# Patient Record
Sex: FEMALE | Race: WHITE | Employment: UNEMPLOYED | URBAN - METROPOLITAN AREA
[De-identification: names, ages, dates, MRNs, and addresses within clinical notes are randomized per-mention and may not be internally consistent; named-entity substitution may affect disease eponyms.]

---

## 2017-01-01 ENCOUNTER — APPOINTMENT (OUTPATIENT)
Dept: CT IMAGING | Facility: CLINIC | Age: 62
DRG: 682 | End: 2017-01-01
Attending: INTERNAL MEDICINE
Payer: MEDICARE

## 2017-01-01 ENCOUNTER — HOSPITAL ENCOUNTER (EMERGENCY)
Facility: CLINIC | Age: 62
Discharge: HOME | End: 2017-01-29
Attending: EMERGENCY MEDICINE | Admitting: EMERGENCY MEDICINE
Payer: MEDICARE

## 2017-01-01 ENCOUNTER — TRANSFERRED RECORDS (OUTPATIENT)
Dept: HEALTH INFORMATION MANAGEMENT | Facility: CLINIC | Age: 62
End: 2017-01-01

## 2017-01-01 ENCOUNTER — APPOINTMENT (OUTPATIENT)
Dept: SPEECH THERAPY | Facility: CLINIC | Age: 62
DRG: 682 | End: 2017-01-01
Attending: HOSPITALIST
Payer: MEDICARE

## 2017-01-01 ENCOUNTER — APPOINTMENT (OUTPATIENT)
Dept: SPEECH THERAPY | Facility: CLINIC | Age: 62
DRG: 871 | End: 2017-01-01
Attending: INTERNAL MEDICINE
Payer: MEDICARE

## 2017-01-01 ENCOUNTER — APPOINTMENT (OUTPATIENT)
Dept: SPEECH THERAPY | Facility: CLINIC | Age: 62
DRG: 871 | End: 2017-01-01
Payer: MEDICARE

## 2017-01-01 ENCOUNTER — APPOINTMENT (OUTPATIENT)
Dept: SPEECH THERAPY | Facility: CLINIC | Age: 62
DRG: 682 | End: 2017-01-01
Payer: MEDICARE

## 2017-01-01 ENCOUNTER — APPOINTMENT (OUTPATIENT)
Dept: GENERAL RADIOLOGY | Facility: CLINIC | Age: 62
DRG: 871 | End: 2017-01-01
Attending: EMERGENCY MEDICINE
Payer: MEDICARE

## 2017-01-01 ENCOUNTER — APPOINTMENT (OUTPATIENT)
Dept: PHYSICAL THERAPY | Facility: CLINIC | Age: 62
DRG: 871 | End: 2017-01-01
Payer: MEDICARE

## 2017-01-01 ENCOUNTER — APPOINTMENT (OUTPATIENT)
Dept: GENERAL RADIOLOGY | Facility: CLINIC | Age: 62
DRG: 871 | End: 2017-01-01
Attending: INTERNAL MEDICINE
Payer: MEDICARE

## 2017-01-01 ENCOUNTER — APPOINTMENT (OUTPATIENT)
Dept: CT IMAGING | Facility: CLINIC | Age: 62
DRG: 193 | End: 2017-01-01
Attending: EMERGENCY MEDICINE
Payer: MEDICARE

## 2017-01-01 ENCOUNTER — APPOINTMENT (OUTPATIENT)
Dept: CT IMAGING | Facility: CLINIC | Age: 62
DRG: 682 | End: 2017-01-01
Attending: EMERGENCY MEDICINE
Payer: MEDICARE

## 2017-01-01 ENCOUNTER — APPOINTMENT (OUTPATIENT)
Dept: PHYSICAL THERAPY | Facility: CLINIC | Age: 62
DRG: 871 | End: 2017-01-01
Attending: INTERNAL MEDICINE
Payer: MEDICARE

## 2017-01-01 ENCOUNTER — APPOINTMENT (OUTPATIENT)
Dept: GENERAL RADIOLOGY | Facility: CLINIC | Age: 62
DRG: 682 | End: 2017-01-01
Attending: HOSPITALIST
Payer: MEDICARE

## 2017-01-01 ENCOUNTER — HOSPITAL ENCOUNTER (INPATIENT)
Facility: CLINIC | Age: 62
LOS: 5 days | Discharge: NURSING, REHAB OR LONG-TERM CARE | DRG: 193 | End: 2017-04-04
Attending: EMERGENCY MEDICINE | Admitting: INTERNAL MEDICINE
Payer: MEDICARE

## 2017-01-01 ENCOUNTER — NURSING HOME VISIT (OUTPATIENT)
Dept: GERIATRICS | Facility: CLINIC | Age: 62
End: 2017-01-01
Payer: MEDICARE

## 2017-01-01 ENCOUNTER — ANESTHESIA (OUTPATIENT)
Dept: ONCOLOGY | Facility: CLINIC | Age: 62
DRG: 682 | End: 2017-01-01
Payer: MEDICARE

## 2017-01-01 ENCOUNTER — HOSPITAL ENCOUNTER (INPATIENT)
Facility: CLINIC | Age: 62
LOS: 12 days | Discharge: NURSING, REHAB OR LONG-TERM CARE | DRG: 871 | End: 2017-03-28
Attending: EMERGENCY MEDICINE | Admitting: INTERNAL MEDICINE
Payer: MEDICARE

## 2017-01-01 ENCOUNTER — APPOINTMENT (OUTPATIENT)
Dept: GENERAL RADIOLOGY | Facility: CLINIC | Age: 62
DRG: 682 | End: 2017-01-01
Attending: ANESTHESIOLOGY
Payer: MEDICARE

## 2017-01-01 ENCOUNTER — HOSPITAL ENCOUNTER (EMERGENCY)
Facility: CLINIC | Age: 62
Discharge: HOME | DRG: 871 | End: 2017-02-11
Attending: EMERGENCY MEDICINE | Admitting: EMERGENCY MEDICINE
Payer: MEDICARE

## 2017-01-01 ENCOUNTER — APPOINTMENT (OUTPATIENT)
Dept: MRI IMAGING | Facility: CLINIC | Age: 62
DRG: 682 | End: 2017-01-01
Attending: HOSPITALIST
Payer: MEDICARE

## 2017-01-01 ENCOUNTER — ANESTHESIA EVENT (OUTPATIENT)
Dept: ONCOLOGY | Facility: CLINIC | Age: 62
DRG: 682 | End: 2017-01-01
Payer: MEDICARE

## 2017-01-01 ENCOUNTER — APPOINTMENT (OUTPATIENT)
Dept: CT IMAGING | Facility: CLINIC | Age: 62
DRG: 682 | End: 2017-01-01
Attending: HOSPITALIST
Payer: MEDICARE

## 2017-01-01 ENCOUNTER — APPOINTMENT (OUTPATIENT)
Dept: CARDIOLOGY | Facility: CLINIC | Age: 62
DRG: 871 | End: 2017-01-01
Attending: INTERNAL MEDICINE
Payer: MEDICARE

## 2017-01-01 ENCOUNTER — DOCUMENTATION ONLY (OUTPATIENT)
Dept: CARE COORDINATION | Facility: CLINIC | Age: 62
End: 2017-01-01

## 2017-01-01 ENCOUNTER — APPOINTMENT (OUTPATIENT)
Dept: GENERAL RADIOLOGY | Facility: CLINIC | Age: 62
DRG: 682 | End: 2017-01-01
Attending: EMERGENCY MEDICINE
Payer: MEDICARE

## 2017-01-01 ENCOUNTER — HOSPITAL ENCOUNTER (INPATIENT)
Facility: CLINIC | Age: 62
LOS: 16 days | Discharge: NURSING, REHAB OR LONG-TERM CARE | DRG: 682 | End: 2017-06-01
Attending: EMERGENCY MEDICINE | Admitting: INTERNAL MEDICINE
Payer: MEDICARE

## 2017-01-01 ENCOUNTER — APPOINTMENT (OUTPATIENT)
Dept: CT IMAGING | Facility: CLINIC | Age: 62
DRG: 871 | End: 2017-01-01
Attending: INTERNAL MEDICINE
Payer: MEDICARE

## 2017-01-01 ENCOUNTER — APPOINTMENT (OUTPATIENT)
Dept: GENERAL RADIOLOGY | Facility: CLINIC | Age: 62
DRG: 193 | End: 2017-01-01
Attending: EMERGENCY MEDICINE
Payer: MEDICARE

## 2017-01-01 ENCOUNTER — APPOINTMENT (OUTPATIENT)
Dept: PHYSICAL THERAPY | Facility: CLINIC | Age: 62
DRG: 193 | End: 2017-01-01
Attending: INTERNAL MEDICINE
Payer: MEDICARE

## 2017-01-01 VITALS
SYSTOLIC BLOOD PRESSURE: 125 MMHG | HEIGHT: 63 IN | HEART RATE: 70 BPM | BODY MASS INDEX: 39.92 KG/M2 | OXYGEN SATURATION: 98 % | RESPIRATION RATE: 16 BRPM | DIASTOLIC BLOOD PRESSURE: 52 MMHG | WEIGHT: 225.31 LBS | TEMPERATURE: 98.4 F

## 2017-01-01 VITALS
OXYGEN SATURATION: 95 % | DIASTOLIC BLOOD PRESSURE: 46 MMHG | BODY MASS INDEX: 36.09 KG/M2 | WEIGHT: 203.71 LBS | RESPIRATION RATE: 16 BRPM | TEMPERATURE: 98.3 F | HEART RATE: 45 BPM | SYSTOLIC BLOOD PRESSURE: 120 MMHG | HEIGHT: 63 IN

## 2017-01-01 VITALS
SYSTOLIC BLOOD PRESSURE: 111 MMHG | OXYGEN SATURATION: 96 % | RESPIRATION RATE: 20 BRPM | DIASTOLIC BLOOD PRESSURE: 85 MMHG | HEART RATE: 82 BPM | TEMPERATURE: 99.3 F

## 2017-01-01 VITALS
OXYGEN SATURATION: 94 % | TEMPERATURE: 98.2 F | SYSTOLIC BLOOD PRESSURE: 145 MMHG | DIASTOLIC BLOOD PRESSURE: 120 MMHG | RESPIRATION RATE: 18 BRPM

## 2017-01-01 VITALS
DIASTOLIC BLOOD PRESSURE: 65 MMHG | RESPIRATION RATE: 22 BRPM | TEMPERATURE: 99.1 F | OXYGEN SATURATION: 94 % | BODY MASS INDEX: 31.77 KG/M2 | HEART RATE: 68 BPM | SYSTOLIC BLOOD PRESSURE: 135 MMHG | HEIGHT: 64 IN | WEIGHT: 186.07 LBS

## 2017-01-01 VITALS
TEMPERATURE: 101.7 F | DIASTOLIC BLOOD PRESSURE: 94 MMHG | RESPIRATION RATE: 24 BRPM | SYSTOLIC BLOOD PRESSURE: 131 MMHG | HEIGHT: 65 IN | HEART RATE: 134 BPM | WEIGHT: 179 LBS | BODY MASS INDEX: 29.82 KG/M2 | OXYGEN SATURATION: 86 %

## 2017-01-01 DIAGNOSIS — Z51.5 HOSPICE CARE PATIENT: ICD-10-CM

## 2017-01-01 DIAGNOSIS — E87.20 ACIDOSIS: ICD-10-CM

## 2017-01-01 DIAGNOSIS — D69.6 THROMBOCYTOPENIA (H): ICD-10-CM

## 2017-01-01 DIAGNOSIS — R19.7 VOMITING AND DIARRHEA: ICD-10-CM

## 2017-01-01 DIAGNOSIS — E87.5 HYPERKALEMIA: ICD-10-CM
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
## 2017-01-01 DIAGNOSIS — E87.0 HYPERNATREMIA: ICD-10-CM

## 2017-01-01 DIAGNOSIS — N18.4 CKD (CHRONIC KIDNEY DISEASE) STAGE 4, GFR 15-29 ML/MIN (H): ICD-10-CM

## 2017-01-01 DIAGNOSIS — I10 ESSENTIAL HYPERTENSION: Chronic | ICD-10-CM

## 2017-01-01 DIAGNOSIS — N39.0 URINARY TRACT INFECTION, SITE UNSPECIFIED: ICD-10-CM

## 2017-01-01 DIAGNOSIS — I10 BENIGN ESSENTIAL HYPERTENSION: Primary | ICD-10-CM

## 2017-01-01 DIAGNOSIS — I10 HTN (HYPERTENSION): ICD-10-CM

## 2017-01-01 DIAGNOSIS — S80.02XA CONTUSION OF LEFT KNEE, INITIAL ENCOUNTER: ICD-10-CM

## 2017-01-01 DIAGNOSIS — M62.81 GENERALIZED MUSCLE WEAKNESS: ICD-10-CM

## 2017-01-01 DIAGNOSIS — M79.672 LEFT FOOT PAIN: ICD-10-CM

## 2017-01-01 DIAGNOSIS — E23.2 DIABETES INSIPIDUS (H): ICD-10-CM

## 2017-01-01 DIAGNOSIS — J18.9 PNEUMONIA OF LEFT LOWER LOBE DUE TO INFECTIOUS ORGANISM: ICD-10-CM

## 2017-01-01 DIAGNOSIS — R74.8 ELEVATED LIPASE: ICD-10-CM

## 2017-01-01 DIAGNOSIS — F25.0 SCHIZOAFFECTIVE DISORDER, BIPOLAR TYPE (H): ICD-10-CM

## 2017-01-01 DIAGNOSIS — R45.1 AGITATION: ICD-10-CM

## 2017-01-01 DIAGNOSIS — K59.00 CONSTIPATION, UNSPECIFIED CONSTIPATION TYPE: ICD-10-CM

## 2017-01-01 DIAGNOSIS — N19 RENAL FAILURE: ICD-10-CM

## 2017-01-01 DIAGNOSIS — F25.9 SCHIZOAFFECTIVE DISORDER, UNSPECIFIED TYPE (H): ICD-10-CM

## 2017-01-01 DIAGNOSIS — F25.0 SCHIZOAFFECTIVE DISORDER, BIPOLAR TYPE (H): Primary | ICD-10-CM

## 2017-01-01 DIAGNOSIS — K21.9 GASTROESOPHAGEAL REFLUX DISEASE, ESOPHAGITIS PRESENCE NOT SPECIFIED: ICD-10-CM

## 2017-01-01 DIAGNOSIS — N39.0 URINARY TRACT INFECTION WITHOUT HEMATURIA, SITE UNSPECIFIED: ICD-10-CM

## 2017-01-01 DIAGNOSIS — A41.9 SEPSIS, DUE TO UNSPECIFIED ORGANISM: ICD-10-CM

## 2017-01-01 DIAGNOSIS — N17.9 AKI (ACUTE KIDNEY INJURY) (H): ICD-10-CM

## 2017-01-01 DIAGNOSIS — Z86.59 HISTORY OF PSYCHIATRIC DISORDER: ICD-10-CM

## 2017-01-01 DIAGNOSIS — Z79.4 TYPE 2 DIABETES MELLITUS WITH COMPLICATION, WITH LONG-TERM CURRENT USE OF INSULIN (H): ICD-10-CM

## 2017-01-01 DIAGNOSIS — R11.10 VOMITING AND DIARRHEA: ICD-10-CM

## 2017-01-01 DIAGNOSIS — N25.1: ICD-10-CM

## 2017-01-01 DIAGNOSIS — T56.894S LITHIUM TOXICITY, UNDETERMINED INTENT, SEQUELA: ICD-10-CM

## 2017-01-01 DIAGNOSIS — E11.8 TYPE 2 DIABETES MELLITUS WITH COMPLICATION, WITH LONG-TERM CURRENT USE OF INSULIN (H): ICD-10-CM

## 2017-01-01 DIAGNOSIS — G93.40 ENCEPHALOPATHY: ICD-10-CM

## 2017-01-01 DIAGNOSIS — I10 ESSENTIAL HYPERTENSION: ICD-10-CM

## 2017-01-01 DIAGNOSIS — G93.41 ACUTE METABOLIC ENCEPHALOPATHY: Primary | ICD-10-CM

## 2017-01-01 DIAGNOSIS — Z51.5 COMFORT MEASURES ONLY STATUS: Primary | ICD-10-CM

## 2017-01-01 DIAGNOSIS — R56.9 CONVULSIONS, UNSPECIFIED CONVULSION TYPE (H): ICD-10-CM

## 2017-01-01 DIAGNOSIS — R50.9 FEVER, UNSPECIFIED: ICD-10-CM

## 2017-01-01 DIAGNOSIS — E86.0 SEVERE DEHYDRATION: ICD-10-CM

## 2017-01-01 DIAGNOSIS — S40.012A CONTUSION OF LEFT SHOULDER, INITIAL ENCOUNTER: ICD-10-CM

## 2017-01-01 DIAGNOSIS — F20.0 PARANOID SCHIZOPHRENIA (H): ICD-10-CM

## 2017-01-01 LAB
ALBUMIN SERPL-MCNC: 2 G/DL (ref 3.4–5)
ALBUMIN SERPL-MCNC: 2.3 G/DL (ref 3.4–5)
ALBUMIN SERPL-MCNC: 2.4 G/DL (ref 3.4–5)
ALBUMIN SERPL-MCNC: 2.6 G/DL (ref 3.4–5)
ALBUMIN SERPL-MCNC: 2.7 G/DL (ref 3.4–5)
ALBUMIN SERPL-MCNC: 2.7 G/DL (ref 3.4–5)
ALBUMIN SERPL-MCNC: 2.8 G/DL (ref 3.4–5)
ALBUMIN SERPL-MCNC: 2.9 G/DL (ref 3.4–5)
ALBUMIN SERPL-MCNC: 3 G/DL (ref 3.4–5)
ALBUMIN SERPL-MCNC: 3.1 G/DL (ref 3.4–5)
ALBUMIN SERPL-MCNC: NORMAL G/DL (ref 3.4–5)
ALBUMIN UR-MCNC: 10 MG/DL
ALBUMIN UR-MCNC: 100 MG/DL
ALBUMIN UR-MCNC: 30 MG/DL
ALP SERPL-CCNC: 50 U/L (ref 40–150)
ALP SERPL-CCNC: 67 U/L (ref 40–150)
ALP SERPL-CCNC: 68 U/L (ref 40–150)
ALP SERPL-CCNC: 72 U/L (ref 40–150)
ALP SERPL-CCNC: 75 U/L (ref 40–150)
ALP SERPL-CCNC: 86 U/L (ref 40–150)
ALT SERPL W P-5'-P-CCNC: 13 U/L (ref 0–50)
ALT SERPL W P-5'-P-CCNC: 14 U/L (ref 0–50)
ALT SERPL W P-5'-P-CCNC: 20 U/L (ref 0–50)
ALT SERPL W P-5'-P-CCNC: 26 U/L (ref 0–50)
ALT SERPL W P-5'-P-CCNC: 28 U/L (ref 0–50)
ALT SERPL W P-5'-P-CCNC: 8 U/L (ref 0–50)
AMMONIA PLAS-SCNC: 20 UMOL/L (ref 10–50)
ANION GAP SERPL CALCULATED.3IONS-SCNC: 10 MMOL/L (ref 3–14)
ANION GAP SERPL CALCULATED.3IONS-SCNC: 11 MMOL/L (ref 3–14)
ANION GAP SERPL CALCULATED.3IONS-SCNC: 12 MMOL/L (ref 3–14)
ANION GAP SERPL CALCULATED.3IONS-SCNC: 13 MMOL/L (ref 3–14)
ANION GAP SERPL CALCULATED.3IONS-SCNC: 14 MMOL/L (ref 3–14)
ANION GAP SERPL CALCULATED.3IONS-SCNC: 5 MMOL/L (ref 3–14)
ANION GAP SERPL CALCULATED.3IONS-SCNC: 6 MMOL/L (ref 3–14)
ANION GAP SERPL CALCULATED.3IONS-SCNC: 7 MMOL/L (ref 3–14)
ANION GAP SERPL CALCULATED.3IONS-SCNC: 8 MMOL/L (ref 3–14)
ANION GAP SERPL CALCULATED.3IONS-SCNC: 9 MMOL/L (ref 3–14)
ANION GAP SERPL CALCULATED.3IONS-SCNC: NORMAL MMOL/L (ref 6–17)
ANION GAP SERPL CALCULATED.3IONS-SCNC: NORMAL MMOL/L (ref 6–17)
ANISOCYTOSIS BLD QL SMEAR: SLIGHT
APPEARANCE CSF: CLEAR
APPEARANCE UR: ABNORMAL
APPEARANCE UR: ABNORMAL
APPEARANCE UR: CLEAR
AST SERPL W P-5'-P-CCNC: 10 U/L (ref 0–45)
AST SERPL W P-5'-P-CCNC: 12 U/L (ref 0–45)
AST SERPL W P-5'-P-CCNC: 19 U/L (ref 0–45)
AST SERPL W P-5'-P-CCNC: 19 U/L (ref 0–45)
AST SERPL W P-5'-P-CCNC: 34 U/L (ref 0–45)
AST SERPL W P-5'-P-CCNC: 5 U/L (ref 0–45)
BACTERIA #/AREA URNS HPF: ABNORMAL /HPF
BACTERIA SPEC CULT: ABNORMAL
BACTERIA SPEC CULT: NO GROWTH
BASE DEFICIT BLDA-SCNC: 11.1 MMOL/L
BASE DEFICIT BLDA-SCNC: 13.2 MMOL/L
BASE DEFICIT BLDA-SCNC: 3.1 MMOL/L
BASE DEFICIT BLDA-SCNC: 3.7 MMOL/L
BASE DEFICIT BLDA-SCNC: NORMAL MMOL/L
BASE DEFICIT BLDV-SCNC: 10.9 MMOL/L
BASE DEFICIT BLDV-SCNC: 5.5 MMOL/L
BASE DEFICIT BLDV-SCNC: 6 MMOL/L
BASE DEFICIT BLDV-SCNC: NORMAL MMOL/L
BASE EXCESS BLDA CALC-SCNC: NORMAL MMOL/L
BASE EXCESS BLDV CALC-SCNC: NORMAL MMOL/L
BASOPHILS # BLD AUTO: 0 10E9/L (ref 0–0.2)
BASOPHILS # BLD AUTO: 0.1 10E9/L (ref 0–0.2)
BASOPHILS NFR BLD AUTO: 0 %
BASOPHILS NFR BLD AUTO: 0.2 %
BASOPHILS NFR BLD AUTO: 0.2 %
BASOPHILS NFR BLD AUTO: 0.3 %
BASOPHILS NFR BLD AUTO: 0.5 %
BASOPHILS NFR BLD AUTO: 0.5 %
BASOPHILS NFR BLD AUTO: 1 %
BASOPHILS NFR BLD AUTO: 2 %
BILIRUB SERPL-MCNC: 0.2 MG/DL (ref 0.2–1.3)
BILIRUB SERPL-MCNC: 0.3 MG/DL (ref 0.2–1.3)
BILIRUB SERPL-MCNC: 0.3 MG/DL (ref 0.2–1.3)
BILIRUB UR QL STRIP: NEGATIVE
BUN SERPL-MCNC: 27 MG/DL (ref 7–30)
BUN SERPL-MCNC: 30 MG/DL (ref 7–30)
BUN SERPL-MCNC: 33 MG/DL (ref 7–30)
BUN SERPL-MCNC: 33 MG/DL (ref 7–30)
BUN SERPL-MCNC: 34 MG/DL (ref 7–30)
BUN SERPL-MCNC: 35 MG/DL (ref 7–30)
BUN SERPL-MCNC: 36 MG/DL (ref 7–30)
BUN SERPL-MCNC: 36 MG/DL (ref 7–30)
BUN SERPL-MCNC: 37 MG/DL (ref 7–30)
BUN SERPL-MCNC: 38 MG/DL (ref 7–30)
BUN SERPL-MCNC: 39 MG/DL (ref 7–30)
BUN SERPL-MCNC: 40 MG/DL (ref 7–30)
BUN SERPL-MCNC: 41 MG/DL (ref 7–30)
BUN SERPL-MCNC: 42 MG/DL (ref 7–30)
BUN SERPL-MCNC: 43 MG/DL (ref 7–30)
BUN SERPL-MCNC: 44 MG/DL (ref 7–30)
BUN SERPL-MCNC: 45 MG/DL (ref 7–30)
BUN SERPL-MCNC: 45 MG/DL (ref 7–30)
BUN SERPL-MCNC: 46 MG/DL (ref 7–30)
BUN SERPL-MCNC: 47 MG/DL (ref 7–30)
BUN SERPL-MCNC: 48 MG/DL (ref 7–30)
BUN SERPL-MCNC: 48 MG/DL (ref 7–30)
BUN SERPL-MCNC: 49 MG/DL (ref 7–30)
BUN SERPL-MCNC: 51 MG/DL (ref 7–30)
BUN SERPL-MCNC: 52 MG/DL (ref 7–30)
BUN SERPL-MCNC: 54 MG/DL (ref 7–30)
BUN SERPL-MCNC: 55 MG/DL (ref 7–30)
BUN SERPL-MCNC: 55 MG/DL (ref 7–30)
BUN SERPL-MCNC: 58 MG/DL (ref 7–30)
BUN SERPL-MCNC: NORMAL MG/DL (ref 7–30)
BUN SERPL-MCNC: NORMAL MG/DL (ref 7–30)
C DIFF TOX B STL QL: NORMAL
CALCIUM SERPL-MCNC: 10 MG/DL (ref 8.5–10.1)
CALCIUM SERPL-MCNC: 10.1 MG/DL (ref 8.5–10.1)
CALCIUM SERPL-MCNC: 10.2 MG/DL (ref 8.5–10.1)
CALCIUM SERPL-MCNC: 10.2 MG/DL (ref 8.5–10.1)
CALCIUM SERPL-MCNC: 10.3 MG/DL (ref 8.5–10.1)
CALCIUM SERPL-MCNC: 10.5 MG/DL (ref 8.5–10.1)
CALCIUM SERPL-MCNC: 10.8 MG/DL (ref 8.5–10.1)
CALCIUM SERPL-MCNC: 10.8 MG/DL (ref 8.5–10.1)
CALCIUM SERPL-MCNC: 10.9 MG/DL (ref 8.5–10.1)
CALCIUM SERPL-MCNC: 11.5 MG/DL (ref 8.5–10.1)
CALCIUM SERPL-MCNC: 12 MG/DL (ref 8.5–10.1)
CALCIUM SERPL-MCNC: 12.1 MG/DL (ref 8.5–10.1)
CALCIUM SERPL-MCNC: 12.3 MG/DL (ref 8.5–10.1)
CALCIUM SERPL-MCNC: 12.5 MG/DL (ref 8.5–10.1)
CALCIUM SERPL-MCNC: 8.9 MG/DL (ref 8.5–10.1)
CALCIUM SERPL-MCNC: 9 MG/DL (ref 8.5–10.1)
CALCIUM SERPL-MCNC: 9.1 MG/DL (ref 8.5–10.1)
CALCIUM SERPL-MCNC: 9.2 MG/DL (ref 8.5–10.1)
CALCIUM SERPL-MCNC: 9.2 MG/DL (ref 8.5–10.1)
CALCIUM SERPL-MCNC: 9.3 MG/DL (ref 8.5–10.1)
CALCIUM SERPL-MCNC: 9.4 MG/DL (ref 8.5–10.1)
CALCIUM SERPL-MCNC: 9.5 MG/DL (ref 8.5–10.1)
CALCIUM SERPL-MCNC: 9.6 MG/DL (ref 8.5–10.1)
CALCIUM SERPL-MCNC: 9.7 MG/DL (ref 8.5–10.1)
CALCIUM SERPL-MCNC: 9.8 MG/DL (ref 8.5–10.1)
CALCIUM SERPL-MCNC: 9.9 MG/DL (ref 8.5–10.1)
CALCIUM SERPL-MCNC: NORMAL MG/DL (ref 8.5–10.1)
CALCIUM SERPL-MCNC: NORMAL MG/DL (ref 8.5–10.1)
CHLORIDE SERPL-SCNC: 101 MMOL/L (ref 94–109)
CHLORIDE SERPL-SCNC: 103 MMOL/L (ref 94–109)
CHLORIDE SERPL-SCNC: 104 MMOL/L (ref 94–109)
CHLORIDE SERPL-SCNC: 105 MMOL/L (ref 94–109)
CHLORIDE SERPL-SCNC: 106 MMOL/L (ref 94–109)
CHLORIDE SERPL-SCNC: 107 MMOL/L (ref 94–109)
CHLORIDE SERPL-SCNC: 108 MMOL/L (ref 94–109)
CHLORIDE SERPL-SCNC: 108 MMOL/L (ref 94–109)
CHLORIDE SERPL-SCNC: 109 MMOL/L (ref 94–109)
CHLORIDE SERPL-SCNC: 109 MMOL/L (ref 94–109)
CHLORIDE SERPL-SCNC: 110 MMOL/L (ref 94–109)
CHLORIDE SERPL-SCNC: 110 MMOL/L (ref 94–109)
CHLORIDE SERPL-SCNC: 111 MMOL/L (ref 94–109)
CHLORIDE SERPL-SCNC: 112 MMOL/L (ref 94–109)
CHLORIDE SERPL-SCNC: 113 MMOL/L (ref 94–109)
CHLORIDE SERPL-SCNC: 114 MMOL/L (ref 94–109)
CHLORIDE SERPL-SCNC: 115 MMOL/L (ref 94–109)
CHLORIDE SERPL-SCNC: 116 MMOL/L (ref 94–109)
CHLORIDE SERPL-SCNC: 117 MMOL/L (ref 94–109)
CHLORIDE SERPL-SCNC: 119 MMOL/L (ref 94–109)
CHLORIDE SERPL-SCNC: 119 MMOL/L (ref 94–109)
CHLORIDE SERPL-SCNC: 121 MMOL/L (ref 94–109)
CHLORIDE SERPL-SCNC: 122 MMOL/L (ref 94–109)
CHLORIDE SERPL-SCNC: 122 MMOL/L (ref 94–109)
CHLORIDE SERPL-SCNC: 123 MMOL/L (ref 94–109)
CHLORIDE SERPL-SCNC: NORMAL MMOL/L (ref 94–109)
CHLORIDE SERPL-SCNC: NORMAL MMOL/L (ref 94–109)
CK SERPL-CCNC: 88 U/L (ref 30–225)
CO2 BLDCOV-SCNC: 16 MMOL/L (ref 21–28)
CO2 BLDCOV-SCNC: 16 MMOL/L (ref 21–28)
CO2 SERPL-SCNC: 14 MMOL/L (ref 20–32)
CO2 SERPL-SCNC: 15 MMOL/L (ref 20–32)
CO2 SERPL-SCNC: 17 MMOL/L (ref 20–32)
CO2 SERPL-SCNC: 19 MMOL/L (ref 20–32)
CO2 SERPL-SCNC: 20 MMOL/L (ref 20–32)
CO2 SERPL-SCNC: 21 MMOL/L (ref 20–32)
CO2 SERPL-SCNC: 22 MMOL/L (ref 20–32)
CO2 SERPL-SCNC: 23 MMOL/L (ref 20–32)
CO2 SERPL-SCNC: 24 MMOL/L (ref 20–32)
CO2 SERPL-SCNC: 25 MMOL/L (ref 20–32)
CO2 SERPL-SCNC: 26 MMOL/L (ref 20–32)
CO2 SERPL-SCNC: 27 MMOL/L (ref 20–32)
CO2 SERPL-SCNC: 28 MMOL/L (ref 20–32)
CO2 SERPL-SCNC: NORMAL MMOL/L (ref 20–32)
CO2 SERPL-SCNC: NORMAL MMOL/L (ref 20–32)
COLOR CSF: COLORLESS
COLOR UR AUTO: ABNORMAL
COLOR UR AUTO: YELLOW
COLOR UR AUTO: YELLOW
CREAT SERPL-MCNC: 1.96 MG/DL (ref 0.52–1.04)
CREAT SERPL-MCNC: 2.04 MG/DL (ref 0.52–1.04)
CREAT SERPL-MCNC: 2.04 MG/DL (ref 0.52–1.04)
CREAT SERPL-MCNC: 2.15 MG/DL (ref 0.52–1.04)
CREAT SERPL-MCNC: 2.22 MG/DL (ref 0.52–1.04)
CREAT SERPL-MCNC: 2.26 MG/DL (ref 0.52–1.04)
CREAT SERPL-MCNC: 2.33 MG/DL (ref 0.52–1.04)
CREAT SERPL-MCNC: 2.35 MG/DL (ref 0.52–1.04)
CREAT SERPL-MCNC: 2.37 MG/DL (ref 0.52–1.04)
CREAT SERPL-MCNC: 2.38 MG/DL (ref 0.52–1.04)
CREAT SERPL-MCNC: 2.4 MG/DL (ref 0.52–1.04)
CREAT SERPL-MCNC: 2.41 MG/DL (ref 0.52–1.04)
CREAT SERPL-MCNC: 2.49 MG/DL (ref 0.52–1.04)
CREAT SERPL-MCNC: 2.5 MG/DL (ref 0.52–1.04)
CREAT SERPL-MCNC: 2.54 MG/DL (ref 0.52–1.04)
CREAT SERPL-MCNC: 2.54 MG/DL (ref 0.52–1.04)
CREAT SERPL-MCNC: 2.56 MG/DL (ref 0.52–1.04)
CREAT SERPL-MCNC: 2.59 MG/DL (ref 0.52–1.04)
CREAT SERPL-MCNC: 2.59 MG/DL (ref 0.52–1.04)
CREAT SERPL-MCNC: 2.63 MG/DL (ref 0.52–1.04)
CREAT SERPL-MCNC: 2.64 MG/DL (ref 0.52–1.04)
CREAT SERPL-MCNC: 2.66 MG/DL (ref 0.52–1.04)
CREAT SERPL-MCNC: 2.71 MG/DL (ref 0.52–1.04)
CREAT SERPL-MCNC: 2.73 MG/DL (ref 0.52–1.04)
CREAT SERPL-MCNC: 2.74 MG/DL (ref 0.52–1.04)
CREAT SERPL-MCNC: 2.77 MG/DL (ref 0.52–1.04)
CREAT SERPL-MCNC: 2.82 MG/DL (ref 0.52–1.04)
CREAT SERPL-MCNC: 2.85 MG/DL (ref 0.52–1.04)
CREAT SERPL-MCNC: 2.89 MG/DL (ref 0.52–1.04)
CREAT SERPL-MCNC: 2.91 MG/DL (ref 0.52–1.04)
CREAT SERPL-MCNC: 2.93 MG/DL (ref 0.52–1.04)
CREAT SERPL-MCNC: 2.94 MG/DL (ref 0.52–1.04)
CREAT SERPL-MCNC: 2.99 MG/DL (ref 0.52–1.04)
CREAT SERPL-MCNC: 3 MG/DL (ref 0.52–1.04)
CREAT SERPL-MCNC: 3.03 MG/DL (ref 0.52–1.04)
CREAT SERPL-MCNC: 3.1 MG/DL (ref 0.52–1.04)
CREAT SERPL-MCNC: 3.11 MG/DL (ref 0.52–1.04)
CREAT SERPL-MCNC: 3.16 MG/DL (ref 0.52–1.04)
CREAT SERPL-MCNC: 3.18 MG/DL (ref 0.52–1.04)
CREAT SERPL-MCNC: 3.19 MG/DL (ref 0.52–1.04)
CREAT SERPL-MCNC: 3.2 MG/DL (ref 0.52–1.04)
CREAT SERPL-MCNC: 3.21 MG/DL (ref 0.52–1.04)
CREAT SERPL-MCNC: 3.22 MG/DL (ref 0.52–1.04)
CREAT SERPL-MCNC: 3.25 MG/DL (ref 0.52–1.04)
CREAT SERPL-MCNC: 3.28 MG/DL (ref 0.52–1.04)
CREAT SERPL-MCNC: 3.28 MG/DL (ref 0.52–1.04)
CREAT SERPL-MCNC: 3.35 MG/DL (ref 0.52–1.04)
CREAT SERPL-MCNC: 3.41 MG/DL (ref 0.52–1.04)
CREAT SERPL-MCNC: 3.47 MG/DL (ref 0.52–1.04)
CREAT SERPL-MCNC: 3.53 MG/DL (ref 0.52–1.04)
CREAT SERPL-MCNC: 3.53 MG/DL (ref 0.52–1.04)
CREAT SERPL-MCNC: 3.61 MG/DL (ref 0.52–1.04)
CREAT SERPL-MCNC: 3.61 MG/DL (ref 0.52–1.04)
CREAT SERPL-MCNC: 3.64 MG/DL (ref 0.52–1.04)
CREAT SERPL-MCNC: 3.65 MG/DL (ref 0.52–1.04)
CREAT SERPL-MCNC: 3.68 MG/DL (ref 0.52–1.04)
CREAT SERPL-MCNC: 3.76 MG/DL (ref 0.52–1.04)
CREAT SERPL-MCNC: 4.09 MG/DL (ref 0.52–1.04)
CREAT SERPL-MCNC: 4.14 MG/DL (ref 0.52–1.04)
CREAT SERPL-MCNC: NORMAL MG/DL (ref 0.52–1.04)
CREAT SERPL-MCNC: NORMAL MG/DL (ref 0.52–1.04)
CREAT UR-MCNC: 19 MG/DL
DEPRECATED CALCIDIOL+CALCIFEROL SERPL-MC: 19 UG/L (ref 20–75)
DIFFERENTIAL METHOD BLD: ABNORMAL
DIFFERENTIAL METHOD BLD: NORMAL
DIFFERENTIAL METHOD BLD: NORMAL
EOSINOPHIL # BLD AUTO: 0 10E9/L (ref 0–0.7)
EOSINOPHIL # BLD AUTO: 0 10E9/L (ref 0–0.7)
EOSINOPHIL # BLD AUTO: 0.1 10E9/L (ref 0–0.7)
EOSINOPHIL # BLD AUTO: 0.2 10E9/L (ref 0–0.7)
EOSINOPHIL # BLD AUTO: 0.4 10E9/L (ref 0–0.7)
EOSINOPHIL NFR BLD AUTO: 0 %
EOSINOPHIL NFR BLD AUTO: 0 %
EOSINOPHIL NFR BLD AUTO: 0.6 %
EOSINOPHIL NFR BLD AUTO: 0.6 %
EOSINOPHIL NFR BLD AUTO: 0.7 %
EOSINOPHIL NFR BLD AUTO: 0.8 %
EOSINOPHIL NFR BLD AUTO: 1 %
EOSINOPHIL NFR BLD AUTO: 2 %
EOSINOPHIL NFR BLD AUTO: 2 %
EOSINOPHIL NFR BLD AUTO: 4.4 %
ERYTHROCYTE [DISTWIDTH] IN BLOOD BY AUTOMATED COUNT: 14 % (ref 10–15)
ERYTHROCYTE [DISTWIDTH] IN BLOOD BY AUTOMATED COUNT: 14.1 % (ref 10–15)
ERYTHROCYTE [DISTWIDTH] IN BLOOD BY AUTOMATED COUNT: 14.2 % (ref 10–15)
ERYTHROCYTE [DISTWIDTH] IN BLOOD BY AUTOMATED COUNT: 14.2 % (ref 10–15)
ERYTHROCYTE [DISTWIDTH] IN BLOOD BY AUTOMATED COUNT: 14.3 % (ref 10–15)
ERYTHROCYTE [DISTWIDTH] IN BLOOD BY AUTOMATED COUNT: 14.4 % (ref 10–15)
ERYTHROCYTE [DISTWIDTH] IN BLOOD BY AUTOMATED COUNT: 14.4 % (ref 10–15)
ERYTHROCYTE [DISTWIDTH] IN BLOOD BY AUTOMATED COUNT: 14.5 % (ref 10–15)
ERYTHROCYTE [DISTWIDTH] IN BLOOD BY AUTOMATED COUNT: 14.6 % (ref 10–15)
ERYTHROCYTE [DISTWIDTH] IN BLOOD BY AUTOMATED COUNT: 14.7 % (ref 10–15)
ERYTHROCYTE [DISTWIDTH] IN BLOOD BY AUTOMATED COUNT: 14.9 % (ref 10–15)
ERYTHROCYTE [DISTWIDTH] IN BLOOD BY AUTOMATED COUNT: 15 % (ref 10–15)
ERYTHROCYTE [DISTWIDTH] IN BLOOD BY AUTOMATED COUNT: 15.2 % (ref 10–15)
ERYTHROCYTE [DISTWIDTH] IN BLOOD BY AUTOMATED COUNT: 15.4 % (ref 10–15)
ERYTHROCYTE [DISTWIDTH] IN BLOOD BY AUTOMATED COUNT: 15.6 % (ref 10–15)
ERYTHROCYTE [DISTWIDTH] IN BLOOD BY AUTOMATED COUNT: 15.7 % (ref 10–15)
ERYTHROCYTE [DISTWIDTH] IN BLOOD BY AUTOMATED COUNT: 15.8 % (ref 10–15)
ERYTHROCYTE [DISTWIDTH] IN BLOOD BY AUTOMATED COUNT: 15.8 % (ref 10–15)
ERYTHROCYTE [DISTWIDTH] IN BLOOD BY AUTOMATED COUNT: 15.9 % (ref 10–15)
ERYTHROCYTE [DISTWIDTH] IN BLOOD BY AUTOMATED COUNT: 16 % (ref 10–15)
ERYTHROCYTE [DISTWIDTH] IN BLOOD BY AUTOMATED COUNT: 16.1 % (ref 10–15)
ERYTHROCYTE [DISTWIDTH] IN BLOOD BY AUTOMATED COUNT: 16.5 % (ref 10–15)
FRACT EXCRET NA UR+SERPL-RTO: 2.3 %
GFR SERPL CREATININE-BSD FRML MDRD: 11 ML/MIN/1.7M2
GFR SERPL CREATININE-BSD FRML MDRD: 11 ML/MIN/1.7M2
GFR SERPL CREATININE-BSD FRML MDRD: 12 ML/MIN/1.7M2
GFR SERPL CREATININE-BSD FRML MDRD: 13 ML/MIN/1.7M2
GFR SERPL CREATININE-BSD FRML MDRD: 14 ML/MIN/1.7M2
GFR SERPL CREATININE-BSD FRML MDRD: 15 ML/MIN/1.7M2
GFR SERPL CREATININE-BSD FRML MDRD: 16 ML/MIN/1.7M2
GFR SERPL CREATININE-BSD FRML MDRD: 17 ML/MIN/1.7M2
GFR SERPL CREATININE-BSD FRML MDRD: 18 ML/MIN/1.7M2
GFR SERPL CREATININE-BSD FRML MDRD: 19 ML/MIN/1.7M2
GFR SERPL CREATININE-BSD FRML MDRD: 20 ML/MIN/1.7M2
GFR SERPL CREATININE-BSD FRML MDRD: 21 ML/MIN/1.7M2
GFR SERPL CREATININE-BSD FRML MDRD: 22 ML/MIN/1.7M2
GFR SERPL CREATININE-BSD FRML MDRD: 22 ML/MIN/1.7M2
GFR SERPL CREATININE-BSD FRML MDRD: 23 ML/MIN/1.7M2
GFR SERPL CREATININE-BSD FRML MDRD: 25 ML/MIN/1.7M2
GFR SERPL CREATININE-BSD FRML MDRD: 25 ML/MIN/1.7M2
GFR SERPL CREATININE-BSD FRML MDRD: 26 ML/MIN/1.7M2
GFR SERPL CREATININE-BSD FRML MDRD: NORMAL ML/MIN/1.7M2
GFR SERPL CREATININE-BSD FRML MDRD: NORMAL ML/MIN/1.7M2
GLUCOSE BLDC GLUCOMTR-MCNC: 103 MG/DL (ref 70–99)
GLUCOSE BLDC GLUCOMTR-MCNC: 104 MG/DL (ref 70–99)
GLUCOSE BLDC GLUCOMTR-MCNC: 105 MG/DL (ref 70–99)
GLUCOSE BLDC GLUCOMTR-MCNC: 105 MG/DL (ref 70–99)
GLUCOSE BLDC GLUCOMTR-MCNC: 106 MG/DL (ref 70–99)
GLUCOSE BLDC GLUCOMTR-MCNC: 107 MG/DL (ref 70–99)
GLUCOSE BLDC GLUCOMTR-MCNC: 108 MG/DL (ref 70–99)
GLUCOSE BLDC GLUCOMTR-MCNC: 109 MG/DL (ref 70–99)
GLUCOSE BLDC GLUCOMTR-MCNC: 111 MG/DL (ref 70–99)
GLUCOSE BLDC GLUCOMTR-MCNC: 118 MG/DL (ref 70–99)
GLUCOSE BLDC GLUCOMTR-MCNC: 121 MG/DL (ref 70–99)
GLUCOSE BLDC GLUCOMTR-MCNC: 121 MG/DL (ref 70–99)
GLUCOSE BLDC GLUCOMTR-MCNC: 123 MG/DL (ref 70–99)
GLUCOSE BLDC GLUCOMTR-MCNC: 124 MG/DL (ref 70–99)
GLUCOSE BLDC GLUCOMTR-MCNC: 124 MG/DL (ref 70–99)
GLUCOSE BLDC GLUCOMTR-MCNC: 126 MG/DL (ref 70–99)
GLUCOSE BLDC GLUCOMTR-MCNC: 127 MG/DL (ref 70–99)
GLUCOSE BLDC GLUCOMTR-MCNC: 128 MG/DL (ref 70–99)
GLUCOSE BLDC GLUCOMTR-MCNC: 128 MG/DL (ref 70–99)
GLUCOSE BLDC GLUCOMTR-MCNC: 129 MG/DL (ref 70–99)
GLUCOSE BLDC GLUCOMTR-MCNC: 129 MG/DL (ref 70–99)
GLUCOSE BLDC GLUCOMTR-MCNC: 130 MG/DL (ref 70–99)
GLUCOSE BLDC GLUCOMTR-MCNC: 131 MG/DL (ref 70–99)
GLUCOSE BLDC GLUCOMTR-MCNC: 132 MG/DL (ref 70–99)
GLUCOSE BLDC GLUCOMTR-MCNC: 132 MG/DL (ref 70–99)
GLUCOSE BLDC GLUCOMTR-MCNC: 133 MG/DL (ref 70–99)
GLUCOSE BLDC GLUCOMTR-MCNC: 133 MG/DL (ref 70–99)
GLUCOSE BLDC GLUCOMTR-MCNC: 134 MG/DL (ref 70–99)
GLUCOSE BLDC GLUCOMTR-MCNC: 135 MG/DL (ref 70–99)
GLUCOSE BLDC GLUCOMTR-MCNC: 135 MG/DL (ref 70–99)
GLUCOSE BLDC GLUCOMTR-MCNC: 136 MG/DL (ref 70–99)
GLUCOSE BLDC GLUCOMTR-MCNC: 136 MG/DL (ref 70–99)
GLUCOSE BLDC GLUCOMTR-MCNC: 138 MG/DL (ref 70–99)
GLUCOSE BLDC GLUCOMTR-MCNC: 140 MG/DL (ref 70–99)
GLUCOSE BLDC GLUCOMTR-MCNC: 142 MG/DL (ref 70–99)
GLUCOSE BLDC GLUCOMTR-MCNC: 143 MG/DL (ref 70–99)
GLUCOSE BLDC GLUCOMTR-MCNC: 143 MG/DL (ref 70–99)
GLUCOSE BLDC GLUCOMTR-MCNC: 144 MG/DL (ref 70–99)
GLUCOSE BLDC GLUCOMTR-MCNC: 145 MG/DL (ref 70–99)
GLUCOSE BLDC GLUCOMTR-MCNC: 146 MG/DL (ref 70–99)
GLUCOSE BLDC GLUCOMTR-MCNC: 147 MG/DL (ref 70–99)
GLUCOSE BLDC GLUCOMTR-MCNC: 149 MG/DL (ref 70–99)
GLUCOSE BLDC GLUCOMTR-MCNC: 149 MG/DL (ref 70–99)
GLUCOSE BLDC GLUCOMTR-MCNC: 150 MG/DL (ref 70–99)
GLUCOSE BLDC GLUCOMTR-MCNC: 151 MG/DL (ref 70–99)
GLUCOSE BLDC GLUCOMTR-MCNC: 151 MG/DL (ref 70–99)
GLUCOSE BLDC GLUCOMTR-MCNC: 152 MG/DL (ref 70–99)
GLUCOSE BLDC GLUCOMTR-MCNC: 152 MG/DL (ref 70–99)
GLUCOSE BLDC GLUCOMTR-MCNC: 153 MG/DL (ref 70–99)
GLUCOSE BLDC GLUCOMTR-MCNC: 154 MG/DL (ref 70–99)
GLUCOSE BLDC GLUCOMTR-MCNC: 156 MG/DL (ref 70–99)
GLUCOSE BLDC GLUCOMTR-MCNC: 157 MG/DL (ref 70–99)
GLUCOSE BLDC GLUCOMTR-MCNC: 158 MG/DL (ref 70–99)
GLUCOSE BLDC GLUCOMTR-MCNC: 159 MG/DL (ref 70–99)
GLUCOSE BLDC GLUCOMTR-MCNC: 160 MG/DL (ref 70–99)
GLUCOSE BLDC GLUCOMTR-MCNC: 161 MG/DL (ref 70–99)
GLUCOSE BLDC GLUCOMTR-MCNC: 161 MG/DL (ref 70–99)
GLUCOSE BLDC GLUCOMTR-MCNC: 162 MG/DL (ref 70–99)
GLUCOSE BLDC GLUCOMTR-MCNC: 163 MG/DL (ref 70–99)
GLUCOSE BLDC GLUCOMTR-MCNC: 163 MG/DL (ref 70–99)
GLUCOSE BLDC GLUCOMTR-MCNC: 164 MG/DL (ref 70–99)
GLUCOSE BLDC GLUCOMTR-MCNC: 165 MG/DL (ref 70–99)
GLUCOSE BLDC GLUCOMTR-MCNC: 165 MG/DL (ref 70–99)
GLUCOSE BLDC GLUCOMTR-MCNC: 166 MG/DL (ref 70–99)
GLUCOSE BLDC GLUCOMTR-MCNC: 167 MG/DL (ref 70–99)
GLUCOSE BLDC GLUCOMTR-MCNC: 168 MG/DL (ref 70–99)
GLUCOSE BLDC GLUCOMTR-MCNC: 169 MG/DL (ref 70–99)
GLUCOSE BLDC GLUCOMTR-MCNC: 169 MG/DL (ref 70–99)
GLUCOSE BLDC GLUCOMTR-MCNC: 170 MG/DL (ref 70–99)
GLUCOSE BLDC GLUCOMTR-MCNC: 171 MG/DL (ref 70–99)
GLUCOSE BLDC GLUCOMTR-MCNC: 172 MG/DL (ref 70–99)
GLUCOSE BLDC GLUCOMTR-MCNC: 172 MG/DL (ref 70–99)
GLUCOSE BLDC GLUCOMTR-MCNC: 173 MG/DL (ref 70–99)
GLUCOSE BLDC GLUCOMTR-MCNC: 174 MG/DL (ref 70–99)
GLUCOSE BLDC GLUCOMTR-MCNC: 174 MG/DL (ref 70–99)
GLUCOSE BLDC GLUCOMTR-MCNC: 175 MG/DL (ref 70–99)
GLUCOSE BLDC GLUCOMTR-MCNC: 176 MG/DL (ref 70–99)
GLUCOSE BLDC GLUCOMTR-MCNC: 178 MG/DL (ref 70–99)
GLUCOSE BLDC GLUCOMTR-MCNC: 178 MG/DL (ref 70–99)
GLUCOSE BLDC GLUCOMTR-MCNC: 179 MG/DL (ref 70–99)
GLUCOSE BLDC GLUCOMTR-MCNC: 179 MG/DL (ref 70–99)
GLUCOSE BLDC GLUCOMTR-MCNC: 180 MG/DL (ref 70–99)
GLUCOSE BLDC GLUCOMTR-MCNC: 181 MG/DL (ref 70–99)
GLUCOSE BLDC GLUCOMTR-MCNC: 184 MG/DL (ref 70–99)
GLUCOSE BLDC GLUCOMTR-MCNC: 184 MG/DL (ref 70–99)
GLUCOSE BLDC GLUCOMTR-MCNC: 185 MG/DL (ref 70–99)
GLUCOSE BLDC GLUCOMTR-MCNC: 185 MG/DL (ref 70–99)
GLUCOSE BLDC GLUCOMTR-MCNC: 187 MG/DL (ref 70–99)
GLUCOSE BLDC GLUCOMTR-MCNC: 188 MG/DL (ref 70–99)
GLUCOSE BLDC GLUCOMTR-MCNC: 189 MG/DL (ref 70–99)
GLUCOSE BLDC GLUCOMTR-MCNC: 190 MG/DL (ref 70–99)
GLUCOSE BLDC GLUCOMTR-MCNC: 192 MG/DL (ref 70–99)
GLUCOSE BLDC GLUCOMTR-MCNC: 192 MG/DL (ref 70–99)
GLUCOSE BLDC GLUCOMTR-MCNC: 193 MG/DL (ref 70–99)
GLUCOSE BLDC GLUCOMTR-MCNC: 193 MG/DL (ref 70–99)
GLUCOSE BLDC GLUCOMTR-MCNC: 194 MG/DL (ref 70–99)
GLUCOSE BLDC GLUCOMTR-MCNC: 194 MG/DL (ref 70–99)
GLUCOSE BLDC GLUCOMTR-MCNC: 195 MG/DL (ref 70–99)
GLUCOSE BLDC GLUCOMTR-MCNC: 195 MG/DL (ref 70–99)
GLUCOSE BLDC GLUCOMTR-MCNC: 196 MG/DL (ref 70–99)
GLUCOSE BLDC GLUCOMTR-MCNC: 196 MG/DL (ref 70–99)
GLUCOSE BLDC GLUCOMTR-MCNC: 197 MG/DL (ref 70–99)
GLUCOSE BLDC GLUCOMTR-MCNC: 197 MG/DL (ref 70–99)
GLUCOSE BLDC GLUCOMTR-MCNC: 199 MG/DL (ref 70–99)
GLUCOSE BLDC GLUCOMTR-MCNC: 199 MG/DL (ref 70–99)
GLUCOSE BLDC GLUCOMTR-MCNC: 200 MG/DL (ref 70–99)
GLUCOSE BLDC GLUCOMTR-MCNC: 203 MG/DL (ref 70–99)
GLUCOSE BLDC GLUCOMTR-MCNC: 203 MG/DL (ref 70–99)
GLUCOSE BLDC GLUCOMTR-MCNC: 204 MG/DL (ref 70–99)
GLUCOSE BLDC GLUCOMTR-MCNC: 205 MG/DL (ref 70–99)
GLUCOSE BLDC GLUCOMTR-MCNC: 206 MG/DL (ref 70–99)
GLUCOSE BLDC GLUCOMTR-MCNC: 206 MG/DL (ref 70–99)
GLUCOSE BLDC GLUCOMTR-MCNC: 208 MG/DL (ref 70–99)
GLUCOSE BLDC GLUCOMTR-MCNC: 209 MG/DL (ref 70–99)
GLUCOSE BLDC GLUCOMTR-MCNC: 210 MG/DL (ref 70–99)
GLUCOSE BLDC GLUCOMTR-MCNC: 210 MG/DL (ref 70–99)
GLUCOSE BLDC GLUCOMTR-MCNC: 211 MG/DL (ref 70–99)
GLUCOSE BLDC GLUCOMTR-MCNC: 213 MG/DL (ref 70–99)
GLUCOSE BLDC GLUCOMTR-MCNC: 215 MG/DL (ref 70–99)
GLUCOSE BLDC GLUCOMTR-MCNC: 217 MG/DL (ref 70–99)
GLUCOSE BLDC GLUCOMTR-MCNC: 217 MG/DL (ref 70–99)
GLUCOSE BLDC GLUCOMTR-MCNC: 218 MG/DL (ref 70–99)
GLUCOSE BLDC GLUCOMTR-MCNC: 219 MG/DL (ref 70–99)
GLUCOSE BLDC GLUCOMTR-MCNC: 219 MG/DL (ref 70–99)
GLUCOSE BLDC GLUCOMTR-MCNC: 220 MG/DL (ref 70–99)
GLUCOSE BLDC GLUCOMTR-MCNC: 221 MG/DL (ref 70–99)
GLUCOSE BLDC GLUCOMTR-MCNC: 222 MG/DL (ref 70–99)
GLUCOSE BLDC GLUCOMTR-MCNC: 224 MG/DL (ref 70–99)
GLUCOSE BLDC GLUCOMTR-MCNC: 225 MG/DL (ref 70–99)
GLUCOSE BLDC GLUCOMTR-MCNC: 225 MG/DL (ref 70–99)
GLUCOSE BLDC GLUCOMTR-MCNC: 226 MG/DL (ref 70–99)
GLUCOSE BLDC GLUCOMTR-MCNC: 226 MG/DL (ref 70–99)
GLUCOSE BLDC GLUCOMTR-MCNC: 227 MG/DL (ref 70–99)
GLUCOSE BLDC GLUCOMTR-MCNC: 228 MG/DL (ref 70–99)
GLUCOSE BLDC GLUCOMTR-MCNC: 229 MG/DL (ref 70–99)
GLUCOSE BLDC GLUCOMTR-MCNC: 230 MG/DL (ref 70–99)
GLUCOSE BLDC GLUCOMTR-MCNC: 231 MG/DL (ref 70–99)
GLUCOSE BLDC GLUCOMTR-MCNC: 232 MG/DL (ref 70–99)
GLUCOSE BLDC GLUCOMTR-MCNC: 232 MG/DL (ref 70–99)
GLUCOSE BLDC GLUCOMTR-MCNC: 233 MG/DL (ref 70–99)
GLUCOSE BLDC GLUCOMTR-MCNC: 234 MG/DL (ref 70–99)
GLUCOSE BLDC GLUCOMTR-MCNC: 235 MG/DL (ref 70–99)
GLUCOSE BLDC GLUCOMTR-MCNC: 236 MG/DL (ref 70–99)
GLUCOSE BLDC GLUCOMTR-MCNC: 236 MG/DL (ref 70–99)
GLUCOSE BLDC GLUCOMTR-MCNC: 237 MG/DL (ref 70–99)
GLUCOSE BLDC GLUCOMTR-MCNC: 237 MG/DL (ref 70–99)
GLUCOSE BLDC GLUCOMTR-MCNC: 238 MG/DL (ref 70–99)
GLUCOSE BLDC GLUCOMTR-MCNC: 239 MG/DL (ref 70–99)
GLUCOSE BLDC GLUCOMTR-MCNC: 240 MG/DL (ref 70–99)
GLUCOSE BLDC GLUCOMTR-MCNC: 241 MG/DL (ref 70–99)
GLUCOSE BLDC GLUCOMTR-MCNC: 241 MG/DL (ref 70–99)
GLUCOSE BLDC GLUCOMTR-MCNC: 243 MG/DL (ref 70–99)
GLUCOSE BLDC GLUCOMTR-MCNC: 244 MG/DL (ref 70–99)
GLUCOSE BLDC GLUCOMTR-MCNC: 245 MG/DL (ref 70–99)
GLUCOSE BLDC GLUCOMTR-MCNC: 246 MG/DL (ref 70–99)
GLUCOSE BLDC GLUCOMTR-MCNC: 247 MG/DL (ref 70–99)
GLUCOSE BLDC GLUCOMTR-MCNC: 248 MG/DL (ref 70–99)
GLUCOSE BLDC GLUCOMTR-MCNC: 249 MG/DL (ref 70–99)
GLUCOSE BLDC GLUCOMTR-MCNC: 250 MG/DL (ref 70–99)
GLUCOSE BLDC GLUCOMTR-MCNC: 253 MG/DL (ref 70–99)
GLUCOSE BLDC GLUCOMTR-MCNC: 254 MG/DL (ref 70–99)
GLUCOSE BLDC GLUCOMTR-MCNC: 255 MG/DL (ref 70–99)
GLUCOSE BLDC GLUCOMTR-MCNC: 257 MG/DL (ref 70–99)
GLUCOSE BLDC GLUCOMTR-MCNC: 259 MG/DL (ref 70–99)
GLUCOSE BLDC GLUCOMTR-MCNC: 262 MG/DL (ref 70–99)
GLUCOSE BLDC GLUCOMTR-MCNC: 262 MG/DL (ref 70–99)
GLUCOSE BLDC GLUCOMTR-MCNC: 264 MG/DL (ref 70–99)
GLUCOSE BLDC GLUCOMTR-MCNC: 265 MG/DL (ref 70–99)
GLUCOSE BLDC GLUCOMTR-MCNC: 267 MG/DL (ref 70–99)
GLUCOSE BLDC GLUCOMTR-MCNC: 267 MG/DL (ref 70–99)
GLUCOSE BLDC GLUCOMTR-MCNC: 268 MG/DL (ref 70–99)
GLUCOSE BLDC GLUCOMTR-MCNC: 269 MG/DL (ref 70–99)
GLUCOSE BLDC GLUCOMTR-MCNC: 270 MG/DL (ref 70–99)
GLUCOSE BLDC GLUCOMTR-MCNC: 271 MG/DL (ref 70–99)
GLUCOSE BLDC GLUCOMTR-MCNC: 274 MG/DL (ref 70–99)
GLUCOSE BLDC GLUCOMTR-MCNC: 275 MG/DL (ref 70–99)
GLUCOSE BLDC GLUCOMTR-MCNC: 275 MG/DL (ref 70–99)
GLUCOSE BLDC GLUCOMTR-MCNC: 278 MG/DL (ref 70–99)
GLUCOSE BLDC GLUCOMTR-MCNC: 279 MG/DL (ref 70–99)
GLUCOSE BLDC GLUCOMTR-MCNC: 280 MG/DL (ref 70–99)
GLUCOSE BLDC GLUCOMTR-MCNC: 281 MG/DL (ref 70–99)
GLUCOSE BLDC GLUCOMTR-MCNC: 281 MG/DL (ref 70–99)
GLUCOSE BLDC GLUCOMTR-MCNC: 282 MG/DL (ref 70–99)
GLUCOSE BLDC GLUCOMTR-MCNC: 282 MG/DL (ref 70–99)
GLUCOSE BLDC GLUCOMTR-MCNC: 283 MG/DL (ref 70–99)
GLUCOSE BLDC GLUCOMTR-MCNC: 284 MG/DL (ref 70–99)
GLUCOSE BLDC GLUCOMTR-MCNC: 285 MG/DL (ref 70–99)
GLUCOSE BLDC GLUCOMTR-MCNC: 288 MG/DL (ref 70–99)
GLUCOSE BLDC GLUCOMTR-MCNC: 288 MG/DL (ref 70–99)
GLUCOSE BLDC GLUCOMTR-MCNC: 291 MG/DL (ref 70–99)
GLUCOSE BLDC GLUCOMTR-MCNC: 292 MG/DL (ref 70–99)
GLUCOSE BLDC GLUCOMTR-MCNC: 294 MG/DL (ref 70–99)
GLUCOSE BLDC GLUCOMTR-MCNC: 296 MG/DL (ref 70–99)
GLUCOSE BLDC GLUCOMTR-MCNC: 297 MG/DL (ref 70–99)
GLUCOSE BLDC GLUCOMTR-MCNC: 299 MG/DL (ref 70–99)
GLUCOSE BLDC GLUCOMTR-MCNC: 299 MG/DL (ref 70–99)
GLUCOSE BLDC GLUCOMTR-MCNC: 303 MG/DL (ref 70–99)
GLUCOSE BLDC GLUCOMTR-MCNC: 303 MG/DL (ref 70–99)
GLUCOSE BLDC GLUCOMTR-MCNC: 304 MG/DL (ref 70–99)
GLUCOSE BLDC GLUCOMTR-MCNC: 304 MG/DL (ref 70–99)
GLUCOSE BLDC GLUCOMTR-MCNC: 316 MG/DL (ref 70–99)
GLUCOSE BLDC GLUCOMTR-MCNC: 317 MG/DL (ref 70–99)
GLUCOSE BLDC GLUCOMTR-MCNC: 320 MG/DL (ref 70–99)
GLUCOSE BLDC GLUCOMTR-MCNC: 320 MG/DL (ref 70–99)
GLUCOSE BLDC GLUCOMTR-MCNC: 334 MG/DL (ref 70–99)
GLUCOSE BLDC GLUCOMTR-MCNC: 335 MG/DL (ref 70–99)
GLUCOSE BLDC GLUCOMTR-MCNC: 335 MG/DL (ref 70–99)
GLUCOSE BLDC GLUCOMTR-MCNC: 336 MG/DL (ref 70–99)
GLUCOSE BLDC GLUCOMTR-MCNC: 339 MG/DL (ref 70–99)
GLUCOSE BLDC GLUCOMTR-MCNC: 341 MG/DL (ref 70–99)
GLUCOSE BLDC GLUCOMTR-MCNC: 341 MG/DL (ref 70–99)
GLUCOSE BLDC GLUCOMTR-MCNC: 342 MG/DL (ref 70–99)
GLUCOSE BLDC GLUCOMTR-MCNC: 346 MG/DL (ref 70–99)
GLUCOSE BLDC GLUCOMTR-MCNC: 347 MG/DL (ref 70–99)
GLUCOSE BLDC GLUCOMTR-MCNC: 348 MG/DL (ref 70–99)
GLUCOSE BLDC GLUCOMTR-MCNC: 356 MG/DL (ref 70–99)
GLUCOSE BLDC GLUCOMTR-MCNC: 361 MG/DL (ref 70–99)
GLUCOSE BLDC GLUCOMTR-MCNC: 366 MG/DL (ref 70–99)
GLUCOSE BLDC GLUCOMTR-MCNC: 372 MG/DL (ref 70–99)
GLUCOSE BLDC GLUCOMTR-MCNC: 374 MG/DL (ref 70–99)
GLUCOSE BLDC GLUCOMTR-MCNC: 376 MG/DL (ref 70–99)
GLUCOSE BLDC GLUCOMTR-MCNC: 377 MG/DL (ref 70–99)
GLUCOSE BLDC GLUCOMTR-MCNC: 378 MG/DL (ref 70–99)
GLUCOSE BLDC GLUCOMTR-MCNC: 381 MG/DL (ref 70–99)
GLUCOSE BLDC GLUCOMTR-MCNC: 389 MG/DL (ref 70–99)
GLUCOSE BLDC GLUCOMTR-MCNC: 389 MG/DL (ref 70–99)
GLUCOSE BLDC GLUCOMTR-MCNC: 391 MG/DL (ref 70–99)
GLUCOSE BLDC GLUCOMTR-MCNC: 395 MG/DL (ref 70–99)
GLUCOSE BLDC GLUCOMTR-MCNC: 404 MG/DL (ref 70–99)
GLUCOSE BLDC GLUCOMTR-MCNC: 417 MG/DL (ref 70–99)
GLUCOSE BLDC GLUCOMTR-MCNC: 419 MG/DL (ref 70–99)
GLUCOSE BLDC GLUCOMTR-MCNC: 420 MG/DL (ref 70–99)
GLUCOSE BLDC GLUCOMTR-MCNC: 420 MG/DL (ref 70–99)
GLUCOSE BLDC GLUCOMTR-MCNC: 421 MG/DL (ref 70–99)
GLUCOSE BLDC GLUCOMTR-MCNC: 431 MG/DL (ref 70–99)
GLUCOSE BLDC GLUCOMTR-MCNC: 439 MG/DL (ref 70–99)
GLUCOSE BLDC GLUCOMTR-MCNC: 443 MG/DL (ref 70–99)
GLUCOSE BLDC GLUCOMTR-MCNC: 452 MG/DL (ref 70–99)
GLUCOSE BLDC GLUCOMTR-MCNC: 457 MG/DL (ref 70–99)
GLUCOSE BLDC GLUCOMTR-MCNC: 462 MG/DL (ref 70–99)
GLUCOSE BLDC GLUCOMTR-MCNC: 468 MG/DL (ref 70–99)
GLUCOSE BLDC GLUCOMTR-MCNC: 47 MG/DL (ref 70–99)
GLUCOSE BLDC GLUCOMTR-MCNC: 503 MG/DL (ref 70–99)
GLUCOSE BLDC GLUCOMTR-MCNC: 52 MG/DL (ref 70–99)
GLUCOSE BLDC GLUCOMTR-MCNC: 57 MG/DL (ref 70–99)
GLUCOSE BLDC GLUCOMTR-MCNC: 57 MG/DL (ref 70–99)
GLUCOSE BLDC GLUCOMTR-MCNC: 61 MG/DL (ref 70–99)
GLUCOSE BLDC GLUCOMTR-MCNC: 66 MG/DL (ref 70–99)
GLUCOSE BLDC GLUCOMTR-MCNC: 66 MG/DL (ref 70–99)
GLUCOSE BLDC GLUCOMTR-MCNC: 71 MG/DL (ref 70–99)
GLUCOSE BLDC GLUCOMTR-MCNC: 73 MG/DL (ref 70–99)
GLUCOSE BLDC GLUCOMTR-MCNC: 73 MG/DL (ref 70–99)
GLUCOSE BLDC GLUCOMTR-MCNC: 74 MG/DL (ref 70–99)
GLUCOSE BLDC GLUCOMTR-MCNC: 74 MG/DL (ref 70–99)
GLUCOSE BLDC GLUCOMTR-MCNC: 77 MG/DL (ref 70–99)
GLUCOSE BLDC GLUCOMTR-MCNC: 79 MG/DL (ref 70–99)
GLUCOSE BLDC GLUCOMTR-MCNC: 82 MG/DL (ref 70–99)
GLUCOSE BLDC GLUCOMTR-MCNC: 86 MG/DL (ref 70–99)
GLUCOSE BLDC GLUCOMTR-MCNC: 89 MG/DL (ref 70–99)
GLUCOSE BLDC GLUCOMTR-MCNC: 91 MG/DL (ref 70–99)
GLUCOSE BLDC GLUCOMTR-MCNC: 93 MG/DL (ref 70–99)
GLUCOSE BLDC GLUCOMTR-MCNC: 93 MG/DL (ref 70–99)
GLUCOSE BLDC GLUCOMTR-MCNC: 94 MG/DL (ref 70–99)
GLUCOSE BLDC GLUCOMTR-MCNC: 95 MG/DL (ref 70–99)
GLUCOSE BLDC GLUCOMTR-MCNC: 96 MG/DL (ref 70–99)
GLUCOSE BLDC GLUCOMTR-MCNC: 98 MG/DL (ref 70–99)
GLUCOSE CSF-MCNC: 128 MG/DL (ref 40–70)
GLUCOSE SERPL-MCNC: 102 MG/DL (ref 70–99)
GLUCOSE SERPL-MCNC: 106 MG/DL (ref 70–99)
GLUCOSE SERPL-MCNC: 107 MG/DL (ref 70–99)
GLUCOSE SERPL-MCNC: 110 MG/DL (ref 70–99)
GLUCOSE SERPL-MCNC: 132 MG/DL (ref 70–99)
GLUCOSE SERPL-MCNC: 132 MG/DL (ref 70–99)
GLUCOSE SERPL-MCNC: 135 MG/DL (ref 70–99)
GLUCOSE SERPL-MCNC: 137 MG/DL (ref 70–99)
GLUCOSE SERPL-MCNC: 139 MG/DL (ref 70–99)
GLUCOSE SERPL-MCNC: 140 MG/DL (ref 70–99)
GLUCOSE SERPL-MCNC: 147 MG/DL (ref 70–99)
GLUCOSE SERPL-MCNC: 148 MG/DL (ref 70–99)
GLUCOSE SERPL-MCNC: 150 MG/DL (ref 70–99)
GLUCOSE SERPL-MCNC: 150 MG/DL (ref 70–99)
GLUCOSE SERPL-MCNC: 153 MG/DL (ref 70–99)
GLUCOSE SERPL-MCNC: 159 MG/DL (ref 70–99)
GLUCOSE SERPL-MCNC: 159 MG/DL (ref 70–99)
GLUCOSE SERPL-MCNC: 161 MG/DL (ref 70–99)
GLUCOSE SERPL-MCNC: 164 MG/DL (ref 70–99)
GLUCOSE SERPL-MCNC: 170 MG/DL (ref 70–99)
GLUCOSE SERPL-MCNC: 175 MG/DL (ref 70–99)
GLUCOSE SERPL-MCNC: 176 MG/DL (ref 70–99)
GLUCOSE SERPL-MCNC: 179 MG/DL (ref 70–99)
GLUCOSE SERPL-MCNC: 183 MG/DL (ref 70–99)
GLUCOSE SERPL-MCNC: 185 MG/DL (ref 70–99)
GLUCOSE SERPL-MCNC: 198 MG/DL (ref 70–99)
GLUCOSE SERPL-MCNC: 201 MG/DL (ref 70–99)
GLUCOSE SERPL-MCNC: 205 MG/DL (ref 70–99)
GLUCOSE SERPL-MCNC: 209 MG/DL (ref 70–99)
GLUCOSE SERPL-MCNC: 222 MG/DL (ref 70–99)
GLUCOSE SERPL-MCNC: 225 MG/DL (ref 70–99)
GLUCOSE SERPL-MCNC: 229 MG/DL (ref 70–99)
GLUCOSE SERPL-MCNC: 232 MG/DL (ref 70–99)
GLUCOSE SERPL-MCNC: 239 MG/DL (ref 70–99)
GLUCOSE SERPL-MCNC: 244 MG/DL (ref 70–99)
GLUCOSE SERPL-MCNC: 250 MG/DL (ref 70–99)
GLUCOSE SERPL-MCNC: 250 MG/DL (ref 70–99)
GLUCOSE SERPL-MCNC: 251 MG/DL (ref 70–99)
GLUCOSE SERPL-MCNC: 282 MG/DL (ref 70–99)
GLUCOSE SERPL-MCNC: 295 MG/DL (ref 70–99)
GLUCOSE SERPL-MCNC: 296 MG/DL (ref 70–99)
GLUCOSE SERPL-MCNC: 296 MG/DL (ref 70–99)
GLUCOSE SERPL-MCNC: 305 MG/DL (ref 70–99)
GLUCOSE SERPL-MCNC: 307 MG/DL (ref 70–99)
GLUCOSE SERPL-MCNC: 316 MG/DL (ref 70–99)
GLUCOSE SERPL-MCNC: 346 MG/DL (ref 70–99)
GLUCOSE SERPL-MCNC: 389 MG/DL (ref 70–99)
GLUCOSE SERPL-MCNC: 406 MG/DL (ref 70–99)
GLUCOSE SERPL-MCNC: 414 MG/DL (ref 70–99)
GLUCOSE SERPL-MCNC: 456 MG/DL (ref 70–99)
GLUCOSE SERPL-MCNC: 56 MG/DL (ref 70–99)
GLUCOSE SERPL-MCNC: 66 MG/DL (ref 70–99)
GLUCOSE SERPL-MCNC: 70 MG/DL (ref 70–99)
GLUCOSE SERPL-MCNC: 86 MG/DL (ref 70–99)
GLUCOSE SERPL-MCNC: 90 MG/DL (ref 70–99)
GLUCOSE SERPL-MCNC: 93 MG/DL (ref 70–99)
GLUCOSE SERPL-MCNC: NORMAL MG/DL (ref 70–99)
GLUCOSE SERPL-MCNC: NORMAL MG/DL (ref 70–99)
GLUCOSE UR STRIP-MCNC: 100 MG/DL
GLUCOSE UR STRIP-MCNC: 250 MG/DL
GLUCOSE UR STRIP-MCNC: 30 MG/DL
GLUCOSE UR STRIP-MCNC: 300 MG/DL
GLUCOSE UR STRIP-MCNC: 300 MG/DL
GLUCOSE UR STRIP-MCNC: >1000 MG/DL
GLUCOSE UR STRIP-MCNC: >1000 MG/DL
GRAM STN SPEC: NORMAL
HBA1C MFR BLD: 7 % (ref 4.3–6)
HBA1C MFR BLD: 8.7 % (ref 4.3–6)
HCO3 BLD-SCNC: 15 MMOL/L (ref 21–28)
HCO3 BLD-SCNC: 15 MMOL/L (ref 21–28)
HCO3 BLD-SCNC: 23 MMOL/L (ref 21–28)
HCO3 BLD-SCNC: 23 MMOL/L (ref 21–28)
HCO3 BLD-SCNC: NORMAL MMOL/L (ref 21–28)
HCO3 BLDV-SCNC: 17 MMOL/L (ref 21–28)
HCO3 BLDV-SCNC: 21 MMOL/L (ref 21–28)
HCO3 BLDV-SCNC: 21 MMOL/L (ref 21–28)
HCO3 BLDV-SCNC: NORMAL MMOL/L (ref 21–28)
HCT VFR BLD AUTO: 28.8 % (ref 35–47)
HCT VFR BLD AUTO: 29.5 % (ref 35–47)
HCT VFR BLD AUTO: 29.5 % (ref 35–47)
HCT VFR BLD AUTO: 29.8 % (ref 35–47)
HCT VFR BLD AUTO: 30 % (ref 35–47)
HCT VFR BLD AUTO: 31 % (ref 35–47)
HCT VFR BLD AUTO: 31.8 % (ref 35–47)
HCT VFR BLD AUTO: 33.3 % (ref 35–47)
HCT VFR BLD AUTO: 33.5 % (ref 35–47)
HCT VFR BLD AUTO: 33.6 % (ref 35–47)
HCT VFR BLD AUTO: 33.6 % (ref 35–47)
HCT VFR BLD AUTO: 33.8 % (ref 35–47)
HCT VFR BLD AUTO: 34.6 % (ref 35–47)
HCT VFR BLD AUTO: 35.3 % (ref 35–47)
HCT VFR BLD AUTO: 36 % (ref 35–47)
HCT VFR BLD AUTO: 36.7 % (ref 35–47)
HCT VFR BLD AUTO: 37 % (ref 35–47)
HCT VFR BLD AUTO: 37.2 % (ref 35–47)
HCT VFR BLD AUTO: 37.5 % (ref 35–47)
HCT VFR BLD AUTO: 37.7 % (ref 35–47)
HCT VFR BLD AUTO: 38.4 % (ref 35–47)
HCT VFR BLD AUTO: 38.6 % (ref 35–47)
HCT VFR BLD AUTO: 40.1 % (ref 35–47)
HCT VFR BLD AUTO: 40.2 % (ref 35–47)
HCT VFR BLD CALC: 38 %PCV (ref 35–47)
HGB BLD CALC-MCNC: 12.9 G/DL (ref 11.7–15.7)
HGB BLD-MCNC: 10 G/DL (ref 11.7–15.7)
HGB BLD-MCNC: 10 G/DL (ref 11.7–15.7)
HGB BLD-MCNC: 10.2 G/DL (ref 11.7–15.7)
HGB BLD-MCNC: 10.6 G/DL (ref 11.7–15.7)
HGB BLD-MCNC: 10.6 G/DL (ref 11.7–15.7)
HGB BLD-MCNC: 10.7 G/DL (ref 11.7–15.7)
HGB BLD-MCNC: 10.9 G/DL (ref 11.7–15.7)
HGB BLD-MCNC: 11.1 G/DL (ref 11.7–15.7)
HGB BLD-MCNC: 11.2 G/DL (ref 11.7–15.7)
HGB BLD-MCNC: 11.2 G/DL (ref 11.7–15.7)
HGB BLD-MCNC: 11.3 G/DL (ref 11.7–15.7)
HGB BLD-MCNC: 11.8 G/DL (ref 11.7–15.7)
HGB BLD-MCNC: 11.9 G/DL (ref 11.7–15.7)
HGB BLD-MCNC: 12 G/DL (ref 11.7–15.7)
HGB BLD-MCNC: 12.2 G/DL (ref 11.7–15.7)
HGB BLD-MCNC: 12.6 G/DL (ref 11.7–15.7)
HGB BLD-MCNC: 12.7 G/DL (ref 11.7–15.7)
HGB BLD-MCNC: 9.1 G/DL (ref 11.7–15.7)
HGB BLD-MCNC: 9.3 G/DL (ref 11.7–15.7)
HGB BLD-MCNC: 9.3 G/DL (ref 11.7–15.7)
HGB BLD-MCNC: 9.4 G/DL (ref 11.7–15.7)
HGB BLD-MCNC: 9.5 G/DL (ref 11.7–15.7)
HGB BLD-MCNC: 9.6 G/DL (ref 11.7–15.7)
HGB BLD-MCNC: 9.8 G/DL (ref 11.7–15.7)
HGB UR QL STRIP: ABNORMAL
HGB UR QL STRIP: NEGATIVE
IMM GRANULOCYTES # BLD: 0.2 10E9/L (ref 0–0.4)
IMM GRANULOCYTES NFR BLD: 1.8 %
IMM GRANULOCYTES NFR BLD: 1.8 %
IMM GRANULOCYTES NFR BLD: 1.9 %
IMM GRANULOCYTES NFR BLD: 2.1 %
IMM GRANULOCYTES NFR BLD: 2.2 %
INR PPP: 1.19 (ref 0.86–1.14)
INTERPRETATION ECG - MUSE: NORMAL
INTERPRETATION ECG - MUSE: NORMAL
KETONES BLD-SCNC: NORMAL MMOL/L (ref 0–0.6)
KETONES UR STRIP-MCNC: ABNORMAL MG/DL
KETONES UR STRIP-MCNC: NEGATIVE MG/DL
LACTATE BLD-SCNC: 0.6 MMOL/L (ref 0.7–2.1)
LACTATE BLD-SCNC: 0.7 MMOL/L (ref 0.7–2.1)
LACTATE BLD-SCNC: 1 MMOL/L (ref 0.7–2.1)
LACTATE BLD-SCNC: 1.3 MMOL/L (ref 0.7–2.1)
LACTATE BLD-SCNC: 1.3 MMOL/L (ref 0.7–2.1)
LACTATE BLD-SCNC: 1.4 MMOL/L (ref 0.7–2.1)
LACTATE BLD-SCNC: 1.4 MMOL/L (ref 0.7–2.1)
LACTATE BLD-SCNC: 1.5 MMOL/L (ref 0.7–2.1)
LACTATE BLD-SCNC: 1.8 MMOL/L (ref 0.7–2.1)
LACTATE BLD-SCNC: 2.7 MMOL/L (ref 0.7–2.1)
LEUKOCYTE ESTERASE UR QL STRIP: ABNORMAL
LEUKOCYTE ESTERASE UR QL STRIP: NEGATIVE
LEUKOCYTE ESTERASE UR QL STRIP: NEGATIVE
LIPASE SERPL-CCNC: 105 U/L (ref 73–393)
LIPASE SERPL-CCNC: 1367 U/L (ref 73–393)
LIPASE SERPL-CCNC: 1497 U/L (ref 73–393)
LIPASE SERPL-CCNC: 756 U/L (ref 73–393)
LYMPHOCYTES # BLD AUTO: 1.1 10E9/L (ref 0.8–5.3)
LYMPHOCYTES # BLD AUTO: 1.3 10E9/L (ref 0.8–5.3)
LYMPHOCYTES # BLD AUTO: 1.3 10E9/L (ref 0.8–5.3)
LYMPHOCYTES # BLD AUTO: 1.7 10E9/L (ref 0.8–5.3)
LYMPHOCYTES # BLD AUTO: 1.9 10E9/L (ref 0.8–5.3)
LYMPHOCYTES # BLD AUTO: 1.9 10E9/L (ref 0.8–5.3)
LYMPHOCYTES # BLD AUTO: 2.1 10E9/L (ref 0.8–5.3)
LYMPHOCYTES # BLD AUTO: 2.2 10E9/L (ref 0.8–5.3)
LYMPHOCYTES # BLD AUTO: 2.3 10E9/L (ref 0.8–5.3)
LYMPHOCYTES # BLD AUTO: 2.5 10E9/L (ref 0.8–5.3)
LYMPHOCYTES NFR BLD AUTO: 10.8 %
LYMPHOCYTES NFR BLD AUTO: 13.3 %
LYMPHOCYTES NFR BLD AUTO: 14.2 %
LYMPHOCYTES NFR BLD AUTO: 15.4 %
LYMPHOCYTES NFR BLD AUTO: 20 %
LYMPHOCYTES NFR BLD AUTO: 24 %
LYMPHOCYTES NFR BLD AUTO: 24 %
LYMPHOCYTES NFR BLD AUTO: 31 %
LYMPHOCYTES NFR BLD AUTO: 33 %
LYMPHOCYTES NFR BLD AUTO: 33 %
Lab: ABNORMAL
Lab: NORMAL
MAGNESIUM SERPL-MCNC: 2.1 MG/DL (ref 1.6–2.3)
MAGNESIUM SERPL-MCNC: 3 MG/DL (ref 1.6–2.3)
MCH RBC QN AUTO: 29.1 PG (ref 26.5–33)
MCH RBC QN AUTO: 29.5 PG (ref 26.5–33)
MCH RBC QN AUTO: 29.6 PG (ref 26.5–33)
MCH RBC QN AUTO: 29.6 PG (ref 26.5–33)
MCH RBC QN AUTO: 29.7 PG (ref 26.5–33)
MCH RBC QN AUTO: 29.8 PG (ref 26.5–33)
MCH RBC QN AUTO: 29.8 PG (ref 26.5–33)
MCH RBC QN AUTO: 29.9 PG (ref 26.5–33)
MCH RBC QN AUTO: 30.2 PG (ref 26.5–33)
MCH RBC QN AUTO: 30.3 PG (ref 26.5–33)
MCH RBC QN AUTO: 30.4 PG (ref 26.5–33)
MCH RBC QN AUTO: 30.4 PG (ref 26.5–33)
MCH RBC QN AUTO: 30.5 PG (ref 26.5–33)
MCH RBC QN AUTO: 30.6 PG (ref 26.5–33)
MCH RBC QN AUTO: 30.6 PG (ref 26.5–33)
MCH RBC QN AUTO: 30.7 PG (ref 26.5–33)
MCH RBC QN AUTO: 30.8 PG (ref 26.5–33)
MCH RBC QN AUTO: 31.2 PG (ref 26.5–33)
MCHC RBC AUTO-ENTMCNC: 29.4 G/DL (ref 31.5–36.5)
MCHC RBC AUTO-ENTMCNC: 29.6 G/DL (ref 31.5–36.5)
MCHC RBC AUTO-ENTMCNC: 29.8 G/DL (ref 31.5–36.5)
MCHC RBC AUTO-ENTMCNC: 29.9 G/DL (ref 31.5–36.5)
MCHC RBC AUTO-ENTMCNC: 30.3 G/DL (ref 31.5–36.5)
MCHC RBC AUTO-ENTMCNC: 30.5 G/DL (ref 31.5–36.5)
MCHC RBC AUTO-ENTMCNC: 30.5 G/DL (ref 31.5–36.5)
MCHC RBC AUTO-ENTMCNC: 30.6 G/DL (ref 31.5–36.5)
MCHC RBC AUTO-ENTMCNC: 30.6 G/DL (ref 31.5–36.5)
MCHC RBC AUTO-ENTMCNC: 30.8 G/DL (ref 31.5–36.5)
MCHC RBC AUTO-ENTMCNC: 30.9 G/DL (ref 31.5–36.5)
MCHC RBC AUTO-ENTMCNC: 31 G/DL (ref 31.5–36.5)
MCHC RBC AUTO-ENTMCNC: 31.1 G/DL (ref 31.5–36.5)
MCHC RBC AUTO-ENTMCNC: 31.2 G/DL (ref 31.5–36.5)
MCHC RBC AUTO-ENTMCNC: 31.4 G/DL (ref 31.5–36.5)
MCHC RBC AUTO-ENTMCNC: 31.5 G/DL (ref 31.5–36.5)
MCHC RBC AUTO-ENTMCNC: 31.5 G/DL (ref 31.5–36.5)
MCHC RBC AUTO-ENTMCNC: 31.6 G/DL (ref 31.5–36.5)
MCHC RBC AUTO-ENTMCNC: 31.7 G/DL (ref 31.5–36.5)
MCHC RBC AUTO-ENTMCNC: 31.7 G/DL (ref 31.5–36.5)
MCHC RBC AUTO-ENTMCNC: 31.9 G/DL (ref 31.5–36.5)
MCHC RBC AUTO-ENTMCNC: 31.9 G/DL (ref 31.5–36.5)
MCHC RBC AUTO-ENTMCNC: 33.1 G/DL (ref 31.5–36.5)
MCHC RBC AUTO-ENTMCNC: 33.1 G/DL (ref 31.5–36.5)
MCV RBC AUTO: 100 FL (ref 78–100)
MCV RBC AUTO: 100 FL (ref 78–100)
MCV RBC AUTO: 101 FL (ref 78–100)
MCV RBC AUTO: 103 FL (ref 78–100)
MCV RBC AUTO: 92 FL (ref 78–100)
MCV RBC AUTO: 94 FL (ref 78–100)
MCV RBC AUTO: 95 FL (ref 78–100)
MCV RBC AUTO: 95 FL (ref 78–100)
MCV RBC AUTO: 96 FL (ref 78–100)
MCV RBC AUTO: 97 FL (ref 78–100)
MCV RBC AUTO: 98 FL (ref 78–100)
MCV RBC AUTO: 99 FL (ref 78–100)
METAMYELOCYTES # BLD: 0.1 10E9/L
METAMYELOCYTES # BLD: 0.2 10E9/L
METAMYELOCYTES NFR BLD MANUAL: 2 %
METAMYELOCYTES NFR BLD MANUAL: 3 %
MICRO REPORT STATUS: ABNORMAL
MICRO REPORT STATUS: NORMAL
MICROORGANISM SPEC CULT: ABNORMAL
MONOCYTES # BLD AUTO: 0.3 10E9/L (ref 0–1.3)
MONOCYTES # BLD AUTO: 0.3 10E9/L (ref 0–1.3)
MONOCYTES # BLD AUTO: 0.5 10E9/L (ref 0–1.3)
MONOCYTES # BLD AUTO: 0.7 10E9/L (ref 0–1.3)
MONOCYTES # BLD AUTO: 0.8 10E9/L (ref 0–1.3)
MONOCYTES # BLD AUTO: 1 10E9/L (ref 0–1.3)
MONOCYTES # BLD AUTO: 1 10E9/L (ref 0–1.3)
MONOCYTES # BLD AUTO: 1.7 10E9/L (ref 0–1.3)
MONOCYTES NFR BLD AUTO: 11 %
MONOCYTES NFR BLD AUTO: 11 %
MONOCYTES NFR BLD AUTO: 17.3 %
MONOCYTES NFR BLD AUTO: 5 %
MONOCYTES NFR BLD AUTO: 7 %
MONOCYTES NFR BLD AUTO: 7 %
MONOCYTES NFR BLD AUTO: 8.7 %
MONOCYTES NFR BLD AUTO: 9.4 %
MUCOUS THREADS #/AREA URNS LPF: PRESENT /LPF
MUCOUS THREADS #/AREA URNS LPF: PRESENT /LPF
MYELOCYTES # BLD: 0.1 10E9/L
MYELOCYTES # BLD: 0.3 10E9/L
MYELOCYTES NFR BLD MANUAL: 1 %
MYELOCYTES NFR BLD MANUAL: 1 %
MYELOCYTES NFR BLD MANUAL: 2 %
MYELOCYTES NFR BLD MANUAL: 3 %
NEUTROPHILS # BLD AUTO: 3.2 10E9/L (ref 1.6–8.3)
NEUTROPHILS # BLD AUTO: 3.7 10E9/L (ref 1.6–8.3)
NEUTROPHILS # BLD AUTO: 3.9 10E9/L (ref 1.6–8.3)
NEUTROPHILS # BLD AUTO: 6 10E9/L (ref 1.6–8.3)
NEUTROPHILS # BLD AUTO: 6.2 10E9/L (ref 1.6–8.3)
NEUTROPHILS # BLD AUTO: 6.5 10E9/L (ref 1.6–8.3)
NEUTROPHILS # BLD AUTO: 6.5 10E9/L (ref 1.6–8.3)
NEUTROPHILS # BLD AUTO: 6.9 10E9/L (ref 1.6–8.3)
NEUTROPHILS # BLD AUTO: 7.3 10E9/L (ref 1.6–8.3)
NEUTROPHILS # BLD AUTO: 7.8 10E9/L (ref 1.6–8.3)
NEUTROPHILS NFR BLD AUTO: 55 %
NEUTROPHILS NFR BLD AUTO: 56 %
NEUTROPHILS NFR BLD AUTO: 57 %
NEUTROPHILS NFR BLD AUTO: 61 %
NEUTROPHILS NFR BLD AUTO: 64.5 %
NEUTROPHILS NFR BLD AUTO: 65 %
NEUTROPHILS NFR BLD AUTO: 69.2 %
NEUTROPHILS NFR BLD AUTO: 71.7 %
NEUTROPHILS NFR BLD AUTO: 74.2 %
NEUTROPHILS NFR BLD AUTO: 76.7 %
NITRATE UR QL: NEGATIVE
NRBC # BLD AUTO: 0 10*3/UL
NRBC # BLD AUTO: 0.1 10*3/UL
NRBC # BLD AUTO: 0.2 10*3/UL
NRBC BLD AUTO-RTO: 0 /100
NRBC BLD AUTO-RTO: 1 /100
NRBC BLD AUTO-RTO: 3 /100
O2/TOTAL GAS SETTING VFR VENT: 5 %
O2/TOTAL GAS SETTING VFR VENT: ABNORMAL %
O2/TOTAL GAS SETTING VFR VENT: ABNORMAL %
O2/TOTAL GAS SETTING VFR VENT: NORMAL %
OXYHGB MFR BLD: 94 % (ref 92–100)
OXYHGB MFR BLD: 95 % (ref 92–100)
OXYHGB MFR BLD: 95 % (ref 92–100)
OXYHGB MFR BLD: 96 % (ref 92–100)
OXYHGB MFR BLD: NORMAL % (ref 92–100)
OXYHGB MFR BLDV: 61 %
OXYHGB MFR BLDV: 69 %
OXYHGB MFR BLDV: 76 %
OXYHGB MFR BLDV: NORMAL %
PCO2 BLD: 36 MM HG (ref 35–45)
PCO2 BLD: 45 MM HG (ref 35–45)
PCO2 BLD: 46 MM HG (ref 35–45)
PCO2 BLD: 46 MM HG (ref 35–45)
PCO2 BLD: NORMAL MM HG (ref 35–45)
PCO2 BLDV: 39 MM HG (ref 40–50)
PCO2 BLDV: 47 MM HG (ref 40–50)
PCO2 BLDV: 49 MM HG (ref 40–50)
PCO2 BLDV: 49 MM HG (ref 40–50)
PCO2 BLDV: 50 MM HG (ref 40–50)
PCO2 BLDV: NORMAL MM HG (ref 40–50)
PH BLD: 7.13 PH (ref 7.35–7.45)
PH BLD: 7.24 PH (ref 7.35–7.45)
PH BLD: 7.3 PH (ref 7.35–7.45)
PH BLD: 7.31 PH (ref 7.35–7.45)
PH BLD: NORMAL PH (ref 7.35–7.45)
PH BLDV: 7.14 PH (ref 7.32–7.43)
PH BLDV: 7.15 PH (ref 7.32–7.43)
PH BLDV: 7.22 PH (ref 7.32–7.43)
PH BLDV: 7.23 PH (ref 7.32–7.43)
PH BLDV: 7.25 PH (ref 7.32–7.43)
PH BLDV: NORMAL PH (ref 7.32–7.43)
PH UR STRIP: 5.5 PH (ref 5–7)
PH UR STRIP: 6 PH (ref 5–7)
PH UR STRIP: 6.5 PH (ref 5–7)
PH UR STRIP: 6.5 PH (ref 5–7)
PH UR STRIP: 7 PH (ref 5–7)
PHOSPHATE SERPL-MCNC: 2.1 MG/DL (ref 2.5–4.5)
PHOSPHATE SERPL-MCNC: 2.5 MG/DL (ref 2.5–4.5)
PHOSPHATE SERPL-MCNC: 2.7 MG/DL (ref 2.5–4.5)
PHOSPHATE SERPL-MCNC: 2.8 MG/DL (ref 2.5–4.5)
PHOSPHATE SERPL-MCNC: 3 MG/DL (ref 2.5–4.5)
PHOSPHATE SERPL-MCNC: 3.6 MG/DL (ref 2.5–4.5)
PHOSPHATE SERPL-MCNC: 4.1 MG/DL (ref 2.5–4.5)
PHOSPHATE SERPL-MCNC: NORMAL MG/DL (ref 2.5–4.5)
PLATELET # BLD AUTO: 126 10E9/L (ref 150–450)
PLATELET # BLD AUTO: 141 10E9/L (ref 150–450)
PLATELET # BLD AUTO: 141 10E9/L (ref 150–450)
PLATELET # BLD AUTO: 143 10E9/L (ref 150–450)
PLATELET # BLD AUTO: 145 10E9/L (ref 150–450)
PLATELET # BLD AUTO: 150 10E9/L (ref 150–450)
PLATELET # BLD AUTO: 180 10E9/L (ref 150–450)
PLATELET # BLD AUTO: 181 10E9/L (ref 150–450)
PLATELET # BLD AUTO: 182 10E9/L (ref 150–450)
PLATELET # BLD AUTO: 195 10E9/L (ref 150–450)
PLATELET # BLD AUTO: 199 10E9/L (ref 150–450)
PLATELET # BLD AUTO: 203 10E9/L (ref 150–450)
PLATELET # BLD AUTO: 204 10E9/L (ref 150–450)
PLATELET # BLD AUTO: 207 10E9/L (ref 150–450)
PLATELET # BLD AUTO: 219 10E9/L (ref 150–450)
PLATELET # BLD AUTO: 243 10E9/L (ref 150–450)
PLATELET # BLD AUTO: 244 10E9/L (ref 150–450)
PLATELET # BLD AUTO: 255 10E9/L (ref 150–450)
PLATELET # BLD AUTO: 264 10E9/L (ref 150–450)
PLATELET # BLD AUTO: 270 10E9/L (ref 150–450)
PLATELET # BLD AUTO: 279 10E9/L (ref 150–450)
PLATELET # BLD AUTO: 290 10E9/L (ref 150–450)
PLATELET # BLD AUTO: 299 10E9/L (ref 150–450)
PLATELET # BLD AUTO: 351 10E9/L (ref 150–450)
PLATELET # BLD AUTO: 375 10E9/L (ref 150–450)
PLATELET # BLD AUTO: 405 10E9/L (ref 150–450)
PLATELET # BLD AUTO: 83 10E9/L (ref 150–450)
PLATELET # BLD AUTO: 83 10E9/L (ref 150–450)
PLATELET # BLD AUTO: 93 10E9/L (ref 150–450)
PLATELET # BLD EST: ABNORMAL 10*3/UL
PO2 BLD: 81 MM HG (ref 80–105)
PO2 BLD: 82 MM HG (ref 80–105)
PO2 BLD: 89 MM HG (ref 80–105)
PO2 BLD: 93 MM HG (ref 80–105)
PO2 BLD: NORMAL MM HG (ref 80–105)
PO2 BLDV: 34 MM HG (ref 25–47)
PO2 BLDV: 37 MM HG (ref 25–47)
PO2 BLDV: 39 MM HG (ref 25–47)
PO2 BLDV: 44 MM HG (ref 25–47)
PO2 BLDV: 75 MM HG (ref 25–47)
PO2 BLDV: NORMAL MM HG (ref 25–47)
POIKILOCYTOSIS BLD QL SMEAR: SLIGHT
POTASSIUM BLD-SCNC: 6.8 MMOL/L (ref 3.4–5.3)
POTASSIUM SERPL-SCNC: 2.9 MMOL/L (ref 3.4–5.3)
POTASSIUM SERPL-SCNC: 3.2 MMOL/L (ref 3.4–5.3)
POTASSIUM SERPL-SCNC: 3.3 MMOL/L (ref 3.4–5.3)
POTASSIUM SERPL-SCNC: 3.4 MMOL/L (ref 3.4–5.3)
POTASSIUM SERPL-SCNC: 3.4 MMOL/L (ref 3.4–5.3)
POTASSIUM SERPL-SCNC: 3.5 MMOL/L (ref 3.4–5.3)
POTASSIUM SERPL-SCNC: 3.6 MMOL/L (ref 3.4–5.3)
POTASSIUM SERPL-SCNC: 3.6 MMOL/L (ref 3.4–5.3)
POTASSIUM SERPL-SCNC: 3.7 MMOL/L (ref 3.4–5.3)
POTASSIUM SERPL-SCNC: 3.7 MMOL/L (ref 3.4–5.3)
POTASSIUM SERPL-SCNC: 3.8 MMOL/L (ref 3.4–5.3)
POTASSIUM SERPL-SCNC: 3.8 MMOL/L (ref 3.4–5.3)
POTASSIUM SERPL-SCNC: 3.9 MMOL/L (ref 3.4–5.3)
POTASSIUM SERPL-SCNC: 4 MMOL/L (ref 3.4–5.3)
POTASSIUM SERPL-SCNC: 4.1 MMOL/L (ref 3.4–5.3)
POTASSIUM SERPL-SCNC: 4.2 MMOL/L (ref 3.4–5.3)
POTASSIUM SERPL-SCNC: 4.3 MMOL/L (ref 3.4–5.3)
POTASSIUM SERPL-SCNC: 4.4 MMOL/L (ref 3.4–5.3)
POTASSIUM SERPL-SCNC: 4.5 MMOL/L (ref 3.4–5.3)
POTASSIUM SERPL-SCNC: 4.6 MMOL/L (ref 3.4–5.3)
POTASSIUM SERPL-SCNC: 4.6 MMOL/L (ref 3.4–5.3)
POTASSIUM SERPL-SCNC: 4.7 MMOL/L (ref 3.4–5.3)
POTASSIUM SERPL-SCNC: 4.9 MMOL/L (ref 3.4–5.3)
POTASSIUM SERPL-SCNC: 5 MMOL/L (ref 3.4–5.3)
POTASSIUM SERPL-SCNC: 5.1 MMOL/L (ref 3.4–5.3)
POTASSIUM SERPL-SCNC: 5.2 MMOL/L (ref 3.4–5.3)
POTASSIUM SERPL-SCNC: 5.2 MMOL/L (ref 3.4–5.3)
POTASSIUM SERPL-SCNC: 5.3 MMOL/L (ref 3.4–5.3)
POTASSIUM SERPL-SCNC: 5.4 MMOL/L (ref 3.4–5.3)
POTASSIUM SERPL-SCNC: 5.5 MMOL/L (ref 3.4–5.3)
POTASSIUM SERPL-SCNC: 5.7 MMOL/L (ref 3.4–5.3)
POTASSIUM SERPL-SCNC: 5.8 MMOL/L (ref 3.4–5.3)
POTASSIUM SERPL-SCNC: 5.8 MMOL/L (ref 3.4–5.3)
POTASSIUM SERPL-SCNC: 6.1 MMOL/L (ref 3.4–5.3)
POTASSIUM SERPL-SCNC: 6.8 MMOL/L (ref 3.4–5.3)
POTASSIUM SERPL-SCNC: NORMAL MMOL/L (ref 3.4–5.3)
POTASSIUM SERPL-SCNC: NORMAL MMOL/L (ref 3.4–5.3)
PROMYELOCYTES # BLD MANUAL: 0.1 10E9/L
PROMYELOCYTES # BLD MANUAL: 0.1 10E9/L
PROMYELOCYTES NFR BLD MANUAL: 1 %
PROMYELOCYTES NFR BLD MANUAL: 1 %
PROT CSF-MCNC: 59 MG/DL (ref 15–60)
PROT SERPL-MCNC: 5.7 G/DL (ref 6.8–8.8)
PROT SERPL-MCNC: 7.5 G/DL (ref 6.8–8.8)
PROT SERPL-MCNC: 7.5 G/DL (ref 6.8–8.8)
PROT SERPL-MCNC: 7.7 G/DL (ref 6.8–8.8)
PROT SERPL-MCNC: 7.7 G/DL (ref 6.8–8.8)
PROT SERPL-MCNC: 8 G/DL (ref 6.8–8.8)
PTH-INTACT SERPL-MCNC: 119 PG/ML (ref 12–72)
RBC # BLD AUTO: 3.07 10E12/L (ref 3.8–5.2)
RBC # BLD AUTO: 3.11 10E12/L (ref 3.8–5.2)
RBC # BLD AUTO: 3.14 10E12/L (ref 3.8–5.2)
RBC # BLD AUTO: 3.14 10E12/L (ref 3.8–5.2)
RBC # BLD AUTO: 3.19 10E12/L (ref 3.8–5.2)
RBC # BLD AUTO: 3.25 10E12/L (ref 3.8–5.2)
RBC # BLD AUTO: 3.32 10E12/L (ref 3.8–5.2)
RBC # BLD AUTO: 3.37 10E12/L (ref 3.8–5.2)
RBC # BLD AUTO: 3.37 10E12/L (ref 3.8–5.2)
RBC # BLD AUTO: 3.44 10E12/L (ref 3.8–5.2)
RBC # BLD AUTO: 3.44 10E12/L (ref 3.8–5.2)
RBC # BLD AUTO: 3.49 10E12/L (ref 3.8–5.2)
RBC # BLD AUTO: 3.54 10E12/L (ref 3.8–5.2)
RBC # BLD AUTO: 3.57 10E12/L (ref 3.8–5.2)
RBC # BLD AUTO: 3.66 10E12/L (ref 3.8–5.2)
RBC # BLD AUTO: 3.69 10E12/L (ref 3.8–5.2)
RBC # BLD AUTO: 3.71 10E12/L (ref 3.8–5.2)
RBC # BLD AUTO: 3.72 10E12/L (ref 3.8–5.2)
RBC # BLD AUTO: 3.88 10E12/L (ref 3.8–5.2)
RBC # BLD AUTO: 3.92 10E12/L (ref 3.8–5.2)
RBC # BLD AUTO: 4.02 10E12/L (ref 3.8–5.2)
RBC # BLD AUTO: 4.07 10E12/L (ref 3.8–5.2)
RBC # BLD AUTO: 4.13 10E12/L (ref 3.8–5.2)
RBC # BLD AUTO: 4.22 10E12/L (ref 3.8–5.2)
RBC # CSF MANUAL: 2 /UL (ref 0–2)
RBC # CSF MANUAL: NORMAL /UL (ref 0–2)
RBC #/AREA URNS AUTO: 3 /HPF (ref 0–2)
RBC #/AREA URNS AUTO: 4 /HPF (ref 0–2)
RBC #/AREA URNS AUTO: 5 /HPF (ref 0–2)
RBC #/AREA URNS AUTO: 84 /HPF (ref 0–2)
RBC #/AREA URNS AUTO: <1 /HPF (ref 0–2)
RBC #/AREA URNS AUTO: ABNORMAL /HPF (ref 0–2)
RBC #/AREA URNS AUTO: ABNORMAL /HPF (ref 0–2)
RBC MORPH BLD: ABNORMAL
RBC MORPH BLD: ABNORMAL
SAO2 % BLDV FROM PO2: 59 %
SAO2 % BLDV FROM PO2: 89 %
SODIUM BLD-SCNC: 141 MMOL/L (ref 133–144)
SODIUM SERPL-SCNC: 129 MMOL/L (ref 133–144)
SODIUM SERPL-SCNC: 135 MMOL/L (ref 133–144)
SODIUM SERPL-SCNC: 135 MMOL/L (ref 133–144)
SODIUM SERPL-SCNC: 136 MMOL/L (ref 133–144)
SODIUM SERPL-SCNC: 136 MMOL/L (ref 133–144)
SODIUM SERPL-SCNC: 137 MMOL/L (ref 133–144)
SODIUM SERPL-SCNC: 137 MMOL/L (ref 133–144)
SODIUM SERPL-SCNC: 138 MMOL/L (ref 133–144)
SODIUM SERPL-SCNC: 139 MMOL/L (ref 133–144)
SODIUM SERPL-SCNC: 140 MMOL/L (ref 133–144)
SODIUM SERPL-SCNC: 141 MMOL/L (ref 133–144)
SODIUM SERPL-SCNC: 142 MMOL/L (ref 133–144)
SODIUM SERPL-SCNC: 143 MMOL/L (ref 133–144)
SODIUM SERPL-SCNC: 144 MMOL/L (ref 133–144)
SODIUM SERPL-SCNC: 145 MMOL/L (ref 133–144)
SODIUM SERPL-SCNC: 146 MMOL/L (ref 133–144)
SODIUM SERPL-SCNC: 147 MMOL/L (ref 133–144)
SODIUM SERPL-SCNC: 147 MMOL/L (ref 133–144)
SODIUM SERPL-SCNC: 148 MMOL/L (ref 133–144)
SODIUM SERPL-SCNC: 149 MMOL/L (ref 133–144)
SODIUM SERPL-SCNC: 150 MMOL/L (ref 133–144)
SODIUM SERPL-SCNC: 151 MMOL/L (ref 133–144)
SODIUM SERPL-SCNC: 152 MMOL/L (ref 133–144)
SODIUM SERPL-SCNC: 153 MMOL/L (ref 133–144)
SODIUM SERPL-SCNC: 154 MMOL/L (ref 133–144)
SODIUM SERPL-SCNC: 155 MMOL/L (ref 133–144)
SODIUM SERPL-SCNC: 156 MMOL/L (ref 133–144)
SODIUM SERPL-SCNC: 156 MMOL/L (ref 133–144)
SODIUM SERPL-SCNC: 157 MMOL/L (ref 133–144)
SODIUM SERPL-SCNC: NORMAL MMOL/L (ref 133–144)
SODIUM SERPL-SCNC: NORMAL MMOL/L (ref 133–144)
SODIUM UR-SCNC: 25 MMOL/L
SP GR UR STRIP: 1 (ref 1–1.03)
SP GR UR STRIP: 1.01 (ref 1–1.03)
SPECIMEN SOURCE: ABNORMAL
SPECIMEN SOURCE: NORMAL
SQUAMOUS #/AREA URNS AUTO: 2 /HPF (ref 0–1)
SQUAMOUS #/AREA URNS AUTO: 2 /HPF (ref 0–1)
TSH SERPL DL<=0.005 MIU/L-ACNC: 2.93 MU/L (ref 0.4–4)
TUBE # CSF: 4 #
URN SPEC COLLECT METH UR: ABNORMAL
UROBILINOGEN UR STRIP-ACNC: 0.2 EU/DL (ref 0.2–1)
UROBILINOGEN UR STRIP-ACNC: 0.2 EU/DL (ref 0.2–1)
UROBILINOGEN UR STRIP-MCNC: NORMAL MG/DL (ref 0–2)
VALPROATE FREE SERPL-MCNC: 33.7 UG/ML
VALPROATE SERPL-MCNC: 14 MG/L (ref 50–100)
VALPROATE SERPL-MCNC: 56 MG/L (ref 50–100)
VALPROATE SERPL-MCNC: 56 MG/L (ref 50–100)
VALPROATE SERPL-MCNC: 57 MG/L (ref 50–100)
VALPROATE SERPL-MCNC: 95 MG/L (ref 50–100)
VALPROATE SERPL-MCNC: 96 MG/L (ref 50–100)
VALPROATE SERPL-MCNC: <3 MG/L (ref 50–100)
VANCOMYCIN SERPL-MCNC: 19.2 MG/L
VANCOMYCIN SERPL-MCNC: 19.4 MG/L
VANCOMYCIN SERPL-MCNC: 22.1 MG/L
WBC # BLD AUTO: 10 10E9/L (ref 4–11)
WBC # BLD AUTO: 10 10E9/L (ref 4–11)
WBC # BLD AUTO: 10.4 10E9/L (ref 4–11)
WBC # BLD AUTO: 10.6 10E9/L (ref 4–11)
WBC # BLD AUTO: 10.7 10E9/L (ref 4–11)
WBC # BLD AUTO: 10.9 10E9/L (ref 4–11)
WBC # BLD AUTO: 11.7 10E9/L (ref 4–11)
WBC # BLD AUTO: 12.7 10E9/L (ref 4–11)
WBC # BLD AUTO: 13.3 10E9/L (ref 4–11)
WBC # BLD AUTO: 13.3 10E9/L (ref 4–11)
WBC # BLD AUTO: 13.7 10E9/L (ref 4–11)
WBC # BLD AUTO: 14.4 10E9/L (ref 4–11)
WBC # BLD AUTO: 5.5 10E9/L (ref 4–11)
WBC # BLD AUTO: 5.8 10E9/L (ref 4–11)
WBC # BLD AUTO: 6.8 10E9/L (ref 4–11)
WBC # BLD AUTO: 6.9 10E9/L (ref 4–11)
WBC # BLD AUTO: 8.9 10E9/L (ref 4–11)
WBC # BLD AUTO: 9 10E9/L (ref 4–11)
WBC # BLD AUTO: 9 10E9/L (ref 4–11)
WBC # BLD AUTO: 9.3 10E9/L (ref 4–11)
WBC # BLD AUTO: 9.5 10E9/L (ref 4–11)
WBC # BLD AUTO: 9.5 10E9/L (ref 4–11)
WBC # BLD AUTO: 9.8 10E9/L (ref 4–11)
WBC # BLD AUTO: 9.9 10E9/L (ref 4–11)
WBC # CSF MANUAL: 0 /UL (ref 0–5)
WBC # CSF MANUAL: NORMAL /UL (ref 0–5)
WBC #/AREA URNS AUTO: 3 /HPF (ref 0–2)
WBC #/AREA URNS AUTO: 4 /HPF (ref 0–2)
WBC #/AREA URNS AUTO: 61 /HPF (ref 0–2)
WBC #/AREA URNS AUTO: <1 /HPF (ref 0–2)
WBC #/AREA URNS AUTO: >182 /HPF (ref 0–2)
WBC #/AREA URNS AUTO: ABNORMAL /HPF (ref 0–2)
WBC #/AREA URNS AUTO: ABNORMAL /HPF (ref 0–2)
WBC CASTS #/AREA URNS LPF: ABNORMAL /LPF
WBC CLUMPS #/AREA URNS HPF: PRESENT /HPF
YEAST #/AREA URNS HPF: ABNORMAL /HPF

## 2017-01-01 PROCEDURE — 40000225 ZZH STATISTIC SLP WARD VISIT: Performed by: SPEECH-LANGUAGE PATHOLOGIST

## 2017-01-01 PROCEDURE — 99238 HOSP IP/OBS DSCHRG MGMT 30/<: CPT | Performed by: INTERNAL MEDICINE

## 2017-01-01 PROCEDURE — 92526 ORAL FUNCTION THERAPY: CPT | Mod: GN

## 2017-01-01 PROCEDURE — G0378 HOSPITAL OBSERVATION PER HR: HCPCS

## 2017-01-01 PROCEDURE — 25000132 ZZH RX MED GY IP 250 OP 250 PS 637: Mod: GY | Performed by: INTERNAL MEDICINE

## 2017-01-01 PROCEDURE — A9270 NON-COVERED ITEM OR SERVICE: HCPCS | Mod: GY | Performed by: INTERNAL MEDICINE

## 2017-01-01 PROCEDURE — 99207 ZZC CDG-CODE CATEGORY CHANGED: CPT | Performed by: INTERNAL MEDICINE

## 2017-01-01 PROCEDURE — 25000131 ZZH RX MED GY IP 250 OP 636 PS 637: Mod: GY | Performed by: INTERNAL MEDICINE

## 2017-01-01 PROCEDURE — 80069 RENAL FUNCTION PANEL: CPT | Performed by: INTERNAL MEDICINE

## 2017-01-01 PROCEDURE — 80048 BASIC METABOLIC PNL TOTAL CA: CPT | Performed by: INTERNAL MEDICINE

## 2017-01-01 PROCEDURE — 40000193 ZZH STATISTIC PT WARD VISIT

## 2017-01-01 PROCEDURE — 36415 COLL VENOUS BLD VENIPUNCTURE: CPT | Performed by: HOSPITALIST

## 2017-01-01 PROCEDURE — 36415 COLL VENOUS BLD VENIPUNCTURE: CPT | Performed by: INTERNAL MEDICINE

## 2017-01-01 PROCEDURE — 00000146 ZZHCL STATISTIC GLUCOSE BY METER IP

## 2017-01-01 PROCEDURE — 85027 COMPLETE CBC AUTOMATED: CPT | Performed by: INTERNAL MEDICINE

## 2017-01-01 PROCEDURE — 12000000 ZZH R&B MED SURG/OB

## 2017-01-01 PROCEDURE — 80048 BASIC METABOLIC PNL TOTAL CA: CPT | Performed by: HOSPITALIST

## 2017-01-01 PROCEDURE — 96365 THER/PROPH/DIAG IV INF INIT: CPT

## 2017-01-01 PROCEDURE — 92610 EVALUATE SWALLOWING FUNCTION: CPT | Mod: GN | Performed by: SPEECH-LANGUAGE PATHOLOGIST

## 2017-01-01 PROCEDURE — 25000131 ZZH RX MED GY IP 250 OP 636 PS 637: Mod: GY | Performed by: HOSPITALIST

## 2017-01-01 PROCEDURE — 84295 ASSAY OF SERUM SODIUM: CPT | Performed by: HOSPITALIST

## 2017-01-01 PROCEDURE — 80164 ASSAY DIPROPYLACETIC ACD TOT: CPT | Performed by: EMERGENCY MEDICINE

## 2017-01-01 PROCEDURE — 71010 XR CHEST PORT 1 VW: CPT

## 2017-01-01 PROCEDURE — 99225 ZZC SUBSEQUENT OBSERVATION CARE,LEVEL II: CPT | Performed by: INTERNAL MEDICINE

## 2017-01-01 PROCEDURE — 80202 ASSAY OF VANCOMYCIN: CPT | Performed by: INTERNAL MEDICINE

## 2017-01-01 PROCEDURE — 25000132 ZZH RX MED GY IP 250 OP 250 PS 637: Mod: GY | Performed by: PSYCHIATRY & NEUROLOGY

## 2017-01-01 PROCEDURE — A9270 NON-COVERED ITEM OR SERVICE: HCPCS | Mod: GY | Performed by: PSYCHIATRY & NEUROLOGY

## 2017-01-01 PROCEDURE — 81001 URINALYSIS AUTO W/SCOPE: CPT | Performed by: EMERGENCY MEDICINE

## 2017-01-01 PROCEDURE — 25000132 ZZH RX MED GY IP 250 OP 250 PS 637: Mod: GY | Performed by: HOSPITALIST

## 2017-01-01 PROCEDURE — 87493 C DIFF AMPLIFIED PROBE: CPT | Performed by: ANESTHESIOLOGY

## 2017-01-01 PROCEDURE — 27210995 ZZH RX 272: Performed by: INTERNAL MEDICINE

## 2017-01-01 PROCEDURE — 97110 THERAPEUTIC EXERCISES: CPT | Mod: GP

## 2017-01-01 PROCEDURE — 99232 SBSQ HOSP IP/OBS MODERATE 35: CPT | Performed by: INTERNAL MEDICINE

## 2017-01-01 PROCEDURE — 25000125 ZZHC RX 250: Performed by: INTERNAL MEDICINE

## 2017-01-01 PROCEDURE — 25000125 ZZHC RX 250

## 2017-01-01 PROCEDURE — 99232 SBSQ HOSP IP/OBS MODERATE 35: CPT | Performed by: HOSPITALIST

## 2017-01-01 PROCEDURE — 82140 ASSAY OF AMMONIA: CPT | Performed by: PSYCHIATRY & NEUROLOGY

## 2017-01-01 PROCEDURE — 87186 SC STD MICRODIL/AGAR DIL: CPT | Performed by: INTERNAL MEDICINE

## 2017-01-01 PROCEDURE — 99211 OFF/OP EST MAY X REQ PHY/QHP: CPT

## 2017-01-01 PROCEDURE — 25000128 H RX IP 250 OP 636: Performed by: INTERNAL MEDICINE

## 2017-01-01 PROCEDURE — L4360 PNEUMAT WALKING BOOT PRE CST: HCPCS

## 2017-01-01 PROCEDURE — 73630 X-RAY EXAM OF FOOT: CPT | Mod: RT

## 2017-01-01 PROCEDURE — 80165 DIPROPYLACETIC ACID FREE: CPT | Performed by: HOSPITALIST

## 2017-01-01 PROCEDURE — 40000275 ZZH STATISTIC RCP TIME EA 10 MIN

## 2017-01-01 PROCEDURE — 87040 BLOOD CULTURE FOR BACTERIA: CPT | Performed by: INTERNAL MEDICINE

## 2017-01-01 PROCEDURE — 99226 ZZC SUBSEQUENT OBSERVATION CARE,LEVEL III: CPT | Performed by: INTERNAL MEDICINE

## 2017-01-01 PROCEDURE — 82947 ASSAY GLUCOSE BLOOD QUANT: CPT | Performed by: EMERGENCY MEDICINE

## 2017-01-01 PROCEDURE — 25000128 H RX IP 250 OP 636

## 2017-01-01 PROCEDURE — 99207 ZZC CDG-CORRECTLY CODED, REVIEWED AND AGREE: CPT | Performed by: NURSE PRACTITIONER

## 2017-01-01 PROCEDURE — 83735 ASSAY OF MAGNESIUM: CPT | Performed by: EMERGENCY MEDICINE

## 2017-01-01 PROCEDURE — 85610 PROTHROMBIN TIME: CPT | Performed by: EMERGENCY MEDICINE

## 2017-01-01 PROCEDURE — 85025 COMPLETE CBC W/AUTO DIFF WBC: CPT | Performed by: HOSPITALIST

## 2017-01-01 PROCEDURE — 85025 COMPLETE CBC W/AUTO DIFF WBC: CPT | Performed by: INTERNAL MEDICINE

## 2017-01-01 PROCEDURE — 82805 BLOOD GASES W/O2 SATURATION: CPT | Performed by: INTERNAL MEDICINE

## 2017-01-01 PROCEDURE — 87088 URINE BACTERIA CULTURE: CPT | Performed by: INTERNAL MEDICINE

## 2017-01-01 PROCEDURE — 83605 ASSAY OF LACTIC ACID: CPT | Performed by: INTERNAL MEDICINE

## 2017-01-01 PROCEDURE — 12000007 ZZH R&B INTERMEDIATE

## 2017-01-01 PROCEDURE — 27210995 ZZH RX 272: Performed by: EMERGENCY MEDICINE

## 2017-01-01 PROCEDURE — 99233 SBSQ HOSP IP/OBS HIGH 50: CPT | Performed by: INTERNAL MEDICINE

## 2017-01-01 PROCEDURE — 40000225 ZZH STATISTIC SLP WARD VISIT

## 2017-01-01 PROCEDURE — 40000501 ZZHCL STATISTIC HEMATOCRIT ED POCT

## 2017-01-01 PROCEDURE — 99233 SBSQ HOSP IP/OBS HIGH 50: CPT | Performed by: HOSPITALIST

## 2017-01-01 PROCEDURE — 40000193 ZZH STATISTIC PT WARD VISIT: Performed by: PHYSICAL THERAPIST

## 2017-01-01 PROCEDURE — 36600 WITHDRAWAL OF ARTERIAL BLOOD: CPT

## 2017-01-01 PROCEDURE — 40000886 ZZH STATISTIC STEP DOWN HRS DAY

## 2017-01-01 PROCEDURE — 99207 ZZC CDG-MDM COMPONENT: MEETS MODERATE - UP CODED: CPT | Performed by: HOSPITALIST

## 2017-01-01 PROCEDURE — 87077 CULTURE AEROBIC IDENTIFY: CPT | Performed by: INTERNAL MEDICINE

## 2017-01-01 PROCEDURE — 92526 ORAL FUNCTION THERAPY: CPT | Mod: GN | Performed by: SPEECH-LANGUAGE PATHOLOGIST

## 2017-01-01 PROCEDURE — 85049 AUTOMATED PLATELET COUNT: CPT | Performed by: INTERNAL MEDICINE

## 2017-01-01 PROCEDURE — 80053 COMPREHEN METABOLIC PANEL: CPT | Performed by: INTERNAL MEDICINE

## 2017-01-01 PROCEDURE — A9270 NON-COVERED ITEM OR SERVICE: HCPCS | Mod: GY | Performed by: HOSPITALIST

## 2017-01-01 PROCEDURE — 36416 COLLJ CAPILLARY BLOOD SPEC: CPT | Performed by: INTERNAL MEDICINE

## 2017-01-01 PROCEDURE — 87070 CULTURE OTHR SPECIMN AEROBIC: CPT | Performed by: EMERGENCY MEDICINE

## 2017-01-01 PROCEDURE — 85027 COMPLETE CBC AUTOMATED: CPT | Performed by: HOSPITALIST

## 2017-01-01 PROCEDURE — 84100 ASSAY OF PHOSPHORUS: CPT | Performed by: HOSPITALIST

## 2017-01-01 PROCEDURE — 25000128 H RX IP 250 OP 636: Performed by: EMERGENCY MEDICINE

## 2017-01-01 PROCEDURE — 25000125 ZZHC RX 250: Performed by: HOSPITALIST

## 2017-01-01 PROCEDURE — 83690 ASSAY OF LIPASE: CPT | Performed by: EMERGENCY MEDICINE

## 2017-01-01 PROCEDURE — 80164 ASSAY DIPROPYLACETIC ACD TOT: CPT | Performed by: INTERNAL MEDICINE

## 2017-01-01 PROCEDURE — 83036 HEMOGLOBIN GLYCOSYLATED A1C: CPT | Performed by: EMERGENCY MEDICINE

## 2017-01-01 PROCEDURE — 99224 ZZC SUBSEQUENT OBSERVATION CARE,LEVEL I: CPT | Performed by: INTERNAL MEDICINE

## 2017-01-01 PROCEDURE — 25000128 H RX IP 250 OP 636: Performed by: HOSPITALIST

## 2017-01-01 PROCEDURE — 99223 1ST HOSP IP/OBS HIGH 75: CPT | Mod: AI | Performed by: INTERNAL MEDICINE

## 2017-01-01 PROCEDURE — 25000132 ZZH RX MED GY IP 250 OP 250 PS 637: Mod: GY | Performed by: EMERGENCY MEDICINE

## 2017-01-01 PROCEDURE — 87205 SMEAR GRAM STAIN: CPT | Performed by: EMERGENCY MEDICINE

## 2017-01-01 PROCEDURE — 94660 CPAP INITIATION&MGMT: CPT

## 2017-01-01 PROCEDURE — 82570 ASSAY OF URINE CREATININE: CPT | Performed by: INTERNAL MEDICINE

## 2017-01-01 PROCEDURE — 83690 ASSAY OF LIPASE: CPT | Performed by: HOSPITALIST

## 2017-01-01 PROCEDURE — 99221 1ST HOSP IP/OBS SF/LOW 40: CPT | Performed by: PSYCHIATRY & NEUROLOGY

## 2017-01-01 PROCEDURE — 97161 PT EVAL LOW COMPLEX 20 MIN: CPT | Mod: GP

## 2017-01-01 PROCEDURE — 40000498 ZZHCL STATISTIC POTASSIUM ED POCT

## 2017-01-01 PROCEDURE — 40000986 XR CHEST PORT 1 VW

## 2017-01-01 PROCEDURE — 99223 1ST HOSP IP/OBS HIGH 75: CPT | Performed by: NURSE PRACTITIONER

## 2017-01-01 PROCEDURE — 84295 ASSAY OF SERUM SODIUM: CPT | Performed by: INTERNAL MEDICINE

## 2017-01-01 PROCEDURE — 82803 BLOOD GASES ANY COMBINATION: CPT

## 2017-01-01 PROCEDURE — G8978 MOBILITY CURRENT STATUS: HCPCS | Mod: GP,CM | Performed by: PHYSICAL THERAPIST

## 2017-01-01 PROCEDURE — 21000000 ZZH R&B IMCU HEART CARE

## 2017-01-01 PROCEDURE — 83036 HEMOGLOBIN GLYCOSYLATED A1C: CPT | Performed by: INTERNAL MEDICINE

## 2017-01-01 PROCEDURE — 82945 GLUCOSE OTHER FLUID: CPT | Performed by: EMERGENCY MEDICINE

## 2017-01-01 PROCEDURE — 99285 EMERGENCY DEPT VISIT HI MDM: CPT | Mod: 25

## 2017-01-01 PROCEDURE — 25800025 ZZH RX 258: Performed by: INTERNAL MEDICINE

## 2017-01-01 PROCEDURE — 83970 ASSAY OF PARATHORMONE: CPT | Performed by: INTERNAL MEDICINE

## 2017-01-01 PROCEDURE — 25000125 ZZHC RX 250: Performed by: EMERGENCY MEDICINE

## 2017-01-01 PROCEDURE — 25500064 ZZH RX 255 OP 636: Performed by: INTERNAL MEDICINE

## 2017-01-01 PROCEDURE — 87040 BLOOD CULTURE FOR BACTERIA: CPT | Performed by: EMERGENCY MEDICINE

## 2017-01-01 PROCEDURE — 85025 COMPLETE CBC W/AUTO DIFF WBC: CPT | Performed by: EMERGENCY MEDICINE

## 2017-01-01 PROCEDURE — 70450 CT HEAD/BRAIN W/O DYE: CPT

## 2017-01-01 PROCEDURE — 99207 ZZC CDG-CODE INCORRECT PER BILLING BASED ON TIME: CPT | Performed by: INTERNAL MEDICINE

## 2017-01-01 PROCEDURE — 25800025 ZZH RX 258: Performed by: HOSPITALIST

## 2017-01-01 PROCEDURE — 82805 BLOOD GASES W/O2 SATURATION: CPT | Performed by: EMERGENCY MEDICINE

## 2017-01-01 PROCEDURE — 82565 ASSAY OF CREATININE: CPT | Performed by: INTERNAL MEDICINE

## 2017-01-01 PROCEDURE — 99207 ZZC APP CREDIT; MD BILLING SHARED VISIT: CPT | Performed by: INTERNAL MEDICINE

## 2017-01-01 PROCEDURE — 84157 ASSAY OF PROTEIN OTHER: CPT | Performed by: EMERGENCY MEDICINE

## 2017-01-01 PROCEDURE — G8979 MOBILITY GOAL STATUS: HCPCS | Mod: GP,CJ | Performed by: PHYSICAL THERAPIST

## 2017-01-01 PROCEDURE — S5010 5% DEXTROSE AND 0.45% SALINE: HCPCS | Performed by: INTERNAL MEDICINE

## 2017-01-01 PROCEDURE — 87800 DETECT AGNT MULT DNA DIREC: CPT | Performed by: INTERNAL MEDICINE

## 2017-01-01 PROCEDURE — 89050 BODY FLUID CELL COUNT: CPT | Performed by: EMERGENCY MEDICINE

## 2017-01-01 PROCEDURE — 83605 ASSAY OF LACTIC ACID: CPT | Performed by: HOSPITALIST

## 2017-01-01 PROCEDURE — 40000915 ZZH STATISTIC SITTER, EVENING HOURS

## 2017-01-01 PROCEDURE — 44500 INTRO GASTROINTESTINAL TUBE: CPT

## 2017-01-01 PROCEDURE — 81001 URINALYSIS AUTO W/SCOPE: CPT | Performed by: INTERNAL MEDICINE

## 2017-01-01 PROCEDURE — 97530 THERAPEUTIC ACTIVITIES: CPT | Mod: GP

## 2017-01-01 PROCEDURE — 80053 COMPREHEN METABOLIC PANEL: CPT | Performed by: EMERGENCY MEDICINE

## 2017-01-01 PROCEDURE — 40000884 ZZH STATISTIC STEP DOWN HRS NIGHT

## 2017-01-01 PROCEDURE — 009U3ZX DRAINAGE OF SPINAL CANAL, PERCUTANEOUS APPROACH, DIAGNOSTIC: ICD-10-PCS | Performed by: EMERGENCY MEDICINE

## 2017-01-01 PROCEDURE — 40000281 ZZH STATISTIC TRANSPORT TIME EA 15 MIN

## 2017-01-01 PROCEDURE — 82010 KETONE BODYS QUAN: CPT | Performed by: EMERGENCY MEDICINE

## 2017-01-01 PROCEDURE — S5010 5% DEXTROSE AND 0.45% SALINE: HCPCS | Performed by: HOSPITALIST

## 2017-01-01 PROCEDURE — 99212 OFFICE O/P EST SF 10 MIN: CPT

## 2017-01-01 PROCEDURE — 40000893 ZZH STATISTIC PT IP EVAL DEFER: Performed by: PHYSICAL THERAPIST

## 2017-01-01 PROCEDURE — 40000556 ZZH STATISTIC PERIPHERAL IV START W US GUIDANCE

## 2017-01-01 PROCEDURE — 20000003 ZZH R&B ICU

## 2017-01-01 PROCEDURE — 99285 EMERGENCY DEPT VISIT HI MDM: CPT

## 2017-01-01 PROCEDURE — 82565 ASSAY OF CREATININE: CPT | Performed by: HOSPITALIST

## 2017-01-01 PROCEDURE — 40000497 ZZHCL STATISTIC SODIUM ED POCT

## 2017-01-01 PROCEDURE — 99239 HOSP IP/OBS DSCHRG MGMT >30: CPT | Performed by: INTERNAL MEDICINE

## 2017-01-01 PROCEDURE — A9270 NON-COVERED ITEM OR SERVICE: HCPCS | Mod: GY | Performed by: EMERGENCY MEDICINE

## 2017-01-01 PROCEDURE — 82550 ASSAY OF CK (CPK): CPT | Performed by: EMERGENCY MEDICINE

## 2017-01-01 PROCEDURE — 85027 COMPLETE CBC AUTOMATED: CPT | Performed by: PHYSICIAN ASSISTANT

## 2017-01-01 PROCEDURE — 71020 XR CHEST 2 VW: CPT

## 2017-01-01 PROCEDURE — 80053 COMPREHEN METABOLIC PANEL: CPT | Performed by: HOSPITALIST

## 2017-01-01 PROCEDURE — 83605 ASSAY OF LACTIC ACID: CPT

## 2017-01-01 PROCEDURE — 87086 URINE CULTURE/COLONY COUNT: CPT | Performed by: INTERNAL MEDICINE

## 2017-01-01 PROCEDURE — 93306 TTE W/DOPPLER COMPLETE: CPT | Mod: 26 | Performed by: INTERNAL MEDICINE

## 2017-01-01 PROCEDURE — 40000885 ZZH STATISTIC STEP DOWN HRS EVENING

## 2017-01-01 PROCEDURE — 95816 EEG AWAKE AND DROWSY: CPT

## 2017-01-01 PROCEDURE — 83605 ASSAY OF LACTIC ACID: CPT | Performed by: EMERGENCY MEDICINE

## 2017-01-01 PROCEDURE — 80048 BASIC METABOLIC PNL TOTAL CA: CPT | Performed by: EMERGENCY MEDICINE

## 2017-01-01 PROCEDURE — 99239 HOSP IP/OBS DSCHRG MGMT >30: CPT | Mod: GV | Performed by: HOSPITALIST

## 2017-01-01 PROCEDURE — 36415 COLL VENOUS BLD VENIPUNCTURE: CPT

## 2017-01-01 PROCEDURE — 84132 ASSAY OF SERUM POTASSIUM: CPT | Performed by: INTERNAL MEDICINE

## 2017-01-01 PROCEDURE — 83735 ASSAY OF MAGNESIUM: CPT | Performed by: HOSPITALIST

## 2017-01-01 PROCEDURE — 82306 VITAMIN D 25 HYDROXY: CPT | Performed by: INTERNAL MEDICINE

## 2017-01-01 PROCEDURE — 97110 THERAPEUTIC EXERCISES: CPT | Mod: GP | Performed by: PHYSICAL THERAPIST

## 2017-01-01 PROCEDURE — 27210339 ZZH CIRCUIT HUMIDITY W/CPAP BIP

## 2017-01-01 PROCEDURE — 99207 ZZC CDG-CODE CATEGORY CHANGED: CPT | Performed by: HOSPITALIST

## 2017-01-01 PROCEDURE — 80069 RENAL FUNCTION PANEL: CPT | Performed by: HOSPITALIST

## 2017-01-01 PROCEDURE — 36415 COLL VENOUS BLD VENIPUNCTURE: CPT | Performed by: PHYSICIAN ASSISTANT

## 2017-01-01 PROCEDURE — 90791 PSYCH DIAGNOSTIC EVALUATION: CPT

## 2017-01-01 PROCEDURE — 81001 URINALYSIS AUTO W/SCOPE: CPT | Performed by: PHYSICIAN ASSISTANT

## 2017-01-01 PROCEDURE — 99207 ZZC APP CREDIT; MD BILLING SHARED VISIT: CPT | Performed by: PHYSICIAN ASSISTANT

## 2017-01-01 PROCEDURE — 99309 SBSQ NF CARE MODERATE MDM 30: CPT | Mod: GV | Performed by: NURSE PRACTITIONER

## 2017-01-01 PROCEDURE — 97161 PT EVAL LOW COMPLEX 20 MIN: CPT | Mod: GP | Performed by: PHYSICAL THERAPIST

## 2017-01-01 PROCEDURE — 99232 SBSQ HOSP IP/OBS MODERATE 35: CPT | Performed by: PSYCHIATRY & NEUROLOGY

## 2017-01-01 PROCEDURE — 99220 ZZC INITIAL OBSERVATION CARE,LEVL III: CPT | Performed by: INTERNAL MEDICINE

## 2017-01-01 PROCEDURE — 25000131 ZZH RX MED GY IP 250 OP 636 PS 637: Mod: GY | Performed by: EMERGENCY MEDICINE

## 2017-01-01 PROCEDURE — 83690 ASSAY OF LIPASE: CPT | Performed by: INTERNAL MEDICINE

## 2017-01-01 PROCEDURE — 87106 FUNGI IDENTIFICATION YEAST: CPT | Performed by: EMERGENCY MEDICINE

## 2017-01-01 PROCEDURE — 70551 MRI BRAIN STEM W/O DYE: CPT

## 2017-01-01 PROCEDURE — 96367 TX/PROPH/DG ADDL SEQ IV INF: CPT

## 2017-01-01 PROCEDURE — 25000131 ZZH RX MED GY IP 250 OP 636 PS 637: Mod: GY | Performed by: PHYSICIAN ASSISTANT

## 2017-01-01 PROCEDURE — 40000671 ZZH STATISTIC ANESTHESIA CASE

## 2017-01-01 PROCEDURE — 40000264 ECHO COMPLETE WITH LUMASON

## 2017-01-01 PROCEDURE — 87086 URINE CULTURE/COLONY COUNT: CPT | Performed by: EMERGENCY MEDICINE

## 2017-01-01 PROCEDURE — 84300 ASSAY OF URINE SODIUM: CPT | Performed by: INTERNAL MEDICINE

## 2017-01-01 PROCEDURE — 82805 BLOOD GASES W/O2 SATURATION: CPT | Performed by: HOSPITALIST

## 2017-01-01 PROCEDURE — 99225 ZZC SUBSEQUENT OBSERVATION CARE,LEVEL II: CPT | Performed by: HOSPITALIST

## 2017-01-01 PROCEDURE — 96375 TX/PRO/DX INJ NEW DRUG ADDON: CPT

## 2017-01-01 PROCEDURE — 99233 SBSQ HOSP IP/OBS HIGH 50: CPT | Performed by: PSYCHIATRY & NEUROLOGY

## 2017-01-01 PROCEDURE — 84132 ASSAY OF SERUM POTASSIUM: CPT | Performed by: HOSPITALIST

## 2017-01-01 PROCEDURE — 99291 CRITICAL CARE FIRST HOUR: CPT | Performed by: INTERNAL MEDICINE

## 2017-01-01 PROCEDURE — 25800025 ZZH RX 258: Performed by: EMERGENCY MEDICINE

## 2017-01-01 PROCEDURE — 93005 ELECTROCARDIOGRAM TRACING: CPT

## 2017-01-01 PROCEDURE — 74020 XR ABDOMEN 2 VW: CPT

## 2017-01-01 PROCEDURE — 37000011 ZZH ANESTHESIA WARD SERVICE

## 2017-01-01 PROCEDURE — 85049 AUTOMATED PLATELET COUNT: CPT | Performed by: HOSPITALIST

## 2017-01-01 PROCEDURE — 84132 ASSAY OF SERUM POTASSIUM: CPT | Performed by: EMERGENCY MEDICINE

## 2017-01-01 PROCEDURE — 82140 ASSAY OF AMMONIA: CPT | Performed by: NURSE PRACTITIONER

## 2017-01-01 PROCEDURE — 74176 CT ABD & PELVIS W/O CONTRAST: CPT

## 2017-01-01 PROCEDURE — 40000893 ZZH STATISTIC PT IP EVAL DEFER

## 2017-01-01 PROCEDURE — 40000894 ZZH STATISTIC OT IP EVAL DEFER

## 2017-01-01 PROCEDURE — 84443 ASSAY THYROID STIM HORMONE: CPT | Performed by: EMERGENCY MEDICINE

## 2017-01-01 PROCEDURE — 80164 ASSAY DIPROPYLACETIC ACD TOT: CPT | Performed by: HOSPITALIST

## 2017-01-01 RX ORDER — DIVALPROEX SODIUM 500 MG/1
1500 TABLET, DELAYED RELEASE ORAL AT BEDTIME
Status: DISCONTINUED | OUTPATIENT
Start: 2017-01-01 | End: 2017-01-01

## 2017-01-01 RX ORDER — HYDRALAZINE HYDROCHLORIDE 20 MG/ML
10 INJECTION INTRAMUSCULAR; INTRAVENOUS EVERY 4 HOURS PRN
Status: DISCONTINUED | OUTPATIENT
Start: 2017-01-01 | End: 2017-01-01 | Stop reason: HOSPADM

## 2017-01-01 RX ORDER — ACETAMINOPHEN 325 MG/1
650 TABLET ORAL EVERY 4 HOURS PRN
Status: DISCONTINUED | OUTPATIENT
Start: 2017-01-01 | End: 2017-01-01

## 2017-01-01 RX ORDER — DILTIAZEM HCL 60 MG
60 TABLET ORAL EVERY 6 HOURS SCHEDULED
Status: DISCONTINUED | OUTPATIENT
Start: 2017-01-01 | End: 2017-01-01

## 2017-01-01 RX ORDER — AMOXICILLIN 250 MG
1 CAPSULE ORAL 2 TIMES DAILY PRN
Status: DISCONTINUED | OUTPATIENT
Start: 2017-01-01 | End: 2017-01-01

## 2017-01-01 RX ORDER — GLIPIZIDE 2.5 MG/1
2.5 TABLET, EXTENDED RELEASE ORAL
Status: DISCONTINUED | OUTPATIENT
Start: 2017-01-01 | End: 2017-01-01 | Stop reason: HOSPADM

## 2017-01-01 RX ORDER — BISACODYL 10 MG
10 SUPPOSITORY, RECTAL RECTAL DAILY PRN
Status: DISCONTINUED | OUTPATIENT
Start: 2017-01-01 | End: 2017-01-01 | Stop reason: HOSPADM

## 2017-01-01 RX ORDER — HYDRALAZINE HYDROCHLORIDE 20 MG/ML
10 INJECTION INTRAMUSCULAR; INTRAVENOUS EVERY 4 HOURS PRN
Status: DISCONTINUED | OUTPATIENT
Start: 2017-01-01 | End: 2017-01-01

## 2017-01-01 RX ORDER — CLONIDINE HYDROCHLORIDE 0.2 MG/1
0.2 TABLET ORAL 2 TIMES DAILY
Status: DISCONTINUED | OUTPATIENT
Start: 2017-01-01 | End: 2017-01-01

## 2017-01-01 RX ORDER — DIVALPROEX SODIUM 125 MG/1
1500 CAPSULE, COATED PELLETS ORAL AT BEDTIME
Status: DISCONTINUED | OUTPATIENT
Start: 2017-01-01 | End: 2017-01-01

## 2017-01-01 RX ORDER — DEXTROSE MONOHYDRATE 25 G/50ML
25-50 INJECTION, SOLUTION INTRAVENOUS
Status: DISCONTINUED | OUTPATIENT
Start: 2017-01-01 | End: 2017-01-01

## 2017-01-01 RX ORDER — ACETAMINOPHEN 325 MG/1
650 TABLET ORAL ONCE
Status: COMPLETED | OUTPATIENT
Start: 2017-01-01 | End: 2017-01-01

## 2017-01-01 RX ORDER — ACETAMINOPHEN 650 MG/1
650 SUPPOSITORY RECTAL EVERY 4 HOURS PRN
Status: DISCONTINUED | OUTPATIENT
Start: 2017-01-01 | End: 2017-01-01 | Stop reason: HOSPADM

## 2017-01-01 RX ORDER — DILTIAZEM HYDROCHLORIDE 60 MG/1
120 CAPSULE, EXTENDED RELEASE ORAL EVERY EVENING
Status: DISCONTINUED | OUTPATIENT
Start: 2017-01-01 | End: 2017-01-01 | Stop reason: HOSPADM

## 2017-01-01 RX ORDER — ACETAMINOPHEN 325 MG/1
650 TABLET ORAL EVERY 4 HOURS PRN
Status: DISCONTINUED | OUTPATIENT
Start: 2017-01-01 | End: 2017-01-01 | Stop reason: HOSPADM

## 2017-01-01 RX ORDER — AMILORIDE HYDROCHLORIDE 5 MG/1
10 TABLET ORAL DAILY
Status: ON HOLD | DISCHARGE
Start: 2017-01-01 | End: 2017-01-01

## 2017-01-01 RX ORDER — DIVALPROEX SODIUM 500 MG/1
1500 TABLET, DELAYED RELEASE ORAL AT BEDTIME
Status: DISCONTINUED | OUTPATIENT
Start: 2017-01-01 | End: 2017-01-01 | Stop reason: HOSPADM

## 2017-01-01 RX ORDER — ACETAMINOPHEN 650 MG/1
650 SUPPOSITORY RECTAL EVERY 4 HOURS PRN
Qty: 60 SUPPOSITORY | DISCHARGE
Start: 2017-01-01 | End: 2017-01-01

## 2017-01-01 RX ORDER — AMILORIDE HYDROCHLORIDE 5 MG/1
5 TABLET ORAL DAILY
Status: DISCONTINUED | OUTPATIENT
Start: 2017-01-01 | End: 2017-01-01

## 2017-01-01 RX ORDER — SODIUM CHLORIDE 450 MG/100ML
INJECTION, SOLUTION INTRAVENOUS CONTINUOUS
Status: DISCONTINUED | OUTPATIENT
Start: 2017-01-01 | End: 2017-01-01

## 2017-01-01 RX ORDER — POLYETHYLENE GLYCOL 3350 17 G/17G
17 POWDER, FOR SOLUTION ORAL DAILY PRN
Status: DISCONTINUED | OUTPATIENT
Start: 2017-01-01 | End: 2017-01-01 | Stop reason: HOSPADM

## 2017-01-01 RX ORDER — HEPARIN SODIUM,PORCINE 10 UNIT/ML
5-10 VIAL (ML) INTRAVENOUS EVERY 24 HOURS
Status: DISCONTINUED | OUTPATIENT
Start: 2017-01-01 | End: 2017-01-01 | Stop reason: HOSPADM

## 2017-01-01 RX ORDER — AMILORIDE HYDROCHLORIDE 5 MG/1
10 TABLET ORAL DAILY
Status: DISCONTINUED | OUTPATIENT
Start: 2017-01-01 | End: 2017-01-01 | Stop reason: HOSPADM

## 2017-01-01 RX ORDER — METOPROLOL TARTRATE 25 MG/1
25 TABLET, FILM COATED ORAL 2 TIMES DAILY
Status: DISCONTINUED | OUTPATIENT
Start: 2017-01-01 | End: 2017-01-01

## 2017-01-01 RX ORDER — LEVOFLOXACIN 5 MG/ML
500 INJECTION, SOLUTION INTRAVENOUS
Status: DISCONTINUED | OUTPATIENT
Start: 2017-01-01 | End: 2017-01-01

## 2017-01-01 RX ORDER — SODIUM CHLORIDE 9 MG/ML
INJECTION, SOLUTION INTRAVENOUS CONTINUOUS
Status: DISCONTINUED | OUTPATIENT
Start: 2017-01-01 | End: 2017-01-01

## 2017-01-01 RX ORDER — RISPERIDONE 1 MG/1
1 TABLET, ORALLY DISINTEGRATING ORAL AT BEDTIME
Status: DISCONTINUED | OUTPATIENT
Start: 2017-01-01 | End: 2017-01-01

## 2017-01-01 RX ORDER — POTASSIUM CHLORIDE 29.8 MG/ML
20 INJECTION INTRAVENOUS ONCE
Status: COMPLETED | OUTPATIENT
Start: 2017-01-01 | End: 2017-01-01

## 2017-01-01 RX ORDER — VANCOMYCIN HYDROCHLORIDE 1 G/200ML
1000 INJECTION, SOLUTION INTRAVENOUS ONCE
Status: COMPLETED | OUTPATIENT
Start: 2017-01-01 | End: 2017-01-01

## 2017-01-01 RX ORDER — RISPERIDONE 0.5 MG/1
0.5 TABLET, ORALLY DISINTEGRATING ORAL 2 TIMES DAILY
Status: DISCONTINUED | OUTPATIENT
Start: 2017-01-01 | End: 2017-01-01 | Stop reason: HOSPADM

## 2017-01-01 RX ORDER — AMOXICILLIN 250 MG
1 CAPSULE ORAL 2 TIMES DAILY PRN
Status: DISCONTINUED | OUTPATIENT
Start: 2017-01-01 | End: 2017-01-01 | Stop reason: HOSPADM

## 2017-01-01 RX ORDER — LORAZEPAM 0.5 MG/1
0.5 TABLET ORAL 2 TIMES DAILY PRN
Status: DISCONTINUED | OUTPATIENT
Start: 2017-01-01 | End: 2017-01-01 | Stop reason: HOSPADM

## 2017-01-01 RX ORDER — DILTIAZEM HYDROCHLORIDE 120 MG/1
120 CAPSULE, EXTENDED RELEASE ORAL DAILY
Status: DISCONTINUED | OUTPATIENT
Start: 2017-01-01 | End: 2017-01-01

## 2017-01-01 RX ORDER — CLONIDINE HYDROCHLORIDE 0.1 MG/1
0.2 TABLET ORAL 2 TIMES DAILY
Status: DISCONTINUED | OUTPATIENT
Start: 2017-01-01 | End: 2017-01-01

## 2017-01-01 RX ORDER — ONDANSETRON 4 MG/1
4 TABLET, ORALLY DISINTEGRATING ORAL EVERY 6 HOURS PRN
Status: DISCONTINUED | OUTPATIENT
Start: 2017-01-01 | End: 2017-01-01 | Stop reason: HOSPADM

## 2017-01-01 RX ORDER — DIAZEPAM 10 MG
10 TABLET ORAL EVERY 30 MIN PRN
Qty: 20 TABLET | Refills: 0 | Status: SHIPPED | DISCHARGE
Start: 2017-01-01 | End: 2017-01-01

## 2017-01-01 RX ORDER — NICOTINE POLACRILEX 4 MG
15-30 LOZENGE BUCCAL
Status: DISCONTINUED | OUTPATIENT
Start: 2017-01-01 | End: 2017-01-01

## 2017-01-01 RX ORDER — MEROPENEM 500 MG/1
500 INJECTION, POWDER, FOR SOLUTION INTRAVENOUS EVERY 12 HOURS
Status: DISCONTINUED | OUTPATIENT
Start: 2017-01-01 | End: 2017-01-01

## 2017-01-01 RX ORDER — CEFTRIAXONE 2 G/1
2 INJECTION, POWDER, FOR SOLUTION INTRAMUSCULAR; INTRAVENOUS EVERY 24 HOURS
Status: DISCONTINUED | OUTPATIENT
Start: 2017-01-01 | End: 2017-01-01

## 2017-01-01 RX ORDER — CLONIDINE HYDROCHLORIDE 0.1 MG/1
0.3 TABLET ORAL 2 TIMES DAILY
Status: DISCONTINUED | OUTPATIENT
Start: 2017-01-01 | End: 2017-01-01 | Stop reason: HOSPADM

## 2017-01-01 RX ORDER — DILTIAZEM HYDROCHLORIDE 60 MG/1
120 CAPSULE, EXTENDED RELEASE ORAL 2 TIMES DAILY
Status: DISCONTINUED | OUTPATIENT
Start: 2017-01-01 | End: 2017-01-01

## 2017-01-01 RX ORDER — CLONIDINE HYDROCHLORIDE 0.2 MG/1
0.2 TABLET ORAL 2 TIMES DAILY
Status: DISCONTINUED | OUTPATIENT
Start: 2017-01-01 | End: 2017-01-01 | Stop reason: HOSPADM

## 2017-01-01 RX ORDER — KETAMINE HYDROCHLORIDE 10 MG/ML
100 INJECTION INTRAMUSCULAR; INTRAVENOUS ONCE
Status: COMPLETED | OUTPATIENT
Start: 2017-01-01 | End: 2017-01-01

## 2017-01-01 RX ORDER — DEXTROSE MONOHYDRATE 50 MG/ML
INJECTION, SOLUTION INTRAVENOUS CONTINUOUS
Status: DISCONTINUED | OUTPATIENT
Start: 2017-01-01 | End: 2017-01-01

## 2017-01-01 RX ORDER — DEXTROSE MONOHYDRATE 25 G/50ML
25-50 INJECTION, SOLUTION INTRAVENOUS
Status: DISCONTINUED | OUTPATIENT
Start: 2017-01-01 | End: 2017-01-01 | Stop reason: HOSPADM

## 2017-01-01 RX ORDER — POTASSIUM CHLORIDE 20MEQ/15ML
20 LIQUID (ML) ORAL ONCE
Status: COMPLETED | OUTPATIENT
Start: 2017-01-01 | End: 2017-01-01

## 2017-01-01 RX ORDER — FLUCONAZOLE 150 MG/1
150 TABLET ORAL ONCE
Status: COMPLETED | OUTPATIENT
Start: 2017-01-01 | End: 2017-01-01

## 2017-01-01 RX ORDER — ONDANSETRON 2 MG/ML
4 INJECTION INTRAMUSCULAR; INTRAVENOUS EVERY 6 HOURS PRN
Status: DISCONTINUED | OUTPATIENT
Start: 2017-01-01 | End: 2017-01-01 | Stop reason: HOSPADM

## 2017-01-01 RX ORDER — LEVOFLOXACIN 250 MG/1
250 TABLET, FILM COATED ORAL DAILY
Status: DISCONTINUED | OUTPATIENT
Start: 2017-01-01 | End: 2017-01-01 | Stop reason: HOSPADM

## 2017-01-01 RX ORDER — ACETAMINOPHEN 325 MG/1
975 TABLET ORAL 3 TIMES DAILY
Status: DISCONTINUED | OUTPATIENT
Start: 2017-01-01 | End: 2017-01-01 | Stop reason: HOSPADM

## 2017-01-01 RX ORDER — LORAZEPAM 1 MG/1
1 TABLET ORAL 2 TIMES DAILY PRN
Status: DISCONTINUED | OUTPATIENT
Start: 2017-01-01 | End: 2017-01-01

## 2017-01-01 RX ORDER — HEPARIN SODIUM,PORCINE 10 UNIT/ML
5-10 VIAL (ML) INTRAVENOUS
Status: DISCONTINUED | OUTPATIENT
Start: 2017-01-01 | End: 2017-01-01 | Stop reason: HOSPADM

## 2017-01-01 RX ORDER — GLIPIZIDE 5 MG/1
5 TABLET ORAL
Status: DISCONTINUED | OUTPATIENT
Start: 2017-01-01 | End: 2017-01-01

## 2017-01-01 RX ORDER — LEVOFLOXACIN 5 MG/ML
750 INJECTION, SOLUTION INTRAVENOUS ONCE
Status: COMPLETED | OUTPATIENT
Start: 2017-01-01 | End: 2017-01-01

## 2017-01-01 RX ORDER — RISPERIDONE 1 MG/1
1 TABLET ORAL AT BEDTIME
Status: DISCONTINUED | OUTPATIENT
Start: 2017-01-01 | End: 2017-01-01 | Stop reason: HOSPADM

## 2017-01-01 RX ORDER — AMLODIPINE BESYLATE 5 MG/1
5 TABLET ORAL DAILY
Status: DISCONTINUED | OUTPATIENT
Start: 2017-01-01 | End: 2017-01-01 | Stop reason: HOSPADM

## 2017-01-01 RX ORDER — HYDRALAZINE HYDROCHLORIDE 20 MG/ML
10 INJECTION INTRAMUSCULAR; INTRAVENOUS EVERY 6 HOURS
Status: DISCONTINUED | OUTPATIENT
Start: 2017-01-01 | End: 2017-01-01

## 2017-01-01 RX ORDER — CLONIDINE HYDROCHLORIDE 0.1 MG/1
0.3 TABLET ORAL 2 TIMES DAILY
Status: DISCONTINUED | OUTPATIENT
Start: 2017-01-01 | End: 2017-01-01

## 2017-01-01 RX ORDER — AMLODIPINE BESYLATE 5 MG/1
5 TABLET ORAL DAILY
Qty: 30 TABLET | Status: ON HOLD | DISCHARGE
Start: 2017-01-01 | End: 2017-01-01

## 2017-01-01 RX ORDER — DEXTROSE MONOHYDRATE 100 MG/ML
INJECTION, SOLUTION INTRAVENOUS CONTINUOUS
Status: DISCONTINUED | OUTPATIENT
Start: 2017-01-01 | End: 2017-01-01

## 2017-01-01 RX ORDER — DIVALPROEX SODIUM 500 MG/1
500 TABLET, DELAYED RELEASE ORAL EVERY MORNING
Status: DISCONTINUED | OUTPATIENT
Start: 2017-01-01 | End: 2017-01-01 | Stop reason: HOSPADM

## 2017-01-01 RX ORDER — ISOSORBIDE MONONITRATE 30 MG/1
30 TABLET, EXTENDED RELEASE ORAL DAILY
Status: DISCONTINUED | OUTPATIENT
Start: 2017-01-01 | End: 2017-01-01 | Stop reason: HOSPADM

## 2017-01-01 RX ORDER — HYDRALAZINE HYDROCHLORIDE 25 MG/1
25 TABLET, FILM COATED ORAL 4 TIMES DAILY
Status: DISCONTINUED | OUTPATIENT
Start: 2017-01-01 | End: 2017-01-01

## 2017-01-01 RX ORDER — NALOXONE HYDROCHLORIDE 0.4 MG/ML
.1-.4 INJECTION, SOLUTION INTRAMUSCULAR; INTRAVENOUS; SUBCUTANEOUS
Status: DISCONTINUED | OUTPATIENT
Start: 2017-01-01 | End: 2017-01-01 | Stop reason: HOSPADM

## 2017-01-01 RX ORDER — HYDROCHLOROTHIAZIDE 12.5 MG/1
25 CAPSULE ORAL DAILY
Qty: 30 CAPSULE | DISCHARGE
Start: 2017-01-01 | End: 2017-01-01

## 2017-01-01 RX ORDER — GLIPIZIDE 5 MG/1
10 TABLET, FILM COATED, EXTENDED RELEASE ORAL
Status: DISCONTINUED | OUTPATIENT
Start: 2017-01-01 | End: 2017-01-01

## 2017-01-01 RX ORDER — NITROGLYCERIN 0.4 MG/1
0.4 TABLET SUBLINGUAL EVERY 5 MIN PRN
Status: DISCONTINUED | OUTPATIENT
Start: 2017-01-01 | End: 2017-01-01 | Stop reason: HOSPADM

## 2017-01-01 RX ORDER — RISPERIDONE 0.5 MG/1
0.5 TABLET, ORALLY DISINTEGRATING ORAL 3 TIMES DAILY PRN
Status: DISCONTINUED | OUTPATIENT
Start: 2017-01-01 | End: 2017-01-01 | Stop reason: HOSPADM

## 2017-01-01 RX ORDER — ACETAMINOPHEN 650 MG/1
650 SUPPOSITORY RECTAL ONCE
Status: COMPLETED | OUTPATIENT
Start: 2017-01-01 | End: 2017-01-01

## 2017-01-01 RX ORDER — POLYETHYLENE GLYCOL 3350 17 G/17G
17 POWDER, FOR SOLUTION ORAL 2 TIMES DAILY
Status: DISCONTINUED | OUTPATIENT
Start: 2017-01-01 | End: 2017-01-01 | Stop reason: HOSPADM

## 2017-01-01 RX ORDER — LORAZEPAM 2 MG/ML
0.5 CONCENTRATE ORAL EVERY 4 HOURS
Qty: 30 ML | Refills: 0 | Status: SHIPPED | OUTPATIENT
Start: 2017-01-01

## 2017-01-01 RX ORDER — DEXTROSE MONOHYDRATE 25 G/50ML
25 INJECTION, SOLUTION INTRAVENOUS ONCE
Status: COMPLETED | OUTPATIENT
Start: 2017-01-01 | End: 2017-01-01

## 2017-01-01 RX ORDER — OLANZAPINE 5 MG/1
5 TABLET ORAL AT BEDTIME
Status: DISCONTINUED | OUTPATIENT
Start: 2017-01-01 | End: 2017-01-01 | Stop reason: HOSPADM

## 2017-01-01 RX ORDER — METOPROLOL TARTRATE 50 MG
50 TABLET ORAL 2 TIMES DAILY
Status: DISCONTINUED | OUTPATIENT
Start: 2017-01-01 | End: 2017-01-01

## 2017-01-01 RX ORDER — OXYCODONE HYDROCHLORIDE 5 MG/1
5-10 TABLET ORAL
Status: DISCONTINUED | OUTPATIENT
Start: 2017-01-01 | End: 2017-01-01

## 2017-01-01 RX ORDER — KETAMINE HCL IN 0.9 % NACL 20 MG/2 ML
SYRINGE (ML) INTRAVENOUS
Status: DISCONTINUED
Start: 2017-01-01 | End: 2017-01-01 | Stop reason: WASHOUT

## 2017-01-01 RX ORDER — BISACODYL 5 MG
5-15 TABLET, DELAYED RELEASE (ENTERIC COATED) ORAL DAILY PRN
Status: DISCONTINUED | OUTPATIENT
Start: 2017-01-01 | End: 2017-01-01 | Stop reason: HOSPADM

## 2017-01-01 RX ORDER — ISOSORBIDE MONONITRATE 30 MG/1
30 TABLET, EXTENDED RELEASE ORAL DAILY
Status: DISCONTINUED | OUTPATIENT
Start: 2017-01-01 | End: 2017-01-01

## 2017-01-01 RX ORDER — MEROPENEM 500 MG/1
500 INJECTION, POWDER, FOR SOLUTION INTRAVENOUS EVERY 8 HOURS
Status: DISCONTINUED | OUTPATIENT
Start: 2017-01-01 | End: 2017-01-01

## 2017-01-01 RX ORDER — HEPARIN SODIUM 5000 [USP'U]/.5ML
5000 INJECTION, SOLUTION INTRAVENOUS; SUBCUTANEOUS EVERY 12 HOURS
Status: DISCONTINUED | OUTPATIENT
Start: 2017-01-01 | End: 2017-01-01 | Stop reason: HOSPADM

## 2017-01-01 RX ORDER — AMILORIDE HYDROCHLORIDE 5 MG/1
10 TABLET ORAL DAILY
Status: DISCONTINUED | OUTPATIENT
Start: 2017-01-01 | End: 2017-01-01

## 2017-01-01 RX ORDER — METOPROLOL TARTRATE 100 MG
100 TABLET ORAL 2 TIMES DAILY
Status: DISCONTINUED | OUTPATIENT
Start: 2017-01-01 | End: 2017-01-01

## 2017-01-01 RX ORDER — BISACODYL 10 MG
10 SUPPOSITORY, RECTAL RECTAL DAILY PRN
Qty: 25 SUPPOSITORY | Refills: 1 | DISCHARGE
Start: 2017-01-01

## 2017-01-01 RX ORDER — POLYETHYLENE GLYCOL 3350 17 G/17G
17 POWDER, FOR SOLUTION ORAL DAILY
Status: DISCONTINUED | OUTPATIENT
Start: 2017-01-01 | End: 2017-01-01

## 2017-01-01 RX ORDER — AMOXICILLIN 250 MG
1-2 CAPSULE ORAL 2 TIMES DAILY PRN
Status: DISCONTINUED | OUTPATIENT
Start: 2017-01-01 | End: 2017-01-01 | Stop reason: HOSPADM

## 2017-01-01 RX ORDER — OLANZAPINE 10 MG/2ML
2.5 INJECTION, POWDER, FOR SOLUTION INTRAMUSCULAR EVERY 8 HOURS PRN
Status: DISCONTINUED | OUTPATIENT
Start: 2017-01-01 | End: 2017-01-01 | Stop reason: HOSPADM

## 2017-01-01 RX ORDER — GLIPIZIDE 2.5 MG/1
2.5 TABLET, EXTENDED RELEASE ORAL
Qty: 30 TABLET | Status: ON HOLD | DISCHARGE
Start: 2017-01-01 | End: 2017-01-01

## 2017-01-01 RX ORDER — BISACODYL 10 MG
10 SUPPOSITORY, RECTAL RECTAL DAILY PRN
Status: DISCONTINUED | OUTPATIENT
Start: 2017-01-01 | End: 2017-01-01

## 2017-01-01 RX ORDER — HYDRALAZINE HYDROCHLORIDE 20 MG/ML
10-20 INJECTION INTRAMUSCULAR; INTRAVENOUS
Status: DISCONTINUED | OUTPATIENT
Start: 2017-01-01 | End: 2017-01-01 | Stop reason: HOSPADM

## 2017-01-01 RX ORDER — OLANZAPINE 5 MG/1
5 TABLET ORAL AT BEDTIME
Status: DISCONTINUED | OUTPATIENT
Start: 2017-01-01 | End: 2017-01-01

## 2017-01-01 RX ORDER — OLANZAPINE 5 MG/1
5 TABLET, ORALLY DISINTEGRATING ORAL EVERY 6 HOURS PRN
Qty: 40 TABLET | Refills: 0 | Status: SHIPPED | OUTPATIENT
Start: 2017-01-01 | End: 2017-01-01

## 2017-01-01 RX ORDER — LEVOFLOXACIN 500 MG/1
500 TABLET, FILM COATED ORAL DAILY
Status: DISCONTINUED | OUTPATIENT
Start: 2017-01-01 | End: 2017-01-01

## 2017-01-01 RX ORDER — NICOTINE POLACRILEX 4 MG
15-30 LOZENGE BUCCAL
Status: DISCONTINUED | OUTPATIENT
Start: 2017-01-01 | End: 2017-01-01 | Stop reason: HOSPADM

## 2017-01-01 RX ORDER — OXYCODONE HCL 20 MG/ML
5-10 CONCENTRATE, ORAL ORAL
Status: DISCONTINUED | OUTPATIENT
Start: 2017-01-01 | End: 2017-01-01 | Stop reason: HOSPADM

## 2017-01-01 RX ORDER — SODIUM BICARBONATE 650 MG/1
650 TABLET ORAL 2 TIMES DAILY
Status: DISCONTINUED | OUTPATIENT
Start: 2017-01-01 | End: 2017-01-01

## 2017-01-01 RX ORDER — LORAZEPAM 2 MG/ML
0.5 CONCENTRATE ORAL EVERY 4 HOURS
Qty: 30 ML | Refills: 0 | Status: SHIPPED | OUTPATIENT
Start: 2017-01-01 | End: 2017-01-01

## 2017-01-01 RX ORDER — ATROPINE SULFATE 10 MG/ML
1-2 SOLUTION/ DROPS OPHTHALMIC
Status: DISCONTINUED | OUTPATIENT
Start: 2017-01-01 | End: 2017-01-01 | Stop reason: HOSPADM

## 2017-01-01 RX ORDER — LORAZEPAM 2 MG/ML
.5-1 INJECTION INTRAMUSCULAR EVERY 6 HOURS PRN
Status: DISCONTINUED | OUTPATIENT
Start: 2017-01-01 | End: 2017-01-01 | Stop reason: HOSPADM

## 2017-01-01 RX ORDER — RISPERIDONE 1 MG/1
1 TABLET, ORALLY DISINTEGRATING ORAL 2 TIMES DAILY
Status: DISCONTINUED | OUTPATIENT
Start: 2017-01-01 | End: 2017-01-01 | Stop reason: HOSPADM

## 2017-01-01 RX ORDER — HEPARIN SODIUM 5000 [USP'U]/.5ML
5000 INJECTION, SOLUTION INTRAVENOUS; SUBCUTANEOUS EVERY 12 HOURS
Status: DISCONTINUED | OUTPATIENT
Start: 2017-01-01 | End: 2017-01-01

## 2017-01-01 RX ORDER — HYDROCHLOROTHIAZIDE 12.5 MG/1
25 CAPSULE ORAL DAILY
Status: DISCONTINUED | OUTPATIENT
Start: 2017-01-01 | End: 2017-01-01 | Stop reason: HOSPADM

## 2017-01-01 RX ORDER — POLYETHYLENE GLYCOL 3350 17 G/17G
17 POWDER, FOR SOLUTION ORAL DAILY PRN
Status: DISCONTINUED | OUTPATIENT
Start: 2017-01-01 | End: 2017-01-01

## 2017-01-01 RX ORDER — OLANZAPINE 5 MG/1
5 TABLET, ORALLY DISINTEGRATING ORAL EVERY 6 HOURS PRN
Qty: 40 TABLET | Refills: 0 | DISCHARGE
Start: 2017-01-01

## 2017-01-01 RX ORDER — HEPARIN SODIUM,PORCINE 10 UNIT/ML
5-10 VIAL (ML) INTRAVENOUS EVERY 24 HOURS
Status: DISCONTINUED | OUTPATIENT
Start: 2017-01-01 | End: 2017-01-01

## 2017-01-01 RX ORDER — LEVOFLOXACIN 250 MG/1
250 TABLET, FILM COATED ORAL EVERY OTHER DAY
Status: DISCONTINUED | OUTPATIENT
Start: 2017-01-01 | End: 2017-01-01 | Stop reason: DRUGHIGH

## 2017-01-01 RX ORDER — HYDROMORPHONE HYDROCHLORIDE 1 MG/ML
1 SOLUTION ORAL
Qty: 30 ML | Refills: 0 | Status: SHIPPED | OUTPATIENT
Start: 2017-01-01

## 2017-01-01 RX ORDER — ONDANSETRON 4 MG/1
4 TABLET, ORALLY DISINTEGRATING ORAL EVERY 8 HOURS PRN
Status: DISCONTINUED | OUTPATIENT
Start: 2017-01-01 | End: 2017-01-01 | Stop reason: DRUGHIGH

## 2017-01-01 RX ORDER — ACETAZOLAMIDE 500 MG/1
500 CAPSULE, EXTENDED RELEASE ORAL EVERY 12 HOURS SCHEDULED
Status: DISCONTINUED | OUTPATIENT
Start: 2017-01-01 | End: 2017-01-01 | Stop reason: HOSPADM

## 2017-01-01 RX ORDER — METOPROLOL SUCCINATE 100 MG/1
100 TABLET, EXTENDED RELEASE ORAL DAILY
Status: DISCONTINUED | OUTPATIENT
Start: 2017-01-01 | End: 2017-01-01

## 2017-01-01 RX ORDER — DILTIAZEM HYDROCHLORIDE 240 MG/1
240 CAPSULE, EXTENDED RELEASE ORAL DAILY
Status: DISCONTINUED | OUTPATIENT
Start: 2017-01-01 | End: 2017-01-01

## 2017-01-01 RX ORDER — ACETAMINOPHEN 500 MG
TABLET ORAL
Status: DISCONTINUED
Start: 2017-01-01 | End: 2017-01-01 | Stop reason: HOSPADM

## 2017-01-01 RX ORDER — SODIUM CHLORIDE 9 MG/ML
1000 INJECTION, SOLUTION INTRAVENOUS CONTINUOUS
Status: DISCONTINUED | OUTPATIENT
Start: 2017-01-01 | End: 2017-01-01

## 2017-01-01 RX ORDER — METOPROLOL SUCCINATE 25 MG/1
25 TABLET, EXTENDED RELEASE ORAL DAILY
Status: DISCONTINUED | OUTPATIENT
Start: 2017-01-01 | End: 2017-01-01 | Stop reason: HOSPADM

## 2017-01-01 RX ORDER — LORAZEPAM 0.5 MG/1
0.5 TABLET ORAL 2 TIMES DAILY PRN
Qty: 14 TABLET | Refills: 0 | Status: ON HOLD | OUTPATIENT
Start: 2017-01-01 | End: 2017-01-01

## 2017-01-01 RX ORDER — DILTIAZEM HYDROCHLORIDE 120 MG/1
120 CAPSULE, EXTENDED RELEASE ORAL ONCE
Status: COMPLETED | OUTPATIENT
Start: 2017-01-01 | End: 2017-01-01

## 2017-01-01 RX ORDER — OLANZAPINE 10 MG/2ML
2.5 INJECTION, POWDER, FOR SOLUTION INTRAMUSCULAR EVERY 8 HOURS
Status: DISCONTINUED | OUTPATIENT
Start: 2017-01-01 | End: 2017-01-01

## 2017-01-01 RX ORDER — ACETAMINOPHEN 500 MG
1000 TABLET ORAL 3 TIMES DAILY
Status: DISCONTINUED | OUTPATIENT
Start: 2017-01-01 | End: 2017-01-01

## 2017-01-01 RX ORDER — FUROSEMIDE 20 MG
20 TABLET ORAL DAILY
Status: DISCONTINUED | OUTPATIENT
Start: 2017-01-01 | End: 2017-01-01

## 2017-01-01 RX ORDER — AMLODIPINE BESYLATE 5 MG/1
5 TABLET ORAL DAILY
Status: DISCONTINUED | OUTPATIENT
Start: 2017-01-01 | End: 2017-01-01

## 2017-01-01 RX ORDER — CEFTRIAXONE 1 G/1
1 INJECTION, POWDER, FOR SOLUTION INTRAMUSCULAR; INTRAVENOUS EVERY 24 HOURS
Status: DISCONTINUED | OUTPATIENT
Start: 2017-01-01 | End: 2017-01-01

## 2017-01-01 RX ORDER — LORAZEPAM 0.5 MG/1
1 TABLET ORAL 2 TIMES DAILY PRN
Qty: 14 TABLET | Refills: 0 | Status: ON HOLD | OUTPATIENT
Start: 2017-01-01 | End: 2017-01-01

## 2017-01-01 RX ORDER — KETAMINE HYDROCHLORIDE 10 MG/ML
INJECTION, SOLUTION INTRAMUSCULAR; INTRAVENOUS
Status: COMPLETED
Start: 2017-01-01 | End: 2017-01-01

## 2017-01-01 RX ORDER — ACETAMINOPHEN 500 MG
1000 TABLET ORAL ONCE
Status: COMPLETED | OUTPATIENT
Start: 2017-01-01 | End: 2017-01-01

## 2017-01-01 RX ORDER — SODIUM POLYSTYRENE SULFONATE 15 G/60ML
15 SUSPENSION ORAL; RECTAL ONCE
Status: COMPLETED | OUTPATIENT
Start: 2017-01-01 | End: 2017-01-01

## 2017-01-01 RX ORDER — RISPERIDONE 1 MG/1
1 TABLET ORAL AT BEDTIME
Status: DISCONTINUED | OUTPATIENT
Start: 2017-01-01 | End: 2017-01-01

## 2017-01-01 RX ORDER — DIVALPROEX SODIUM 500 MG/1
500 TABLET, DELAYED RELEASE ORAL EVERY MORNING
Status: DISCONTINUED | OUTPATIENT
Start: 2017-01-01 | End: 2017-01-01

## 2017-01-01 RX ORDER — CLONIDINE HYDROCHLORIDE 0.2 MG/1
0.2 TABLET ORAL 2 TIMES DAILY
Status: ON HOLD | DISCHARGE
Start: 2017-01-01 | End: 2017-01-01

## 2017-01-01 RX ORDER — DIVALPROEX SODIUM 125 MG/1
500 CAPSULE, COATED PELLETS ORAL EVERY MORNING
Status: DISCONTINUED | OUTPATIENT
Start: 2017-01-01 | End: 2017-01-01

## 2017-01-01 RX ORDER — METOPROLOL TARTRATE 1 MG/ML
5 INJECTION, SOLUTION INTRAVENOUS EVERY 6 HOURS
Status: DISCONTINUED | OUTPATIENT
Start: 2017-01-01 | End: 2017-01-01

## 2017-01-01 RX ORDER — HYDRALAZINE HYDROCHLORIDE 25 MG/1
50 TABLET, FILM COATED ORAL 4 TIMES DAILY
Status: DISCONTINUED | OUTPATIENT
Start: 2017-01-01 | End: 2017-01-01

## 2017-01-01 RX ORDER — METOPROLOL SUCCINATE 25 MG/1
25 TABLET, EXTENDED RELEASE ORAL DAILY
Qty: 30 TABLET | Status: ON HOLD
Start: 2017-01-01 | End: 2017-01-01

## 2017-01-01 RX ORDER — LEVOFLOXACIN 250 MG/1
250 TABLET, FILM COATED ORAL DAILY
Qty: 3 TABLET | Refills: 0 | DISCHARGE
Start: 2017-01-01 | End: 2017-01-01

## 2017-01-01 RX ORDER — LORAZEPAM 2 MG/ML
1 CONCENTRATE ORAL EVERY 4 HOURS
Qty: 30 ML | Status: SHIPPED | DISCHARGE
Start: 2017-01-01 | End: 2017-01-01

## 2017-01-01 RX ORDER — DIVALPROEX SODIUM 125 MG/1
1000 CAPSULE, COATED PELLETS ORAL AT BEDTIME
Status: DISCONTINUED | OUTPATIENT
Start: 2017-01-01 | End: 2017-01-01

## 2017-01-01 RX ORDER — DOCUSATE SODIUM 100 MG/1
100 CAPSULE, LIQUID FILLED ORAL 2 TIMES DAILY
Status: DISCONTINUED | OUTPATIENT
Start: 2017-01-01 | End: 2017-01-01 | Stop reason: HOSPADM

## 2017-01-01 RX ORDER — METOPROLOL TARTRATE 1 MG/ML
2.5 INJECTION, SOLUTION INTRAVENOUS EVERY 4 HOURS PRN
Status: DISCONTINUED | OUTPATIENT
Start: 2017-01-01 | End: 2017-01-01 | Stop reason: HOSPADM

## 2017-01-01 RX ORDER — MEROPENEM 500 MG/1
500 INJECTION, POWDER, FOR SOLUTION INTRAVENOUS ONCE
Status: COMPLETED | OUTPATIENT
Start: 2017-01-01 | End: 2017-01-01

## 2017-01-01 RX ORDER — METOPROLOL SUCCINATE 25 MG/1
25 TABLET, EXTENDED RELEASE ORAL DAILY
Status: DISCONTINUED | OUTPATIENT
Start: 2017-01-01 | End: 2017-01-01

## 2017-01-01 RX ADMIN — OLANZAPINE 5 MG: 5 TABLET, FILM COATED ORAL at 22:17

## 2017-01-01 RX ADMIN — ACETAMINOPHEN 1000 MG: 500 TABLET, COATED ORAL at 16:32

## 2017-01-01 RX ADMIN — METOPROLOL TARTRATE 50 MG: 50 TABLET, FILM COATED ORAL at 22:17

## 2017-01-01 RX ADMIN — ISOSORBIDE MONONITRATE 30 MG: 30 TABLET, EXTENDED RELEASE ORAL at 10:11

## 2017-01-01 RX ADMIN — GLIPIZIDE 10 MG: 5 TABLET, FILM COATED, EXTENDED RELEASE ORAL at 16:28

## 2017-01-01 RX ADMIN — GLIPIZIDE 2.5 MG: 2.5 TABLET, FILM COATED, EXTENDED RELEASE ORAL at 08:04

## 2017-01-01 RX ADMIN — GLIPIZIDE 2.5 MG: 2.5 TABLET, FILM COATED, EXTENDED RELEASE ORAL at 08:08

## 2017-01-01 RX ADMIN — ACETAMINOPHEN 975 MG: 325 TABLET, FILM COATED ORAL at 16:30

## 2017-01-01 RX ADMIN — CLONIDINE HYDROCHLORIDE 0.2 MG: 0.1 TABLET ORAL at 21:17

## 2017-01-01 RX ADMIN — LORAZEPAM 1 MG: 1 TABLET ORAL at 15:37

## 2017-01-01 RX ADMIN — RISPERIDONE 1 MG: 1 TABLET, ORALLY DISINTEGRATING ORAL at 21:17

## 2017-01-01 RX ADMIN — SODIUM BICARBONATE 650 MG TABLET 650 MG: at 10:05

## 2017-01-01 RX ADMIN — ISOSORBIDE MONONITRATE 30 MG: 30 TABLET, EXTENDED RELEASE ORAL at 09:27

## 2017-01-01 RX ADMIN — CLONIDINE HYDROCHLORIDE 0.3 MG: 0.1 TABLET ORAL at 08:24

## 2017-01-01 RX ADMIN — ACETAMINOPHEN 1000 MG: 500 TABLET, COATED ORAL at 22:06

## 2017-01-01 RX ADMIN — CLONIDINE HYDROCHLORIDE 0.3 MG: 0.1 TABLET ORAL at 22:17

## 2017-01-01 RX ADMIN — CLONIDINE HYDROCHLORIDE 0.3 MG: 0.1 TABLET ORAL at 09:09

## 2017-01-01 RX ADMIN — DOCUSATE SODIUM 100 MG: 100 CAPSULE, LIQUID FILLED ORAL at 22:10

## 2017-01-01 RX ADMIN — HYDRALAZINE HYDROCHLORIDE 50 MG: 25 TABLET ORAL at 12:39

## 2017-01-01 RX ADMIN — ACETAMINOPHEN 650 MG: 325 TABLET, FILM COATED ORAL at 14:30

## 2017-01-01 RX ADMIN — INSULIN ASPART 1 UNITS: 100 INJECTION, SOLUTION INTRAVENOUS; SUBCUTANEOUS at 12:24

## 2017-01-01 RX ADMIN — DILTIAZEM HYDROCHLORIDE 60 MG: 60 TABLET, FILM COATED ORAL at 02:06

## 2017-01-01 RX ADMIN — HYDRALAZINE HYDROCHLORIDE 25 MG: 25 TABLET ORAL at 09:01

## 2017-01-01 RX ADMIN — CLONIDINE HYDROCHLORIDE 0.3 MG: 0.1 TABLET ORAL at 08:39

## 2017-01-01 RX ADMIN — DIVALPROEX SODIUM 1500 MG: 500 TABLET, DELAYED RELEASE ORAL at 21:58

## 2017-01-01 RX ADMIN — DIVALPROEX SODIUM 500 MG: 500 TABLET, DELAYED RELEASE ORAL at 09:04

## 2017-01-01 RX ADMIN — ACETAMINOPHEN 1000 MG: 500 TABLET, COATED ORAL at 17:18

## 2017-01-01 RX ADMIN — OLANZAPINE 5 MG: 5 TABLET, FILM COATED ORAL at 22:40

## 2017-01-01 RX ADMIN — LEVOFLOXACIN 250 MG: 250 TABLET, FILM COATED ORAL at 08:20

## 2017-01-01 RX ADMIN — ACETAMINOPHEN 1000 MG: 500 TABLET, COATED ORAL at 09:09

## 2017-01-01 RX ADMIN — GLIPIZIDE 10 MG: 5 TABLET, FILM COATED, EXTENDED RELEASE ORAL at 07:56

## 2017-01-01 RX ADMIN — OLANZAPINE 5 MG: 5 TABLET, FILM COATED ORAL at 22:43

## 2017-01-01 RX ADMIN — INSULIN GLARGINE 25 UNITS: 100 INJECTION, SOLUTION SUBCUTANEOUS at 08:13

## 2017-01-01 RX ADMIN — DOCUSATE SODIUM 100 MG: 100 CAPSULE, LIQUID FILLED ORAL at 22:01

## 2017-01-01 RX ADMIN — OLANZAPINE 5 MG: 5 TABLET, FILM COATED ORAL at 21:50

## 2017-01-01 RX ADMIN — HYDRALAZINE HYDROCHLORIDE 25 MG: 25 TABLET ORAL at 13:40

## 2017-01-01 RX ADMIN — INSULIN ASPART 2 UNITS: 100 INJECTION, SOLUTION INTRAVENOUS; SUBCUTANEOUS at 08:32

## 2017-01-01 RX ADMIN — GLIPIZIDE 10 MG: 5 TABLET, FILM COATED, EXTENDED RELEASE ORAL at 06:32

## 2017-01-01 RX ADMIN — LEVOFLOXACIN 250 MG: 250 TABLET, FILM COATED ORAL at 09:18

## 2017-01-01 RX ADMIN — DIVALPROEX SODIUM 1500 MG: 500 TABLET, DELAYED RELEASE ORAL at 21:00

## 2017-01-01 RX ADMIN — GLIPIZIDE 10 MG: 5 TABLET, FILM COATED, EXTENDED RELEASE ORAL at 06:37

## 2017-01-01 RX ADMIN — ISOSORBIDE MONONITRATE 30 MG: 30 TABLET, EXTENDED RELEASE ORAL at 08:50

## 2017-01-01 RX ADMIN — AMILORIDE HYDROCHLORIDE 10 MG: 5 TABLET ORAL at 09:02

## 2017-01-01 RX ADMIN — DEXTROSE MONOHYDRATE: 50 INJECTION, SOLUTION INTRAVENOUS at 11:14

## 2017-01-01 RX ADMIN — DILTIAZEM HYDROCHLORIDE 60 MG: 60 TABLET, FILM COATED ORAL at 02:37

## 2017-01-01 RX ADMIN — ACETAMINOPHEN 1000 MG: 500 TABLET, COATED ORAL at 08:30

## 2017-01-01 RX ADMIN — HYDRALAZINE HYDROCHLORIDE 50 MG: 25 TABLET ORAL at 21:17

## 2017-01-01 RX ADMIN — ISOSORBIDE MONONITRATE 30 MG: 30 TABLET, EXTENDED RELEASE ORAL at 08:27

## 2017-01-01 RX ADMIN — METOPROLOL TARTRATE 100 MG: 100 TABLET, FILM COATED ORAL at 21:17

## 2017-01-01 RX ADMIN — METOPROLOL TARTRATE 100 MG: 100 TABLET, FILM COATED ORAL at 10:30

## 2017-01-01 RX ADMIN — RISPERIDONE 1 MG: 1 TABLET, ORALLY DISINTEGRATING ORAL at 07:56

## 2017-01-01 RX ADMIN — RISPERIDONE 1 MG: 1 TABLET, ORALLY DISINTEGRATING ORAL at 21:00

## 2017-01-01 RX ADMIN — INSULIN ASPART 60 UNITS: 100 INJECTION, SUSPENSION SUBCUTANEOUS at 09:15

## 2017-01-01 RX ADMIN — HYDRALAZINE HYDROCHLORIDE 50 MG: 25 TABLET ORAL at 17:22

## 2017-01-01 RX ADMIN — METOPROLOL SUCCINATE 25 MG: 25 TABLET, EXTENDED RELEASE ORAL at 16:58

## 2017-01-01 RX ADMIN — OMEPRAZOLE 20 MG: 20 CAPSULE, DELAYED RELEASE ORAL at 17:02

## 2017-01-01 RX ADMIN — OMEPRAZOLE 20 MG: 20 CAPSULE, DELAYED RELEASE ORAL at 06:34

## 2017-01-01 RX ADMIN — HYDRALAZINE HYDROCHLORIDE 50 MG: 25 TABLET ORAL at 23:25

## 2017-01-01 RX ADMIN — PANTOPRAZOLE SODIUM 40 MG: 40 INJECTION, POWDER, FOR SOLUTION INTRAVENOUS at 06:07

## 2017-01-01 RX ADMIN — DOCUSATE SODIUM 100 MG: 100 CAPSULE, LIQUID FILLED ORAL at 08:38

## 2017-01-01 RX ADMIN — HEPARIN SODIUM 5000 UNITS: 10000 INJECTION, SOLUTION INTRAVENOUS; SUBCUTANEOUS at 19:09

## 2017-01-01 RX ADMIN — ACETAMINOPHEN 975 MG: 325 TABLET, FILM COATED ORAL at 16:35

## 2017-01-01 RX ADMIN — HYDRALAZINE HYDROCHLORIDE 10 MG: 20 INJECTION INTRAMUSCULAR; INTRAVENOUS at 08:38

## 2017-01-01 RX ADMIN — METOPROLOL SUCCINATE 25 MG: 25 TABLET, EXTENDED RELEASE ORAL at 16:16

## 2017-01-01 RX ADMIN — OLANZAPINE 5 MG: 5 TABLET, FILM COATED ORAL at 22:11

## 2017-01-01 RX ADMIN — HEPARIN SODIUM 5000 UNITS: 10000 INJECTION, SOLUTION INTRAVENOUS; SUBCUTANEOUS at 08:47

## 2017-01-01 RX ADMIN — RISPERIDONE 1 MG: 1 TABLET, ORALLY DISINTEGRATING ORAL at 09:59

## 2017-01-01 RX ADMIN — RISPERIDONE 1 MG: 1 TABLET ORAL at 20:29

## 2017-01-01 RX ADMIN — DOCUSATE SODIUM 100 MG: 100 CAPSULE, LIQUID FILLED ORAL at 21:06

## 2017-01-01 RX ADMIN — DIVALPROEX SODIUM 1500 MG: 500 TABLET, DELAYED RELEASE ORAL at 21:32

## 2017-01-01 RX ADMIN — ACETAMINOPHEN 1000 MG: 500 TABLET, COATED ORAL at 08:38

## 2017-01-01 RX ADMIN — CLONIDINE HYDROCHLORIDE 0.2 MG: 0.1 TABLET ORAL at 11:50

## 2017-01-01 RX ADMIN — DIVALPROEX SODIUM 1500 MG: 500 TABLET, DELAYED RELEASE ORAL at 21:08

## 2017-01-01 RX ADMIN — CLONIDINE HYDROCHLORIDE 0.3 MG: 0.1 TABLET ORAL at 08:47

## 2017-01-01 RX ADMIN — AMLODIPINE BESYLATE 5 MG: 5 TABLET ORAL at 09:44

## 2017-01-01 RX ADMIN — DOCUSATE SODIUM 100 MG: 100 CAPSULE, LIQUID FILLED ORAL at 09:18

## 2017-01-01 RX ADMIN — HYDRALAZINE HYDROCHLORIDE 25 MG: 25 TABLET ORAL at 17:58

## 2017-01-01 RX ADMIN — POLYETHYLENE GLYCOL 3350 17 G: 17 POWDER, FOR SOLUTION ORAL at 21:18

## 2017-01-01 RX ADMIN — VALPROIC ACID 500 MG: 250 SOLUTION ORAL at 08:13

## 2017-01-01 RX ADMIN — VALPROIC ACID 500 MG: 250 SOLUTION ORAL at 16:11

## 2017-01-01 RX ADMIN — DIVALPROEX SODIUM 1500 MG: 500 TABLET, DELAYED RELEASE ORAL at 22:00

## 2017-01-01 RX ADMIN — ISOSORBIDE MONONITRATE 30 MG: 30 TABLET, EXTENDED RELEASE ORAL at 09:53

## 2017-01-01 RX ADMIN — DIVALPROEX SODIUM 1500 MG: 500 TABLET, DELAYED RELEASE ORAL at 22:10

## 2017-01-01 RX ADMIN — METOPROLOL TARTRATE 100 MG: 100 TABLET, FILM COATED ORAL at 21:59

## 2017-01-01 RX ADMIN — CLONIDINE HYDROCHLORIDE 0.2 MG: 0.2 TABLET ORAL at 22:07

## 2017-01-01 RX ADMIN — ACETAMINOPHEN 1000 MG: 500 TABLET, COATED ORAL at 08:21

## 2017-01-01 RX ADMIN — AMILORIDE HYDROCHLORIDE 10 MG: 5 TABLET ORAL at 08:24

## 2017-01-01 RX ADMIN — DIVALPROEX SODIUM 500 MG: 500 TABLET, DELAYED RELEASE ORAL at 08:41

## 2017-01-01 RX ADMIN — DEXTROSE MONOHYDRATE: 50 INJECTION, SOLUTION INTRAVENOUS at 19:57

## 2017-01-01 RX ADMIN — OMEPRAZOLE 20 MG: 20 CAPSULE, DELAYED RELEASE ORAL at 07:00

## 2017-01-01 RX ADMIN — DOCUSATE SODIUM 100 MG: 100 CAPSULE, LIQUID FILLED ORAL at 08:50

## 2017-01-01 RX ADMIN — HYDRALAZINE HYDROCHLORIDE 25 MG: 25 TABLET ORAL at 08:51

## 2017-01-01 RX ADMIN — FUROSEMIDE 20 MG: 20 TABLET ORAL at 08:04

## 2017-01-01 RX ADMIN — ISOSORBIDE MONONITRATE 30 MG: 30 TABLET, EXTENDED RELEASE ORAL at 08:54

## 2017-01-01 RX ADMIN — FUROSEMIDE 20 MG: 20 TABLET ORAL at 08:00

## 2017-01-01 RX ADMIN — DIVALPROEX SODIUM 500 MG: 500 TABLET, DELAYED RELEASE ORAL at 08:05

## 2017-01-01 RX ADMIN — METOPROLOL TARTRATE 5 MG: 5 INJECTION INTRAVENOUS at 00:48

## 2017-01-01 RX ADMIN — INSULIN ASPART 5 UNITS: 100 INJECTION, SOLUTION INTRAVENOUS; SUBCUTANEOUS at 06:45

## 2017-01-01 RX ADMIN — METOPROLOL TARTRATE 100 MG: 100 TABLET, FILM COATED ORAL at 21:23

## 2017-01-01 RX ADMIN — OMEPRAZOLE 20 MG: 20 CAPSULE, DELAYED RELEASE ORAL at 06:54

## 2017-01-01 RX ADMIN — PANTOPRAZOLE SODIUM 40 MG: 40 TABLET, DELAYED RELEASE ORAL at 08:16

## 2017-01-01 RX ADMIN — GLIPIZIDE 10 MG: 5 TABLET, FILM COATED, EXTENDED RELEASE ORAL at 16:44

## 2017-01-01 RX ADMIN — INSULIN ASPART 6 UNITS: 100 INJECTION, SOLUTION INTRAVENOUS; SUBCUTANEOUS at 22:33

## 2017-01-01 RX ADMIN — CLONIDINE HYDROCHLORIDE 0.2 MG: 0.1 TABLET ORAL at 23:55

## 2017-01-01 RX ADMIN — RISPERIDONE 0.5 MG: 1 TABLET, ORALLY DISINTEGRATING ORAL at 16:59

## 2017-01-01 RX ADMIN — ACETAMINOPHEN 975 MG: 325 TABLET, FILM COATED ORAL at 08:47

## 2017-01-01 RX ADMIN — INSULIN ASPART 50 UNITS: 100 INJECTION, SUSPENSION SUBCUTANEOUS at 17:43

## 2017-01-01 RX ADMIN — ACETAMINOPHEN 975 MG: 325 TABLET, FILM COATED ORAL at 17:08

## 2017-01-01 RX ADMIN — DILTIAZEM HYDROCHLORIDE 120 MG: 60 CAPSULE, EXTENDED RELEASE ORAL at 21:28

## 2017-01-01 RX ADMIN — DEXTROSE MONOHYDRATE 25 G: 500 INJECTION PARENTERAL at 19:27

## 2017-01-01 RX ADMIN — VITAMIN D, TAB 1000IU (100/BT) 1000 UNITS: 25 TAB at 17:00

## 2017-01-01 RX ADMIN — LORAZEPAM 1 MG: 1 TABLET ORAL at 18:02

## 2017-01-01 RX ADMIN — DILTIAZEM HYDROCHLORIDE 120 MG: 120 CAPSULE, EXTENDED RELEASE ORAL at 08:25

## 2017-01-01 RX ADMIN — OMEPRAZOLE 20 MG: 20 CAPSULE, DELAYED RELEASE ORAL at 15:34

## 2017-01-01 RX ADMIN — ACETAMINOPHEN 975 MG: 325 TABLET, FILM COATED ORAL at 08:06

## 2017-01-01 RX ADMIN — Medication 2.5 MG: at 00:03

## 2017-01-01 RX ADMIN — RISPERIDONE 0.5 MG: 0.5 TABLET, ORALLY DISINTEGRATING ORAL at 14:06

## 2017-01-01 RX ADMIN — HEPARIN SODIUM 5000 UNITS: 10000 INJECTION, SOLUTION INTRAVENOUS; SUBCUTANEOUS at 08:04

## 2017-01-01 RX ADMIN — CLONIDINE HYDROCHLORIDE 0.3 MG: 0.1 TABLET ORAL at 08:57

## 2017-01-01 RX ADMIN — VITAMIN D, TAB 1000IU (100/BT) 1000 UNITS: 25 TAB at 16:11

## 2017-01-01 RX ADMIN — RISPERIDONE 1 MG: 1 TABLET, ORALLY DISINTEGRATING ORAL at 10:23

## 2017-01-01 RX ADMIN — ACETAMINOPHEN 1000 MG: 500 TABLET, COATED ORAL at 22:10

## 2017-01-01 RX ADMIN — METOPROLOL TARTRATE 100 MG: 100 TABLET, FILM COATED ORAL at 22:44

## 2017-01-01 RX ADMIN — METOPROLOL TARTRATE 5 MG: 5 INJECTION INTRAVENOUS at 02:42

## 2017-01-01 RX ADMIN — VALPROATE SODIUM 500 MG: 100 INJECTION, SOLUTION INTRAVENOUS at 13:25

## 2017-01-01 RX ADMIN — ISOSORBIDE MONONITRATE 30 MG: 30 TABLET, EXTENDED RELEASE ORAL at 09:52

## 2017-01-01 RX ADMIN — OLANZAPINE 5 MG: 5 TABLET, FILM COATED ORAL at 21:36

## 2017-01-01 RX ADMIN — DILTIAZEM HYDROCHLORIDE 60 MG: 60 TABLET, FILM COATED ORAL at 13:08

## 2017-01-01 RX ADMIN — CLONIDINE HYDROCHLORIDE 0.3 MG: 0.1 TABLET ORAL at 08:00

## 2017-01-01 RX ADMIN — INSULIN ASPART 6 UNITS: 100 INJECTION, SOLUTION INTRAVENOUS; SUBCUTANEOUS at 03:16

## 2017-01-01 RX ADMIN — ACETAMINOPHEN 975 MG: 325 TABLET, FILM COATED ORAL at 09:02

## 2017-01-01 RX ADMIN — ISOSORBIDE MONONITRATE 30 MG: 30 TABLET, EXTENDED RELEASE ORAL at 08:00

## 2017-01-01 RX ADMIN — ONDANSETRON 4 MG: 4 TABLET, ORALLY DISINTEGRATING ORAL at 19:46

## 2017-01-01 RX ADMIN — DEXTROSE MONOHYDRATE AND SODIUM CHLORIDE: 5; .45 INJECTION, SOLUTION INTRAVENOUS at 14:00

## 2017-01-01 RX ADMIN — OMEPRAZOLE 20 MG: 20 CAPSULE, DELAYED RELEASE ORAL at 16:59

## 2017-01-01 RX ADMIN — ISOSORBIDE MONONITRATE 30 MG: 30 TABLET, EXTENDED RELEASE ORAL at 09:38

## 2017-01-01 RX ADMIN — ACETAMINOPHEN 1000 MG: 500 TABLET, COATED ORAL at 09:25

## 2017-01-01 RX ADMIN — VITAMIN D, TAB 1000IU (100/BT) 1000 UNITS: 25 TAB at 15:37

## 2017-01-01 RX ADMIN — HEPARIN SODIUM 5000 UNITS: 10000 INJECTION, SOLUTION INTRAVENOUS; SUBCUTANEOUS at 09:28

## 2017-01-01 RX ADMIN — HEPARIN SODIUM 5000 UNITS: 10000 INJECTION, SOLUTION INTRAVENOUS; SUBCUTANEOUS at 08:11

## 2017-01-01 RX ADMIN — CLONIDINE HYDROCHLORIDE 0.2 MG: 0.1 TABLET ORAL at 20:15

## 2017-01-01 RX ADMIN — INSULIN ASPART 3 UNITS: 100 INJECTION, SOLUTION INTRAVENOUS; SUBCUTANEOUS at 02:06

## 2017-01-01 RX ADMIN — HYDRALAZINE HYDROCHLORIDE 10 MG: 20 INJECTION INTRAMUSCULAR; INTRAVENOUS at 14:38

## 2017-01-01 RX ADMIN — LORAZEPAM 0.5 MG: 0.5 TABLET ORAL at 19:57

## 2017-01-01 RX ADMIN — VALPROIC ACID 1500 MG: 250 SOLUTION ORAL at 23:22

## 2017-01-01 RX ADMIN — DILTIAZEM HYDROCHLORIDE 120 MG: 120 CAPSULE, EXTENDED RELEASE ORAL at 16:58

## 2017-01-01 RX ADMIN — HYDRALAZINE HYDROCHLORIDE 50 MG: 25 TABLET ORAL at 16:37

## 2017-01-01 RX ADMIN — OMEPRAZOLE 20 MG: 20 CAPSULE, DELAYED RELEASE ORAL at 15:57

## 2017-01-01 RX ADMIN — ACETAMINOPHEN 1000 MG: 500 TABLET, COATED ORAL at 08:06

## 2017-01-01 RX ADMIN — RISPERIDONE 1 MG: 1 TABLET, ORALLY DISINTEGRATING ORAL at 08:08

## 2017-01-01 RX ADMIN — DIVALPROEX SODIUM 500 MG: 500 TABLET, DELAYED RELEASE ORAL at 11:53

## 2017-01-01 RX ADMIN — METOPROLOL TARTRATE 100 MG: 100 TABLET, FILM COATED ORAL at 21:06

## 2017-01-01 RX ADMIN — METOPROLOL TARTRATE 100 MG: 100 TABLET, FILM COATED ORAL at 22:07

## 2017-01-01 RX ADMIN — INSULIN ASPART 50 UNITS: 100 INJECTION, SUSPENSION SUBCUTANEOUS at 08:54

## 2017-01-01 RX ADMIN — POLYETHYLENE GLYCOL 3350 17 G: 17 POWDER, FOR SOLUTION ORAL at 20:57

## 2017-01-01 RX ADMIN — ACETAMINOPHEN 1000 MG: 500 TABLET, COATED ORAL at 15:57

## 2017-01-01 RX ADMIN — ACETAMINOPHEN 975 MG: 325 TABLET, FILM COATED ORAL at 09:17

## 2017-01-01 RX ADMIN — ISOSORBIDE MONONITRATE 30 MG: 30 TABLET, EXTENDED RELEASE ORAL at 08:20

## 2017-01-01 RX ADMIN — INSULIN ASPART 2 UNITS: 100 INJECTION, SOLUTION INTRAVENOUS; SUBCUTANEOUS at 12:23

## 2017-01-01 RX ADMIN — DIVALPROEX SODIUM 500 MG: 500 TABLET, DELAYED RELEASE ORAL at 08:51

## 2017-01-01 RX ADMIN — CLONIDINE HYDROCHLORIDE 0.2 MG: 0.1 TABLET ORAL at 21:47

## 2017-01-01 RX ADMIN — CLONIDINE HYDROCHLORIDE 0.3 MG: 0.1 TABLET ORAL at 22:43

## 2017-01-01 RX ADMIN — HYDRALAZINE HYDROCHLORIDE 50 MG: 25 TABLET ORAL at 21:06

## 2017-01-01 RX ADMIN — HYDRALAZINE HYDROCHLORIDE 50 MG: 25 TABLET ORAL at 18:01

## 2017-01-01 RX ADMIN — HEPARIN SODIUM 5000 UNITS: 10000 INJECTION, SOLUTION INTRAVENOUS; SUBCUTANEOUS at 19:57

## 2017-01-01 RX ADMIN — INSULIN ASPART 3 UNITS: 100 INJECTION, SOLUTION INTRAVENOUS; SUBCUTANEOUS at 17:46

## 2017-01-01 RX ADMIN — GLIPIZIDE 10 MG: 5 TABLET, FILM COATED, EXTENDED RELEASE ORAL at 08:26

## 2017-01-01 RX ADMIN — DILTIAZEM HYDROCHLORIDE 60 MG: 60 TABLET, FILM COATED ORAL at 12:46

## 2017-01-01 RX ADMIN — GLIPIZIDE 2.5 MG: 2.5 TABLET, FILM COATED, EXTENDED RELEASE ORAL at 10:50

## 2017-01-01 RX ADMIN — DILTIAZEM HYDROCHLORIDE 60 MG: 60 TABLET, FILM COATED ORAL at 20:15

## 2017-01-01 RX ADMIN — VALPROATE SODIUM 500 MG: 100 INJECTION, SOLUTION INTRAVENOUS at 20:32

## 2017-01-01 RX ADMIN — DEXTROSE MONOHYDRATE AND SODIUM CHLORIDE: 5; .45 INJECTION, SOLUTION INTRAVENOUS at 21:39

## 2017-01-01 RX ADMIN — OMEPRAZOLE 20 MG: 20 CAPSULE, DELAYED RELEASE ORAL at 08:27

## 2017-01-01 RX ADMIN — CLONIDINE HYDROCHLORIDE 0.3 MG: 0.1 TABLET ORAL at 09:28

## 2017-01-01 RX ADMIN — ACETAMINOPHEN 1000 MG: 500 TABLET, COATED ORAL at 22:17

## 2017-01-01 RX ADMIN — VALPROIC ACID 500 MG: 250 SOLUTION ORAL at 08:04

## 2017-01-01 RX ADMIN — HYDRALAZINE HYDROCHLORIDE 25 MG: 25 TABLET ORAL at 09:09

## 2017-01-01 RX ADMIN — LORAZEPAM 1 MG: 1 TABLET ORAL at 15:15

## 2017-01-01 RX ADMIN — ACETAZOLAMIDE 500 MG: 500 CAPSULE, EXTENDED RELEASE ORAL at 08:33

## 2017-01-01 RX ADMIN — MEROPENEM 500 MG: 500 INJECTION, POWDER, FOR SOLUTION INTRAVENOUS at 23:47

## 2017-01-01 RX ADMIN — INSULIN HUMAN 30 UNITS: 100 INJECTION, SUSPENSION SUBCUTANEOUS at 00:53

## 2017-01-01 RX ADMIN — GLIPIZIDE 10 MG: 5 TABLET, FILM COATED, EXTENDED RELEASE ORAL at 08:37

## 2017-01-01 RX ADMIN — OMEPRAZOLE 20 MG: 20 CAPSULE, DELAYED RELEASE ORAL at 17:41

## 2017-01-01 RX ADMIN — INSULIN GLARGINE 10 UNITS: 100 INJECTION, SOLUTION SUBCUTANEOUS at 23:54

## 2017-01-01 RX ADMIN — OLANZAPINE 5 MG: 5 TABLET, FILM COATED ORAL at 21:06

## 2017-01-01 RX ADMIN — GLIPIZIDE 5 MG: 5 TABLET ORAL at 16:59

## 2017-01-01 RX ADMIN — RISPERIDONE 1 MG: 1 TABLET, ORALLY DISINTEGRATING ORAL at 22:03

## 2017-01-01 RX ADMIN — INSULIN ASPART 70 UNITS: 100 INJECTION, SUSPENSION SUBCUTANEOUS at 08:08

## 2017-01-01 RX ADMIN — INSULIN ASPART 50 UNITS: 100 INJECTION, SUSPENSION SUBCUTANEOUS at 19:09

## 2017-01-01 RX ADMIN — DILTIAZEM HYDROCHLORIDE 240 MG: 240 CAPSULE, EXTENDED RELEASE ORAL at 08:51

## 2017-01-01 RX ADMIN — INSULIN ASPART 7 UNITS: 100 INJECTION, SOLUTION INTRAVENOUS; SUBCUTANEOUS at 12:10

## 2017-01-01 RX ADMIN — GLIPIZIDE 10 MG: 5 TABLET, FILM COATED, EXTENDED RELEASE ORAL at 16:19

## 2017-01-01 RX ADMIN — DILTIAZEM HYDROCHLORIDE 120 MG: 120 CAPSULE, EXTENDED RELEASE ORAL at 08:00

## 2017-01-01 RX ADMIN — MEROPENEM 500 MG: 500 INJECTION, POWDER, FOR SOLUTION INTRAVENOUS at 23:44

## 2017-01-01 RX ADMIN — INSULIN ASPART 3 UNITS: 100 INJECTION, SOLUTION INTRAVENOUS; SUBCUTANEOUS at 10:00

## 2017-01-01 RX ADMIN — CLONIDINE HYDROCHLORIDE 0.2 MG: 0.1 TABLET ORAL at 08:39

## 2017-01-01 RX ADMIN — HYDRALAZINE HYDROCHLORIDE 50 MG: 25 TABLET ORAL at 18:18

## 2017-01-01 RX ADMIN — RISPERIDONE 0.5 MG: 1 TABLET, ORALLY DISINTEGRATING ORAL at 15:26

## 2017-01-01 RX ADMIN — METOPROLOL TARTRATE 5 MG: 5 INJECTION INTRAVENOUS at 19:43

## 2017-01-01 RX ADMIN — VANCOMYCIN HYDROCHLORIDE 1500 MG: 5 INJECTION, POWDER, LYOPHILIZED, FOR SOLUTION INTRAVENOUS at 18:01

## 2017-01-01 RX ADMIN — SODIUM BICARBONATE: 84 INJECTION, SOLUTION INTRAVENOUS at 21:06

## 2017-01-01 RX ADMIN — VALPROATE SODIUM 500 MG: 100 INJECTION, SOLUTION INTRAVENOUS at 12:29

## 2017-01-01 RX ADMIN — CLONIDINE HYDROCHLORIDE 0.3 MG: 0.1 TABLET ORAL at 08:51

## 2017-01-01 RX ADMIN — DIVALPROEX SODIUM 500 MG: 500 TABLET, DELAYED RELEASE ORAL at 08:14

## 2017-01-01 RX ADMIN — RISPERIDONE 0.5 MG: 1 TABLET, ORALLY DISINTEGRATING ORAL at 13:02

## 2017-01-01 RX ADMIN — OMEPRAZOLE 20 MG: 20 CAPSULE, DELAYED RELEASE ORAL at 16:01

## 2017-01-01 RX ADMIN — ISOSORBIDE MONONITRATE 30 MG: 30 TABLET, EXTENDED RELEASE ORAL at 08:15

## 2017-01-01 RX ADMIN — CLONIDINE HYDROCHLORIDE 0.2 MG: 0.2 TABLET ORAL at 10:48

## 2017-01-01 RX ADMIN — ACETAMINOPHEN 650 MG: 325 TABLET, FILM COATED ORAL at 18:18

## 2017-01-01 RX ADMIN — GLIPIZIDE 10 MG: 5 TABLET, FILM COATED, EXTENDED RELEASE ORAL at 07:54

## 2017-01-01 RX ADMIN — ISOSORBIDE MONONITRATE 30 MG: 30 TABLET, EXTENDED RELEASE ORAL at 08:06

## 2017-01-01 RX ADMIN — RISPERIDONE 1 MG: 1 TABLET, ORALLY DISINTEGRATING ORAL at 09:04

## 2017-01-01 RX ADMIN — INSULIN ASPART 40 UNITS: 100 INJECTION, SUSPENSION SUBCUTANEOUS at 08:27

## 2017-01-01 RX ADMIN — ACETAMINOPHEN 1000 MG: 500 TABLET, COATED ORAL at 08:47

## 2017-01-01 RX ADMIN — POLYETHYLENE GLYCOL 3350 17 G: 17 POWDER, FOR SOLUTION ORAL at 08:39

## 2017-01-01 RX ADMIN — DOCUSATE SODIUM 100 MG: 100 CAPSULE, LIQUID FILLED ORAL at 09:04

## 2017-01-01 RX ADMIN — ACETAMINOPHEN 1000 MG: 500 TABLET, COATED ORAL at 17:02

## 2017-01-01 RX ADMIN — OLANZAPINE 5 MG: 5 TABLET, FILM COATED ORAL at 21:00

## 2017-01-01 RX ADMIN — ISOSORBIDE MONONITRATE 30 MG: 30 TABLET, EXTENDED RELEASE ORAL at 08:57

## 2017-01-01 RX ADMIN — RISPERIDONE 1 MG: 1 TABLET, ORALLY DISINTEGRATING ORAL at 08:50

## 2017-01-01 RX ADMIN — OMEPRAZOLE 20 MG: 20 CAPSULE, DELAYED RELEASE ORAL at 08:53

## 2017-01-01 RX ADMIN — LORAZEPAM 1 MG: 1 TABLET ORAL at 08:38

## 2017-01-01 RX ADMIN — INSULIN ASPART 60 UNITS: 100 INJECTION, SUSPENSION SUBCUTANEOUS at 17:46

## 2017-01-01 RX ADMIN — INSULIN ASPART 50 UNITS: 100 INJECTION, SUSPENSION SUBCUTANEOUS at 09:38

## 2017-01-01 RX ADMIN — INSULIN ASPART 1 UNITS: 100 INJECTION, SOLUTION INTRAVENOUS; SUBCUTANEOUS at 12:19

## 2017-01-01 RX ADMIN — DEXTROSE MONOHYDRATE: 50 INJECTION, SOLUTION INTRAVENOUS at 14:05

## 2017-01-01 RX ADMIN — HYDRALAZINE HYDROCHLORIDE 50 MG: 25 TABLET ORAL at 12:35

## 2017-01-01 RX ADMIN — RISPERIDONE 1 MG: 1 TABLET ORAL at 21:50

## 2017-01-01 RX ADMIN — POLYETHYLENE GLYCOL 3350 17 G: 17 POWDER, FOR SOLUTION ORAL at 21:47

## 2017-01-01 RX ADMIN — MEROPENEM 500 MG: 500 INJECTION, POWDER, FOR SOLUTION INTRAVENOUS at 00:15

## 2017-01-01 RX ADMIN — POLYETHYLENE GLYCOL 3350 17 G: 17 POWDER, FOR SOLUTION ORAL at 09:53

## 2017-01-01 RX ADMIN — HYDRALAZINE HYDROCHLORIDE 25 MG: 25 TABLET ORAL at 12:56

## 2017-01-01 RX ADMIN — RISPERIDONE 1 MG: 1 TABLET, ORALLY DISINTEGRATING ORAL at 20:52

## 2017-01-01 RX ADMIN — ACETAMINOPHEN 1000 MG: 500 TABLET, COATED ORAL at 21:53

## 2017-01-01 RX ADMIN — METOPROLOL TARTRATE 100 MG: 100 TABLET, FILM COATED ORAL at 21:42

## 2017-01-01 RX ADMIN — INSULIN ASPART 7 UNITS: 100 INJECTION, SOLUTION INTRAVENOUS; SUBCUTANEOUS at 18:00

## 2017-01-01 RX ADMIN — METOPROLOL TARTRATE 5 MG: 5 INJECTION INTRAVENOUS at 15:07

## 2017-01-01 RX ADMIN — INSULIN ASPART 50 UNITS: 100 INJECTION, SUSPENSION SUBCUTANEOUS at 08:48

## 2017-01-01 RX ADMIN — RISPERIDONE 1 MG: 1 TABLET, ORALLY DISINTEGRATING ORAL at 22:02

## 2017-01-01 RX ADMIN — INSULIN ASPART 60 UNITS: 100 INJECTION, SUSPENSION SUBCUTANEOUS at 19:20

## 2017-01-01 RX ADMIN — HYDRALAZINE HYDROCHLORIDE 25 MG: 25 TABLET ORAL at 21:11

## 2017-01-01 RX ADMIN — GLIPIZIDE 10 MG: 5 TABLET, FILM COATED, EXTENDED RELEASE ORAL at 16:22

## 2017-01-01 RX ADMIN — ACETAMINOPHEN 1000 MG: 500 TABLET, COATED ORAL at 21:46

## 2017-01-01 RX ADMIN — INSULIN ASPART 50 UNITS: 100 INJECTION, SUSPENSION SUBCUTANEOUS at 09:44

## 2017-01-01 RX ADMIN — INSULIN ASPART 2 UNITS: 100 INJECTION, SOLUTION INTRAVENOUS; SUBCUTANEOUS at 08:50

## 2017-01-01 RX ADMIN — CLONIDINE HYDROCHLORIDE 0.3 MG: 0.1 TABLET ORAL at 09:25

## 2017-01-01 RX ADMIN — ISOSORBIDE MONONITRATE 30 MG: 30 TABLET, EXTENDED RELEASE ORAL at 09:44

## 2017-01-01 RX ADMIN — POLYETHYLENE GLYCOL 3350 17 G: 17 POWDER, FOR SOLUTION ORAL at 08:51

## 2017-01-01 RX ADMIN — INSULIN HUMAN 30 UNITS: 100 INJECTION, SUSPENSION SUBCUTANEOUS at 21:29

## 2017-01-01 RX ADMIN — DIVALPROEX SODIUM 1500 MG: 500 TABLET, DELAYED RELEASE ORAL at 22:43

## 2017-01-01 RX ADMIN — HYDRALAZINE HYDROCHLORIDE 25 MG: 25 TABLET ORAL at 12:37

## 2017-01-01 RX ADMIN — DIVALPROEX SODIUM 1000 MG: 125 CAPSULE, COATED PELLETS ORAL at 21:49

## 2017-01-01 RX ADMIN — MEROPENEM 500 MG: 500 INJECTION, POWDER, FOR SOLUTION INTRAVENOUS at 16:12

## 2017-01-01 RX ADMIN — OMEPRAZOLE 20 MG: 20 CAPSULE, DELAYED RELEASE ORAL at 17:07

## 2017-01-01 RX ADMIN — DOCUSATE SODIUM 100 MG: 100 CAPSULE, LIQUID FILLED ORAL at 09:41

## 2017-01-01 RX ADMIN — DOCUSATE SODIUM 100 MG: 100 CAPSULE, LIQUID FILLED ORAL at 08:09

## 2017-01-01 RX ADMIN — GLIPIZIDE 10 MG: 5 TABLET, FILM COATED, EXTENDED RELEASE ORAL at 18:02

## 2017-01-01 RX ADMIN — VALPROIC ACID 1500 MG: 250 SOLUTION ORAL at 22:30

## 2017-01-01 RX ADMIN — INSULIN ASPART 4 UNITS: 100 INJECTION, SOLUTION INTRAVENOUS; SUBCUTANEOUS at 17:03

## 2017-01-01 RX ADMIN — KETAMINE HYDROCHLORIDE 100 MG: 10 INJECTION INTRAMUSCULAR; INTRAVENOUS at 16:43

## 2017-01-01 RX ADMIN — HYDRALAZINE HYDROCHLORIDE 25 MG: 25 TABLET ORAL at 18:07

## 2017-01-01 RX ADMIN — DIVALPROEX SODIUM 500 MG: 125 CAPSULE, COATED PELLETS ORAL at 09:39

## 2017-01-01 RX ADMIN — DIVALPROEX SODIUM 500 MG: 500 TABLET, DELAYED RELEASE ORAL at 09:10

## 2017-01-01 RX ADMIN — DIVALPROEX SODIUM 1500 MG: 500 TABLET, DELAYED RELEASE ORAL at 22:11

## 2017-01-01 RX ADMIN — DOCUSATE SODIUM 100 MG: 100 CAPSULE, LIQUID FILLED ORAL at 08:57

## 2017-01-01 RX ADMIN — CLONIDINE HYDROCHLORIDE 0.3 MG: 0.1 TABLET ORAL at 21:37

## 2017-01-01 RX ADMIN — ACETAMINOPHEN 1000 MG: 500 TABLET, COATED ORAL at 22:12

## 2017-01-01 RX ADMIN — INSULIN ASPART 50 UNITS: 100 INJECTION, SUSPENSION SUBCUTANEOUS at 19:08

## 2017-01-01 RX ADMIN — HYDRALAZINE HYDROCHLORIDE 50 MG: 25 TABLET ORAL at 21:32

## 2017-01-01 RX ADMIN — DIVALPROEX SODIUM 1500 MG: 500 TABLET, DELAYED RELEASE ORAL at 20:59

## 2017-01-01 RX ADMIN — DIVALPROEX SODIUM 1500 MG: 500 TABLET, DELAYED RELEASE ORAL at 19:57

## 2017-01-01 RX ADMIN — OMEPRAZOLE 20 MG: 20 CAPSULE, DELAYED RELEASE ORAL at 06:31

## 2017-01-01 RX ADMIN — METOPROLOL SUCCINATE 25 MG: 25 TABLET, EXTENDED RELEASE ORAL at 08:27

## 2017-01-01 RX ADMIN — METOPROLOL TARTRATE 50 MG: 50 TABLET, FILM COATED ORAL at 08:31

## 2017-01-01 RX ADMIN — INSULIN ASPART 1 UNITS: 100 INJECTION, SOLUTION INTRAVENOUS; SUBCUTANEOUS at 09:01

## 2017-01-01 RX ADMIN — DILTIAZEM HYDROCHLORIDE 240 MG: 240 CAPSULE, EXTENDED RELEASE ORAL at 08:06

## 2017-01-01 RX ADMIN — DOCUSATE SODIUM 100 MG: 100 CAPSULE, LIQUID FILLED ORAL at 21:46

## 2017-01-01 RX ADMIN — METOPROLOL TARTRATE 100 MG: 100 TABLET, FILM COATED ORAL at 09:53

## 2017-01-01 RX ADMIN — DIVALPROEX SODIUM 1500 MG: 500 TABLET, DELAYED RELEASE ORAL at 20:29

## 2017-01-01 RX ADMIN — METOPROLOL TARTRATE 25 MG: 100 TABLET, FILM COATED ORAL at 09:36

## 2017-01-01 RX ADMIN — CLONIDINE HYDROCHLORIDE 0.3 MG: 0.1 TABLET ORAL at 21:42

## 2017-01-01 RX ADMIN — PANTOPRAZOLE SODIUM 40 MG: 40 INJECTION, POWDER, FOR SOLUTION INTRAVENOUS at 22:35

## 2017-01-01 RX ADMIN — GLIPIZIDE 10 MG: 5 TABLET, FILM COATED, EXTENDED RELEASE ORAL at 06:50

## 2017-01-01 RX ADMIN — Medication 2.5 MG: at 21:28

## 2017-01-01 RX ADMIN — ISOSORBIDE MONONITRATE 30 MG: 30 TABLET, EXTENDED RELEASE ORAL at 08:03

## 2017-01-01 RX ADMIN — SODIUM CHLORIDE: 4.5 INJECTION, SOLUTION INTRAVENOUS at 21:31

## 2017-01-01 RX ADMIN — LORAZEPAM 1 MG: 1 TABLET ORAL at 19:28

## 2017-01-01 RX ADMIN — INSULIN ASPART 50 UNITS: 100 INJECTION, SUSPENSION SUBCUTANEOUS at 16:25

## 2017-01-01 RX ADMIN — DOCUSATE SODIUM 100 MG: 100 CAPSULE, LIQUID FILLED ORAL at 08:31

## 2017-01-01 RX ADMIN — ACETAMINOPHEN 975 MG: 325 TABLET, FILM COATED ORAL at 21:51

## 2017-01-01 RX ADMIN — POLYETHYLENE GLYCOL 3350 17 G: 17 POWDER, FOR SOLUTION ORAL at 20:59

## 2017-01-01 RX ADMIN — SULFUR HEXAFLUORIDE 5 ML: KIT at 16:24

## 2017-01-01 RX ADMIN — HYDRALAZINE HYDROCHLORIDE 10 MG: 20 INJECTION INTRAMUSCULAR; INTRAVENOUS at 23:52

## 2017-01-01 RX ADMIN — DIVALPROEX SODIUM 500 MG: 500 TABLET, DELAYED RELEASE ORAL at 08:39

## 2017-01-01 RX ADMIN — HYDRALAZINE HYDROCHLORIDE 50 MG: 25 TABLET ORAL at 12:05

## 2017-01-01 RX ADMIN — OMEPRAZOLE 20 MG: 20 CAPSULE, DELAYED RELEASE ORAL at 06:50

## 2017-01-01 RX ADMIN — ACETAMINOPHEN 1000 MG: 500 TABLET, COATED ORAL at 08:10

## 2017-01-01 RX ADMIN — GLIPIZIDE 2.5 MG: 2.5 TABLET, FILM COATED, EXTENDED RELEASE ORAL at 16:35

## 2017-01-01 RX ADMIN — DOCUSATE SODIUM 100 MG: 100 CAPSULE, LIQUID FILLED ORAL at 09:15

## 2017-01-01 RX ADMIN — METOPROLOL TARTRATE 100 MG: 100 TABLET, FILM COATED ORAL at 09:41

## 2017-01-01 RX ADMIN — OMEPRAZOLE 20 MG: 20 CAPSULE, DELAYED RELEASE ORAL at 06:43

## 2017-01-01 RX ADMIN — GLIPIZIDE 10 MG: 5 TABLET, FILM COATED, EXTENDED RELEASE ORAL at 15:58

## 2017-01-01 RX ADMIN — ACETAMINOPHEN 1000 MG: 500 TABLET, COATED ORAL at 09:27

## 2017-01-01 RX ADMIN — METOPROLOL TARTRATE 100 MG: 100 TABLET, FILM COATED ORAL at 22:01

## 2017-01-01 RX ADMIN — LORAZEPAM 1 MG: 1 TABLET ORAL at 15:00

## 2017-01-01 RX ADMIN — GLIPIZIDE 2.5 MG: 2.5 TABLET, FILM COATED, EXTENDED RELEASE ORAL at 16:25

## 2017-01-01 RX ADMIN — GLIPIZIDE 10 MG: 5 TABLET, FILM COATED, EXTENDED RELEASE ORAL at 17:58

## 2017-01-01 RX ADMIN — HYDRALAZINE HYDROCHLORIDE 10 MG: 20 INJECTION INTRAMUSCULAR; INTRAVENOUS at 12:36

## 2017-01-01 RX ADMIN — GLIPIZIDE 5 MG: 5 TABLET ORAL at 09:27

## 2017-01-01 RX ADMIN — VALPROATE SODIUM 500 MG: 100 INJECTION, SOLUTION INTRAVENOUS at 03:14

## 2017-01-01 RX ADMIN — OLANZAPINE 5 MG: 5 TABLET, FILM COATED ORAL at 22:03

## 2017-01-01 RX ADMIN — OLANZAPINE 5 MG: 5 TABLET, FILM COATED ORAL at 21:23

## 2017-01-01 RX ADMIN — METOPROLOL SUCCINATE 100 MG: 100 TABLET, EXTENDED RELEASE ORAL at 08:25

## 2017-01-01 RX ADMIN — METOPROLOL TARTRATE 100 MG: 100 TABLET, FILM COATED ORAL at 08:15

## 2017-01-01 RX ADMIN — ACETAMINOPHEN 1000 MG: 500 TABLET, COATED ORAL at 21:58

## 2017-01-01 RX ADMIN — METOPROLOL TARTRATE 100 MG: 100 TABLET, FILM COATED ORAL at 09:08

## 2017-01-01 RX ADMIN — OLANZAPINE 5 MG: 5 TABLET, FILM COATED ORAL at 22:14

## 2017-01-01 RX ADMIN — VALPROIC ACID 1500 MG: 250 SOLUTION ORAL at 21:51

## 2017-01-01 RX ADMIN — INSULIN ASPART 70 UNITS: 100 INJECTION, SUSPENSION SUBCUTANEOUS at 08:33

## 2017-01-01 RX ADMIN — METOPROLOL TARTRATE 5 MG: 5 INJECTION INTRAVENOUS at 18:53

## 2017-01-01 RX ADMIN — ACETAMINOPHEN 1000 MG: 500 TABLET, COATED ORAL at 09:14

## 2017-01-01 RX ADMIN — RISPERIDONE 1 MG: 1 TABLET, ORALLY DISINTEGRATING ORAL at 22:10

## 2017-01-01 RX ADMIN — AMLODIPINE BESYLATE 5 MG: 5 TABLET ORAL at 10:46

## 2017-01-01 RX ADMIN — RISPERIDONE 1 MG: 1 TABLET, ORALLY DISINTEGRATING ORAL at 21:20

## 2017-01-01 RX ADMIN — OMEPRAZOLE 20 MG: 20 CAPSULE, DELAYED RELEASE ORAL at 16:19

## 2017-01-01 RX ADMIN — OMEPRAZOLE 20 MG: 20 CAPSULE, DELAYED RELEASE ORAL at 08:00

## 2017-01-01 RX ADMIN — METOPROLOL TARTRATE 100 MG: 100 TABLET, FILM COATED ORAL at 21:46

## 2017-01-01 RX ADMIN — DIVALPROEX SODIUM 1500 MG: 500 TABLET, DELAYED RELEASE ORAL at 21:09

## 2017-01-01 RX ADMIN — INSULIN ASPART 3 UNITS: 100 INJECTION, SOLUTION INTRAVENOUS; SUBCUTANEOUS at 17:29

## 2017-01-01 RX ADMIN — AMLODIPINE BESYLATE 5 MG: 5 TABLET ORAL at 08:27

## 2017-01-01 RX ADMIN — OMEPRAZOLE 20 MG: 20 CAPSULE, DELAYED RELEASE ORAL at 09:51

## 2017-01-01 RX ADMIN — CLONIDINE HYDROCHLORIDE 0.3 MG: 0.1 TABLET ORAL at 10:11

## 2017-01-01 RX ADMIN — RISPERIDONE 1 MG: 1 TABLET, ORALLY DISINTEGRATING ORAL at 09:14

## 2017-01-01 RX ADMIN — METOPROLOL TARTRATE 5 MG: 5 INJECTION INTRAVENOUS at 21:08

## 2017-01-01 RX ADMIN — DILTIAZEM HYDROCHLORIDE 240 MG: 240 CAPSULE, EXTENDED RELEASE ORAL at 08:37

## 2017-01-01 RX ADMIN — RISPERIDONE 1 MG: 1 TABLET, ORALLY DISINTEGRATING ORAL at 00:06

## 2017-01-01 RX ADMIN — HYDRALAZINE HYDROCHLORIDE 25 MG: 25 TABLET ORAL at 21:59

## 2017-01-01 RX ADMIN — DEXTROSE MONOHYDRATE: 50 INJECTION, SOLUTION INTRAVENOUS at 03:14

## 2017-01-01 RX ADMIN — POLYETHYLENE GLYCOL 3350 17 G: 17 POWDER, FOR SOLUTION ORAL at 09:10

## 2017-01-01 RX ADMIN — ACETAMINOPHEN 1000 MG: 500 TABLET ORAL at 00:45

## 2017-01-01 RX ADMIN — SODIUM POLYSTYRENE SULFONATE 15 G: 15 SUSPENSION ORAL; RECTAL at 16:05

## 2017-01-01 RX ADMIN — CLONIDINE HYDROCHLORIDE 0.2 MG: 0.1 TABLET ORAL at 08:14

## 2017-01-01 RX ADMIN — RISPERIDONE 1 MG: 1 TABLET ORAL at 21:23

## 2017-01-01 RX ADMIN — HYDRALAZINE HYDROCHLORIDE 10 MG: 20 INJECTION INTRAMUSCULAR; INTRAVENOUS at 11:48

## 2017-01-01 RX ADMIN — GLIPIZIDE 10 MG: 5 TABLET, FILM COATED, EXTENDED RELEASE ORAL at 07:00

## 2017-01-01 RX ADMIN — AMLODIPINE BESYLATE 5 MG: 5 TABLET ORAL at 09:51

## 2017-01-01 RX ADMIN — DIVALPROEX SODIUM 500 MG: 500 TABLET, DELAYED RELEASE ORAL at 10:22

## 2017-01-01 RX ADMIN — HEPARIN SODIUM 5000 UNITS: 10000 INJECTION, SOLUTION INTRAVENOUS; SUBCUTANEOUS at 20:25

## 2017-01-01 RX ADMIN — RISPERIDONE 1 MG: 1 TABLET ORAL at 21:09

## 2017-01-01 RX ADMIN — DILTIAZEM HYDROCHLORIDE 240 MG: 240 CAPSULE, EXTENDED RELEASE ORAL at 09:52

## 2017-01-01 RX ADMIN — HEPARIN SODIUM 5000 UNITS: 5000 INJECTION, SOLUTION INTRAVENOUS; SUBCUTANEOUS at 22:02

## 2017-01-01 RX ADMIN — DEXTROSE MONOHYDRATE: 50 INJECTION, SOLUTION INTRAVENOUS at 02:22

## 2017-01-01 RX ADMIN — CLONIDINE HYDROCHLORIDE 0.2 MG: 0.1 TABLET ORAL at 20:44

## 2017-01-01 RX ADMIN — METOPROLOL TARTRATE 100 MG: 100 TABLET, FILM COATED ORAL at 20:56

## 2017-01-01 RX ADMIN — ACETAMINOPHEN 1000 MG: 500 TABLET, COATED ORAL at 08:24

## 2017-01-01 RX ADMIN — PANTOPRAZOLE SODIUM 40 MG: 40 INJECTION, POWDER, FOR SOLUTION INTRAVENOUS at 09:33

## 2017-01-01 RX ADMIN — INSULIN ASPART 2 UNITS: 100 INJECTION, SOLUTION INTRAVENOUS; SUBCUTANEOUS at 18:14

## 2017-01-01 RX ADMIN — ACETAMINOPHEN 1000 MG: 500 TABLET, COATED ORAL at 16:28

## 2017-01-01 RX ADMIN — CLONIDINE HYDROCHLORIDE 0.3 MG: 0.1 TABLET ORAL at 09:17

## 2017-01-01 RX ADMIN — INSULIN ASPART 70 UNITS: 100 INJECTION, SUSPENSION SUBCUTANEOUS at 18:58

## 2017-01-01 RX ADMIN — DOCUSATE SODIUM 100 MG: 100 CAPSULE, LIQUID FILLED ORAL at 21:04

## 2017-01-01 RX ADMIN — METOPROLOL TARTRATE 5 MG: 5 INJECTION INTRAVENOUS at 06:42

## 2017-01-01 RX ADMIN — RISPERIDONE 1 MG: 1 TABLET, ORALLY DISINTEGRATING ORAL at 08:55

## 2017-01-01 RX ADMIN — INSULIN ASPART 1 UNITS: 100 INJECTION, SOLUTION INTRAVENOUS; SUBCUTANEOUS at 18:51

## 2017-01-01 RX ADMIN — DILTIAZEM HYDROCHLORIDE 240 MG: 240 CAPSULE, EXTENDED RELEASE ORAL at 09:16

## 2017-01-01 RX ADMIN — POLYETHYLENE GLYCOL 3350 17 G: 17 POWDER, FOR SOLUTION ORAL at 21:53

## 2017-01-01 RX ADMIN — ACETAMINOPHEN 975 MG: 325 TABLET, FILM COATED ORAL at 09:07

## 2017-01-01 RX ADMIN — INSULIN ASPART 50 UNITS: 100 INJECTION, SUSPENSION SUBCUTANEOUS at 18:43

## 2017-01-01 RX ADMIN — OMEPRAZOLE 20 MG: 20 CAPSULE, DELAYED RELEASE ORAL at 16:22

## 2017-01-01 RX ADMIN — SODIUM CHLORIDE: 4.5 INJECTION, SOLUTION INTRAVENOUS at 00:55

## 2017-01-01 RX ADMIN — INSULIN ASPART 2 UNITS: 100 INJECTION, SOLUTION INTRAVENOUS; SUBCUTANEOUS at 17:40

## 2017-01-01 RX ADMIN — METOPROLOL SUCCINATE 25 MG: 25 TABLET, EXTENDED RELEASE ORAL at 09:13

## 2017-01-01 RX ADMIN — DEXTROSE MONOHYDRATE: 50 INJECTION, SOLUTION INTRAVENOUS at 11:56

## 2017-01-01 RX ADMIN — HYDROCHLOROTHIAZIDE 25 MG: 12.5 CAPSULE ORAL at 09:25

## 2017-01-01 RX ADMIN — OMEPRAZOLE 20 MG: 20 CAPSULE, DELAYED RELEASE ORAL at 12:05

## 2017-01-01 RX ADMIN — OMEPRAZOLE 20 MG: 20 CAPSULE, DELAYED RELEASE ORAL at 16:58

## 2017-01-01 RX ADMIN — DIVALPROEX SODIUM 1500 MG: 500 TABLET, DELAYED RELEASE ORAL at 22:17

## 2017-01-01 RX ADMIN — GLIPIZIDE 10 MG: 5 TABLET, FILM COATED, EXTENDED RELEASE ORAL at 19:00

## 2017-01-01 RX ADMIN — VITAMIN D, TAB 1000IU (100/BT) 1000 UNITS: 25 TAB at 15:43

## 2017-01-01 RX ADMIN — AMILORIDE HYDROCHLORIDE 10 MG: 5 TABLET ORAL at 10:12

## 2017-01-01 RX ADMIN — DIVALPROEX SODIUM 500 MG: 125 CAPSULE, COATED PELLETS ORAL at 09:36

## 2017-01-01 RX ADMIN — OMEPRAZOLE 20 MG: 20 CAPSULE, DELAYED RELEASE ORAL at 16:47

## 2017-01-01 RX ADMIN — VALPROATE SODIUM 500 MG: 100 INJECTION, SOLUTION INTRAVENOUS at 21:25

## 2017-01-01 RX ADMIN — SODIUM BICARBONATE 650 MG TABLET 650 MG: at 21:49

## 2017-01-01 RX ADMIN — GLIPIZIDE 10 MG: 5 TABLET, FILM COATED, EXTENDED RELEASE ORAL at 16:49

## 2017-01-01 RX ADMIN — OLANZAPINE 5 MG: 5 TABLET, FILM COATED ORAL at 22:00

## 2017-01-01 RX ADMIN — VITAMIN D, TAB 1000IU (100/BT) 1000 UNITS: 25 TAB at 16:45

## 2017-01-01 RX ADMIN — LORAZEPAM 1 MG: 1 TABLET ORAL at 21:09

## 2017-01-01 RX ADMIN — RISPERIDONE 1 MG: 1 TABLET, ORALLY DISINTEGRATING ORAL at 08:47

## 2017-01-01 RX ADMIN — CLONIDINE HYDROCHLORIDE 0.3 MG: 0.1 TABLET ORAL at 08:56

## 2017-01-01 RX ADMIN — INSULIN HUMAN 10 UNITS: 100 INJECTION, SUSPENSION SUBCUTANEOUS at 22:26

## 2017-01-01 RX ADMIN — METOPROLOL TARTRATE 100 MG: 100 TABLET, FILM COATED ORAL at 21:52

## 2017-01-01 RX ADMIN — RISPERIDONE 1 MG: 1 TABLET, ORALLY DISINTEGRATING ORAL at 09:33

## 2017-01-01 RX ADMIN — MEROPENEM 500 MG: 500 INJECTION, POWDER, FOR SOLUTION INTRAVENOUS at 14:17

## 2017-01-01 RX ADMIN — DIVALPROEX SODIUM 500 MG: 500 TABLET, DELAYED RELEASE ORAL at 08:27

## 2017-01-01 RX ADMIN — INSULIN HUMAN 20 UNITS: 100 INJECTION, SUSPENSION SUBCUTANEOUS at 17:00

## 2017-01-01 RX ADMIN — CLONIDINE HYDROCHLORIDE 0.2 MG: 0.1 TABLET ORAL at 21:32

## 2017-01-01 RX ADMIN — OLANZAPINE 5 MG: 5 TABLET, FILM COATED ORAL at 21:32

## 2017-01-01 RX ADMIN — OLANZAPINE 5 MG: 5 TABLET, FILM COATED ORAL at 21:09

## 2017-01-01 RX ADMIN — METOPROLOL TARTRATE 50 MG: 50 TABLET, FILM COATED ORAL at 08:10

## 2017-01-01 RX ADMIN — RISPERIDONE 1 MG: 1 TABLET, ORALLY DISINTEGRATING ORAL at 09:16

## 2017-01-01 RX ADMIN — HYDRALAZINE HYDROCHLORIDE 10 MG: 20 INJECTION INTRAMUSCULAR; INTRAVENOUS at 18:42

## 2017-01-01 RX ADMIN — RISPERIDONE 1 MG: 1 TABLET, ORALLY DISINTEGRATING ORAL at 21:53

## 2017-01-01 RX ADMIN — POLYETHYLENE GLYCOL 3350 17 G: 17 POWDER, FOR SOLUTION ORAL at 17:57

## 2017-01-01 RX ADMIN — DOCUSATE SODIUM 100 MG: 100 CAPSULE, LIQUID FILLED ORAL at 21:25

## 2017-01-01 RX ADMIN — RISPERIDONE 1 MG: 1 TABLET ORAL at 21:47

## 2017-01-01 RX ADMIN — VALPROATE SODIUM 500 MG: 100 INJECTION, SOLUTION INTRAVENOUS at 12:54

## 2017-01-01 RX ADMIN — POLYETHYLENE GLYCOL 3350 17 G: 17 POWDER, FOR SOLUTION ORAL at 09:44

## 2017-01-01 RX ADMIN — CLONIDINE HYDROCHLORIDE 0.3 MG: 0.1 TABLET ORAL at 08:25

## 2017-01-01 RX ADMIN — OMEPRAZOLE 20 MG: 20 CAPSULE, DELAYED RELEASE ORAL at 06:22

## 2017-01-01 RX ADMIN — METOPROLOL SUCCINATE 100 MG: 100 TABLET, EXTENDED RELEASE ORAL at 08:04

## 2017-01-01 RX ADMIN — INSULIN ASPART 40 UNITS: 100 INJECTION, SUSPENSION SUBCUTANEOUS at 10:16

## 2017-01-01 RX ADMIN — METOPROLOL TARTRATE 100 MG: 100 TABLET, FILM COATED ORAL at 08:37

## 2017-01-01 RX ADMIN — DIVALPROEX SODIUM 1500 MG: 500 TABLET, DELAYED RELEASE ORAL at 21:53

## 2017-01-01 RX ADMIN — AMILORIDE HYDROCHLORIDE 10 MG: 5 TABLET ORAL at 08:38

## 2017-01-01 RX ADMIN — POLYETHYLENE GLYCOL 3350 17 G: 17 POWDER, FOR SOLUTION ORAL at 08:13

## 2017-01-01 RX ADMIN — INSULIN HUMAN 10 UNITS: 100 INJECTION, SUSPENSION SUBCUTANEOUS at 21:19

## 2017-01-01 RX ADMIN — INSULIN ASPART 50 UNITS: 100 INJECTION, SUSPENSION SUBCUTANEOUS at 18:13

## 2017-01-01 RX ADMIN — DOCUSATE SODIUM 100 MG: 100 CAPSULE, LIQUID FILLED ORAL at 21:52

## 2017-01-01 RX ADMIN — INSULIN HUMAN 30 UNITS: 100 INJECTION, SUSPENSION SUBCUTANEOUS at 21:45

## 2017-01-01 RX ADMIN — RISPERIDONE 1 MG: 1 TABLET, ORALLY DISINTEGRATING ORAL at 17:04

## 2017-01-01 RX ADMIN — GLIPIZIDE 2.5 MG: 2.5 TABLET, FILM COATED, EXTENDED RELEASE ORAL at 09:08

## 2017-01-01 RX ADMIN — GLIPIZIDE 10 MG: 5 TABLET, FILM COATED, EXTENDED RELEASE ORAL at 09:04

## 2017-01-01 RX ADMIN — ACETAMINOPHEN 1000 MG: 500 TABLET, COATED ORAL at 10:30

## 2017-01-01 RX ADMIN — DEXTROSE MONOHYDRATE: 50 INJECTION, SOLUTION INTRAVENOUS at 06:52

## 2017-01-01 RX ADMIN — RISPERIDONE 1 MG: 1 TABLET, ORALLY DISINTEGRATING ORAL at 23:31

## 2017-01-01 RX ADMIN — CLONIDINE HYDROCHLORIDE 0.2 MG: 0.2 TABLET ORAL at 09:44

## 2017-01-01 RX ADMIN — DOCUSATE SODIUM 100 MG: 100 CAPSULE, LIQUID FILLED ORAL at 21:07

## 2017-01-01 RX ADMIN — DILTIAZEM HYDROCHLORIDE 120 MG: 120 CAPSULE, EXTENDED RELEASE ORAL at 10:21

## 2017-01-01 RX ADMIN — OMEPRAZOLE 20 MG: 20 CAPSULE, DELAYED RELEASE ORAL at 17:18

## 2017-01-01 RX ADMIN — POLYETHYLENE GLYCOL 3350 17 G: 17 POWDER, FOR SOLUTION ORAL at 21:14

## 2017-01-01 RX ADMIN — HEPARIN SODIUM 5000 UNITS: 10000 INJECTION, SOLUTION INTRAVENOUS; SUBCUTANEOUS at 21:21

## 2017-01-01 RX ADMIN — DIVALPROEX SODIUM 500 MG: 500 TABLET, DELAYED RELEASE ORAL at 08:37

## 2017-01-01 RX ADMIN — INSULIN ASPART 2 UNITS: 100 INJECTION, SOLUTION INTRAVENOUS; SUBCUTANEOUS at 13:21

## 2017-01-01 RX ADMIN — LIDOCAINE HYDROCHLORIDE 1 ML: 20 JELLY TOPICAL at 10:24

## 2017-01-01 RX ADMIN — HYDRALAZINE HYDROCHLORIDE 25 MG: 25 TABLET ORAL at 19:04

## 2017-01-01 RX ADMIN — INSULIN ASPART 5 UNITS: 100 INJECTION, SOLUTION INTRAVENOUS; SUBCUTANEOUS at 10:56

## 2017-01-01 RX ADMIN — VITAMIN D, TAB 1000IU (100/BT) 1000 UNITS: 25 TAB at 15:57

## 2017-01-01 RX ADMIN — DOCUSATE SODIUM 100 MG: 100 CAPSULE, LIQUID FILLED ORAL at 08:40

## 2017-01-01 RX ADMIN — SODIUM CHLORIDE: 9 INJECTION, SOLUTION INTRAVENOUS at 06:33

## 2017-01-01 RX ADMIN — HYDRALAZINE HYDROCHLORIDE 25 MG: 25 TABLET ORAL at 08:54

## 2017-01-01 RX ADMIN — INSULIN ASPART 2 UNITS: 100 INJECTION, SOLUTION INTRAVENOUS; SUBCUTANEOUS at 23:55

## 2017-01-01 RX ADMIN — ACETAMINOPHEN 1000 MG: 500 TABLET, COATED ORAL at 08:15

## 2017-01-01 RX ADMIN — DIVALPROEX SODIUM 500 MG: 500 TABLET, DELAYED RELEASE ORAL at 09:13

## 2017-01-01 RX ADMIN — LEVOFLOXACIN 250 MG: 250 TABLET, FILM COATED ORAL at 09:09

## 2017-01-01 RX ADMIN — INSULIN ASPART 4 UNITS: 100 INJECTION, SOLUTION INTRAVENOUS; SUBCUTANEOUS at 21:16

## 2017-01-01 RX ADMIN — RISPERIDONE 1 MG: 1 TABLET, ORALLY DISINTEGRATING ORAL at 21:38

## 2017-01-01 RX ADMIN — DEXTROSE MONOHYDRATE: 50 INJECTION, SOLUTION INTRAVENOUS at 07:03

## 2017-01-01 RX ADMIN — OMEPRAZOLE 20 MG: 20 CAPSULE, DELAYED RELEASE ORAL at 09:04

## 2017-01-01 RX ADMIN — VALPROIC ACID 500 MG: 250 SOLUTION ORAL at 09:08

## 2017-01-01 RX ADMIN — DOCUSATE SODIUM 100 MG: 100 CAPSULE, LIQUID FILLED ORAL at 22:07

## 2017-01-01 RX ADMIN — SODIUM BICARBONATE 650 MG TABLET 650 MG: at 08:53

## 2017-01-01 RX ADMIN — LORAZEPAM 1 MG: 1 TABLET ORAL at 20:43

## 2017-01-01 RX ADMIN — HYDRALAZINE HYDROCHLORIDE 50 MG: 25 TABLET ORAL at 12:02

## 2017-01-01 RX ADMIN — OXYCODONE HYDROCHLORIDE 5 MG: 5 TABLET ORAL at 22:02

## 2017-01-01 RX ADMIN — HEPARIN SODIUM 5000 UNITS: 10000 INJECTION, SOLUTION INTRAVENOUS; SUBCUTANEOUS at 09:14

## 2017-01-01 RX ADMIN — HYDRALAZINE HYDROCHLORIDE 50 MG: 25 TABLET ORAL at 09:15

## 2017-01-01 RX ADMIN — POLYETHYLENE GLYCOL 3350 17 G: 17 POWDER, FOR SOLUTION ORAL at 22:44

## 2017-01-01 RX ADMIN — SODIUM CHLORIDE, PRESERVATIVE FREE 10 ML: 5 INJECTION INTRAVENOUS at 17:24

## 2017-01-01 RX ADMIN — CLONIDINE HYDROCHLORIDE 0.3 MG: 0.1 TABLET ORAL at 21:20

## 2017-01-01 RX ADMIN — INSULIN ASPART 5 UNITS: 100 INJECTION, SOLUTION INTRAVENOUS; SUBCUTANEOUS at 17:09

## 2017-01-01 RX ADMIN — METOPROLOL TARTRATE 100 MG: 100 TABLET, FILM COATED ORAL at 08:26

## 2017-01-01 RX ADMIN — METOPROLOL TARTRATE 100 MG: 100 TABLET, FILM COATED ORAL at 09:04

## 2017-01-01 RX ADMIN — ACETAMINOPHEN 1000 MG: 500 TABLET, COATED ORAL at 08:40

## 2017-01-01 RX ADMIN — HYDRALAZINE HYDROCHLORIDE 10 MG: 20 INJECTION INTRAMUSCULAR; INTRAVENOUS at 06:44

## 2017-01-01 RX ADMIN — CLONIDINE HYDROCHLORIDE 0.2 MG: 0.1 TABLET ORAL at 09:33

## 2017-01-01 RX ADMIN — RISPERIDONE 1 MG: 1 TABLET, ORALLY DISINTEGRATING ORAL at 09:53

## 2017-01-01 RX ADMIN — POLYETHYLENE GLYCOL 3350 17 G: 17 POWDER, FOR SOLUTION ORAL at 08:06

## 2017-01-01 RX ADMIN — VITAMIN D, TAB 1000IU (100/BT) 1000 UNITS: 25 TAB at 16:20

## 2017-01-01 RX ADMIN — GLIPIZIDE 10 MG: 5 TABLET, FILM COATED, EXTENDED RELEASE ORAL at 17:22

## 2017-01-01 RX ADMIN — DOCUSATE SODIUM 100 MG: 100 CAPSULE, LIQUID FILLED ORAL at 22:17

## 2017-01-01 RX ADMIN — RISPERIDONE 1 MG: 1 TABLET, ORALLY DISINTEGRATING ORAL at 08:15

## 2017-01-01 RX ADMIN — CLONIDINE HYDROCHLORIDE 0.2 MG: 0.2 TABLET ORAL at 08:54

## 2017-01-01 RX ADMIN — HYDRALAZINE HYDROCHLORIDE 50 MG: 25 TABLET ORAL at 21:46

## 2017-01-01 RX ADMIN — OMEPRAZOLE 20 MG: 20 CAPSULE, DELAYED RELEASE ORAL at 16:10

## 2017-01-01 RX ADMIN — SODIUM CHLORIDE, PRESERVATIVE FREE 5 ML: 5 INJECTION INTRAVENOUS at 02:01

## 2017-01-01 RX ADMIN — AMILORIDE HYDROCHLORIDE 10 MG: 5 TABLET ORAL at 09:09

## 2017-01-01 RX ADMIN — OMEPRAZOLE 20 MG: 20 CAPSULE, DELAYED RELEASE ORAL at 06:21

## 2017-01-01 RX ADMIN — INSULIN ASPART 70 UNITS: 100 INJECTION, SUSPENSION SUBCUTANEOUS at 08:10

## 2017-01-01 RX ADMIN — ISOSORBIDE MONONITRATE 30 MG: 30 TABLET, EXTENDED RELEASE ORAL at 10:22

## 2017-01-01 RX ADMIN — DILTIAZEM HYDROCHLORIDE 240 MG: 240 CAPSULE, EXTENDED RELEASE ORAL at 08:44

## 2017-01-01 RX ADMIN — HYDRALAZINE HYDROCHLORIDE 50 MG: 25 TABLET ORAL at 18:02

## 2017-01-01 RX ADMIN — HYDRALAZINE HYDROCHLORIDE 10 MG: 20 INJECTION INTRAMUSCULAR; INTRAVENOUS at 17:00

## 2017-01-01 RX ADMIN — OMEPRAZOLE 20 MG: 20 CAPSULE, DELAYED RELEASE ORAL at 17:22

## 2017-01-01 RX ADMIN — GLIPIZIDE 2.5 MG: 2.5 TABLET, FILM COATED, EXTENDED RELEASE ORAL at 08:33

## 2017-01-01 RX ADMIN — INSULIN ASPART 3 UNITS: 100 INJECTION, SOLUTION INTRAVENOUS; SUBCUTANEOUS at 18:41

## 2017-01-01 RX ADMIN — RISPERIDONE 1 MG: 1 TABLET, ORALLY DISINTEGRATING ORAL at 09:09

## 2017-01-01 RX ADMIN — AMILORIDE HYDROCHLORIDE 10 MG: 5 TABLET ORAL at 08:05

## 2017-01-01 RX ADMIN — METOPROLOL SUCCINATE 25 MG: 25 TABLET, EXTENDED RELEASE ORAL at 08:28

## 2017-01-01 RX ADMIN — POLYETHYLENE GLYCOL 3350 17 G: 17 POWDER, FOR SOLUTION ORAL at 20:52

## 2017-01-01 RX ADMIN — SODIUM BICARBONATE: 84 INJECTION, SOLUTION INTRAVENOUS at 07:30

## 2017-01-01 RX ADMIN — Medication 2.5 MG: at 21:15

## 2017-01-01 RX ADMIN — CLONIDINE HYDROCHLORIDE 0.3 MG: 0.1 TABLET ORAL at 08:05

## 2017-01-01 RX ADMIN — GLIPIZIDE 10 MG: 5 TABLET, FILM COATED, EXTENDED RELEASE ORAL at 17:00

## 2017-01-01 RX ADMIN — RISPERIDONE 1 MG: 1 TABLET, ORALLY DISINTEGRATING ORAL at 08:40

## 2017-01-01 RX ADMIN — ACETAZOLAMIDE 500 MG: 500 CAPSULE, EXTENDED RELEASE ORAL at 20:25

## 2017-01-01 RX ADMIN — CLONIDINE HYDROCHLORIDE 0.3 MG: 0.1 TABLET ORAL at 08:26

## 2017-01-01 RX ADMIN — RISPERIDONE 1 MG: 1 TABLET, ORALLY DISINTEGRATING ORAL at 21:52

## 2017-01-01 RX ADMIN — ISOSORBIDE MONONITRATE 30 MG: 30 TABLET, EXTENDED RELEASE ORAL at 08:38

## 2017-01-01 RX ADMIN — ISOSORBIDE MONONITRATE 30 MG: 30 TABLET, EXTENDED RELEASE ORAL at 09:42

## 2017-01-01 RX ADMIN — INSULIN ASPART 3 UNITS: 100 INJECTION, SOLUTION INTRAVENOUS; SUBCUTANEOUS at 08:14

## 2017-01-01 RX ADMIN — DILTIAZEM HYDROCHLORIDE 120 MG: 60 CAPSULE, EXTENDED RELEASE ORAL at 08:02

## 2017-01-01 RX ADMIN — HYDRALAZINE HYDROCHLORIDE 25 MG: 25 TABLET ORAL at 22:25

## 2017-01-01 RX ADMIN — DILTIAZEM HYDROCHLORIDE 240 MG: 240 CAPSULE, EXTENDED RELEASE ORAL at 08:27

## 2017-01-01 RX ADMIN — RISPERIDONE 1 MG: 1 TABLET, ORALLY DISINTEGRATING ORAL at 21:51

## 2017-01-01 RX ADMIN — ACETAZOLAMIDE 500 MG: 500 CAPSULE, EXTENDED RELEASE ORAL at 08:03

## 2017-01-01 RX ADMIN — CLONIDINE HYDROCHLORIDE 0.3 MG: 0.1 TABLET ORAL at 21:23

## 2017-01-01 RX ADMIN — INSULIN HUMAN 20 UNITS: 100 INJECTION, SUSPENSION SUBCUTANEOUS at 17:41

## 2017-01-01 RX ADMIN — DIVALPROEX SODIUM 500 MG: 500 TABLET, DELAYED RELEASE ORAL at 08:31

## 2017-01-01 RX ADMIN — HYDRALAZINE HYDROCHLORIDE 50 MG: 25 TABLET ORAL at 14:12

## 2017-01-01 RX ADMIN — OMEPRAZOLE 20 MG: 20 CAPSULE, DELAYED RELEASE ORAL at 07:08

## 2017-01-01 RX ADMIN — INSULIN ASPART 10 UNITS: 100 INJECTION, SOLUTION INTRAVENOUS; SUBCUTANEOUS at 18:00

## 2017-01-01 RX ADMIN — PANTOPRAZOLE SODIUM 40 MG: 40 INJECTION, POWDER, FOR SOLUTION INTRAVENOUS at 07:54

## 2017-01-01 RX ADMIN — VITAMIN D, TAB 1000IU (100/BT) 1000 UNITS: 25 TAB at 16:09

## 2017-01-01 RX ADMIN — METOPROLOL SUCCINATE 100 MG: 100 TABLET, EXTENDED RELEASE ORAL at 08:08

## 2017-01-01 RX ADMIN — VALPROATE SODIUM 500 MG: 100 INJECTION, SOLUTION INTRAVENOUS at 21:34

## 2017-01-01 RX ADMIN — SODIUM BICARBONATE: 84 INJECTION, SOLUTION INTRAVENOUS at 11:41

## 2017-01-01 RX ADMIN — METOPROLOL TARTRATE 5 MG: 5 INJECTION INTRAVENOUS at 06:34

## 2017-01-01 RX ADMIN — OLANZAPINE 5 MG: 5 TABLET, FILM COATED ORAL at 21:14

## 2017-01-01 RX ADMIN — HYDROCHLOROTHIAZIDE 25 MG: 12.5 CAPSULE ORAL at 09:04

## 2017-01-01 RX ADMIN — OMEPRAZOLE 20 MG: 20 CAPSULE, DELAYED RELEASE ORAL at 16:32

## 2017-01-01 RX ADMIN — INSULIN ASPART 70 UNITS: 100 INJECTION, SUSPENSION SUBCUTANEOUS at 09:00

## 2017-01-01 RX ADMIN — INSULIN ASPART 1 UNITS: 100 INJECTION, SOLUTION INTRAVENOUS; SUBCUTANEOUS at 09:33

## 2017-01-01 RX ADMIN — OLANZAPINE 5 MG: 5 TABLET, FILM COATED ORAL at 21:53

## 2017-01-01 RX ADMIN — POLYETHYLENE GLYCOL 3350 17 G: 17 POWDER, FOR SOLUTION ORAL at 08:26

## 2017-01-01 RX ADMIN — CLONIDINE HYDROCHLORIDE 0.2 MG: 0.1 TABLET ORAL at 21:46

## 2017-01-01 RX ADMIN — DEXTROSE MONOHYDRATE: 50 INJECTION, SOLUTION INTRAVENOUS at 18:50

## 2017-01-01 RX ADMIN — POLYETHYLENE GLYCOL 3350 17 G: 17 POWDER, FOR SOLUTION ORAL at 08:23

## 2017-01-01 RX ADMIN — DIVALPROEX SODIUM 500 MG: 500 TABLET, DELAYED RELEASE ORAL at 09:27

## 2017-01-01 RX ADMIN — MULTIVITAMIN 15 ML: LIQUID ORAL at 11:51

## 2017-01-01 RX ADMIN — METOPROLOL TARTRATE 5 MG: 5 INJECTION INTRAVENOUS at 14:21

## 2017-01-01 RX ADMIN — OMEPRAZOLE 20 MG: 20 CAPSULE, DELAYED RELEASE ORAL at 18:18

## 2017-01-01 RX ADMIN — RISPERIDONE 1 MG: 1 TABLET, ORALLY DISINTEGRATING ORAL at 09:26

## 2017-01-01 RX ADMIN — POLYETHYLENE GLYCOL 3350 17 G: 17 POWDER, FOR SOLUTION ORAL at 08:38

## 2017-01-01 RX ADMIN — HEPARIN SODIUM 5000 UNITS: 10000 INJECTION, SOLUTION INTRAVENOUS; SUBCUTANEOUS at 21:29

## 2017-01-01 RX ADMIN — HYDRALAZINE HYDROCHLORIDE 50 MG: 25 TABLET ORAL at 08:14

## 2017-01-01 RX ADMIN — INSULIN ASPART 3 UNITS: 100 INJECTION, SOLUTION INTRAVENOUS; SUBCUTANEOUS at 12:42

## 2017-01-01 RX ADMIN — CLONIDINE HYDROCHLORIDE 0.3 MG: 0.1 TABLET ORAL at 21:28

## 2017-01-01 RX ADMIN — POTASSIUM CHLORIDE 20 MEQ: 29.8 INJECTION, SOLUTION INTRAVENOUS at 19:33

## 2017-01-01 RX ADMIN — HYDRALAZINE HYDROCHLORIDE 50 MG: 25 TABLET ORAL at 17:04

## 2017-01-01 RX ADMIN — DIVALPROEX SODIUM 1500 MG: 500 TABLET, DELAYED RELEASE ORAL at 22:07

## 2017-01-01 RX ADMIN — VALPROIC ACID 1500 MG: 250 SOLUTION ORAL at 21:29

## 2017-01-01 RX ADMIN — HYDRALAZINE HYDROCHLORIDE 50 MG: 25 TABLET ORAL at 09:41

## 2017-01-01 RX ADMIN — OLANZAPINE 5 MG: 5 TABLET, FILM COATED ORAL at 20:07

## 2017-01-01 RX ADMIN — MICONAZOLE NITRATE: 2 POWDER TOPICAL at 21:00

## 2017-01-01 RX ADMIN — RISPERIDONE 0.5 MG: 0.5 TABLET, ORALLY DISINTEGRATING ORAL at 09:04

## 2017-01-01 RX ADMIN — DIVALPROEX SODIUM 500 MG: 500 TABLET, DELAYED RELEASE ORAL at 09:52

## 2017-01-01 RX ADMIN — AMILORIDE HYDROCHLORIDE 5 MG: 5 TABLET ORAL at 09:13

## 2017-01-01 RX ADMIN — DIVALPROEX SODIUM 500 MG: 500 TABLET, DELAYED RELEASE ORAL at 10:00

## 2017-01-01 RX ADMIN — CLONIDINE HYDROCHLORIDE 0.3 MG: 0.1 TABLET ORAL at 21:08

## 2017-01-01 RX ADMIN — DEXTROSE MONOHYDRATE: 50 INJECTION, SOLUTION INTRAVENOUS at 01:36

## 2017-01-01 RX ADMIN — OMEPRAZOLE 20 MG: 20 CAPSULE, DELAYED RELEASE ORAL at 07:54

## 2017-01-01 RX ADMIN — SODIUM CHLORIDE: 4.5 INJECTION, SOLUTION INTRAVENOUS at 23:40

## 2017-01-01 RX ADMIN — ACETAMINOPHEN 650 MG: 650 SUPPOSITORY RECTAL at 16:21

## 2017-01-01 RX ADMIN — ACETAMINOPHEN 975 MG: 325 TABLET, FILM COATED ORAL at 15:54

## 2017-01-01 RX ADMIN — DILTIAZEM HYDROCHLORIDE 120 MG: 60 CAPSULE, EXTENDED RELEASE ORAL at 21:25

## 2017-01-01 RX ADMIN — CLONIDINE HYDROCHLORIDE 0.2 MG: 0.2 TABLET ORAL at 21:47

## 2017-01-01 RX ADMIN — DOCUSATE SODIUM 100 MG: 100 CAPSULE, LIQUID FILLED ORAL at 09:08

## 2017-01-01 RX ADMIN — INSULIN ASPART 4 UNITS: 100 INJECTION, SOLUTION INTRAVENOUS; SUBCUTANEOUS at 12:26

## 2017-01-01 RX ADMIN — RISPERIDONE 1 MG: 1 TABLET, ORALLY DISINTEGRATING ORAL at 08:05

## 2017-01-01 RX ADMIN — VALPROATE SODIUM 500 MG: 100 INJECTION, SOLUTION INTRAVENOUS at 04:10

## 2017-01-01 RX ADMIN — AMILORIDE HYDROCHLORIDE 5 MG: 5 TABLET ORAL at 16:25

## 2017-01-01 RX ADMIN — HEPARIN SODIUM 5000 UNITS: 10000 INJECTION, SOLUTION INTRAVENOUS; SUBCUTANEOUS at 21:17

## 2017-01-01 RX ADMIN — CLONIDINE HYDROCHLORIDE 0.3 MG: 0.1 TABLET ORAL at 21:51

## 2017-01-01 RX ADMIN — ONDANSETRON 4 MG: 2 SOLUTION INTRAMUSCULAR; INTRAVENOUS at 10:19

## 2017-01-01 RX ADMIN — DIVALPROEX SODIUM 500 MG: 500 TABLET, DELAYED RELEASE ORAL at 09:16

## 2017-01-01 RX ADMIN — INSULIN GLARGINE 15 UNITS: 100 INJECTION, SOLUTION SUBCUTANEOUS at 22:59

## 2017-01-01 RX ADMIN — OLANZAPINE 5 MG: 5 TABLET, FILM COATED ORAL at 21:46

## 2017-01-01 RX ADMIN — CLONIDINE HYDROCHLORIDE 0.3 MG: 0.1 TABLET ORAL at 21:09

## 2017-01-01 RX ADMIN — METOPROLOL SUCCINATE 25 MG: 25 TABLET, EXTENDED RELEASE ORAL at 09:52

## 2017-01-01 RX ADMIN — LORAZEPAM 1 MG: 1 TABLET ORAL at 23:51

## 2017-01-01 RX ADMIN — SENNOSIDES AND DOCUSATE SODIUM 1 TABLET: 8.6; 5 TABLET ORAL at 20:22

## 2017-01-01 RX ADMIN — DOCUSATE SODIUM 100 MG: 100 CAPSULE, LIQUID FILLED ORAL at 09:28

## 2017-01-01 RX ADMIN — METOPROLOL TARTRATE 100 MG: 100 TABLET, FILM COATED ORAL at 22:06

## 2017-01-01 RX ADMIN — ACETAMINOPHEN 1000 MG: 500 TABLET, COATED ORAL at 17:00

## 2017-01-01 RX ADMIN — Medication 2.5 MG: at 21:52

## 2017-01-01 RX ADMIN — RISPERIDONE 1 MG: 1 TABLET ORAL at 22:03

## 2017-01-01 RX ADMIN — CLONIDINE HYDROCHLORIDE 0.3 MG: 0.1 TABLET ORAL at 21:53

## 2017-01-01 RX ADMIN — INSULIN GLARGINE 15 UNITS: 100 INJECTION, SOLUTION SUBCUTANEOUS at 18:53

## 2017-01-01 RX ADMIN — RISPERIDONE 1 MG: 1 TABLET, ORALLY DISINTEGRATING ORAL at 21:29

## 2017-01-01 RX ADMIN — OMEPRAZOLE 20 MG: 20 CAPSULE, DELAYED RELEASE ORAL at 16:08

## 2017-01-01 RX ADMIN — GLIPIZIDE 10 MG: 5 TABLET, FILM COATED, EXTENDED RELEASE ORAL at 06:54

## 2017-01-01 RX ADMIN — CLONIDINE HYDROCHLORIDE 0.3 MG: 0.1 TABLET ORAL at 22:10

## 2017-01-01 RX ADMIN — RISPERIDONE 1 MG: 1 TABLET, ORALLY DISINTEGRATING ORAL at 22:17

## 2017-01-01 RX ADMIN — DEXTROSE MONOHYDRATE: 50 INJECTION, SOLUTION INTRAVENOUS at 00:59

## 2017-01-01 RX ADMIN — SODIUM CHLORIDE, PRESERVATIVE FREE 10 ML: 5 INJECTION INTRAVENOUS at 19:04

## 2017-01-01 RX ADMIN — INSULIN ASPART 4 UNITS: 100 INJECTION, SOLUTION INTRAVENOUS; SUBCUTANEOUS at 18:03

## 2017-01-01 RX ADMIN — CLONIDINE HYDROCHLORIDE 0.3 MG: 0.1 TABLET ORAL at 21:59

## 2017-01-01 RX ADMIN — DILTIAZEM HYDROCHLORIDE 240 MG: 240 CAPSULE, EXTENDED RELEASE ORAL at 10:29

## 2017-01-01 RX ADMIN — HYDRALAZINE HYDROCHLORIDE 50 MG: 25 TABLET ORAL at 08:37

## 2017-01-01 RX ADMIN — INSULIN GLARGINE 25 UNITS: 100 INJECTION, SOLUTION SUBCUTANEOUS at 09:36

## 2017-01-01 RX ADMIN — LORAZEPAM 1 MG: 1 TABLET ORAL at 19:00

## 2017-01-01 RX ADMIN — VALPROIC ACID 500 MG: 250 SOLUTION ORAL at 10:48

## 2017-01-01 RX ADMIN — CEFTRIAXONE 2 G: 2 INJECTION, POWDER, FOR SOLUTION INTRAMUSCULAR; INTRAVENOUS at 09:47

## 2017-01-01 RX ADMIN — CLONIDINE HYDROCHLORIDE 0.3 MG: 0.1 TABLET ORAL at 08:09

## 2017-01-01 RX ADMIN — CLONIDINE HYDROCHLORIDE 0.3 MG: 0.1 TABLET ORAL at 21:12

## 2017-01-01 RX ADMIN — RISPERIDONE 1 MG: 1 TABLET, ORALLY DISINTEGRATING ORAL at 22:07

## 2017-01-01 RX ADMIN — CLONIDINE HYDROCHLORIDE 0.2 MG: 0.1 TABLET ORAL at 21:03

## 2017-01-01 RX ADMIN — AMLODIPINE BESYLATE 5 MG: 5 TABLET ORAL at 08:54

## 2017-01-01 RX ADMIN — POLYETHYLENE GLYCOL 3350 17 G: 17 POWDER, FOR SOLUTION ORAL at 09:17

## 2017-01-01 RX ADMIN — HYDRALAZINE HYDROCHLORIDE 25 MG: 25 TABLET ORAL at 18:18

## 2017-01-01 RX ADMIN — ACETAMINOPHEN 1000 MG: 500 TABLET, COATED ORAL at 16:27

## 2017-01-01 RX ADMIN — INSULIN HUMAN 20 UNITS: 100 INJECTION, SUSPENSION SUBCUTANEOUS at 17:51

## 2017-01-01 RX ADMIN — CLONIDINE HYDROCHLORIDE 0.3 MG: 0.1 TABLET ORAL at 09:14

## 2017-01-01 RX ADMIN — ACETAMINOPHEN 1000 MG: 500 TABLET, COATED ORAL at 21:06

## 2017-01-01 RX ADMIN — GLIPIZIDE 10 MG: 5 TABLET, FILM COATED, EXTENDED RELEASE ORAL at 17:46

## 2017-01-01 RX ADMIN — INSULIN HUMAN 20 UNITS: 100 INJECTION, SUSPENSION SUBCUTANEOUS at 18:29

## 2017-01-01 RX ADMIN — OMEPRAZOLE 20 MG: 20 CAPSULE, DELAYED RELEASE ORAL at 17:05

## 2017-01-01 RX ADMIN — DILTIAZEM HYDROCHLORIDE 240 MG: 240 CAPSULE, EXTENDED RELEASE ORAL at 08:55

## 2017-01-01 RX ADMIN — SODIUM BICARBONATE 650 MG TABLET 650 MG: at 21:50

## 2017-01-01 RX ADMIN — DILTIAZEM HYDROCHLORIDE 60 MG: 60 TABLET, FILM COATED ORAL at 18:31

## 2017-01-01 RX ADMIN — DILTIAZEM HYDROCHLORIDE 120 MG: 120 CAPSULE, EXTENDED RELEASE ORAL at 09:14

## 2017-01-01 RX ADMIN — FUROSEMIDE 20 MG: 20 TABLET ORAL at 08:30

## 2017-01-01 RX ADMIN — SODIUM BICARBONATE 650 MG TABLET 650 MG: at 09:50

## 2017-01-01 RX ADMIN — CLONIDINE HYDROCHLORIDE 0.2 MG: 0.1 TABLET ORAL at 20:52

## 2017-01-01 RX ADMIN — METOPROLOL TARTRATE 100 MG: 100 TABLET, FILM COATED ORAL at 08:54

## 2017-01-01 RX ADMIN — SODIUM BICARBONATE: 84 INJECTION, SOLUTION INTRAVENOUS at 11:00

## 2017-01-01 RX ADMIN — GLIPIZIDE 10 MG: 5 TABLET, FILM COATED, EXTENDED RELEASE ORAL at 08:00

## 2017-01-01 RX ADMIN — DIVALPROEX SODIUM 500 MG: 500 TABLET, DELAYED RELEASE ORAL at 09:28

## 2017-01-01 RX ADMIN — DOCUSATE SODIUM 100 MG: 100 CAPSULE, LIQUID FILLED ORAL at 21:47

## 2017-01-01 RX ADMIN — INSULIN ASPART 50 UNITS: 100 INJECTION, SUSPENSION SUBCUTANEOUS at 08:49

## 2017-01-01 RX ADMIN — RISPERIDONE 0.5 MG: 0.5 TABLET, ORALLY DISINTEGRATING ORAL at 14:38

## 2017-01-01 RX ADMIN — DOCUSATE SODIUM 100 MG: 100 CAPSULE, LIQUID FILLED ORAL at 20:52

## 2017-01-01 RX ADMIN — ACETAMINOPHEN 1000 MG: 500 TABLET, COATED ORAL at 08:51

## 2017-01-01 RX ADMIN — INSULIN GLARGINE 25 UNITS: 100 INJECTION, SOLUTION SUBCUTANEOUS at 09:31

## 2017-01-01 RX ADMIN — METOPROLOL TARTRATE 5 MG: 5 INJECTION INTRAVENOUS at 13:34

## 2017-01-01 RX ADMIN — ACETAMINOPHEN 1000 MG: 500 TABLET, COATED ORAL at 18:43

## 2017-01-01 RX ADMIN — DIVALPROEX SODIUM 1500 MG: 500 TABLET, DELAYED RELEASE ORAL at 20:06

## 2017-01-01 RX ADMIN — CLONIDINE HYDROCHLORIDE 0.3 MG: 0.1 TABLET ORAL at 20:24

## 2017-01-01 RX ADMIN — ISOSORBIDE MONONITRATE 30 MG: 30 TABLET, EXTENDED RELEASE ORAL at 12:36

## 2017-01-01 RX ADMIN — INSULIN ASPART 50 UNITS: 100 INJECTION, SUSPENSION SUBCUTANEOUS at 10:06

## 2017-01-01 RX ADMIN — ISOSORBIDE MONONITRATE 30 MG: 30 TABLET, EXTENDED RELEASE ORAL at 08:40

## 2017-01-01 RX ADMIN — DIVALPROEX SODIUM 1500 MG: 500 TABLET, DELAYED RELEASE ORAL at 23:29

## 2017-01-01 RX ADMIN — HYDRALAZINE HYDROCHLORIDE 25 MG: 25 TABLET ORAL at 18:19

## 2017-01-01 RX ADMIN — DILTIAZEM HYDROCHLORIDE 240 MG: 240 CAPSULE, EXTENDED RELEASE ORAL at 08:39

## 2017-01-01 RX ADMIN — INSULIN ASPART 50 UNITS: 100 INJECTION, SUSPENSION SUBCUTANEOUS at 18:09

## 2017-01-01 RX ADMIN — VANCOMYCIN HYDROCHLORIDE 1000 MG: 1 INJECTION, SOLUTION INTRAVENOUS at 16:01

## 2017-01-01 RX ADMIN — SODIUM CHLORIDE: 4.5 INJECTION, SOLUTION INTRAVENOUS at 09:26

## 2017-01-01 RX ADMIN — ACETAMINOPHEN 1000 MG: 500 TABLET, COATED ORAL at 16:59

## 2017-01-01 RX ADMIN — HYDRALAZINE HYDROCHLORIDE 50 MG: 25 TABLET ORAL at 07:55

## 2017-01-01 RX ADMIN — AMILORIDE HYDROCHLORIDE 5 MG: 5 TABLET ORAL at 09:04

## 2017-01-01 RX ADMIN — DIVALPROEX SODIUM 500 MG: 500 TABLET, DELAYED RELEASE ORAL at 09:42

## 2017-01-01 RX ADMIN — DILTIAZEM HYDROCHLORIDE 240 MG: 240 CAPSULE, EXTENDED RELEASE ORAL at 09:27

## 2017-01-01 RX ADMIN — METOPROLOL TARTRATE 100 MG: 100 TABLET, FILM COATED ORAL at 22:09

## 2017-01-01 RX ADMIN — INSULIN ASPART 5 UNITS: 100 INJECTION, SOLUTION INTRAVENOUS; SUBCUTANEOUS at 12:01

## 2017-01-01 RX ADMIN — PANTOPRAZOLE SODIUM 40 MG: 40 INJECTION, POWDER, FOR SOLUTION INTRAVENOUS at 06:34

## 2017-01-01 RX ADMIN — CLONIDINE HYDROCHLORIDE 0.3 MG: 0.1 TABLET ORAL at 10:29

## 2017-01-01 RX ADMIN — OLANZAPINE 5 MG: 5 TABLET, FILM COATED ORAL at 19:57

## 2017-01-01 RX ADMIN — METOPROLOL TARTRATE 5 MG: 5 INJECTION INTRAVENOUS at 20:14

## 2017-01-01 RX ADMIN — ACETAMINOPHEN 1000 MG: 500 TABLET, COATED ORAL at 16:19

## 2017-01-01 RX ADMIN — DILTIAZEM HYDROCHLORIDE 120 MG: 60 CAPSULE, EXTENDED RELEASE ORAL at 21:51

## 2017-01-01 RX ADMIN — HEPARIN SODIUM 5000 UNITS: 10000 INJECTION, SOLUTION INTRAVENOUS; SUBCUTANEOUS at 09:08

## 2017-01-01 RX ADMIN — METOPROLOL TARTRATE 5 MG: 5 INJECTION INTRAVENOUS at 09:33

## 2017-01-01 RX ADMIN — VITAMIN D, TAB 1000IU (100/BT) 1000 UNITS: 25 TAB at 17:05

## 2017-01-01 RX ADMIN — DIVALPROEX SODIUM 500 MG: 500 TABLET, DELAYED RELEASE ORAL at 08:58

## 2017-01-01 RX ADMIN — OLANZAPINE 5 MG: 5 TABLET, FILM COATED ORAL at 20:30

## 2017-01-01 RX ADMIN — METOPROLOL TARTRATE 5 MG: 5 INJECTION INTRAVENOUS at 01:53

## 2017-01-01 RX ADMIN — SODIUM CHLORIDE: 9 INJECTION, SOLUTION INTRAVENOUS at 08:32

## 2017-01-01 RX ADMIN — INSULIN GLARGINE 15 UNITS: 100 INJECTION, SOLUTION SUBCUTANEOUS at 08:31

## 2017-01-01 RX ADMIN — RISPERIDONE 1 MG: 1 TABLET ORAL at 21:14

## 2017-01-01 RX ADMIN — GLIPIZIDE 10 MG: 5 TABLET, FILM COATED, EXTENDED RELEASE ORAL at 16:32

## 2017-01-01 RX ADMIN — METOPROLOL SUCCINATE 25 MG: 25 TABLET, EXTENDED RELEASE ORAL at 08:53

## 2017-01-01 RX ADMIN — ACETAMINOPHEN 1000 MG: 500 TABLET, COATED ORAL at 16:47

## 2017-01-01 RX ADMIN — INSULIN HUMAN 20 UNITS: 100 INJECTION, SUSPENSION SUBCUTANEOUS at 17:13

## 2017-01-01 RX ADMIN — SODIUM CHLORIDE: 4.5 INJECTION, SOLUTION INTRAVENOUS at 10:25

## 2017-01-01 RX ADMIN — HYDRALAZINE HYDROCHLORIDE 50 MG: 25 TABLET ORAL at 18:43

## 2017-01-01 RX ADMIN — PANTOPRAZOLE SODIUM 40 MG: 40 INJECTION, POWDER, FOR SOLUTION INTRAVENOUS at 21:10

## 2017-01-01 RX ADMIN — ISOSORBIDE MONONITRATE 30 MG: 30 TABLET, EXTENDED RELEASE ORAL at 09:09

## 2017-01-01 RX ADMIN — DILTIAZEM HYDROCHLORIDE 240 MG: 240 CAPSULE, EXTENDED RELEASE ORAL at 09:09

## 2017-01-01 RX ADMIN — SODIUM CHLORIDE: 4.5 INJECTION, SOLUTION INTRAVENOUS at 02:25

## 2017-01-01 RX ADMIN — ACETAMINOPHEN 1000 MG: 500 TABLET, COATED ORAL at 07:55

## 2017-01-01 RX ADMIN — INSULIN ASPART 70 UNITS: 100 INJECTION, SUSPENSION SUBCUTANEOUS at 17:10

## 2017-01-01 RX ADMIN — POLYETHYLENE GLYCOL 3350 17 G: 17 POWDER, FOR SOLUTION ORAL at 10:46

## 2017-01-01 RX ADMIN — INSULIN ASPART 50 UNITS: 100 INJECTION, SUSPENSION SUBCUTANEOUS at 08:51

## 2017-01-01 RX ADMIN — DIVALPROEX SODIUM 500 MG: 125 CAPSULE, COATED PELLETS ORAL at 08:52

## 2017-01-01 RX ADMIN — VALPROIC ACID 1500 MG: 250 SOLUTION ORAL at 21:19

## 2017-01-01 RX ADMIN — VALPROATE SODIUM 500 MG: 100 INJECTION, SOLUTION INTRAVENOUS at 03:37

## 2017-01-01 RX ADMIN — RISPERIDONE 1 MG: 1 TABLET, ORALLY DISINTEGRATING ORAL at 21:15

## 2017-01-01 RX ADMIN — INSULIN ASPART 1 UNITS: 100 INJECTION, SOLUTION INTRAVENOUS; SUBCUTANEOUS at 10:08

## 2017-01-01 RX ADMIN — INSULIN ASPART 70 UNITS: 100 INJECTION, SUSPENSION SUBCUTANEOUS at 12:40

## 2017-01-01 RX ADMIN — AMLODIPINE BESYLATE 5 MG: 5 TABLET ORAL at 10:05

## 2017-01-01 RX ADMIN — DEXTROSE MONOHYDRATE: 50 INJECTION, SOLUTION INTRAVENOUS at 17:29

## 2017-01-01 RX ADMIN — GLIPIZIDE 10 MG: 5 TABLET, FILM COATED, EXTENDED RELEASE ORAL at 09:27

## 2017-01-01 RX ADMIN — MEROPENEM 500 MG: 500 INJECTION, POWDER, FOR SOLUTION INTRAVENOUS at 16:47

## 2017-01-01 RX ADMIN — OMEPRAZOLE 20 MG: 20 CAPSULE, DELAYED RELEASE ORAL at 09:44

## 2017-01-01 RX ADMIN — DIVALPROEX SODIUM 1500 MG: 500 TABLET, DELAYED RELEASE ORAL at 21:16

## 2017-01-01 RX ADMIN — DIVALPROEX SODIUM 1500 MG: 500 TABLET, DELAYED RELEASE ORAL at 22:18

## 2017-01-01 RX ADMIN — DOCUSATE SODIUM 100 MG: 100 CAPSULE, LIQUID FILLED ORAL at 21:00

## 2017-01-01 RX ADMIN — DILTIAZEM HYDROCHLORIDE 240 MG: 240 CAPSULE, EXTENDED RELEASE ORAL at 07:55

## 2017-01-01 RX ADMIN — OLANZAPINE 5 MG: 5 TABLET, FILM COATED ORAL at 21:52

## 2017-01-01 RX ADMIN — ISOSORBIDE MONONITRATE 30 MG: 30 TABLET, EXTENDED RELEASE ORAL at 08:05

## 2017-01-01 RX ADMIN — POLYETHYLENE GLYCOL 3350 17 G: 17 POWDER, FOR SOLUTION ORAL at 09:04

## 2017-01-01 RX ADMIN — INSULIN ASPART 50 UNITS: 100 INJECTION, SUSPENSION SUBCUTANEOUS at 17:47

## 2017-01-01 RX ADMIN — LEVOFLOXACIN 250 MG: 250 TABLET, FILM COATED ORAL at 08:47

## 2017-01-01 RX ADMIN — INSULIN ASPART 4 UNITS: 100 INJECTION, SOLUTION INTRAVENOUS; SUBCUTANEOUS at 09:39

## 2017-01-01 RX ADMIN — CLONIDINE HYDROCHLORIDE 0.2 MG: 0.1 TABLET ORAL at 23:32

## 2017-01-01 RX ADMIN — METOPROLOL TARTRATE 100 MG: 100 TABLET, FILM COATED ORAL at 08:50

## 2017-01-01 RX ADMIN — GLIPIZIDE 2.5 MG: 2.5 TABLET, FILM COATED, EXTENDED RELEASE ORAL at 15:58

## 2017-01-01 RX ADMIN — SODIUM BICARBONATE: 84 INJECTION, SOLUTION INTRAVENOUS at 17:30

## 2017-01-01 RX ADMIN — DIVALPROEX SODIUM 500 MG: 500 TABLET, DELAYED RELEASE ORAL at 08:10

## 2017-01-01 RX ADMIN — RISPERIDONE 0.5 MG: 1 TABLET, ORALLY DISINTEGRATING ORAL at 16:04

## 2017-01-01 RX ADMIN — CLONIDINE HYDROCHLORIDE 0.3 MG: 0.1 TABLET ORAL at 10:21

## 2017-01-01 RX ADMIN — OMEPRAZOLE 20 MG: 20 CAPSULE, DELAYED RELEASE ORAL at 08:37

## 2017-01-01 RX ADMIN — LORAZEPAM 1 MG: 1 TABLET ORAL at 11:18

## 2017-01-01 RX ADMIN — HYDRALAZINE HYDROCHLORIDE 10 MG: 20 INJECTION INTRAMUSCULAR; INTRAVENOUS at 06:30

## 2017-01-01 RX ADMIN — HEPARIN SODIUM 5000 UNITS: 10000 INJECTION, SOLUTION INTRAVENOUS; SUBCUTANEOUS at 21:15

## 2017-01-01 RX ADMIN — INSULIN ASPART 2 UNITS: 100 INJECTION, SOLUTION INTRAVENOUS; SUBCUTANEOUS at 17:45

## 2017-01-01 RX ADMIN — ACETAMINOPHEN 1000 MG: 500 TABLET, COATED ORAL at 21:32

## 2017-01-01 RX ADMIN — OMEPRAZOLE 20 MG: 20 CAPSULE, DELAYED RELEASE ORAL at 16:35

## 2017-01-01 RX ADMIN — VITAMIN D, TAB 1000IU (100/BT) 1000 UNITS: 25 TAB at 17:22

## 2017-01-01 RX ADMIN — CLONIDINE HYDROCHLORIDE 0.3 MG: 0.1 TABLET ORAL at 21:10

## 2017-01-01 RX ADMIN — DIVALPROEX SODIUM 500 MG: 500 TABLET, DELAYED RELEASE ORAL at 09:15

## 2017-01-01 RX ADMIN — RISPERIDONE 1 MG: 1 TABLET, ORALLY DISINTEGRATING ORAL at 22:44

## 2017-01-01 RX ADMIN — OLANZAPINE 5 MG: 5 TABLET, FILM COATED ORAL at 21:17

## 2017-01-01 RX ADMIN — POLYETHYLENE GLYCOL 3350 17 G: 17 POWDER, FOR SOLUTION ORAL at 23:28

## 2017-01-01 RX ADMIN — ISOSORBIDE MONONITRATE 30 MG: 30 TABLET, EXTENDED RELEASE ORAL at 10:51

## 2017-01-01 RX ADMIN — DEXTROSE MONOHYDRATE: 50 INJECTION, SOLUTION INTRAVENOUS at 21:24

## 2017-01-01 RX ADMIN — INSULIN ASPART 3 UNITS: 100 INJECTION, SOLUTION INTRAVENOUS; SUBCUTANEOUS at 13:12

## 2017-01-01 RX ADMIN — OLANZAPINE 5 MG: 5 TABLET, FILM COATED ORAL at 21:20

## 2017-01-01 RX ADMIN — DOCUSATE SODIUM 100 MG: 100 CAPSULE, LIQUID FILLED ORAL at 21:32

## 2017-01-01 RX ADMIN — SODIUM CHLORIDE, PRESERVATIVE FREE 5 ML: 5 INJECTION INTRAVENOUS at 06:19

## 2017-01-01 RX ADMIN — PANTOPRAZOLE SODIUM 40 MG: 40 INJECTION, POWDER, FOR SOLUTION INTRAVENOUS at 08:06

## 2017-01-01 RX ADMIN — HEPARIN SODIUM 5000 UNITS: 5000 INJECTION, SOLUTION INTRAVENOUS; SUBCUTANEOUS at 23:11

## 2017-01-01 RX ADMIN — CLONIDINE HYDROCHLORIDE 0.3 MG: 0.1 TABLET ORAL at 22:07

## 2017-01-01 RX ADMIN — CLONIDINE HYDROCHLORIDE 0.3 MG: 0.1 TABLET ORAL at 21:52

## 2017-01-01 RX ADMIN — ACETAMINOPHEN 1000 MG: 500 TABLET, COATED ORAL at 16:04

## 2017-01-01 RX ADMIN — POLYETHYLENE GLYCOL 3350 17 G: 17 POWDER, FOR SOLUTION ORAL at 10:05

## 2017-01-01 RX ADMIN — VALPROIC ACID 500 MG: 250 SOLUTION ORAL at 08:46

## 2017-01-01 RX ADMIN — GLIPIZIDE 2.5 MG: 2.5 TABLET, FILM COATED, EXTENDED RELEASE ORAL at 16:30

## 2017-01-01 RX ADMIN — OMEPRAZOLE 20 MG: 20 CAPSULE, DELAYED RELEASE ORAL at 18:43

## 2017-01-01 RX ADMIN — METOPROLOL SUCCINATE 100 MG: 100 TABLET, EXTENDED RELEASE ORAL at 08:00

## 2017-01-01 RX ADMIN — ACETAMINOPHEN 1000 MG: 500 TABLET, COATED ORAL at 09:53

## 2017-01-01 RX ADMIN — ACETAMINOPHEN 1000 MG: 500 TABLET, COATED ORAL at 10:12

## 2017-01-01 RX ADMIN — ACETAZOLAMIDE 500 MG: 500 CAPSULE, EXTENDED RELEASE ORAL at 09:18

## 2017-01-01 RX ADMIN — INSULIN ASPART 50 UNITS: 100 INJECTION, SUSPENSION SUBCUTANEOUS at 08:26

## 2017-01-01 RX ADMIN — ACETAMINOPHEN 1000 MG: 500 TABLET, COATED ORAL at 09:05

## 2017-01-01 RX ADMIN — RISPERIDONE 1 MG: 1 TABLET, ORALLY DISINTEGRATING ORAL at 08:51

## 2017-01-01 RX ADMIN — DIVALPROEX SODIUM 500 MG: 500 TABLET, DELAYED RELEASE ORAL at 08:24

## 2017-01-01 RX ADMIN — METOPROLOL TARTRATE 5 MG: 5 INJECTION INTRAVENOUS at 21:25

## 2017-01-01 RX ADMIN — ISOSORBIDE MONONITRATE 30 MG: 30 TABLET, EXTENDED RELEASE ORAL at 10:00

## 2017-01-01 RX ADMIN — SODIUM CHLORIDE, PRESERVATIVE FREE 5 ML: 5 INJECTION INTRAVENOUS at 19:04

## 2017-01-01 RX ADMIN — LORAZEPAM 1 MG: 1 TABLET ORAL at 13:25

## 2017-01-01 RX ADMIN — OMEPRAZOLE 20 MG: 20 CAPSULE, DELAYED RELEASE ORAL at 16:05

## 2017-01-01 RX ADMIN — ACETAMINOPHEN 1000 MG: 500 TABLET, COATED ORAL at 08:04

## 2017-01-01 RX ADMIN — DIVALPROEX SODIUM 1500 MG: 500 TABLET, DELAYED RELEASE ORAL at 21:38

## 2017-01-01 RX ADMIN — DIVALPROEX SODIUM 1500 MG: 500 TABLET, DELAYED RELEASE ORAL at 22:26

## 2017-01-01 RX ADMIN — METOPROLOL TARTRATE 5 MG: 5 INJECTION INTRAVENOUS at 08:13

## 2017-01-01 RX ADMIN — DILTIAZEM HYDROCHLORIDE 120 MG: 120 CAPSULE, EXTENDED RELEASE ORAL at 08:31

## 2017-01-01 RX ADMIN — ACETAMINOPHEN 975 MG: 325 TABLET, FILM COATED ORAL at 21:17

## 2017-01-01 RX ADMIN — ONDANSETRON 4 MG: 2 SOLUTION INTRAMUSCULAR; INTRAVENOUS at 18:00

## 2017-01-01 RX ADMIN — DIVALPROEX SODIUM 1500 MG: 500 TABLET, DELAYED RELEASE ORAL at 22:12

## 2017-01-01 RX ADMIN — VITAMIN D, TAB 1000IU (100/BT) 1000 UNITS: 25 TAB at 16:27

## 2017-01-01 RX ADMIN — METOPROLOL TARTRATE 5 MG: 5 INJECTION INTRAVENOUS at 04:17

## 2017-01-01 RX ADMIN — METOPROLOL TARTRATE 100 MG: 100 TABLET, FILM COATED ORAL at 10:11

## 2017-01-01 RX ADMIN — INSULIN ASPART 5 UNITS: 100 INJECTION, SOLUTION INTRAVENOUS; SUBCUTANEOUS at 02:37

## 2017-01-01 RX ADMIN — METOPROLOL SUCCINATE 25 MG: 25 TABLET, EXTENDED RELEASE ORAL at 08:47

## 2017-01-01 RX ADMIN — LORAZEPAM 1 MG: 1 TABLET ORAL at 09:38

## 2017-01-01 RX ADMIN — ACETAMINOPHEN 975 MG: 325 TABLET, FILM COATED ORAL at 15:56

## 2017-01-01 RX ADMIN — HYDRALAZINE HYDROCHLORIDE 50 MG: 25 TABLET ORAL at 15:06

## 2017-01-01 RX ADMIN — POLYETHYLENE GLYCOL 3350 17 G: 17 POWDER, FOR SOLUTION ORAL at 09:33

## 2017-01-01 RX ADMIN — INSULIN ASPART 2 UNITS: 100 INJECTION, SOLUTION INTRAVENOUS; SUBCUTANEOUS at 18:00

## 2017-01-01 RX ADMIN — HEPARIN SODIUM 5000 UNITS: 10000 INJECTION, SOLUTION INTRAVENOUS; SUBCUTANEOUS at 10:47

## 2017-01-01 RX ADMIN — VITAMIN D, TAB 1000IU (100/BT) 1000 UNITS: 25 TAB at 16:35

## 2017-01-01 RX ADMIN — LEVOFLOXACIN 250 MG: 250 TABLET, FILM COATED ORAL at 08:02

## 2017-01-01 RX ADMIN — POTASSIUM CHLORIDE 20 MEQ: 29.8 INJECTION, SOLUTION INTRAVENOUS at 11:36

## 2017-01-01 RX ADMIN — SENNOSIDES AND DOCUSATE SODIUM 1 TABLET: 8.6; 5 TABLET ORAL at 21:01

## 2017-01-01 RX ADMIN — HYDRALAZINE HYDROCHLORIDE 50 MG: 25 TABLET ORAL at 22:11

## 2017-01-01 RX ADMIN — INSULIN ASPART 3 UNITS: 100 INJECTION, SOLUTION INTRAVENOUS; SUBCUTANEOUS at 17:26

## 2017-01-01 RX ADMIN — DOCUSATE SODIUM 100 MG: 100 CAPSULE, LIQUID FILLED ORAL at 22:44

## 2017-01-01 RX ADMIN — VALPROIC ACID 500 MG: 250 SOLUTION ORAL at 08:06

## 2017-01-01 RX ADMIN — ISOSORBIDE MONONITRATE 30 MG: 30 TABLET, EXTENDED RELEASE ORAL at 10:13

## 2017-01-01 RX ADMIN — VALPROIC ACID 500 MG: 250 SOLUTION ORAL at 09:37

## 2017-01-01 RX ADMIN — CLONIDINE HYDROCHLORIDE 0.2 MG: 0.1 TABLET ORAL at 09:15

## 2017-01-01 RX ADMIN — DILTIAZEM HYDROCHLORIDE 240 MG: 240 CAPSULE, EXTENDED RELEASE ORAL at 08:38

## 2017-01-01 RX ADMIN — DIVALPROEX SODIUM 500 MG: 500 TABLET, DELAYED RELEASE ORAL at 10:29

## 2017-01-01 RX ADMIN — ISOSORBIDE MONONITRATE 30 MG: 30 TABLET, EXTENDED RELEASE ORAL at 08:04

## 2017-01-01 RX ADMIN — METOPROLOL TARTRATE 5 MG: 5 INJECTION INTRAVENOUS at 03:12

## 2017-01-01 RX ADMIN — ISOSORBIDE MONONITRATE 30 MG: 30 TABLET, EXTENDED RELEASE ORAL at 09:18

## 2017-01-01 RX ADMIN — ACETAMINOPHEN 650 MG: 325 TABLET, FILM COATED ORAL at 15:31

## 2017-01-01 RX ADMIN — POLYETHYLENE GLYCOL 3350 17 G: 17 POWDER, FOR SOLUTION ORAL at 21:08

## 2017-01-01 RX ADMIN — VALPROIC ACID 500 MG: 250 SOLUTION ORAL at 09:16

## 2017-01-01 RX ADMIN — RISPERIDONE 1 MG: 1 TABLET, ORALLY DISINTEGRATING ORAL at 08:18

## 2017-01-01 RX ADMIN — METOPROLOL TARTRATE 100 MG: 100 TABLET, FILM COATED ORAL at 21:53

## 2017-01-01 RX ADMIN — CLONIDINE HYDROCHLORIDE 0.3 MG: 0.1 TABLET ORAL at 08:02

## 2017-01-01 RX ADMIN — LEVOFLOXACIN 250 MG: 250 TABLET, FILM COATED ORAL at 08:28

## 2017-01-01 RX ADMIN — CLONIDINE 1 PATCH: 0.1 PATCH TRANSDERMAL at 17:48

## 2017-01-01 RX ADMIN — POLYETHYLENE GLYCOL 3350 17 G: 17 POWDER, FOR SOLUTION ORAL at 09:26

## 2017-01-01 RX ADMIN — MULTIVITAMIN 15 ML: LIQUID ORAL at 08:16

## 2017-01-01 RX ADMIN — VITAMIN D, TAB 1000IU (100/BT) 1000 UNITS: 25 TAB at 16:19

## 2017-01-01 RX ADMIN — HYDRALAZINE HYDROCHLORIDE 25 MG: 25 TABLET ORAL at 21:23

## 2017-01-01 RX ADMIN — DOCUSATE SODIUM 100 MG: 100 CAPSULE, LIQUID FILLED ORAL at 10:51

## 2017-01-01 RX ADMIN — RISPERIDONE 1 MG: 1 TABLET, ORALLY DISINTEGRATING ORAL at 10:30

## 2017-01-01 RX ADMIN — HEPARIN SODIUM 5000 UNITS: 10000 INJECTION, SOLUTION INTRAVENOUS; SUBCUTANEOUS at 21:01

## 2017-01-01 RX ADMIN — CALCIUM GLUCONATE 1 G: 94 INJECTION, SOLUTION INTRAVENOUS at 19:27

## 2017-01-01 RX ADMIN — HYDRALAZINE HYDROCHLORIDE 10 MG: 20 INJECTION INTRAMUSCULAR; INTRAVENOUS at 09:06

## 2017-01-01 RX ADMIN — ACETAMINOPHEN 1000 MG: 500 TABLET, COATED ORAL at 00:24

## 2017-01-01 RX ADMIN — CLONIDINE HYDROCHLORIDE 0.2 MG: 0.1 TABLET ORAL at 09:41

## 2017-01-01 RX ADMIN — SODIUM CHLORIDE 1000 ML: 9 INJECTION, SOLUTION INTRAVENOUS at 17:19

## 2017-01-01 RX ADMIN — DOCUSATE SODIUM 100 MG: 100 CAPSULE, LIQUID FILLED ORAL at 21:42

## 2017-01-01 RX ADMIN — METOPROLOL TARTRATE 100 MG: 100 TABLET, FILM COATED ORAL at 08:46

## 2017-01-01 RX ADMIN — ACETAMINOPHEN 1000 MG: 500 TABLET, COATED ORAL at 21:00

## 2017-01-01 RX ADMIN — INSULIN GLARGINE 25 UNITS: 100 INJECTION, SOLUTION SUBCUTANEOUS at 11:51

## 2017-01-01 RX ADMIN — DOCUSATE SODIUM 100 MG: 100 CAPSULE, LIQUID FILLED ORAL at 08:14

## 2017-01-01 RX ADMIN — ACETAMINOPHEN 1000 MG: 500 TABLET, COATED ORAL at 22:18

## 2017-01-01 RX ADMIN — CLONIDINE HYDROCHLORIDE 0.2 MG: 0.1 TABLET ORAL at 07:55

## 2017-01-01 RX ADMIN — SODIUM CHLORIDE, PRESERVATIVE FREE 5 ML: 5 INJECTION INTRAVENOUS at 06:01

## 2017-01-01 RX ADMIN — INSULIN ASPART 5 UNITS: 100 INJECTION, SOLUTION INTRAVENOUS; SUBCUTANEOUS at 12:17

## 2017-01-01 RX ADMIN — HYDRALAZINE HYDROCHLORIDE 50 MG: 25 TABLET ORAL at 08:39

## 2017-01-01 RX ADMIN — LORAZEPAM 1 MG: 1 TABLET ORAL at 06:50

## 2017-01-01 RX ADMIN — DILTIAZEM HYDROCHLORIDE 240 MG: 240 CAPSULE, EXTENDED RELEASE ORAL at 09:00

## 2017-01-01 RX ADMIN — DIVALPROEX SODIUM 1500 MG: 125 CAPSULE, COATED PELLETS ORAL at 23:01

## 2017-01-01 RX ADMIN — VITAMIN D, TAB 1000IU (100/BT) 1000 UNITS: 25 TAB at 17:18

## 2017-01-01 RX ADMIN — INSULIN GLARGINE 10 UNITS: 100 INJECTION, SOLUTION SUBCUTANEOUS at 10:53

## 2017-01-01 RX ADMIN — HYDRALAZINE HYDROCHLORIDE 25 MG: 25 TABLET ORAL at 21:52

## 2017-01-01 RX ADMIN — AMILORIDE HYDROCHLORIDE 5 MG: 5 TABLET ORAL at 14:06

## 2017-01-01 RX ADMIN — DIVALPROEX SODIUM 500 MG: 500 TABLET, DELAYED RELEASE ORAL at 08:55

## 2017-01-01 RX ADMIN — INSULIN ASPART 4 UNITS: 100 INJECTION, SOLUTION INTRAVENOUS; SUBCUTANEOUS at 07:44

## 2017-01-01 RX ADMIN — CEFTRIAXONE 1 G: 1 INJECTION, POWDER, FOR SOLUTION INTRAMUSCULAR; INTRAVENOUS at 22:27

## 2017-01-01 RX ADMIN — OMEPRAZOLE 20 MG: 20 CAPSULE, DELAYED RELEASE ORAL at 09:09

## 2017-01-01 RX ADMIN — SODIUM CHLORIDE 500 ML: 9 INJECTION, SOLUTION INTRAVENOUS at 07:45

## 2017-01-01 RX ADMIN — HYDRALAZINE HYDROCHLORIDE 25 MG: 25 TABLET ORAL at 10:00

## 2017-01-01 RX ADMIN — ACETAMINOPHEN 975 MG: 325 TABLET, FILM COATED ORAL at 21:12

## 2017-01-01 RX ADMIN — ACETAMINOPHEN 1000 MG: 500 TABLET, COATED ORAL at 22:04

## 2017-01-01 RX ADMIN — DOCUSATE SODIUM 100 MG: 100 CAPSULE, LIQUID FILLED ORAL at 09:33

## 2017-01-01 RX ADMIN — INSULIN HUMAN 30 UNITS: 100 INJECTION, SUSPENSION SUBCUTANEOUS at 21:22

## 2017-01-01 RX ADMIN — RISPERIDONE 1 MG: 1 TABLET, ORALLY DISINTEGRATING ORAL at 21:11

## 2017-01-01 RX ADMIN — ACETAMINOPHEN 1000 MG: 500 TABLET, COATED ORAL at 21:03

## 2017-01-01 RX ADMIN — ACETAMINOPHEN 1000 MG: 500 TABLET, COATED ORAL at 21:09

## 2017-01-01 RX ADMIN — DIVALPROEX SODIUM 500 MG: 500 TABLET, DELAYED RELEASE ORAL at 08:38

## 2017-01-01 RX ADMIN — POLYETHYLENE GLYCOL 3350 17 G: 17 POWDER, FOR SOLUTION ORAL at 22:51

## 2017-01-01 RX ADMIN — LORAZEPAM 1 MG: 1 TABLET ORAL at 06:34

## 2017-01-01 RX ADMIN — HEPARIN SODIUM 5000 UNITS: 5000 INJECTION, SOLUTION INTRAVENOUS; SUBCUTANEOUS at 09:56

## 2017-01-01 RX ADMIN — GLIPIZIDE 10 MG: 5 TABLET, FILM COATED, EXTENDED RELEASE ORAL at 06:31

## 2017-01-01 RX ADMIN — METOPROLOL TARTRATE 100 MG: 100 TABLET, FILM COATED ORAL at 08:56

## 2017-01-01 RX ADMIN — CLONIDINE HYDROCHLORIDE 0.3 MG: 0.1 TABLET ORAL at 08:30

## 2017-01-01 RX ADMIN — GLIPIZIDE 10 MG: 5 TABLET, FILM COATED, EXTENDED RELEASE ORAL at 06:34

## 2017-01-01 RX ADMIN — RISPERIDONE 1 MG: 1 TABLET, ORALLY DISINTEGRATING ORAL at 08:24

## 2017-01-01 RX ADMIN — CLONIDINE HYDROCHLORIDE 0.3 MG: 0.1 TABLET ORAL at 09:05

## 2017-01-01 RX ADMIN — DILTIAZEM HYDROCHLORIDE 240 MG: 240 CAPSULE, EXTENDED RELEASE ORAL at 11:58

## 2017-01-01 RX ADMIN — INSULIN ASPART 2 UNITS: 100 INJECTION, SOLUTION INTRAVENOUS; SUBCUTANEOUS at 12:02

## 2017-01-01 RX ADMIN — SODIUM BICARBONATE: 84 INJECTION, SOLUTION INTRAVENOUS at 00:26

## 2017-01-01 RX ADMIN — HEPARIN SODIUM 5000 UNITS: 5000 INJECTION, SOLUTION INTRAVENOUS; SUBCUTANEOUS at 21:47

## 2017-01-01 RX ADMIN — METOPROLOL SUCCINATE 25 MG: 25 TABLET, EXTENDED RELEASE ORAL at 08:50

## 2017-01-01 RX ADMIN — MEROPENEM 500 MG: 500 INJECTION, POWDER, FOR SOLUTION INTRAVENOUS at 16:00

## 2017-01-01 RX ADMIN — FLUCONAZOLE 150 MG: 150 TABLET ORAL at 17:01

## 2017-01-01 RX ADMIN — DILTIAZEM HYDROCHLORIDE 240 MG: 240 CAPSULE, EXTENDED RELEASE ORAL at 08:09

## 2017-01-01 RX ADMIN — DOCUSATE SODIUM 100 MG: 100 CAPSULE, LIQUID FILLED ORAL at 22:29

## 2017-01-01 RX ADMIN — DIVALPROEX SODIUM 500 MG: 500 TABLET, DELAYED RELEASE ORAL at 07:56

## 2017-01-01 RX ADMIN — DEXTROSE MONOHYDRATE: 50 INJECTION, SOLUTION INTRAVENOUS at 18:49

## 2017-01-01 RX ADMIN — VALPROATE SODIUM 500 MG: 100 INJECTION, SOLUTION INTRAVENOUS at 19:29

## 2017-01-01 RX ADMIN — DILTIAZEM HYDROCHLORIDE 240 MG: 240 CAPSULE, EXTENDED RELEASE ORAL at 08:14

## 2017-01-01 RX ADMIN — GLIPIZIDE 10 MG: 5 TABLET, FILM COATED, EXTENDED RELEASE ORAL at 09:00

## 2017-01-01 RX ADMIN — RISPERIDONE 1 MG: 1 TABLET, ORALLY DISINTEGRATING ORAL at 09:18

## 2017-01-01 RX ADMIN — METOPROLOL TARTRATE 5 MG: 5 INJECTION INTRAVENOUS at 09:00

## 2017-01-01 RX ADMIN — CLONIDINE HYDROCHLORIDE 0.2 MG: 0.2 TABLET ORAL at 09:52

## 2017-01-01 RX ADMIN — VITAMIN D, TAB 1000IU (100/BT) 1000 UNITS: 25 TAB at 17:02

## 2017-01-01 RX ADMIN — METOPROLOL TARTRATE 5 MG: 5 INJECTION INTRAVENOUS at 18:55

## 2017-01-01 RX ADMIN — RISPERIDONE 1 MG: 1 TABLET, ORALLY DISINTEGRATING ORAL at 21:06

## 2017-01-01 RX ADMIN — ACETAMINOPHEN 975 MG: 325 TABLET, FILM COATED ORAL at 08:11

## 2017-01-01 RX ADMIN — HYDRALAZINE HYDROCHLORIDE 25 MG: 25 TABLET ORAL at 08:05

## 2017-01-01 RX ADMIN — ISOSORBIDE MONONITRATE 30 MG: 30 TABLET, EXTENDED RELEASE ORAL at 17:05

## 2017-01-01 RX ADMIN — POLYETHYLENE GLYCOL 3350 17 G: 17 POWDER, FOR SOLUTION ORAL at 21:59

## 2017-01-01 RX ADMIN — DILTIAZEM HYDROCHLORIDE 240 MG: 240 CAPSULE, EXTENDED RELEASE ORAL at 09:58

## 2017-01-01 RX ADMIN — ACETAMINOPHEN 1000 MG: 500 TABLET, COATED ORAL at 08:50

## 2017-01-01 RX ADMIN — DOCUSATE SODIUM 100 MG: 100 CAPSULE, LIQUID FILLED ORAL at 10:07

## 2017-01-01 RX ADMIN — INSULIN ASPART 5 UNITS: 100 INJECTION, SOLUTION INTRAVENOUS; SUBCUTANEOUS at 18:28

## 2017-01-01 RX ADMIN — DILTIAZEM HYDROCHLORIDE 240 MG: 240 CAPSULE, EXTENDED RELEASE ORAL at 17:04

## 2017-01-01 RX ADMIN — DILTIAZEM HYDROCHLORIDE 240 MG: 240 CAPSULE, EXTENDED RELEASE ORAL at 10:11

## 2017-01-01 RX ADMIN — METOPROLOL SUCCINATE 100 MG: 100 TABLET, EXTENDED RELEASE ORAL at 14:23

## 2017-01-01 RX ADMIN — OLANZAPINE 5 MG: 5 TABLET, FILM COATED ORAL at 20:43

## 2017-01-01 RX ADMIN — VITAMIN D, TAB 1000IU (100/BT) 1000 UNITS: 25 TAB at 16:04

## 2017-01-01 RX ADMIN — DOCUSATE SODIUM 100 MG: 100 CAPSULE, LIQUID FILLED ORAL at 08:21

## 2017-01-01 RX ADMIN — HYDRALAZINE HYDROCHLORIDE 25 MG: 25 TABLET ORAL at 17:02

## 2017-01-01 RX ADMIN — OMEPRAZOLE 20 MG: 20 CAPSULE, DELAYED RELEASE ORAL at 16:49

## 2017-01-01 RX ADMIN — SODIUM BICARBONATE: 84 INJECTION, SOLUTION INTRAVENOUS at 03:41

## 2017-01-01 RX ADMIN — INSULIN HUMAN 30 UNITS: 100 INJECTION, SUSPENSION SUBCUTANEOUS at 22:34

## 2017-01-01 RX ADMIN — OMEPRAZOLE 20 MG: 20 CAPSULE, DELAYED RELEASE ORAL at 16:44

## 2017-01-01 RX ADMIN — LORAZEPAM 1 MG: 1 TABLET ORAL at 07:07

## 2017-01-01 RX ADMIN — LORAZEPAM 1 MG: 1 TABLET ORAL at 17:57

## 2017-01-01 RX ADMIN — DIVALPROEX SODIUM 1500 MG: 500 TABLET, DELAYED RELEASE ORAL at 20:44

## 2017-01-01 RX ADMIN — INSULIN ASPART 50 UNITS: 100 INJECTION, SUSPENSION SUBCUTANEOUS at 17:41

## 2017-01-01 RX ADMIN — MEROPENEM 500 MG: 500 INJECTION, POWDER, FOR SOLUTION INTRAVENOUS at 00:48

## 2017-01-01 RX ADMIN — POLYETHYLENE GLYCOL 3350 17 G: 17 POWDER, FOR SOLUTION ORAL at 21:19

## 2017-01-01 RX ADMIN — OMEPRAZOLE 20 MG: 20 CAPSULE, DELAYED RELEASE ORAL at 16:20

## 2017-01-01 RX ADMIN — VITAMIN D, TAB 1000IU (100/BT) 1000 UNITS: 25 TAB at 17:01

## 2017-01-01 RX ADMIN — SODIUM CHLORIDE 500 ML: 9 INJECTION, SOLUTION INTRAVENOUS at 20:43

## 2017-01-01 RX ADMIN — VALPROATE SODIUM 500 MG: 100 INJECTION, SOLUTION INTRAVENOUS at 19:52

## 2017-01-01 RX ADMIN — INSULIN ASPART 7 UNITS: 100 INJECTION, SOLUTION INTRAVENOUS; SUBCUTANEOUS at 14:24

## 2017-01-01 RX ADMIN — VALPROIC ACID 500 MG: 250 SOLUTION ORAL at 00:58

## 2017-01-01 RX ADMIN — DILTIAZEM HYDROCHLORIDE 60 MG: 60 TABLET, FILM COATED ORAL at 06:13

## 2017-01-01 RX ADMIN — INSULIN ASPART 3 UNITS: 100 INJECTION, SOLUTION INTRAVENOUS; SUBCUTANEOUS at 12:16

## 2017-01-01 RX ADMIN — DIVALPROEX SODIUM 500 MG: 500 TABLET, DELAYED RELEASE ORAL at 17:04

## 2017-01-01 RX ADMIN — RISPERIDONE 1 MG: 1 TABLET, ORALLY DISINTEGRATING ORAL at 21:04

## 2017-01-01 RX ADMIN — INSULIN ASPART 60 UNITS: 100 INJECTION, SUSPENSION SUBCUTANEOUS at 17:04

## 2017-01-01 RX ADMIN — HYDRALAZINE HYDROCHLORIDE 25 MG: 25 TABLET ORAL at 13:05

## 2017-01-01 RX ADMIN — RISPERIDONE 1 MG: 1 TABLET, ORALLY DISINTEGRATING ORAL at 21:47

## 2017-01-01 RX ADMIN — METOPROLOL TARTRATE 100 MG: 100 TABLET, FILM COATED ORAL at 07:54

## 2017-01-01 RX ADMIN — INSULIN ASPART 70 UNITS: 100 INJECTION, SUSPENSION SUBCUTANEOUS at 08:25

## 2017-01-01 RX ADMIN — METOPROLOL TARTRATE 100 MG: 100 TABLET, FILM COATED ORAL at 09:15

## 2017-01-01 RX ADMIN — ACETAMINOPHEN 1000 MG: 500 TABLET, COATED ORAL at 16:44

## 2017-01-01 RX ADMIN — DOCUSATE SODIUM 100 MG: 100 CAPSULE, LIQUID FILLED ORAL at 08:06

## 2017-01-01 RX ADMIN — ACETAMINOPHEN 1000 MG: 500 TABLET, COATED ORAL at 09:16

## 2017-01-01 RX ADMIN — DEXTROSE MONOHYDRATE: 50 INJECTION, SOLUTION INTRAVENOUS at 13:14

## 2017-01-01 RX ADMIN — CLONIDINE HYDROCHLORIDE 0.2 MG: 0.1 TABLET ORAL at 08:06

## 2017-01-01 RX ADMIN — POLYETHYLENE GLYCOL 3350 17 G: 17 POWDER, FOR SOLUTION ORAL at 21:06

## 2017-01-01 RX ADMIN — METOPROLOL TARTRATE 5 MG: 5 INJECTION INTRAVENOUS at 14:41

## 2017-01-01 RX ADMIN — INSULIN ASPART 60 UNITS: 100 INJECTION, SUSPENSION SUBCUTANEOUS at 17:59

## 2017-01-01 RX ADMIN — DEXTROSE AND SODIUM CHLORIDE: 5; 450 INJECTION, SOLUTION INTRAVENOUS at 07:58

## 2017-01-01 RX ADMIN — METOPROLOL SUCCINATE 100 MG: 100 TABLET, EXTENDED RELEASE ORAL at 08:02

## 2017-01-01 RX ADMIN — METOPROLOL TARTRATE 100 MG: 100 TABLET, FILM COATED ORAL at 21:20

## 2017-01-01 RX ADMIN — VALPROIC ACID 1500 MG: 250 SOLUTION ORAL at 21:21

## 2017-01-01 RX ADMIN — OMEPRAZOLE 20 MG: 20 CAPSULE, DELAYED RELEASE ORAL at 16:31

## 2017-01-01 RX ADMIN — DIVALPROEX SODIUM 500 MG: 500 TABLET, DELAYED RELEASE ORAL at 09:26

## 2017-01-01 RX ADMIN — HEPARIN SODIUM 5000 UNITS: 10000 INJECTION, SOLUTION INTRAVENOUS; SUBCUTANEOUS at 08:33

## 2017-01-01 RX ADMIN — HYDRALAZINE HYDROCHLORIDE 10 MG: 20 INJECTION INTRAMUSCULAR; INTRAVENOUS at 00:11

## 2017-01-01 RX ADMIN — CLONIDINE HYDROCHLORIDE 0.2 MG: 0.1 TABLET ORAL at 22:03

## 2017-01-01 RX ADMIN — CLONIDINE HYDROCHLORIDE 0.3 MG: 0.1 TABLET ORAL at 21:07

## 2017-01-01 RX ADMIN — INSULIN ASPART 2 UNITS: 100 INJECTION, SOLUTION INTRAVENOUS; SUBCUTANEOUS at 12:19

## 2017-01-01 RX ADMIN — ACETAZOLAMIDE 500 MG: 500 CAPSULE, EXTENDED RELEASE ORAL at 21:12

## 2017-01-01 RX ADMIN — INSULIN ASPART 50 UNITS: 100 INJECTION, SUSPENSION SUBCUTANEOUS at 08:23

## 2017-01-01 RX ADMIN — DIVALPROEX SODIUM 1500 MG: 500 TABLET, DELAYED RELEASE ORAL at 21:23

## 2017-01-01 RX ADMIN — SODIUM CHLORIDE: 9 INJECTION, SOLUTION INTRAVENOUS at 12:52

## 2017-01-01 RX ADMIN — HYDRALAZINE HYDROCHLORIDE 50 MG: 25 TABLET ORAL at 23:31

## 2017-01-01 RX ADMIN — INSULIN HUMAN 30 UNITS: 100 INJECTION, SUSPENSION SUBCUTANEOUS at 21:19

## 2017-01-01 RX ADMIN — SODIUM BICARBONATE: 84 INJECTION, SOLUTION INTRAVENOUS at 21:36

## 2017-01-01 RX ADMIN — METOPROLOL TARTRATE 5 MG: 5 INJECTION INTRAVENOUS at 01:30

## 2017-01-01 RX ADMIN — DOCUSATE SODIUM 100 MG: 100 CAPSULE, LIQUID FILLED ORAL at 08:25

## 2017-01-01 RX ADMIN — VALPROIC ACID 500 MG: 250 SOLUTION ORAL at 11:15

## 2017-01-01 RX ADMIN — HYDRALAZINE HYDROCHLORIDE 50 MG: 25 TABLET ORAL at 09:52

## 2017-01-01 RX ADMIN — CLONIDINE HYDROCHLORIDE 0.2 MG: 0.1 TABLET ORAL at 17:04

## 2017-01-01 RX ADMIN — DOCUSATE SODIUM 100 MG: 100 CAPSULE, LIQUID FILLED ORAL at 21:53

## 2017-01-01 RX ADMIN — CLONIDINE HYDROCHLORIDE 0.2 MG: 0.1 TABLET ORAL at 09:26

## 2017-01-01 RX ADMIN — DOCUSATE SODIUM 100 MG: 100 CAPSULE, LIQUID FILLED ORAL at 07:56

## 2017-01-01 RX ADMIN — METOPROLOL TARTRATE 100 MG: 100 TABLET, FILM COATED ORAL at 20:57

## 2017-01-01 RX ADMIN — AMILORIDE HYDROCHLORIDE 5 MG: 5 TABLET ORAL at 10:20

## 2017-01-01 RX ADMIN — GLIPIZIDE 2.5 MG: 2.5 TABLET, FILM COATED, EXTENDED RELEASE ORAL at 15:54

## 2017-01-01 RX ADMIN — RISPERIDONE 1 MG: 1 TABLET, ORALLY DISINTEGRATING ORAL at 22:12

## 2017-01-01 RX ADMIN — HYDRALAZINE HYDROCHLORIDE 25 MG: 25 TABLET ORAL at 17:44

## 2017-01-01 RX ADMIN — CLONIDINE HYDROCHLORIDE 0.3 MG: 0.1 TABLET ORAL at 21:00

## 2017-01-01 RX ADMIN — HYDROCHLOROTHIAZIDE 25 MG: 12.5 CAPSULE ORAL at 11:27

## 2017-01-01 RX ADMIN — MICONAZOLE NITRATE: 2 POWDER TOPICAL at 10:23

## 2017-01-01 RX ADMIN — AMLODIPINE BESYLATE 5 MG: 5 TABLET ORAL at 09:39

## 2017-01-01 RX ADMIN — ISOSORBIDE MONONITRATE 30 MG: 30 TABLET, EXTENDED RELEASE ORAL at 09:26

## 2017-01-01 RX ADMIN — DOCUSATE SODIUM 100 MG: 100 CAPSULE, LIQUID FILLED ORAL at 21:17

## 2017-01-01 RX ADMIN — VALPROATE SODIUM 500 MG: 100 INJECTION, SOLUTION INTRAVENOUS at 12:41

## 2017-01-01 RX ADMIN — ISOSORBIDE MONONITRATE 30 MG: 30 TABLET, EXTENDED RELEASE ORAL at 08:51

## 2017-01-01 RX ADMIN — OMEPRAZOLE 20 MG: 20 CAPSULE, DELAYED RELEASE ORAL at 17:08

## 2017-01-01 RX ADMIN — CLONIDINE HYDROCHLORIDE 0.3 MG: 0.1 TABLET ORAL at 11:01

## 2017-01-01 RX ADMIN — DILTIAZEM HYDROCHLORIDE 240 MG: 240 CAPSULE, EXTENDED RELEASE ORAL at 09:26

## 2017-01-01 RX ADMIN — INSULIN ASPART 40 UNITS: 100 INJECTION, SUSPENSION SUBCUTANEOUS at 09:22

## 2017-01-01 RX ADMIN — SODIUM CHLORIDE, PRESERVATIVE FREE 5 ML: 5 INJECTION INTRAVENOUS at 00:23

## 2017-01-01 RX ADMIN — OMEPRAZOLE 20 MG: 20 CAPSULE, DELAYED RELEASE ORAL at 07:07

## 2017-01-01 RX ADMIN — ONDANSETRON 4 MG: 4 TABLET, ORALLY DISINTEGRATING ORAL at 17:40

## 2017-01-01 RX ADMIN — INSULIN ASPART 5 UNITS: 100 INJECTION, SOLUTION INTRAVENOUS; SUBCUTANEOUS at 12:40

## 2017-01-01 RX ADMIN — ACETAMINOPHEN 650 MG: 325 TABLET, FILM COATED ORAL at 13:48

## 2017-01-01 RX ADMIN — OLANZAPINE 5 MG: 5 TABLET, FILM COATED ORAL at 23:31

## 2017-01-01 RX ADMIN — GLIPIZIDE 10 MG: 5 TABLET, FILM COATED, EXTENDED RELEASE ORAL at 08:57

## 2017-01-01 RX ADMIN — OMEPRAZOLE 20 MG: 20 CAPSULE, DELAYED RELEASE ORAL at 15:43

## 2017-01-01 RX ADMIN — RISPERIDONE 1 MG: 1 TABLET, ORALLY DISINTEGRATING ORAL at 20:57

## 2017-01-01 RX ADMIN — HYDRALAZINE HYDROCHLORIDE 50 MG: 25 TABLET ORAL at 19:43

## 2017-01-01 RX ADMIN — ACETAMINOPHEN 1000 MG: 500 TABLET, COATED ORAL at 20:44

## 2017-01-01 RX ADMIN — INSULIN ASPART 5 UNITS: 100 INJECTION, SOLUTION INTRAVENOUS; SUBCUTANEOUS at 13:51

## 2017-01-01 RX ADMIN — INSULIN ASPART 50 UNITS: 100 INJECTION, SUSPENSION SUBCUTANEOUS at 17:26

## 2017-01-01 RX ADMIN — DIVALPROEX SODIUM 1500 MG: 500 TABLET, DELAYED RELEASE ORAL at 21:10

## 2017-01-01 RX ADMIN — HEPARIN SODIUM 5000 UNITS: 5000 INJECTION, SOLUTION INTRAVENOUS; SUBCUTANEOUS at 12:10

## 2017-01-01 RX ADMIN — GLIPIZIDE 10 MG: 5 TABLET, FILM COATED, EXTENDED RELEASE ORAL at 17:09

## 2017-01-01 RX ADMIN — SODIUM CHLORIDE 1000 ML: 9 INJECTION, SOLUTION INTRAVENOUS at 15:38

## 2017-01-01 RX ADMIN — DIVALPROEX SODIUM 500 MG: 125 CAPSULE, COATED PELLETS ORAL at 10:06

## 2017-01-01 RX ADMIN — LORAZEPAM 1 MG: 1 TABLET ORAL at 19:40

## 2017-01-01 RX ADMIN — DOCUSATE SODIUM 100 MG: 100 CAPSULE, LIQUID FILLED ORAL at 21:20

## 2017-01-01 RX ADMIN — HYDRALAZINE HYDROCHLORIDE 25 MG: 25 TABLET ORAL at 13:52

## 2017-01-01 RX ADMIN — POLYETHYLENE GLYCOL 3350 17 G: 17 POWDER, FOR SOLUTION ORAL at 08:36

## 2017-01-01 RX ADMIN — SODIUM CHLORIDE 9 UNITS: 9 INJECTION, SOLUTION INTRAVENOUS at 19:48

## 2017-01-01 RX ADMIN — RISPERIDONE 1 MG: 1 TABLET, ORALLY DISINTEGRATING ORAL at 09:02

## 2017-01-01 RX ADMIN — INSULIN ASPART 1 UNITS: 100 INJECTION, SOLUTION INTRAVENOUS; SUBCUTANEOUS at 16:50

## 2017-01-01 RX ADMIN — HYDRALAZINE HYDROCHLORIDE 25 MG: 25 TABLET ORAL at 08:20

## 2017-01-01 RX ADMIN — ACETAMINOPHEN 1000 MG: 500 TABLET, COATED ORAL at 21:20

## 2017-01-01 RX ADMIN — RISPERIDONE 0.5 MG: 0.5 TABLET, ORALLY DISINTEGRATING ORAL at 09:25

## 2017-01-01 RX ADMIN — PANTOPRAZOLE SODIUM 40 MG: 40 INJECTION, POWDER, FOR SOLUTION INTRAVENOUS at 06:30

## 2017-01-01 RX ADMIN — INSULIN ASPART 60 UNITS: 100 INJECTION, SUSPENSION SUBCUTANEOUS at 08:37

## 2017-01-01 RX ADMIN — AMLODIPINE BESYLATE 5 MG: 5 TABLET ORAL at 08:49

## 2017-01-01 RX ADMIN — LORAZEPAM 1 MG: 1 TABLET ORAL at 08:51

## 2017-01-01 RX ADMIN — RISPERIDONE 1 MG: 1 TABLET, ORALLY DISINTEGRATING ORAL at 08:37

## 2017-01-01 RX ADMIN — LORAZEPAM 1 MG: 1 TABLET ORAL at 12:00

## 2017-01-01 RX ADMIN — RISPERIDONE 1 MG: 1 TABLET, ORALLY DISINTEGRATING ORAL at 22:11

## 2017-01-01 RX ADMIN — DIVALPROEX SODIUM 500 MG: 125 CAPSULE, COATED PELLETS ORAL at 08:26

## 2017-01-01 RX ADMIN — VITAMIN D, TAB 1000IU (100/BT) 1000 UNITS: 25 TAB at 16:59

## 2017-01-01 RX ADMIN — OMEPRAZOLE 20 MG: 20 CAPSULE, DELAYED RELEASE ORAL at 16:04

## 2017-01-01 RX ADMIN — METOPROLOL TARTRATE 100 MG: 100 TABLET, FILM COATED ORAL at 10:00

## 2017-01-01 RX ADMIN — DOCUSATE SODIUM 100 MG: 100 CAPSULE, LIQUID FILLED ORAL at 21:11

## 2017-01-01 RX ADMIN — METOPROLOL TARTRATE 100 MG: 100 TABLET, FILM COATED ORAL at 09:16

## 2017-01-01 RX ADMIN — VALPROIC ACID 1500 MG: 250 SOLUTION ORAL at 21:45

## 2017-01-01 RX ADMIN — VITAMIN D, TAB 1000IU (100/BT) 1000 UNITS: 25 TAB at 17:08

## 2017-01-01 RX ADMIN — CLONIDINE HYDROCHLORIDE 0.3 MG: 0.1 TABLET ORAL at 08:19

## 2017-01-01 RX ADMIN — POLYETHYLENE GLYCOL 3350 17 G: 17 POWDER, FOR SOLUTION ORAL at 21:38

## 2017-01-01 RX ADMIN — INSULIN ASPART 4 UNITS: 100 INJECTION, SOLUTION INTRAVENOUS; SUBCUTANEOUS at 12:17

## 2017-01-01 RX ADMIN — VALPROATE SODIUM 500 MG: 100 INJECTION, SOLUTION INTRAVENOUS at 04:30

## 2017-01-01 RX ADMIN — CLONIDINE HYDROCHLORIDE 0.2 MG: 0.2 TABLET ORAL at 09:39

## 2017-01-01 RX ADMIN — Medication 2.5 MG: at 21:17

## 2017-01-01 RX ADMIN — HEPARIN SODIUM 5000 UNITS: 5000 INJECTION, SOLUTION INTRAVENOUS; SUBCUTANEOUS at 21:12

## 2017-01-01 RX ADMIN — RISPERIDONE 1 MG: 1 TABLET, ORALLY DISINTEGRATING ORAL at 20:07

## 2017-01-01 RX ADMIN — OMEPRAZOLE 20 MG: 20 CAPSULE, DELAYED RELEASE ORAL at 16:28

## 2017-01-01 RX ADMIN — OMEPRAZOLE 20 MG: 20 CAPSULE, DELAYED RELEASE ORAL at 07:46

## 2017-01-01 RX ADMIN — DIVALPROEX SODIUM 1500 MG: 500 TABLET, DELAYED RELEASE ORAL at 21:46

## 2017-01-01 RX ADMIN — GLIPIZIDE 10 MG: 5 TABLET, FILM COATED, EXTENDED RELEASE ORAL at 06:57

## 2017-01-01 RX ADMIN — SODIUM BICARBONATE: 84 INJECTION, SOLUTION INTRAVENOUS at 00:16

## 2017-01-01 RX ADMIN — SODIUM CHLORIDE: 4.5 INJECTION, SOLUTION INTRAVENOUS at 11:49

## 2017-01-01 RX ADMIN — HYDRALAZINE HYDROCHLORIDE 25 MG: 25 TABLET ORAL at 17:42

## 2017-01-01 RX ADMIN — SODIUM CHLORIDE: 4.5 INJECTION, SOLUTION INTRAVENOUS at 22:30

## 2017-01-01 RX ADMIN — DOCUSATE SODIUM 100 MG: 100 CAPSULE, LIQUID FILLED ORAL at 20:58

## 2017-01-01 RX ADMIN — ACETAMINOPHEN 1000 MG: 500 TABLET, COATED ORAL at 22:11

## 2017-01-01 RX ADMIN — ACETAMINOPHEN 1000 MG: 500 TABLET, COATED ORAL at 21:42

## 2017-01-01 RX ADMIN — HYDRALAZINE HYDROCHLORIDE 25 MG: 25 TABLET ORAL at 18:09

## 2017-01-01 RX ADMIN — METOPROLOL TARTRATE 50 MG: 50 TABLET, FILM COATED ORAL at 21:35

## 2017-01-01 RX ADMIN — ACETAZOLAMIDE 500 MG: 500 CAPSULE, EXTENDED RELEASE ORAL at 09:08

## 2017-01-01 RX ADMIN — INSULIN ASPART 55 UNITS: 100 INJECTION, SUSPENSION SUBCUTANEOUS at 18:35

## 2017-01-01 RX ADMIN — LORAZEPAM 1 MG: 1 TABLET ORAL at 09:15

## 2017-01-01 RX ADMIN — HEPARIN SODIUM 5000 UNITS: 5000 INJECTION, SOLUTION INTRAVENOUS; SUBCUTANEOUS at 11:15

## 2017-01-01 RX ADMIN — VITAMIN D, TAB 1000IU (100/BT) 1000 UNITS: 25 TAB at 16:28

## 2017-01-01 RX ADMIN — ACETAMINOPHEN 975 MG: 325 TABLET, FILM COATED ORAL at 10:00

## 2017-01-01 RX ADMIN — SODIUM CHLORIDE, PRESERVATIVE FREE 5 ML: 5 INJECTION INTRAVENOUS at 17:52

## 2017-01-01 RX ADMIN — HYDRALAZINE HYDROCHLORIDE 25 MG: 25 TABLET ORAL at 12:23

## 2017-01-01 RX ADMIN — HYDRALAZINE HYDROCHLORIDE 25 MG: 25 TABLET ORAL at 21:53

## 2017-01-01 RX ADMIN — OMEPRAZOLE 20 MG: 20 CAPSULE, DELAYED RELEASE ORAL at 15:37

## 2017-01-01 RX ADMIN — INSULIN ASPART 50 UNITS: 100 INJECTION, SUSPENSION SUBCUTANEOUS at 16:27

## 2017-01-01 RX ADMIN — GLIPIZIDE 10 MG: 5 TABLET, FILM COATED, EXTENDED RELEASE ORAL at 07:07

## 2017-01-01 RX ADMIN — ACETAMINOPHEN 975 MG: 650 SUPPOSITORY RECTAL at 21:14

## 2017-01-01 RX ADMIN — DILTIAZEM HYDROCHLORIDE 240 MG: 240 CAPSULE, EXTENDED RELEASE ORAL at 09:15

## 2017-01-01 RX ADMIN — DOCUSATE SODIUM 100 MG: 100 CAPSULE, LIQUID FILLED ORAL at 08:27

## 2017-01-01 RX ADMIN — DOCUSATE SODIUM 100 MG: 100 CAPSULE, LIQUID FILLED ORAL at 10:00

## 2017-01-01 RX ADMIN — METOPROLOL TARTRATE 5 MG: 5 INJECTION INTRAVENOUS at 00:30

## 2017-01-01 RX ADMIN — POLYETHYLENE GLYCOL 3350 17 G: 17 POWDER, FOR SOLUTION ORAL at 13:28

## 2017-01-01 RX ADMIN — DIVALPROEX SODIUM 500 MG: 500 TABLET, DELAYED RELEASE ORAL at 09:01

## 2017-01-01 RX ADMIN — HEPARIN SODIUM 5000 UNITS: 5000 INJECTION, SOLUTION INTRAVENOUS; SUBCUTANEOUS at 09:46

## 2017-01-01 RX ADMIN — HYDRALAZINE HYDROCHLORIDE 50 MG: 25 TABLET ORAL at 22:21

## 2017-01-01 RX ADMIN — GLIPIZIDE 2.5 MG: 2.5 TABLET, FILM COATED, EXTENDED RELEASE ORAL at 17:08

## 2017-01-01 RX ADMIN — DOCUSATE SODIUM 100 MG: 100 CAPSULE, LIQUID FILLED ORAL at 09:52

## 2017-01-01 RX ADMIN — METOPROLOL TARTRATE 100 MG: 100 TABLET, FILM COATED ORAL at 21:26

## 2017-01-01 RX ADMIN — DOCUSATE SODIUM 100 MG: 100 CAPSULE, LIQUID FILLED ORAL at 08:39

## 2017-01-01 RX ADMIN — GLIPIZIDE 10 MG: 5 TABLET, FILM COATED, EXTENDED RELEASE ORAL at 08:30

## 2017-01-01 RX ADMIN — MICONAZOLE NITRATE: 2 POWDER TOPICAL at 10:49

## 2017-01-01 RX ADMIN — HYDRALAZINE HYDROCHLORIDE 25 MG: 25 TABLET ORAL at 09:03

## 2017-01-01 RX ADMIN — DEXTROSE MONOHYDRATE: 50 INJECTION, SOLUTION INTRAVENOUS at 14:30

## 2017-01-01 RX ADMIN — LORAZEPAM 1 MG: 1 TABLET ORAL at 23:04

## 2017-01-01 RX ADMIN — RISPERIDONE 0.5 MG: 0.5 TABLET, ORALLY DISINTEGRATING ORAL at 13:15

## 2017-01-01 RX ADMIN — ACETAMINOPHEN 975 MG: 325 TABLET, FILM COATED ORAL at 16:19

## 2017-01-01 RX ADMIN — METOPROLOL TARTRATE 100 MG: 100 TABLET, FILM COATED ORAL at 08:38

## 2017-01-01 RX ADMIN — ACETAMINOPHEN 1000 MG: 500 TABLET, COATED ORAL at 21:52

## 2017-01-01 RX ADMIN — FUROSEMIDE 20 MG: 20 TABLET ORAL at 08:58

## 2017-01-01 RX ADMIN — HYDRALAZINE HYDROCHLORIDE 25 MG: 25 TABLET ORAL at 12:19

## 2017-01-01 RX ADMIN — INSULIN ASPART 60 UNITS: 100 INJECTION, SUSPENSION SUBCUTANEOUS at 18:03

## 2017-01-01 RX ADMIN — ACETAMINOPHEN 975 MG: 325 TABLET, FILM COATED ORAL at 21:25

## 2017-01-01 RX ADMIN — HYDRALAZINE HYDROCHLORIDE 10 MG: 20 INJECTION INTRAMUSCULAR; INTRAVENOUS at 22:22

## 2017-01-01 RX ADMIN — INSULIN ASPART 50 UNITS: 100 INJECTION, SUSPENSION SUBCUTANEOUS at 07:54

## 2017-01-01 RX ADMIN — DEXTROSE MONOHYDRATE: 50 INJECTION, SOLUTION INTRAVENOUS at 06:40

## 2017-01-01 RX ADMIN — POLYETHYLENE GLYCOL 3350 17 G: 17 POWDER, FOR SOLUTION ORAL at 09:14

## 2017-01-01 RX ADMIN — OMEPRAZOLE 20 MG: 20 CAPSULE, DELAYED RELEASE ORAL at 09:12

## 2017-01-01 RX ADMIN — VALPROATE SODIUM 500 MG: 100 INJECTION, SOLUTION INTRAVENOUS at 03:41

## 2017-01-01 RX ADMIN — RISPERIDONE 1 MG: 1 TABLET, ORALLY DISINTEGRATING ORAL at 21:42

## 2017-01-01 RX ADMIN — OMEPRAZOLE 20 MG: 20 CAPSULE, DELAYED RELEASE ORAL at 17:48

## 2017-01-01 RX ADMIN — OMEPRAZOLE 20 MG: 20 CAPSULE, DELAYED RELEASE ORAL at 06:35

## 2017-01-01 RX ADMIN — MULTIVITAMIN 15 ML: LIQUID ORAL at 09:37

## 2017-01-01 RX ADMIN — CLONIDINE HYDROCHLORIDE 0.2 MG: 0.1 TABLET ORAL at 09:14

## 2017-01-01 RX ADMIN — GLIPIZIDE 10 MG: 5 TABLET, FILM COATED, EXTENDED RELEASE ORAL at 08:14

## 2017-01-01 RX ADMIN — RISPERIDONE 0.5 MG: 0.5 TABLET, ORALLY DISINTEGRATING ORAL at 09:13

## 2017-01-01 RX ADMIN — LORAZEPAM 0.5 MG: 0.5 TABLET ORAL at 11:53

## 2017-01-01 RX ADMIN — METOPROLOL TARTRATE 100 MG: 100 TABLET, FILM COATED ORAL at 08:24

## 2017-01-01 RX ADMIN — OMEPRAZOLE 20 MG: 20 CAPSULE, DELAYED RELEASE ORAL at 08:25

## 2017-01-01 RX ADMIN — DEXTROSE MONOHYDRATE: 50 INJECTION, SOLUTION INTRAVENOUS at 14:47

## 2017-01-01 RX ADMIN — ACETAMINOPHEN 1000 MG: 500 TABLET, COATED ORAL at 16:20

## 2017-01-01 RX ADMIN — SODIUM CHLORIDE, PRESERVATIVE FREE 5 ML: 5 INJECTION INTRAVENOUS at 05:36

## 2017-01-01 RX ADMIN — HYDRALAZINE HYDROCHLORIDE 50 MG: 25 TABLET ORAL at 12:18

## 2017-01-01 RX ADMIN — METOPROLOL SUCCINATE 25 MG: 25 TABLET, EXTENDED RELEASE ORAL at 09:09

## 2017-01-01 RX ADMIN — METOPROLOL TARTRATE 5 MG: 5 INJECTION INTRAVENOUS at 11:00

## 2017-01-01 RX ADMIN — INSULIN ASPART 1 UNITS: 100 INJECTION, SOLUTION INTRAVENOUS; SUBCUTANEOUS at 13:13

## 2017-01-01 RX ADMIN — INSULIN ASPART 50 UNITS: 100 INJECTION, SUSPENSION SUBCUTANEOUS at 16:49

## 2017-01-01 RX ADMIN — ACETAMINOPHEN 975 MG: 325 TABLET, FILM COATED ORAL at 22:29

## 2017-01-01 RX ADMIN — INSULIN ASPART 5 UNITS: 100 INJECTION, SOLUTION INTRAVENOUS; SUBCUTANEOUS at 16:27

## 2017-01-01 RX ADMIN — OMEPRAZOLE 20 MG: 20 CAPSULE, DELAYED RELEASE ORAL at 06:37

## 2017-01-01 RX ADMIN — VALPROIC ACID 500 MG: 250 SOLUTION ORAL at 08:38

## 2017-01-01 RX ADMIN — SODIUM BICARBONATE 650 MG TABLET 650 MG: at 08:26

## 2017-01-01 RX ADMIN — HYDRALAZINE HYDROCHLORIDE 50 MG: 25 TABLET ORAL at 20:44

## 2017-01-01 RX ADMIN — ACETAMINOPHEN 975 MG: 650 SUPPOSITORY RECTAL at 11:26

## 2017-01-01 RX ADMIN — OXYCODONE HYDROCHLORIDE 5 MG: 5 TABLET ORAL at 20:22

## 2017-01-01 RX ADMIN — OMEPRAZOLE 20 MG: 20 CAPSULE, DELAYED RELEASE ORAL at 10:13

## 2017-01-01 RX ADMIN — SODIUM CHLORIDE: 4.5 INJECTION, SOLUTION INTRAVENOUS at 14:01

## 2017-01-01 RX ADMIN — ACETAMINOPHEN 975 MG: 325 TABLET, FILM COATED ORAL at 08:04

## 2017-01-01 RX ADMIN — RISPERIDONE 1 MG: 1 TABLET, ORALLY DISINTEGRATING ORAL at 09:03

## 2017-01-01 RX ADMIN — OLANZAPINE 5 MG: 5 TABLET, FILM COATED ORAL at 21:29

## 2017-01-01 RX ADMIN — VALPROATE SODIUM 500 MG: 100 INJECTION, SOLUTION INTRAVENOUS at 13:01

## 2017-01-01 RX ADMIN — SODIUM CHLORIDE, PRESERVATIVE FREE 5 ML: 5 INJECTION INTRAVENOUS at 06:07

## 2017-01-01 RX ADMIN — METOPROLOL TARTRATE 5 MG: 5 INJECTION INTRAVENOUS at 01:32

## 2017-01-01 RX ADMIN — GLIPIZIDE 10 MG: 5 TABLET, FILM COATED, EXTENDED RELEASE ORAL at 17:25

## 2017-01-01 RX ADMIN — POLYETHYLENE GLYCOL 3350 17 G: 17 POWDER, FOR SOLUTION ORAL at 08:37

## 2017-01-01 RX ADMIN — OMEPRAZOLE 20 MG: 20 CAPSULE, DELAYED RELEASE ORAL at 08:23

## 2017-01-01 RX ADMIN — INSULIN ASPART 1 UNITS: 100 INJECTION, SOLUTION INTRAVENOUS; SUBCUTANEOUS at 19:00

## 2017-01-01 RX ADMIN — OLANZAPINE 5 MG: 5 TABLET, FILM COATED ORAL at 22:07

## 2017-01-01 RX ADMIN — INSULIN ASPART 1 UNITS: 100 INJECTION, SOLUTION INTRAVENOUS; SUBCUTANEOUS at 12:11

## 2017-01-01 RX ADMIN — POLYETHYLENE GLYCOL 3350 17 G: 17 POWDER, FOR SOLUTION ORAL at 22:06

## 2017-01-01 RX ADMIN — ACETAZOLAMIDE 500 MG: 500 CAPSULE, EXTENDED RELEASE ORAL at 19:29

## 2017-01-01 RX ADMIN — RISPERIDONE 1 MG: 1 TABLET, ORALLY DISINTEGRATING ORAL at 20:58

## 2017-01-01 RX ADMIN — ISOSORBIDE MONONITRATE 30 MG: 30 TABLET, EXTENDED RELEASE ORAL at 09:16

## 2017-01-01 RX ADMIN — ISOSORBIDE MONONITRATE 30 MG: 30 TABLET, EXTENDED RELEASE ORAL at 08:55

## 2017-01-01 RX ADMIN — METOPROLOL TARTRATE 5 MG: 5 INJECTION INTRAVENOUS at 09:30

## 2017-01-01 RX ADMIN — GLIPIZIDE 10 MG: 5 TABLET, FILM COATED, EXTENDED RELEASE ORAL at 17:18

## 2017-01-01 RX ADMIN — ISOSORBIDE MONONITRATE 30 MG: 30 TABLET, EXTENDED RELEASE ORAL at 09:15

## 2017-01-01 RX ADMIN — INSULIN ASPART 2 UNITS: 100 INJECTION, SOLUTION INTRAVENOUS; SUBCUTANEOUS at 18:54

## 2017-01-01 RX ADMIN — OMEPRAZOLE 20 MG: 20 CAPSULE, DELAYED RELEASE ORAL at 16:45

## 2017-01-01 RX ADMIN — ACETAMINOPHEN 975 MG: 325 TABLET, FILM COATED ORAL at 21:28

## 2017-01-01 RX ADMIN — CLONIDINE HYDROCHLORIDE 0.3 MG: 0.1 TABLET ORAL at 22:00

## 2017-01-01 RX ADMIN — DOCUSATE SODIUM 100 MG: 100 CAPSULE, LIQUID FILLED ORAL at 23:57

## 2017-01-01 RX ADMIN — RISPERIDONE 1 MG: 1 TABLET, ORALLY DISINTEGRATING ORAL at 21:33

## 2017-01-01 RX ADMIN — DIVALPROEX SODIUM 1500 MG: 500 TABLET, DELAYED RELEASE ORAL at 21:42

## 2017-01-01 RX ADMIN — CLONIDINE HYDROCHLORIDE 0.3 MG: 0.1 TABLET ORAL at 19:58

## 2017-01-01 RX ADMIN — ACETAMINOPHEN 1000 MG: 500 TABLET, COATED ORAL at 22:00

## 2017-01-01 RX ADMIN — ACETAMINOPHEN 1000 MG: 500 TABLET, COATED ORAL at 08:37

## 2017-01-01 RX ADMIN — INSULIN ASPART 55 UNITS: 100 INJECTION, SUSPENSION SUBCUTANEOUS at 08:41

## 2017-01-01 RX ADMIN — ACETAMINOPHEN 1000 MG: 500 TABLET, COATED ORAL at 15:37

## 2017-01-01 RX ADMIN — OMEPRAZOLE 20 MG: 20 CAPSULE, DELAYED RELEASE ORAL at 06:56

## 2017-01-01 RX ADMIN — ISOSORBIDE MONONITRATE 30 MG: 30 TABLET, EXTENDED RELEASE ORAL at 08:25

## 2017-01-01 RX ADMIN — HYDRALAZINE HYDROCHLORIDE 25 MG: 25 TABLET ORAL at 12:42

## 2017-01-01 RX ADMIN — MEROPENEM 500 MG: 500 INJECTION, POWDER, FOR SOLUTION INTRAVENOUS at 04:44

## 2017-01-01 RX ADMIN — CLONIDINE HYDROCHLORIDE 0.2 MG: 0.1 TABLET ORAL at 08:10

## 2017-01-01 RX ADMIN — Medication 2.5 MG: at 21:45

## 2017-01-01 RX ADMIN — ACETAMINOPHEN 975 MG: 325 TABLET, FILM COATED ORAL at 16:25

## 2017-01-01 RX ADMIN — POLYETHYLENE GLYCOL 3350 17 G: 17 POWDER, FOR SOLUTION ORAL at 09:25

## 2017-01-01 RX ADMIN — ISOSORBIDE MONONITRATE 30 MG: 30 TABLET, EXTENDED RELEASE ORAL at 08:09

## 2017-01-01 RX ADMIN — GLIPIZIDE 10 MG: 5 TABLET, FILM COATED, EXTENDED RELEASE ORAL at 17:08

## 2017-01-01 RX ADMIN — VALPROIC ACID 1500 MG: 250 SOLUTION ORAL at 21:27

## 2017-01-01 RX ADMIN — ACETAMINOPHEN 1000 MG: 500 TABLET, COATED ORAL at 21:23

## 2017-01-01 RX ADMIN — DIVALPROEX SODIUM 1500 MG: 500 TABLET, DELAYED RELEASE ORAL at 21:51

## 2017-01-01 RX ADMIN — VITAMIN D, TAB 1000IU (100/BT) 1000 UNITS: 25 TAB at 16:49

## 2017-01-01 RX ADMIN — DIVALPROEX SODIUM 500 MG: 125 CAPSULE, COATED PELLETS ORAL at 09:48

## 2017-01-01 RX ADMIN — DILTIAZEM HYDROCHLORIDE 120 MG: 120 CAPSULE, EXTENDED RELEASE ORAL at 08:04

## 2017-01-01 RX ADMIN — METOPROLOL TARTRATE 100 MG: 100 TABLET, FILM COATED ORAL at 17:05

## 2017-01-01 RX ADMIN — INSULIN ASPART 6 UNITS: 100 INJECTION, SOLUTION INTRAVENOUS; SUBCUTANEOUS at 19:09

## 2017-01-01 RX ADMIN — METOPROLOL TARTRATE 100 MG: 100 TABLET, FILM COATED ORAL at 08:51

## 2017-01-01 RX ADMIN — MEROPENEM 500 MG: 500 INJECTION, POWDER, FOR SOLUTION INTRAVENOUS at 15:39

## 2017-01-01 RX ADMIN — OXYCODONE HYDROCHLORIDE 5 MG: 5 TABLET ORAL at 08:05

## 2017-01-01 RX ADMIN — METOPROLOL TARTRATE 100 MG: 100 TABLET, FILM COATED ORAL at 21:00

## 2017-01-01 RX ADMIN — POLYETHYLENE GLYCOL 3350 17 G: 17 POWDER, FOR SOLUTION ORAL at 22:03

## 2017-01-01 RX ADMIN — MICONAZOLE NITRATE: 2 POWDER TOPICAL at 09:41

## 2017-01-01 RX ADMIN — INSULIN ASPART 5 UNITS: 100 INJECTION, SOLUTION INTRAVENOUS; SUBCUTANEOUS at 17:46

## 2017-01-01 RX ADMIN — HEPARIN SODIUM 5000 UNITS: 10000 INJECTION, SOLUTION INTRAVENOUS; SUBCUTANEOUS at 21:08

## 2017-01-01 RX ADMIN — ACETAZOLAMIDE 500 MG: 500 CAPSULE, EXTENDED RELEASE ORAL at 19:10

## 2017-01-01 RX ADMIN — HEPARIN SODIUM 5000 UNITS: 10000 INJECTION, SOLUTION INTRAVENOUS; SUBCUTANEOUS at 20:29

## 2017-01-01 RX ADMIN — DILTIAZEM HYDROCHLORIDE 240 MG: 240 CAPSULE, EXTENDED RELEASE ORAL at 08:25

## 2017-01-01 RX ADMIN — OMEPRAZOLE 20 MG: 20 CAPSULE, DELAYED RELEASE ORAL at 16:27

## 2017-01-01 RX ADMIN — INSULIN GLARGINE 25 UNITS: 100 INJECTION, SOLUTION SUBCUTANEOUS at 08:18

## 2017-01-01 RX ADMIN — OMEPRAZOLE 20 MG: 20 CAPSULE, DELAYED RELEASE ORAL at 07:56

## 2017-01-01 RX ADMIN — HEPARIN SODIUM 5000 UNITS: 5000 INJECTION, SOLUTION INTRAVENOUS; SUBCUTANEOUS at 08:45

## 2017-01-01 RX ADMIN — ISOSORBIDE MONONITRATE 30 MG: 30 TABLET, EXTENDED RELEASE ORAL at 09:33

## 2017-01-01 RX ADMIN — CLONIDINE HYDROCHLORIDE 0.3 MG: 0.1 TABLET ORAL at 08:50

## 2017-01-01 RX ADMIN — ACETAMINOPHEN 1000 MG: 500 TABLET, COATED ORAL at 22:26

## 2017-01-01 RX ADMIN — POLYETHYLENE GLYCOL 3350 17 G: 17 POWDER, FOR SOLUTION ORAL at 21:25

## 2017-01-01 RX ADMIN — DIVALPROEX SODIUM 1500 MG: 125 CAPSULE, COATED PELLETS ORAL at 21:44

## 2017-01-01 RX ADMIN — LORAZEPAM 1 MG: 1 TABLET ORAL at 21:04

## 2017-01-01 RX ADMIN — METOPROLOL TARTRATE 100 MG: 100 TABLET, FILM COATED ORAL at 21:09

## 2017-01-01 RX ADMIN — MEROPENEM 500 MG: 500 INJECTION, POWDER, FOR SOLUTION INTRAVENOUS at 12:14

## 2017-01-01 RX ADMIN — ACETAMINOPHEN 975 MG: 650 SUPPOSITORY RECTAL at 14:35

## 2017-01-01 RX ADMIN — Medication 2.5 MG: at 21:29

## 2017-01-01 RX ADMIN — HEPARIN SODIUM 5000 UNITS: 10000 INJECTION, SOLUTION INTRAVENOUS; SUBCUTANEOUS at 21:03

## 2017-01-01 RX ADMIN — HYDRALAZINE HYDROCHLORIDE 25 MG: 25 TABLET ORAL at 22:07

## 2017-01-01 RX ADMIN — HEPARIN SODIUM 5000 UNITS: 10000 INJECTION, SOLUTION INTRAVENOUS; SUBCUTANEOUS at 09:15

## 2017-01-01 RX ADMIN — METOPROLOL SUCCINATE 25 MG: 25 TABLET, EXTENDED RELEASE ORAL at 09:17

## 2017-01-01 RX ADMIN — INSULIN ASPART 50 UNITS: 100 INJECTION, SUSPENSION SUBCUTANEOUS at 08:39

## 2017-01-01 RX ADMIN — SODIUM CHLORIDE: 4.5 INJECTION, SOLUTION INTRAVENOUS at 16:32

## 2017-01-01 RX ADMIN — SODIUM CHLORIDE, PRESERVATIVE FREE 5 ML: 5 INJECTION INTRAVENOUS at 17:51

## 2017-01-01 RX ADMIN — GLIPIZIDE 10 MG: 5 TABLET, FILM COATED, EXTENDED RELEASE ORAL at 06:56

## 2017-01-01 RX ADMIN — METOPROLOL TARTRATE 5 MG: 5 INJECTION INTRAVENOUS at 07:51

## 2017-01-01 RX ADMIN — LORAZEPAM 1 MG: 1 TABLET ORAL at 08:44

## 2017-01-01 RX ADMIN — HEPARIN SODIUM 5000 UNITS: 5000 INJECTION, SOLUTION INTRAVENOUS; SUBCUTANEOUS at 21:49

## 2017-01-01 RX ADMIN — OLANZAPINE 5 MG: 5 TABLET, FILM COATED ORAL at 22:12

## 2017-01-01 RX ADMIN — INSULIN ASPART 60 UNITS: 100 INJECTION, SUSPENSION SUBCUTANEOUS at 08:58

## 2017-01-01 RX ADMIN — LORAZEPAM 1 MG: 1 TABLET ORAL at 19:04

## 2017-01-01 RX ADMIN — OMEPRAZOLE 20 MG: 20 CAPSULE, DELAYED RELEASE ORAL at 17:09

## 2017-01-01 RX ADMIN — HYDRALAZINE HYDROCHLORIDE 50 MG: 25 TABLET ORAL at 17:41

## 2017-01-01 RX ADMIN — HEPARIN SODIUM 5000 UNITS: 10000 INJECTION, SOLUTION INTRAVENOUS; SUBCUTANEOUS at 08:48

## 2017-01-01 RX ADMIN — ISOSORBIDE MONONITRATE 30 MG: 30 TABLET, EXTENDED RELEASE ORAL at 08:30

## 2017-01-01 RX ADMIN — VANCOMYCIN HYDROCHLORIDE 1000 MG: 1 INJECTION, SOLUTION INTRAVENOUS at 21:01

## 2017-01-01 RX ADMIN — ACETAMINOPHEN 1000 MG: 500 TABLET, COATED ORAL at 15:43

## 2017-01-01 RX ADMIN — POLYETHYLENE GLYCOL 3350 17 G: 17 POWDER, FOR SOLUTION ORAL at 08:31

## 2017-01-01 RX ADMIN — ISOSORBIDE MONONITRATE 30 MG: 30 TABLET, EXTENDED RELEASE ORAL at 08:39

## 2017-01-01 RX ADMIN — POLYETHYLENE GLYCOL 3350 17 G: 17 POWDER, FOR SOLUTION ORAL at 09:15

## 2017-01-01 RX ADMIN — ACETAZOLAMIDE 500 MG: 500 CAPSULE, EXTENDED RELEASE ORAL at 10:50

## 2017-01-01 RX ADMIN — POLYETHYLENE GLYCOL 3350 17 G: 17 POWDER, FOR SOLUTION ORAL at 08:10

## 2017-01-01 RX ADMIN — AMLODIPINE BESYLATE 5 MG: 5 TABLET ORAL at 16:16

## 2017-01-01 RX ADMIN — DIVALPROEX SODIUM 500 MG: 500 TABLET, DELAYED RELEASE ORAL at 08:25

## 2017-01-01 RX ADMIN — MEROPENEM 500 MG: 500 INJECTION, POWDER, FOR SOLUTION INTRAVENOUS at 13:44

## 2017-01-01 RX ADMIN — METOPROLOL TARTRATE 5 MG: 5 INJECTION INTRAVENOUS at 20:24

## 2017-01-01 RX ADMIN — DEXTROSE MONOHYDRATE: 50 INJECTION, SOLUTION INTRAVENOUS at 08:51

## 2017-01-01 RX ADMIN — ACETAMINOPHEN 1000 MG: 500 TABLET, COATED ORAL at 17:48

## 2017-01-01 RX ADMIN — OMEPRAZOLE 20 MG: 20 CAPSULE, DELAYED RELEASE ORAL at 09:42

## 2017-01-01 RX ADMIN — SODIUM CHLORIDE: 4.5 INJECTION, SOLUTION INTRAVENOUS at 11:58

## 2017-01-01 RX ADMIN — MEROPENEM 500 MG: 500 INJECTION, POWDER, FOR SOLUTION INTRAVENOUS at 08:47

## 2017-01-01 RX ADMIN — SODIUM BICARBONATE: 84 INJECTION, SOLUTION INTRAVENOUS at 18:52

## 2017-01-01 RX ADMIN — CLONIDINE HYDROCHLORIDE 0.3 MG: 0.1 TABLET ORAL at 20:06

## 2017-01-01 RX ADMIN — SODIUM BICARBONATE: 84 INJECTION, SOLUTION INTRAVENOUS at 14:34

## 2017-01-01 RX ADMIN — PANTOPRAZOLE SODIUM 40 MG: 40 TABLET, DELAYED RELEASE ORAL at 09:37

## 2017-01-01 RX ADMIN — INSULIN ASPART 60 UNITS: 100 INJECTION, SUSPENSION SUBCUTANEOUS at 09:27

## 2017-01-01 RX ADMIN — OLANZAPINE 5 MG: 5 TABLET, FILM COATED ORAL at 21:59

## 2017-01-01 RX ADMIN — OMEPRAZOLE 20 MG: 20 CAPSULE, DELAYED RELEASE ORAL at 06:57

## 2017-01-01 RX ADMIN — RISPERIDONE 1 MG: 1 TABLET, ORALLY DISINTEGRATING ORAL at 12:02

## 2017-01-01 RX ADMIN — METOPROLOL SUCCINATE 25 MG: 25 TABLET, EXTENDED RELEASE ORAL at 10:14

## 2017-01-01 RX ADMIN — ACETAMINOPHEN 975 MG: 325 TABLET, FILM COATED ORAL at 21:45

## 2017-01-01 RX ADMIN — Medication 50 MEQ: at 19:22

## 2017-01-01 RX ADMIN — ACETAMINOPHEN 1000 MG: 500 TABLET, COATED ORAL at 09:58

## 2017-01-01 RX ADMIN — INSULIN ASPART 5 UNITS: 100 INJECTION, SOLUTION INTRAVENOUS; SUBCUTANEOUS at 13:31

## 2017-01-01 RX ADMIN — DOCUSATE SODIUM 100 MG: 100 CAPSULE, LIQUID FILLED ORAL at 08:54

## 2017-01-01 RX ADMIN — INSULIN ASPART 4 UNITS: 100 INJECTION, SOLUTION INTRAVENOUS; SUBCUTANEOUS at 17:45

## 2017-01-01 RX ADMIN — DIVALPROEX SODIUM 500 MG: 500 TABLET, DELAYED RELEASE ORAL at 08:50

## 2017-01-01 RX ADMIN — DILTIAZEM HYDROCHLORIDE 240 MG: 240 CAPSULE, EXTENDED RELEASE ORAL at 09:03

## 2017-01-01 RX ADMIN — DIVALPROEX SODIUM 1500 MG: 500 TABLET, DELAYED RELEASE ORAL at 21:25

## 2017-01-01 RX ADMIN — HYDRALAZINE HYDROCHLORIDE 50 MG: 25 TABLET ORAL at 12:10

## 2017-01-01 RX ADMIN — ACETAMINOPHEN 1000 MG: 500 TABLET, COATED ORAL at 21:17

## 2017-01-01 RX ADMIN — RISPERIDONE 0.5 MG: 1 TABLET, ORALLY DISINTEGRATING ORAL at 16:28

## 2017-01-01 RX ADMIN — SODIUM CHLORIDE, PRESERVATIVE FREE 5 ML: 5 INJECTION INTRAVENOUS at 05:30

## 2017-01-01 RX ADMIN — INSULIN ASPART 4 UNITS: 100 INJECTION, SOLUTION INTRAVENOUS; SUBCUTANEOUS at 12:37

## 2017-01-01 RX ADMIN — GLIPIZIDE 10 MG: 5 TABLET, FILM COATED, EXTENDED RELEASE ORAL at 07:46

## 2017-01-01 RX ADMIN — CLONIDINE HYDROCHLORIDE 0.2 MG: 0.1 TABLET ORAL at 21:25

## 2017-01-01 RX ADMIN — METOPROLOL TARTRATE 50 MG: 50 TABLET, FILM COATED ORAL at 09:00

## 2017-01-01 RX ADMIN — METOPROLOL TARTRATE 5 MG: 5 INJECTION INTRAVENOUS at 03:17

## 2017-01-01 RX ADMIN — OMEPRAZOLE 20 MG: 20 CAPSULE, DELAYED RELEASE ORAL at 17:01

## 2017-01-01 RX ADMIN — ACETAMINOPHEN 975 MG: 325 TABLET, FILM COATED ORAL at 23:32

## 2017-01-01 RX ADMIN — ACETAMINOPHEN 1000 MG: 500 TABLET, COATED ORAL at 16:08

## 2017-01-01 RX ADMIN — ACETAMINOPHEN 1000 MG: 500 TABLET, COATED ORAL at 16:11

## 2017-01-01 RX ADMIN — INSULIN ASPART 1 UNITS: 100 INJECTION, SOLUTION INTRAVENOUS; SUBCUTANEOUS at 08:12

## 2017-01-01 RX ADMIN — GLIPIZIDE 10 MG: 5 TABLET, FILM COATED, EXTENDED RELEASE ORAL at 16:21

## 2017-01-01 RX ADMIN — POLYETHYLENE GLYCOL 3350 17 G: 17 POWDER, FOR SOLUTION ORAL at 23:59

## 2017-01-01 RX ADMIN — MEROPENEM 500 MG: 500 INJECTION, POWDER, FOR SOLUTION INTRAVENOUS at 13:46

## 2017-01-01 RX ADMIN — METOPROLOL TARTRATE 5 MG: 5 INJECTION INTRAVENOUS at 22:36

## 2017-01-01 RX ADMIN — INSULIN ASPART 1 UNITS: 100 INJECTION, SOLUTION INTRAVENOUS; SUBCUTANEOUS at 08:36

## 2017-01-01 RX ADMIN — CLONIDINE HYDROCHLORIDE 0.3 MG: 0.1 TABLET ORAL at 20:22

## 2017-01-01 RX ADMIN — INSULIN ASPART 4 UNITS: 100 INJECTION, SOLUTION INTRAVENOUS; SUBCUTANEOUS at 17:48

## 2017-01-01 RX ADMIN — VITAMIN D, TAB 1000IU (100/BT) 1000 UNITS: 25 TAB at 15:34

## 2017-01-01 RX ADMIN — OMEPRAZOLE 20 MG: 20 CAPSULE, DELAYED RELEASE ORAL at 06:32

## 2017-01-01 RX ADMIN — ISOSORBIDE MONONITRATE 30 MG: 30 TABLET, EXTENDED RELEASE ORAL at 07:54

## 2017-01-01 RX ADMIN — INSULIN ASPART 4 UNITS: 100 INJECTION, SOLUTION INTRAVENOUS; SUBCUTANEOUS at 17:41

## 2017-01-01 RX ADMIN — OLANZAPINE 5 MG: 5 TABLET, FILM COATED ORAL at 21:11

## 2017-01-01 RX ADMIN — RISPERIDONE 1 MG: 1 TABLET, ORALLY DISINTEGRATING ORAL at 08:34

## 2017-01-01 RX ADMIN — FUROSEMIDE 20 MG: 20 TABLET ORAL at 08:25

## 2017-01-01 RX ADMIN — CLONIDINE HYDROCHLORIDE 0.2 MG: 0.1 TABLET ORAL at 20:58

## 2017-01-01 RX ADMIN — OLANZAPINE 5 MG: 5 TABLET, FILM COATED ORAL at 21:31

## 2017-01-01 RX ADMIN — ACETAMINOPHEN 975 MG: 325 TABLET, FILM COATED ORAL at 08:26

## 2017-01-01 RX ADMIN — METOPROLOL TARTRATE 100 MG: 100 TABLET, FILM COATED ORAL at 21:11

## 2017-01-01 RX ADMIN — ACETAMINOPHEN 1000 MG: 500 TABLET, COATED ORAL at 17:04

## 2017-01-01 RX ADMIN — HYDRALAZINE HYDROCHLORIDE 50 MG: 25 TABLET ORAL at 17:40

## 2017-01-01 RX ADMIN — DEXTROSE MONOHYDRATE: 50 INJECTION, SOLUTION INTRAVENOUS at 11:50

## 2017-01-01 RX ADMIN — LEVOFLOXACIN 750 MG: 5 INJECTION, SOLUTION INTRAVENOUS at 20:43

## 2017-01-01 RX ADMIN — OMEPRAZOLE 20 MG: 20 CAPSULE, DELAYED RELEASE ORAL at 09:27

## 2017-01-01 RX ADMIN — HYDRALAZINE HYDROCHLORIDE 50 MG: 25 TABLET ORAL at 17:13

## 2017-01-01 RX ADMIN — LEVOFLOXACIN 500 MG: 500 TABLET, FILM COATED ORAL at 09:27

## 2017-01-01 RX ADMIN — INSULIN ASPART 1 UNITS: 100 INJECTION, SOLUTION INTRAVENOUS; SUBCUTANEOUS at 08:54

## 2017-01-01 RX ADMIN — HEPARIN SODIUM 5000 UNITS: 10000 INJECTION, SOLUTION INTRAVENOUS; SUBCUTANEOUS at 19:29

## 2017-01-01 RX ADMIN — POLYETHYLENE GLYCOL 3350 17 G: 17 POWDER, FOR SOLUTION ORAL at 21:41

## 2017-01-01 RX ADMIN — DIVALPROEX SODIUM 500 MG: 500 TABLET, DELAYED RELEASE ORAL at 08:18

## 2017-01-01 RX ADMIN — INSULIN ASPART 50 UNITS: 100 INJECTION, SUSPENSION SUBCUTANEOUS at 08:06

## 2017-01-01 RX ADMIN — RISPERIDONE 1 MG: 1 TABLET, ORALLY DISINTEGRATING ORAL at 21:10

## 2017-01-01 RX ADMIN — HYDRALAZINE HYDROCHLORIDE 25 MG: 25 TABLET ORAL at 22:06

## 2017-01-01 RX ADMIN — HYDRALAZINE HYDROCHLORIDE 25 MG: 25 TABLET ORAL at 12:54

## 2017-01-01 RX ADMIN — METOPROLOL TARTRATE 100 MG: 100 TABLET, FILM COATED ORAL at 08:39

## 2017-01-01 RX ADMIN — OMEPRAZOLE 20 MG: 20 CAPSULE, DELAYED RELEASE ORAL at 16:25

## 2017-01-01 RX ADMIN — CLONIDINE HYDROCHLORIDE 0.2 MG: 0.1 TABLET ORAL at 21:06

## 2017-01-01 RX ADMIN — RISPERIDONE 1 MG: 1 TABLET, ORALLY DISINTEGRATING ORAL at 08:38

## 2017-01-01 RX ADMIN — SODIUM CHLORIDE: 9 INJECTION, SOLUTION INTRAVENOUS at 19:48

## 2017-01-01 RX ADMIN — INSULIN ASPART 4 UNITS: 100 INJECTION, SOLUTION INTRAVENOUS; SUBCUTANEOUS at 06:31

## 2017-01-01 RX ADMIN — OMEPRAZOLE 20 MG: 20 CAPSULE, DELAYED RELEASE ORAL at 17:00

## 2017-01-01 RX ADMIN — OMEPRAZOLE 20 MG: 20 CAPSULE, DELAYED RELEASE ORAL at 10:21

## 2017-01-01 RX ADMIN — VANCOMYCIN HYDROCHLORIDE 2000 MG: 5 INJECTION, POWDER, LYOPHILIZED, FOR SOLUTION INTRAVENOUS at 10:50

## 2017-01-01 RX ADMIN — INSULIN ASPART 2 UNITS: 100 INJECTION, SOLUTION INTRAVENOUS; SUBCUTANEOUS at 16:50

## 2017-01-01 RX ADMIN — DOCUSATE SODIUM 100 MG: 100 CAPSULE, LIQUID FILLED ORAL at 22:03

## 2017-01-01 RX ADMIN — GLIPIZIDE 10 MG: 5 TABLET, FILM COATED, EXTENDED RELEASE ORAL at 17:02

## 2017-01-01 RX ADMIN — CLONIDINE HYDROCHLORIDE 0.3 MG: 0.1 TABLET ORAL at 08:04

## 2017-01-01 RX ADMIN — DIVALPROEX SODIUM 500 MG: 500 TABLET, DELAYED RELEASE ORAL at 08:00

## 2017-01-01 RX ADMIN — ACETAMINOPHEN 1000 MG: 500 TABLET, COATED ORAL at 21:11

## 2017-01-01 RX ADMIN — RISPERIDONE 0.5 MG: 0.5 TABLET, ORALLY DISINTEGRATING ORAL at 14:35

## 2017-01-01 RX ADMIN — RISPERIDONE 1 MG: 1 TABLET, ORALLY DISINTEGRATING ORAL at 21:23

## 2017-01-01 RX ADMIN — DIVALPROEX SODIUM 1500 MG: 500 TABLET, DELAYED RELEASE ORAL at 22:03

## 2017-01-01 RX ADMIN — INSULIN ASPART 70 UNITS: 100 INJECTION, SUSPENSION SUBCUTANEOUS at 17:41

## 2017-01-01 RX ADMIN — SODIUM BICARBONATE 50 MEQ: 84 INJECTION, SOLUTION INTRAVENOUS at 19:22

## 2017-01-01 RX ADMIN — GLIPIZIDE 2.5 MG: 2.5 TABLET, FILM COATED, EXTENDED RELEASE ORAL at 10:39

## 2017-01-01 RX ADMIN — INSULIN ASPART 2 UNITS: 100 INJECTION, SOLUTION INTRAVENOUS; SUBCUTANEOUS at 12:09

## 2017-01-01 RX ADMIN — SODIUM CHLORIDE: 9 INJECTION, SOLUTION INTRAVENOUS at 08:57

## 2017-01-01 RX ADMIN — BISACODYL 10 MG: 5 TABLET, COATED ORAL at 13:20

## 2017-01-01 RX ADMIN — INSULIN ASPART 2 UNITS: 100 INJECTION, SOLUTION INTRAVENOUS; SUBCUTANEOUS at 12:38

## 2017-01-01 RX ADMIN — DEXTROSE MONOHYDRATE AND SODIUM CHLORIDE: 5; .45 INJECTION, SOLUTION INTRAVENOUS at 06:34

## 2017-01-01 RX ADMIN — HYDRALAZINE HYDROCHLORIDE 25 MG: 25 TABLET ORAL at 08:24

## 2017-01-01 RX ADMIN — GLIPIZIDE 10 MG: 5 TABLET, FILM COATED, EXTENDED RELEASE ORAL at 16:04

## 2017-01-01 RX ADMIN — DIVALPROEX SODIUM 1500 MG: 500 TABLET, DELAYED RELEASE ORAL at 21:06

## 2017-01-01 RX ADMIN — MICONAZOLE NITRATE: 2 POWDER TOPICAL at 21:29

## 2017-01-01 RX ADMIN — HYDRALAZINE HYDROCHLORIDE 25 MG: 25 TABLET ORAL at 22:10

## 2017-01-01 RX ADMIN — OMEPRAZOLE 20 MG: 20 CAPSULE, DELAYED RELEASE ORAL at 17:28

## 2017-01-01 RX ADMIN — DEXTROSE MONOHYDRATE: 50 INJECTION, SOLUTION INTRAVENOUS at 01:50

## 2017-01-01 RX ADMIN — ISOSORBIDE MONONITRATE 30 MG: 30 TABLET, EXTENDED RELEASE ORAL at 12:03

## 2017-01-01 RX ADMIN — OLANZAPINE 5 MG: 5 TABLET, FILM COATED ORAL at 22:21

## 2017-01-01 RX ADMIN — DILTIAZEM HYDROCHLORIDE 120 MG: 120 CAPSULE, EXTENDED RELEASE ORAL at 09:27

## 2017-01-01 RX ADMIN — DILTIAZEM HYDROCHLORIDE 60 MG: 60 TABLET, FILM COATED ORAL at 12:11

## 2017-01-01 RX ADMIN — CLONIDINE HYDROCHLORIDE 0.3 MG: 0.1 TABLET ORAL at 09:59

## 2017-01-01 RX ADMIN — BISACODYL 10 MG: 10 SUPPOSITORY RECTAL at 12:41

## 2017-01-01 RX ADMIN — METOPROLOL TARTRATE 25 MG: 25 TABLET ORAL at 20:32

## 2017-01-01 RX ADMIN — SODIUM CHLORIDE, PRESERVATIVE FREE 5 ML: 5 INJECTION INTRAVENOUS at 16:32

## 2017-01-01 RX ADMIN — DOCUSATE SODIUM 100 MG: 100 CAPSULE, LIQUID FILLED ORAL at 21:37

## 2017-01-01 RX ADMIN — LORAZEPAM 1 MG: 1 TABLET ORAL at 10:24

## 2017-01-01 RX ADMIN — ACETAZOLAMIDE 500 MG: 500 CAPSULE, EXTENDED RELEASE ORAL at 08:47

## 2017-01-01 RX ADMIN — ISOSORBIDE MONONITRATE 30 MG: 30 TABLET, EXTENDED RELEASE ORAL at 09:04

## 2017-01-01 RX ADMIN — DILTIAZEM HYDROCHLORIDE 60 MG: 60 TABLET, FILM COATED ORAL at 08:05

## 2017-01-01 RX ADMIN — DEXTROSE MONOHYDRATE: 50 INJECTION, SOLUTION INTRAVENOUS at 18:42

## 2017-01-01 RX ADMIN — RISPERIDONE 1 MG: 1 TABLET, ORALLY DISINTEGRATING ORAL at 08:02

## 2017-01-01 RX ADMIN — DEXTROSE MONOHYDRATE: 50 INJECTION, SOLUTION INTRAVENOUS at 12:15

## 2017-01-01 RX ADMIN — HYDRALAZINE HYDROCHLORIDE 50 MG: 25 TABLET ORAL at 17:00

## 2017-01-01 RX ADMIN — GLIPIZIDE 10 MG: 5 TABLET, FILM COATED, EXTENDED RELEASE ORAL at 16:24

## 2017-01-01 RX ADMIN — INSULIN ASPART 3 UNITS: 100 INJECTION, SOLUTION INTRAVENOUS; SUBCUTANEOUS at 11:41

## 2017-01-01 RX ADMIN — VITAMIN D, TAB 1000IU (100/BT) 1000 UNITS: 25 TAB at 16:47

## 2017-01-01 RX ADMIN — HYDRALAZINE HYDROCHLORIDE 25 MG: 25 TABLET ORAL at 10:11

## 2017-01-01 RX ADMIN — MICONAZOLE NITRATE: 2 POWDER TOPICAL at 21:55

## 2017-01-01 RX ADMIN — HYDRALAZINE HYDROCHLORIDE 25 MG: 25 TABLET ORAL at 21:00

## 2017-01-01 RX ADMIN — DIVALPROEX SODIUM 500 MG: 500 TABLET, DELAYED RELEASE ORAL at 09:09

## 2017-01-01 RX ADMIN — CLONIDINE HYDROCHLORIDE 0.2 MG: 0.2 TABLET ORAL at 21:50

## 2017-01-01 RX ADMIN — ACETAMINOPHEN 1000 MG: 500 TABLET, COATED ORAL at 15:34

## 2017-01-01 RX ADMIN — LORAZEPAM 0.5 MG: 0.5 TABLET ORAL at 19:38

## 2017-01-01 RX ADMIN — DOCUSATE SODIUM 100 MG: 100 CAPSULE, LIQUID FILLED ORAL at 20:57

## 2017-01-01 RX ADMIN — ACETAMINOPHEN 1000 MG: 500 TABLET, COATED ORAL at 17:01

## 2017-01-01 RX ADMIN — INSULIN ASPART 1 UNITS: 100 INJECTION, SOLUTION INTRAVENOUS; SUBCUTANEOUS at 17:43

## 2017-01-01 RX ADMIN — ISOSORBIDE MONONITRATE 30 MG: 30 TABLET, EXTENDED RELEASE ORAL at 08:47

## 2017-01-01 RX ADMIN — OLANZAPINE 5 MG: 5 TABLET, FILM COATED ORAL at 22:18

## 2017-01-01 RX ADMIN — RISPERIDONE 1 MG: 1 TABLET, ORALLY DISINTEGRATING ORAL at 20:25

## 2017-01-01 RX ADMIN — RISPERIDONE 1 MG: 1 TABLET, ORALLY DISINTEGRATING ORAL at 21:25

## 2017-01-01 RX ADMIN — ACETAZOLAMIDE 500 MG: 500 CAPSULE, EXTENDED RELEASE ORAL at 21:25

## 2017-01-01 RX ADMIN — HYDRALAZINE HYDROCHLORIDE 50 MG: 25 TABLET ORAL at 12:41

## 2017-01-01 RX ADMIN — INSULIN ASPART 4 UNITS: 100 INJECTION, SOLUTION INTRAVENOUS; SUBCUTANEOUS at 17:33

## 2017-01-01 RX ADMIN — RISPERIDONE 1 MG: 1 TABLET ORAL at 19:57

## 2017-01-01 RX ADMIN — INSULIN ASPART 6 UNITS: 100 INJECTION, SOLUTION INTRAVENOUS; SUBCUTANEOUS at 09:36

## 2017-01-01 RX ADMIN — CLONIDINE HYDROCHLORIDE 0.2 MG: 0.2 TABLET ORAL at 08:49

## 2017-01-01 RX ADMIN — POLYETHYLENE GLYCOL 3350 17 G: 17 POWDER, FOR SOLUTION ORAL at 21:22

## 2017-01-01 RX ADMIN — DEXTROSE MONOHYDRATE: 50 INJECTION, SOLUTION INTRAVENOUS at 09:31

## 2017-01-01 RX ADMIN — ACETAMINOPHEN 650 MG: 325 TABLET, FILM COATED ORAL at 09:22

## 2017-01-01 RX ADMIN — OMEPRAZOLE 20 MG: 20 CAPSULE, DELAYED RELEASE ORAL at 08:57

## 2017-01-01 RX ADMIN — HYDRALAZINE HYDROCHLORIDE 25 MG: 25 TABLET ORAL at 17:31

## 2017-01-01 RX ADMIN — CLONIDINE HYDROCHLORIDE 0.2 MG: 0.1 TABLET ORAL at 09:53

## 2017-01-01 RX ADMIN — SODIUM CHLORIDE, PRESERVATIVE FREE 5 ML: 5 INJECTION INTRAVENOUS at 15:09

## 2017-01-01 RX ADMIN — KETAMINE HYDROCHLORIDE 100 MG: 10 INJECTION, SOLUTION INTRAMUSCULAR; INTRAVENOUS at 16:43

## 2017-01-01 RX ADMIN — INSULIN ASPART 50 UNITS: 100 INJECTION, SUSPENSION SUBCUTANEOUS at 08:56

## 2017-01-01 RX ADMIN — CLONIDINE HYDROCHLORIDE 0.2 MG: 0.2 TABLET ORAL at 21:49

## 2017-01-01 RX ADMIN — HEPARIN SODIUM 5000 UNITS: 5000 INJECTION, SOLUTION INTRAVENOUS; SUBCUTANEOUS at 09:59

## 2017-01-01 RX ADMIN — METOPROLOL TARTRATE 100 MG: 100 TABLET, FILM COATED ORAL at 09:28

## 2017-01-01 RX ADMIN — HYDRALAZINE HYDROCHLORIDE 50 MG: 25 TABLET ORAL at 22:03

## 2017-01-01 RX ADMIN — DILTIAZEM HYDROCHLORIDE 120 MG: 60 CAPSULE, EXTENDED RELEASE ORAL at 21:14

## 2017-01-01 RX ADMIN — ISOSORBIDE MONONITRATE 30 MG: 30 TABLET, EXTENDED RELEASE ORAL at 09:14

## 2017-01-01 RX ADMIN — DIVALPROEX SODIUM 1500 MG: 500 TABLET, DELAYED RELEASE ORAL at 21:03

## 2017-01-01 RX ADMIN — ACETAMINOPHEN 1000 MG: 500 TABLET, COATED ORAL at 15:58

## 2017-01-01 RX ADMIN — INSULIN ASPART 50 UNITS: 100 INJECTION, SUSPENSION SUBCUTANEOUS at 17:07

## 2017-01-01 RX ADMIN — ACETAMINOPHEN 1000 MG: 500 TABLET, COATED ORAL at 09:40

## 2017-01-01 RX ADMIN — DEXTROSE MONOHYDRATE: 50 INJECTION, SOLUTION INTRAVENOUS at 00:25

## 2017-01-01 RX ADMIN — DEXTROSE MONOHYDRATE: 50 INJECTION, SOLUTION INTRAVENOUS at 08:34

## 2017-01-01 RX ADMIN — RISPERIDONE 1 MG: 1 TABLET, ORALLY DISINTEGRATING ORAL at 21:26

## 2017-01-01 RX ADMIN — RISPERIDONE 1 MG: 1 TABLET, ORALLY DISINTEGRATING ORAL at 09:44

## 2017-01-01 RX ADMIN — SODIUM CHLORIDE: 9 INJECTION, SOLUTION INTRAVENOUS at 17:49

## 2017-01-01 RX ADMIN — DIVALPROEX SODIUM 1500 MG: 500 TABLET, DELAYED RELEASE ORAL at 21:20

## 2017-01-01 RX ADMIN — HYDRALAZINE HYDROCHLORIDE 50 MG: 25 TABLET ORAL at 14:00

## 2017-01-01 RX ADMIN — SODIUM CHLORIDE: 4.5 INJECTION, SOLUTION INTRAVENOUS at 22:06

## 2017-01-01 RX ADMIN — SODIUM CHLORIDE, PRESERVATIVE FREE 5 ML: 5 INJECTION INTRAVENOUS at 21:06

## 2017-01-01 RX ADMIN — GLIPIZIDE 10 MG: 5 TABLET, FILM COATED, EXTENDED RELEASE ORAL at 16:10

## 2017-01-01 RX ADMIN — RISPERIDONE 0.5 MG: 0.5 TABLET, ORALLY DISINTEGRATING ORAL at 10:22

## 2017-01-01 RX ADMIN — INSULIN ASPART 60 UNITS: 100 INJECTION, SUSPENSION SUBCUTANEOUS at 17:24

## 2017-01-01 RX ADMIN — METOPROLOL TARTRATE 100 MG: 100 TABLET, FILM COATED ORAL at 08:40

## 2017-01-01 RX ADMIN — DOCUSATE SODIUM 100 MG: 100 CAPSULE, LIQUID FILLED ORAL at 09:26

## 2017-01-01 RX ADMIN — RISPERIDONE 1 MG: 1 TABLET, ORALLY DISINTEGRATING ORAL at 22:01

## 2017-01-01 RX ADMIN — METOPROLOL TARTRATE 100 MG: 100 TABLET, FILM COATED ORAL at 20:44

## 2017-01-01 RX ADMIN — LEVOFLOXACIN 250 MG: 250 TABLET, FILM COATED ORAL at 10:50

## 2017-01-01 RX ADMIN — BISACODYL 10 MG: 10 SUPPOSITORY RECTAL at 16:09

## 2017-01-01 RX ADMIN — DIVALPROEX SODIUM 1500 MG: 125 CAPSULE, COATED PELLETS ORAL at 21:13

## 2017-01-01 RX ADMIN — MICONAZOLE NITRATE: 2 POWDER TOPICAL at 19:56

## 2017-01-01 RX ADMIN — INSULIN ASPART 3 UNITS: 100 INJECTION, SOLUTION INTRAVENOUS; SUBCUTANEOUS at 17:21

## 2017-01-01 RX ADMIN — METOPROLOL SUCCINATE 25 MG: 25 TABLET, EXTENDED RELEASE ORAL at 09:39

## 2017-01-01 RX ADMIN — CLONIDINE HYDROCHLORIDE 0.2 MG: 0.1 TABLET ORAL at 08:38

## 2017-01-01 RX ADMIN — CLONIDINE HYDROCHLORIDE 0.3 MG: 0.1 TABLET ORAL at 21:22

## 2017-01-01 RX ADMIN — ACETAMINOPHEN 1000 MG: 500 TABLET, COATED ORAL at 16:22

## 2017-01-01 RX ADMIN — METOPROLOL TARTRATE 100 MG: 100 TABLET, FILM COATED ORAL at 21:04

## 2017-01-01 RX ADMIN — HYDRALAZINE HYDROCHLORIDE 25 MG: 25 TABLET ORAL at 21:42

## 2017-01-01 RX ADMIN — ACETAMINOPHEN 1000 MG: 500 TABLET, COATED ORAL at 16:49

## 2017-01-01 RX ADMIN — CLONIDINE HYDROCHLORIDE 0.3 MG: 0.1 TABLET ORAL at 21:26

## 2017-01-01 RX ADMIN — HYDRALAZINE HYDROCHLORIDE 50 MG: 25 TABLET ORAL at 09:26

## 2017-01-01 RX ADMIN — OMEPRAZOLE 20 MG: 20 CAPSULE, DELAYED RELEASE ORAL at 06:14

## 2017-01-01 RX ADMIN — HYDRALAZINE HYDROCHLORIDE 50 MG: 25 TABLET ORAL at 09:14

## 2017-01-01 RX ADMIN — ISOSORBIDE MONONITRATE 30 MG: 30 TABLET, EXTENDED RELEASE ORAL at 08:41

## 2017-01-01 RX ADMIN — OXYCODONE HYDROCHLORIDE 10 MG: 5 TABLET ORAL at 13:14

## 2017-01-01 RX ADMIN — ISOSORBIDE MONONITRATE 30 MG: 30 TABLET, EXTENDED RELEASE ORAL at 08:23

## 2017-01-01 RX ADMIN — GLIPIZIDE 10 MG: 5 TABLET, FILM COATED, EXTENDED RELEASE ORAL at 17:48

## 2017-01-01 RX ADMIN — DILTIAZEM HYDROCHLORIDE 240 MG: 240 CAPSULE, EXTENDED RELEASE ORAL at 08:58

## 2017-01-01 RX ADMIN — GLIPIZIDE 2.5 MG: 2.5 TABLET, FILM COATED, EXTENDED RELEASE ORAL at 08:50

## 2017-01-01 RX ADMIN — ACETAMINOPHEN 975 MG: 325 TABLET, FILM COATED ORAL at 16:06

## 2017-01-01 RX ADMIN — AMILORIDE HYDROCHLORIDE 10 MG: 5 TABLET ORAL at 09:25

## 2017-01-01 RX ADMIN — CLONIDINE HYDROCHLORIDE 0.2 MG: 0.2 TABLET ORAL at 10:05

## 2017-01-01 RX ADMIN — DIVALPROEX SODIUM 500 MG: 500 TABLET, DELAYED RELEASE ORAL at 10:11

## 2017-01-01 RX ADMIN — HYDRALAZINE HYDROCHLORIDE 25 MG: 25 TABLET ORAL at 21:09

## 2017-01-01 RX ADMIN — DOCUSATE SODIUM 100 MG: 100 CAPSULE, LIQUID FILLED ORAL at 21:59

## 2017-01-01 RX ADMIN — LORAZEPAM 1 MG: 1 TABLET ORAL at 08:18

## 2017-01-01 RX ADMIN — ISOSORBIDE MONONITRATE 30 MG: 30 TABLET, EXTENDED RELEASE ORAL at 10:29

## 2017-01-01 RX ADMIN — METOPROLOL TARTRATE 100 MG: 100 TABLET, FILM COATED ORAL at 20:52

## 2017-01-01 RX ADMIN — ACETAMINOPHEN 1000 MG: 500 TABLET, COATED ORAL at 22:07

## 2017-01-01 RX ADMIN — GLIPIZIDE 10 MG: 5 TABLET, FILM COATED, EXTENDED RELEASE ORAL at 16:47

## 2017-01-01 RX ADMIN — GLIPIZIDE 2.5 MG: 2.5 TABLET, FILM COATED, EXTENDED RELEASE ORAL at 16:06

## 2017-01-01 RX ADMIN — POLYETHYLENE GLYCOL 3350 17 G: 17 POWDER, FOR SOLUTION ORAL at 07:56

## 2017-01-01 RX ADMIN — DEXTROSE MONOHYDRATE: 50 INJECTION, SOLUTION INTRAVENOUS at 13:29

## 2017-01-01 RX ADMIN — METOPROLOL TARTRATE 25 MG: 25 TABLET ORAL at 08:17

## 2017-01-01 RX ADMIN — HYDRALAZINE HYDROCHLORIDE 10 MG: 20 INJECTION INTRAMUSCULAR; INTRAVENOUS at 17:48

## 2017-01-01 RX ADMIN — OMEPRAZOLE 20 MG: 20 CAPSULE, DELAYED RELEASE ORAL at 09:15

## 2017-01-01 RX ADMIN — DOCUSATE SODIUM 100 MG: 100 CAPSULE, LIQUID FILLED ORAL at 20:44

## 2017-01-01 RX ADMIN — DILTIAZEM HYDROCHLORIDE 240 MG: 240 CAPSULE, EXTENDED RELEASE ORAL at 09:42

## 2017-01-01 RX ADMIN — DIVALPROEX SODIUM 1500 MG: 500 TABLET, DELAYED RELEASE ORAL at 21:07

## 2017-01-01 RX ADMIN — DIVALPROEX SODIUM 1500 MG: 500 TABLET, DELAYED RELEASE ORAL at 21:47

## 2017-01-01 RX ADMIN — VANCOMYCIN HYDROCHLORIDE 2000 MG: 1 INJECTION, POWDER, LYOPHILIZED, FOR SOLUTION INTRAVENOUS at 13:30

## 2017-01-01 RX ADMIN — CLONIDINE HYDROCHLORIDE 0.3 MG: 0.1 TABLET ORAL at 22:12

## 2017-01-01 RX ADMIN — RISPERIDONE 1 MG: 1 TABLET, ORALLY DISINTEGRATING ORAL at 21:09

## 2017-01-01 RX ADMIN — SODIUM BICARBONATE 650 MG TABLET 650 MG: at 12:26

## 2017-01-01 RX ADMIN — DOCUSATE SODIUM 100 MG: 100 CAPSULE, LIQUID FILLED ORAL at 21:10

## 2017-01-01 RX ADMIN — HYDRALAZINE HYDROCHLORIDE 25 MG: 25 TABLET ORAL at 12:25

## 2017-01-01 RX ADMIN — HEPARIN SODIUM 5000 UNITS: 10000 INJECTION, SOLUTION INTRAVENOUS; SUBCUTANEOUS at 08:53

## 2017-01-01 RX ADMIN — METOPROLOL SUCCINATE 25 MG: 25 TABLET, EXTENDED RELEASE ORAL at 09:44

## 2017-01-01 RX ADMIN — INSULIN ASPART 70 UNITS: 100 INJECTION, SUSPENSION SUBCUTANEOUS at 17:48

## 2017-01-01 RX ADMIN — INSULIN ASPART 40 UNITS: 100 INJECTION, SUSPENSION SUBCUTANEOUS at 09:56

## 2017-01-01 RX ADMIN — ACETAMINOPHEN 1000 MG: 500 TABLET, COATED ORAL at 17:22

## 2017-01-01 RX ADMIN — DEXTROSE MONOHYDRATE: 50 INJECTION, SOLUTION INTRAVENOUS at 00:06

## 2017-01-01 RX ADMIN — POLYETHYLENE GLYCOL 3350 17 G: 17 POWDER, FOR SOLUTION ORAL at 21:10

## 2017-01-01 RX ADMIN — VITAMIN D, TAB 1000IU (100/BT) 1000 UNITS: 25 TAB at 16:32

## 2017-01-01 RX ADMIN — DILTIAZEM HYDROCHLORIDE 120 MG: 60 CAPSULE, EXTENDED RELEASE ORAL at 20:25

## 2017-01-01 RX ADMIN — RISPERIDONE 1 MG: 1 TABLET, ORALLY DISINTEGRATING ORAL at 10:12

## 2017-01-01 RX ADMIN — INSULIN ASPART 70 UNITS: 100 INJECTION, SUSPENSION SUBCUTANEOUS at 16:45

## 2017-01-01 RX ADMIN — GLIPIZIDE 10 MG: 5 TABLET, FILM COATED, EXTENDED RELEASE ORAL at 15:37

## 2017-01-01 RX ADMIN — DILTIAZEM HYDROCHLORIDE 240 MG: 240 CAPSULE, EXTENDED RELEASE ORAL at 08:21

## 2017-01-01 RX ADMIN — INSULIN ASPART 50 UNITS: 100 INJECTION, SUSPENSION SUBCUTANEOUS at 17:38

## 2017-01-01 RX ADMIN — DIVALPROEX SODIUM 1500 MG: 500 TABLET, DELAYED RELEASE ORAL at 21:52

## 2017-01-01 RX ADMIN — INSULIN ASPART 25 UNITS: 100 INJECTION, SUSPENSION SUBCUTANEOUS at 09:07

## 2017-01-01 RX ADMIN — MICONAZOLE NITRATE: 2 POWDER TOPICAL at 09:28

## 2017-01-01 RX ADMIN — OLANZAPINE 5 MG: 5 TABLET, FILM COATED ORAL at 21:47

## 2017-01-01 RX ADMIN — POLYETHYLENE GLYCOL 3350 17 G: 17 POWDER, FOR SOLUTION ORAL at 21:54

## 2017-01-01 RX ADMIN — ACETAMINOPHEN 1000 MG: 500 TABLET, COATED ORAL at 22:44

## 2017-01-01 RX ADMIN — CEFTRIAXONE 1 G: 1 INJECTION, POWDER, FOR SOLUTION INTRAMUSCULAR; INTRAVENOUS at 22:52

## 2017-01-01 RX ADMIN — METOPROLOL TARTRATE 25 MG: 100 TABLET, FILM COATED ORAL at 21:05

## 2017-01-01 RX ADMIN — HEPARIN SODIUM 5000 UNITS: 10000 INJECTION, SOLUTION INTRAVENOUS; SUBCUTANEOUS at 09:04

## 2017-01-01 RX ADMIN — POLYETHYLENE GLYCOL 3350 17 G: 17 POWDER, FOR SOLUTION ORAL at 08:16

## 2017-01-01 RX ADMIN — ACETAMINOPHEN 1000 MG: 500 TABLET, COATED ORAL at 16:35

## 2017-01-01 RX ADMIN — OMEPRAZOLE 20 MG: 20 CAPSULE, DELAYED RELEASE ORAL at 06:42

## 2017-01-01 RX ADMIN — METOPROLOL TARTRATE 100 MG: 100 TABLET, FILM COATED ORAL at 21:33

## 2017-01-01 RX ADMIN — VALPROATE SODIUM 500 MG: 100 INJECTION, SOLUTION INTRAVENOUS at 03:24

## 2017-01-01 RX ADMIN — CLONIDINE HYDROCHLORIDE 0.3 MG: 0.1 TABLET ORAL at 20:56

## 2017-01-01 RX ADMIN — OLANZAPINE 5 MG: 5 TABLET, FILM COATED ORAL at 22:08

## 2017-01-01 RX ADMIN — VITAMIN D, TAB 1000IU (100/BT) 1000 UNITS: 25 TAB at 16:22

## 2017-01-01 RX ADMIN — OLANZAPINE 5 MG: 5 TABLET, FILM COATED ORAL at 21:49

## 2017-01-01 RX ADMIN — HYDRALAZINE HYDROCHLORIDE 25 MG: 25 TABLET ORAL at 08:44

## 2017-01-01 RX ADMIN — DILTIAZEM HYDROCHLORIDE 120 MG: 120 CAPSULE, EXTENDED RELEASE ORAL at 12:02

## 2017-01-01 RX ADMIN — METOPROLOL TARTRATE 100 MG: 100 TABLET, FILM COATED ORAL at 22:12

## 2017-01-01 RX ADMIN — METOPROLOL TARTRATE 100 MG: 100 TABLET, FILM COATED ORAL at 09:26

## 2017-01-01 RX ADMIN — RISPERIDONE 1 MG: 1 TABLET, ORALLY DISINTEGRATING ORAL at 20:44

## 2017-01-01 RX ADMIN — Medication 20 MEQ: at 11:51

## 2017-01-01 RX ADMIN — ACETAMINOPHEN 975 MG: 650 SUPPOSITORY RECTAL at 22:09

## 2017-01-01 RX ADMIN — MEROPENEM 500 MG: 500 INJECTION, POWDER, FOR SOLUTION INTRAVENOUS at 04:39

## 2017-01-01 RX ADMIN — CLONIDINE HYDROCHLORIDE 0.3 MG: 0.1 TABLET ORAL at 20:30

## 2017-01-01 RX ADMIN — CLONIDINE HYDROCHLORIDE 0.2 MG: 0.2 TABLET ORAL at 08:27

## 2017-01-01 RX ADMIN — ACETAMINOPHEN 1000 MG: 500 TABLET, COATED ORAL at 08:27

## 2017-01-01 RX ADMIN — ACETAMINOPHEN 975 MG: 325 TABLET, FILM COATED ORAL at 15:36

## 2017-01-01 RX ADMIN — ACETAMINOPHEN 1000 MG: 500 TABLET, COATED ORAL at 17:08

## 2017-01-01 RX ADMIN — ACETAMINOPHEN 1000 MG: 500 TABLET, COATED ORAL at 08:57

## 2017-01-01 RX ADMIN — VITAMIN D, TAB 1000IU (100/BT) 1000 UNITS: 25 TAB at 18:43

## 2017-01-01 RX ADMIN — LORAZEPAM 1 MG: 1 TABLET ORAL at 09:09

## 2017-01-01 ASSESSMENT — VISUAL ACUITY
OU: NOT TESTABLE
OU: NOT TESTABLE
OU: BLINKS TO THREAT
OU: BLINKS TO THREAT
OU: OTHER (SEE COMMENT)
OU: NOT TESTABLE
OU: BLINKS TO THREAT
OU: NOT TESTABLE
OU: NOT TESTABLE
OU: BLINKS TO THREAT
OU: BLINKS TO THREAT
OU: NORMAL ACUITY
OU: BLINKS TO THREAT
OU: NORMAL ACUITY
OU: NOT TESTABLE
OU: BLINKS TO THREAT
OU: NOT TESTABLE
OU: NOT TESTABLE
OU: BLINKS TO THREAT
OU: NOT TESTABLE
OU: BLINKS TO THREAT

## 2017-01-01 ASSESSMENT — ENCOUNTER SYMPTOMS
ACTIVITY CHANGE: 1
FATIGUE: 1
WEAKNESS: 1
HALLUCINATIONS: 1
FATIGUE: 1
BACK PAIN: 0
NECK PAIN: 0

## 2017-01-01 ASSESSMENT — ACTIVITIES OF DAILY LIVING (ADL)
COGNITION: 2 - DIFFICULTY WITH ORGANIZING THOUGHTS
WHICH_OF_THE_ABOVE_FUNCTIONAL_RISKS_HAD_A_RECENT_ONSET_OR_CHANGE?: AMBULATION;SWALLOWING
BATHING: 4-->COMPLETELY DEPENDENT
FALL_HISTORY_WITHIN_LAST_SIX_MONTHS: NO
RETIRED_COMMUNICATION: 2-->DIFFICULTY UNDERSTANDING (NOT RELATED TO LANGUAGE BARRIER)
AMBULATION: 3-->ASSISTIVE EQUIPMENT AND PERSON
TOILETING: 3-->ASSISTIVE EQUIPMENT AND PERSON
SWALLOWING: 2-->DIFFICULTY SWALLOWING LIQUIDS/FOODS
TOILETING: 3-->ASSISTIVE EQUIPMENT AND PERSON
DRESS: 4-->COMPLETELY DEPENDENT
SWALLOWING: 2-->DIFFICULTY SWALLOWING FOODS
TRANSFERRING: 3-->ASSISTIVE EQUIPMENT AND PERSON
FALL_HISTORY_WITHIN_LAST_SIX_MONTHS: YES
RETIRED_EATING: 4-->COMPLETELY DEPENDENT
AMBULATION: 3-->ASSISTIVE EQUIPMENT AND PERSON
RETIRED_COMMUNICATION: 2-->DIFFICULTY SPEAKING (NOT RELATED TO LANGUAGE BARRIER)
COGNITION: 2 - DIFFICULTY WITH ORGANIZING THOUGHTS
NUMBER_OF_TIMES_PATIENT_HAS_FALLEN_WITHIN_LAST_SIX_MONTHS: 2
DRESS: 2-->ASSISTIVE PERSON
BATHING: 2-->ASSISTIVE PERSON
TRANSFERRING: 3-->ASSISTIVE EQUIPMENT AND PERSON
RETIRED_EATING: 1-->ASSISTIVE EQUIPMENT

## 2017-01-01 ASSESSMENT — PAIN DESCRIPTION - DESCRIPTORS
DESCRIPTORS: SORE
DESCRIPTORS: ACHING

## 2017-01-29 NOTE — ED AVS SNAPSHOT
Emergency Department    [address] [place] [id number]    Phone:  [phone]    Fax:  [phone]                                       Nel Farmer   MRN: [id number]    Department:   Emergency Department   Date of Visit:  1/29/2017           After Visit Summary Signature Page     I have received my discharge instructions, and my questions have been answered. I have discussed any challenges I see with this plan with the nurse or doctor.    ..........................................................................................................................................  Patient/Patient Representative Signature      ..........................................................................................................................................  Patient Representative Print Name and Relationship to Patient    ..................................................               ................................................  Date                                            Time    ..........................................................................................................................................  Reviewed by Signature/Title    ...................................................              ..............................................  Date                                                            Time

## 2017-01-29 NOTE — ED PROVIDER NOTES
"  History     Chief Complaint:  Hallucinations  Hyperglycemia      HPI   Nel Farmer is a 61 year old female, who frequents the ED, with a history of schizoaffective disorder and mild to moderate cognitive delay, who presents with hallucinations and hyperglycemia. The patient's sister-in-law reports that the patient has been living at home with her mother and today became agitated after noting that her blood sugar was in the mid-300's. The patient attempted to call 911 three times, but her sister-in-law and mother told the police not to come. The patient has also been complaining of hallucinations, which is not a new problem for her, and has reported seeing things such as thinking that her mother is a witch, per her sister-in-law. She also reports that up until 2 weeks ago the patient was accidentally doubling her Depakote for a month which her sister-in-law believes was an accident as she is usually very diligent about her medications. The patient's sister-in-law reports that she was finally able to get the patient into a group home a little while ago, however the patient only lasted there for 3 days and then moved back in with her mother. In general, the patient's sister-in-law reports that the patient has not been active and lays in bed for a good part of each day. This has led to weakening and muscle loss which leads to her 85-year-old mother helping her get around. The patient does not have any help at home aside from her mother and her sister-in-law occasionally. Her sister-in-law states that she would like to see the patient admitted and stabilized psychiatrically. However, she notes that there are no new changes in her behavior. She is frustrated because this has been an ongoing issue. Patient also fell 10 days ago on the ice and notes that \"my bottom hurts.\"    Allergies:  Amoxicillin  Haldol  Penicillin G  Penicillins  Seroquel       Medications:  "    Risperdal  Senokot  Miralax  Diltiazem  Depakote  Lasix  Novolog  Lorazepam  Toprol  Zyprexa  Zofran  Glucotrol  Catapres  Imdur    Past Medical History:     Type II Diabetes  HTN  Hyperlipidemia  Schizoaffective Disorder  Suicidal Ideation  Paranoia  Psychosis  Osteoarthritis  CKD  Depression  Cognitive Deficits  Anxiety    Depression       Past Surgical History:     Tonsillectomy and Adenoidectomy  D&C  Total Knee Arthroplasty  Colonoscopy      Family History:  Mother: HTN  Father: Colorectal Cancer    Social History:  Marital status: single  Former smoker, negative for current alcohol or drug use.  The patient does not have a regular PCP. Her psychiatrist is Dr. Kraft whom she sees every 6 months or so.  She lives with her mother. Of note, her mother is 85-years-old and had a MI and multiple stents placed about a year ago.  The patient presents with her sister-in-law, Tana.    Review of Systems   Unable to perform ROS: Psychiatric disorder   Psychiatric/Behavioral: Positive for hallucinations.     Physical Exam     Patient Vitals for the past 24 hrs:   BP Temp Temp src Heart Rate Resp SpO2   01/29/17 2013 - - - - 18 -   01/29/17 1801 (!) 145/120 mmHg 98.2  F (36.8  C) Temporal 68 - 94 %   01/29/17 1800 - - - - - 95 %         Physical Exam  Constitutional:  Loud, high pitched voice. Says bottom hurts. Obese.     Appears well-developed and well-nourished.   HENT:   Head:    Normocephalic and atraumatic.   Right Ear:   Tympanic membrane and external ear normal.   Left Ear:   Tympanic membrane and external ear normal.   Mouth/Throat:   Oropharynx is clear and moist.      Mucous membranes are normal.   Eyes:    Conjunctivae normal and EOM are normal.      Pupils are equal, round, and reactive to light.   Neck:    Normal range of motion. Neck supple.   Cardiovascular:  Normal rate, regular rhythm, S1 normal and S2 normal.      No gallop and no friction rub. No murmur heard.  Pulmonary/Chest:  Breath sounds  normal. No respiratory distress.      No wheezes. No rhonchi. No rales.   Abdominal:   Soft. No hepatosplenomegaly. No tenderness.      No rebound and no CVA tenderness.   Musculoskeletal:  Normal range of motion. Tenderness over coccyx.  Neurological:   Alert and oriented to person, place, and time. Normal strength.      GCS eye subscore is 4. GCS verbal subscore is 5.      GCS motor subscore is 6.   Skin:    Skin is warm and dry.   Psychiatric:   Denies suicidal or homicidal ideation. Voices no delusions at this time.    Emergency Department Course     Laboratory:  CBC: WBC 9.3 (WNL) HGB 12.7 (WNL)  (WNL)  BMP: Creatinine 1.56 (H) Glucose 295 (H) BUN 33 (H) GFR 19 (L) Rest WNL  Valproic acid: 56     UA: Clear, straw colored urine; Glucose >1000 (A) Specific Gravity 1.000 (L) Albumin 10 (A) Leukocyte Esterase Trace (A) WBC 4 (H) RBC 3 (H) Bacteria Few (A) Squamous Epithelial 2 (H) Rest WNL     ED Course:  Nursing notes and vitals reviewed.  I performed an exam of the patient as documented above.     1958: I checked in with and updated the patient's sister-in-law.  Seen by DEC.     I personally reviewed the laboratory results with the patient's sister-in-law and answered all related questions prior to discharge.   Findings and plan explained to the patient's sister-in-law. Patient discharged home with instructions regarding supportive care, medications, and reasons to return. The importance of close follow-up was reviewed.     Impression & Plan      Medical Decision Making:  Nel Farmer is a 61 year old female with schizoaffective disorder with frequent ED visits. She is brought in by her sister-in-law Tana due to concern of increased agitation today with calling 911 three times. The paramedics did not go out but did talk to Tana who then brought her in. Tana notes that she has chronic hallucinations. Patient was in a group home last year but left after a number of days unfortunately. Home  "situation remains difficult with living with elderly mother who has had some cardiac issues. Patient here complained of some pain in her \"bottom\" and has some coccyx tenderness after a fall on the ice last week, but I did not think this required imaging. Laboratory workup here is unremarkable. She has been cooperative. She denies suicidal or homicidal ideation. She has been seen by DEC who feels like she is at her baseline. Obviously this situation is very frustrating to her sister-in-law Tana, but she will be discharged home.     Diagnosis:    ICD-10-CM    1. Agitation in patient with a history of underlying schizoaffective disorder R45.1      Disposition:   Discharge to home. Follow up as needed with primary. I will again make a note to Corazon, our care coordinator, about a care plan for her.     I, Soha Augustinradhaon, am serving as a scribe on 1/29/2017 at 5:32 PM to personally document services performed by Brittney Calderon MD, based on my observations and the provider's statements to me.              Brittney Calderon MD  01/29/17 7161  "

## 2017-01-29 NOTE — ED AVS SNAPSHOT
Emergency Department    [address] [place] [id number]    Phone:  [phone]    Fax:  [phone]                                       Nel Farmer   MRN: [id number]    Department:   Emergency Department   Date of Visit:  1/29/2017           Patient Information     Date Of Birth          1955        Your diagnoses for this visit were:     Agitation        You were seen by Brittney Calderon MD.      Follow-up Information     Please follow up.    Why:  See your primary as needed.         Discharge Instructions       I will talk with Corazon the Care Coordinator here tomorrow.     24 Hour Appointment Hotline       To make an appointment at any [facility], call [phone] ([phone]). If you don't have a family doctor or clinic, we will help you find one. [facility] clinics are conveniently located to serve the needs of you and your family.             Review of your medicines      Our records show that you are taking the medicines listed below. If these are incorrect, please call your family doctor or clinic.        Dose / Directions Last dose taken    cloNIDine 0.3 MG tablet   Commonly known as:  CATAPRES   Dose:  0.3 mg   Quantity:  60 tablet        Take 1 tablet (0.3 mg) by mouth 2 times daily   Refills:  0        diltiazem 120 MG 24 hr capsule   Dose:  120 mg        Take 120 mg by mouth daily   Refills:  0        * divalproex 500 MG EC tablet   Commonly known as:  DEPAKOTE   Dose:  500 mg        Take 500 mg by mouth every morning   Refills:  0        * divalproex 500 MG EC tablet   Commonly known as:  DEPAKOTE   Dose:  1500 mg        Take 1,500 mg by mouth At Bedtime   Refills:  0        GLUCOTROL XL PO   Dose:  10 mg   Indication:  Type 2 Diabetes        Take 10 mg by mouth 2 times daily (before meals)   Refills:  0        insulin aspart prot & aspart injection   Commonly known as:  NovoLOG MIX 70/30 PEN   Dose:  70 Units        Inject 70 Units Subcutaneous 2 times  "daily (before meals)   Refills:  0        insulin syringe-needle U-100 31G X 5/16\" 1 ML   Commonly known as:  BD insulin syringe ULTRAFINE   Quantity:  60 each        Use one syringe bid daily or as directed.   Refills:  prn        isosorbide mononitrate 30 MG 24 hr tablet   Commonly known as:  IMDUR   Dose:  30 mg   Quantity:  30 tablet        Take 1 tablet by mouth daily.   Refills:  prn        * LASIX PO   Dose:  20 mg        Take 20 mg by mouth daily   Refills:  0        * LASIX PO   Dose:  20 mg        Take 20 mg by mouth daily as needed (Take at noon for leg swelling if needed)   Refills:  0        LORAZEPAM PO   Dose:  1 mg        Take 1 mg by mouth 2 times daily as needed for anxiety   Refills:  0        polyethylene glycol powder   Commonly known as:  MIRALAX/GLYCOLAX   Dose:  17 g        Take 17 g by mouth daily as needed for constipation   Refills:  0        PRILOSEC PO   Dose:  20 mg        Take 20 mg by mouth 2 times daily (before meals)   Refills:  0        * RISPERDAL PO   Dose:  1 mg        Take 1 mg by mouth At Bedtime   Refills:  0        * risperiDONE 0.5 MG ODT tab   Commonly known as:  RISPERDAL M-TAB   Dose:  1 mg   Quantity:  30 tablet        Place 2 tablets (1 mg) under the tongue At Bedtime   Refills:  0        * risperiDONE 0.5 MG ODT tab   Commonly known as:  risperDAL M-TABS   Dose:  0.5 mg   Quantity:  60 tablet        Place 1 tablet (0.5 mg) under the tongue 3 times daily as needed   Refills:  0        senna-docusate 8.6-50 MG per tablet   Commonly known as:  SENOKOT-S;PERICOLACE   Dose:  1 tablet   Quantity:  60 tablet        Take 1 tablet by mouth 2 times daily as needed for constipation   Refills:  0        TOPROL XL PO   Dose:  100 mg        Take 100 mg by mouth daily (50 mg x 2 tablets)   Refills:  0        ZOFRAN ODT PO   Dose:  4 mg        Take 4 mg by mouth every 8 hours as needed for nausea   Refills:  0        ZYPREXA PO   Dose:  5 mg        Take 5 mg by mouth At Bedtime "   Refills:  0        * Notice:  This list has 7 medication(s) that are the same as other medications prescribed for you. Read the directions carefully, and ask your doctor or other care provider to review them with you.            Procedures and tests performed during your visit     Basic metabolic panel    CBC with platelets differential    UA with Microscopic    Valproic acid (Depakote level)      Orders Needing Specimen Collection     None      Pending Results     No orders found from 1/28/2017 to 1/30/2017.            Pending Culture Results     No orders found from 1/28/2017 to 1/30/2017.       Test Results from your hospital stay           1/29/2017  6:40 PM - Interface, Flexilab Results      Component Results     Component Value Ref Range & Units Status    WBC 9.3 4.0 - 11.0 10e9/L Final    RBC Count 4.07 3.8 - 5.2 10e12/L Final    Hemoglobin 12.7 11.7 - 15.7 g/dL Final    Hematocrit 38.4 35.0 - 47.0 % Final    MCV 94 78 - 100 fl Final    MCH 31.2 26.5 - 33.0 pg Final    MCHC 33.1 31.5 - 36.5 g/dL Final    RDW 14.3 10.0 - 15.0 % Final    Platelet Count 204 150 - 450 10e9/L Final    Diff Method Automated Method  Final    % Neutrophils 64.5 % Final    % Lymphocytes 24.0 % Final    % Monocytes 5.0 % Final    % Eosinophils 4.4 % Final    % Basophils 0.3 % Final    % Immature Granulocytes 1.8 % Final    Nucleated RBCs 0 0 /100 Final    Absolute Neutrophil 6.0 1.6 - 8.3 10e9/L Final    Absolute Lymphocytes 2.2 0.8 - 5.3 10e9/L Final    Absolute Monocytes 0.5 0.0 - 1.3 10e9/L Final    Absolute Eosinophils 0.4 0.0 - 0.7 10e9/L Final    Absolute Basophils 0.0 0.0 - 0.2 10e9/L Final    Abs Immature Granulocytes 0.2 0 - 0.4 10e9/L Final    Absolute Nucleated RBC 0.0  Final         1/29/2017  6:55 PM - Interface, Flexilab Results      Component Results     Component Value Ref Range & Units Status    Sodium 135 133 - 144 mmol/L Final    Potassium 4.6 3.4 - 5.3 mmol/L Final    Chloride 103 94 - 109 mmol/L Final    Carbon  Dioxide 24 20 - 32 mmol/L Final    Anion Gap 8 3 - 14 mmol/L Final    Glucose 295 (H) 70 - 99 mg/dL Final    Urea Nitrogen 33 (H) 7 - 30 mg/dL Final    Creatinine 2.56 (H) 0.52 - 1.04 mg/dL Final    GFR Estimate 19 (L) >60 mL/min/1.7m2 Final    Non  GFR Calc    GFR Estimate If Black 23 (L) >60 mL/min/1.7m2 Final    African American GFR Calc    Calcium 9.7 8.5 - 10.1 mg/dL Final         1/29/2017  6:56 PM - Interface, Flexilab Results      Component Results     Component Value Ref Range & Units Status    Valproic Acid Level 56 50 - 100 mg/L Final         1/29/2017  6:26 PM - Interface, Flexilab Results      Component Results     Component Value Ref Range & Units Status    Color Urine Straw  Final    Appearance Urine Clear  Final    Glucose Urine >1000 (A) NEG mg/dL Final    Bilirubin Urine Negative NEG Final    Ketones Urine Negative NEG mg/dL Final    Specific Gravity Urine 1.000 (L) 1.003 - 1.035 Final    Blood Urine Negative NEG Final    pH Urine 6.5 5.0 - 7.0 pH Final    Protein Albumin Urine 10 (A) NEG mg/dL Final    Urobilinogen mg/dL Normal 0.0 - 2.0 mg/dL Final    Nitrite Urine Negative NEG Final    Leukocyte Esterase Urine Trace (A) NEG Final    Source Midstream Urine  Final    WBC Urine 4 (H) 0 - 2 /HPF Final    RBC Urine 3 (H) 0 - 2 /HPF Final    Bacteria Urine Few (A) NEG /HPF Final    Squamous Epithelial /HPF Urine 2 (H) 0 - 1 /HPF Final                Clinical Quality Measure: Blood Pressure Screening     Your blood pressure was checked while you were in the emergency department today. The last reading we obtained was  BP: (!) 145/120 mmHg . Please read the guidelines below about what these numbers mean and what you should do about them.  If your systolic blood pressure (the top number) is less than 120 and your diastolic blood pressure (the bottom number) is less than 80, then your blood pressure is normal. There is nothing more that you need to do about it.  If your systolic blood  "pressure (the top number) is 120-139 or your diastolic blood pressure (the bottom number) is 80-89, your blood pressure may be higher than it should be. You should have your blood pressure rechecked within a year by a primary care provider.  If your systolic blood pressure (the top number) is 140 or greater or your diastolic blood pressure (the bottom number) is 90 or greater, you may have high blood pressure. High blood pressure is treatable, but if left untreated over time it can put you at risk for heart attack, stroke, or kidney failure. You should have your blood pressure rechecked by a primary care provider within the next 4 weeks.  If your provider in the emergency department today gave you specific instructions to follow-up with your doctor or provider even sooner than that, you should follow that instruction and not wait for up to 4 weeks for your follow-up visit.        Thank you for choosing [facility]       Thank you for choosing [facility] for your care. Our goal is always to provide you with excellent care. Hearing back from our patients is one way we can continue to improve our services. Please take a few minutes to complete the written survey that you may receive in the mail after you visit with us. Thank you!        [employer] Information     [employer] lets you send messages to your doctor, view your test results, renew your prescriptions, schedule appointments and more. To sign up, go to [email or web address] . Click on \"Log in\" on the left side of the screen, which will take you to the Welcome page. Then click on \"Sign up Now\" on the right side of the page.     You will be asked to enter the access code listed below, as well as some personal information. Please follow the directions to create your username and password.     Your access code is: [email or web address]  Expires: 3/6/2017  6:11 PM     Your access code will  in 90 days. If you need help or a new code, please call your [facility] clinic or " [phone].        Care EveryWhere ID     This is your Care EveryWhere ID. This could be used by other organizations to access your [facility] medical records  [id number]        After Visit Summary       This is your record. Keep this with you and show to your community pharmacist(s) and doctor(s) at your next visit.

## 2017-02-11 NOTE — IP AVS SNAPSHOT
MRN:[id number]                      After Visit Summary   2/11/2017    Nel Farmer    MRN: [id number]           Thank you!     Thank you for choosing [facility] for your care. Our goal is always to provide you with excellent care. Hearing back from our patients is one way we can continue to improve our services. Please take a few minutes to complete the written survey that you may receive in the mail after you visit with us. Thank you!        Patient Information     Date Of Birth          1955        About your hospital stay     You were admitted on:  February 12, 2017 You last received care in the:  [facility] [address] [facility]    You were discharged on:  March 28, 2017        Reason for your hospital stay       Pneumonia, UTI, toxic encephalopathy                  Who to Call     For medical emergencies, please call 911.  For non-urgent questions about your medical care, please call your primary care provider or clinic, None          Attending Provider     Provider Specialty    Jay Hutchinson MD Emergency Medicine    [facility], Hong Nunez MD Internal Medicine       Primary Care Provider    None Specified       No primary provider on file.        After Care Instructions     Activity - Up with assistive device           Advance Diet as Tolerated       Follow this diet upon discharge:  Combination Diet Dysphagia Diet Level 3: Advanced; Thin Liquids (water, ice chips, juice, milk gelatin, ice cream, etc)            Fall precautions           Dunlap catheter       To straight gravity drainage. Change catheter every 2 weeks and PRN for leaking or decreased uring output with signs of bladder distention. DO NOT change catheter without a specific MD order IF diagnosis of benign prostatic hypertrophy (BPH), neurogenic bladder, or other urological conditions            General info for SNF       Length of Stay Estimate: Short Term Care: Estimated # of Days <30  Condition at  Discharge: Stable  Level of care:skilled   Rehabilitation Potential: Good  Admission H&P remains valid and up-to-date: Yes  Recent Chemotherapy: N/A  Use Nursing Home Standing Orders: Yes            Glucose monitor nursing POCT       Before meals and at bedtime            Mantoux instructions       Give two-step Mantoux (PPD) Per Facility Policy Yes                  Follow-up Appointments     Follow Up and recommended labs and tests       Follow up with primary care provider in on wk after d/c from TCU.                  Your next 10 appointments already scheduled     Mar 29, 2017  6:00 AM CDT   Inpatient Meal Follow Up Therapy with Jesenia Calvin   [facility] ([facility])    [address], [address]  [facility] [address] [phone]              Additional Services     Occupational Therapy Adult Consult       Evaluate and treat as clinically indicated.    Reason:  Recurrent falls and generalized weakness.            Physical Therapy Adult Consult       Evaluate and treat as clinically indicated.    Reason:  Recurrent falls and generalized weakness.            Speech Language Path Adult Consult       Evaluate and treat as clinically indicated.    Reason:  dysphagia                  Further instructions from your care team       [facility] phone [phone]    Pending Results     No orders found from 2/9/2017 to 2/12/2017.            Statement of Approval     Ordered          03/28/17 1516  I have reviewed and agree with all the recommendations and orders detailed in this document.  EFFECTIVE NOW     Approved and electronically signed by:  Wilfrido Flores MD           03/28/17 0949  I have reviewed and agree with all the recommendations and orders detailed in this document.  EFFECTIVE NOW     Approved and electronically signed by:  Wilfrido Flores MD           03/28/17 8434  I have reviewed and agree with all the recommendations and orders detailed in  "this document.  EFFECTIVE NOW     Approved and electronically signed by:  Wilfrido Flores MD           17 9970  I have reviewed and agree with all the recommendations and orders detailed in this document.  EFFECTIVE NOW     Approved and electronically signed by:  Wilfrido Flores MD             Admission Information     Date & Time Provider Department Dept. Phone    2017 Hong Lee MD [place] [address] [facility] [phone]      Your Vitals Were     Blood Pressure Pulse Temperature Respirations Height Weight    135/62 (BP Location: Left arm) 70 98.4  F (36.9  C) (Axillary) 16 1.6 m (5' 3\") 102.2 kg (225 lb 5 oz)    Last Period Pulse Oximetry BMI (Body Mass Index)             2012 97% 39.91 kg/m2         [employer] Information     [employer] lets you send messages to your doctor, view your test results, renew your prescriptions, schedule appointments and more. To sign up, go to [email or web address] . Click on \"Log in\" on the left side of the screen, which will take you to the Welcome page. Then click on \"Sign up Now\" on the right side of the page.     You will be asked to enter the access code listed below, as well as some personal information. Please follow the directions to create your username and password.     Your access code is: [email or web address]  Expires: 2017  8:54 AM     Your access code will  in 90 days. If you need help or a new code, please call your [facility] clinic or [phone].        Care EveryWhere ID     This is your Care EveryWhere ID. This could be used by other organizations to access your [facility] medical records  [id number]           Review of your medicines      START taking        Dose / Directions    insulin isophane & regular susp   Commonly known as:  HumuLIN MIX 70/30 PEN   Used for:  Type 2 diabetes mellitus with complication, with long-term current use of insulin (H)        20 units before breakfast  30 units before dinner   Quantity:  30 " "mL   Refills:  1         CONTINUE these medicines which may have CHANGED, or have new prescriptions. If we are uncertain of the size of tablets/capsules you have at home, strength may be listed as something that might have changed.        Dose / Directions    glipiZIDE 2.5 MG 24 hr tablet   Commonly known as:  GLUCOTROL XL   Indication:  Type 2 Diabetes   This may have changed:  how much to take   Used for:  Type 2 diabetes mellitus with complication, with long-term current use of insulin (H)        Dose:  2.5 mg   Take 1 tablet (2.5 mg) by mouth 2 times daily (before meals)   Quantity:  30 tablet   Refills:  0       LORazepam 0.5 MG tablet   Commonly known as:  ATIVAN   This may have changed:  medication strength   Used for:  Schizoaffective disorder, bipolar type (H)        Dose:  1 mg   Take 2 tablets (1 mg) by mouth 2 times daily as needed for anxiety   Quantity:  14 tablet   Refills:  0       metoprolol 25 MG 24 hr tablet   Commonly known as:  TOPROL-XL   This may have changed:    - how much to take  - additional instructions   Used for:  Essential hypertension        Dose:  25 mg   Take 1 tablet (25 mg) by mouth daily   Quantity:  30 tablet   Refills:  0         CONTINUE these medicines which have NOT CHANGED        Dose / Directions    cloNIDine 0.3 MG tablet   Commonly known as:  CATAPRES   Used for:  HTN (hypertension)        Dose:  0.3 mg   Take 1 tablet (0.3 mg) by mouth 2 times daily   Quantity:  60 tablet   Refills:  0       diltiazem 120 MG 24 hr capsule        Dose:  120 mg   Take 120 mg by mouth daily   Refills:  0       * divalproex 500 MG EC tablet   Commonly known as:  DEPAKOTE        Dose:  500 mg   Take 500 mg by mouth every morning   Refills:  0       * divalproex 500 MG EC tablet   Commonly known as:  DEPAKOTE        Dose:  1500 mg   Take 1,500 mg by mouth At Bedtime   Refills:  0       insulin syringe-needle U-100 31G X 5/16\" 1 ML   Commonly known as:  BD insulin syringe ULTRAFINE   Used for: "  Type 2 diabetes, HbA1c goal < 7% (H)        Use one syringe bid daily or as directed.   Quantity:  60 each   Refills:  prn       isosorbide mononitrate 30 MG 24 hr tablet   Commonly known as:  IMDUR   Used for:  HTN (hypertension)        Dose:  30 mg   Take 1 tablet by mouth daily.   Quantity:  30 tablet   Refills:  prn       * LASIX PO        Dose:  20 mg   Take 20 mg by mouth daily   Refills:  0       * LASIX PO        Dose:  20 mg   Take 20 mg by mouth daily as needed (Take at noon for leg swelling if needed)   Refills:  0       polyethylene glycol powder   Commonly known as:  MIRALAX/GLYCOLAX        Dose:  17 g   Take 17 g by mouth daily as needed for constipation   Refills:  0       PRILOSEC PO        Dose:  20 mg   Take 20 mg by mouth 2 times daily (before meals)   Refills:  0       * RISPERDAL PO        Dose:  1 mg   Take 1 mg by mouth At Bedtime   Refills:  0       * risperiDONE 0.5 MG ODT tab   Commonly known as:  RISPERDAL M-TAB        Dose:  1 mg   Place 2 tablets (1 mg) under the tongue At Bedtime   Quantity:  30 tablet   Refills:  0       * risperiDONE 0.5 MG ODT tab   Commonly known as:  risperDAL M-TABS        Dose:  0.5 mg   Place 1 tablet (0.5 mg) under the tongue 3 times daily as needed   Quantity:  60 tablet   Refills:  0       senna-docusate 8.6-50 MG per tablet   Commonly known as:  SENOKOT-S;PERICOLACE   Used for:  Constipation, unspecified constipation type        Dose:  1 tablet   Take 1 tablet by mouth 2 times daily as needed for constipation   Quantity:  60 tablet   Refills:  0       ZOFRAN ODT PO        Dose:  4 mg   Take 4 mg by mouth every 8 hours as needed for nausea   Refills:  0       ZYPREXA PO        Dose:  5 mg   Take 5 mg by mouth At Bedtime   Refills:  0       * Notice:  This list has 7 medication(s) that are the same as other medications prescribed for you. Read the directions carefully, and ask your doctor or other care provider to review them with you.      STOP taking      insulin aspart prot & aspart injection   Commonly known as:  NovoLOG MIX 70/30 PEN                Where to get your medicines      Some of these will need a paper prescription and others can be bought over the counter. Ask your nurse if you have questions.     Bring a paper prescription for each of these medications     LORazepam 0.5 MG tablet       You don't need a prescription for these medications     glipiZIDE 2.5 MG 24 hr tablet    insulin isophane & regular susp    metoprolol 25 MG 24 hr tablet                Protect others around you: Learn how to safely use, store and throw away your medicines at www.disposemymeds.org.             Medication List: This is a list of all your medications and when to take them. Check marks below indicate your daily home schedule. Keep this list as a reference.      Medications           Morning Afternoon Evening Bedtime As Needed    cloNIDine 0.3 MG tablet   Commonly known as:  CATAPRES   Take 1 tablet (0.3 mg) by mouth 2 times daily   Last time this was given:  0.3 mg on 3/28/2017  8:47 AM                                diltiazem 120 MG 24 hr capsule   Take 120 mg by mouth daily                                * divalproex 500 MG EC tablet   Commonly known as:  DEPAKOTE   Take 500 mg by mouth every morning   Last time this was given:  500 mg on 3/19/2017 11:53 AM                                * divalproex 500 MG EC tablet   Commonly known as:  DEPAKOTE   Take 1,500 mg by mouth At Bedtime   Last time this was given:  500 mg on 3/19/2017 11:53 AM                                glipiZIDE 2.5 MG 24 hr tablet   Commonly known as:  GLUCOTROL XL   Take 1 tablet (2.5 mg) by mouth 2 times daily (before meals)   Last time this was given:  2.5 mg on 3/28/2017  8:50 AM                                insulin isophane & regular susp   Commonly known as:  HumuLIN MIX 70/30 PEN   20 units before breakfast  30 units before dinner                                insulin syringe-needle U-100 31G  "X 5/16\" 1 ML   Commonly known as:  BD insulin syringe ULTRAFINE   Use one syringe bid daily or as directed.                                isosorbide mononitrate 30 MG 24 hr tablet   Commonly known as:  IMDUR   Take 1 tablet by mouth daily.   Last time this was given:  30 mg on 3/28/2017  8:47 AM                                * LASIX PO   Take 20 mg by mouth daily                                * LASIX PO   Take 20 mg by mouth daily as needed (Take at noon for leg swelling if needed)                                LORazepam 0.5 MG tablet   Commonly known as:  ATIVAN   Take 2 tablets (1 mg) by mouth 2 times daily as needed for anxiety   Last time this was given:  1 mg on 3/13/2017 11:51 PM                                metoprolol 25 MG 24 hr tablet   Commonly known as:  TOPROL-XL   Take 1 tablet (25 mg) by mouth daily   Last time this was given:  25 mg on 3/28/2017  8:47 AM                                polyethylene glycol powder   Commonly known as:  MIRALAX/GLYCOLAX   Take 17 g by mouth daily as needed for constipation                                PRILOSEC PO   Take 20 mg by mouth 2 times daily (before meals)   Last time this was given:  20 mg on 3/19/2017 12:05 PM                                * RISPERDAL PO   Take 1 mg by mouth At Bedtime                                * risperiDONE 0.5 MG ODT tab   Commonly known as:  RISPERDAL M-TAB   Place 2 tablets (1 mg) under the tongue At Bedtime   Last time this was given:  1 mg on 3/28/2017  8:47 AM                                * risperiDONE 0.5 MG ODT tab   Commonly known as:  risperDAL M-TABS   Place 1 tablet (0.5 mg) under the tongue 3 times daily as needed   Last time this was given:  1 mg on 3/28/2017  8:47 AM                                senna-docusate 8.6-50 MG per tablet   Commonly known as:  SENOKOT-S;PERICOLACE   Take 1 tablet by mouth 2 times daily as needed for constipation   Last time this was given:  1 tablet on 3/4/2017  9:01 PM                  "               ZOFRAN ODT PO   Take 4 mg by mouth every 8 hours as needed for nausea   Last time this was given:  4 mg on 3/24/2017  7:46 PM                                ZYPREXA PO   Take 5 mg by mouth At Bedtime   Last time this was given:  5 mg on 3/18/2017 11:31 PM                                * Notice:  This list has 7 medication(s) that are the same as other medications prescribed for you. Read the directions carefully, and ask your doctor or other care provider to review them with you.

## 2017-02-11 NOTE — IP AVS SNAPSHOT
"[place] [address] [facility]: [phone]                                              INTERAGENCY TRANSFER FORM - PHYSICIAN ORDERS   2017                    Hospital Admission Date: 2017  MARIO ALBERTO PATRICK   : 1955  Sex: Female        Attending Provider: Hong Lee MD     Allergies:  Amoxicillin, Amoxicillin, Haldol [Benzyl Alcohol], Haldol [Haloperidol], Pcn [Penicillin G], Penicillins, Seroquel [Quetiapine]    Infection:  None   Service:  HOSPITALIST    Ht:  1.6 m (5' 3\")   Wt:  102.2 kg (225 lb 5 oz)   Admission Wt:  99 kg (218 lb 4.1 oz)    BMI:  39.91 kg/m 2   BSA:  2.13 m 2            Patient PCP Information     None on File      ED Clinical Impression     Diagnosis Description Comment Added By Time Added    Generalized muscle weakness [M62.81] Generalized muscle weakness [M62.81]  Jay Hutchinson MD 2017  6:34 AM    Fall, initial encounter [W19.XXXA] Fall, initial encounter [W19.XXXA]  Jay Hutchinson MD 2017  6:34 AM    Left foot pain [M79.672] Left foot pain [M79.672]  Jay Hutchinson MD 2017  6:34 AM    Contusion of left knee, initial encounter [S80.02XA] Contusion of left knee, initial encounter [S80.02XA]  Jay Hutchinson MD 2017  6:35 AM      Hospital Problems as of 3/28/2017              Priority Class Noted POA    Falls frequently Medium  2017 Yes    Sepsis due to urinary tract infection (H) Medium  3/16/2017 Yes      Non-Hospital Problems as of 3/28/2017              Priority Class Noted    HTN (hypertension)   Unknown    Hyperlipidemia with target LDL less than 70   Unknown    TYPE 2 DIABETES, HBA1C GOAL < 7%   10/31/2010    acute Mastitis   2012    CKD (chronic kidney disease) stage 3, GFR 30-59 ml/min   Unknown    Advanced directives, counseling/discussion   2012    Health Care Home   2013    Suicidal ideation   2013    Schizoaffective disorder, bipolar type (H)   2013    Depression " (emotion)   1/15/2014    Paranoia (H)   1/25/2015    Schizoaffective disorder, manic type (H)   3/20/2015    Psychosis   4/8/2015    Depression with suicidal ideation Medium  9/11/2015    Depression Medium  12/29/2015    Nausea Medium  3/9/2016      Code Status History     Date Active Date Inactive Code Status Order ID Comments User Context    3/28/2017  2:50 PM  Full Code [id number]  Wilfrido Flores MD Outpatient    2/12/2017  6:38 AM 3/28/2017  2:50 PM Full Code [id number]  Hong Lee MD Inpatient    3/10/2016 11:07 AM 2/12/2017  6:38 AM Full Code [id number]  Kayleen Trevino PA-C Outpatient    3/9/2016  8:44 PM 3/10/2016 11:07 AM Full Code [id number]  Carolee Kline MD ED    12/29/2015  3:52 PM 12/31/2015  2:38 PM Full Code [id number]  Brenda Duncan RN Inpatient    9/11/2015  5:39 PM 9/14/2015  2:02 PM Full Code [id number]  Veronica Salmon RN Inpatient    5/10/2015  7:45 PM 5/14/2015  5:11 PM Full Code [id number]  Natalia Garibay MD Inpatient    4/8/2015  5:33 AM 4/13/2015  6:46 PM Full Code [id number]  Luna Duncan MD Inpatient    3/20/2015  6:00 AM 3/23/2015  5:43 PM Full Code [id number]  Kevin Hewitt MD Inpatient    1/25/2015  4:00 AM 1/28/2015  3:50 PM Full Code [id number]  Marlene Corrales RN Inpatient    1/15/2014  5:59 PM 1/17/2014  3:27 PM Full Code [id number]  Keena Regan RN Inpatient    4/30/2013  4:35 PM 5/3/2013  6:24 PM Full Code [id number]  Nel Wallace RN Inpatient    3/27/2013  5:08 PM 4/2/2013  2:03 PM Full Code [id number]  Brenda Mishra RN Inpatient    12/4/2012  3:08 PM 12/11/2012  2:53 PM Full Code [id number]  Jesenia Cabello, RN Inpatient    5/18/2012  7:38 PM 5/23/2012  4:06 PM Full Code [id number]  Veronica Salmon RN Inpatient    1/31/2012  3:58 PM 2/3/2012  2:14 PM Full Code [id number]  Shiva Feng, DO Inpatient         Medication Review      START taking        Dose / Directions Comments    insulin isophane & regular susp    Commonly known as:  HumuLIN MIX 70/30 PEN   Used for:  Type 2 diabetes mellitus with complication, with long-term current use of insulin (H)        20 units before breakfast  30 units before dinner   Quantity:  30 mL   Refills:  1          CONTINUE these medications which may have CHANGED, or have new prescriptions. If we are uncertain of the size of tablets/capsules you have at home, strength may be listed as something that might have changed.        Dose / Directions Comments    glipiZIDE 2.5 MG 24 hr tablet   Commonly known as:  GLUCOTROL XL   Indication:  Type 2 Diabetes   This may have changed:  how much to take   Used for:  Type 2 diabetes mellitus with complication, with long-term current use of insulin (H)        Dose:  2.5 mg   Take 1 tablet (2.5 mg) by mouth 2 times daily (before meals)   Quantity:  30 tablet   Refills:  0        LORazepam 0.5 MG tablet   Commonly known as:  ATIVAN   This may have changed:  medication strength   Used for:  Schizoaffective disorder, bipolar type (H)        Dose:  1 mg   Take 2 tablets (1 mg) by mouth 2 times daily as needed for anxiety   Quantity:  14 tablet   Refills:  0        metoprolol 25 MG 24 hr tablet   Commonly known as:  TOPROL-XL   This may have changed:    - how much to take  - additional instructions   Used for:  Essential hypertension        Dose:  25 mg   Take 1 tablet (25 mg) by mouth daily   Quantity:  30 tablet   Refills:  0          CONTINUE these medications which have NOT CHANGED        Dose / Directions Comments    cloNIDine 0.3 MG tablet   Commonly known as:  CATAPRES   Used for:  HTN (hypertension)        Dose:  0.3 mg   Take 1 tablet (0.3 mg) by mouth 2 times daily   Quantity:  60 tablet   Refills:  0        diltiazem 120 MG 24 hr capsule        Dose:  120 mg   Take 120 mg by mouth daily   Refills:  0        * divalproex 500 MG EC tablet   Commonly known as:  DEPAKOTE        Dose:  500 mg   Take 500 mg by mouth every morning   Refills:  0        *  "divalproex 500 MG EC tablet   Commonly known as:  DEPAKOTE        Dose:  1500 mg   Take 1,500 mg by mouth At Bedtime   Refills:  0        insulin syringe-needle U-100 31G X 5/16\" 1 ML   Commonly known as:  BD insulin syringe ULTRAFINE   Used for:  Type 2 diabetes, HbA1c goal < 7% (H)        Use one syringe bid daily or as directed.   Quantity:  60 each   Refills:  prn        isosorbide mononitrate 30 MG 24 hr tablet   Commonly known as:  IMDUR   Used for:  HTN (hypertension)        Dose:  30 mg   Take 1 tablet by mouth daily.   Quantity:  30 tablet   Refills:  prn        * LASIX PO        Dose:  20 mg   Take 20 mg by mouth daily   Refills:  0        * LASIX PO        Dose:  20 mg   Take 20 mg by mouth daily as needed (Take at noon for leg swelling if needed)   Refills:  0        polyethylene glycol powder   Commonly known as:  MIRALAX/GLYCOLAX        Dose:  17 g   Take 17 g by mouth daily as needed for constipation   Refills:  0        PRILOSEC PO        Dose:  20 mg   Take 20 mg by mouth 2 times daily (before meals)   Refills:  0        * RISPERDAL PO        Dose:  1 mg   Take 1 mg by mouth At Bedtime   Refills:  0        * risperiDONE 0.5 MG ODT tab   Commonly known as:  RISPERDAL M-TAB        Dose:  1 mg   Place 2 tablets (1 mg) under the tongue At Bedtime   Quantity:  30 tablet   Refills:  0        * risperiDONE 0.5 MG ODT tab   Commonly known as:  risperDAL M-TABS        Dose:  0.5 mg   Place 1 tablet (0.5 mg) under the tongue 3 times daily as needed   Quantity:  60 tablet   Refills:  0    Take 1 - 2 three times a day as needed for agitation. (0.5 mg - 1 mg TID PRN agitation)       senna-docusate 8.6-50 MG per tablet   Commonly known as:  SENOKOT-S;PERICOLACE   Used for:  Constipation, unspecified constipation type        Dose:  1 tablet   Take 1 tablet by mouth 2 times daily as needed for constipation   Quantity:  60 tablet   Refills:  0        ZOFRAN ODT PO        Dose:  4 mg   Take 4 mg by mouth every 8 " hours as needed for nausea   Refills:  0        ZYPREXA PO        Dose:  5 mg   Take 5 mg by mouth At Bedtime   Refills:  0        * Notice:  This list has 7 medication(s) that are the same as other medications prescribed for you. Read the directions carefully, and ask your doctor or other care provider to review them with you.      STOP taking     insulin aspart prot & aspart injection   Commonly known as:  NovoLOG MIX 70/30 PEN                     Further instructions from your care team       Discharge to [facility], phone [phone]    Summary of Visit     Reason for your hospital stay       Pneumonia, UTI, toxic encephalopathy             After Care     Activity - Up with assistive device           Advance Diet as Tolerated       Follow this diet upon discharge:  Combination Diet Dysphagia Diet Level 3: Advanced; Thin Liquids (water, ice chips, juice, milk gelatin, ice cream, etc)       Fall precautions           Dunlap catheter       To straight gravity drainage. Change catheter every 2 weeks and PRN for leaking or decreased uring output with signs of bladder distention. DO NOT change catheter without a specific MD order IF diagnosis of benign prostatic hypertrophy (BPH), neurogenic bladder, or other urological conditions       General info for SNF       Length of Stay Estimate: Short Term Care: Estimated # of Days <30  Condition at Discharge: Stable  Level of care:skilled   Rehabilitation Potential: Good  Admission H&P remains valid and up-to-date: Yes  Recent Chemotherapy: N/A  Use Nursing Home Standing Orders: Yes       Glucose monitor nursing POCT       Before meals and at bedtime       Mantoux instructions       Give two-step Mantoux (PPD) Per Facility Policy Yes             Referrals     Occupational Therapy Adult Consult       Evaluate and treat as clinically indicated.    Reason:  Recurrent falls and generalized weakness.       Physical Therapy Adult Consult       Evaluate and treat as  clinically indicated.    Reason:  Recurrent falls and generalized weakness.       Speech Language Path Adult Consult       Evaluate and treat as clinically indicated.    Reason:  dysphagia             Your next 10 appointments already scheduled     Mar 29, 2017  6:00 AM CDT   Inpatient Meal Follow Up Therapy with Jesenia Calvin   [facility] ([facility])    [address], [address]  [place] [address]   [phone]              Follow-Up Appointment Instructions     Future Labs/Procedures    Follow Up and recommended labs and tests     Comments:    Follow up with primary care provider in on wk after d/c from TCU.      Follow-Up Appointment Instructions     Follow Up and recommended labs and tests       Follow up with primary care provider in on wk after d/c from TCU.             Statement of Approval     Ordered          03/28/17 1516  I have reviewed and agree with all the recommendations and orders detailed in this document.  EFFECTIVE NOW     Approved and electronically signed by:  Wilfrido Flores MD           03/28/17 1523  I have reviewed and agree with all the recommendations and orders detailed in this document.  EFFECTIVE NOW     Approved and electronically signed by:  Wilfrido Flores MD           03/28/17 1501  I have reviewed and agree with all the recommendations and orders detailed in this document.  EFFECTIVE NOW     Approved and electronically signed by:  Wilfrido Flores MD           03/28/17 1450  I have reviewed and agree with all the recommendations and orders detailed in this document.  EFFECTIVE NOW     Approved and electronically signed by:  Wilfrido Flores MD

## 2017-02-11 NOTE — ED AVS SNAPSHOT
Emergency Department    [address] [facility] [address]    Phone:  [phone]    Fax:  [phone]                                       Nel Farmer   MRN: [id number]    Department:   Emergency Department   Date of Visit:  2/11/2017           Patient Information     Date Of Birth          1955        Your diagnoses for this visit were:     Contusion of left knee, initial encounter     Contusion of left shoulder, initial encounter     Fall, initial encounter     History of psychiatric disorder        You were seen by Duane Menjivar MD.      Follow-up Information     Schedule an appointment as soon as possible for a visit with your primary clinic.    Why:  As needed for recheck of your injuries        Follow up with  Emergency Department.    Specialty:  EMERGENCY MEDICINE    Why:  As needed, If symptoms worsen    Contact information:    [address] [facility] [id number] [phone]        Discharge Instructions         Bruises (Contusions)    A contusion is a bruise. A bruise happens when a blow to your body doesn't break the skin but does break blood vessels beneath the skin. Blood leaking from the broken vessels causes redness and swelling. As it heals, your bruise is likely to turn colors like purple, green, and yellow. This is normal. The bruise should fade in 2 or 3 weeks.  Factors that make you more likely to bruise  Almost everyone bruises now and then. Certain people do bruise more easily than others. You're more prone to bruising as you get older. That's because blood vessels become more fragile with age. You're also more likely to bruise if you have a clotting disorder such as hemophilia or take medications that reduce clotting, including aspirin.  When to go to the emergency room (ER)  Bruises almost always heal on their own without special treatment. But for some people, a bad bruise can be serious. Seek medical care if you:    Have a  clotting disorder such as hemophilia.    Have cirrhosis or other serious liver disease.    Take blood-thinning medications such as warfarin (Coumadin).  What to expect in the ER  A doctor will examine your bruise and ask about any health conditions you have. In some cases, you may have a test to check how well your blood clots. Other treatment will depend on your needs.  Follow-up care  Sometimes a bruise gets worse instead of better. It may become larger and more swollen. This can occur when your body walls off a small pool of blood under the skin (hematoma). In very rare cases, your doctor may need to drain excess blood from the area.  Tip:  Apply an ice pack or bag of frozen peas to a bruise (keep a thin cloth between the cold source and your skin). This can help reduce redness and swelling.     7431-2041 [employer] [address] [place] [address]. All rights reserved. This information is not intended as a substitute for professional medical care. Always follow your healthcare professional's instructions.          24 Hour Appointment Hotline       To make an appointment at any [facility], call [phone] ([phone]). If you don't have a family doctor or clinic, we will help you find one. [place] clinics are conveniently located to serve the needs of you and your family.             Review of your medicines      Our records show that you are taking the medicines listed below. If these are incorrect, please call your family doctor or clinic.        Dose / Directions Last dose taken    cloNIDine 0.3 MG tablet   Commonly known as:  CATAPRES   Dose:  0.3 mg   Quantity:  60 tablet        Take 1 tablet (0.3 mg) by mouth 2 times daily   Refills:  0        diltiazem 120 MG 24 hr capsule   Dose:  120 mg        Take 120 mg by mouth daily   Refills:  0        * divalproex 500 MG EC tablet   Commonly known as:  DEPAKOTE   Dose:  500 mg        Take 500 mg by mouth every morning  "  Refills:  0        * divalproex 500 MG EC tablet   Commonly known as:  DEPAKOTE   Dose:  1500 mg        Take 1,500 mg by mouth At Bedtime   Refills:  0        GLUCOTROL XL PO   Dose:  10 mg   Indication:  Type 2 Diabetes        Take 10 mg by mouth 2 times daily (before meals)   Refills:  0        insulin aspart prot & aspart injection   Commonly known as:  NovoLOG MIX 70/30 PEN   Dose:  70 Units        Inject 70 Units Subcutaneous 2 times daily (before meals)   Refills:  0        insulin syringe-needle U-100 31G X 5/16\" 1 ML   Commonly known as:  BD insulin syringe ULTRAFINE   Quantity:  60 each        Use one syringe bid daily or as directed.   Refills:  prn        isosorbide mononitrate 30 MG 24 hr tablet   Commonly known as:  IMDUR   Dose:  30 mg   Quantity:  30 tablet        Take 1 tablet by mouth daily.   Refills:  prn        * LASIX PO   Dose:  20 mg        Take 20 mg by mouth daily   Refills:  0        * LASIX PO   Dose:  20 mg        Take 20 mg by mouth daily as needed (Take at noon for leg swelling if needed)   Refills:  0        LORAZEPAM PO   Dose:  1 mg        Take 1 mg by mouth 2 times daily as needed for anxiety   Refills:  0        polyethylene glycol powder   Commonly known as:  MIRALAX/GLYCOLAX   Dose:  17 g        Take 17 g by mouth daily as needed for constipation   Refills:  0        PRILOSEC PO   Dose:  20 mg        Take 20 mg by mouth 2 times daily (before meals)   Refills:  0        * RISPERDAL PO   Dose:  1 mg        Take 1 mg by mouth At Bedtime   Refills:  0        * risperiDONE 0.5 MG ODT tab   Commonly known as:  RISPERDAL M-TAB   Dose:  1 mg   Quantity:  30 tablet        Place 2 tablets (1 mg) under the tongue At Bedtime   Refills:  0        * risperiDONE 0.5 MG ODT tab   Commonly known as:  risperDAL M-TABS   Dose:  0.5 mg   Quantity:  60 tablet        Place 1 tablet (0.5 mg) under the tongue 3 times daily as needed   Refills:  0        senna-docusate 8.6-50 MG per tablet   Commonly " known as:  SENOKOT-S;PERICOLACE   Dose:  1 tablet   Quantity:  60 tablet        Take 1 tablet by mouth 2 times daily as needed for constipation   Refills:  0        TOPROL XL PO   Dose:  100 mg        Take 100 mg by mouth daily (50 mg x 2 tablets)   Refills:  0        ZOFRAN ODT PO   Dose:  4 mg        Take 4 mg by mouth every 8 hours as needed for nausea   Refills:  0        ZYPREXA PO   Dose:  5 mg        Take 5 mg by mouth At Bedtime   Refills:  0        * Notice:  This list has 7 medication(s) that are the same as other medications prescribed for you. Read the directions carefully, and ask your doctor or other care provider to review them with you.            Procedures and tests performed during your visit     Foot XR, G/E 3 views, left    Shoulder XR, G/E 3 views, left    XR Chest 2 Views    XR Knee Left 1/2 Views      Orders Needing Specimen Collection     None      Pending Results     Date and Time Order Name Status Description    2/11/2017 1526 XR Knee Left 1/2 Views Preliminary     2/11/2017 1526 Shoulder XR, G/E 3 views, left Preliminary     2/11/2017 1526 XR Chest 2 Views Preliminary     2/11/2017 1526 Foot XR, G/E 3 views, left Preliminary             Pending Culture Results     No orders found from 2/10/2017 to 2/12/2017.       Test Results from your hospital stay           2/11/2017  4:17 PM - Interface, Radiant Ib      Narrative     FOOT LEFT THREE OR MORE VIEWS   2/11/2017 4:00 PM     HISTORY: Acute pain after blunt trauma.    COMPARISON: 9/6/2016        Impression     IMPRESSION: Negative for fracture. Tiny radiopaque foreign bodies  again seen adjacent to the proximal phalanx of the fifth digit.         2/11/2017  4:17 PM - Interface, Radiant Ib      Narrative     CHEST TWO VIEW   2/11/2017 4:00 PM     HISTORY: Acute pain after blunt trauma.    COMPARISON: 7/28/2015        Impression     IMPRESSION: Cardiomegaly. No evidence for infiltrate or congestive  heart failure. No pleural effusions.  Minimal degenerative changes seen  spine. No pneumothorax.         2/11/2017  4:18 PM - Interface, Radiant Ib      Narrative     SHOULDER LEFT THREE OR MORE VIEWS   2/11/2017 3:59 PM     HISTORY: Acute pain after blunt trauma.    COMPARISON: None.        Impression     IMPRESSION: Normal.         2/11/2017  4:23 PM - Interface, Radiant Ib      Narrative     KNEE LEFT ONE - TWO VIEW   2/11/2017 4:02 PM     HISTORY: Pain after fall, history of knee replacement.    COMPARISON: Left knee x-rays dated 4/28/2016.        Impression     IMPRESSION: Total knee arthroplasty components are again noted without  evidence for hardware failure or periprosthetic fracture. Probable  small knee joint effusion is noted. No significant changes since the  prior study.                Clinical Quality Measure: Blood Pressure Screening     Your blood pressure was checked while you were in the emergency department today. The last reading we obtained was  BP: (!) 142/96 mmHg . Please read the guidelines below about what these numbers mean and what you should do about them.  If your systolic blood pressure (the top number) is less than 120 and your diastolic blood pressure (the bottom number) is less than 80, then your blood pressure is normal. There is nothing more that you need to do about it.  If your systolic blood pressure (the top number) is 120-139 or your diastolic blood pressure (the bottom number) is 80-89, your blood pressure may be higher than it should be. You should have your blood pressure rechecked within a year by a primary care provider.  If your systolic blood pressure (the top number) is 140 or greater or your diastolic blood pressure (the bottom number) is 90 or greater, you may have high blood pressure. High blood pressure is treatable, but if left untreated over time it can put you at risk for heart attack, stroke, or kidney failure. You should have your blood pressure rechecked by a primary care provider within the  "next 4 weeks.  If your provider in the emergency department today gave you specific instructions to follow-up with your doctor or provider even sooner than that, you should follow that instruction and not wait for up to 4 weeks for your follow-up visit.        Thank you for choosing [facility]       Thank you for choosing [facility] for your care. Our goal is always to provide you with excellent care. Hearing back from our patients is one way we can continue to improve our services. Please take a few minutes to complete the written survey that you may receive in the mail after you visit with us. Thank you!        [employer] Information     [employer] lets you send messages to your doctor, view your test results, renew your prescriptions, schedule appointments and more. To sign up, go to [email or web address] . Click on \"Log in\" on the left side of the screen, which will take you to the Welcome page. Then click on \"Sign up Now\" on the right side of the page.     You will be asked to enter the access code listed below, as well as some personal information. Please follow the directions to create your username and password.     Your access code is: [email or web address]  Expires: 3/6/2017  6:11 PM     Your access code will  in 90 days. If you need help or a new code, please call your [facility] clinic or [phone].        Care EveryWhere ID     This is your Care EveryWhere ID. This could be used by other organizations to access your [facility] medical records  [id number]        After Visit Summary       This is your record. Keep this with you and show to your community pharmacist(s) and doctor(s) at your next visit.                  "

## 2017-02-11 NOTE — ED NOTES
Bed: ED19  Expected date: 2/11/17  Expected time: 3:01 PM  Means of arrival: Ambulance  Comments:  [address]

## 2017-02-11 NOTE — IP AVS SNAPSHOT
` [place] [address] [facility]: [phone]            Medication Administration Report for Nel Farmer as of 03/28/17 1613   Legend:    Given Hold Not Given Due Canceled Entry Other Actions    Time Time (Time) Time  Time-Action       Inactive    Active    Linked        Medications 03/22/17 03/23/17 03/24/17 03/25/17 03/26/17 03/27/17 03/28/17    acetaminophen (TYLENOL) tablet 975 mg  Dose: 975 mg Freq: 3 TIMES DAILY Route: PO  Start: 03/17/17 2200   Admin Instructions: Maximum acetaminophen dose from all sources = 75 mg/kg/day not to exceed 4 grams/day.     0811 (975 mg)-Given       1625 (975 mg)-Given       2128 (975 mg)-Given        0804 (975 mg)-Given       1554 (975 mg)-Given       2151 (975 mg)-Given        0902 (975 mg)-Given       1556 (975 mg)-Given       2145 (975 mg)-Given        0917 (975 mg)-Given       1606 (975 mg)-Given       2112 (975 mg)-Given        0826 (975 mg)-Given       1635 (975 mg)-Given       (2104)-Not Given        0907 (975 mg)-Given       1630 (975 mg)-Given       2125 (975 mg)-Given        0847 (975 mg)-Given       [ ] 1600       [ ] 2200          Or  acetaminophen (TYLENOL) Suppository 975 mg  Dose: 975 mg Freq: 3 TIMES DAILY Route: RE  Start: 03/17/17 2200   Admin Instructions: Maximum acetaminophen dose from all sources= 75 mg/kg/day or 4 g/day.                                                                                                                                                [ ] 1600       [ ] 2200           acetaZOLAMIDE (DIAMOX SEQUEL) 12 hr capsule 500 mg  Dose: 500 mg Freq: EVERY 12 HOURS SCHEDULED Route: PO  Start: 03/22/17 2000    1910 (500 mg)-Given        0803 (500 mg)-Given       1929 (500 mg)-Given        1050 (500 mg)-Given       2025 (500 mg)-Given        0918 (500 mg)-Given       2112 (500 mg)-Given        0833 (500 mg)-Given       (2058)-Not Given [C]        0908 (500 mg)-Given       2125 (500 mg)-Given        0847 (500  mg)-Given       [ ] 2000           bisacodyl (DULCOLAX) EC tablet 5-15 mg  Dose: 5-15 mg Freq: DAILY PRN Route: PO  PRN Reason: constipation  PRN Comment:    Start: 02/14/17 0941   Admin Instructions: Do not crush or chew. Swallow whole. May titrate 1 tablet each day until response. Maximum of 3 tablets daily. Hold for loose stools.  This is the first step of a three step constipation treatment protocol.               bisacodyl (DULCOLAX) Suppository 10 mg  Dose: 10 mg Freq: DAILY PRN Route: RE  PRN Reason: constipation  Start: 02/17/17 1212              cloNIDine (CATAPRES) tablet 0.3 mg  Dose: 0.3 mg Freq: 2 TIMES DAILY Route: PO  Start: 03/22/17 2100 2128 (0.3 mg)-Given        0802 (0.3 mg)-Given       2152 (0.3 mg)-Given        1101 (0.3 mg)-Given       2024 (0.3 mg)-Given        0917 (0.3 mg)-Given       2112 (0.3 mg)-Given        0826 (0.3 mg)-Given       (2059)-Not Given        0909 (0.3 mg)-Given       2126 (0.3 mg)-Given        0847 (0.3 mg)-Given       [ ] 2100           diltiazem (CARDIZEM SR) 12 hr capsule 120 mg  Dose: 120 mg Freq: EVERY EVENING Route: PO  Start: 03/24/17 2000   Admin Instructions: DO NOT CRUSH.  Hold if SBP< 110 or HR< 60       2025 (120 mg)-Given        2114 (120 mg)-Given        (2059)-Not Given        2125 (120 mg)-Given        [ ] 2000           docusate sodium (COLACE) capsule 100 mg  Dose: 100 mg Freq: 2 TIMES DAILY Route: PO  Start: 02/14/17 0945   Admin Instructions: To prevent constipation.  Hold for loose stools.     0809 (100 mg)-Given       (2117)-Not Given [C]        (0843)-Not Given [C]       (2152)-Not Given        1051 (100 mg)-Given       (2006)-Not Given [C]        0918 (100 mg)-Given       (2103)-Not Given [C]        0827 (100 mg)-Given       (2059)-Not Given        0908 (100 mg)-Given       2125 (100 mg)-Given        (1024)-Not Given       [ ] 2100           glipiZIDE (GLUCOTROL XL) 24 hr tablet 2.5 mg  Dose: 2.5 mg Freq: 2 TIMES DAILY BEFORE MEALS Route:  PO  Indications of Use: TYPE 2 DIABETES MELLITUS  Start: 03/21/17 1630    0808 (2.5 mg)-Given       1625 (2.5 mg)-Given        0804 (2.5 mg)-Given       1554 (2.5 mg)-Given        1050 (2.5 mg)-Given       1558 (2.5 mg)-Given        1039 (2.5 mg)-Given       1606 (2.5 mg)-Given        0833 (2.5 mg)-Given       1635 (2.5 mg)-Given        0908 (2.5 mg)-Given       1630 (2.5 mg)-Given        0850 (2.5 mg)-Given [C]       [ ] 1630           glucose 40 % gel 15-30 g  Dose: 15-30 g Freq: EVERY 15 MIN PRN Route: PO  PRN Reason: low blood sugar  Start: 03/10/17 1031   Admin Instructions: Give 15 g for BG 51 to 69 mg/dL IF patient is conscious and able to swallow. Give 30 g for BG less than or equal to 50 mg/dL IF patient is conscious and able to swallow. Do NOT give glucose gel via enteral tube.  IF patient has enteral tube: give apple juice 120 mL (4 oz or 15 g of CHO) via enteral tube for BG 51 to 69 mg/dL.  Give apple juice 240 mL (8 oz or 30 g of CHO) via enteral tube for BG less than or equal to 50 mg/dL.              Or  dextrose 50 % injection 25-50 mL  Dose: 25-50 mL Freq: EVERY 15 MIN PRN Route: IV  PRN Reason: low blood sugar  Start: 03/10/17 1031   Admin Instructions: Use if have IV access, BG less than 70 mg/dL and meet dose criteria below:  Dose if conscious and alert (or disorientated) and NPO = 25 mL  Dose if unconscious / not alert = 50 mL  Vesicant.              Or  glucagon injection 1 mg  Dose: 1 mg Freq: EVERY 15 MIN PRN Route: SC  PRN Reason: low blood sugar  PRN Comment: May repeat x 1 only  Start: 03/10/17 1031   Admin Instructions: May give SQ or IM. IF BG less than or equal to 50 mg/dL and no IV access.  ONLY use glucagon IF patient has NO IV access AND is UNABLE to swallow.               heparin sodium PF injection 5,000 Units  Dose: 5,000 Units Freq: EVERY 12 HOURS Route: SC  Start: 03/21/17 2000   Admin Instructions: HOLD heparin IF platelet count falls below 50% baseline or less than 100,000 /   L and notify provider. Use this product If CrCl less than 30 mL/min.     0811 (5,000 Units)-Given       1909 (5,000 Units)-Given        0804 (5,000 Units)-Given       1929 (5,000 Units)-Given        1047 (5,000 Units)-Given       2025 (5,000 Units)-Given        0915 (5,000 Units)-Given       2115 (5,000 Units)-Given        0833 (5,000 Units)-Given       2103 (5,000 Units)-Given        0908 (5,000 Units)-Given       2129 (5,000 Units)-Given        0848 (5,000 Units)-Given       [ ] 2000           HOLD MEDICATION  Freq: HOLD Route: XX  Start: 03/19/17 1526              hydrALAZINE (APRESOLINE) injection 10 mg  Dose: 10 mg Freq: EVERY 4 HOURS PRN Route: IV  PRN Reason: high blood pressure  PRN Comment: give for SBP > 180  Start: 03/18/17 0929              insulin aspart (NovoLOG) inj (RAPID ACTING)  Dose: 1-5 Units Freq: AT BEDTIME Route: SC  Start: 03/22/17 2200   Admin Instructions: MEDIUM INSULIN RESISTANCE DOSING    Do Not give Bedtime Correction Insulin if BG less than  200.   For  - 249 give 1 units.   For  - 299 give 2 units.   For  - 349 give 3 units.   For  -399 give 4 units.   For BG greater than or equal to 400 give 5 units.  Notify provider if glucose greater than or equal to 350 mg/dL after administration of correction dose.  If given at mealtime, must be administered 5 min before meal or immediately after.     2129 (1 Units)-Given        2151 (2 Units)-Given        2146 (1 Units)-Given        2122 (2 Units)-Given        (2143)-Not Given        (2128)-Not Given        [ ] 2200           insulin aspart (NovoLOG) inj (RAPID ACTING)  Dose: 1-7 Units Freq: 3 TIMES DAILY BEFORE MEALS Route: SC  Start: 03/22/17 1330   Admin Instructions: Correction Scale - MEDIUM INSULIN RESISTANCE DOSING     Do Not give Correction Insulin if Pre-Meal BG less than 140.   For Pre-Meal  - 189 give 1 unit.   For Pre-Meal  - 239 give 2 units.   For Pre-Meal  - 289 give 3 units.   For  Pre-Meal  - 339 give 4 units.   For Pre-Meal - 399 give 5 units.   For Pre-Meal -449 give 6 units  For Pre-Meal BG greater than or equal to 450 give 7 units.   To be given with prandial insulin, and based on pre-meal blood glucose.    Notify provider if glucose greater than or equal to 350 mg/dL after administration of correction dose.  If given at mealtime, must be administered 5 min before meal or immediately after.     (1450)-Not Given [C]       (1722)-Not Given        (0843)-Not Given       1248 (1 Units)-Given       1750 (2 Units)-Given        0906 (1 Units)-Given       1244 (3 Units)-Given       1752 (2 Units)-Given        0916 (1 Units)-Given       1252 (2 Units)-Given       1820 (2 Units)-Given        0829 (1 Units)-Given       1317 (1 Units)-Given       (1934)-Not Given [C]        0914 (1 Units)-Given [C]       1134 (3 Units)-Given       1831 (1 Units)-Given        0846 (1 Units)-Given       1207 (2 Units)-Given       [ ] 1700           insulin isophane human (HumuLIN N PEN) injection 20 Units  Dose: 20 Units Freq: EVERY MORNING BEFORE BREAKFAST Route: SC  Start: 03/23/17 0930     0925 (20 Units)-Given        0906 (20 Units)-Given        0919 (20 Units)-Given        0829 (20 Units)-Given        0913 (20 Units)-Given        0846 (20 Units)-Given           insulin isophane human (HumuLIN N PEN) injection 30 Units  Dose: 30 Units Freq: AT BEDTIME Route: SC  Start: 03/20/17 2200    2129 (30 Units)-Given        2151-Hold [C]        0053 (30 Units)-Given [C]       2145 (30 Units)-Given        2122 (30 Units)-Given        (2224)-Not Given        2129 (30 Units)-Given        [ ] 2200           isosorbide mononitrate (IMDUR) 24 hr tablet 30 mg  Dose: 30 mg Freq: DAILY Route: PO  Start: 02/12/17 0900   Admin Instructions: DO NOT CRUSH. Can split tablet in half along score clarisse.     0809 (30 mg)-Given        0803 (30 mg)-Given        1051 (30 mg)-Given        0918 (30 mg)-Given        0827 (30  "mg)-Given        0909 (30 mg)-Given        0847 (30 mg)-Given           levofloxacin (LEVAQUIN) tablet 250 mg  Dose: 250 mg Freq: DAILY Route: PO  Indications of Use: URINARY TRACT INFECTION  Start: 03/24/17 0900   Admin Instructions: Administer at least 2 hrs before or 4 hrs after aluminum, calcium, iron, zinc or magnesium containing products.    Dose adjusted per renal dosing policy.  Estimated CrCl = 15-30 mL/min.       1050 (250 mg)-Given        0918 (250 mg)-Given        0828 (250 mg)-Given        0909 (250 mg)-Given        0847 (250 mg)-Given           lidocaine 1 % 1 mL  Dose: 1 mL Freq: EVERY 1 HOUR PRN Route: OTHER  PRN Comment: mild pain with VAD insertion or accessing implanted port,  Start: 03/17/17 1227   Admin Instructions: Do NOT give if patient has a history of allergy to any local anesthetic or any \"mimi\" product. MAX dose 1 mL subcutaneous OR intradermal in divided doses.               LORazepam (ATIVAN) injection 0.5-1 mg  Dose: 0.5-1 mg Freq: EVERY 6 HOURS PRN Route: IV  PRN Reason: anxiety  Start: 03/19/17 1527   Admin Instructions: For IV PUSH: Dilute with equal volume of NS.               metoprolol (TOPROL-XL) 24 hr tablet 25 mg  Dose: 25 mg Freq: DAILY Route: PO  Start: 03/24/17 1345   Admin Instructions: DO NOT CRUSH. Tablet may be split in half along score line.    Hold if HR< 60       (1600)-Not Given        0917 (25 mg)-Given        0828 (25 mg)-Given        0909 (25 mg)-Given        0847 (25 mg)-Given           naloxone (NARCAN) injection 0.1-0.4 mg  Dose: 0.1-0.4 mg Freq: EVERY 2 MIN PRN Route: IV  PRN Reason: opioid reversal  Start: 02/12/17 0636   Admin Instructions: For respiratory rate LESS than or EQUAL to 8.  Partial reversal dose:  0.1 mg titrated q 2 minutes for Analgesia Side Effects Monitoring Sedation Level of 3 (frequently drowsy, arousable, drifts to sleep during conversation).Full reversal dose:  0.4 mg bolus for Analgesia Side Effects Monitoring Sedation Level of 4 " (somnolent, minimal or no response to stimulation).               nitroglycerin (NITROSTAT) sublingual tablet 0.4 mg  Dose: 0.4 mg Freq: EVERY 5 MIN PRN Route: SL  PRN Reason: chest pain  Start: 03/17/17 1227   Admin Instructions: Maximum 3 doses in 15 minutes.  Notify provider if no relief after 3 doses.    Do NOT give nitroglycerin SLIF the patient has taken avanafil (STENDRA), sildenafil (VIAGRA) or (REVATIO) within the last 8 hours, vardenafil (LEVITRA) or (STAXYN) within the last 18 hours, tadalafil (CIALIS) or (ADCIRCA) within the last 36 hours. Inform provider if patient has taken one of these medications.  If patient is still having acute angina requiring treatment, an alternative treatment option may be used such as: IV beta-blocker [2.5 mg - 5 mg metoprolol (LOPRESSOR)] if ordered by a provider.               OLANZapine (zyPREXA) injection 2.5 mg  Dose: 2.5 mg Freq: EVERY 8 HOURS PRN Route: IM  PRN Reason: agitation  PRN Comment: for severe agitation  Start: 03/06/17 1813   Admin Instructions: Dissolve the contents of the 10 mg vial using 2.1 mL of Sterile Water for Injection to provide a solution containing 5 mg/mL of olanzapine. Withdraw the ordered dose from vial. Use immediately (within 1 hour) after reconstitution. Discard any unused portion.               OLANZapine zydis (zyPREXA) ODT half-tab 2.5 mg  Dose: 2.5 mg Freq: AT BEDTIME Route: SL  Start: 03/19/17 2200   Admin Instructions: Changed to sublingual due to patients NPO status and aspiration risk  With dry hands, peel back foil backing and gently remove tablet; do not push oral disintegrating tablet through foil backing; administer immediately on tongue and oral disintegrating tablet dissolves in seconds; then swallow with saliva; liquid not required.     2129 (2.5 mg)-Given        2152 (2.5 mg)-Given        2145 (2.5 mg)-Given        2115 (2.5 mg)-Given        (2104)-Not Given        2128 (2.5 mg)-Given        [ ] 2200           ondansetron  (ZOFRAN-ODT) ODT tab 4 mg  Dose: 4 mg Freq: EVERY 6 HOURS PRN Route: PO  PRN Reason: nausea  Start: 02/12/17 0638   Admin Instructions: This is Step 1 of nausea and vomiting management.  If nausea not resolved in 15 minutes, go to Step 2 prochlorperazine (COMPAZINE). Do not push through foil backing. Peel back foil and gently remove. Place on tongue immediately. Administration with liquid unnecessary       1946 (4 mg)-Given              Or  ondansetron (ZOFRAN) injection 4 mg  Dose: 4 mg Freq: EVERY 6 HOURS PRN Route: IV  PRN Reasons: nausea,vomiting  Start: 02/12/17 0638   Admin Instructions: This is Step 1 of nausea and vomiting management.  If nausea not resolved in 15 minutes, go to Step 2 prochlorperazine (COMPAZINE).                      polyethylene glycol (MIRALAX/GLYCOLAX) Packet 17 g  Dose: 17 g Freq: 2 TIMES DAILY Route: PO  Start: 02/17/17 2100   Admin Instructions: 1 Packet = 17 grams. Mixed prescribed dose in 8 ounces of water. Follow with 8 oz. of water.     (1145)-Not Given [C]       (2118)-Not Given [C]        (0844)-Not Given       (2153)-Not Given        1046 (17 g)-Given       (2006)-Not Given [C]        0917 (17 g)-Given       (2102)-Not Given [C]        0831 (17 g)-Given       (2059)-Not Given        0910 (17 g)-Given       2125 (17 g)-Given        (1024)-Not Given       [ ] 2100           polyethylene glycol (MIRALAX/GLYCOLAX) Packet 17 g  Dose: 17 g Freq: DAILY PRN Route: PO  PRN Reason: constipation  Start: 02/14/17 0941   Admin Instructions: Give in 8oz of  water, juice, or soda. Hold for loose stools.  This is the second step of a three step constipation treatment protocol.  1 Packet = 17 grams. Mixed prescribed dose in 8 ounces of water. Follow with 8 oz. of water.               risperiDONE (risperDAL M-TABS) ODT tab 0.5 mg  Dose: 0.5 mg Freq: 3 TIMES DAILY PRN Route: SL  PRN Comment: for agitation  Start: 02/12/17 0636        (0828)-Not Given             risperiDONE (risperDAL M-TABS)  ODT tab 1 mg  Dose: 1 mg Freq: 2 TIMES DAILY Route: SL  Start: 02/20/17 0900    0808 (1 mg)-Given       2129 (1 mg)-Given        0802 (1 mg)-Given       2151 (1 mg)-Given        0902 (1 mg)-Given       2025 (1 mg)-Given        0918 (1 mg)-Given       2115 (1 mg)-Given        0834 (1 mg)-Given       (2100)-Not Given        0909 (1 mg)-Given       2126 (1 mg)-Given        0847 (1 mg)-Given       [ ] 2100           senna-docusate (SENOKOT-S;PERICOLACE) 8.6-50 MG per tablet 1 tablet  Dose: 1 tablet Freq: 2 TIMES DAILY PRN Route: PO  PRN Reason: constipation  Start: 02/12/17 0636              sodium chloride (PF) 0.9% PF flush 3 mL  Dose: 3 mL Freq: EVERY 8 HOURS Route: IK  Start: 03/17/17 1230   Admin Instructions: And Q1H PRN, to lock peripheral IV dormant line.     0613 (3 mL)-Given       1419 (3 mL)-Given       2129 (3 mL)-Given        0645 (3 mL)-Given       1238 (3 mL)-Given       2149 (3 mL)-Given        0652 (3 mL)-Given       1244 (3 mL)-Given       2025 (3 mL)-Given        0329 (3 mL)-Given [C]              1502 (3 mL)-Given       2115 (3 mL)-Given        0059 (3 mL)-Given       (0608)-Not Given [C]       1319 (3 mL)-Given       (2100)-Not Given        (0400)-Not Given       (1201)-Not Given       (2128)-Not Given        (0430)-Not Given       1208 (3 mL)-Given       [ ] 2030           sodium chloride (PF) 0.9% PF flush 3 mL  Dose: 3 mL Freq: EVERY 1 HOUR PRN Route: IK  PRN Reasons: line flush,post meds or blood draw  Start: 03/17/17 1227   Admin Instructions: for peripheral IV flush post IV meds               valproic acid (DEPAKENE) syrup 1,500 mg  Dose: 1,500 mg Freq: AT BEDTIME Route: PO  Start: 03/20/17 2200    2129 (1,500 mg)-Given        2151 (1,500 mg)-Given        2145 (1,500 mg)-Given        2121 (1,500 mg)-Given        2322 (1,500 mg)-Given        2127 (1,500 mg)-Given        [ ] 2200           valproic acid (DEPAKENE) syrup 500 mg  Dose: 500 mg Freq: EVERY MORNING Route: PO  Start: 03/20/17 1000     0813 (500 mg)-Given        0804 (500 mg)-Given        1048 (500 mg)-Given        0916 (500 mg)-Given        0838 (500 mg)-Given        0908 (500 mg)-Given        0846 (500 mg)-Given          Discontinued Medications  Medications 03/22/17 03/23/17 03/24/17 03/25/17 03/26/17 03/27/17 03/28/17         Rate: 75 mL/hr Freq: CONTINUOUS Route: IV  Start: 03/26/17 1445   End: 03/28/17 1146        1632 ( )-New Bag        0225 ( )-New Bag       0907 ( )-Rate/Dose Verify       1136 ( )-Rate/Dose Change       1311 ( )-Rate/Dose Change       2131 ( )-New Bag        1025 ( )-New Bag       1146-Med Discontinued

## 2017-02-11 NOTE — ED PROVIDER NOTES
History     Chief Complaint:  L knee pain after fall       HPI   Hx limited by baseline psychiatric illness.    Nel Farmer is a 61 year old female with past medical history significant for schizoaffective disorder who presents via EMS for evaluation after a fall.  The patient reports she slid from the passenger seat of the car today while running errands with her mother and then trying to get out of the car.  On EMS arrival, they found her to be sitting on the metal edge of the car below the seat level.  She complains of pain in her left knee where she has had a previous joint replacement.  Additionally she complains of foot and left shoulder pain.  She is able to move all extremities and did not hit her head.  She is not on any blood thinners.      Allergies:  Amoxicillin  Haldol  Penicillins   Seroquel - GI disturbance      Medications:    Novolog 70/30  Glipizide   Risperidone  Divalproex  Lorazepam   Olanzapine   Clonidine   Isosorbide mononitrate   Metoprolol  Furosemide   Diltiazem  Omeprazole   Zofran  Senna-docusate  Polyethylene glycol      Past Medical History:    Diabetes mellitus    Hypertension  Hyperlipidemia  Chronic kidney disease, stage 3  Schizoaffective disorder   Anxiety   Depression   Cognitive deficits   Osteoarthritis     Past Surgical History:    Total knee arthroplasty, left 3/2010  Tonsillectomy with adenoidectomy   Dilation and curettage      Family History:    Hypertension - mother  Colorectal cancer - father      Social History:  Marital Status: single  Presents to the ED alone.  Tobacco Use: No previous or current tobacco use.  Alcohol Use: No alcohol use.       Review of Systems   Musculoskeletal: Negative for back pain and neck pain.        Positive for knee, foot, and shoulder pain.   All other systems reviewed and are negative.      Physical Exam   First Vitals:  BP: 137/59 mmHg  Heart Rate: 80   Resp: 16  SpO2: 91% RA  Temp: 99.3  F (oral)       Physical Exam  General:  woman lying recumbent in room 19  HENT: MMM, no signs of facial trauma  CV:  regular rhythm, soft compartments in BLE, cap refill normal in bilateral feet  Resp: normal effort, clear   GI: abdomen soft, nontender  MSK:  Spine: no midline tenderness  Extremities: no focal tenderness.  Patient yells when touched anywhere on her body.  Able to range all joints in BLE well, no pain with axial loading.  Pelvis and chest wall stable.  Skin:   No abrasion  Small ecchymosis to L knee, no breaks in skin.  No laceration  Neuro: awake, alert, speaks in shrill loud voice (baseline), moves all extremities spontaneously, ambulatory, unable to provide nuanced history  Psych: repeatedly requests her mother       Emergency Department Course     Imaging:  Foot x-ray, left (3 views):  Negative for fracture. Tiny radiopaque foreign bodies again seen adjacent to the proximal phalanx of the fifth digit.  Report per radiology.     Chest X-ray (PA and lateral):  Cardiomegaly. No evidence for infiltrate or congestive heart failure. No pleural effusions. Minimal degenerative changes seen spine. No pneumothorax.  Report per radiology.     Shoulder x-ray, left (3 views):  Normal.  Report per radiology.     Knee x-ray, left (3 views):  Total knee arthroplasty components are again noted without evidence for hardware failure or periprosthetic fracture. Probable small knee joint effusion is noted. No significant changes since the prior study.  Report per radiology.     Radiographic findings were communicated with the patient who voiced understanding of the findings.    Interventions:  (1531) Tylenol, 650 mg, PO     Emergency Department Course:  The patient arrived in the emergency department via [facility].   Nursing notes and vitals reviewed.  I performed an exam of the patient as documented above. GCS 15.    The patient was sent for x-rays of the foot, chest, shoulder, and knee while in the emergency department, findings above.      16:40   Mother initiated called to ED and spoke with ED RN.  Mother comfortable coming to ED to pick her up    Patient ambulated in the department under ED staff observation.     Findings and plan explained to the patient. Patient discharged home with instructions regarding supportive care, medications, and reasons to return. The importance of close follow-up was reviewed.    Impression & Plan      Medical Decision Making:  This 61 year old woman who is well known to this emergency department presents for evaluation of scattered injuries after apparently sliding out of her car.  She did not lose consciousness, though the patient, per her baseline, is a poor historian and unable to describe these events in great detail.  Her exam has no worrisome findings, acknowledging that her demeanor makes it very difficult to fully localize her greatest discomfort.  I considered, but do not suspect, more serious injuries including intracranial hemorrhage, spinal fracture, pneumothorax, and intraabdominal hemorrhage.  I ordered a left foot x-ray, though she later described greater pain in the right foot, but given she has been ambulatory and weight bearing with both legs and is much more bothered by her left knee, I do not think she needs to be sent back to the radiology suite for images of her right foot given that my clinical suspicion for acute serious injury there was very low.  She can use over the counter analgesics as needed and follow up through primary care as desired.  Acute worsening should prompt return visit.      Diagnosis:    ICD-10-CM    1. Contusion of left knee, initial encounter S80.02XA    2. Contusion of left shoulder, initial encounter S40.012A    3. Fall, initial encounter W19.XXXA    4. History of psychiatric disorder Z86.59        Disposition:  Discharged to home.     Discharge Medications:  None      I, Jacqueline Raza, am serving as a scribe on 2/11/2017 at 3:14 PM to personally document services performed by   Tiara based on my observations and the provider's statements to me.    Jacqueline Raza  2/11/2017    EMERGENCY DEPARTMENT        Duane Menjivar MD  02/11/17 8214

## 2017-02-11 NOTE — IP AVS SNAPSHOT
"` [place] [address] MEDICAL SPECIALTY UNIT: [phone]                                              INTERAGENCY TRANSFER FORM - NURSING   2017                    Hospital Admission Date: 2017  MARIO ALBERTO PATRICK   : 1955  Sex: Female        Attending Provider: Hong Lee MD     Allergies:  Amoxicillin, Amoxicillin, Haldol [Benzyl Alcohol], Haldol [Haloperidol], Pcn [Penicillin G], Penicillins, Seroquel [Quetiapine]    Infection:  None   Service:  HOSPITALIST    Ht:  1.6 m (5' 3\")   Wt:  102.2 kg (225 lb 5 oz)   Admission Wt:  99 kg (218 lb 4.1 oz)    BMI:  39.91 kg/m 2   BSA:  2.13 m 2            Patient PCP Information     None on File      Current Code Status     Date Active Code Status Order ID Comments User Context       Prior      Code Status History     Date Active Date Inactive Code Status Order ID Comments User Context    3/28/2017  2:50 PM  Full Code [id number]  Wilfrido Flores MD Outpatient    2017  6:38 AM 3/28/2017  2:50 PM Full Code [id number]  Hong Lee MD Inpatient    3/10/2016 11:07 AM 2017  6:38 AM Full Code [id number]  Kayleen Trevino PA-C Outpatient    3/9/2016  8:44 PM 3/10/2016 11:07 AM Full Code [id number]  Carolee Kline MD ED    2015  3:52 PM 2015  2:38 PM Full Code [id number]  Brenda Duncan RN Inpatient    2015  5:39 PM 2015  2:02 PM Full Code [id number]  Veronica Salmon RN Inpatient    5/10/2015  7:45 PM 2015  5:11 PM Full Code [id number]  Natalia Garibay MD Inpatient    2015  5:33 AM 2015  6:46 PM Full Code [id number]  Luna Duncan MD Inpatient    3/20/2015  6:00 AM 3/23/2015  5:43 PM Full Code [id number]  Kevin Hewitt MD Inpatient    2015  4:00 AM 2015  3:50 PM Full Code [id number]  Marlene Corrales, RN Inpatient    1/15/2014  5:59 PM 2014  3:27 PM Full Code [id number]  Keena Regan, RN Inpatient    2013  4:35 PM 5/3/2013  6:24 PM Full Code " [id number]  Nel Wallace, RN Inpatient    3/27/2013  5:08 PM 4/2/2013  2:03 PM Full Code [id number]  Brenda Mishra, RN Inpatient    12/4/2012  3:08 PM 12/11/2012  2:53 PM Full Code [id number]  Jesenia Cabello, RN Inpatient    5/18/2012  7:38 PM 5/23/2012  4:06 PM Full Code [id number]  Veronica Salmon, VERONA Inpatient    1/31/2012  3:58 PM 2/3/2012  2:14 PM Full Code [id number]  Shiva Feng, DO Inpatient      Advance Directives        Does patient have a scanned Advance Directive/ACP document in EPIC?           No        Hospital Problems as of 3/28/2017              Priority Class Noted POA    Falls frequently Medium  2/12/2017 Yes    Sepsis due to urinary tract infection (H) Medium  3/16/2017 Yes      Non-Hospital Problems as of 3/28/2017              Priority Class Noted    HTN (hypertension)   Unknown    Hyperlipidemia with target LDL less than 70   Unknown    TYPE 2 DIABETES, HBA1C GOAL < 7%   10/31/2010    acute Mastitis   1/31/2012    CKD (chronic kidney disease) stage 3, GFR 30-59 ml/min   Unknown    Advanced directives, counseling/discussion   6/25/2012    Health Care Home   2/1/2013    Suicidal ideation   4/30/2013    Schizoaffective disorder, bipolar type (H)   5/22/2013    Depression (emotion)   1/15/2014    Paranoia (H)   1/25/2015    Schizoaffective disorder, manic type (H)   3/20/2015    Psychosis   4/8/2015    Depression with suicidal ideation Medium  9/11/2015    Depression Medium  12/29/2015    Nausea Medium  3/9/2016      Immunizations     Name Date      Influenza (H1N1) 12/01/09     Influenza (IIV3) 08/29/12     Influenza (IIV3) 08/28/12     Influenza (IIV3) 11/01/11     Influenza (IIV3) 10/29/08     Tdap (Adacel,Boostrix) 03/25/12          END      ASSESSMENT     Discharge Profile Flowsheet     EXPECTED DISCHARGE     Patient's communication style  spoken language (English or Bilingual) 02/12/17 0544    Expected Discharge Date  03/28/17 (Pending labs) 03/27/17 1359   SKIN       "DISCHARGE NEEDS ASSESSMENT     Inspection  Full 03/28/17 1112    Equipment Currently Used at Home  none 03/23/17 0944   Skin WDL  ex 03/28/17 1112    Transportation Available  -- (mom provides. ) 02/03/12 0940   Skin Moisture  dry 03/27/17 0944    # of Referrals Placed by CTS  Senior Linkage Line;Post Acute Facilities;[facility] 03/24/17 1611   Skin Integrity  drain/device(s);skin tear(s) 03/27/17 0944    GASTROINTESTINAL (ADULT,PEDIATRIC,OB)     Additional Documentation  Drains (LDA) 03/27/17 0220    GI WDL  ex 03/28/17 1112   Skin areas NOT inspected  Buttock, left;Buttock, right;Sacrum;Coccyx 03/27/17 0220    Abdominal Appearance  obese 03/28/17 1112   Skin Color/Characteristics  -- (coccyx pink, blanchable) 03/28/17 1112    All Quadrants Bowel Sounds  hypoactive 03/27/17 1653   Skin Temperature  warm 03/27/17 0944    Last Bowel Movement  03/27/17 03/27/17 1653   Skin Elasticity  quick return to original state 03/27/17 0220    GI Signs/Symptoms  fecal incontinence 03/27/17 1653   SAFETY      Passing flatus  yes 03/25/17 1421   Safety WDL  WDL 03/28/17 1112    COMMUNICATION ASSESSMENT                        Assessment WDL (Within Defined Limits) Definitions           Safety WDL     Effective: 09/28/15    Row Information: <b>WDL Definition:</b> Bed in low position, wheels locked; call light in reach; upper side rails up x 2; ID band on<br> <font color=\"gray\"><i>Item=AS safety wdl>>List=AS safety wdl>>Version=F14</i></font>      Skin WDL     Effective: 09/28/15    Row Information: <b>WDL Definition:</b> Warm; dry; intact; elastic; without discoloration; pressure points without redness<br> <font color=\"gray\"><i>Item=AS skin wdl>>List=AS skin wdl>>Version=F14</i></font>      Vitals     Vital Signs Flowsheet     VITAL SIGNS     PAIN ASSESSMENT/FLACC      Temp  98.4  F (36.9  C) 03/28/17 0743   Pain Rating: FLACC (rest) - Face  0 03/25/17 0202    Temp src  Axillary 03/28/17 0743   Pain Rating: FLACC (rest) - Legs  0 " "03/25/17 0202    Resp  16 03/28/17 0743   Pain Rating: FLACC (rest) - Activity  0 03/25/17 0202    Pulse  70 03/27/17 0114   Pain Rating: FLACC (rest) - Cry  0 03/25/17 0202    Heart Rate  66 03/28/17 0743   Pain Rating: FLACC (rest) - Consolability  0 03/25/17 0202    Pulse/Heart Rate Source  Monitor 03/28/17 0743   Score: FLACC (rest)  0 03/25/17 0202    BP  135/62 03/28/17 0743   ANALGESIA SIDE EFFECTS MONITORING      BP Location  Left arm 03/28/17 0743   Side Effects Monitoring: Respiratory Quality  R 03/28/17 1031    OXYGEN THERAPY     Side Effects Monitoring: Respiratory Depth  N 03/28/17 1031    SpO2  97 % 03/28/17 0743   Side Effects Monitoring: Sedation Level  1 03/28/17 1031    O2 Device  None (Room air) 03/28/17 0743   HEIGHT AND WEIGHT      FiO2 (%)  40 % 03/18/17 0234   Height  1.6 m (5' 3\") 02/11/17 2350    Oxygen Delivery  2 LPM 03/20/17 0045   Height Method  Stated 02/11/17 2350    PAIN/COMFORT     Weight  102.2 kg (225 lb 5 oz) 03/25/17 0525    Patient Currently in Pain  sleeping: patient not able to self report 03/28/17 1403   POSITIONING      Preferred Pain Scale  number (Numeric Rating Pain Scale) 03/27/17 0915   Body Position  left, side-lying;lower extremity elevated, left;lower extremity elevated, right 03/28/17 1403    Patient's Stated Pain Goal  Unable to Assess 03/20/17 1026   Head of Bed (HOB)  HOB at 20 degrees 03/28/17 1403    0-10 Pain Scale  8 03/27/17 0909   Positioning/Transfer Devices  pillows;in use 03/28/17 1403    Word Pain Scale  8 03/25/17 1606   Chair  Upright in chair 03/28/17 1031    FACES Pain Rating  2-->hurts little bit 03/20/17 1026   DAILY CARE      Pain Location  Generalized 03/27/17 1027   Activity Type  up in chair 03/28/17 1246    Pain Orientation  Left;Right 03/25/17 1606   Activity Level of Assistance  assistance, 2 people 03/28/17 1246    Pain Descriptors  Aching 03/24/17 1551   Activity Assistive Device  mechanical lift 03/28/17 1403    Nonverbal Indicators Of " Pain  restless 03/13/17 1930   ECG      Pain Management Interventions  analgesia administered 03/13/17 1930   ECG Rhythm  Sinus tachycardia 03/17/17 1203    Pain Intervention(s)  Medication (See eMAR) 03/27/17 0915   Equipment  telemetry batteries changed;electrodes changed 03/23/17 1023    Response to Interventions  Decrease in pain 03/27/17 1027                 Patient Lines/Drains/Airways Status    Active LINES/DRAINS/AIRWAYS     Name: Placement date: Placement time: Site: Days: Last dressing change:    Urethral Catheter 16 fr 03/18/17   0044      10     Peripheral IV 03/20/17 Left;Anterior Lower forearm 03/20/17   1119   Lower forearm   8             Patient Lines/Drains/Airways Status    Active PICC/CVC     None            Intake/Output Detail Report     Date Intake     Output Net    Shift P.O. I.V. IV Piggyback Total Urine Total       Day 03/27/17 0700 - 03/27/17 1459 840 -- -- 840 800 800 40    Genna 03/27/17 1500 - 03/27/17 2259 2000 -- -- 2000 2700 2700 -700    Noc 03/27/17 2300 - 03/28/17 0659 -- -- -- -- 1400 1400 -1400    Day 03/28/17 0700 - 03/28/17 9613 835 3251 -- 1990 1800 1800 190    Genna 03/28/17 1500 - 03/28/17 2259 -- -- -- -- 600 600 -600      Last Void/BM       Most Recent Value    Urine Occurrence 1 at 03/23/2017 1021    Stool Occurrence 1 at 03/22/2017 1200      Case Management/Discharge Planning     Case Management/Discharge Planning Flowsheet     REFERRAL INFORMATION     EMPLOYMENT      Admission Type  observation 03/10/17 1246   Do you work full or part-time?  no 03/10/17 1246    Arrived From  home or self-care 03/10/17 1246   COPING/STRESS      Referral Source  interdisciplinary rounds 03/10/17 1246   Major Change/Loss/Stressor  hospitalization;illness 02/12/17 0555    New Steerage to  Clinics?  No 02/12/17 1457   EXPECTED DISCHARGE      # of Referrals Placed by [facility]  Senior Linkage Line;Post Acute Facilities;[facility] 03/24/17 1611   Expected Discharge Date  03/28/17 (Pending labs) 03/27/17  1359    Post Acute Facilities  SNF 03/24/17 1611   DISCHARGE PLANNING      Reason For Consult  discharge planning 03/13/17 1544   Transportation Available  -- (mom provides. ) 02/03/12 0940    Record Reviewed  medical record 03/13/17 1544   FINAL NOTE       Assigned to Case  Tanya Willard 03/24/17 1611   Final Note  -- (D/C to [facility]) 03/28/17 1433    Primary Care Clinic Name  [facility] 03/13/17 1544   FINAL RESOURCES      Primary Care MD Name  Dr. Myles 03/13/17 1544   Equipment Currently Used at Home  none 03/23/17 0944    LIVING ENVIRONMENT     ABUSE RISK SCREEN      Lives With  parent(s) 03/23/17 0944   QUESTION TO PATIENT:  Has a member of your family or a partner(now or in the past) intimidated, hurt, manipulated, or controlled you in any way?  no 02/11/17 2351    Living Arrangements  house 03/10/16 1338   QUESTION TO PATIENT: Do you feel safe going back to the place where you are living?  yes 02/11/17 2351    Provides Primary Care For  no one, unable/limited ability to care for self 03/10/17 1246   OBSERVATION: Is there reason to believe there has been maltreatment of a vulnerable adult (ie. Physical/Sexual/Emotional abuse, self neglect, lack of adequate food, shelter, medical care, or financial exploitation)?  no 02/11/17 2351    Able to Return to Prior Living Arrangements  no 03/10/17 1246   (R) MENTAL HEALTH SUICIDE RISK      Living Arrangement Comments  -- (SW attempting LTC/ GH Placement--pt denied several times) 03/10/17 1246   Are you depressed or being treated for depression?  No 02/12/17 0551    ASSESSMENT OF FAMILY/SOCIAL SUPPORT     HOMICIDE RISK      Who is your support system?  Parent(s) (Elderly Mom/ whom can't con't to care for pt) 03/10/17 1246   Homicidal Ideation  no 02/11/17 2351

## 2017-02-11 NOTE — IP AVS SNAPSHOT
"    [place] [address] [facility]: [phone]                                              INTERAGENCY TRANSFER FORM - LAB / IMAGING / EKG / EMG RESULTS   2017                    Hospital Admission Date: 2017  MARIO ALBERTO PATRICK   : 1955  Sex: Female        Attending Provider: Hong Lee MD     Allergies:  Amoxicillin, Amoxicillin, Haldol [Benzyl Alcohol], Haldol [Haloperidol], Pcn [Penicillin G], Penicillins, Seroquel [Quetiapine]    Infection:  None   Service:  HOSPITALIST    Ht:  1.6 m (5' 3\")   Wt:  102.2 kg (225 lb 5 oz)   Admission Wt:  99 kg (218 lb 4.1 oz)    BMI:  39.91 kg/m 2   BSA:  2.13 m 2            Patient PCP Information     None on File         Lab Results - 3 Days      Glucose by meter [[id number]] (Abnormal)  Resulted: 17 1156, Result status: Final result    Ordering provider: Hong Lee MD  17 1151 Resulting lab: POINT OF CARE TEST, GLUCOSE    Specimen Information    Type Source Collected On     17 1151          Components       Value Reference Range Flag Lab   Glucose 230 70 - 99 mg/dL H 170            Basic metabolic panel [[id number]] (Abnormal)  Resulted: 17 0856, Result status: Final result    Ordering provider: Angie Park MD  17 0000 Resulting lab: [facility]    Specimen Information    Type Source Collected On   Blood  17 0826          Components       Value Reference Range Flag Lab   Sodium 142 133 - 144 mmol/L  FrStHsLb   Potassium 4.0 3.4 - 5.3 mmol/L  FrStHsLb   Chloride 115 94 - 109 mmol/L H FrStHsLb   Carbon Dioxide 20 20 - 32 mmol/L  FrStHsLb   Anion Gap 7 3 - 14 mmol/L  FrStHsLb   Glucose 175 70 - 99 mg/dL H FrStHsLb   Urea Nitrogen 37 7 - 30 mg/dL H FrStHsLb   Creatinine 2.63 0.52 - 1.04 mg/dL H FrStHsLb   GFR Estimate 18 >60 mL/min/1.7m2 L FrStHsLb   Comment:  Non  GFR Calc   GFR Estimate If Black 22 >60 mL/min/1.7m2 L FrStHsLb   Comment:  African " American GFR Calc   Calcium 9.5 8.5 - 10.1 mg/dL  FrStHsLb            Glucose by meter [[id number]] (Abnormal)  Resulted: 03/28/17 0846, Result status: Final result    Ordering provider: Hong Lee MD  03/28/17 0841 Resulting lab: POINT OF CARE TEST, GLUCOSE    Specimen Information    Type Source Collected On     03/28/17 0841          Components       Value Reference Range Flag Lab   Glucose 174 70 - 99 mg/dL H 170            CBC with platelets [[id number]] (Abnormal)  Resulted: 03/28/17 0845, Result status: Final result    Ordering provider: Angie Park MD  03/28/17 0000 Resulting lab: [facility]    Specimen Information    Type Source Collected On   Blood  03/28/17 0826          Components       Value Reference Range Flag Lab   WBC 10.4 4.0 - 11.0 10e9/L  FrStHsLb   RBC Count 3.37 3.8 - 5.2 10e12/L L FrStHsLb   Hemoglobin 10.2 11.7 - 15.7 g/dL L FrStHsLb   Hematocrit 33.3 35.0 - 47.0 % L FrStHsLb   MCV 99 78 - 100 fl  FrStHsLb   MCH 30.3 26.5 - 33.0 pg  FrStHsLb   MCHC 30.6 31.5 - 36.5 g/dL L FrStHsLb   RDW 14.4 10.0 - 15.0 %  FrStHsLb   Platelet Count 375 150 - 450 10e9/L  FrStHsLb            Glucose by meter [[id number]] (Abnormal)  Resulted: 03/28/17 0256, Result status: Final result    Ordering provider: Hong Lee MD  03/28/17 0252 Resulting lab: POINT OF CARE TEST, GLUCOSE    Specimen Information    Type Source Collected On     03/28/17 0252          Components       Value Reference Range Flag Lab   Glucose 140 70 - 99 mg/dL H 170            Glucose by meter [[id number]] (Abnormal)  Resulted: 03/27/17 2111, Result status: Final result    Ordering provider: Hong Lee MD  03/27/17 2106 Resulting lab: POINT OF CARE TEST, GLUCOSE    Specimen Information    Type Source Collected On     03/27/17 2106          Components       Value Reference Range Flag Lab   Glucose 161 70 - 99 mg/dL H 170            Urine Culture Aerobic Bacterial [[id number]]  Resulted: 03/27/17  2106, Result status: Final result    Ordering provider: Angie Park MD  03/26/17 1006 Resulting lab: [facility]    Specimen Information    Type Source Collected On   Urine Urine catheter 03/26/17 1130          Components       Value Reference Range Flag Lab   Specimen Description Catheterized Urine   FrStHsLb   Special Requests Specimen received in preservative   75   Culture Micro No growth   225   Micro Report Status FINAL 03/27/2017   225            Glucose by meter [[id number]] (Abnormal)  Resulted: 03/27/17 1731, Result status: Final result    Ordering provider: Hong Lee MD  03/27/17 1726 Resulting lab: POINT OF CARE TEST, GLUCOSE    Specimen Information    Type Source Collected On     03/27/17 1726          Components       Value Reference Range Flag Lab   Glucose 184 70 - 99 mg/dL H 170            Glucose by meter [[id number]] (Abnormal)  Resulted: 03/27/17 1136, Result status: Final result    Ordering provider: Hong Lee MD  03/27/17 1131 Resulting lab: POINT OF CARE TEST, GLUCOSE    Specimen Information    Type Source Collected On     03/27/17 1131          Components       Value Reference Range Flag Lab   Glucose 253 70 - 99 mg/dL H 170            Glucose by meter [[id number]] (Abnormal)  Resulted: 03/27/17 1136, Result status: Final result    Ordering provider: Hong Lee MD  03/27/17 0218 Resulting lab: POINT OF CARE TEST, GLUCOSE    Specimen Information    Type Source Collected On     03/27/17 0218          Components       Value Reference Range Flag Lab   Glucose 145 70 - 99 mg/dL H 170            Glucose by meter [[id number]] (Abnormal)  Resulted: 03/27/17 0936, Result status: Final result    Ordering provider: Hong Lee MD  03/27/17 0908 Resulting lab: POINT OF CARE TEST, GLUCOSE    Specimen Information    Type Source Collected On     03/27/17 0908          Components       Value Reference Range Flag Lab   Glucose 188 70 - 99  mg/dL H 170            Basic metabolic panel [[id number]] (Abnormal)  Resulted: 03/27/17 0926, Result status: Final result    Ordering provider: Angie Park MD  03/27/17 0000 Resulting lab: [facility]    Specimen Information    Type Source Collected On   Blood  03/27/17 0851          Components       Value Reference Range Flag Lab   Sodium 148 133 - 144 mmol/L H FrStHsLb   Potassium 4.1 3.4 - 5.3 mmol/L  FrStHsLb   Chloride 121 94 - 109 mmol/L H FrStHsLb   Carbon Dioxide 21 20 - 32 mmol/L  FrStHsLb   Anion Gap 6 3 - 14 mmol/L  FrStHsLb   Glucose 201 70 - 99 mg/dL H FrStHsLb   Urea Nitrogen 38 7 - 30 mg/dL H FrStHsLb   Creatinine 2.91 0.52 - 1.04 mg/dL H FrStHsLb   GFR Estimate 16 >60 mL/min/1.7m2 L FrStHsLb   Comment:  Non  GFR Calc   GFR Estimate If Black 20 >60 mL/min/1.7m2 L FrStHsLb   Comment:  African American GFR Calc   Calcium 10.0 8.5 - 10.1 mg/dL  FrStHsLb            CBC with platelets [[id number]] (Abnormal)  Resulted: 03/27/17 0914, Result status: Final result    Ordering provider: Angie Park MD  03/27/17 0000 Resulting lab: [facility]    Specimen Information    Type Source Collected On   Blood  03/27/17 0851          Components       Value Reference Range Flag Lab   WBC 10.7 4.0 - 11.0 10e9/L  FrStHsLb   RBC Count 3.37 3.8 - 5.2 10e12/L L FrStHsLb   Hemoglobin 10.0 11.7 - 15.7 g/dL L FrStHsLb   Hematocrit 33.6 35.0 - 47.0 % L FrStHsLb    78 - 100 fl  FrStHsLb   MCH 29.7 26.5 - 33.0 pg  FrStHsLb   MCHC 29.8 31.5 - 36.5 g/dL L FrStHsLb   RDW 14.4 10.0 - 15.0 %  FrStHsLb   Platelet Count 405 150 - 450 10e9/L  [facility], [facility]            Glucose by meter [[id number]] (Abnormal)  Resulted: 03/26/17 2136, Result status: Final result    Ordering provider: Hong Lee MD  03/26/17 2128 Resulting lab: POINT OF CARE TEST, GLUCOSE    Specimen Information    Type Source Collected On     03/26/17 2128          Components       Value Reference  Range Flag Lab   Glucose 121 70 - 99 mg/dL H 170            Glucose by meter [[id number]] (Abnormal)  Resulted: 03/26/17 1711, Result status: Final result    Ordering provider: Hong Lee MD  03/26/17 1700 Resulting lab: POINT OF CARE TEST, GLUCOSE    Specimen Information    Type Source Collected On     03/26/17 1700          Components       Value Reference Range Flag Lab   Glucose 171 70 - 99 mg/dL H 170            Glucose by meter [[id number]] (Abnormal)  Resulted: 03/26/17 1236, Result status: Final result    Ordering provider: Hong Lee MD  03/26/17 1232 Resulting lab: POINT OF CARE TEST, GLUCOSE    Specimen Information    Type Source Collected On     03/26/17 1232          Components       Value Reference Range Flag Lab   Glucose 187 70 - 99 mg/dL H 170            Basic metabolic panel [[id number]] (Abnormal)  Resulted: 03/26/17 1104, Result status: Final result    Ordering provider: Hong Lee MD  03/26/17 0928 Resulting lab: [facility]    Specimen Information    Type Source Collected On     03/26/17 1036          Components       Value Reference Range Flag Lab   Sodium 145 133 - 144 mmol/L H FrStHsLb   Potassium 4.3 3.4 - 5.3 mmol/L  FrStHsLb   Chloride 114 94 - 109 mmol/L H FrStHsLb   Carbon Dioxide 20 20 - 32 mmol/L  FrStHsLb   Anion Gap 11 3 - 14 mmol/L  FrStHsLb   Glucose 225 70 - 99 mg/dL H FrStHsLb   Urea Nitrogen 39 7 - 30 mg/dL H FrStHsLb   Creatinine 3.00 0.52 - 1.04 mg/dL H FrStHsLb   GFR Estimate 16 >60 mL/min/1.7m2 L FrStHsLb   Comment:  Non  GFR Calc   GFR Estimate If Black 19 >60 mL/min/1.7m2 L FrStHsLb   Comment:  African American GFR Calc   Calcium 10.3 8.5 - 10.1 mg/dL H FrStHsLb            Basic metabolic panel [[id number]]  Resulted: 03/26/17 0924, Result status: Edited Result - FINAL    Ordering provider: Angie Park MD  03/26/17 0000 Resulting lab: [facility]    Specimen Information    Type Source  Collected On   Blood  03/26/17 0830          Components       Value Reference Range Flag Lab   Sodium -- 133 - 144 mmol/L  FrStHsLb   Result:         Canceled, Test credited   Specimen hemolyzed     Potassium -- 3.4 - 5.3 mmol/L  FrStHsLb   Result:         Canceled, Test credited   Specimen hemolyzed     Chloride -- 94 - 109 mmol/L  FrStHsLb   Result:         Canceled, Test credited   Specimen hemolyzed     Carbon Dioxide -- 20 - 32 mmol/L  FrStHsLb   Result:         Canceled, Test credited   Specimen hemolyzed     Anion Gap -- 6 - 17 mmol/L  FrStHsLb   Result:         Canceled, Test credited   Specimen hemolyzed     Glucose -- 70 - 99 mg/dL  FrStHsLb   Result:         Canceled, Test credited   Specimen hemolyzed     Urea Nitrogen -- 7 - 30 mg/dL  FrStHsLb   Result:         Canceled, Test credited   Specimen hemolyzed     Creatinine -- 0.52 - 1.04 mg/dL  FrStHsLb   Result:         Canceled, Test credited   Specimen hemolyzed     GFR Estimate -- >60 mL/min/1.7m2  FrStHsLb   Result:         Canceled, Test credited   Specimen hemolyzed     GFR Estimate If Black -- >60 mL/min/1.7m2  FrStHsLb   Result:         Canceled, Test credited   Specimen hemolyzed     Calcium -- 8.5 - 10.1 mg/dL  FrStHsLb   Result:         Canceled, Test credited   Specimen hemolyzed              Glucose by meter [[id number]] (Abnormal)  Resulted: 03/26/17 0831, Result status: Final result    Ordering provider: Hong Lee MD  03/26/17 0807 Resulting lab: POINT OF CARE TEST, GLUCOSE    Specimen Information    Type Source Collected On     03/26/17 0807          Components       Value Reference Range Flag Lab   Glucose 184 70 - 99 mg/dL H 170            Blood culture [[id number]]  Resulted: 03/26/17 0659, Result status: Final result    Ordering provider: Irene Billy MD  03/20/17 0914 Resulting lab: [facility] RAPID TESTING [facility]    Specimen Information    Type Source Collected On   Blood  03/20/17 1011          Components       Value Reference  Range Flag Lab   Specimen Description Blood Left Hand   FrStHsLb   Culture Micro No growth   226   Micro Report Status FINAL 03/26/2017   226            Blood culture [[id number]]  Resulted: 03/26/17 0659, Result status: Final result    Ordering provider: Irene Billy MD  03/20/17 0914 Resulting lab: [facility]    Specimen Information    Type Source Collected On   Blood  03/20/17 1000          Components       Value Reference Range Flag Lab   Specimen Description Blood Right Hand   FrStHsLb   Culture Micro No growth   226   Micro Report Status FINAL 03/26/2017   226            Glucose by meter [[id number]] (Abnormal)  Resulted: 03/26/17 0620, Result status: Final result    Ordering provider: Hong Lee MD  03/26/17 0617 Resulting lab: POINT OF CARE TEST, GLUCOSE    Specimen Information    Type Source Collected On     03/26/17 0617          Components       Value Reference Range Flag Lab   Glucose 173 70 - 99 mg/dL H 170            Glucose by meter [[id number]] (Abnormal)  Resulted: 03/26/17 0221, Result status: Final result    Ordering provider: Hong Lee MD  03/26/17 0217 Resulting lab: POINT OF CARE TEST, GLUCOSE    Specimen Information    Type Source Collected On     03/26/17 0217          Components       Value Reference Range Flag Lab   Glucose 199 70 - 99 mg/dL H 170            Glucose by meter [[id number]] (Abnormal)  Resulted: 03/25/17 2116, Result status: Final result    Ordering provider: Hong Lee MD  03/25/17 2113 Resulting lab: POINT OF CARE TEST, GLUCOSE    Specimen Information    Type Source Collected On     03/25/17 2113          Components       Value Reference Range Flag Lab   Glucose 275 70 - 99 mg/dL H 170            Glucose by meter [[id number]] (Abnormal)  Resulted: 03/25/17 1756, Result status: Final result    Ordering provider: Hong Lee MD  03/25/17 1751 Resulting lab: POINT OF CARE TEST, GLUCOSE    Specimen Information    Type Source  Collected On     03/25/17 1751          Components       Value Reference Range Flag Lab   Glucose 215 70 - 99 mg/dL H 170            Glucose by meter [[id number]] (Abnormal)  Resulted: 03/25/17 1256, Result status: Final result    Ordering provider: Hong Lee MD  03/25/17 1245 Resulting lab: POINT OF CARE TEST, GLUCOSE    Specimen Information    Type Source Collected On     03/25/17 1245          Components       Value Reference Range Flag Lab   Glucose 210 70 - 99 mg/dL H 170            Glucose by meter [[id number]] (Abnormal)  Resulted: 03/25/17 0931, Result status: Final result    Ordering provider: Hong Lee MD  03/25/17 0852 Resulting lab: POINT OF CARE TEST, GLUCOSE    Specimen Information    Type Source Collected On     03/25/17 0852          Components       Value Reference Range Flag Lab   Glucose 169 70 - 99 mg/dL H 170            Basic metabolic panel [[id number]] (Abnormal)  Resulted: 03/25/17 0839, Result status: Final result    Ordering provider: Angie Park MD  03/25/17 0001 Resulting lab: [facility]    Specimen Information    Type Source Collected On   Blood  03/25/17 0813          Components       Value Reference Range Flag Lab   Sodium 144 133 - 144 mmol/L  FrStHsLb   Potassium 4.1 3.4 - 5.3 mmol/L  FrStHsLb   Chloride 114 94 - 109 mmol/L H FrStHsLb   Carbon Dioxide 23 20 - 32 mmol/L  FrStHsLb   Anion Gap 7 3 - 14 mmol/L  FrStHsLb   Glucose 164 70 - 99 mg/dL H FrStHsLb   Urea Nitrogen 39 7 - 30 mg/dL H FrStHsLb   Creatinine 2.94 0.52 - 1.04 mg/dL H FrStHsLb   GFR Estimate 16 >60 mL/min/1.7m2 L FrStHsLb   Comment:  Non  GFR Calc   GFR Estimate If Black 20 >60 mL/min/1.7m2 L FrStHsLb   Comment:  African American GFR Calc   Calcium 10.1 8.5 - 10.1 mg/dL  FrStHsLb            Testing Performed By     Lab - Abbreviation Name Director Address Valid Date Range    14 - FrStHsLb [facility] Unknown [address] [place] [address]  05/08/15 1057 - Present    75 - Unknown [facility] Unknown [address] [facility] [address] 01/15/15 1019 - Present    170 - Unknown POINT OF CARE TEST, GLUCOSE Unknown Unknown 10/31/11 1114 - Present    225 - Unknown [facility] Unknown [address] [facility] [address] 12/19/14 0954 - Present    226 - Unknown MICRO RAPID TESTING LAB Unknown [address] [facility] [address] 12/19/14 0955 - Present            Unresulted Labs (24h ago through future)    Start       Ordered    03/24/17 0600  Platelet count  (Pharmacological Prophylaxis- heparin (If CrCl less than 30 mL/min)- UU,UR,UA,SH,RH,PH,WY)  EVERY THREE DAYS,   Routine     Comments:  Repeat every 3 days while on VTE prophylaxis. If no result is listed, this lab has not been done the past 365 days. LATEST LAB RESULT: Platelet Count (10e9/L)       Date                     Value                 03/21/2017               207              ----------      03/21/17 1618    03/22/17 1200  Glucose Whole Blood POCT  4 TIMES DAILY,   Routine      03/22/17 1149    Unscheduled  Blood gas blood with oxy  CONDITIONAL X 3,   Routine     Comments:  Release order if patient in respiratory distress and Notify Provider.    03/17/17 1228    Unscheduled  Glucose  CONDITIONAL X 3,   Routine     Comments:  Release order if patient experiencing hyperglycemia or hypoglycemia symptoms and Notify Provider.    03/17/17 1228    Unscheduled  Hemoglobin  CONDITIONAL X 3,   Routine     Comments:  Release order if patient experiencing active bleeding and/or hypotension and Notify Provider.    03/17/17 1228         Imaging Results - 3 Days      XR Chest Port 1 View [[id number]]  Resulted: 03/26/17 1324, Result status: Final result    Ordering provider: Angie Park MD  03/26/17 1006 Resulted by: Jacqueline Lipscomb MD    Performed: 03/26/17 1018 - 03/26/17 1041 Resulting lab: RADIOLOGY RESULTS     Narrative:       CHEST PORTABLE ONE VIEW 2017 10:41 AM    HISTORY: Lethargy, evaluate for infiltrate.    COMPARISON: 3/17/2017.      Impression:       IMPRESSION: The patient is tilted to the right and rotated. No  infiltrates or other acute process.     MAYO MALHOTRA MD      Testing Performed By     Lab - Abbreviation Name Director Address Valid Date Range    104 - Rad Rslts RADIOLOGY RESULTS Unknown Unknown 05 1553 - Present               ECG/EMG Results      Echocardiogram Complete [[id number]]  Resulted: 17 1608, Result status: In process    Ordering provider: Irene Palacios MD  17 1449 Performed: 17 1607 - 17 160    Resulting lab: RADIANT             Echo Complete with Lumason [[id number]]  Resulted: 17 160, Result status: Edited Result - FINAL    Ordering provider: Irene Palacios MD  17 1449 Resulted by: Grant Spring MD    Performed: 17 160 - 17 1624 Resulting lab: RADIOLOGY RESULTS    Narrative:       [id number]  [id number]  [id number]  [id number]^PHILIP^IRENE^           [facility]  Echocardiography Laboratory  [address] [place] [address]        Name: MARIO ALBERTO PATRICK  MRN: [id number]  : 1955  Study Date: 2017 04:08 PM  Age: 61 yrs  Gender: Female  Patient Location: [facility]  Reason For Study: Other, Please Specify in Comments  Ordering Physician: IRENE PALACIOS  Referring Physician: NO PMD  Performed By: Zander Villanueva RDCS     BSA: 2.0 m2  Height: 63 in  Weight: 215 lb  HR: 102  BP: 135/68 mmHg  _____________________________________________________________________________  __        Procedure  Complete Portable Echo Adult. Contrast Lumason.  _____________________________________________________________________________  __        Interpretation Summary     Hyperdynamic right and left ventricular systolic function  The visual LV ejection fraction is estimated at >70%.  There is borderline concentric left  ventricular hypertrophy.  All four valves are normal in structure and function.There is no evidence of a  mass or vegetation. This does not rule out endocarditis.  A ANDREW is more sensitive/specific to rule in or out SBE.  The study was technically difficult.  _____________________________________________________________________________  __        Left Ventricle  The left ventricle is normal in size. There is borderline concentric left  ventricular hypertrophy. The left ventricular ejection fraction is normal. The  visual ejection fraction is estimated at >70%. Hyperdynamic left ventricular  function. Grade I or early diastolic dysfunction. Normal left ventricular wall  motion. There is no thrombus seen in the left ventricle.     Right Ventricle  Normal right ventricle structure and size. The right ventricular systolic  function is normal. Hyperdynamic right ventricular function.     Atria  Normal left atrial size. Right atrial size is normal. Intact atrial septum.     Mitral Valve  The mitral valve is normal in structure and function. There is no evidence of  mitral valve prolapse. There is no mitral valve stenosis.        Tricuspid Valve  The tricuspid valve is normal in structure and function. There is physiologic  tricuspid regurgitation. Right ventricular systolic pressure could not be  approximated due to inadequate tricuspid regurgitation.     Aortic Valve  The aortic valve is normal in structure and function. Normal tricuspid aortic  valve. No aortic regurgitation is present. No aortic stenosis is present.     Pulmonic Valve  The pulmonic valve is not well seen, but is grossly normal. There is trace  pulmonic valvular regurgitation.     Vessels  Normal size aorta. The inferior vena cava is not dilated.     Pericardium  The pericardium appears normal. There is no pleural effusion.     _____________________________________________________________________________  __  MMode/2D Measurements & Calculations  IVSd:  1.2 cm  LVIDd: 4.1 cm  LVIDs: 2.7 cm  LVPWd: 1.2 cm  FS: 34.8 %  EDV(Teich): 75.5 ml  ESV(Teich): 26.8 ml  LV mass(C)d: 173.4 grams     Ao root diam: 3.2 cm  asc Aorta Diam: 3.3 cm        Doppler Measurements & Calculations  MV E max den: 61.4 cm/sec  MV A max den: 87.1 cm/sec  MV E/A: 0.70  MV dec time: 0.19 sec  Lateral E/e': 9.3  Medial E/e': 10.3           _____________________________________________________________________________  __           Report approved by: Rayo Romano 03/19/2017 04:54 PM       1    Type Source Collected On     03/19/17 1608          View Image (below)              Encounter-Level Documents:     There are no encounter-level documents.      Order-Level Documents:     There are no order-level documents.

## 2017-02-11 NOTE — IP AVS SNAPSHOT
` `     [place] [address] [facility] UNIT: [phone]                 INTERAGENCY TRANSFER FORM - NOTES (H&P, Discharge Summary, Consults, Procedures, Therapies)   2017                    Hospital Admission Date: 2017  MARIO ALBERTO FARMER   : 1955  Sex: Female        Patient PCP Information     None on File         History & Physicals      H&P signed by Hong Lee MD at 2017 12:58 AM      Author:  Hong Lee MD Service:  Hospitalist Author Type:  Physician    Filed:  2017 12:58 AM Date of Service:  2017  7:01 AM Note Created:  2017  8:12 AM    Status:  Signed :  Hong Lee MD (Physician)         REVISED REPORT      PRIMARY CARE PROVIDER:  Nick Myles MD, [facility].      CHIEF COMPLAINT:  Multiple falls.      HISTORY OF PRESENT ILLNESS:  Mario Alberto Farmer is a 61-year-old  female with schizoaffective disorder, who lives with her elderly mother who has significant cognitive deficits, anxiety and depression along with her schizoaffective disorder, hypertension, diabetes, and other medical issues.  The patient was admitted earlier today after she apparently fell out of a car and she had some foot pain.  She had x-rays of her chest, and left foot and knee and shoulder, all of which were negative for any fractures.  The patient was subsequently discharged under her mother's care back to her home.      The patient apparently had another fall out of bed later on in the day, and came back to [facility].  The patient was seen at this time by Dr. Jay Bethea.  The patient had a low-grade temperature of 99.7, normal blood pressure, normal oxygen saturations.  Electrolytes were normal with BUN of 40 and creatinine of 2.73, which is slightly above her baseline kidney function.  CBC was normal.  Urinalysis had greater than 1000 glucose.  She had 3 white cells, less than 1 red cell, and a few bacteria.  The  patient was road-tested, and was able to ambulate only with a lot of effort.  The patient's mother was called, and the patient's mother stated that she is unable to care for her, and that she needs placement in some sort of assisted living facility.  The patient is therefore being admitted under observation status for placement.      PAST MEDICAL HISTORY:   1.  Schizoaffective disorder.   2.  Anxiety and depression.   3.  Cognitive deficits.   4.  Osteoarthritis.   5.  Diabetes.   6.  Hypertension.   7.  Hyperlipidemia.      PAST SURGICAL HISTORY:   1.  Tonsillectomy and adenoidectomy.   2.  History of D&C.   3.  Total knee arthroplasty on the left.   4.  Prior colonoscopy.      FAMILY HISTORY:  Mother with hypertension.  Father with colorectal cancer.      SOCIAL HISTORY:  She is single.  She lives with her elderly mother.  No alcohol or tobacco.  She is FULL CODE.      REVIEW OF SYSTEMS:  Ten-point system reviewed.  The only complaint is pain in her foot.      PHYSICAL EXAM:   VITAL SIGNS:  Temperature 99.7, heart rate 92, respirations 16, blood pressure 172/84, sats 100% on room air.   GENERAL:  The patient is an obese, 61-year-old, hirsute female.  She speaks very, very loudly.   HEENT:  Atraumatic.  Pupils equal.  Mucous membranes are tacky.   NECK:  Veins are not distended.   LUNGS:  Clear to auscultation.   CARDIOVASCULAR:  S1, S2, regular rate and rhythm.   ABDOMEN:  Soft, nontender, nondistended.   EXTREMITIES:  No edema.  She has some bruising noted over her left knee.   NEUROLOGIC:  She is able to move all four extremities.  Cranial nerves II-XII are grossly intact.      LABS/IMAGING STUDIES:  As dictated in the history of present illness.      ASSESSMENT:  Nel Farmer is a 61-year-old with schizoaffective disorder, depression, anxiety, and cognitive deficits, with multiple falls, metabolically intact with the exception of slightly worsening kidney function as well as glucosuria of greater than 1000,  being admitted under observation status for further placement.      PLAN:   1.  Multiple falls:  Unclear.  She is spilling quite a bit of glucose in her urine.  She states that she is compliant with her medications.  We will continue the patient on her medications.  I am going to give her some IV fluids, as maybe she is volume-down from osmotic loss from spilling glucose, so we will give her IV fluids.  A chest x-ray earlier showed no evidence of infiltrate or congestion.  The patient will be seen by PT for home safety.   2.  Diabetes with glycosuria:  The patient will be continued on her home doses of Lantus, metformin, and glipizide.   3.  Hypertension:  We will continue the patient on her metoprolol and diltiazem.   4.  Schizoaffective disorder:  We will continue the patient on all of her psychiatric medications including Depakote and Risperdal and Zyprexa.   5.  DVT prophylaxis:  The patient will have compression boots.   6.  CODE STATUS:  FULL.   7.  Disposition:  The patient is pending placement at an alternative care facility instead of her home situation due to her elderly mother and inability to care for her.   8.  Chronic kidney disease.  Mario Alberto Farmer's creatinine is about baseline and given her glucose loss in the urine she may be relatively fluid depleted.  Will check orthostatics and give her IV fluids.      Continuation:  D&T:  2017, Document #[id number], AMS/sp         HONG LAUREN MD             D: 2017 07:01   T: 2017 08:12   MT: #101      Name:     MARIO ALBERTO FARMER   MRN:      [id number]        Account:      [id number]   :      1955           Admitted:     [id number]      Document: [id number]       cc: STEPHAN Myles MD   [PD1.1]   Revision History        User Key Date/Time User Provider Type Action    > PD1.1 2017 12:58 AM Hong Lauren MD Physician Sign     [N/A] 2017  8:12 AM Hong Lauren MD Physician Edit                  Discharge Summaries      No notes of this type exist for this encounter.         Consult Notes      Consults by David Steel MD at 3/19/2017  2:41 PM     Author:  David Steel MD Service:  Infectious Disease Author Type:  Physician    Filed:  3/19/2017  5:46 PM Date of Service:  3/19/2017  2:41 PM Note Created:  3/19/2017  2:40 PM    Status:  Signed :  David Steel MD (Physician)     Consult Orders:    1. Infectious Diseases IP Consult: Patient to be seen: Routine - within 24 hours; bacteremia; Consultant may enter orders: Yes [[id number]] ordered by Irene Billy MD at 03/19/17 1317                Infectious Diseases Consultation  March 19, 2017  Nel Farmer MRN# [id number]   Age: 61 year old YOB: 1955   Date of Admission: 2/11/2017     Reason for consult: I was asked to see this patient for evaluation of staph bacteremia, klebsiella UTI          Requesting physician Mariajose              Past Medical History:  Past Medical History   Diagnosis Date     Anxiety      CKD (chronic kidney disease) stage 3, GFR 30-59 ml/min      baseline Cr 1.8-2     Cognitive deficits      Depression      Depressive disorder      Diabetes (H)      DM type 2 (diabetes mellitus, type 2) (H)      insulin dependent      HTN      Hyperlipidemia LDL goal < 70      Hypertension      Osteoarthritis      Schizoaffective disorder (H)        Past Surgical History:  Past Surgical History   Procedure Laterality Date     Tonsillectomy & adenoidectomy       Dilation and curettage  age 31     C total knee arthroplasty Left 3/2010     Colonoscopy  10/19/2011     Procedure:COMBINED COLONOSCOPY, REMOVE TUMOR/POLYP/LESION BY SNARE; Colonoscopy; Surgeon:MARY ALICE RUSH; Location: GI       History of Present Illness:[SS1.1]  61-year-old  female with schizoaffective disorder, who lives with her elderly mother who has significant cognitive deficits, anxiety and depression along with her schizoaffective disorder,  hypertension, diabetes, and other medical issues. The patient was admitted earlier today after she apparently fell out of a car and she had some foot pain. She had x-rays of her chest, and left foot and knee and shoulder, all of which were negative for any fractures. The patient was subsequently discharged under her mother's care back to her home.       The patient apparently had another fall out of bed later on in the day, and came back to [facility]. The patient was seen at this time by Dr. Jay Bethea. The patient had a low-grade temperature of 99.7, normal blood pressure, normal oxygen saturations. Electrolytes were normal with BUN of 40 and creatinine of 2.73, which is slightly above her baseline kidney function. CBC was normal. Urinalysis had greater than 1000 glucose. She had 3 white cells, less than 1 red cell, and a few bacteria.[SS1.2]  P[SS1.3]lida's mother stated that she is unable to care for her, and that she needs placement in some sort of assisted living facility. The patient[SS1.2] has been here 1 mo as placement difficult.[SS1.3]     [SS1.2]   She was started on Ceftriaxone on 3/16[SS1.1] but changed to meropenem for ? Pneumonia.[SS1.3]    head CT- 3/17 no acute changes    valproic acid level- normal   - blood cultures 2 out of 2 positive for gram positive cocci in clusters[SS1.1] --started[SS1.3] on vancomycin and meropenem      Sepsis ? Due to UTI vs LLL pneumonia  - had hx of urinary retention during hospital course. Cherry last removed on 3/9 and currently voiding large amounts per discussion with RN. UCx 3/12 growing Klebsiella. UCx from 3/15 +for GNR (50-100k).   - started on ceftriaxone on 3/16. Changed to meropenem as above in light of possible pneumonia[SS1.1]      Pt says she is getting better  Denies pain, cough, SOB, diarrhea  C/o being hot and was 102 earlier  Not sure how long she has had cherry  Pt was out of it last night when transferred from ICU but apperently ok  "by 9 pm last night  Today out of it most of day but now has woken up[SS1.4]    Antibiotics:  Meropenem  Vancomycin[SS1.1]    Tm 102.3 3/19, 101.5 3/18[SS1.5]    Review of Systems: as per HPI    Social History:[SS1.1] has been living with mother[SS1.3] in [place], single, no etoh or smoking[SS1.6]  Social History   Substance Use Topics     Smoking status: Never Smoker     Smokeless tobacco: Not on file     Alcohol use No        Family History:  Family History   Problem Relation Age of Onset     Hypertension Mother      Cancer - colorectal Father      Family History Negative Sister      Family History Negative Brother         Allergies:  This patient is allergic to is allergic to amoxicillin; amoxicillin; haldol [benzyl alcohol]; haldol [haloperidol]; pcn [penicillin g]; penicillins; and seroquel [quetiapine].     Physical Exam:  Vitals were reviewed  Blood pressure 135/68, pulse 109, temperature 102.3  F (39.1  C), resp. rate 18, height 1.6 m (5' 3\"), weight 97.8 kg (215 lb 9.8 oz), last menstrual period 04/27/2012, SpO2 95 %, not currently breastfeeding.  GEN:[SS1.1] awake, looks hot, recall of details poor, mentation not normal, loud speech[SS1.4]  HEENT:[SS1.1] oral clear[SS1.4]  LUNG:[SS1.1] CTA[SS1.4]  COR:[SS1.1] sinus tach[SS1.4]  ABDOMEN:[SS1.1] obese, soft,. NT,. ND  [place] clear yellow urine w/o sediment or cloudiness[SS1.4]  EXT:[SS1.1] w/o edema, skin warm[SS1.4]  SKIN:[SS1.1] no rash[SS1.4]    Data:  CBC  Recent Labs  Lab 03/19/17  0635 03/18/17  0536 03/17/17  1153 03/17/17  0638   WBC 9.8 10.0 12.7* 13.3*   HGB 11.1* 11.2* 11.3* 11.2*    264 244 243       BMP  Recent Labs  Lab 03/19/17  1155 03/19/17  0635 03/19/17  0030 03/18/17  1805  03/18/17  0536  03/17/17  1153 03/17/17  0638   * 156* 149* 151*  < > 155*  < > 155* 154*   POTASSIUM  --  4.1  --   --   --  4.1  --  4.2 4.1   CHLORIDE  --  123*  --   --   --  121*  --  121* 119*   BENTLEY  --  10.3*  --   --   --  10.8*  --  10.9* 10.8* "   CO2  --  25  --   --   --  25  --  24 26   BUN  --  54*  --   --   --  58*  --  51* 44*   CR  --  3.64*  --   --   --  4.14*  --  4.09* 3.76*   GLC  --  209*  --   --   --  406*  --  232* 159*   < > = values in this interval not displayed.    CULTURES  Recent Labs  Lab 03/17/17  1333 03/17/17  1322 03/16/17  1106 03/15/17  2245 03/12/17  2300   CULT Cultured on the 1st day of incubation: Gram positive cocci in clustersCritical Value/Significant Value, preliminary result only, called to and read back by Dawn Benton RN SH55 @ 1925 3/18/17 CS* Cultured on the 1st day of incubation: Gram positive cocci in clustersCritical Value/Significant Value, preliminary result only, called to and read back by Jacqueline Coreas R.N.  [facility] at 10:32am 03.18.17 JTCulture in progress(Note)POSITIVE for Staphylococci other than S.aureus, S.epidermidis andS.lugdunensis, by Verigene multiplex nucleic acid test.Coagulase-negative staphylococci are the most common venipuncture orcollection associated skin CONTAMINANTS grown in blood cultures.Final identification and antimicrobial susceptibility testing will beverified by standard methods.Specimen tested with Verigene multiplex, gram-positive blood culturenucleic acid test for the following targets: Staph aureus, Staphepidermidis, Staph lugdunensis, other Staph species, Enterococcusfaecalis, Enterococcus faecium, Streptococcus species, S. agalactiae,S. anginosus grp., S. pneumoniae, S. pyogenes, Listeria sp., mecA(methicillin resistance) and Nicole/B (vancomycin resi stance).Critical Value/Significant Value called to and read back by  Lorena 3.18.2017 at 1.25 pm* No growth after 3 days 50,000 to 100,000 colonies/mL Klebsiella pneumoniae* >100,000 colonies/mL Klebsiella pneumoniae*   BC 3/16b x 1 NG  BC 3/17 x 2  Staph in 2 BC   3/15-K.pneumonia > 100k S ceftriaxone     Attestation:  I have reviewed today's vital signs, notes, medications, labs and  "imaging.    ASSESSMENT:[SS1.1]  1. STAPH IN 2 BC-probably real, no CVP line, not sure where coming from, febrile, on vancomycin-agree[SS1.4]; she does not seem to have pneumonia, if S.aureus will need 4 weeks iv RX, ? If had CVP line earlier this admit but find no procedure note[SS1.6]    2. UTI-KLEBSIELLA PNEUMONIA-would change/narrow to ceftriaxone    3. FEVERS-probably related to staph bacteremia[SS1.4]    4. SCHIZOPHRENIA[SS1.3]    REC  1. Cont vancomycin  2. D/c meropenem  3. Ceftriaxone 1 g iv q 24 hours  4. F/u on ID on Staph in BC  5. Serial BC[SS1.4]--check tomorrow[SS1.3]  6. reassess[SS1.6]  Thanks for asking me to see her![SS1.4]    JACQUELIN STEEL M.D.  O:[phone]   B:[phone][SS1.1]            Revision History        User Key Date/Time User Provider Type Action    > SS1.6 3/19/2017  5:46 PM David Steel MD Physician Sign     SS1.3 3/19/2017  5:42 PM David Steel MD Physician      SS1.4 3/19/2017  5:33 PM David Steel MD Physician      SS1.2 3/19/2017  2:44 PM David Steel MD Physician      SS1.5 3/19/2017  2:42 PM David Steel MD Physician      SS1.1 3/19/2017  2:40 PM David Steel MD Physician             Consults signed by Hong Orta MD at 2/20/2017 11:49 AM      Author:  Hong Orta MD Service:  Psychiatry Author Type:  Physician    Filed:  2/20/2017 11:49 AM Date of Service:  2/20/2017  9:02 AM Note Created:  2/20/2017  9:42 AM    Status:  Signed :  Hong Orta MD (Physician)         PSYCHIATRY CONSULTATION       DATE OF CONSULTATION:  02/20/2017      REQUESTING PHYSICIAN:   Jaime Parr MD      REASON FOR CONSULTATION:  Outbursts, schizoaffective disorder.      IDENTIFYING DATA:  Nel Farmer is a 69-year-old woman with mental retardation and schizoaffective disorder who comes in with multiple falls and is convalescing on station 55 with a foot fracture.      CHIEF COMPLAINT:  \"I'm on too many " "medications.\"       HISTORY OF PRESENT ILLNESS:  Nel is a very unfortunate 61-year-old woman with chronic schizoaffective disorder and mental retardation.  She comes in after falling and sustaining a foot fracture.  She is convalescing on station 55.  She has long time health issues and has been hospitalized here at [facility] as well as other facilities.  She is typically on Depakote, Risperdal and Zyprexa.  She is a marginal historian due to her cognitive impairment.  She speaks in a very loud, aprosodic voice.  She is not actively psychotic and says that she has been sleeping good.  The nursing notes reflect that she has been intermittently uncooperative, not always cooperative with cares.   has been exploring placement options.  She had been living with her mother prior to coming in to the hospital.  It looks like she is going to need a transitional care unit to address her concerns.  She is unable to walk because of a nondisplaced fracture of her distal fibula.  She has had some issues with renal failure.      On interview, the patient is very concrete, she is fairly loquacious with me, but speaks in a very loud, monotone voice.  She is not a particularly good historian.  She says she is sleeping okay and does not appear to be actively hallucinating.  In reviewing her medication history, it looks like she has been on slightly higher doses of Risperdal in the past to manage her mood instability.  They did do a Depakote level on 02/12/2017 and this was normal at 96.  She is not reporting thoughts of wanting to hurt self or others.      PAST PSYCHIATRIC HISTORY:  As above.  She has longstanding mental health problems and was last under our care back in the fall of 2015.  She typically sees Dr. Kraft.      PAST CHEMICAL DEPENDENCY HISTORY:  Negative.      PAST MEDICAL HISTORY:  Impaired cognition, diabetes, hyperlipidemia, hypertension, knee arthroplasty, foot fracture, tonsillectomy, adenoidectomy, " "D&C, frequent falls.      LISTED MEDICATIONS:  At this time:     1.  Tylenol.   2.  Vitamin D.   3.  Catapres.   4.  Diltiazem.   5.  Depakote  mg in the morning, 1500 mg at bedtime.   6.  Colace.   7.  Glucotrol.   8.  NovoLog insulin.   9.  NovoLog mix.   10.  Imdur.   11.  Lopressor.   12.  Zyprexa 5 mg each day at bedtime.   13.  Prilosec.   14.  Risperdal M-Tab 1 mg each day at bedtime.   15.  Dulcolax.   16.  Zofran.   17.  MiraLax.   18.  Senokot-S.   19.  Roxicodone p.r.n.      FAMILY AND SOCIAL HISTORY:  I have limited details, but I believe that she was utilizing support from her mother prior to coming into the hospital and they are currently looking into the possibility of nursing home option for her so that she can recover.  I think she is in some sort of a work program and has supportive services in the community.      REVIEW OF SYSTEMS:  A 10-point review of systems is conducted and is notable for a foot fracture on review of her extremities, an elevated creatinine on genitourinary examination.  All other systems negative.        VITAL SIGNS:  Most recent vital signs:  Temperature 98, pulse 82, respiratory rate 16, blood pressure 148/69, oxygen saturation 95%.      MENTAL STATUS EXAMINATION:  Appearance:  The patient is a disheveled woman.  She has some facial hair.  She is lying in bed complaining of a sore foot.  She is judged to be a poor historian.  Speech is loud in aprosodic.  Use of language poor.  Motor exam is somewhat tense.  Coordination, station, gait impaired due to her foot fracture.  Muscle strength and tone adequate.  Affect is slightly agitated.  Mood \"okay.\"  Thought process is generally logical, coherent and goal directed.  No loosening of associations.  No flight of ideas.  No formal thought disorder.  Thought content is very concrete, she is not verbalizing any active hallucinations, delusions, paranoia or suicidal ideation.  Insight and judgment poor.  Cognitive exam:  The " patient is alert and oriented x2 out of 3.  Concentration poor.  Recent memory  poor, remote memory poor.  General fund of knowledge below average.      IMPRESSION:  The patient is a 61-year-old woman with schizoaffective disorder.  She also has significant developmental disability.  She is probably close to baseline with her psychiatric status and has a therapeutic dose of Depakote.  We can increase Risperdal slightly for mood stabilization.  She has a p.r.n. available.      DIAGNOSES:   1.  Schizoaffective disorder, bipolar type.   2.  Intellectual disability with moderate impairment.   3.  Foot fracture with frequent falls.      PLAN:   1.  Increase Risperdal M-Tab 1 mg b.i.d.   2.  Agree with community placement in a nursing home setting for rehabilitation.   3.  She has followup in the community,  I believe with Dr. Kraft, for psychotropic meds.         HONG ORTA MD             D: 2017 09:02   T: 2017 09:42   MT: CD      Name:     MARIO ALBERTO PATRICK   MRN:      [id number]        Account:       [id number]   :      1955           Consult Date:  2017      Document: [id number]    [PR1.1]   Revision History        User Key Date/Time User Provider Type Action    > PR1.1 2017 11:49 AM Hong Orta MD Physician Sign            Consults by Hong Orta MD at 2017  8:52 AM     Author:  Hong Orta MD Service:  Psychiatry Author Type:  Physician    Filed:  2017  8:52 AM Date of Service:  2017  8:52 AM Note Created:  2017  8:52 AM    Status:  Signed :  Hong Orta MD (Physician)     Consult Orders:    1. Psychiatry IP Consult: Outbursts causing difficulty for placement; Consultant may enter orders: Yes; Patient to be seen: Routine; Call back #: 0 [[id number]] ordered by Jaime Parr MD at 17 1202                Pt seen for new psychiatric consultation, see my dictation.    Hong Orta  MD[PR1.1]      Revision History        User Key Date/Time User Provider Type Action    > PR1.1 2/20/2017  8:52 AM Hong Orta MD Physician Sign            Consults by Pavel Ruiz MD at 2/16/2017  4:22 PM     Author:  Pavel Ruiz MD Service:  Nephrology Author Type:  Physician    Filed:  2/16/2017  4:39 PM Date of Service:  2/16/2017  4:22 PM Note Created:  2/16/2017  4:22 PM    Status:  Signed :  Pavel Ruiz MD (Physician)     Consult Orders:    1. Nephrology IP Consult: Patient to be seen: Routine - within 24 hours; chronic renal insufficiency, creatinine bumped up.; Consultant may enter orders: Yes [[id number]] ordered by Angie Park MD at 02/16/17 1320                [facility]    Nephrology Consultation     Date of Admission:  2/11/2017    Assessment & Plan   Nel Farmer is a 61 year old female who was admitted on 2/11/2017 for evaluation of falls and for placement.  She has A/CKD.        1) Baseline Stage 4 CKD:  Cr 2.2 and GFR 23.  Lithium nephropathy.    2) A/CKD:  Prerenal ?  Urine retention?  No obvious nephrotoxic exposure.  Lithium nephropathy normally progresses very slowly.    3) HTN:  Borderline control on diltiazem, clonidine and metoprolol.      4) Metabolic Bone Disease:  Need to check phos, PTH and Vit D given CKD and fracture.    Plan:    NS 75 mL/hour  UA  Bladder scan  Phos, PTH and Vit D.        Pavel Ruiz MD  [facility] - Nephrology  [phone]    Reason for Consult   I was asked to see the patient for A/CKD.      Primary Care Physician   No primary care provider on file.    Chief Complaint      I AM VERY SICK !!    History is obtained from the patient and her chart.    History of Present Illness      Nel Farmer is a 61 year old female who was admitted for evaluation of falls and for placement.    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    She was admitted for  evaluation of falls.  She has been found to have a distal R fibula fx.    Her kidney function is recent weeks is not as good as baseline.    1/29 cr 2.56  2/12 cr 2.73  2/16 cr 3.22    Reasons are unclear.  She says that she has not had enough fluid, but I am not sure how reliable she is.    No recent NSAID or contrast exposure.      Past Medical History   I have reviewed this patient's medical history and updated it with pertinent information if needed.   Past Medical History   Diagnosis Date     Anxiety      CKD (chronic kidney disease) stage 3, GFR 30-59 ml/min      baseline Cr 1.8-2     Cognitive deficits      Depression      Depressive disorder      Diabetes (H)      DM type 2 (diabetes mellitus, type 2) (H)      insulin dependent      HTN      Hyperlipidemia LDL goal < 70      Hypertension      Osteoarthritis      Schizoaffective disorder (H)        Past Surgical History   I have reviewed this patient's surgical history and updated it with pertinent information if needed.  Past Surgical History   Procedure Laterality Date     Tonsillectomy & adenoidectomy       Dilation and curettage  age 31     C total knee arthroplasty Left 3/2010     Colonoscopy  10/19/2011     Procedure:COMBINED COLONOSCOPY, REMOVE TUMOR/POLYP/LESION BY SNARE; Colonoscopy; Surgeon:MARY ALICE RUSH; Location: [facility]       Prior to Admission Medications   Prior to Admission Medications   Prescriptions Last Dose Informant Patient Reported? Taking?   Furosemide (LASIX PO)  Pharmacy Yes No   Sig: Take 20 mg by mouth daily   Furosemide (LASIX PO)  Pharmacy Yes No   Sig: Take 20 mg by mouth daily as needed (Take at noon for leg swelling if needed)   GlipiZIDE (GLUCOTROL XL PO)  Pharmacy Yes Yes   Sig: Take 10 mg by mouth 2 times daily (before meals)   LORAZEPAM PO  Pharmacy Yes Yes   Sig: Take 1 mg by mouth 2 times daily as needed for anxiety   Metoprolol Succinate (TOPROL XL PO)  Pharmacy Yes Yes   Sig: Take 100 mg by mouth daily (50 mg x  "2 tablets)   OLANZapine (ZYPREXA PO)  Pharmacy Yes No   Sig: Take 5 mg by mouth At Bedtime   Omeprazole (PRILOSEC PO)  Pharmacy Yes No   Sig: Take 20 mg by mouth 2 times daily (before meals)   Ondansetron (ZOFRAN ODT PO)  Pharmacy Yes No   Sig: Take 4 mg by mouth every 8 hours as needed for nausea   RisperiDONE (RISPERDAL PO)  Pharmacy Yes No   Sig: Take 1 mg by mouth At Bedtime   cloNIDine (CATAPRES) 0.3 MG tablet  Pharmacy No Yes   Sig: Take 1 tablet (0.3 mg) by mouth 2 times daily   diltiazem 120 MG 24 hr CD capsule  Pharmacy Yes No   Sig: Take 120 mg by mouth daily   divalproex (DEPAKOTE) 500 MG EC tablet  Pharmacy Yes No   Sig: Take 500 mg by mouth every morning   divalproex (DEPAKOTE) 500 MG EC tablet  Pharmacy Yes Yes   Sig: Take 1,500 mg by mouth At Bedtime   insulin aspart prot & aspart (NOVOLOG MIX 70/30 PEN) 100 UNITS/ML susp  Pharmacy Yes Yes   Sig: Inject 55 Units Subcutaneous 2 times daily (before meals)    insulin syringe-needle U-100 (BD INSULIN SYRINGE ULTRAFINE) 31G X 5/16\" 1 ML  Pharmacy No No   Sig: Use one syringe bid daily or as directed.   isosorbide mononitrate (IMDUR) 30 MG 24 hr tablet  Pharmacy No Yes   Sig: Take 1 tablet by mouth daily.   polyethylene glycol (MIRALAX/GLYCOLAX) powder  Self Yes No   Sig: Take 17 g by mouth daily as needed for constipation   risperiDONE (RISPERDAL M-TAB) 0.5 MG disintegrating tablet   No No   Sig: Place 2 tablets (1 mg) under the tongue At Bedtime   risperiDONE (RISPERDAL M-TABS) 0.5 MG disintegrating tablet   No No   Sig: Place 1 tablet (0.5 mg) under the tongue 3 times daily as needed   senna-docusate (SENOKOT-S;PERICOLACE) 8.6-50 MG per tablet   No No   Sig: Take 1 tablet by mouth 2 times daily as needed for constipation      Facility-Administered Medications: None     Allergies   Allergies   Allergen Reactions     Amoxicillin      Amoxicillin      Haldol [Benzyl Alcohol]      Haldol [Haloperidol]      Pcn [Penicillin G]      Penicillins      Seroquel " [Quetiapine] GI Disturbance       Social History   I have reviewed this patient's social history and updated it with pertinent information if needed. Nel Farmer  reports that she has never smoked. She does not have any smokeless tobacco history on file. She reports that she does not drink alcohol or use illicit drugs.    Family History   I have reviewed this patient's family history and updated it with pertinent information if needed.   Family History   Problem Relation Age of Onset     Hypertension Mother      Cancer - colorectal Father      Family History Negative Sister      Family History Negative Brother        Review of Systems   The 10 point Review of Systems is not considered reliable due to her mental status.     Physical Exam   Temp: 98.7  F (37.1  C) Temp src: Oral BP: 148/62   Heart Rate: 69 Resp: 16 SpO2: 92 % O2 Device: None (Room air) Oxygen Delivery: 2 LPM  Vital Signs with Ranges  Temp:  [97.6  F (36.4  C)-99.3  F (37.4  C)] 98.7  F (37.1  C)  Heart Rate:  [] 69  Resp:  [16] 16  BP: (125-177)/(52-90) 148/62  SpO2:  [92 %-94 %] 92 %  0 lbs 0 oz    GENERAL: alert, NAD  HEENT:  Normocephalic. No gross abnormalities.  Pupils equal.  MMM.  Dentition is fair.   CV: RRR, no murmurs, no clicks, gallops, or rubs, no edema, no carotid bruits  RESP: Clear bilaterally with good efforts  GI: Abdomen soft/nt/nd, BS normal. No masses, organomegaly  MUSCULOSKELETAL: extremities boot applied to RLE  SKIN: no suspicious lesions or rashes, dry to touch  NEURO:  Strength normal and symmetric.   PSYCH: inappropriate comments at times  LYMPH: No palpable ant/post cervical and supraclavicular adenopathy    Data   BMP  Recent Labs  Lab 02/16/17  0650 02/12/17  0050    136   POTASSIUM 3.9 4.4   CHLORIDE 107 105   BENTLEY 9.5 9.7   CO2 25 20   BUN 38* 40*   CR 3.22* 2.73*   * 307*     Phos@LABRCNTIPR(phos:4)  CBC)  Recent Labs  Lab 02/12/17  0050   WBC 10.9   HGB 11.9   HCT 36.0   MCV 92           Recent Labs  Lab 02/12/17  0050   AST 19   ALT 26   ALKPHOS 86   BILITOTAL 0.3     No lab results found in last 7 days.  25 OH Vit D2   Date Value Ref Range Status   07/02/2010 <5 ug/L Final     25 OH Vit D3   Date Value Ref Range Status   07/02/2010 36 ug/L Final     25 OH Vit D total   Date Value Ref Range Status   07/02/2010 <41 30 - 75 ug/L Final     Comment:     Season, race, dietary intake, and treatment affect the concentration of   25-hydroxy-Vitamin D. Values may decrease during winter months and increase   during summer months. Values less than 30 ug/L may indicate Vitamin D   deficiency.       Recent Labs  Lab 02/12/17  0050   HGB 11.9   HCT 36.0   MCV 92     No results for input(s): PTHI in the last 168 hours.[DB1.1]     Revision History        User Key Date/Time User Provider Type Action    > DB1.1 2/16/2017  4:39 PM Pavel Ruiz MD Physician Sign            Consults signed by Sam Lamas MD at 2/14/2017  7:49 AM      Author:  Sam Lamas MD Service:  Orthopedics Author Type:  Physician    Filed:  2/14/2017  7:49 AM Date of Service:  2/13/2017  9:50 PM Note Created:  2/13/2017 10:16 PM    Status:  Signed :  Sam Lamas MD (Physician)     Consult Orders:    1. Orthopedic Surgery IP Consult: Patient to be seen: Routine - within 24 hours; Right subacute fibula fracture due to fall.; Consultant may enter orders: Yes [[id number]] ordered by Michael Suarez MD at 02/13/17 1600                ORTHOPEDIC SURGERY CONSULTATION       DATE OF SERVICE:  02/13/2017       CHIEF COMPLAINT:  Right ankle pain.      REQUESTING PHYSICIAN:  Dr. Suarez from the Hospitalist Service.      HISTORY OF PRESENT ILLNESS:  Nel Farmer is a 61-year-old female with multiple medical comorbidities who presents with generalized weakness and pain after multiple falls.  She was evaluated during her hospital stay and found to have a subacute right distal fibula fracture.  Orthopedics was consulted  for further evaluation.  The patient has a significant schizoaffective disorder with again cognitive deficits.  It is very hard to get any history from her as she yells at a very high pitched noise and does not follow the conversation very well.  She states that she has had multiple falls to both legs.  She is unsure when these have happened.  She describes pain in both feet.      PAST MEDICAL HISTORY:   1.  Schizoaffective disorder.   2.  Anxiety, depression.   3.  Cognitive status.   4.  Diabetes.   5.  Hyperlipidemia.   6.  Hypertension.      PAST SURGICAL HISTORY:   1.  Left total knee arthroplasty.   2.  D&C.   3.  Tonsillectomy and adenoidectomy.      SOCIAL HISTORY:  The patient lives with her elderly mother.  She does not smoke or drink.      MEDICATIONS:  The patient is on a multitude of medications.  Please see H&P for details.      ALLERGIES:  Amoxicillin, Haldol, penicillin, Seroquel.      REVIEW OF SYSTEMS:  Unable to get fully assessed due to the patient's current cognitive status.      PHYSICAL EXAMINATION:  Examination shows a 61-year-old female in no acute distress.  Focused examination of her right lower extremity shows some mild swelling diffusely over the right ankle.  No significant swelling over the left foot.  She screams in pain before I actually touch her foot and ankle.  Hard to assess pain as she is again screaming without even being touched.  She does have again mild swelling over the medial and lateral aspects of the ankle.  No significant  crepitus.  No redness.  No erythema.  She moves her ankle freely when not being examined.      X-RAYS:  X-rays of her right foot were reviewed.  On these views shows a subacute fracture of the right distal fibula with early healing noted.  There is callus formation seen.      IMPRESSION:  A 61-year-old female with multiple medical comorbidities with a minimally to nondisplaced fracture of the right distal fibula.  Subacute in nature with early  healing.      RECOMMENDATIONS:  At this point, it is unclear of how long that has been there.  It is most likely at least a month old.  She has early healing on the x-ray.  At this point, I would recommend symptomatic treatment.  If she is doing well ambulating, she does not need any type of ambulatory device or boot.  If she is having pain with weightbearing then I would recommend a Cam boot.  This could be ordered through orthotics.  I am happy to order this if needed.  Otherwise, the nursing staff or primary team can order again a Cam walker.  She can follow up with her primary care physician.  No surgical intervention is necessary.      Please contact me with any further questions or concerns.         CHARLEE LAMAS MD             D: 2017 21:50   T: 2017 22:16   MT: VIOLET      Name:     MARIO ALBERTO PATRICK   MRN:      [id number]        Account:       [id number]   :      1955           Consult Date:  2017      Document: [id number][BB1.1]         Revision History        User Key Date/Time User Provider Type Action    > BB1.1 2017  7:49 AM Charlee Lamas MD Physician Sign                     Progress Notes - Physician (Notes from 17 through 17)      Progress Notes by Wliian Mcpherson at 3/28/2017  2:35 PM     Author:  Wilian Mcpherson Service:  Social Work Author Type:      Filed:  3/28/2017  3:34 PM Date of Service:  3/28/2017  2:35 PM Note Created:  3/28/2017  2:35 PM    Status:  Addendum :  Wilian Mcpherson ()         SW  D) Received a message from Cherri ([phone]) at [facility] stating the Level 2 screen is completed and they can accept patient today.  I) Arranged [facility] stretcher ride at 1630. The stretcher ride is needed due to patient's Schizoaffective disorder with baseline cognitive deficits. Patient often calls out for assistance. The PCS form was completed and faxed.  Will fax script, PAS and the orders when completed. MD has  been paged.  Attempted to reach patient's mother, Lisa. No answer but left a voice mail with the discharge information. Left a message for sister,[RH1.1] Tana[RH1.2] with the same information. Also left a message for Cristobal with [facility]RH1.1[facility] ([phone])[RH1.3] with the discharge information, should a group home opening occur in the future.  P) D/C to [facility] ay 1630 today.[RH1.1]    Addendum  Sister, Tana and Mother, Lisa visited and were given written information on [facility]. They agree to this plan.[RH1.2]     Revision History        User Key Date/Time User Provider Type Action    > RH1.2 3/28/2017  3:34 PM Wilian Mcpherson  Addend     RH1.3 3/28/2017  3:02 PM Wilian Mcpherson  Addend     RH1.1 3/28/2017  2:47 PM Wilian Mcpherson  Sign            Progress Notes by NISA Renae MD at 3/28/2017 11:45 AM     Author:  NISA Renae MD Service:  Nephrology Author Type:  Physician    Filed:  3/28/2017 11:46 AM Date of Service:  3/28/2017 11:45 AM Note Created:  3/28/2017 11:45 AM    Status:  Signed :  NISA Renae MD (Physician)         Renal Medicine Progress Note                                Nel Farmer MRN# [id number]   Age: 61 year old YOB: 1955   Date of Admission: 2/11/2017 Hospital LOS: 12                  Assessment/Plan:     61 year old female who was admitted on 2/11/2017 for evaluation of falls and for placement.   She has A/CKD.         1) Baseline Stage 4 CKD: Cr 2.2 and GFR 23. Lithium nephropathy.     2) A/CKD: Prerenal ? Urine retention? No obvious nephrotoxic exposure. Lithium nephropathy normally progresses very slowly.     3) HTN: Borderline control on diltiazem, clonidine and metoprolol.      4) Metabolic Bone Disease   -secondary hyperparathyroidism   -in part related to vitamin D deficiency (on replacement)  5) Polyuria/DI   -presumed related to Li effect on concentrating ability   -previous  trial of amiloride    -now on acetazolamide       Stop IVF  Follow labs    Free access to water        Interval History:     UO remains in 5 liter range.  Resting.  IVF and cherry remain    ROS:     No patient complaints    Medications and Allergies:     Reviewed    Physical Exam:     Vitals were reviewed  Patient Vitals for the past 8 hrs:   BP Temp Temp src Heart Rate Resp SpO2   03/28/17 0739 135/62 98.4  F (36.9  C) Axillary 66 16 97 %   03/28/17 0430 125/60 98.5  F (36.9  C) Axillary 63 16 96 %     I/O last 3 completed shifts:  In: 2840 [P.O.:2840]  Out: 4900 [Urine:4900]    Vitals:    03/21/17 0520 03/22/17 0614 03/23/17 0513 03/24/17 0704   Weight: 99.9 kg (220 lb 3.2 oz) 100.2 kg (220 lb 14.4 oz) 102.5 kg (225 lb 15.5 oz) 103.1 kg (227 lb 4.7 oz)    03/25/17 0500   Weight: 102.2 kg (225 lb 5 oz)       GENERAL: resting   HEENT: NC/AT, PERRLA, EOMI, non icteric, pharynx moist without lesion  RESP:  clear anteriorly  CV: RRR, normal S1 S2  ABDOMEN: soft, nontender, no HSM or masses and bowel sounds normal  MS: no clubbing, cyanosis   SKIN: clear without significant rashes or lesions  NEURO: speech normal and cranial nerves 2-12 intact  EXT: warm, no edema    Data:       Recent Labs  Lab 03/28/17  0826 03/27/17  0851 03/26/17  1036 03/26/17  0830    148* 145* Canceled, Test credited Specimen hemolyzed   POTASSIUM 4.0 4.1 4.3 Canceled, Test credited Specimen hemolyzed   CHLORIDE 115* 121* 114* Canceled, Test credited Specimen hemolyzed   CO2 20 21 20 Canceled, Test credited Specimen hemolyzed   ANIONGAP 7 6 11 Canceled, Test credited Specimen hemolyzed   * 201* 225* Canceled, Test credited Specimen hemolyzed   BUN 37* 38* 39* Canceled, Test credited Specimen hemolyzed   CR 2.63* 2.91* 3.00* Canceled, Test credited Specimen hemolyzed   GFRESTIMATED 18* 16* 16* Canceled, Test credited Specimen hemolyzed   GFRESTBLACK 22* 20* 19* Canceled, Test credited Specimen hemolyzed   BENTLEY 9.5 10.0 10.3* Canceled,  Test credited Specimen hemolyzed          Recent Labs   Lab Test  03/28/17   0826  03/27/17   0851  03/26/17   1036  03/26/17   0830  03/25/17   0813  03/23/17   0903  03/22/17   0915  03/21/17   1050  03/20/17   0715  03/19/17   0635   CR  2.63*  2.91*  3.00*  Canceled, Test credited   Specimen hemolyzed    2.94*  2.71*  2.99*  3.28*  3.61*  3.64*         NISA Renae    [facility] - Nephrology  [phone][GO1.1]     Revision History        User Key Date/Time User Provider Type Action    > GO1.1 3/28/2017 11:46 AM NISA Renae MD Physician Sign            Progress Notes by Angie Park MD at 3/27/2017 11:22 AM     Author:  Angie Park MD Service:  Hospitalist Author Type:  Physician    Filed:  3/27/2017  6:24 PM Date of Service:  3/27/2017 11:22 AM Note Created:  3/27/2017 11:22 AM    Status:  Addendum :  Angie Park MD (Physician)         [facility]    Hospitalist Progress Note[EL1.1]    Assessment & Plan[EL1.2]   Nel Farmer is a 61 year old female who was admitted on 2/11/2017.       Acute encephalopathy secondary to klebsiella UTI:   Etiology of her encephalopathy was multifactorial. Possible causes included sepsis with high fever, hypernatremia, medications, ?uremia. .she also has hypercalecmia with corrected calcium level of 12.1. ABG does not explain her encephalopathy with pCO2 of only 46. Has not gotten narcotics or benzos recently. UCx from 3/12 was +for klebsiella and UCx from 3/15 growing 50-100K Klebsiella pneumonia. She was started on Ceftriaxone on 3/16   - head CT- 3/17 no acute changes   - valproic acid level- normal   -- had hx of urinary retention during hospital course. Dunlap last removed on 3/9 and currently voiding large amounts per discussion with RN. UCx 3/12 growing Klebsiella. UCx from 3/15 +for (50-100k).   - started on ceftriaxone on 3/16. Changed to meropenem in light of possible pneumonia  - blood culture  from 3/16 NGTD  - changed to levaquin to complete a 14 day course. ([EL1.1]10[EL1.3]/14)  - Blood cultures turned out to be one staph epi and one staph hominis so most likely contaminant   - Mental status returned to baseline  - resumed zyprexa   - TTE showed LVH and valves are OK      Lethargy  -patient was in chair a lot on 3/25  -[EL1.1]yesterday[EL1.3] more lethargic, although calm  -CXR[EL1.1] 3/26[EL1.3]--> CXR showed no infiltrates[EL1.1]   -UA not remarkable  -UC Pending[EL1.3]           Hypernatremia:  This is likely exacerbated by her DI.   - Na 134, discontinued IVF 3/22  -sodium[EL1.1] 148 today< 145 <144  -due to lethargy, did not drink enough free water  -started iv fluids again 3/26  -dec rate today as she is up in chair and drinking[EL1.3]      LYLA on CKD stage IV with Lithium induced Nephrogenic DI/acute retention of urine:  -Patient had urinary retention and obstructive uropathy. 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   --on diamox but does not appear to be helping[EL1.1]  -was on amiloride previously  -difficult situation due to her diabetes insipidus, as when she is lethargic she does not drink enough and then her creatinine and her sodium increases.  Therefore she is high risk for this to happen in the TCU.[EL1.3]   -discussed with nephrology today and there are no good treatments except for her to drink enough water and have free access to water.[EL1.4]           Multiple falls  -Patient presented with multiple falls  -Was living with her mother prior to this admission[EL1.1]  -mother who is 85 is not able to care for her anymore[EL1.3]  -current plan is placement at LTC facility[EL1.1], [facility][EL1.3]  -etiology of fall unclear, likely multi-factorial      Right distal fibular fracture  - CAM boot while  ambulating, encourage ambulation  -sat in chair today for several hours  - mobilize with the help of nursing staff as much as possible.  - Tylenol for pain  - was able to sit in chair[EL1.1] the last 2 days[EL1.3]          DM2 with hypoglycemia   Ongoing hyperglycemia in hospita.  - better blood glucose  - restart glipizide 2.5 mg bid  - Continue with SSI  - glucose[EL1.1] 145, 253, 184[EL1.3]       HTN  - meds include clonidine, hydralazine, diltiazem, metoprolol, Imdur  - Blood pressures soft[EL1.1] 3/25[EL1.3]  -[EL1.1] due to dec BP 3/25[EL1.3] decreased toprol from 100 to 25, but hold if HR< 60  -diltiazem dec from bid to q evening   -BP acceptable today, pulse in the[EL1.1] 60's and 70's[EL1.3]      Schizoaffective disorder with baseline cognitive deficits  -Patient does have baseline cognitive deficits  -Continue depakote, risperdone and zyprexa (when more alert)  -Psychiatry consult appreciated      Vitamin D deficiency  - d/c because of hypercalcemia       DVT Prophylaxis: Pneumatic Compression Device      Code Status: Full Code       Disposition: Expected discharge[EL1.1] once [facility] does their level 2 screening[EL1.3]   [EL1.1]    Angie Park[EL1.2], MD[EL1.1]    Interval History[EL1.2]   Interviewed with her mother and sister today.  Patient is unwilling to feed herself but will eat if fed[EL1.3]    -Data reviewed today: I reviewed all new labs and imaging results over the last 24 hours. I personally reviewed[EL1.1] no images or EKG's today[EL1.3].[EL1.1]    Physical Exam   Temp: 97.9  F (36.6  C) Temp src: Oral BP: 155/65 Pulse: 70 Heart Rate: 99 Resp: 18 SpO2: 96 % O2 Device: None (Room air)    Vitals:    03/23/17 0513 03/24/17 0704 03/25/17 0500   Weight: 102.5 kg (225 lb 15.5 oz) 103.1 kg (227 lb 4.7 oz) 102.2 kg (225 lb 5 oz)[EL1.2]     Vital Signs with Ranges[EL1.1]  Temp:  [97.9  F (36.6  C)-98.4  F (36.9  C)] 97.9  F (36.6  C)  Pulse:  [67-70] 70  Heart Rate:  [99] 99  Resp:  [18-20]  18  BP: (125-155)/(51-65) 155/65  SpO2:  [95 %-98 %] 96 %  I/O last 3 completed shifts:  In: 2086.67 [P.O.:740; I.V.:1346.67]  Out: 4700 [Urine:4700][EL1.2]    Constitutional: alert   Respiratory: clear to auscultation, no wheezing or rhonchi   Cardiovascular: regular rate and rhythm without murmer or rub   GI:positive bowel sounds, non-tender   Skin/Integumen: no rashes    Other:[EL1.1]        Medications     NaCl 100 mL/hr at 03/27/17 0907       metoprolol  25 mg Oral Daily     diltiazem  120 mg Oral QPM     insulin isophane human  20 Units Subcutaneous QAM AC     levofloxacin  250 mg Oral Daily     insulin aspart  1-7 Units Subcutaneous TID AC     insulin aspart  1-5 Units Subcutaneous At Bedtime     cloNIDine  0.3 mg Oral BID     acetaZOLAMIDE  500 mg Oral Q12H MARIANNE     glipiZIDE (GLUCOTROL XL) 24 hr tablet 2.5 mg  2.5 mg Oral BID AC     heparin  5,000 Units Subcutaneous Q12H     valproic acid  1,500 mg Oral At Bedtime     valproic acid  500 mg Oral QAM     insulin isophane human  30 Units Subcutaneous At Bedtime     OLANZapine zydis  2.5 mg Sublingual At Bedtime     sodium chloride (PF)  3 mL Intracatheter Q8H     acetaminophen  975 mg Oral TID    Or     acetaminophen  975 mg Rectal TID     risperiDONE  1 mg Sublingual BID     polyethylene glycol  17 g Oral BID     docusate sodium  100 mg Oral BID     isosorbide mononitrate  30 mg Oral Daily       Data     Recent Labs  Lab 03/27/17  0851 03/26/17  1036 03/26/17  0830  03/21/17  1050   WBC 10.7  --   --   --  11.7*   HGB 10.0*  --   --   --  9.3*     --   --   --  96     --   --   --  207   * 145* Canceled, Test credited Specimen hemolyzed  < > 135   POTASSIUM 4.1 4.3 Canceled, Test credited Specimen hemolyzed  < > 3.9   CHLORIDE 121* 114* Canceled, Test credited Specimen hemolyzed  < > 104   CO2 21 20 Canceled, Test credited Specimen hemolyzed  < > 20   BUN 38* 39* Canceled, Test credited Specimen hemolyzed  < > 55*   CR 2.91* 3.00* Canceled,  Test credited Specimen hemolyzed  < > 3.28*   ANIONGAP 6 11 Canceled, Test credited Specimen hemolyzed  < > 11   BENTLEY 10.0 10.3* Canceled, Test credited Specimen hemolyzed  < > 8.9   * 225* Canceled, Test credited Specimen hemolyzed  < > 456*   < > = values in this interval not displayed.    No results found for this or any previous visit (from the past 24 hour(s)).[EL1.2]     Revision History        User Key Date/Time User Provider Type Action    > [N/A] 3/27/2017  6:24 PM Angie Park MD Physician Addend     EL1.4 3/27/2017  6:23 PM Angie Park MD Physician Sign     EL1.3 3/27/2017  6:13 PM Angie Park MD Physician      EL1.2 3/27/2017 11:23 AM Angie Park MD Physician      EL1.1 3/27/2017 11:22 AM Angie Park MD Physician             Progress Notes by Wilian Mcpherson at 3/27/2017 10:30 AM     Author:  Wilian Mcpherson Service:  Social Work Author Type:      Filed:  3/27/2017  2:17 PM Date of Service:  3/27/2017 10:30 AM Note Created:  3/27/2017 10:30 AM    Status:  Addendum :  Wilian Mcpherson ()         SW  D) Patient has been accepted at [facility], but a Level 2 screening is needed.  I) Spoke with Nati with the [facility] Line ([phone], [phone]. [phone]). Nati states [facility] will make a final determination whether or not the Level 2 screening is truly needed, and they would then have 7-9 days to complete this. She believes they will communicate directly with the nursing facility, but may call  also.  P) Following.[RH1.1]    Addendum  Patient's mother and sister are here visiting and were updated on the D/C plan and process.[RH1.2]    Addendum  Updated Cherri at [facility] that patient is not medically ready for discharge today and we are awaiting the Level 2 screening appointment with [facility]. Cherri states that once patient is ready for discharge and the date of the screening and name and  phone number of the screener is available, they can accept patient. The screening needs to be within 10 days of her admission there.[RH1.3]     Revision History        User Key Date/Time User Provider Type Action    > RH1.3 3/27/2017  2:17 PM Wilian Mcpherson  Addend     RH1.2 3/27/2017 11:16 AM Wilian Mcpherson  Addend     RH1.1 3/27/2017 10:36 AM Wilian Mcpherson  Sign            Progress Notes by NISA Renae MD at 3/27/2017  1:20 PM     Author:  NISA Renae MD Service:  Nephrology Author Type:  Physician    Filed:  3/27/2017  1:24 PM Date of Service:  3/27/2017  1:20 PM Note Created:  3/27/2017  1:20 PM    Status:  Signed :  NISA Renae MD (Physician)         Renal Medicine Progress Note                                Nel Farmer MRN# [id number]   Age: 61 year old YOB: 1955   Date of Admission: 2/11/2017 Hospital LOS: 11                  Assessment/Plan:     61 year old female who was admitted on 2/11/2017 for evaluation of falls and for placement.   She has A/CKD.         1) Baseline Stage 4 CKD: Cr 2.2 and GFR 23. Lithium nephropathy.     2) A/CKD: Prerenal ? Urine retention? No obvious nephrotoxic exposure. Lithium nephropathy normally progresses very slowly.     3) HTN: Borderline control on diltiazem, clonidine and metoprolol.      4) Metabolic Bone Disease   -secondary hyperparathyroidism   -in part related to vitamin D deficiency (on replacement)  5) Polyuria/DI   -presumed related to Li effect on concentrating ability   -previous trial of amiloride    -now on acetazolamide         Free access to water  OK to decrease IVF    Follow labs        Interval History:     UO remains in liter range.  Na up based on in adequate water intake.    ROS:     No patient complaints    Medications and Allergies:     Reviewed    Physical Exam:     Vitals were reviewed  Patient Vitals for the past 8 hrs:   BP Temp Temp src Heart Rate Resp SpO2   03/27/17  1007 - - - - 18 -   03/27/17 0907 155/65 97.9  F (36.6  C) Oral 99 18 96 %     I/O last 3 completed shifts:  In: 2086.67 [P.O.:740; I.V.:1346.67]  Out: 4700 [Urine:4700]    Vitals:    03/21/17 0520 03/22/17 0614 03/23/17 0513 03/24/17 0704   Weight: 99.9 kg (220 lb 3.2 oz) 100.2 kg (220 lb 14.4 oz) 102.5 kg (225 lb 15.5 oz) 103.1 kg (227 lb 4.7 oz)    03/25/17 0500   Weight: 102.2 kg (225 lb 5 oz)       GENERAL: resting   HEENT: NC/AT, PERRLA, EOMI, non icteric, pharynx moist without lesion  RESP:  clear anteriorly  CV: RRR, normal S1 S2  ABDOMEN: soft, nontender, no HSM or masses and bowel sounds normal  MS: no clubbing, cyanosis   SKIN: clear without significant rashes or lesions  NEURO: speech normal and cranial nerves 2-12 intact  EXT: warm, no edema    Data:       Recent Labs  Lab 03/27/17  0851 03/26/17  1036 03/26/17  0830 03/25/17  0813   * 145* Canceled, Test credited Specimen hemolyzed 144   POTASSIUM 4.1 4.3 Canceled, Test credited Specimen hemolyzed 4.1   CHLORIDE 121* 114* Canceled, Test credited Specimen hemolyzed 114*   CO2 21 20 Canceled, Test credited Specimen hemolyzed 23   ANIONGAP 6 11 Canceled, Test credited Specimen hemolyzed 7   * 225* Canceled, Test credited Specimen hemolyzed 164*   BUN 38* 39* Canceled, Test credited Specimen hemolyzed 39*   CR 2.91* 3.00* Canceled, Test credited Specimen hemolyzed 2.94*   GFRESTIMATED 16* 16* Canceled, Test credited Specimen hemolyzed 16*   GFRESTBLACK 20* 19* Canceled, Test credited Specimen hemolyzed 20*   BENTLEY 10.0 10.3* Canceled, Test credited Specimen hemolyzed 10.1          Recent Labs   Lab Test  03/27/17   0851  03/26/17   1036  03/26/17   0830  03/25/17   0813  03/23/17   0903  03/22/17   0915  03/21/17   1050  03/20/17   0715  03/19/17   0635  03/18/17   0536   CR  2.91*  3.00*  Canceled, Test credited   Specimen hemolyzed    2.94*  2.71*  2.99*  3.28*  3.61*  3.64*  4.14*         G Dino Renae    [facility] -  Nephrology  [phone][GO1.1]     Revision History        User Key Date/Time User Provider Type Action    > GO1.1 3/27/2017  1:24 PM NISA Renae MD Physician Sign            Progress Notes by Joy Upton RN at 3/27/2017  4:18 AM     Author:  Joy Upton RN Service:  (none) Author Type:  Registered Nurse    Filed:  3/27/2017  4:23 AM Date of Service:  3/27/2017  4:18 AM Note Created:  3/27/2017  4:18 AM    Status:  Signed :  Joy Upton RN (Registered Nurse)         Problem: Goal Outcome Summary  Goal: Goal Outcome Summary  Outcome: No Change  Pt very lethargic this shift,  VSS on RA. Brannon diminished T/O. Denies pain.  at 0200. PM medications held due to lethargy- MD aware. IVF infusing.Repositioned q2 hours. Poor appetite.D/C to [facility] today per  note. Up with lift.   [VP1.1]     Revision History        User Key Date/Time User Provider Type Action    > VP1.1 3/27/2017  4:23 AM Joy Upton RN Registered Nurse Sign            Progress Notes by Angie Park MD at 3/26/2017  7:18 AM     Author:  Angie Park MD Service:  Hospitalist Author Type:  Physician    Filed:  3/26/2017  3:11 PM Date of Service:  3/26/2017  7:18 AM Note Created:  3/26/2017  7:18 AM    Status:  Signed :  Angie Park MD (Physician)         [facility]    Hospitalist Progress Note[EL1.1]    Assessment & Plan[EL1.2]   Nel Farmer is a 61 year old female who was admitted on 2/11/2017.     Acute encephalopathy secondary to klebsiella UTI:   Etiology of her encephalopathy was multifactorial. Possible causes included sepsis with high fever, hypernatremia, medications, ?uremia. .she also has hypercalecmia with corrected calcium level of 12.1. ABG does not explain her encephalopathy with pCO2 of only 46. Has not gotten narcotics or benzos recently. UCx from 3/12 was +for klebsiella and UCx from 3/15 growing 50-100K Klebsiella pneumonia. She was  started on Ceftriaxone on 3/16   - head CT- 3/17 no acute changes   - valproic acid level- normal   -- had hx of urinary retention during hospital course. Cherry last removed on 3/9 and currently voiding large amounts per discussion with RN. UCx 3/12 growing Klebsiella. UCx from 3/15 +for (50-100k).   - started on ceftriaxone on 3/16. Changed to meropenem in light of possible pneumonia  - blood culture from 3/16 NGTD  - changed to levaquin to complete a 14 day course. ([EL1.1]9[EL1.3]/14)  - Blood cultures turned out to be one staph epi and one staph hominis so most likely contaminant   - Mental status[EL1.1] returned to baseline[EL1.3]  - resume[EL1.1]d[EL1.3] zyprexa   - TTE showed LVH and valves are OK[EL1.1]     Lethargy  -patient was in chair a lot on 3/25  -today more lethargic, although calm  -will check CXR and UA/UC to eval for occult infection--> CXR showed no infiltrates[EL1.3]         Hypernatremia:  This is likely exacerbated by her DI.   - Na 134, discontinued IVF 3/22  -[EL1.1]sodium 145 today[EL1.3]      LYLA on CKD stage IV with Lithium induced Nephrogenic DI/acute retention of urine:  -Patient had urinary retention and obstructive uropathy. 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  -Nephrology consult appreciated.[EL1.1]   -urine out 5525 yesterday  -on diamox but does not appear to be helping  -will discuss with nephrology if there are any other treatment options, as she may be able to discharge to care facility but with her renal issues, she is high risk[EL1.3]          Multiple falls  -Patient presented with multiple falls  -Was living with her mother prior to this admission  -current plan is placement at LTC facility  -etiology of fall unclear, likely multi-factorial      Right distal fibular fracture  - CAM boot while ambulating, encourage ambulation  -sat in  chair today for several hours  - mobilize with the help of nursing staff as much as possible.  - Tylenol for pain  - was able to sit in chair[EL1.1] yesterday[EL1.3]          DM2 with hypoglycemia   Ongoing hyperglycemia in hospita.  - better blood glucose  - restart glipizide 2.5 mg bid  - Continue with SSI[EL1.1]  -[EL1.3] glucose[EL1.1] 173, 187, yesterday 189, 2110, 276[EL1.3]      HTN  - meds include clonidine, hydralazine, diltiazem, metoprolol, Imdur  - Blood pressures soft today  -decreased toprol from 100 to 25, but hold if HR< 60  -diltiazem dec from bid to q evening[EL1.1]   -BP acceptable today, pulse in the 50's to 60's[EL1.3]      Schizoaffective disorder with baseline cognitive deficits  -Patient does have baseline cognitive deficits  -Continue depakote, risperdone and zyprexa (when more alert)  -Psychiatry consult appreciated      Vitamin D deficiency  - d/c because of hypercalcemia       DVT Prophylaxis: Pneumatic Compression Device      Code Status: Full Code       Disposition: Expected discharge once living situation found[EL1.1]        Angie Park[EL1.2], MD[EL1.1]    Interval History[EL1.2]   Today pt is lethargic, but denies pain[EL1.3]    -Data reviewed today: I reviewed all new labs and imaging results over the last 24 hours. I personally reviewed[EL1.1] the chest x-ray image(s) showing no infiltrate[EL1.3].[EL1.1]    Physical Exam   Temp: 97.5  F (36.4  C) Temp src: Axillary BP: 126/56   Heart Rate: 67 Resp: 18 SpO2: 95 % O2 Device: None (Room air)    Vitals:    03/23/17 0513 03/24/17 0704 03/25/17 0500   Weight: 102.5 kg (225 lb 15.5 oz) 103.1 kg (227 lb 4.7 oz) 102.2 kg (225 lb 5 oz)[EL1.2]     Vital Signs with Ranges[EL1.1]  Temp:  [97.5  F (36.4  C)-98.7  F (37.1  C)] 97.5  F (36.4  C)  Heart Rate:  [60-67] 67  Resp:  [18] 18  BP: (111-139)/(49-65) 126/56  SpO2:  [94 %-99 %] 95 %  I/O last 3 completed shifts:  In: -   Out: 5525 [Urine:5525][EL1.2]    Constitutional: alert    Respiratory: clear to auscultation, no wheezing or rhonchi   Cardiovascular: regular rate and rhythm without murmer or rub   GI:positive bowel sounds, non-tender   Skin/Integumen: no rashes    Other:[EL1.1]        Medications        metoprolol  25 mg Oral Daily     diltiazem  120 mg Oral QPM     insulin isophane human  20 Units Subcutaneous QAM AC     levofloxacin  250 mg Oral Daily     insulin aspart  1-7 Units Subcutaneous TID AC     insulin aspart  1-5 Units Subcutaneous At Bedtime     cloNIDine  0.3 mg Oral BID     acetaZOLAMIDE  500 mg Oral Q12H MARIANNE     glipiZIDE (GLUCOTROL XL) 24 hr tablet 2.5 mg  2.5 mg Oral BID AC     heparin  5,000 Units Subcutaneous Q12H     valproic acid  1,500 mg Oral At Bedtime     valproic acid  500 mg Oral QAM     insulin isophane human  30 Units Subcutaneous At Bedtime     OLANZapine zydis  2.5 mg Sublingual At Bedtime     sodium chloride (PF)  3 mL Intracatheter Q8H     acetaminophen  975 mg Oral TID    Or     acetaminophen  975 mg Rectal TID     risperiDONE  1 mg Sublingual BID     polyethylene glycol  17 g Oral BID     docusate sodium  100 mg Oral BID     isosorbide mononitrate  30 mg Oral Daily       Data     Recent Labs  Lab 03/25/17  0813 03/23/17  0903 03/22/17  0915 03/21/17  1050   WBC  --   --   --  11.7*   HGB  --   --   --  9.3*   MCV  --   --   --  96   PLT  --   --   --  207    145* 143 135   POTASSIUM 4.1 3.9 3.9 3.9   CHLORIDE 114* 113* 112* 104   CO2 23 24 23 20   BUN 39* 41* 52* 55*   CR 2.94* 2.71* 2.99* 3.28*   ANIONGAP 7 8 8 11   BENTLEY 10.1 9.8 9.5 8.9   * 132* 251* 456*       No results found for this or any previous visit (from the past 24 hour(s)).[EL1.2]     Revision History        User Key Date/Time User Provider Type Action    > EL1.3 3/26/2017  3:11 PM Angie Park MD Physician Sign     EL1.2 3/26/2017  7:19 AM Angie Park MD Physician      EL1.1 3/26/2017  7:18 AM Angie Park MD Physician             Progress  Notes by Angie Park MD at 3/25/2017  1:51 PM     Author:  Angie Park MD Service:  Hospitalist Author Type:  Physician    Filed:  3/25/2017  5:07 PM Date of Service:  3/25/2017  1:51 PM Note Created:  3/25/2017  1:51 PM    Status:  Signed :  Angie Park MD (Physician)         [facility]    Hospitalist Progress Note    Assessment & Plan   Nel Farmer is a 61 year old female who was admitted on 2/11/2017.     Acute encephalopathy secondary to klebsiella UTI:   Etiology of her encephalopathy was multifactorial. Possible causes included sepsis with high fever, hypernatremia, medications, ?uremia. .she also has hypercalecmia with corrected calcium level of 12.1. ABG does not explain her encephalopathy with pCO2 of only 46. Has not gotten narcotics or benzos recently. UCx from 3/12 was +for klebsiella and UCx from 3/15 growing 50-100K Klebsiella pneumonia.  She was started on Ceftriaxone on 3/16   - head CT- 3/17 no acute changes   - valproic acid level- normal   -- had hx of urinary retention during hospital course. Dunlap last removed on 3/9 and currently voiding large amounts per discussion with RN. UCx 3/12 growing Klebsiella. UCx from 3/15 +for (50-100k).   - started on ceftriaxone on 3/16. Changed to meropenem in light of possible pneumonia  - blood culture from 3/16 NGTD  - changed to levaquin to complete a 14 day course. (8/14)  - Blood cultures turned out to be one staph epi and one staph hominis so most likely contaminant   - Mental status back to baseline.  - resume zyprexa   - TTE showed LVH and valves are OK       Hypernatremia:  This is likely exacerbated by her DI.   - Na 134, discontinued IVF 3/22  -pt refused blood draw today, will reschedule for tomorrow.      LYLA on CKD stage IV with Lithium induced Nephrogenic DI/acute retention of urine:  -Patient had urinary retention and obstructive uropathy. 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           Multiple falls  -Patient presented with multiple falls  -Was living with her mother prior to this admission  -current plan is placement at LTC facility  -etiology of fall unclear, likely multi-factorial      Right distal fibular fracture  - CAM boot while ambulating, encourage ambulation  -sat in chair today for several hours  - mobilize with the help of nursing staff as much as possible.  - Tylenol for pain  -[EL1.1] was able to sit in chair today[EL1.2]          DM2 with hypoglycemia   Ongoing hyperglycemia in hospita.  - better blood glucose  - restart glipizide 2.5 mg bid  - Continue with SSI  -am glucose low 3/23 am, dec'd am lantus from 30 to 20[EL1.1]  -glucoses 189, 210 today[EL1.2]      HTN  - meds include clonidine, hydralazine, diltiazem, metoprolol, Imdur  - Blood pressures soft today  -decreased toprol from 100 to 25, but hold if HR< 60  -diltiazem dec from bid to q evening       Schizoaffective disorder with baseline cognitive deficits  -Patient does have baseline cognitive deficits  -Continue depakote, risperdone and zyprexa (when more alert)  -Psychiatry consult appreciated      Vitamin D deficiency  - d/c because of hypercalcemia       DVT Prophylaxis: Pneumatic Compression Device      Code Status: Full Code       Disposition: Expected discharge once living situation found     Angie Park MD    Interval History[EL1.1]   Says she \"feels horrible\" but unable to be more specific.  She actually looks good sitting in the chair[EL1.2]    -Data reviewed today: I reviewed all new labs and imaging results over the last 24 hours. I personally reviewed no images or EKG's today.    Physical Exam   Temp: 98.7  F (37.1  C) Temp src: Axillary BP: 131/49   Heart Rate: 60 Resp: 18 SpO2: 94 % O2 Device: None (Room air)    Vitals:    " 03/23/17 0513 03/24/17 0704 03/25/17 0500   Weight: 102.5 kg (225 lb 15.5 oz) 103.1 kg (227 lb 4.7 oz) 102.2 kg (225 lb 5 oz)     Vital Signs with Ranges  Temp:  [97.4  F (36.3  C)-98.7  F (37.1  C)] 98.7  F (37.1  C)  Heart Rate:  [57-63] 60  Resp:  [16-18] 18  BP: (117-131)/(49-60) 131/49  SpO2:  [94 %-99 %] 94 %  I/O last 3 completed shifts:  In: 2150 [P.O.:2150]  Out: 5175 [Urine:5175]    Constitutional: alert   Respiratory: clear to auscultation, no wheezing or rhonchi   Cardiovascular: regular rate and rhythm without murmer or rub   GI:positive bowel sounds, non-tender   Skin/Integumen: no rashes    Other:        Medications        metoprolol  25 mg Oral Daily     diltiazem  120 mg Oral QPM     insulin isophane human  20 Units Subcutaneous QAM AC     levofloxacin  250 mg Oral Daily     insulin aspart  1-7 Units Subcutaneous TID AC     insulin aspart  1-5 Units Subcutaneous At Bedtime     cloNIDine  0.3 mg Oral BID     acetaZOLAMIDE  500 mg Oral Q12H MARIANNE     glipiZIDE (GLUCOTROL XL) 24 hr tablet 2.5 mg  2.5 mg Oral BID AC     heparin  5,000 Units Subcutaneous Q12H     valproic acid  1,500 mg Oral At Bedtime     valproic acid  500 mg Oral QAM     insulin isophane human  30 Units Subcutaneous At Bedtime     OLANZapine zydis  2.5 mg Sublingual At Bedtime     sodium chloride (PF)  3 mL Intracatheter Q8H     acetaminophen  975 mg Oral TID    Or     acetaminophen  975 mg Rectal TID     risperiDONE  1 mg Sublingual BID     polyethylene glycol  17 g Oral BID     docusate sodium  100 mg Oral BID     isosorbide mononitrate  30 mg Oral Daily       Data     Recent Labs  Lab 03/25/17  0813 03/23/17  0903 03/22/17  0915 03/21/17  1050  03/19/17  0635   WBC  --   --   --  11.7*  --  9.8   HGB  --   --   --  9.3*  --  11.1*   MCV  --   --   --  96  --  101*   PLT  --   --   --  207  --  255    145* 143 135  < > 156*   POTASSIUM 4.1 3.9 3.9 3.9  --  4.1   CHLORIDE 114* 113* 112* 104  --  123*   CO2 23 24 23 20  --  25    BUN 39* 41* 52* 55*  --  54*   CR 2.94* 2.71* 2.99* 3.28*  < > 3.64*   ANIONGAP 7 8 8 11  --  8   BENTLEY 10.1 9.8 9.5 8.9  --  10.3*   * 132* 251* 456*  --  209*   ALBUMIN  --   --   --   --   --  2.3*   PROTTOTAL  --   --   --   --   --  7.5   BILITOTAL  --   --   --   --   --  0.2   ALKPHOS  --   --   --   --   --  67   ALT  --   --   --   --   --  28   AST  --   --   --   --   --  34   < > = values in this interval not displayed.    No results found for this or any previous visit (from the past 24 hour(s)).[EL1.1]     Revision History        User Key Date/Time User Provider Type Action    > EL1.2 3/25/2017  5:07 PM Angie Park MD Physician Sign     EL1.1 3/25/2017  1:51 PM Angie Park MD Physician             Progress Notes by Champ Cain MD at 3/25/2017 11:08 AM     Author:  Champ Cain MD Service:  Nephrology Author Type:  Physician    Filed:  3/25/2017 11:14 AM Date of Service:  3/25/2017 11:08 AM Note Created:  3/25/2017 11:08 AM    Status:  Addendum :  Champ Cain MD (Physician)                  Assessment and Plan:   LYLA: Cr stable to slightly worse, lytes nominal. Na stable. UO 5.3 L yesterday. Has cherry in place.[JT1.1] On diamox at 500 bid without great effect on UO at this point.[JT1.2]             Interval History:   Hypertension  UTI  DM  Psych disease                  Review of Systems:   Somewhat agitated.Thin liquid diet with supervision and upright in chair for meals.  Soc Ser looking into NH placement.           Medications:[JT1.1]       metoprolol  25 mg Oral Daily     diltiazem  120 mg Oral QPM     insulin isophane human  20 Units Subcutaneous QAM AC     levofloxacin  250 mg Oral Daily     insulin aspart  1-7 Units Subcutaneous TID AC     insulin aspart  1-5 Units Subcutaneous At Bedtime     cloNIDine  0.3 mg Oral BID     acetaZOLAMIDE  500 mg Oral Q12H MARIANNE     glipiZIDE (GLUCOTROL XL) 24 hr tablet 2.5 mg  2.5 mg Oral BID AC      heparin  5,000 Units Subcutaneous Q12H     valproic acid  1,500 mg Oral At Bedtime     valproic acid  500 mg Oral QAM     insulin isophane human  30 Units Subcutaneous At Bedtime     OLANZapine zydis  2.5 mg Sublingual At Bedtime     sodium chloride (PF)  3 mL Intracatheter Q8H     acetaminophen  975 mg Oral TID    Or     acetaminophen  975 mg Rectal TID     risperiDONE  1 mg Sublingual BID     polyethylene glycol  17 g Oral BID     docusate sodium  100 mg Oral BID     isosorbide mononitrate  30 mg Oral Daily[JT1.3]        Current active medications and PTA medications reviewed, see medication list for details.            Physical Exam:   Vitals were reviewed  Patient Vitals for the past 24 hrs:   BP Temp Temp src Heart Rate Resp SpO2 Weight   17 0747 131/49 98.7  F (37.1  C) Axillary 60 18 94 % -   17 0500 - - - - - - 102.2 kg (225 lb 5 oz)   17 0154 128/56 97.4  F (36.3  C) Axillary 57 18 98 % -   17 117/55 - - 63 - 99 % -   17 1550 123/60 97.7  F (36.5  C) Oral 57 16 99 % -       Temp:  [97.4  F (36.3  C)-98.7  F (37.1  C)] 98.7  F (37.1  C)  Heart Rate:  [57-63] 60  Resp:  [16-18] 18  BP: (117-131)/(49-60) 131/49  SpO2:  [94 %-99 %] 94 %    Temperatures:  Current - Temp: 98.7  F (37.1  C); Max - Temp  Av.9  F (36.6  C)  Min: 97.4  F (36.3  C)  Max: 98.7  F (37.1  C)  Respiration range: Resp  Av.3  Min: 16  Max: 18  Pulse range: No Data Recorded  Blood pressure range: Systolic (24hrs), Av , Min:117 , Max:131   ; Diastolic (24hrs), Av, Min:49, Max:60    Pulse oximetry range: SpO2  Av.5 %  Min: 94 %  Max: 99 %    I/O last 3 completed shifts:  In:  [P.O.:2150]  Out: 5175 [Urine:5175]      Intake/Output Summary (Last 24 hours) at 17 1108  Last data filed at 17 0650   Gross per 24 hour   Intake             1050 ml   Output             3475 ml   Net            -2425 ml       Alert, cherry in place.       Wt Readings from Last 4 Encounters:    03/25/17 102.2 kg (225 lb 5 oz)   06/20/16 108.9 kg (240 lb)   05/27/16 108.9 kg (240 lb)   05/21/16 108.9 kg (240 lb)          Data:          Lab Results   Component Value Date     03/25/2017     03/23/2017     03/22/2017    Lab Results   Component Value Date    CHLORIDE 114 03/25/2017    CHLORIDE 113 03/23/2017    CHLORIDE 112 03/22/2017    Lab Results   Component Value Date    BUN 39 03/25/2017    BUN 41 03/23/2017    BUN 52 03/22/2017      Lab Results   Component Value Date    POTASSIUM 4.1 03/25/2017    POTASSIUM 3.9 03/23/2017    POTASSIUM 3.9 03/22/2017    Lab Results   Component Value Date    CO2 23 03/25/2017    CO2 24 03/23/2017    CO2 23 03/22/2017    Lab Results   Component Value Date    CR 2.94 03/25/2017    CR 2.71 03/23/2017    CR 2.99 03/22/2017        Recent Labs   Lab Test  03/21/17   1050  03/19/17   0635  03/18/17   0536   WBC  11.7*  9.8  10.0   HGB  9.3*  11.1*  11.2*   HCT  29.8*  37.7  37.5   MCV  96  101*  103*   PLT  207  255  264     Recent Labs   Lab Test  03/19/17   0635  03/17/17   1153  02/12/17   0050   05/14/15   0737   03/27/10   1615   03/22/10   1435   AST  34  10  19   < >   --    < >   --    < >  37   ALT  28  13  26   < >   --    < >   --    < >  8   ALKPHOS  67  72  86   < >   --    < >   --    < >  106   BILITOTAL  0.2  0.2  0.3   < >   --    < >   --    < >  0.3   BILICONJ   --    --    --    --    --    --    --    --   0.0   ZACARIAS   --    --    --    --   18   --   18   --    --     < > = values in this interval not displayed.       Recent Labs   Lab Test  02/03/12   0740  02/02/12   0750  03/27/10   1615   MAG  2.6*  2.4*  3.2*     Recent Labs   Lab Test  02/24/17   0625  02/17/17   0655  02/03/12   0740   PHOS  4.1  3.6  3.6     Recent Labs   Lab Test  03/25/17   0813  03/23/17   0903  03/22/17   0915   BENTLEY  10.1  9.8  9.5       Lab Results   Component Value Date    BENTLEY 10.1 03/25/2017     Lab Results   Component Value Date    WBC 11.7 (H) 03/21/2017     HGB 9.3 (L) 03/21/2017    HCT 29.8 (L) 03/21/2017    MCV 96 03/21/2017     03/21/2017     Lab Results   Component Value Date     03/25/2017    POTASSIUM 4.1 03/25/2017    CHLORIDE 114 (H) 03/25/2017    CO2 23 03/25/2017     (H) 03/25/2017     Lab Results   Component Value Date    BUN 39 (H) 03/25/2017    CR 2.94 (H) 03/25/2017     Lab Results   Component Value Date    MAG 2.6 (H) 02/03/2012     Lab Results   Component Value Date    PHOS 4.1 02/24/2017       Creatinine   Date Value Ref Range Status   03/25/2017 2.94 (H) 0.52 - 1.04 mg/dL Final   03/23/2017 2.71 (H) 0.52 - 1.04 mg/dL Final   03/22/2017 2.99 (H) 0.52 - 1.04 mg/dL Final   03/21/2017 3.28 (H) 0.52 - 1.04 mg/dL Final   03/20/2017 3.61 (H) 0.52 - 1.04 mg/dL Final   03/19/2017 3.64 (H) 0.52 - 1.04 mg/dL Final       Attestation:  I have reviewed today's vital signs, notes, medications, labs and imaging.     Champ Cain MD[JT1.1]               Revision History        User Key Date/Time User Provider Type Action    > [N/A] 3/25/2017 11:14 AM Champ Cain MD Physician Addend     JT1.2 3/25/2017 11:13 AM Champ Cain MD Physician Sign     JT1.3 3/25/2017 11:09 AM Champ Cain MD Physician      JT1.1 3/25/2017 11:08 AM Champ Cain MD Physician                   Procedure Notes     No notes of this type exist for this encounter.      Progress Notes - Therapies (Notes from 03/25/17 through 03/28/17)     No notes of this type exist for this encounter.

## 2017-02-11 NOTE — ED AVS SNAPSHOT
Emergency Department    [address] [place] [id number]    Phone:  [phone]    Fax:  [phone]                                       Nel Farmer   MRN: [id number]    Department:   Emergency Department   Date of Visit:  2/11/2017           After Visit Summary Signature Page     I have received my discharge instructions, and my questions have been answered. I have discussed any challenges I see with this plan with the nurse or doctor.    ..........................................................................................................................................  Patient/Patient Representative Signature      ..........................................................................................................................................  Patient Representative Print Name and Relationship to Patient    ..................................................               ................................................  Date                                            Time    ..........................................................................................................................................  Reviewed by Signature/Title    ...................................................              ..............................................  Date                                                            Time

## 2017-02-11 NOTE — DISCHARGE INSTRUCTIONS
Bruises (Contusions)    A contusion is a bruise. A bruise happens when a blow to your body doesn't break the skin but does break blood vessels beneath the skin. Blood leaking from the broken vessels causes redness and swelling. As it heals, your bruise is likely to turn colors like purple, green, and yellow. This is normal. The bruise should fade in 2 or 3 weeks.  Factors that make you more likely to bruise  Almost everyone bruises now and then. Certain people do bruise more easily than others. You're more prone to bruising as you get older. That's because blood vessels become more fragile with age. You're also more likely to bruise if you have a clotting disorder such as hemophilia or take medications that reduce clotting, including aspirin.  When to go to the emergency room (ER)  Bruises almost always heal on their own without special treatment. But for some people, a bad bruise can be serious. Seek medical care if you:    Have a clotting disorder such as hemophilia.    Have cirrhosis or other serious liver disease.    Take blood-thinning medications such as warfarin (Coumadin).  What to expect in the ER  A doctor will examine your bruise and ask about any health conditions you have. In some cases, you may have a test to check how well your blood clots. Other treatment will depend on your needs.  Follow-up care  Sometimes a bruise gets worse instead of better. It may become larger and more swollen. This can occur when your body walls off a small pool of blood under the skin (hematoma). In very rare cases, your doctor may need to drain excess blood from the area.  Tip:  Apply an ice pack or bag of frozen peas to a bruise (keep a thin cloth between the cold source and your skin). This can help reduce redness and swelling.     3552-0769 [employer]. [address] [place] [address], [place] [address]. All rights reserved. This information is not intended as a substitute for professional medical care. Always  follow your healthcare professional's instructions.

## 2017-02-12 PROBLEM — R29.6 FALLS FREQUENTLY: Status: ACTIVE | Noted: 2017-01-01

## 2017-02-12 NOTE — ED NOTES
Bed: ED16  Expected date: 2/11/17  Expected time: 11:40 PM  Means of arrival: Ambulance  Comments:

## 2017-02-12 NOTE — ED PROVIDER NOTES
"  History     Chief Complaint:  Fall      HPI   History is limited secondary to patient's baseline psychiatric illness    Nel Farmer is a 61 year old female who presents after a fall. The patient fell out of a car earlier today and subsequently had left foot pain, knee pain, shoulder pain and chest wall pain. She was brought to the ED at that time and had negative x-rays. She was able to ambulate in the ED, and was eventually discharged to home with her elderly mother. The patient states that tonight she had another fall out of bed, and couldn't get up. She states that she fell onto the floor and is continuing to have left foot pain. The patient denies any loss of consciousness or other complaints. The patient's mother reports she is no longer able to care for Nel. She reports the patient has not been up and active, and that she is requiring adult diapers and not taking care of herself.        Allergies:  Amoxicillin  Haldol  Penicillins    Seroquel - GI disturbance      Medications:     Novolog 70/30  Glipizide    Risperidone  Divalproex  Lorazepam    Olanzapine    Clonidine    Isosorbide mononitrate    Metoprolol  Furosemide    Diltiazem  Omeprazole    Zofran  Senna-docusate  Polyethylene glycol       Past Medical History:     Diabetes mellitus     Hypertension  Hyperlipidemia  Chronic kidney disease, stage 3  Schizoaffective disorder    Anxiety    Depression    Cognitive deficits    Osteoarthritis      Past Surgical History:    Total knee arthroplasty, left 3/2010  Tonsillectomy with adenoidectomy    Dilation and curettage      Family History:     Hypertension - mother  Colorectal cancer - father      Social History:  Marital Status: single  Presents to the ED alone.  Tobacco Use: No previous or current tobacco use.  Alcohol Use: No alcohol use.        Review of Systems   Constitutional: Positive for activity change and fatigue.        \"I just don't feel good\"   Musculoskeletal:        Positive for left " "foot pain.    Neurological: Positive for weakness. Negative for syncope.   All other systems reviewed and are negative.    Physical Exam   First Vitals:  BP: 172/84 mmHg  Heart Rate: 93  Temp: 99.7  F (37.6  C)  Resp: 16  Height: 160 cm (5' 3\")  SpO2: 100 %    Physical Exam  Constitutional:  Appears well-developed and well-nourished. Cooperative.   HENT:   Head:    Atraumatic.   Mouth/Throat:   Oropharynx is without erythema or exudate and mucous     membranes are tacky.   Eyes:    Conjunctivae normal and EOM are normal.      Pupils are equal, round, and reactive to light.   Neck:    Normal range of motion. Neck supple.   Cardiovascular:  Normal rate, regular rhythm, normal heart sounds and radial and    dorsalis pedis pulses are 2+ and symmetric.    Pulmonary/Chest:  Effort normal and breath sounds normal.   Abdominal:   Soft. Bowel sounds are normal.      No splenomegaly or hepatomegaly. No tenderness. No rebound.   Musculoskeletal:  Normal range of motion. No edema and no tenderness.   Neurological:  Alert. No cranial nerve deficit. 5/5 strength upper and lower extremities bilaterally gait is slow and shuffling. Speech is loud and fluent Sensation to light touch intact in all 4 extremities  Skin:    Skin is warm and dry. Ecchymosis over the left anterior knee.   Psychiatric:   Normal mood and affect.      Emergency Department Course   Laboratory:  UA: Clear straw urine, Glucose >1000, Specific Gravity 1.001 (L), Protein 10, WBC 3 (H), Bacteria few, Squamous Epithelial 2 (H), Mucous present,  otherwise WNL  CBC:  WBC 10.9, HGB 11.9,   CMP: Glucose 307 (H), Urea Nitrogen 40 (H), Creatinine 2.73 (H), GFR 18 (L), Albumin 2.9 (L), otherwise WNL  Valproic Acid: 96    (2348) Glucose by Meter: 335 (H)     Interventions:   Tylenol, 1000 mg, PO    Emergency Department Course:  Nursing notes and vitals reviewed.  I performed an exam of the patient as documented above.   Blood was drawn from the patient. This was sent " for laboratory testing, findings above.    Urine sample was obtained and sent for laboratory analysis, findings above.   Findings and plan explained to the patient and mother who consents to observation.   (0303) I discussed the patient with Dr. Lee of the hospitalist service, who will place the patient in observation in the observation  area.      Impression & Plan    Medical Decision Making:  The patient presents complaining of foot pain and generalized weakness. She was seen in the ED earlier today after falling out of her mother s car. X-rays of the chest, left foot, left knee and right shoulder were all negative for acute abnormality. On exam today, I note a bruise on her left kneecap but no other traumatic injuries. Ace wrap and a post op shoe were placed on her left foot, due to complaints of pain, and concern for a mid-foot sprain. She said this helped with her left foot pain but when she got up and tried to walk she said that her other foot hurt as well.     I do not appreciate any focal neurologic deficits. The patient s blood sugar was elevated at 335. She has a history of diabetes. The patient reports feeling generally unwell. Because of the two falls I checked a urinalysis and there is glucose in the urine but evidence for infection. Laboratory testing returned looking fairly unremarkable as above. Her creatinine is slightly above her most recent reading however. Her Depakote level was in the therapeutic range.     The patient is tolerating oral fluids. She was able to stand and slowly ambulate to the bathroom with assist of two staff, and transfer to a commode, but she reports that she is really unable to do any self-cares. The patient lives with her elderly mother, and when we called her mother to see if she could pick the patient up to return home the patient s mother reports she can no longer care for her daughter. She is requesting help from  for long term placement for the  patient.     The patient has cognitive impairment. She is not able to adequately care for herself at home at this point and she will be admitted to the observation unit for further evaluation of her generalized weakness and evaluation for placement. Dr. Lee of the hospitalist service agreed to accept the patient.        Diagnosis:  1. Fall  2. Generalized Weakness  3. Left knee contusion   4. Diabetes      Disposition:  Admitted to the hospitalist        ISvetlana, am serving as a scribe on 2/11/2017 at 11:56 PM to personally document services performed by Dr. Hutchinson based on my observations and the provider's statements to me.    2/11/2017    EMERGENCY DEPARTMENT         Jay Hutchinson MD  02/12/17 0736

## 2017-02-12 NOTE — PROGRESS NOTES
[facility]    Hospitalist Progress Note  Michael Suarez MD    Date of Service : 02/12/2017    Assessment & Plan   61-year-old with schizoaffective disorder, depression, anxiety, cognitive deficits, diabetes, hypertension, hyperlipidemia, who was admitted due to multiple falls. Work up done on 2/12/17 revealed, BUN 40, Creat 2.73, Alb 2.9. CBC was normal. UA was significant for glucose >1000. Imaging done on 2/11/7 revealed, normal left shoulder x-rays, chest x-rays were normal, left foot x-rays were normal and left knee x-rays revealed, total knee arthroplasty components.       1. History of multiple falls: For PT assessment. Continue IV N/Saline.    2. Type 2 Diabetes: Continue glipizide and insulin 70/30 bid. For medium dose insulin aspart sliding scale prn.    3. Hypertension: Continue metoprolol and diltiazem.     4. Schizoaffective disorder: Continue divalproex, zyprexa and risperidone.          DVT Prophylaxis: Pneumatic Compression Devices  Code Status: Full Code    Disposition: Expected discharge in 1-2 days. For PT review. For possible TCU at discharge.      Interval History   The pt communicates by talking in a high toned voice continuously. She reported falling and having pain in her left foot. Her Mom is unable to care for her and she was admitted for placement.     -Data reviewed today: I reviewed all new labs and imaging results over the last 24 hours. I personally reviewed no images or EKG's today.    Physical Exam   Temp: 98.6  F (37  C) Temp src: Oral BP: (!) 188/94   Heart Rate: 102 Resp: 16 SpO2: 95 % O2 Device: None (Room air)    There were no vitals filed for this visit.  Vital Signs with Ranges  Temp:  [98.6  F (37  C)-99.7  F (37.6  C)] 98.6  F (37  C)  Pulse:  [80-82] 82  Heart Rate:  [] 102  Resp:  [16-20] 16  BP: (111-188)/(59-96) 188/94  SpO2:  [91 %-100 %] 95 %       Constitutional: Adult white female, awake, cooperative, no apparent distress, O2 Sats  98% on RA  Respiratory: BS vesicular bilaterally, no crackles or wheezing  Cardiovascular: S1 and S2, reg, no murmur noted  GI: Soft, non-distended, non-tender, no masses, BS present+  Skin/Integumen: No rashes  Extremities: No pedal edema    Medications     NaCl         cloNIDine  0.3 mg Oral BID     diltiazem  120 mg Oral Daily     divalproex  500 mg Oral QAM     divalproex  1,500 mg Oral At Bedtime     furosemide (LASIX) tablet 20 mg  20 mg Oral Daily     glipiZIDE (GLUCOTROL XL) 24 hr tablet 10 mg  10 mg Oral BID AC     insulin aspart prot & aspart  70 Units Subcutaneous BID AC     isosorbide mononitrate  30 mg Oral Daily     metoprolol (TOPROL-XL) 24 hr tablet 100 mg  100 mg Oral Daily     OLANZapine (zyPREXA) tablet 5 mg  5 mg Oral At Bedtime     omeprazole (priLOSEC) CR capsule 20 mg  20 mg Oral BID AC     risperiDONE  1 mg Sublingual At Bedtime     sodium chloride 0.9%  500 mL Intravenous Once       Data     Recent Labs  Lab 02/12/17  0050   WBC 10.9   HGB 11.9   MCV 92         POTASSIUM 4.4   CHLORIDE 105   CO2 20   BUN 40*   CR 2.73*   ANIONGAP 11   BENTLEY 9.7   *   ALBUMIN 2.9*   PROTTOTAL 7.5   BILITOTAL 0.3   ALKPHOS 86   ALT 26   AST 19       Recent Results (from the past 24 hour(s))   Shoulder XR, G/E 3 views, left    Narrative    SHOULDER LEFT THREE OR MORE VIEWS   2/11/2017 3:59 PM     HISTORY: Acute pain after blunt trauma.    COMPARISON: None.      Impression    IMPRESSION: Normal.    JESSICA ADAMS MD   XR Chest 2 Views    Narrative    CHEST TWO VIEW   2/11/2017 4:00 PM     HISTORY: Acute pain after blunt trauma.    COMPARISON: 7/28/2015      Impression    IMPRESSION: Cardiomegaly. No evidence for infiltrate or congestive  heart failure. No pleural effusions. Minimal degenerative changes seen  spine. No pneumothorax.    JESSICA ADAMS MD   Foot XR, G/E 3 views, left    Narrative    FOOT LEFT THREE OR MORE VIEWS   2/11/2017 4:00 PM     HISTORY: Acute pain after blunt  trauma.    COMPARISON: 9/6/2016      Impression    IMPRESSION: Negative for fracture. Tiny radiopaque foreign bodies  again seen adjacent to the proximal phalanx of the fifth digit.    JESSICA ADAMS MD   XR Knee Left 1/2 Views    Narrative    KNEE LEFT ONE - TWO VIEW   2/11/2017 4:02 PM     HISTORY: Pain after fall, history of knee replacement.    COMPARISON: Left knee x-rays dated 4/28/2016.      Impression    IMPRESSION: Total knee arthroplasty components are again noted without  evidence for hardware failure or periprosthetic fracture. Probable  small knee joint effusion is noted. No significant changes since the  prior study.    POLLY TANG MD

## 2017-02-12 NOTE — PLAN OF CARE
Problem: Goal Outcome Summary  Goal: Goal Outcome Summary  Patient came to the floor at 3:45am from ED, she is A&O, VSS, cms intact and IV saline locked. She speaks very loudly and needs redirection to keep her voice down. Will continue to monitor

## 2017-02-12 NOTE — PROGRESS NOTES
CM  I:  Per request from RN CC, SW placed referrals for respite with potential to LTC thru DOD to: [facility], [facility],    P:  Continue to assist with discharge planning as needed.    CHERI Solitario

## 2017-02-12 NOTE — PLAN OF CARE
Problem: Goal Outcome Summary  Goal: Goal Outcome Summary  Outcome: Improving  A&O. Up w/ 2 and lift. Generalized weakness on BLE. May be incontinent. IV infusing. Pt speaks w/ very loud voice. Tylenol for pain. Continue to monitor.

## 2017-02-12 NOTE — H&P
REVISED REPORT      PRIMARY CARE PROVIDER:  Nick Myles MD, [facility].      CHIEF COMPLAINT:  Multiple falls.      HISTORY OF PRESENT ILLNESS:  Nel Farmer is a 61-year-old  female with schizoaffective disorder, who lives with her elderly mother who has significant cognitive deficits, anxiety and depression along with her schizoaffective disorder, hypertension, diabetes, and other medical issues.  The patient was admitted earlier today after she apparently fell out of a car and she had some foot pain.  She had x-rays of her chest, and left foot and knee and shoulder, all of which were negative for any fractures.  The patient was subsequently discharged under her mother's care back to her home.      The patient apparently had another fall out of bed later on in the day, and came back to [facility].  The patient was seen at this time by Dr. Jay Bethea.  The patient had a low-grade temperature of 99.7, normal blood pressure, normal oxygen saturations.  Electrolytes were normal with BUN of 40 and creatinine of 2.73, which is slightly above her baseline kidney function.  CBC was normal.  Urinalysis had greater than 1000 glucose.  She had 3 white cells, less than 1 red cell, and a few bacteria.  The patient was road-tested, and was able to ambulate only with a lot of effort.  The patient's mother was called, and the patient's mother stated that she is unable to care for her, and that she needs placement in some sort of assisted living facility.  The patient is therefore being admitted under observation status for placement.      PAST MEDICAL HISTORY:   1.  Schizoaffective disorder.   2.  Anxiety and depression.   3.  Cognitive deficits.   4.  Osteoarthritis.   5.  Diabetes.   6.  Hypertension.   7.  Hyperlipidemia.      PAST SURGICAL HISTORY:   1.  Tonsillectomy and adenoidectomy.   2.  History of D&C.   3.  Total knee arthroplasty on the left.   4.  Prior colonoscopy.       FAMILY HISTORY:  Mother with hypertension.  Father with colorectal cancer.      SOCIAL HISTORY:  She is single.  She lives with her elderly mother.  No alcohol or tobacco.  She is FULL CODE.      REVIEW OF SYSTEMS:  Ten-point system reviewed.  The only complaint is pain in her foot.      PHYSICAL EXAM:   VITAL SIGNS:  Temperature 99.7, heart rate 92, respirations 16, blood pressure 172/84, sats 100% on room air.   GENERAL:  The patient is an obese, 61-year-old, hirsute female.  She speaks very, very loudly.   HEENT:  Atraumatic.  Pupils equal.  Mucous membranes are tacky.   NECK:  Veins are not distended.   LUNGS:  Clear to auscultation.   CARDIOVASCULAR:  S1, S2, regular rate and rhythm.   ABDOMEN:  Soft, nontender, nondistended.   EXTREMITIES:  No edema.  She has some bruising noted over her left knee.   NEUROLOGIC:  She is able to move all four extremities.  Cranial nerves II-XII are grossly intact.      LABS/IMAGING STUDIES:  As dictated in the history of present illness.      ASSESSMENT:  Nel Farmer is a 61-year-old with schizoaffective disorder, depression, anxiety, and cognitive deficits, with multiple falls, metabolically intact with the exception of slightly worsening kidney function as well as glucosuria of greater than 1000, being admitted under observation status for further placement.      PLAN:   1.  Multiple falls:  Unclear.  She is spilling quite a bit of glucose in her urine.  She states that she is compliant with her medications.  We will continue the patient on her medications.  I am going to give her some IV fluids, as maybe she is volume-down from osmotic loss from spilling glucose, so we will give her IV fluids.  A chest x-ray earlier showed no evidence of infiltrate or congestion.  The patient will be seen by PT for home safety.   2.  Diabetes with glycosuria:  The patient will be continued on her home doses of Lantus, metformin, and glipizide.   3.  Hypertension:  We will continue the  patient on her metoprolol and diltiazem.   4.  Schizoaffective disorder:  We will continue the patient on all of her psychiatric medications including Depakote and Risperdal and Zyprexa.   5.  DVT prophylaxis:  The patient will have compression boots.   6.  CODE STATUS:  FULL.   7.  Disposition:  The patient is pending placement at an alternative care facility instead of her home situation due to her elderly mother and inability to care for her.   8.  Chronic kidney disease.  Mario Alberto Patrick's creatinine is about baseline and given her glucose loss in the urine she may be relatively fluid depleted.  Will check orthostatics and give her IV fluids.      Continuation:  D&T:  2017, Document #[id number], AMS/sp         JULIO CESAR LAUREN MD             D: 2017 07:01   T: 2017 08:12   MT: [id number]      Name:     MARIO ALBERTO PATRICK   MRN:      [id number]        Account:      [id number]   :      1955           Admitted:     [id number]      Document: [id number]       cc: STEPHAN Myles MD

## 2017-02-13 NOTE — PROGRESS NOTES
"[facility]    Hospitalist Progress Note  Michael Suarez MD    Date of Service : 02/13/2017    Assessment & Plan   61-year-old with schizoaffective disorder, depression, anxiety, cognitive deficits, diabetes, hypertension, hyperlipidemia, who was admitted due to multiple falls. Work up done on 2/12/17 revealed, BUN 40, Creat 2.73, Alb 2.9. CBC was normal. UA was significant for glucose >1000. Imaging done on 2/11/7 revealed, normal left shoulder x-rays, chest x-rays were normal, left foot x-rays were normal and left knee x-rays revealed, total knee arthroplasty components.       1. History of multiple falls: Appreciate PT input. For PT at LTC facility.     2. Bilateral foot pain R>L: for scheduled Tylenol 1000mg po bid. For ace wrap of right foot. Will order right foot x-rays today. Left foot x-rays on 2/11/17 was negative for fracture.    3. Type 2 Diabetes: BG was 128 this am. Continue glipizide and insulin 70/30 bid. For medium dose insulin aspart sliding scale prn.    4. Hypertension: Continue metoprolol and diltiazem.     5. Schizoaffective disorder: Continue divalproex, zyprexa and risperidone.        Addendum at 4.01pm: Right foot x-rays done on 2/13/17 revealed, likely subacute fracture involving the distal right fibula. For Orthopedics Consult. Continue scheduled Tylenol. For Oxycodone IR prn.     DVT Prophylaxis: Pneumatic Compression Devices  Code Status: Full Code    Disposition: Expected discharge in 1-2 days. For transfer to LTC facility at discharge.      Interval History   The pt communicates in a high toned voice continuously. She asked me if \"she has stomach cancer and if that is why she cannot walk\". She complained of her mom not calling her since she was admitted. She had bilat foot pain with movts.  Her Mom is unable to care for her and she was admitted for placement.     -Data reviewed today: I reviewed all new labs and imaging results over the last 24 hours. I " personally reviewed no images or EKG's today.    Physical Exam   Temp: 98  F (36.7  C) Temp src: Axillary BP: 128/54   Heart Rate: 70 Resp: 18 SpO2: 94 % O2 Device: None (Room air)    There were no vitals filed for this visit.  Vital Signs with Ranges  Temp:  [97.8  F (36.6  C)-98.5  F (36.9  C)] 98  F (36.7  C)  Heart Rate:  [66-90] 70  Resp:  [16-18] 18  BP: (128-177)/() 128/54  SpO2:  [94 %-98 %] 94 %  I/O last 3 completed shifts:  In: 880 [P.O.:880]  Out: -     Constitutional: Adult white female, awake, cooperative, no apparent distress, O2 Sats 94% on RA  Respiratory: BS vesicular bilaterally, no crackles or wheezing  Cardiovascular: S1 and S2, reg, no murmur noted  GI: Soft, non-distended, non-tender, no masses, BS present+  Skin/Integumen: Hirsutism+  Extremities: No pedal edema  KATHY: Right ankle swelling+, Painful bilat foot flexion movts R>L.    Medications        acetaminophen  1,000 mg Oral TID     cloNIDine  0.3 mg Oral BID     diltiazem  120 mg Oral Daily     divalproex  500 mg Oral QAM     divalproex  1,500 mg Oral At Bedtime     furosemide (LASIX) tablet 20 mg  20 mg Oral Daily     glipiZIDE (GLUCOTROL XL) 24 hr tablet 10 mg  10 mg Oral BID AC     insulin aspart prot & aspart  70 Units Subcutaneous BID AC     isosorbide mononitrate  30 mg Oral Daily     metoprolol (TOPROL-XL) 24 hr tablet 100 mg  100 mg Oral Daily     OLANZapine (zyPREXA) tablet 5 mg  5 mg Oral At Bedtime     omeprazole (priLOSEC) CR capsule 20 mg  20 mg Oral BID AC     risperiDONE  1 mg Sublingual At Bedtime       Data     Recent Labs  Lab 02/12/17  0050   WBC 10.9   HGB 11.9   MCV 92         POTASSIUM 4.4   CHLORIDE 105   CO2 20   BUN 40*   CR 2.73*   ANIONGAP 11   BENTLEY 9.7   *   ALBUMIN 2.9*   PROTTOTAL 7.5   BILITOTAL 0.3   ALKPHOS 86   ALT 26   AST 19       No results found for this or any previous visit (from the past 24 hour(s)).

## 2017-02-13 NOTE — PROGRESS NOTES
OCTAVIA  D: OCTAVIA following for discharge planning needs.  OCTAVIA received a message from Admissions at [facility] stating that they will not have a bed available for patient at least for a few days.  P: OCTAVIA will continue to follow and assist with finalizing the discharge plan as appropriate.    CHERI Lopez      OCTAVIA  D: OCTAVIA following for discharge planning needs.  OCTAVIA spoke to Admissions at [facility] and [facility] and both facilities are still assessing patient.  Admissions at both facilities will call OCTAVIA once they have completed the assessment process and have determine if patient will be appropriate for admission to the facility.  P: SW will continue to follow and assist with finalizing the discharge plan as appropriate.    CHERI Lopez

## 2017-02-13 NOTE — PLAN OF CARE
Problem: Goal Outcome Summary  Goal: Goal Outcome Summary  Patient is A&O, VSS, incontinence of urine, Tylenol for pain, Moderate carb diet. She is complaining of generalized weakness/pain,up with assist of 2 and lift. Will continue to monitor

## 2017-02-13 NOTE — PLAN OF CARE
Problem: Goal Outcome Summary  Goal: Goal Outcome Summary  Outcome: Improving  PRIOR TO DISCHARGE     Comments: 1. Home safety - not met  2. dispostion to home vs assisted care facility - not met

## 2017-02-13 NOTE — PLAN OF CARE
Problem: Goal Outcome Summary  Goal: Goal Outcome Summary  PT: Orders received and chart reviewed. Patient currently admitted under observation status. At this time, patient has a discharge plan in place - LTC. Appropriate to hold PT evaluation at this time as there is a plan established for discharge. Will complete order. Communicated with SW and RN. Please re-order if indicated. Patient would benefit from PT services at LTC.

## 2017-02-13 NOTE — PLAN OF CARE
Problem: Goal Outcome Summary  Goal: Goal Outcome Summary  Outcome: Improving  Pt alert, disoriented at times to situation and place. Pt with baseline cognitive impairment. Up with lift. Feet very tender to touch, xray of RLE showed subacute fracture. Ortho to consult. Incontinent of urine. BP elevated at times, upon recheck WNL. Pain managed with tylenol. IV SL. Will continue to monitor.

## 2017-02-13 NOTE — PLAN OF CARE
Problem: Goal Outcome Summary  Goal: Goal Outcome Summary  Outcome: Improving  Pt is A&O, c/o pain in BLE that has been tolerable with prn tylenol and repositioning. Did not get up OOB this shift, is incontinent of urine and AX2 with bed mobility. Pt calling out frequently and not using call light even after education provided. Tolerating PO, good appetite, stopped IV fluids per MD order. Pt is able to make needs known.

## 2017-02-14 NOTE — PROGRESS NOTES
OCTAVIA  D: OCTAVIA following for discharge planning needs.  Carolyn from [facility] came out to complete an on site assessment of patient and states that she needs her team to review the information.  P: SW will continue to follow and assist with finalizing the discharge plan as appropriate.    Svetlana Tamez, CHERI

## 2017-02-14 NOTE — PROGRESS NOTES
[facility]    Hospitalist Progress Note    Assessment & Plan   Nel Farmer is a 61 year old female who was admitted on 2/11/2017.       61-year-old with schizoaffective disorder, depression, anxiety, cognitive deficits, diabetes, hypertension, hyperlipidemia, who was admitted due to multiple falls. Work up done on 2/12/17 revealed, BUN 40, Creat 2.73, Alb 2.9. CBC was normal. UA was significant for glucose >1000. Imaging done on 2/11/7 revealed, normal left shoulder x-rays, chest x-rays were normal, left foot x-rays were normal and left knee x-rays revealed, total knee arthroplasty components.       1. History of multiple falls: Appreciate PT input. For PT at LTC facility.      2. Right foot fx  Left foot x-rays on 2/11/17 was negative for fracture.    Right foot x-rays done on 2/13/17 revealed, likely subacute fracture involving the distal right fibula. Continue scheduled Tylenol. For Oxycodone IR prn.  -will order CAM boot from orthotics as pt is unwilling to walk due to foot pain.  -per ortho, fx appears to be from an event about a month ago as there is healing   -no surgery planned  -discussed with mother by phone today     3. Type 2 Diabetes: BG was 128 this am. Continue glipizide and insulin 70/30 bid. For medium dose insulin aspart sliding scale prn.     4. Hypertension: Continue metoprolol and diltiazem.   -as BP are high in the evening and am, will change metoprolol in am to 50 bid for better coverage   -continue other BP meds of dilt, lasix, imdur, clonidine     5. Schizoaffective disorder: Continue divalproex, zyprexa and risperidone.    abd pain  -complains of diffuse abd pain  -can't tell me when she had last BM  -will start bowel program  -will also get xrays taken          DVT Prophylaxis: Pneumatic Compression Devices  Code Status: Full Code     Disposition: Expected discharge today or tomorrow depending getting CAM boot and  SW finding appropriate bed at LT  "facility.           Angie Park MD    Interval History   Complains of pain \" all over\".  Says she can't walk due to foot pain.  Also diffuse abd pain for which she can't tell me if she has had a bowel movment lately or how long she has had this.    -Data reviewed today: I reviewed all new labs and imaging results over the last 24 hours. I personally reviewed no images or EKG's today.    Physical Exam   Temp: 98.2  F (36.8  C) Temp src: Oral BP: 168/87   Heart Rate: 82 Resp: 16 SpO2: 95 % O2 Device: None (Room air)    There were no vitals filed for this visit.  Vital Signs with Ranges  Temp:  [98.1  F (36.7  C)-98.3  F (36.8  C)] 98.2  F (36.8  C)  Heart Rate:  [64-89] 82  Resp:  [16] 16  BP: (127-189)/(54-89) 168/87  SpO2:  [93 %-95 %] 95 %  I/O last 3 completed shifts:  In: 1060 [P.O.:1060]  Out: -     Constitutional: alert   Respiratory: clear to auscultation, no wheezing or rhonchi   Cardiovascular: regular rate and rhythm without murmer or rub   GI:positive bowel sounds, obese, distended, not tender to palpation, no rebound or guarding  Skin/Integumen: no rashes    Other:        Medications        acetaminophen  1,000 mg Oral TID     cloNIDine  0.3 mg Oral BID     diltiazem  120 mg Oral Daily     divalproex  500 mg Oral QAM     divalproex  1,500 mg Oral At Bedtime     furosemide (LASIX) tablet 20 mg  20 mg Oral Daily     glipiZIDE (GLUCOTROL XL) 24 hr tablet 10 mg  10 mg Oral BID AC     insulin aspart prot & aspart  70 Units Subcutaneous BID AC     isosorbide mononitrate  30 mg Oral Daily     metoprolol (TOPROL-XL) 24 hr tablet 100 mg  100 mg Oral Daily     OLANZapine (zyPREXA) tablet 5 mg  5 mg Oral At Bedtime     omeprazole (priLOSEC) CR capsule 20 mg  20 mg Oral BID AC     risperiDONE  1 mg Sublingual At Bedtime       Data     Recent Labs  Lab 02/12/17  0050   WBC 10.9   HGB 11.9   MCV 92         POTASSIUM 4.4   CHLORIDE 105   CO2 20   BUN 40*   CR 2.73*   ANIONGAP 11   BENTLEY 9.7   GLC " 307*   ALBUMIN 2.9*   PROTTOTAL 7.5   BILITOTAL 0.3   ALKPHOS 86   ALT 26   AST 19       Recent Results (from the past 24 hour(s))   XR Foot Right G/E 3 Views    Narrative    XR FOOT RT G/E 3 VW 2/13/2017 3:40 PM    COMPARISON: None.    HISTORY: Recurrent falls, painful right foot.      Impression    IMPRESSION: There appears to be a likely subacute fracture involving  the distal right fibula. No other findings concerning for fracture in  the right foot. Joints are in normal alignment. Small plantar  calcaneal spur.    JOHNSON ARRIETA

## 2017-02-14 NOTE — CONSULTS
ORTHOPEDIC SURGERY CONSULTATION       DATE OF SERVICE:  02/13/2017       CHIEF COMPLAINT:  Right ankle pain.      REQUESTING PHYSICIAN:  Dr. Suarez from the Hospitalist Service.      HISTORY OF PRESENT ILLNESS:  Nel Farmer is a 61-year-old female with multiple medical comorbidities who presents with generalized weakness and pain after multiple falls.  She was evaluated during her hospital stay and found to have a subacute right distal fibula fracture.  Orthopedics was consulted for further evaluation.  The patient has a significant schizoaffective disorder with again cognitive deficits.  It is very hard to get any history from her as she yells at a very high pitched noise and does not follow the conversation very well.  She states that she has had multiple falls to both legs.  She is unsure when these have happened.  She describes pain in both feet.      PAST MEDICAL HISTORY:   1.  Schizoaffective disorder.   2.  Anxiety, depression.   3.  Cognitive status.   4.  Diabetes.   5.  Hyperlipidemia.   6.  Hypertension.      PAST SURGICAL HISTORY:   1.  Left total knee arthroplasty.   2.  D&C.   3.  Tonsillectomy and adenoidectomy.      SOCIAL HISTORY:  The patient lives with her elderly mother.  She does not smoke or drink.      MEDICATIONS:  The patient is on a multitude of medications.  Please see H&P for details.      ALLERGIES:  Amoxicillin, Haldol, penicillin, Seroquel.      REVIEW OF SYSTEMS:  Unable to get fully assessed due to the patient's current cognitive status.      PHYSICAL EXAMINATION:  Examination shows a 61-year-old female in no acute distress.  Focused examination of her right lower extremity shows some mild swelling diffusely over the right ankle.  No significant swelling over the left foot.  She screams in pain before I actually touch her foot and ankle.  Hard to assess pain as she is again screaming without even being touched.  She does have again mild swelling over the medial and lateral aspects of  the ankle.  No significant  crepitus.  No redness.  No erythema.  She moves her ankle freely when not being examined.      X-RAYS:  X-rays of her right foot were reviewed.  On these views shows a subacute fracture of the right distal fibula with early healing noted.  There is callus formation seen.      IMPRESSION:  A 61-year-old female with multiple medical comorbidities with a minimally to nondisplaced fracture of the right distal fibula.  Subacute in nature with early healing.      RECOMMENDATIONS:  At this point, it is unclear of how long that has been there.  It is most likely at least a month old.  She has early healing on the x-ray.  At this point, I would recommend symptomatic treatment.  If she is doing well ambulating, she does not need any type of ambulatory device or boot.  If she is having pain with weightbearing then I would recommend a Cam boot.  This could be ordered through orthotics.  I am happy to order this if needed.  Otherwise, the nursing staff or primary team can order again a Cam walker.  She can follow up with her primary care physician.  No surgical intervention is necessary.      Please contact me with any further questions or concerns.         CHARLEE HOLBROOK MD             D: 2017 21:50   T: 2017 22:16   MT: VIOLET      Name:     MARIO ALBERTO PATRICK   MRN:      [id number]        Account:       [id number]   :      1955           Consult Date:  2017      Document: [id number]

## 2017-02-14 NOTE — PLAN OF CARE
Problem: Goal Outcome Summary  Goal: Goal Outcome Summary  Outcome: No Change  Patient A/O x4. Cognitive impairment noted. BP elevated. No further pain noted. Patient q 2 hr reposition. Urine incontinent. Patient slept all evening. Will continue to monitor.

## 2017-02-14 NOTE — PLAN OF CARE
Problem: Individualization  Goal: Patient Preferences  Outcome: No Change  VSS.  Pt continue to C/O bilateral foot pain, oxycodone administered per MD orders, little relief noted.  Pt yells out for help from staff and speaks loud.  Pt incontinent of urine.  Pt was repositioned q 2 hours in bed.

## 2017-02-14 NOTE — PLAN OF CARE
Problem: Goal Outcome Summary  Goal: Goal Outcome Summary  Outcome: No Change  Patient alert and oriented x2. Disorientation to place and situation noted at times. Cognitive impairment noted. BP elevated, but WNL following recheck and scheduled medication given. Oxycodone and tylenol given for pain. No further pain noted. Patient q 2 hr reposition. Patient was up with lift, then used bedpan with 2 assist.

## 2017-02-15 NOTE — PLAN OF CARE
Problem: Goal Outcome Summary  Goal: Goal Outcome Summary  Outcome: Improving  Pt's vital are within except for elevated blood pressure, repositioned, incontinent of bladder , briefs change, cognitively challenged, Continue to monitor.

## 2017-02-15 NOTE — PROGRESS NOTES
OCTAVIA  D: OCTAVIA following for discharge planning needs.  I: OCTAVIA spoke to Admissions at [facility] and they don't have any appropriate beds available at this time.  OCTVAIA left a message for Carolyn in Admissions at [facility] to follow up regarding where they are at in the assessment process and to see if they will have a bed available for patient or not.  OCTAVIA spoke to patients mom, Lisa, and discussed alternative facility options and she was okay with SW making a referral to [facility] and [facility].  OCTAVIA made a referral to the facilities via [facility]charge on the Double to have them assess patient for a possible admission for today.  A: Patient is alert and oriented.  P: OCTAVIA will continue to follow and assist with finalizing the discharge plan as appropriate.    CHERI Lopez      OCTAVIA  D: OCTAVIA following for discharge planning needs.  OCTAVIA received a message from Admissions at [facility] stating that they don't have any Long Term Care beds available at this time.  P: OCTAVIA will continue to follow and assist with finalizing the discharge plan as appropriate.    CHERI Lopez      OCTAVIA  D: OCTAVIA following for discharge planning needs.  OCTAVIA received a call from Carolyn in Admissions at [facility] and she states that they would not have an appropriate bed available for patient at this time.  OCTAVIA left a message with Admissions at [facility] to follow up on the referral and regarding bed availability.  SW will await a return call back.  P: OCTAVIA will continue to follow and assist with finalizing the discharge plan as appropriate.    CHERI Lopez

## 2017-02-15 NOTE — PROGRESS NOTES
[facility]    Hospitalist Progress Note    Assessment & Plan   Nel Farmer is a 61 year old female who was admitted on 2/11/2017.     61-year-old with schizoaffective disorder, depression, anxiety, cognitive deficits, diabetes, hypertension, hyperlipidemia, who was admitted due to multiple falls. Work up done on 2/12/17 revealed, BUN 40, Creat 2.73, Alb 2.9. CBC was normal. UA was significant for glucose >1000. Imaging done on 2/11/7 revealed, normal left shoulder x-rays, chest x-rays were normal, left foot x-rays were normal and left knee x-rays revealed, total knee arthroplasty components.       History of multiple falls: Appreciate PT input. For PT at LTC facility.        Right foot distal fibula fx  Left foot x-rays on 2/11/17 was negative for fracture.     Right foot x-rays done on 2/13/17 revealed, likely subacute fracture involving the distal right fibula. Continue scheduled Tylenol. For Oxycodone IR prn.  -will order CAM boot from orthotics as pt is unwilling to walk due to foot pain.  -per ortho, fx appears to be from an event about a month ago as there is healing   -no surgery planned  -discussed with mother by phone 2/14  -order re-entered for orthotics for CAM boot      Type 2 Diabetes: BG was 128 this am. Continue glipizide and insulin 70/30 bid. For medium dose insulin aspart sliding scale prn.       Hypertension: Continue metoprolol and diltiazem.   -metoprolol changed to bid on 2/14  -BP still high, rashaad in c diltiazem to 240 form 120  -continue other BP meds of  lasix, imdur, clonidine    Chronic renal insufficiency  -recheck creatinine in am       Schizoaffective disorder: Continue divalproex, zyprexa and risperidone.     abd pain  -no complaints today  -complains of diffuse abd pain on 2/14  -can't tell me when she had last BM  -abd  xrays with stool 2/14      Angie Park MD    Interval History   No complaints today    -Data reviewed today: I reviewed all new labs  and imaging results over the last 24 hours. I personally reviewed the abdominal x-ray image(s) showing stool.    Physical Exam   Temp: 98.3  F (36.8  C) Temp src: Axillary BP: 161/72 Pulse: 65 Heart Rate: 74 Resp: 16 SpO2: 91 % O2 Device: None (Room air)    There were no vitals filed for this visit.  Vital Signs with Ranges  Temp:  [97.5  F (36.4  C)-98.4  F (36.9  C)] 98.3  F (36.8  C)  Pulse:  [59-65] 65  Heart Rate:  [58-74] 74  Resp:  [16] 16  BP: (127-170)/(61-72) 161/72  SpO2:  [91 %-93 %] 91 %  I/O last 3 completed shifts:  In: 200 [P.O.:200]  Out: -     Constitutional: alert   Respiratory: clear to auscultation, no wheezing or rhonchi   Cardiovascular: regular rate and rhythm without murmer or rub   GI:positive bowel sounds, non-tender   Skin/Integumen: no rashes    Other:        Medications        metoprolol  50 mg Oral BID     docusate sodium  100 mg Oral BID     insulin aspart  1-10 Units Subcutaneous TID AC     insulin aspart  1-7 Units Subcutaneous At Bedtime     acetaminophen  1,000 mg Oral TID     cloNIDine  0.3 mg Oral BID     diltiazem  120 mg Oral Daily     divalproex  500 mg Oral QAM     divalproex  1,500 mg Oral At Bedtime     furosemide (LASIX) tablet 20 mg  20 mg Oral Daily     glipiZIDE (GLUCOTROL XL) 24 hr tablet 10 mg  10 mg Oral BID AC     insulin aspart prot & aspart  70 Units Subcutaneous BID AC     isosorbide mononitrate  30 mg Oral Daily     OLANZapine (zyPREXA) tablet 5 mg  5 mg Oral At Bedtime     omeprazole (priLOSEC) CR capsule 20 mg  20 mg Oral BID AC     risperiDONE  1 mg Sublingual At Bedtime       Data     Recent Labs  Lab 02/12/17  0050   WBC 10.9   HGB 11.9   MCV 92         POTASSIUM 4.4   CHLORIDE 105   CO2 20   BUN 40*   CR 2.73*   ANIONGAP 11   BENTLEY 9.7   *   ALBUMIN 2.9*   PROTTOTAL 7.5   BILITOTAL 0.3   ALKPHOS 86   ALT 26   AST 19       Recent Results (from the past 24 hour(s))   XR Abdomen 2 Views    Narrative    XR ABDOMEN 2 VW  2/14/2017 10:42 AM      HISTORY:  Abdominal pain.      Impression    IMPRESSION: Nonspecific bowel gas pattern with air and feces seen  throughout the colon. No evidence for pneumoperitoneum. No significant  change since 7/19/2014.    NARCISA OBREGON MD

## 2017-02-15 NOTE — PROGRESS NOTES
S.  Patient was seen today at 502-02 for a right cam walker as ordered.  O/G. Help stabilize foot and ankle.  A.  Patient was fit with a medium short pneumatic Cam Walker for the right leg.  Cam walker was assembled by removing the soft liner from the foot plate and uprights, the patients foot and leg was positioned into the soft liner with the heel firmly sealed.  The liner was closed tightly and secured with the Velcro closure. The heel and foot were positioned in the rocker bottom foot plate and the uprights were contoured to fall at mid-line of the lower leg and secured to the Velcro.  The foot plate Velcro straps were secured, proximal straps first, then the distal strap.  The lower leg straps were attached starting distal and working proximal. The ankle joints were set at 90 degrees of dorsi/plantar flexion. Donning and doffing of the Cam Walker was explained.  P.   Patient was advised to contact this office with any problems or questions.

## 2017-02-15 NOTE — PLAN OF CARE
Problem: Goal Outcome Summary  Goal: Goal Outcome Summary  Outcome: No Change  Pt is alert and oriented, cognitively delayed, yells out, takes oxycodone for pain, blood sugar check, incontinent of bladder, large amount of urine, utilizes incontinent pad, repositions to protect skin, awaiting long term placement,

## 2017-02-16 NOTE — CONSULTS
[facility]    Nephrology Consultation     Date of Admission:  2/11/2017    Assessment & Plan   Nel Farmer is a 61 year old female who was admitted on 2/11/2017 for evaluation of falls and for placement.  She has A/CKD.        1) Baseline Stage 4 CKD:  Cr 2.2 and GFR 23.  Lithium nephropathy.    2) A/CKD:  Prerenal ?  Urine retention?  No obvious nephrotoxic exposure.  Lithium nephropathy normally progresses very slowly.    3) HTN:  Borderline control on diltiazem, clonidine and metoprolol.      4) Metabolic Bone Disease:  Need to check phos, PTH and Vit D given CKD and fracture.    Plan:    NS 75 mL/hour  UA  Bladder scan  Phos, PTH and Vit D.        Pavel Ruiz MD  [facility] - Nephrology  [phone]    Reason for Consult   I was asked to see the patient for A/CKD.      Primary Care Physician   No primary care provider on file.    Chief Complaint      I AM VERY SICK !!    History is obtained from the patient and her chart.    History of Present Illness      Nel Farmer is a 61 year old female who was admitted for evaluation of falls and for placement.    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    She was admitted for evaluation of falls.  She has been found to have a distal R fibula fx.    Her kidney function is recent weeks is not as good as baseline.    1/29 cr 2.56  2/12 cr 2.73  2/16 cr 3.22    Reasons are unclear.  She says that she has not had enough fluid, but I am not sure how reliable she is.    No recent NSAID or contrast exposure.      Past Medical History   I have reviewed this patient's medical history and updated it with pertinent information if needed.   Past Medical History   Diagnosis Date     Anxiety      CKD (chronic kidney disease) stage 3, GFR 30-59 ml/min      baseline Cr 1.8-2     Cognitive deficits      Depression      Depressive disorder      Diabetes (H)      DM type 2 (diabetes mellitus, type 2) (H)       insulin dependent      HTN      Hyperlipidemia LDL goal < 70      Hypertension      Osteoarthritis      Schizoaffective disorder (H)        Past Surgical History   I have reviewed this patient's surgical history and updated it with pertinent information if needed.  Past Surgical History   Procedure Laterality Date     Tonsillectomy & adenoidectomy       Dilation and curettage  age 31     C total knee arthroplasty Left 3/2010     Colonoscopy  10/19/2011     Procedure:COMBINED COLONOSCOPY, REMOVE TUMOR/POLYP/LESION BY SNARE; Colonoscopy; Surgeon:MARY ALICE RUSH; Location: GI       Prior to Admission Medications   Prior to Admission Medications   Prescriptions Last Dose Informant Patient Reported? Taking?   Furosemide (LASIX PO)  Pharmacy Yes No   Sig: Take 20 mg by mouth daily   Furosemide (LASIX PO)  Pharmacy Yes No   Sig: Take 20 mg by mouth daily as needed (Take at noon for leg swelling if needed)   GlipiZIDE (GLUCOTROL XL PO)  Pharmacy Yes Yes   Sig: Take 10 mg by mouth 2 times daily (before meals)   LORAZEPAM PO  Pharmacy Yes Yes   Sig: Take 1 mg by mouth 2 times daily as needed for anxiety   Metoprolol Succinate (TOPROL XL PO)  Pharmacy Yes Yes   Sig: Take 100 mg by mouth daily (50 mg x 2 tablets)   OLANZapine (ZYPREXA PO)  Pharmacy Yes No   Sig: Take 5 mg by mouth At Bedtime   Omeprazole (PRILOSEC PO)  Pharmacy Yes No   Sig: Take 20 mg by mouth 2 times daily (before meals)   Ondansetron (ZOFRAN ODT PO)  Pharmacy Yes No   Sig: Take 4 mg by mouth every 8 hours as needed for nausea   RisperiDONE (RISPERDAL PO)  Pharmacy Yes No   Sig: Take 1 mg by mouth At Bedtime   cloNIDine (CATAPRES) 0.3 MG tablet  Pharmacy No Yes   Sig: Take 1 tablet (0.3 mg) by mouth 2 times daily   diltiazem 120 MG 24 hr CD capsule  Pharmacy Yes No   Sig: Take 120 mg by mouth daily   divalproex (DEPAKOTE) 500 MG EC tablet  Pharmacy Yes No   Sig: Take 500 mg by mouth every morning   divalproex (DEPAKOTE) 500 MG EC tablet  Pharmacy  "Yes Yes   Sig: Take 1,500 mg by mouth At Bedtime   insulin aspart prot & aspart (NOVOLOG MIX 70/30 PEN) 100 UNITS/ML susp  Pharmacy Yes Yes   Sig: Inject 55 Units Subcutaneous 2 times daily (before meals)    insulin syringe-needle U-100 (BD INSULIN SYRINGE ULTRAFINE) 31G X 5/16\" 1 ML  Pharmacy No No   Sig: Use one syringe bid daily or as directed.   isosorbide mononitrate (IMDUR) 30 MG 24 hr tablet  Pharmacy No Yes   Sig: Take 1 tablet by mouth daily.   polyethylene glycol (MIRALAX/GLYCOLAX) powder  Self Yes No   Sig: Take 17 g by mouth daily as needed for constipation   risperiDONE (RISPERDAL M-TAB) 0.5 MG disintegrating tablet   No No   Sig: Place 2 tablets (1 mg) under the tongue At Bedtime   risperiDONE (RISPERDAL M-TABS) 0.5 MG disintegrating tablet   No No   Sig: Place 1 tablet (0.5 mg) under the tongue 3 times daily as needed   senna-docusate (SENOKOT-S;PERICOLACE) 8.6-50 MG per tablet   No No   Sig: Take 1 tablet by mouth 2 times daily as needed for constipation      Facility-Administered Medications: None     Allergies   Allergies   Allergen Reactions     Amoxicillin      Amoxicillin      Haldol [Benzyl Alcohol]      Haldol [Haloperidol]      Pcn [Penicillin G]      Penicillins      Seroquel [Quetiapine] GI Disturbance       Social History   I have reviewed this patient's social history and updated it with pertinent information if needed. Nel Farmer  reports that she has never smoked. She does not have any smokeless tobacco history on file. She reports that she does not drink alcohol or use illicit drugs.    Family History   I have reviewed this patient's family history and updated it with pertinent information if needed.   Family History   Problem Relation Age of Onset     Hypertension Mother      Cancer - colorectal Father      Family History Negative Sister      Family History Negative Brother        Review of Systems   The 10 point Review of Systems is not considered reliable due to her mental " status.     Physical Exam   Temp: 98.7  F (37.1  C) Temp src: Oral BP: 148/62   Heart Rate: 69 Resp: 16 SpO2: 92 % O2 Device: None (Room air) Oxygen Delivery: 2 LPM  Vital Signs with Ranges  Temp:  [97.6  F (36.4  C)-99.3  F (37.4  C)] 98.7  F (37.1  C)  Heart Rate:  [] 69  Resp:  [16] 16  BP: (125-177)/(52-90) 148/62  SpO2:  [92 %-94 %] 92 %  0 lbs 0 oz    GENERAL: alert, NAD  HEENT:  Normocephalic. No gross abnormalities.  Pupils equal.  MMM.  Dentition is fair.   CV: RRR, no murmurs, no clicks, gallops, or rubs, no edema, no carotid bruits  RESP: Clear bilaterally with good efforts  GI: Abdomen soft/nt/nd, BS normal. No masses, organomegaly  MUSCULOSKELETAL: extremities boot applied to RLE  SKIN: no suspicious lesions or rashes, dry to touch  NEURO:  Strength normal and symmetric.   PSYCH: inappropriate comments at times  LYMPH: No palpable ant/post cervical and supraclavicular adenopathy    Data   BMP  Recent Labs  Lab 02/16/17  0650 02/12/17  0050    136   POTASSIUM 3.9 4.4   CHLORIDE 107 105   BENTLEY 9.5 9.7   CO2 25 20   BUN 38* 40*   CR 3.22* 2.73*   * 307*     Phos@LABRCNTIPR(phos:4)  CBC)  Recent Labs  Lab 02/12/17  0050   WBC 10.9   HGB 11.9   HCT 36.0   MCV 92          Recent Labs  Lab 02/12/17  0050   AST 19   ALT 26   ALKPHOS 86   BILITOTAL 0.3     No lab results found in last 7 days.  25 OH Vit D2   Date Value Ref Range Status   07/02/2010 <5 ug/L Final     25 OH Vit D3   Date Value Ref Range Status   07/02/2010 36 ug/L Final     25 OH Vit D total   Date Value Ref Range Status   07/02/2010 <41 30 - 75 ug/L Final     Comment:     Season, race, dietary intake, and treatment affect the concentration of   25-hydroxy-Vitamin D. Values may decrease during winter months and increase   during summer months. Values less than 30 ug/L may indicate Vitamin D   deficiency.       Recent Labs  Lab 02/12/17  0050   HGB 11.9   HCT 36.0   MCV 92     No results for input(s): PTHI in the last 168  hours.

## 2017-02-16 NOTE — PLAN OF CARE
Problem: Goal Outcome Summary  Goal: Goal Outcome Summary  Outcome: No Change  Lethargic overnight, denied pain, cms intact, vs stable, oxygen applied at 2 liters.  Turning and repositioning every 2 hours, incontinent of urine, brief on.  Will continue to monitor.

## 2017-02-16 NOTE — PROGRESS NOTES
[facility]    Hospitalist Progress Note    Assessment & Plan   Nel Farmer is a 61 year old female who was admitted on 2/11/2017.    61-year-old with schizoaffective disorder, depression, anxiety, cognitive deficits, diabetes, hypertension, hyperlipidemia, who was admitted due to multiple falls. Work up done on 2/12/17 revealed, BUN 40, Creat 2.73, Alb 2.9. CBC was normal. UA was significant for glucose >1000. Imaging done on 2/11/7 revealed, normal left shoulder x-rays, chest x-rays were normal, left foot x-rays were normal and left knee x-rays revealed, total knee arthroplasty components.       History of multiple falls: Appreciate PT input. For PT at LTC facility.       Right foot distal fibula fx  Left foot x-rays on 2/11/17 was negative for fracture.      Right foot x-rays done on 2/13/17 revealed, likely subacute fracture involving the distal right fibula. Continue scheduled Tylenol for pain.  -CAM boot has been placed  -per ortho, fx appears to be from an event about a month ago as there is healing   -no surgery planned  -will have PT start working with pt      Type 2 Diabetes:  Continue glipizide and insulin 70/30 bid. For medium dose insulin aspart sliding scale prn.  -glucose 107, 158        Hypertension: Continue metoprolol and diltiazem.   -metoprolol changed to bid on 2/14  -BP still high 2/15,  Increased  diltiazem to 240 qd from 120 qd  -continue other BP meds imdur, clonidine  -BP still elevated but improved- 177/76, 151/72, 135/67, 169/90, 147/72, 140/72., 158/76     Acute on Chronic renal insufficiency  -creatinine increased to 3.22 from 2.73<2.56  -will discontinue lasix  - US 2011 showed inc echotexture of both kidneys suggesting medical renal disease  -will discuss renal consult with pt  -recheck creatinine in am  -discussed with pt, mother and sister.  Pt  Was on lithium for decades which family reports is what damaged her kidneys  -family and pt in agreement with renal  consult.  Will order        Schizoaffective disorder: Continue divalproex, zyprexa and risperidone.       constipation  -complains of diffuse abd pain on 2/14  -can't tell me when she had last BM  -abd xrays with stool 2/14  -family reports pt usually takes miralax most nights  -will start miralax bid  -also available will be dulcolax             DVT Prophylaxis: SCD  Code Status: Full Code    Disposition: Expected discharge to LTC with PT when bed found in accepting facility    Angie Park MD    Interval History   Still no bowel movement.  Sister and mother present.    -Data reviewed today: I reviewed all new labs and imaging results over the last 24 hours. I personally reviewed no images or EKG's today.    Physical Exam   Temp: 97.6  F (36.4  C) Temp src: Oral BP: 177/76   Heart Rate: 92 Resp: 16 SpO2: 93 % O2 Device: None (Room air) Oxygen Delivery: 2 LPM  There were no vitals filed for this visit.  Vital Signs with Ranges  Temp:  [97.6  F (36.4  C)-99.3  F (37.4  C)] 97.6  F (36.4  C)  Heart Rate:  [] 92  Resp:  [16] 16  BP: (135-177)/(67-90) 177/76  SpO2:  [90 %-94 %] 93 %  I/O last 3 completed shifts:  In: 550 [P.O.:550]  Out: -     Constitutional: alert   Respiratory: clear to auscultation, no wheezing or rhonchi   Cardiovascular: regular rate and rhythm without murmer or rub   GI:positive bowel sounds, non-tender   Skin/Integumen: no rashes    Other:        Medications        diltiazem  240 mg Oral Daily     metoprolol  50 mg Oral BID     docusate sodium  100 mg Oral BID     insulin aspart  1-10 Units Subcutaneous TID AC     insulin aspart  1-7 Units Subcutaneous At Bedtime     acetaminophen  1,000 mg Oral TID     cloNIDine  0.3 mg Oral BID     divalproex  500 mg Oral QAM     divalproex  1,500 mg Oral At Bedtime     furosemide (LASIX) tablet 20 mg  20 mg Oral Daily     glipiZIDE (GLUCOTROL XL) 24 hr tablet 10 mg  10 mg Oral BID AC     insulin aspart prot & aspart  70 Units Subcutaneous BID AC      isosorbide mononitrate  30 mg Oral Daily     OLANZapine (zyPREXA) tablet 5 mg  5 mg Oral At Bedtime     omeprazole (priLOSEC) CR capsule 20 mg  20 mg Oral BID AC     risperiDONE  1 mg Sublingual At Bedtime       Data     Recent Labs  Lab 02/16/17  0650 02/12/17  0050   WBC  --  10.9   HGB  --  11.9   MCV  --  92   PLT  --  279    136   POTASSIUM 3.9 4.4   CHLORIDE 107 105   CO2 25 20   BUN 38* 40*   CR 3.22* 2.73*   ANIONGAP 8 11   BENTLEY 9.5 9.7   * 307*   ALBUMIN  --  2.9*   PROTTOTAL  --  7.5   BILITOTAL  --  0.3   ALKPHOS  --  86   ALT  --  26   AST  --  19       No results found for this or any previous visit (from the past 24 hour(s)).

## 2017-02-16 NOTE — PROGRESS NOTES
"   02/16/17 1419   Quick Adds   Type of Visit Initial PT Evaluation   Living Environment   Living Environment Comment Per chart review, pt was living with mom but mom is unable to continue to care for pt; pt's mom and SW are looking for LTC placement at this time   Self-Care   Usual Activity Tolerance moderate   Current Activity Tolerance fair   Equipment Currently Used at Home none   Functional Level Prior   Ambulation 0-->independent   Transferring 0-->independent   Fall history within last six months yes   Number of times patient has fallen within last six months 2  (2 are documented but chart review says inc freq of falls)   Which of the above functional risks had a recent onset or change? ambulation;transferring   Prior Functional Level Comment Per chart review, appears pt was IND with functional mobility at baseline   General Information   Onset of Illness/Injury or Date of Surgery - Date 02/12/17   Referring Physician Angie Park MD   Patient/Family Goals Statement \"to have less pain\"   Pertinent History of Current Problem (include personal factors and/or comorbidities that impact the POC) Pt is 61-year-old with schizoaffective disorder, depression, anxiety, cognitive deficits, diabetes, hypertension, hyperlipidemia, who was admitted due to multiple falls   Weight-Bearing Status - LLE weight-bearing as tolerated   Weight-Bearing Status - RLE weight-bearing as tolerated   General Observations Per ortho note, CAM boot if having pain, does not need CAM boot if no pain   General Info Comments Activity: Ambulate with assist   Cognitive Status Examination   Orientation person   Level of Consciousness confused   Follows Commands and Answers Questions 50% of the time   Personal Safety and Judgment impaired;at risk behaviors demonstrated   Memory impaired   Pain Assessment   Patient Currently in Pain (yells out in pain but does not rate 0-10)   Posture    Posture Forward head position;Protracted shoulders " "  Range of Motion (ROM)   ROM Comment WFL in BLE (did not assess R ankle 2/2 CAM boot being donned)   Strength   Strength Comments Grossly 2+/5 as observed pt move LEs against gravity slightly; did not follow to [facility]   Bed Mobility   Bed Mobility Comments max A for supine > sit, max A x2 for sit > supine   Transfer Skills   Transfer Comments max A x2 for sit <> stand   Gait   Gait Comments Unable to assess, pt did not tolerate   Balance   Balance Comments Poor, heavy reliance on UE support and physical assist to maintain upright standing; in sitting; pt often leaning towards the side (inconsistent which side she leaned towards)   Clinical Impression   Criteria for Skilled Therapeutic Intervention evaluation only   PT Diagnosis decreased functional mobility, impaired gait   Influenced by the following impairments weakness, pain, cognition   Functional limitations due to impairments bed mobility, transfers, ambulation   Clinical Presentation Stable/Uncomplicated   Clinical Presentation Rationale stable hospital course thus far   Clinical Decision Making (Complexity) Low complexity   Predicted Duration of Therapy Intervention (days/wks) Eval Only   Anticipated Discharge Disposition Transitional Care Facility;Long Term Care Facility   Risk & Benefits of therapy have been explained Yes   Patient, Family & other staff in agreement with plan of care Yes   [facility] [employer]-PAC TM \"6 Clicks\"   2016, Trustees of [facility], under license to [employer].  All rights reserved.   6 Clicks Short Forms Basic Mobility Inpatient Short Form   [facility] AM-PAC  \"6 Clicks\" V.2 Basic Mobility Inpatient Short Form   1. Turning from your back to your side while in a flat bed without using bedrails? 2 - A Lot   2. Moving from lying on your back to sitting on the side of a flat bed without using bedrails? 2 - A Lot   3. Moving to and from a bed to a chair (including a wheelchair)? 1 - Total   4. Standing up from a chair " using your arms (e.g., wheelchair, or bedside chair)? 1 - Total   5. To walk in hospital room? 1 - Total   6. Climbing 3-5 steps with a railing? 1 - Total   Basic Mobility Raw Score (Score out of 24.Lower scores equate to lower levels of function) 8   Total Evaluation Time   Total Evaluation Time (Minutes) 16

## 2017-02-16 NOTE — PLAN OF CARE
Problem: Goal Outcome Summary  Goal: Goal Outcome Summary  Outcome: No Change  Tylenol given for comfort. Tolerating diet. Incontinent of urine. Cam boot on. Turned and repositioned for comfort. Placement pending. Continue to monitor.

## 2017-02-16 NOTE — PROGRESS NOTES
OCTAVIA  D: OCTAVIA following for discharge planning needs.  SW received a call from Admissions at [facility] stating that they don't have any female beds available at this time.  I: OCTAVIA made a referral to [facility], [facility] and [facility].  SW will await return calls regarding bed availability.  A: Patient is alert and oriented.  P: SW will continue to follow and assist with finalizing the discharge plan as appropriate.    CHERI Lopez      OCTAVIA  D: OCTAVIA following for discharge planning needs.  SW received a message from Admissions at [facility] and [facility] and they will not have an appropriate bed available for patient.  I: OTCAVIA made a referral to [facility].  SW will await a return call regarding bed availability.  A: Patient is alert and oriented.  P: SW will continue to follow and assist with finalizing the discharge plan as appropriate.    CHERI Lopez

## 2017-02-16 NOTE — PLAN OF CARE
"Problem: Goal Outcome Summary  Goal: Goal Outcome Summary  PT: Orders received, eval completed.  Per discussion with SW, needs PT assessment to determine level of assistance pt needs to assist SW with finding LTC placement.  Currently, pt needing max A x1-2 for supine <> sit, max A x2 for sit <> stand with elevated bed height, min A x2 with FWW to maintain standing and ability to side step a few times along bed; returned supine.  CAM boot donned throughout session.  Pt inconsistent with command following and perseverating on someone named Ariadne, \"not caring\"; difficult to re-direct, yelling out frequently throughout.     Once SW finds appropriate placement, recommend continued PT services to progress IND with functional mobility.      "

## 2017-02-17 NOTE — PROGRESS NOTES
OCTAVIA  D: OCTAVIA following for discharge planning needs.  SW received a message from Qi in Admissions at [facility] and she states that they would not have an appropriate bed available for patient.  I: SW made a referral to [facility], [facility], [facility], [facility], [facility], [facility], [facility], [facility], [facility], [facility] and [facility] via Discharge on the Double to have them assess patient for a possible admission for today.  A: Patient is alert and oriented.  P: OCTAVIA will continue to follow and assist with finalizing the discharge plan as appropriate.    CHERI Lopez      OCTAVIA  D: OCTAVIA following for discharge planning needs.  SW received a call from Admissions at [facility] and they don't currently have a long term bed available.  P: OCTAVIA will continue to follow and assist with finalizing the discharge plan as appropriate.    CHERI Lopez      OCTAVIA  D: OCTAVIA following for discharge planning needs.  OCTAVIA received calls from Admissions at both [facility] and [facility] and neither facility has any long term care beds available at this time.  P: OCTAVIA will continue to follow and assist with finalizing the discharge plan as appropriate.    CHERI Lopez      OCTAVIA  D: OCTAVIA following for discharge planning needs.  SW received a call from Ruthie in Admissions at [facility] and she confirmed that they don't have an appropriate bed available for patient at this time.  P: OCTAVIA will continue to follow and assist with finalizing the discharge plan as appropriate.    CHERI Lopez      OCTAVIA  D: OCTAVIA following for discharge planning needs.  OCTAVIA received a message from Princess in Admissions at [facility] stating that they would not have an appropriate bed available for patient.  P: OCTAVIA will continue to follow and assist with finalizing the discharge plan  as appropriate.    CHERI Lopez      OCTAVIA  D: OCTAVIA following for discharge planning needs.  OCTAVIA received a call from Admissions at [facility] stating that prior to say whether they can accept patient for admission they would like to complete an on site assessment.  This cannot be done until Monday.  OCTAVIA encouraged Admissions to contact SW on Monday to confirm whether patient is still here or whether the patient was placed somewhere.  P: OCTAVIA will continue to follow and assist with finalizing the discharge plan as appropriate.    CHERI Lopez

## 2017-02-17 NOTE — PLAN OF CARE
Problem: Goal Outcome Summary  Goal: Goal Outcome Summary  Outcome: No Change  Pt confused to time and situation, yells out, does not use call light.  C/o pain with in lower extremities with movement, cms intact.  Incontinent of large amounts of urine, pt drinking large amounts of po fluids, IVF at 75 mL/hr.  Bladder scanned after incontinent void, had 700 mL in bladder, had straight cath for 1,200 mL of urine.  Will continue to monitor.

## 2017-02-17 NOTE — PROGRESS NOTES
[facility]    Hospitalist Progress Note    Assessment & Plan   Nel Farmer is a 61 year old female who was admitted on 2/11/2017.     61-year-old with schizoaffective disorder, depression, anxiety, cognitive deficits, diabetes, hypertension, hyperlipidemia, who was admitted due to multiple falls. Work up done on 2/12/17 revealed, BUN 40, Creat 2.73, Alb 2.9. CBC was normal. UA was significant for glucose >1000. Imaging done on 2/11/7 revealed, normal left shoulder x-rays, chest x-rays were normal, left foot x-rays were normal and left knee x-rays revealed, total knee arthroplasty components.       History of multiple falls: Appreciate PT input. For PT at LTC facility.       Right foot distal fibula fx  Left foot x-rays on 2/11/17 was negative for fracture.      Right foot x-rays done on 2/13/17 revealed, likely subacute fracture involving the distal right fibula. Continue scheduled Tylenol for pain.  -CAM boot has been placed  -per ortho, fx appears to be from an event about a month ago as there is healing   -no surgery planned  -will have PT start working with pt      Type 2 Diabetes: Continue glipizide and insulin 70/30 bid. For medium dose insulin aspart sliding scale prn.  -glucose 135, 205 today  yseterday 107, 158, 151              Hypertension: borderline control but difficult  -metoprolol changed to bid on 2/14  -BP still high 2/15, Increased  diltiazem to 240 qd from 120 qd  -continue other BP meds imdur, clonidine  -BP today  variable 185/69, 126/61, 150/97, 148/62, 125/52  -will continue same meds for now as BP is improved over several days ago        Acute on Chronic renal insufficiency  -creatinine increased to 3.22 on 2/16, baseline 2.2 and has stage 4 CKD  -discontinued lasix 2/16  - US 2011 showed inc echotexture of both kidneys suggesting medical renal disease  -appreciate renal consult   -currently on iv fluids at 75 cc/hour and creatinine has improved to 3.03  -recheck  creatinine in am  -currently pt has a cherry as she is normally incontinent and was bladder scanned for 1000 cc urine x2.  Per nursing, is incontinent around cherry    Metabolic bone disease with vitamin D deficiency  -PTH is elevated at 119  -normal phos 3.6  -will start vitamin D replacement with cholecalciferol at 1000 mg po  daily     Schizoaffective disorder: Continue divalproex, zyprexa and risperidone.      constipation  -complains of diffuse abd pain on 2/14  -abd xrays with stool 2/14  -family reports pt usually takes miralax most nights  -will start miralax bid  -2/17 still with no bowel movement , will try dulcolax supository today                DVT Prophylaxis: SCD  Code Status: Full Code     Disposition: Expected discharge to LTC with PT when bed found in accepting facility    Angie Park MD    Interval History   Patient complains that she still has not had a bowel movement.      -Data reviewed today: I reviewed all new labs and imaging results over the last 24 hours. I personally reviewed no images or EKG's today.    Physical Exam   Temp: 98.2  F (36.8  C) Temp src: Oral BP: 185/68 Pulse: 81 Heart Rate: 62 Resp: 16 SpO2: 93 % O2 Device: None (Room air) Oxygen Delivery: 2 LPM  Vitals:    02/17/17 0531   Weight: 99 kg (218 lb 4.1 oz)     Vital Signs with Ranges  Temp:  [98.2  F (36.8  C)-99.2  F (37.3  C)] 98.2  F (36.8  C)  Pulse:  [] 81  Heart Rate:  [62-89] 62  Resp:  [16-18] 16  BP: (125-185)/(52-97) 185/68  SpO2:  [88 %-95 %] 93 %  I/O last 3 completed shifts:  In: 4119.75 [P.O.:3350; I.V.:769.75]  Out: 1200 [Urine:1200]    Constitutional: alert   Respiratory: clear to auscultation, no wheezing or rhonchi   Cardiovascular: regular rate and rhythm without murmer or rub   GI:positive bowel sounds, non-tender   Skin/Integumen: no rashes    Other:        Medications     NaCl 75 mL/hr at 02/17/17 0633       polyethylene glycol  17 g Oral Daily     diltiazem  240 mg Oral Daily      metoprolol  50 mg Oral BID     docusate sodium  100 mg Oral BID     insulin aspart  1-10 Units Subcutaneous TID AC     insulin aspart  1-7 Units Subcutaneous At Bedtime     acetaminophen  1,000 mg Oral TID     cloNIDine  0.3 mg Oral BID     divalproex  500 mg Oral QAM     divalproex  1,500 mg Oral At Bedtime     glipiZIDE (GLUCOTROL XL) 24 hr tablet 10 mg  10 mg Oral BID AC     insulin aspart prot & aspart  70 Units Subcutaneous BID AC     isosorbide mononitrate  30 mg Oral Daily     OLANZapine (zyPREXA) tablet 5 mg  5 mg Oral At Bedtime     omeprazole (priLOSEC) CR capsule 20 mg  20 mg Oral BID AC     risperiDONE  1 mg Sublingual At Bedtime       Data     Recent Labs  Lab 02/17/17  0655 02/16/17  0650 02/12/17  0050   WBC  --   --  10.9   HGB  --   --  11.9   MCV  --   --  92   PLT  --   --  279    140 136   POTASSIUM 4.1 3.9 4.4   CHLORIDE 111* 107 105   CO2 19* 25 20   BUN 38* 38* 40*   CR 3.03* 3.22* 2.73*   ANIONGAP 11 8 11   BENTLEY 9.4 9.5 9.7   * 107* 307*   ALBUMIN 2.7*  --  2.9*   PROTTOTAL  --   --  7.5   BILITOTAL  --   --  0.3   ALKPHOS  --   --  86   ALT  --   --  26   AST  --   --  19       No results found for this or any previous visit (from the past 24 hour(s)).

## 2017-02-17 NOTE — PLAN OF CARE
Problem: Goal Outcome Summary  Goal: Goal Outcome Summary  Outcome: No Change  Pt disoriented to time-baseline MR. Mild pain in RLE, relieved with ice and repositioning. Up with lift. Suppository given today-large hard BM. CAM boot in room. IVF infusing. Diabetic diet. Dunlap patent with large output. Will continue to monitor.

## 2017-02-17 NOTE — PLAN OF CARE
Problem: Goal Outcome Summary  Goal: Goal Outcome Summary  Outcome: No Change  A&O to self, place only. Baseline cog impairment per previous notes. Resolving bruising to L foot/ankle noted, tender to touch. R foot in CAM boot, removed overnight, skin intact. Having urinary retention - straight cath per orders, monitored with bladder scanner. Up with mechanical lift. Blood sugars monitored - 89. O2 at 2L/min - on O2 sat at 93%. Discharge pending LTC placement.

## 2017-02-17 NOTE — PLAN OF CARE
Problem: Goal Outcome Summary  Goal: Goal Outcome Summary  Outcome: Improving  Pt. Alert but forgetful, taking scheduled tylenol for pain, work with PT and got up at the EOB, incontinent large amount of urine, yells out at times, Plan: d/c to TCU, pending placement.

## 2017-02-17 NOTE — PROGRESS NOTES
[facility]    Nephrology Progress Note     Assessment & Plan     Nel Farmer is a 61 year old female who was admitted on 2/11/2017 for evaluation of falls and for placement. She has A/CKD.      1) Baseline Stage 4 CKD: Cr 2.2 and GFR 23. Lithium nephropathy.     2) A/CKD: In part due to urine retention.  Perhaps a little dry as well.       3) HTN: Poor control on diltiazem, clonidine and metoprolol. HR still 70s-80s.  I will increase metoprolol.       4) Metabolic Bone Disease: .  Vit D 19.  Vit D supplement begun.       Plan:    Cherry - she may need urology follow up as outpt.  Cont IV fluid  Increase metoprolol.  Vit D.            Pavel Ruiz MD  [facility] - Nephrology  [phone]    Interval History     Not happy with hospital stay.  Wants to be outside.    She had urine retention - 700 mL on bladder scan.  1200 mL after catheter placed.  Now has cherry in.      Physical Exam   Temp: 97.9  F (36.6  C) Temp src: Oral BP: 168/55 Pulse: 75 Heart Rate: 70 Resp: 16 SpO2: 96 % O2 Device: None (Room air) Oxygen Delivery: 2 LPM  Vitals:    02/17/17 0531   Weight: 99 kg (218 lb 4.1 oz)     Vital Signs with Ranges  Temp:  [97.9  F (36.6  C)-99.2  F (37.3  C)] 97.9  F (36.6  C)  Pulse:  [] 75  Heart Rate:  [62-89] 70  Resp:  [16-18] 16  BP: (126-185)/(55-97) 168/55  SpO2:  [88 %-96 %] 96 %  I/O last 3 completed shifts:  In: 3689.75 [P.O.:2920; I.V.:769.75]  Out: 4100 [Urine:4100]    GENERAL APPEARANCE: awake, very loud  HEENT:  Eyes/ears/nose/neck grossly normal  RESP: lungs cta b c good efforts, no crackles, rhonchi or wheezes  CV: RRR, nl S1/S2, no m/r/g   ABDOMEN: o/s/nt/nd, bs present  EXTREMITIES/SKIN: no rashes/lesions; no edema    Medications     NaCl 75 mL/hr at 02/17/17 0633       polyethylene glycol  17 g Oral BID     cholecalciferol  1,000 Units Oral Daily     diltiazem  240 mg Oral Daily     metoprolol  50 mg Oral BID     docusate sodium  100 mg Oral BID      insulin aspart  1-10 Units Subcutaneous TID AC     insulin aspart  1-7 Units Subcutaneous At Bedtime     acetaminophen  1,000 mg Oral TID     cloNIDine  0.3 mg Oral BID     divalproex  500 mg Oral QAM     divalproex  1,500 mg Oral At Bedtime     glipiZIDE (GLUCOTROL XL) 24 hr tablet 10 mg  10 mg Oral BID AC     insulin aspart prot & aspart  70 Units Subcutaneous BID AC     isosorbide mononitrate  30 mg Oral Daily     OLANZapine (zyPREXA) tablet 5 mg  5 mg Oral At Bedtime     omeprazole (priLOSEC) CR capsule 20 mg  20 mg Oral BID AC     risperiDONE  1 mg Sublingual At Bedtime       Data   BMP  Recent Labs  Lab 02/17/17  0655 02/16/17  0650 02/12/17  0050    140 136   POTASSIUM 4.1 3.9 4.4   CHLORIDE 111* 107 105   BENTLEY 9.4 9.5 9.7   CO2 19* 25 20   BUN 38* 38* 40*   CR 3.03* 3.22* 2.73*   * 107* 307*     [email or web address](phos:4)  CBC)  Recent Labs  Lab 02/12/17  0050   WBC 10.9   HGB 11.9   HCT 36.0   MCV 92          Recent Labs  Lab 02/12/17  0050   AST 19   ALT 26   ALKPHOS 86   BILITOTAL 0.3     No lab results found in last 7 days.  25 OH Vit D2   Date Value Ref Range Status   07/02/2010 <5 ug/L Final     25 OH Vit D3   Date Value Ref Range Status   07/02/2010 36 ug/L Final     25 OH Vit D total   Date Value Ref Range Status   07/02/2010 <41 30 - 75 ug/L Final     Comment:     Season, race, dietary intake, and treatment affect the concentration of   25-hydroxy-Vitamin D. Values may decrease during winter months and increase   during summer months. Values less than 30 ug/L may indicate Vitamin D   deficiency.       Recent Labs  Lab 02/12/17  0050   HGB 11.9   HCT 36.0   MCV 92       Recent Labs  Lab 02/17/17  0655   PTHI 119*       Attestation:   I have reviewed today's relevant vital signs, notes, medications, labs and imaging.

## 2017-02-18 NOTE — PROGRESS NOTES
"OCTAVIA  D: SW following for discharge planning needs. SW received a message from Aida at [facility] who stated they were wondering if the hospital is working on MA. They only have a double room available at this time and do not feel she would be appropriate for a roommate.  They are willing to do an onsite visit on Wednesday for a private room.  SW can follow up with Aida at [phone] on Monday.   - OCTAVIA spoke with Alana at [facility] who said they have no female beds available at this time but will likely have one available soon due to resident in active hospice. Requested that SW follow up with ARMANDO Ying on Monday.   - [facility] said they had a \"global denial from corporate due to behaviors.\"  - Spoke with Leroy at [facility] who said patient was not appropriate for their long term care unit due to screaming.   - OCTAVIA spoke with supervisor at [facility] and they stated that they are not looking at new admissions this weekend and said we can call back on Monday.    - OCTAVIA spoke with supervisor at [facility] who stated their admissions person is gone this weekend and we will need to call back on Monday.  I: SW left  to follow up on referral with [place] [facility] and Irasema Carpenter to follow up on referral.  P: SW will continue to follow and assist with finalizing the discharge plan as appropriate.    Addendum: Received call back from [facility] and they do not have an appropriate bed for patient at their facility.  Placed call to [facility] and they do not have any female beds and are not taking new admissions due to norovirus. Wilda Chirinos stated that they are going to be coming out for an onsite visit on 1/20 and will be in touch with OCTAVIA.     Brian Sher, [facility]  [phone]  "

## 2017-02-18 NOTE — PROVIDER NOTIFICATION
0810: Paged hospitalist re: BG of 70 and scheduled 70 units of novolog 70/30 mix insulin  Per Dr. Parr will give 50 units of scheduled insulin.

## 2017-02-18 NOTE — PLAN OF CARE
Problem: Goal Outcome Summary  Goal: Goal Outcome Summary  Outcome: No Change  Pt disoriented to time-baseline cognitive impairments. Up with lift. Dunlap patent with adequate output. IVF infusing-regular. Mild pain in RLE, relieved with repositioning. Awaiting placement. Will continue to monitor.

## 2017-02-18 NOTE — PROGRESS NOTES
With IV fluid and cherry bladder drainage, he kidney function continues to improve.  BP slightly better with higher dose metoprolol.    Following.    Pavel Ruiz MD

## 2017-02-18 NOTE — PROGRESS NOTES
[facility]    Hospitalist Progress Note    Date of Service (when I saw the patient): 02/18/2017    Assessment & Plan   Nel Farmer is a 61 year old female who was admitted on 2/11/2017 with frequent falls.    1. Multiple falls  - PT at LTC facility    2. Right distal fibular fracture  - CAM boot  - PT  - Tylenol for pain    3. DM2  - Continue glipizide and insulin 70/30 and ISS  - Patient given 50 units of 70/30 this AM  - Reduced 70/30 to 60 units bid    4. HTN  - Continue clonidine, diltiazem, metoprolol, imdur    5. Schizoaffective disorder  - Continue depakote, risperdone and zyprexa    6. Vitamin D deficiency  - Continue cholecalciferol    7. LYLA on CKD stage IV  - Continue IVF  - Nephrology consult appreciated.    DVT Prophylaxis: Pneumatic Compression Devices  Code Status: Full Code    Disposition: Expected discharge pending LTC placement.    Jaime Parr  Text Page (7 am to 6 pm)    Interval History   The patient is resting in bed.  She does not like the food.    -Data reviewed today: I reviewed all new labs and imaging results over the last 24 hours. I personally reviewed no images or EKG's today.    Physical Exam   Temp: 98.4  F (36.9  C) Temp src: Oral BP: 185/83 Pulse: 75 Heart Rate: 75 Resp: 16 SpO2: 94 % O2 Device: None (Room air)    Vitals:    02/17/17 0531   Weight: 99 kg (218 lb 4.1 oz)     Vital Signs with Ranges  Temp:  [97.6  F (36.4  C)-98.5  F (36.9  C)] 98.4  F (36.9  C)  Pulse:  [75] 75  Heart Rate:  [56-95] 75  Resp:  [16] 16  BP: (130-185)/() 185/83  SpO2:  [92 %-96 %] 94 %  I/O last 3 completed shifts:  In: 3085 [P.O.:2640; I.V.:445]  Out: 8250 [Urine:8250]    Gen: Well nourished, disheveled, no acute distressed  HEENT: Atraumatic, normocephalic  Lungs: Clear to ausculation without wheezes, rhonchi, or rales  Heart: Regular rate and rhythm, no murmurs, gallops, or rubs  GI: Bowel sound normal, no hepatosplenomegaly or masses  Lymph: No lymphadenopathy or  edema  Skin: No rashes     Medications     NaCl 75 mL/hr at 02/18/17 0832       polyethylene glycol  17 g Oral BID     cholecalciferol  1,000 Units Oral Daily     metoprolol  100 mg Oral BID     diltiazem  240 mg Oral Daily     docusate sodium  100 mg Oral BID     insulin aspart  1-10 Units Subcutaneous TID AC     insulin aspart  1-7 Units Subcutaneous At Bedtime     acetaminophen  1,000 mg Oral TID     cloNIDine  0.3 mg Oral BID     divalproex  500 mg Oral QAM     divalproex  1,500 mg Oral At Bedtime     glipiZIDE (GLUCOTROL XL) 24 hr tablet 10 mg  10 mg Oral BID AC     insulin aspart prot & aspart  70 Units Subcutaneous BID AC     isosorbide mononitrate  30 mg Oral Daily     OLANZapine (zyPREXA) tablet 5 mg  5 mg Oral At Bedtime     omeprazole (priLOSEC) CR capsule 20 mg  20 mg Oral BID AC     risperiDONE  1 mg Sublingual At Bedtime       Data     Recent Labs  Lab 02/18/17  0630 02/17/17  0655 02/16/17  0650 02/12/17  0050   WBC  --   --   --  10.9   HGB  --   --   --  11.9   MCV  --   --   --  92   PLT  --   --   --  279    141 140 136   POTASSIUM 3.8 4.1 3.9 4.4   CHLORIDE 111* 111* 107 105   CO2 24 19* 25 20   BUN 35* 38* 38* 40*   CR 2.64* 3.03* 3.22* 2.73*   ANIONGAP 7 11 8 11   BENTLEY 9.5 9.4 9.5 9.7   GLC 70 135* 107* 307*   ALBUMIN  --  2.7*  --  2.9*   PROTTOTAL  --   --   --  7.5   BILITOTAL  --   --   --  0.3   ALKPHOS  --   --   --  86   ALT  --   --   --  26   AST  --   --   --  19       No results found for this or any previous visit (from the past 24 hour(s)).

## 2017-02-18 NOTE — PLAN OF CARE
Problem: Goal Outcome Summary  Goal: Goal Outcome Summary  Pt with baseline cognitive defects. Yells out frequently. VSS ex BP's hypertensive at times. Up with Lift. Repositioned in bed overnight. Pt reports pain bilat feet. Pain managed with Tylenol. Dunlap with large output. Slept between cares.

## 2017-02-19 NOTE — PROGRESS NOTES
Cr falling slowly.  2.59 today.  Baseline cr is ~2.2.  Improved with bladder drainage and IV fluid.  As Na slowly rising, I changed IV to 1/2 NS today.    Will follow up tomorrow.    Pavel Ruiz MD

## 2017-02-19 NOTE — PLAN OF CARE
Problem: Goal Outcome Summary  Goal: Goal Outcome Summary  Outcome: No Change  Pt disoriented to place and time-needs reorientation. BP on higher side-scheduled medications given. Up with assist of 2/lift to chair. Tylenol used for discomfort in RLE. IVF infusing, cherry patent. Diabetic diet. Awaiting placement.

## 2017-02-19 NOTE — PLAN OF CARE
Problem: Goal Outcome Summary  Goal: Goal Outcome Summary  Outcome: Improving  Patient up with assist of 2 and lift to BSC. Patient declined to attempt standing or to sit in chair. Adequate I/O. Dunlap in place.

## 2017-02-19 NOTE — PROGRESS NOTES
[facility]    Hospitalist Progress Note    Date of Service (when I saw the patient): 02/19/2017    Assessment & Plan   Nel Farmer is a 61 year old female who was admitted on 2/11/2017 with frequent falls.    1. Multiple falls  - PT at LTC facility    2. Right distal fibular fracture  - CAM boot  - PT  - Tylenol for pain    3. DM2  - Continue glipizide and insulin 70/30 and ISS  - Patient given 50 units of 70/30 this AM  - Reduced 70/30 to 60 units bid    4. HTN  - Continue clonidine, diltiazem, metoprolol, imdur    5. Schizoaffective disorder  - Continue depakote, risperdone and zyprexa  - Psychiatry consult for outbursts causing difficulty in finding accepting facility    6. Vitamin D deficiency  - Continue cholecalciferol    7. LYLA on CKD stage IV  - Continue IVF  - Nephrology consult appreciated.    DVT Prophylaxis: Pneumatic Compression Devices  Code Status: Full Code    Disposition: Expected discharge pending LTC placement.    Jaime Parr  Text Page (7 am to 6 pm)    Interval History   The patient is sitting in a chair.  Patient states that she is scared when left alone.    -Data reviewed today: I reviewed all new labs and imaging results over the last 24 hours. I personally reviewed no images or EKG's today.    Physical Exam   Temp: 98.7  F (37.1  C) Temp src: Axillary BP: 165/75 Pulse: 77 Heart Rate: 82 Resp: 16 SpO2: 95 % O2 Device: None (Room air)    Vitals:    02/17/17 0531   Weight: 99 kg (218 lb 4.1 oz)     Vital Signs with Ranges  Temp:  [98.2  F (36.8  C)-98.7  F (37.1  C)] 98.7  F (37.1  C)  Pulse:  [69-82] 77  Heart Rate:  [59-82] 82  Resp:  [16-18] 16  BP: (134-182)/(62-75) 165/75  SpO2:  [94 %-98 %] 95 %  I/O last 3 completed shifts:  In: 1830 [P.O.:1830]  Out: 40211 [Urine:43011]    Gen: Well nourished, disheveled, no acute distressed  HEENT: Atraumatic, normocephalic  Lungs: Clear to ausculation without wheezes, rhonchi, or rales  Heart: Regular rate and rhythm, no  murmurs, gallops, or rubs  GI: Bowel sound normal, no hepatosplenomegaly or masses  Lymph: No lymphadenopathy or edema  Skin: No rashes     Medications     IV infusion builder WITH additives 75 mL/hr at 02/19/17 0926       insulin aspart prot & aspart  60 Units Subcutaneous BID AC     polyethylene glycol  17 g Oral BID     cholecalciferol  1,000 Units Oral Daily     metoprolol  100 mg Oral BID     diltiazem  240 mg Oral Daily     docusate sodium  100 mg Oral BID     insulin aspart  1-10 Units Subcutaneous TID AC     insulin aspart  1-7 Units Subcutaneous At Bedtime     acetaminophen  1,000 mg Oral TID     cloNIDine  0.3 mg Oral BID     divalproex  500 mg Oral QAM     divalproex  1,500 mg Oral At Bedtime     glipiZIDE (GLUCOTROL XL) 24 hr tablet 10 mg  10 mg Oral BID AC     isosorbide mononitrate  30 mg Oral Daily     OLANZapine (zyPREXA) tablet 5 mg  5 mg Oral At Bedtime     omeprazole (priLOSEC) CR capsule 20 mg  20 mg Oral BID AC     risperiDONE  1 mg Sublingual At Bedtime       Data     Recent Labs  Lab 02/19/17  0638 02/18/17  0630 02/17/17  0655    142 141   POTASSIUM 4.5 3.8 4.1   CHLORIDE 115* 111* 111*   CO2 22 24 19*   BUN 34* 35* 38*   CR 2.59* 2.64* 3.03*   ANIONGAP 7 7 11   BENTLEY 9.1 9.5 9.4   * 70 135*   ALBUMIN  --   --  2.7*       No results found for this or any previous visit (from the past 24 hour(s)).

## 2017-02-19 NOTE — PLAN OF CARE
Problem: Goal Outcome Summary  Goal: Goal Outcome Summary  Pt is alert and oriented, forgetful at times, Dunlap in place, pt repositioned on bed.

## 2017-02-20 NOTE — CONSULTS
"PSYCHIATRY CONSULTATION       DATE OF CONSULTATION:  02/20/2017      REQUESTING PHYSICIAN:   Jaime Parr MD      REASON FOR CONSULTATION:  Outbursts, schizoaffective disorder.      IDENTIFYING DATA:  Nel Farmer is a 69-year-old woman with mental retardation and schizoaffective disorder who comes in with multiple falls and is convalescing on station 55 with a foot fracture.      CHIEF COMPLAINT:  \"I'm on too many medications.\"       HISTORY OF PRESENT ILLNESS:  Nel is a very unfortunate 61-year-old woman with chronic schizoaffective disorder and mental retardation.  She comes in after falling and sustaining a foot fracture.  She is convalescing on station 55.  She has long time health issues and has been hospitalized here at [facility] as well as other facilities.  She is typically on Depakote, Risperdal and Zyprexa.  She is a marginal historian due to her cognitive impairment.  She speaks in a very loud, aprosodic voice.  She is not actively psychotic and says that she has been sleeping good.  The nursing notes reflect that she has been intermittently uncooperative, not always cooperative with cares.   has been exploring placement options.  She had been living with her mother prior to coming in to the hospital.  It looks like she is going to need a transitional care unit to address her concerns.  She is unable to walk because of a nondisplaced fracture of her distal fibula.  She has had some issues with renal failure.      On interview, the patient is very concrete, she is fairly loquacious with me, but speaks in a very loud, monotone voice.  She is not a particularly good historian.  She says she is sleeping okay and does not appear to be actively hallucinating.  In reviewing her medication history, it looks like she has been on slightly higher doses of Risperdal in the past to manage her mood instability.  They did do a Depakote level on 02/12/2017 and this was normal at 96.  She is not " reporting thoughts of wanting to hurt self or others.      PAST PSYCHIATRIC HISTORY:  As above.  She has longstanding mental health problems and was last under our care back in the fall of 2015.  She typically sees Dr. Kraft.      PAST CHEMICAL DEPENDENCY HISTORY:  Negative.      PAST MEDICAL HISTORY:  Impaired cognition, diabetes, hyperlipidemia, hypertension, knee arthroplasty, foot fracture, tonsillectomy, adenoidectomy, D&C, frequent falls.      LISTED MEDICATIONS:  At this time:     1.  Tylenol.   2.  Vitamin D.   3.  Catapres.   4.  Diltiazem.   5.  Depakote  mg in the morning, 1500 mg at bedtime.   6.  Colace.   7.  Glucotrol.   8.  NovoLog insulin.   9.  NovoLog mix.   10.  Imdur.   11.  Lopressor.   12.  Zyprexa 5 mg each day at bedtime.   13.  Prilosec.   14.  Risperdal M-Tab 1 mg each day at bedtime.   15.  Dulcolax.   16.  Zofran.   17.  MiraLax.   18.  Senokot-S.   19.  Roxicodone p.r.n.      FAMILY AND SOCIAL HISTORY:  I have limited details, but I believe that she was utilizing support from her mother prior to coming into the hospital and they are currently looking into the possibility of nursing home option for her so that she can recover.  I think she is in some sort of a work program and has supportive services in the community.      REVIEW OF SYSTEMS:  A 10-point review of systems is conducted and is notable for a foot fracture on review of her extremities, an elevated creatinine on genitourinary examination.  All other systems negative.        VITAL SIGNS:  Most recent vital signs:  Temperature 98, pulse 82, respiratory rate 16, blood pressure 148/69, oxygen saturation 95%.      MENTAL STATUS EXAMINATION:  Appearance:  The patient is a disheveled woman.  She has some facial hair.  She is lying in bed complaining of a sore foot.  She is judged to be a poor historian.  Speech is loud in aprosodic.  Use of language poor.  Motor exam is somewhat tense.  Coordination, station, gait impaired due  "to her foot fracture.  Muscle strength and tone adequate.  Affect is slightly agitated.  Mood \"okay.\"  Thought process is generally logical, coherent and goal directed.  No loosening of associations.  No flight of ideas.  No formal thought disorder.  Thought content is very concrete, she is not verbalizing any active hallucinations, delusions, paranoia or suicidal ideation.  Insight and judgment poor.  Cognitive exam:  The patient is alert and oriented x2 out of 3.  Concentration poor.  Recent memory  poor, remote memory poor.  General fund of knowledge below average.      IMPRESSION:  The patient is a 61-year-old woman with schizoaffective disorder.  She also has significant developmental disability.  She is probably close to baseline with her psychiatric status and has a therapeutic dose of Depakote.  We can increase Risperdal slightly for mood stabilization.  She has a p.r.n. available.      DIAGNOSES:   1.  Schizoaffective disorder, bipolar type.   2.  Intellectual disability with moderate impairment.   3.  Foot fracture with frequent falls.      PLAN:   1.  Increase Risperdal M-Tab 1 mg b.i.d.   2.  Agree with community placement in a nursing home setting for rehabilitation.   3.  She has followup in the community,  I believe with Dr. Kraft, for psychotropic meds.         JULIO CESAR ROMERO MD             D: 2017 09:02   T: 2017 09:42   MT: VIOLET      Name:     MARIO ALBERTO PATRICK   MRN:      [id number]        Account:       [id number]   :      1955           Consult Date:  2017      Document: [id number]    "

## 2017-02-20 NOTE — PLAN OF CARE
Problem: Goal Outcome Summary  Goal: Goal Outcome Summary  Outcome: No Change  Patient up with assist of 2 and ceiling lift to chair/BSC. Adequate I/O. Dunlap in place.

## 2017-02-20 NOTE — PLAN OF CARE
Problem: Goal Outcome Summary  Goal: Goal Outcome Summary  Outcome: Improving  Pt refused to get up from bed, cherry in place draining large urine out put, waiting on place, creatinine getting better.

## 2017-02-20 NOTE — PROGRESS NOTES
[facility]    Hospitalist Progress Note    Date of Service (when I saw the patient): 02/20/2017    Assessment & Plan   Nel Farmer is a 61 year old female who was admitted on 2/11/2017 with frequent falls.    1. Multiple falls  - PT at LTC facility    2. Right distal fibular fracture  - CAM boot  - PT  - Tylenol for pain    3. DM2  - Continue glipizide and insulin 70/30 and ISS  - Patient given 50 units of 70/30 this AM  - Reduced 70/30 to 60 units bid    4. HTN  - Continue clonidine, diltiazem, metoprolol, imdur    5. Schizoaffective disorder  - Continue depakote, risperdone and zyprexa  - Difficulty in finding accepting facility due to loud outbursts  - Psychiatry consult appreciated, increase risperdal to 1 mg bid    6. Vitamin D deficiency  - Continue cholecalciferol    7. LYLA on CKD stage IV  - Continue IVF  - Nephrology consult appreciated.    DVT Prophylaxis: Pneumatic Compression Devices  Code Status: Full Code    Disposition: Expected discharge pending LTC placement.    Jaime Parr  Text Page (7 am to 6 pm)    Interval History   The patient is sitting in a chair.  Patient complains of foot and coccyx pain.    -Data reviewed today: I reviewed all new labs and imaging results over the last 24 hours. I personally reviewed no images or EKG's today.    Physical Exam   Temp: 98  F (36.7  C) Temp src: Oral BP: 148/69 Pulse: 82 Heart Rate: 70 Resp: 18 SpO2: 95 % O2 Device: None (Room air)    Vitals:    02/17/17 0531   Weight: 99 kg (218 lb 4.1 oz)     Vital Signs with Ranges  Temp:  [98  F (36.7  C)-98.9  F (37.2  C)] 98  F (36.7  C)  Pulse:  [64-82] 82  Heart Rate:  [63-82] 70  Resp:  [16-18] 18  BP: (135-174)/(67-73) 148/69  SpO2:  [95 %-96 %] 95 %  I/O last 3 completed shifts:  In: 2415 [P.O.:2020; I.V.:395]  Out: 8750 [Urine:8750]    Gen: Well nourished, disheveled, no acute distressed  HEENT: Atraumatic, normocephalic  Lungs: Clear to ausculation without wheezes, rhonchi, or  rales  Heart: Regular rate and rhythm, no murmurs, gallops, or rubs  GI: Bowel sound normal, no hepatosplenomegaly or masses  Lymph: No lymphadenopathy or edema  Skin: No rashes     Medications     IV infusion builder WITH additives 75 mL/hr at 02/19/17 2206       risperiDONE  1 mg Sublingual BID     insulin aspart prot & aspart  60 Units Subcutaneous BID AC     polyethylene glycol  17 g Oral BID     cholecalciferol  1,000 Units Oral Daily     metoprolol  100 mg Oral BID     diltiazem  240 mg Oral Daily     docusate sodium  100 mg Oral BID     insulin aspart  1-10 Units Subcutaneous TID AC     insulin aspart  1-7 Units Subcutaneous At Bedtime     acetaminophen  1,000 mg Oral TID     cloNIDine  0.3 mg Oral BID     divalproex  500 mg Oral QAM     divalproex  1,500 mg Oral At Bedtime     glipiZIDE (GLUCOTROL XL) 24 hr tablet 10 mg  10 mg Oral BID AC     isosorbide mononitrate  30 mg Oral Daily     OLANZapine (zyPREXA) tablet 5 mg  5 mg Oral At Bedtime     omeprazole (priLOSEC) CR capsule 20 mg  20 mg Oral BID AC       Data     Recent Labs  Lab 02/20/17  0712 02/19/17  0638 02/18/17  0630 02/17/17  0655    144 142 141   POTASSIUM 4.4 4.5 3.8 4.1   CHLORIDE 112* 115* 111* 111*   CO2 22 22 24 19*   BUN 33* 34* 35* 38*   CR 2.66* 2.59* 2.64* 3.03*   ANIONGAP 8 7 7 11   BENTLEY 9.6 9.1 9.5 9.4   * 137* 70 135*   ALBUMIN  --   --   --  2.7*       No results found for this or any previous visit (from the past 24 hour(s)).

## 2017-02-20 NOTE — PLAN OF CARE
Problem: Goal Outcome Summary  Goal: Goal Outcome Summary  Outcome: Improving  Pt remains A/O with confusing to situation. up with A2 and lift. IV SL. Dunlap draining clear yellow urine. Tylenol for pain. Diabetic diet.

## 2017-02-20 NOTE — PROGRESS NOTES
Renal Medicine       Na stable  Creatinine similar range (slightly above baseline)    Continue current IVF  Daily labs              Recent Labs  Lab 02/20/17  0712      POTASSIUM 4.4   CHLORIDE 112*   CO2 22   ANIONGAP 8   *   BUN 33*   CR 2.66*   GFRESTIMATED 18*   GFRESTBLACK 22*   BENTLEY 9.6       Recent Labs  Lab 02/20/17  0712 02/19/17  0638 02/18/17  0630 02/17/17  0655 02/16/17  0650   CR 2.66* 2.59* 2.64* 3.03* 3.22*     Recent Labs   Lab Test  02/20/17   0712  02/19/17   0638  02/18/17   0630  02/17/17   0655  02/16/17   0650  02/12/17   0050  01/29/17   1820  12/06/16   1605  05/21/16   2030  04/30/16   1446   NA  142  144  142  141  140  136  135  138  137  137           ARNALDO Renae    [facility]  [phone]

## 2017-02-21 NOTE — PROGRESS NOTES
OCTAVIA  D: SW following for discharge planning needs.  I: SW received a message from Admissions at [facility] stating that they will be having someone come out tomorrow to do an on site assessment.  OCTAVIA also made a referral to [facility] [place].  A: Patient is alert and oriented.  P: SW will continue to follow and assist with finalizing the discharge plan as appropriate.    Svetlana Tamez, CHERI

## 2017-02-21 NOTE — PROGRESS NOTES
[facility]    Hospitalist Progress Note    Date of Service (when I saw the patient): 02/21/2017    Assessment & Plan   Nel Farmer is a 61 year old female who was admitted on 2/11/2017 with frequent falls.    1. Multiple falls  - PT at LTC facility    2. Right distal fibular fracture  - CAM boot  - PT  - Tylenol for pain    3. DM2  - Continue glipizide and insulin 70/30 and ISS  - Patient given 50 units of 70/30 this AM  - Reduced 70/30 to 60 units bid    4. HTN  - Severe uncontrolled hypertension  - Continue clonidine, diltiazem, metoprolol, imdur  - Start hydralazine 25 mg tid    5. Schizoaffective disorder  - Continue depakote, risperdone and zyprexa  - Difficulty in finding accepting facility due to loud outbursts  - Psychiatry consult appreciated, increase risperdal to 1 mg bid on 2/20    6. Vitamin D deficiency  - Continue cholecalciferol    7. LYLA on CKD stage IV  - Discontinued IVF 2/20  - Nephrology consult appreciated.    DVT Prophylaxis: Pneumatic Compression Devices  Code Status: Full Code    Disposition: Expected discharge pending LTC placement.    Jaime Parr  Text Page (7 am to 6 pm)    Interval History   The patient is sitting in a chair.  She has no acute complaints.    -Data reviewed today: I reviewed all new labs and imaging results over the last 24 hours. I personally reviewed no images or EKG's today.    Physical Exam   Temp: 97.8  F (36.6  C) Temp src: Oral BP: 191/75 Pulse: 62 Heart Rate: 72 Resp: 18 SpO2: 98 % O2 Device: None (Room air)    Vitals:    02/17/17 0531 02/21/17 0538   Weight: 99 kg (218 lb 4.1 oz) 97.9 kg (215 lb 13.3 oz)     Vital Signs with Ranges  Temp:  [97.8  F (36.6  C)-98.6  F (37  C)] 97.8  F (36.6  C)  Pulse:  [62-64] 62  Heart Rate:  [57-72] 72  Resp:  [16-18] 18  BP: (140-191)/(66-78) 191/75  SpO2:  [95 %-98 %] 98 %  I/O last 3 completed shifts:  In: 450 [P.O.:450]  Out: 9650 [Urine:9650]    Gen: Well nourished, disheveled, no acute  distressed  HEENT: Atraumatic, normocephalic  Lungs: Clear to ausculation without wheezes, rhonchi, or rales  Heart: Regular rate and rhythm, no murmurs, gallops, or rubs  GI: Bowel sound normal, no hepatosplenomegaly or masses  Lymph: No lymphadenopathy or edema  Skin: No rashes     Medications        hydrALAZINE  25 mg Oral 4x Daily     risperiDONE  1 mg Sublingual BID     sodium chloride (PF)  3 mL Intracatheter Q8H     insulin aspart prot & aspart  60 Units Subcutaneous BID AC     polyethylene glycol  17 g Oral BID     cholecalciferol  1,000 Units Oral Daily     metoprolol  100 mg Oral BID     diltiazem  240 mg Oral Daily     docusate sodium  100 mg Oral BID     insulin aspart  1-10 Units Subcutaneous TID AC     insulin aspart  1-7 Units Subcutaneous At Bedtime     acetaminophen  1,000 mg Oral TID     cloNIDine  0.3 mg Oral BID     divalproex  500 mg Oral QAM     divalproex  1,500 mg Oral At Bedtime     glipiZIDE (GLUCOTROL XL) 24 hr tablet 10 mg  10 mg Oral BID AC     isosorbide mononitrate  30 mg Oral Daily     OLANZapine (zyPREXA) tablet 5 mg  5 mg Oral At Bedtime     omeprazole (priLOSEC) CR capsule 20 mg  20 mg Oral BID AC       Data     Recent Labs  Lab 02/21/17  0625 02/20/17  0712 02/19/17  0638  02/17/17  0655   * 142 144  < > 141   POTASSIUM 4.2 4.4 4.5  < > 4.1   CHLORIDE 114* 112* 115*  < > 111*   CO2 24 22 22  < > 19*   BUN 35* 33* 34*  < > 38*   CR 2.89* 2.66* 2.59*  < > 3.03*   ANIONGAP 7 8 7  < > 11   BENTLEY 9.9 9.6 9.1  < > 9.4   GLC 90 161* 137*  < > 135*   ALBUMIN  --   --   --   --  2.7*   < > = values in this interval not displayed.    No results found for this or any previous visit (from the past 24 hour(s)).

## 2017-02-21 NOTE — PLAN OF CARE
Problem: Goal Outcome Summary  Goal: Goal Outcome Summary  Outcome: No Change  Alert, disoriented to time. Slept most of shift, cooperative. BPs hypertensive - 166/70. Dunlap patent, light yellow output. Bruising to L ankle/foot resolving. D/c placement pending.

## 2017-02-21 NOTE — PLAN OF CARE
Problem: Goal Outcome Summary  Goal: Goal Outcome Summary  Outcome: Improving  A&O to self, situation and place.  VSS except hypertensive.  Afebrile.  CMS intact.  Dunlap intact-950 cc.  Tolerating Mod CHO diet.  BG-90 & 195.  IV saline locked.  Incontinent of BM x 1.  Was up in recliner chair via assist of 2 and lift.  Needs constant reminder to not speak loudly.  Attention seeking-behavior noticed.  Awaiting placement.

## 2017-02-21 NOTE — PROGRESS NOTES
Renal Medicine Progress Note                                Nel Farmer MRN# [id number]   Age: 61 year old YOB: 1955   Date of Admission: 2/11/2017 Hospital LOS: 0                  Assessment/Plan:     61 year old female who was admitted on 2/11/2017 for evaluation of falls and for placement.   She has A/CKD.         1) Baseline Stage 4 CKD: Cr 2.2 and GFR 23. Lithium nephropathy.     2) A/CKD: Prerenal ? Urine retention? No obvious nephrotoxic exposure. Lithium nephropathy normally progresses very slowly.     3) HTN: Borderline control on diltiazem, clonidine and metoprolol.      4) Metabolic Bone Disease   -secondary hyperparathyroidism   -in part related to vitamin D deficiency (on replacement)  5) Polyuria   -presumed related to Li effect on concentrating ability      Na and creatinine rising  Start amiloride  Encourage water intake        Interval History:     Up in chair at bedside.  Follows.  Flight of thought    ROS:     GENERAL: NAD, No fever,chills  E/M: NEGATIVE for ear, mouth and throat problems  R: NEGATIVE for significant cough or SOB  CV: NEGATIVE for chest pain, palpitations  GI: NEGATIVE for abdominal pain, heartburn, nausea, vomiting or change in bowel pattern  EXT: no change edema  ROS otherwise negative    Medications and Allergies:     Reviewed    Physical Exam:     Vitals were reviewed  Patient Vitals for the past 8 hrs:   BP Temp Temp src Pulse Heart Rate Resp SpO2 Weight   02/21/17 1026 191/75 - - - 72 - - -   02/21/17 0744 156/78 97.8  F (36.6  C) Oral 62 59 18 98 % -   02/21/17 0538 - - - - - - - 97.9 kg (215 lb 13.3 oz)   02/21/17 0500 166/70 97.8  F (36.6  C) Axillary - 57 16 95 % -     I/O last 3 completed shifts:  In: 450 [P.O.:450]  Out: 9650 [Urine:9650]    Vitals:    02/17/17 0531 02/21/17 0538   Weight: 99 kg (218 lb 4.1 oz) 97.9 kg (215 lb 13.3 oz)         GENERAL: awake, alert, follows  HEENT: NC/AT, PERRLA, EOMI, non icteric, pharynx moist without  lesion  RESP:  clear anteriorly  CV: RRR, normal S1 S2  ABDOMEN: soft, nontender, no HSM or masses and bowel sounds normal  MS: no clubbing, cyanosis   SKIN: clear without significant rashes or lesions  NEURO: speech normal and cranial nerves 2-12 intact  EXT: warm, no edema    Data:       Recent Labs  Lab 02/21/17  0625 02/20/17  0712 02/19/17  0638 02/18/17  0630   * 142 144 142   POTASSIUM 4.2 4.4 4.5 3.8   CHLORIDE 114* 112* 115* 111*   CO2 24 22 22 24   ANIONGAP 7 8 7 7   GLC 90 161* 137* 70   BUN 35* 33* 34* 35*   CR 2.89* 2.66* 2.59* 2.64*   GFRESTIMATED 17* 18* 19* 18*   GFRESTBLACK 20* 22* 23* 22*   BENTLEY 9.9 9.6 9.1 9.5          Recent Labs   Lab Test  02/21/17   0625  02/20/17   0712  02/19/17   0638  02/18/17   0630  02/17/17   0655  02/16/17   0650  02/12/17   0050  01/29/17   1820  12/06/16   1605  05/21/16   2030   CR  2.89*  2.66*  2.59*  2.64*  3.03*  3.22*  2.73*  2.56*  2.31*  2.58*       Recent Labs  Lab 02/17/17  0655   PHOS 3.6       Recent Labs  Lab 02/17/17  0655   VITDT 19*       Recent Labs  Lab 02/17/17  0655   PTHI 119*         G Dino Renae    [facility] - Nephrology  [phone]

## 2017-02-21 NOTE — PLAN OF CARE
"Problem: Goal Outcome Summary  Goal: Goal Outcome Summary  Outcome: No Change  Up with lift to chair. Tolerated well. Calls out frequently but does speak in normal tones when reminded to \"use her indoor voice\". Turns with assistance. Cooperative. Ready for discharge soon.      "

## 2017-02-22 NOTE — PROGRESS NOTES
[facility]    Hospitalist Progress Note    Date of Service (when I saw the patient): 02/22/2017    Assessment & Plan   Nel Farmer is a 61 year old female who was admitted on 2/11/2017 with frequent falls.    1. Multiple falls  - PT at LTC facility    2. Right distal fibular fracture  - CAM boot  - PT  - Tylenol for pain    3. DM2  - Continue glipizide and insulin 70/30 and ISS  - Patient given 50 units of 70/30 this AM  - Reduced 70/30 to 60 units bid    4. HTN  - Hypertension with variable control  - Continue clonidine, hydralazine, diltiazem, metoprolol, imdur    5. Schizoaffective disorder  - Continue depakote, risperdone and zyprexa  - Difficulty in finding accepting facility due to loud outbursts  - Psychiatry consult appreciated, increase risperdal to 1 mg bid on 2/20    6. Vitamin D deficiency  - Continue cholecalciferol    7. LYLA on CKD stage IV  - Discontinued IVF 2/20  - Nephrology consult appreciated.    DVT Prophylaxis: Pneumatic Compression Devices  Code Status: Full Code    Disposition: Expected discharge pending LTC placement.    Jaime Parr  Text Page (7 am to 6 pm)    Interval History   The patient is sitting in a chair.  She has no acute complaints.    -Data reviewed today: I reviewed all new labs and imaging results over the last 24 hours. I personally reviewed no images or EKG's today.    Physical Exam   Temp: 97.5  F (36.4  C) Temp src: Oral BP: 117/55   Heart Rate: 67 Resp: 16 SpO2: 94 % O2 Device: None (Room air)    Vitals:    02/17/17 0531 02/21/17 0538   Weight: 99 kg (218 lb 4.1 oz) 97.9 kg (215 lb 13.3 oz)     Vital Signs with Ranges  Temp:  [97.5  F (36.4  C)-98.4  F (36.9  C)] 97.5  F (36.4  C)  Heart Rate:  [57-67] 67  Resp:  [16-18] 16  BP: (117-153)/(48-77) 117/55  SpO2:  [92 %-98 %] 94 %  I/O last 3 completed shifts:  In: 2840 [P.O.:2840]  Out: 5650 [Urine:5650]    Gen: Well nourished, disheveled, no acute distressed  HEENT: Atraumatic,  normocephalic  Lungs: Clear to ausculation without wheezes, rhonchi, or rales  Heart: Regular rate and rhythm, no murmurs, gallops, or rubs  GI: Bowel sound normal, no hepatosplenomegaly or masses  Lymph: No lymphadenopathy or edema  Skin: No rashes     Medications        [START ON 2/23/2017] aMILoride  10 mg Oral Daily     hydrALAZINE  25 mg Oral 4x Daily     risperiDONE  1 mg Sublingual BID     sodium chloride (PF)  3 mL Intracatheter Q8H     insulin aspart prot & aspart  60 Units Subcutaneous BID AC     polyethylene glycol  17 g Oral BID     cholecalciferol  1,000 Units Oral Daily     metoprolol  100 mg Oral BID     diltiazem  240 mg Oral Daily     docusate sodium  100 mg Oral BID     insulin aspart  1-10 Units Subcutaneous TID AC     insulin aspart  1-7 Units Subcutaneous At Bedtime     acetaminophen  1,000 mg Oral TID     cloNIDine  0.3 mg Oral BID     divalproex  500 mg Oral QAM     divalproex  1,500 mg Oral At Bedtime     glipiZIDE (GLUCOTROL XL) 24 hr tablet 10 mg  10 mg Oral BID AC     isosorbide mononitrate  30 mg Oral Daily     OLANZapine (zyPREXA) tablet 5 mg  5 mg Oral At Bedtime     omeprazole (priLOSEC) CR capsule 20 mg  20 mg Oral BID AC       Data     Recent Labs  Lab 02/22/17  0704 02/21/17  0625 02/20/17  0712  02/17/17  0655    145* 142  < > 141   POTASSIUM 5.1 4.2 4.4  < > 4.1   CHLORIDE 113* 114* 112*  < > 111*   CO2 23 24 22  < > 19*   BUN 34* 35* 33*  < > 38*   CR 2.91* 2.89* 2.66*  < > 3.03*   ANIONGAP 8 7 8  < > 11   BENTLEY 9.7 9.9 9.6  < > 9.4   * 90 161*  < > 135*   ALBUMIN  --   --   --   --  2.7*   < > = values in this interval not displayed.    No results found for this or any previous visit (from the past 24 hour(s)).

## 2017-02-22 NOTE — PLAN OF CARE
Problem: Goal Outcome Summary  Goal: Goal Outcome Summary  Outcome: No Change  Patient alert, disorientated to time.  C/o right foot aching at times, on scheduled tylenol.  CMS+.  VSS.  Dunlap patent with large amounts of clear yellow urine.  Had BM on bedpan this shift.  Patient calls out, she reported feeling anxious and unsure of what is happening with her and if she's getting better, given ativan with some relief.  Up to chair x 2 with lift.  Lungs diminished.  Nephrology following Creatinine 2.91.  Good appetite.

## 2017-02-22 NOTE — PLAN OF CARE
Problem: Goal Outcome Summary  Goal: Goal Outcome Summary  Pt with baseline cognitive impairment. Disoriented to time. VSS on RA. Up with Lift. Repositioned in bed overnight. Pain managed with Tylenol. Dunlap with large output. Slept between cares. BG low overnight (52) treated with apple juice, recheck 93.

## 2017-02-22 NOTE — PROGRESS NOTES
Renal Medicine Progress Note                                Nel Farmer MRN# [id number]   Age: 61 year old YOB: 1955   Date of Admission: 2/11/2017 Hospital LOS: 0                  Assessment/Plan:     61 year old female who was admitted on 2/11/2017 for evaluation of falls and for placement.   She has A/CKD.         1) Baseline Stage 4 CKD: Cr 2.2 and GFR 23. Lithium nephropathy.     2) A/CKD: Prerenal ? Urine retention? No obvious nephrotoxic exposure. Lithium nephropathy normally progresses very slowly.     3) HTN: Borderline control on diltiazem, clonidine and metoprolol.      4) Metabolic Bone Disease   -secondary hyperparathyroidism   -in part related to vitamin D deficiency (on replacement)  5) Polyuria   -presumed related to Li effect on concentrating ability      Continue amiloride but increase to 10 mg  Free access to water    Follow labs        Interval History:     Up in chair at bedside.  Follows.  Flight of thought.  Remains loud.  UO remains high    ROS:     GENERAL: NAD, No fever,chills  E/M: NEGATIVE for ear, mouth and throat problems  R: NEGATIVE for significant cough or SOB  CV: NEGATIVE for chest pain, palpitations  GI: NEGATIVE for abdominal pain, heartburn, nausea, vomiting or change in bowel pattern  EXT: no change edema  ROS otherwise negative    Medications and Allergies:     Reviewed    Physical Exam:     Vitals were reviewed  Patient Vitals for the past 8 hrs:   BP Temp Temp src Heart Rate Resp SpO2   02/22/17 0700 153/77 97.9  F (36.6  C) Oral 60 16 93 %     I/O last 3 completed shifts:  In: 2840 [P.O.:2840]  Out: 5650 [Urine:5650]    Vitals:    02/17/17 0531 02/21/17 0538   Weight: 99 kg (218 lb 4.1 oz) 97.9 kg (215 lb 13.3 oz)         GENERAL: awake, alert, follows  HEENT: NC/AT, PERRLA, EOMI, non icteric, pharynx moist without lesion  RESP:  clear anteriorly  CV: RRR, normal S1 S2  ABDOMEN: soft, nontender, no HSM or masses and bowel sounds normal  MS: no clubbing,  cyanosis   SKIN: clear without significant rashes or lesions  NEURO: speech normal and cranial nerves 2-12 intact  EXT: warm, no edema    Data:       Recent Labs  Lab 02/22/17  0704 02/21/17  0625 02/20/17  0712 02/19/17  0638    145* 142 144   POTASSIUM 5.1 4.2 4.4 4.5   CHLORIDE 113* 114* 112* 115*   CO2 23 24 22 22   ANIONGAP 8 7 8 7   * 90 161* 137*   BUN 34* 35* 33* 34*   CR 2.91* 2.89* 2.66* 2.59*   GFRESTIMATED 16* 17* 18* 19*   GFRESTBLACK 20* 20* 22* 23*   BENTLEY 9.7 9.9 9.6 9.1          Recent Labs   Lab Test  02/22/17   0704  02/21/17   0625  02/20/17   0712  02/19/17   0638  02/18/17   0630  02/17/17   0655  02/16/17   0650  02/12/17   0050  01/29/17   1820  12/06/16   1605   CR  2.91*  2.89*  2.66*  2.59*  2.64*  3.03*  3.22*  2.73*  2.56*  2.31*       Recent Labs  Lab 02/17/17  0655   PHOS 3.6       Recent Labs  Lab 02/17/17  0655   VITDT 19*       Recent Labs  Lab 02/17/17  0655   PTHI 119*         G Dino Renae    [facility] - Nephrology  [phone]

## 2017-02-23 NOTE — PLAN OF CARE
Problem: Goal Outcome Summary  Goal: Goal Outcome Summary  Outcome: No Change  A&Ox3; disoriented to time. VSS. Pain managed with tylenol. CMS intact. Dunlap patent; voiding adequately. Tolerating diet. Reposition q/2 hours. RN will continue to monitor.

## 2017-02-23 NOTE — PROGRESS NOTES
[facility]    Hospitalist Progress Note    Assessment & Plan   Nel Farmer is a 61 year old female who was admitted on 2/11/2017 with frequent falls.     1. Multiple falls  - PT at LTC facility     2. Right distal fibular fracture  - CAM boot  - PT  - Tylenol for pain     3. DM2  - Continue glipizide and insulin 70/30 and ISS  - Skipped 70/30 this AM due to blood sugars in the 50s  - Reduced 70/30 to 50 units bid     4. HTN  - Hypertension with adequate control for the most part  - Continue clonidine, hydralazine, diltiazem, metoprolol, imdur     5. Schizoaffective disorder  - Continue depakote, risperdone and zyprexa  - Difficulty in finding accepting facility due to loud outbursts  - Psychiatry consult appreciated, increase risperdal to 1 mg bid on 2/20     6. Vitamin D deficiency  - Continue cholecalciferol     7. LYLA on CKD stage IV  -Patient had urinary retention and obstructive uropathy  -Creatinine improved placement of Dunlap and IV fluids  - Discontinued IVF 2/20  - Nephrology consult appreciated.      D/W: RN  DVT Prophylaxis: Pneumatic Compression Devices  Code Status: Full Code    Disposition: Expected discharge pending placement    Russ Gonzales MD    Interval History   No acute nursing issues.  Disoriented at times.    -Data reviewed today: I reviewed all new labs and imaging results over the last 24 hours. I personally reviewed no images or EKG's today.    Physical Exam   Temp: 98  F (36.7  C) Temp src: Oral BP: 117/49 Pulse: 58 Heart Rate: 55 Resp: 18 SpO2: 95 % O2 Device: None (Room air)    Vitals:    02/17/17 0531 02/21/17 0538   Weight: 99 kg (218 lb 4.1 oz) 97.9 kg (215 lb 13.3 oz)     Vital Signs with Ranges  Temp:  [97.7  F (36.5  C)-98.9  F (37.2  C)] 98  F (36.7  C)  Pulse:  [52-58] 58  Heart Rate:  [53-65] 55  Resp:  [16-20] 18  BP: (117-148)/(49-81) 117/49  SpO2:  [91 %-95 %] 95 %  I/O last 3 completed shifts:  In: 2140 [P.O.:2140]  Out: 4000  [Urine:4000]    Constitutional: AAOX3, NAD, Appears comfortable  Respiratory:  No crackles, No wheezes, CTA B/L, Normal WOB  Cardiovascular: RRR, No murmur  GI: Soft, Non- tender, BS- normoactive, Dunlap in place  Skin/Integument: Warm and dry, no rashes  MSK: No joint deformity or swelling, no edema  Neuro: CN- grossly intact      Medications        insulin aspart prot & aspart  50 Units Subcutaneous BID AC     aMILoride  10 mg Oral Daily     hydrALAZINE  25 mg Oral 4x Daily     risperiDONE  1 mg Sublingual BID     sodium chloride (PF)  3 mL Intracatheter Q8H     polyethylene glycol  17 g Oral BID     cholecalciferol  1,000 Units Oral Daily     metoprolol  100 mg Oral BID     diltiazem  240 mg Oral Daily     docusate sodium  100 mg Oral BID     insulin aspart  1-10 Units Subcutaneous TID AC     insulin aspart  1-7 Units Subcutaneous At Bedtime     acetaminophen  1,000 mg Oral TID     cloNIDine  0.3 mg Oral BID     divalproex  500 mg Oral QAM     divalproex  1,500 mg Oral At Bedtime     glipiZIDE (GLUCOTROL XL) 24 hr tablet 10 mg  10 mg Oral BID AC     isosorbide mononitrate  30 mg Oral Daily     OLANZapine (zyPREXA) tablet 5 mg  5 mg Oral At Bedtime     omeprazole (priLOSEC) CR capsule 20 mg  20 mg Oral BID AC       Data     Recent Labs  Lab 02/23/17  0700 02/22/17  0704 02/21/17  0625  02/17/17  0655   * 144 145*  < > 141   POTASSIUM 4.9 5.1 4.2  < > 4.1   CHLORIDE 113* 113* 114*  < > 111*   CO2 27 23 24  < > 19*   BUN 38* 34* 35*  < > 38*   CR 3.19* 2.91* 2.89*  < > 3.03*   ANIONGAP 5 8 7  < > 11   BENTLEY 10.0 9.7 9.9  < > 9.4   GLC 56* 148* 90  < > 135*   ALBUMIN  --   --   --   --  2.7*   < > = values in this interval not displayed.    No results found for this or any previous visit (from the past 24 hour(s)).

## 2017-02-23 NOTE — PLAN OF CARE
Problem: Goal Outcome Summary  Goal: Goal Outcome Summary  Pt with baseline cognitive impairment. VSS on RA. Up with Lift. Repositioned in bed overnight. Pain managed with Tylenol. Dunlap with large output. BM x1 this shift. Slept between cares. , 111.

## 2017-02-23 NOTE — PROGRESS NOTES
"OCTAVIA  D: SW following for discharge planning needs.  OCTAVIA received a call from Aida in  at [facility] and she informed SW that they are not able to accept patient for admission.  They feel that with patients yelling out that it would not be a good environment or a good quality of life for the other residents around her.  Aida also informed SW that patient does not have straight MA but a Qualified Medicare plan that only covers the deductibles and Medicare co-insurance.  SW updated patients mother, Lisa, on where we are in the discharge planning process.  Lisa states that she has Financial POA for patient but that she has no Medical POA or Guardian.  Patients mom states, \"I am making medical decision for her, I am her mother.\"  I: SW left a message for Tanya Mock ([phone]) with [facility] and am awaiting a return call back to see what the process is for getting patient back on [employer] or onto Straight [employer].    A: Patient is alert and oriented.  P: SW will continue to follow and assist with finalizing the discharge plan as appropriate.    Svetlana Tamez, CHERI      "

## 2017-02-23 NOTE — PROGRESS NOTES
[facility]     Renal Progress Note       SHORTHAND KEY FOR MY NOTES:  c = with, s = without, p = after, a = before, x = except, asx = asymptomatic, tx = transplant or treatment, sx = symptoms or symptomatic, cx = canceled or culture, rxn = reaction, yday = yesterday, nl = normal, abx = antibiotics, fxn = function, dx = diagnosis, dz = disease, m/h = melena/hematochezia, c/d/l/ha = cramping/dizziness/lightheadedness/headache, d/c = discharge or diarrhea/constipation, f/c/n/v = fevers/chills/nausea/vomiting, cp/sob = chest pain/shortness of breath.         Assessment/Plan:     1.  LYLA/CKD.  Pt's cr is higher than baseline.  It is stable.  No major uremic sx.  A.  Follow labs, sx.    2.  HTN.  Controlled c multiple meds.  A.  Continue same meds/doses.    3.  NDI.  Na is ok. She is keeping up c her fluids.  Explained to pt/sister-in-law that she should always have free access to water.  A.  Follow Na, uo.        Interval History:     Pt wants to go home.  She is breathing ok.  She urinates a lot from the NDI.  No cp/abd pain.            Medications and Allergies:       insulin aspart prot & aspart  50 Units Subcutaneous BID AC     aMILoride  10 mg Oral Daily     hydrALAZINE  25 mg Oral 4x Daily     risperiDONE  1 mg Sublingual BID     sodium chloride (PF)  3 mL Intracatheter Q8H     polyethylene glycol  17 g Oral BID     cholecalciferol  1,000 Units Oral Daily     metoprolol  100 mg Oral BID     diltiazem  240 mg Oral Daily     docusate sodium  100 mg Oral BID     insulin aspart  1-10 Units Subcutaneous TID AC     insulin aspart  1-7 Units Subcutaneous At Bedtime     acetaminophen  1,000 mg Oral TID     cloNIDine  0.3 mg Oral BID     divalproex  500 mg Oral QAM     divalproex  1,500 mg Oral At Bedtime     glipiZIDE (GLUCOTROL XL) 24 hr tablet 10 mg  10 mg Oral BID AC     isosorbide mononitrate  30 mg Oral Daily     OLANZapine (zyPREXA) tablet 5 mg  5 mg Oral At Bedtime     omeprazole (priLOSEC) CR  "capsule 20 mg  20 mg Oral BID AC     Allergies   Allergen Reactions     Amoxicillin      Amoxicillin      Haldol [Benzyl Alcohol]      Haldol [Haloperidol]      Pcn [Penicillin G]      Penicillins      Seroquel [Quetiapine] GI Disturbance          Physical Exam:     Vitals were reviewed    Heart Rate: 52, Blood pressure 129/57, pulse 58, temperature 98  F (36.7  C), temperature source Oral, resp. rate 18, height 1.6 m (5' 3\"), weight 97.9 kg (215 lb 13.3 oz), last menstrual period 04/27/2012, SpO2 97 %, not currently breastfeeding.  Wt Readings from Last 3 Encounters:   02/21/17 97.9 kg (215 lb 13.3 oz)   06/20/16 108.9 kg (240 lb)   05/27/16 108.9 kg (240 lb)     Intake/Output Summary (Last 24 hours) at 02/23/17 1738  Last data filed at 02/23/17 1700   Gross per 24 hour   Intake             3390 ml   Output             4150 ml   Net             -760 ml     GENERAL APPEARANCE: pleasant, NAD, alert  HEENT:  Eyes/ears/nose/neck grossly normal  RESP: lungs cta b c good efforts, no crackles  CV: RRR, nl S1/S2  ABDOMEN: o/s/nt/nd  EXTREMITIES/SKIN: no significant ble edema         Data:     CBC RESULTS:   No results for input(s): WBC, RBC, HGB, HCT, PLT in the last 168 hours.  Basic Metabolic Panel:    Recent Labs  Lab 02/23/17  0700 02/22/17  0704 02/21/17  0625 02/20/17  0712 02/19/17  0638 02/18/17  0630   * 144 145* 142 144 142   POTASSIUM 4.9 5.1 4.2 4.4 4.5 3.8   CHLORIDE 113* 113* 114* 112* 115* 111*   CO2 27 23 24 22 22 24   BUN 38* 34* 35* 33* 34* 35*   CR 3.19* 2.91* 2.89* 2.66* 2.59* 2.64*   GLC 56* 148* 90 161* 137* 70   BENTLEY 10.0 9.7 9.9 9.6 9.1 9.5     INRNo lab results found in last 7 days.   Attestation:   I have reviewed today's relevant vital signs, notes, medications, labs and imaging.    Tommie Matias MD  [facility] - Nephrology  [phone]  "

## 2017-02-24 NOTE — PROGRESS NOTES
[facility]     Renal Progress Note       SHORTHAND KEY FOR MY NOTES:  c = with, s = without, p = after, a = before, x = except, asx = asymptomatic, tx = transplant or treatment, sx = symptoms or symptomatic, cx = canceled or culture, rxn = reaction, yday = yesterday, nl = normal, abx = antibiotics, fxn = function, dx = diagnosis, dz = disease, m/h = melena/hematochezia, c/d/l/ha = cramping/dizziness/lightheadedness/headache, d/c = discharge or diarrhea/constipation, f/c/n/v = fevers/chills/nausea/vomiting, cp/sob = chest pain/shortness of breath.         Assessment/Plan:     1.  LYLA/CKD.  Pt's cr is hopefully plateauing.  Cr is stable vs yday.  No major uremic sx and volume is ok.  A.  Follow labs, sx.    2.  HTN.  Decently controlled c multiple meds.  A.  Continue same meds/doses.    3.  NDI.  Na is ok. She is keeping up c her fluids.   A.  Follow Na, uo.  B.  She should always have free access to water.        Interval History:     Pt was happy that she took a couple of steps today. She is feeling better today.  She is drinking plenty of water and denies any d/l.            Medications and Allergies:       insulin aspart prot & aspart  50 Units Subcutaneous BID AC     aMILoride  10 mg Oral Daily     hydrALAZINE  25 mg Oral 4x Daily     risperiDONE  1 mg Sublingual BID     sodium chloride (PF)  3 mL Intracatheter Q8H     polyethylene glycol  17 g Oral BID     cholecalciferol  1,000 Units Oral Daily     metoprolol  100 mg Oral BID     diltiazem  240 mg Oral Daily     docusate sodium  100 mg Oral BID     insulin aspart  1-10 Units Subcutaneous TID AC     insulin aspart  1-7 Units Subcutaneous At Bedtime     acetaminophen  1,000 mg Oral TID     cloNIDine  0.3 mg Oral BID     divalproex  500 mg Oral QAM     divalproex  1,500 mg Oral At Bedtime     glipiZIDE (GLUCOTROL XL) 24 hr tablet 10 mg  10 mg Oral BID AC     isosorbide mononitrate  30 mg Oral Daily     OLANZapine (zyPREXA) tablet 5 mg  5 mg Oral  "At Bedtime     omeprazole (priLOSEC) CR capsule 20 mg  20 mg Oral BID AC     Allergies   Allergen Reactions     Amoxicillin      Amoxicillin      Haldol [Benzyl Alcohol]      Haldol [Haloperidol]      Pcn [Penicillin G]      Penicillins      Seroquel [Quetiapine] GI Disturbance          Physical Exam:     Vitals were reviewed    Heart Rate: 53, Blood pressure 140/64, pulse 58, temperature 97.9  F (36.6  C), temperature source Oral, resp. rate 18, height 1.6 m (5' 3\"), weight 97.9 kg (215 lb 13.3 oz), last menstrual period 04/27/2012, SpO2 96 %, not currently breastfeeding.  Wt Readings from Last 3 Encounters:   02/21/17 97.9 kg (215 lb 13.3 oz)   06/20/16 108.9 kg (240 lb)   05/27/16 108.9 kg (240 lb)     Intake/Output Summary (Last 24 hours) at 02/24/17 1543  Last data filed at 02/24/17 1400   Gross per 24 hour   Intake             2630 ml   Output             6050 ml   Net            -3420 ml     GENERAL APPEARANCE: pleasant, NAD, alert, loud talker  HEENT:  Eyes/ears/nose/neck grossly normal  RESP: lungs cta b c good efforts, no crackles  CV: RRR, nl S1/S2  ABDOMEN: o/s/nt/nd  EXTREMITIES/SKIN: no significant ble edema         Data:     CBC RESULTS:    Recent Labs  Lab 02/24/17  0625   WBC 9.9   RBC 3.88   HGB 11.8   HCT 37.2        Basic Metabolic Panel:    Recent Labs  Lab 02/24/17  0625 02/23/17  0700 02/22/17  0704 02/21/17  0625 02/20/17  0712 02/19/17  0638    145* 144 145* 142 144   POTASSIUM 5.3 4.9 5.1 4.2 4.4 4.5   CHLORIDE 111* 113* 113* 114* 112* 115*   CO2 24 27 23 24 22 22   BUN 43* 38* 34* 35* 33* 34*   CR 3.11* 3.19* 2.91* 2.89* 2.66* 2.59*   GLC 93 56* 148* 90 161* 137*   BENTLEY 10.0 10.0 9.7 9.9 9.6 9.1     INRNo lab results found in last 7 days.   Attestation:   I have reviewed today's relevant vital signs, notes, medications, labs and imaging.    Tommie Matias MD  [facility] - Nephrology  [phone]  "

## 2017-02-24 NOTE — PROGRESS NOTES
OCTAVIA  D: OCTAVIA following for discharge planning needs.  OCTAVIA left a message for Tanya with [facility] and requested that she call SW back.  P: OCTAVIA will continue to follow and assist with finalizing the discharge plan as appropriate.    CHERI Lopez      OCTAVIA  D: OCTAVIA following for discharge planning needs.  OCTAVIA left another message for Tanya with [facility] and will await a return call back.  I: OCTAVIA made a referral to [facility], [facility] and [facility].  Will await return calls regarding bed availability.  A: Patient is alert and oriented.  P: SW will continue to follow and assist with finalizing the discharge plan as appropriate.    CHERI Lopez

## 2017-02-24 NOTE — PLAN OF CARE
Problem: Individualization  Goal: Patient Preferences  Outcome: Improving  Disoriented to time. Pleasant. CMS intact except BLE weakness. BS active, +BM today. VSS except HTN--scheduled BP meds given. Denies pain. Waiting for placement. Up with the lift. Germán patent.

## 2017-02-24 NOTE — PLAN OF CARE
Problem: Goal Outcome Summary  Goal: Goal Outcome Summary  Pt with baseline cognitive impairment. Disoriented to time. VSS on RA. Up with Lift. Repositioned in bed overnight. Pain managed with Tylenol. Dunlap with large output. BG 89, juice given recheck 103. Slept between cares.

## 2017-02-24 NOTE — PLAN OF CARE
Problem: Goal Outcome Summary  Goal: Goal Outcome Summary  Outcome: Improving  Pt Alert, disoriented to time. VSS on RA. Up with Lift. CMS intact. Dunlap patent with good output. Required 2 units for meal time coverage for blood sugar of 187. Awaitng placement. Will continue to monitor.

## 2017-02-24 NOTE — PROGRESS NOTES
[facility]    Hospitalist Progress Note    Assessment & Plan   Nel Farmer is a 61 year old female who was admitted on 2/11/2017 with frequent falls.     1. Multiple falls  - PT at LTC facility  - etiology unclear, likely multi-factorial     2. Right distal fibular fracture  - CAM boot  - PT  - Tylenol for pain     3. DM2  - Continue glipizide and insulin 70/30 and ISS  - Reduced 70/30 to 50 units bid on 2/23     4. HTN  - Adequate control for the most part  - Continue clonidine, hydralazine, diltiazem, metoprolol, imdur     5. Schizoaffective disorder  - Continue depakote, risperdone and zyprexa  - Difficulty in finding accepting facility due to loud outbursts  - Psychiatry consult appreciated, increase risperdal to 1 mg bid on 2/20     6. Vitamin D deficiency  - Continue cholecalciferol     7. LYLA on CKD stage IV  -Patient had urinary retention and obstructive uropathy  -Creatinine improved placement of Dunlap and IV fluids  -Voiding trial in AM  -Discontinued IVF 2/20  -Cr stable ~3  -Nephrology consult appreciated.      D/W: RN  DVT Prophylaxis: Pneumatic Compression Devices  Code Status: Full Code    Disposition: Expected discharge pending placement    Russ Gonzales MD    Interval History   No acute nursing issues. Claims she feels better overall today. Disoriented at times.    -Data reviewed today: I reviewed all new labs and imaging results over the last 24 hours. I personally reviewed no images or EKG's today.    Physical Exam   Temp: 97.9  F (36.6  C) Temp src: Oral BP: 140/64 Pulse: 58 Heart Rate: 53 Resp: 18 SpO2: 96 % O2 Device: None (Room air)    Vitals:    02/17/17 0531 02/21/17 0538   Weight: 99 kg (218 lb 4.1 oz) 97.9 kg (215 lb 13.3 oz)     Vital Signs with Ranges  Temp:  [97.4  F (36.3  C)-98  F (36.7  C)] 97.9  F (36.6  C)  Pulse:  [58-65] 58  Heart Rate:  [52-70] 53  Resp:  [18] 18  BP: (110-192)/(52-76) 140/64  SpO2:  [95 %-97 %] 96 %  I/O last 3 completed shifts:  In:  3130 [P.O.:3130]  Out: 6200 [Urine:6200]    Constitutional: AAOX1, NAD, Appears comfortable  Respiratory:  No crackles, No wheezes, CTA B/L, Normal WOB  Cardiovascular: RRR, No murmur  GI: Soft, Non- tender, BS- normoactive, Dunlap in place  Skin/Integument: Warm and dry, no rashes  MSK: no edema  Neuro: CN- grossly intact      Medications        insulin aspart prot & aspart  50 Units Subcutaneous BID AC     aMILoride  10 mg Oral Daily     hydrALAZINE  25 mg Oral 4x Daily     risperiDONE  1 mg Sublingual BID     sodium chloride (PF)  3 mL Intracatheter Q8H     polyethylene glycol  17 g Oral BID     cholecalciferol  1,000 Units Oral Daily     metoprolol  100 mg Oral BID     diltiazem  240 mg Oral Daily     docusate sodium  100 mg Oral BID     insulin aspart  1-10 Units Subcutaneous TID AC     insulin aspart  1-7 Units Subcutaneous At Bedtime     acetaminophen  1,000 mg Oral TID     cloNIDine  0.3 mg Oral BID     divalproex  500 mg Oral QAM     divalproex  1,500 mg Oral At Bedtime     glipiZIDE (GLUCOTROL XL) 24 hr tablet 10 mg  10 mg Oral BID AC     isosorbide mononitrate  30 mg Oral Daily     OLANZapine (zyPREXA) tablet 5 mg  5 mg Oral At Bedtime     omeprazole (priLOSEC) CR capsule 20 mg  20 mg Oral BID AC       Data     Recent Labs  Lab 02/24/17  0625 02/23/17  0700 02/22/17  0704   WBC 9.9  --   --    HGB 11.8  --   --    MCV 96  --   --      --   --     145* 144   POTASSIUM 5.3 4.9 5.1   CHLORIDE 111* 113* 113*   CO2 24 27 23   BUN 43* 38* 34*   CR 3.11* 3.19* 2.91*   ANIONGAP 5 5 8   BENTLEY 10.0 10.0 9.7   GLC 93 56* 148*   ALBUMIN 2.6*  --   --        No results found for this or any previous visit (from the past 24 hour(s)).

## 2017-02-25 NOTE — PLAN OF CARE
Problem: Goal Outcome Summary  Goal: Goal Outcome Summary  Outcome: Improving  Patient continues to speak loudly, Ativan given for anxiety, agitation,and yelling effective, patient up with assist 2, walker, and cam boot x 3 this shift, took 10 steps, transferred from bed to bedside commode, bm x2, cherry with large amounts of output.

## 2017-02-25 NOTE — PROGRESS NOTES
[facility]    Hospitalist Progress Note    Assessment & Plan   Nel Farmer is a 61 year old female who was admitted on 2/11/2017 with frequent falls.     1. Multiple falls  - PT at LTC facility  - etiology unclear, likely multi-factorial     2. Right distal fibular fracture  - CAM boot while ambulating  - Unable to get a formal physical therapy evaluation because of observation status, mobilize with the help of nursing staff as much as possible.  - Tylenol for pain     3. DM2  - Continue glipizide and insulin 70/30 and ISS  - Reduced 70/30 to 50 units bid on 2/23  -Good glycemic control at the moment     4. HTN  - Adequate control for the most part  - Continue clonidine, hydralazine, diltiazem, metoprolol, imdur     5. Schizoaffective disorder  - Continue depakote, risperdone and zyprexa  - Difficulty in finding accepting facility due to loud outbursts  - Psychiatry consult appreciated     6. Vitamin D deficiency  - Continue cholecalciferol     7. LYLA on CKD stage IV  -Patient had urinary retention and obstructive uropathy  -Creatinine improved after placement of Dunlap and IV fluids  -Continue Dunlap for now  -Discontinued IVF 2/20  -Cr stable ~3  -Nephrology consult appreciated.      D/W: RN  DVT Prophylaxis: Pneumatic Compression Devices  Code Status: Full Code    Disposition: Expected discharge pending placement    Russ Gonzales MD    Interval History      No acute nursing issues.  Denies new complaints.  Disoriented at times.    -Data reviewed today: I reviewed all new labs and imaging results over the last 24 hours. I personally reviewed no images or EKG's today.    Physical Exam   Temp: 97.9  F (36.6  C) Temp src: Oral BP: 155/76   Heart Rate: 69 Resp: 16 SpO2: 95 % O2 Device: None (Room air)    Vitals:    02/17/17 0531 02/21/17 0538   Weight: 99 kg (218 lb 4.1 oz) 97.9 kg (215 lb 13.3 oz)     Vital Signs with Ranges  Temp:  [97.9  F (36.6  C)-98.1  F (36.7  C)] 97.9  F (36.6   C)  Heart Rate:  [59-85] 69  Resp:  [16-18] 16  BP: (118-155)/(57-76) 155/76  SpO2:  [95 %-96 %] 95 %  I/O last 3 completed shifts:  In: 980 [P.O.:980]  Out: 5875 [Urine:5875]    Constitutional: AAOX1, NAD, Appears comfortable  Respiratory:  No crackles, No wheezes, CTA B/L, Normal WOB  Cardiovascular: RRR, No murmur  GI: Soft, Non- tender, BS- normoactive, Dunlap in place  Skin/Integument: Warm and dry, no rashes  MSK: no edema  Neuro: CN- grossly intact      Medications        insulin aspart prot & aspart  50 Units Subcutaneous BID AC     aMILoride  10 mg Oral Daily     hydrALAZINE  25 mg Oral 4x Daily     risperiDONE  1 mg Sublingual BID     sodium chloride (PF)  3 mL Intracatheter Q8H     polyethylene glycol  17 g Oral BID     cholecalciferol  1,000 Units Oral Daily     metoprolol  100 mg Oral BID     diltiazem  240 mg Oral Daily     docusate sodium  100 mg Oral BID     insulin aspart  1-10 Units Subcutaneous TID AC     insulin aspart  1-7 Units Subcutaneous At Bedtime     acetaminophen  1,000 mg Oral TID     cloNIDine  0.3 mg Oral BID     divalproex  500 mg Oral QAM     divalproex  1,500 mg Oral At Bedtime     glipiZIDE (GLUCOTROL XL) 24 hr tablet 10 mg  10 mg Oral BID AC     isosorbide mononitrate  30 mg Oral Daily     OLANZapine (zyPREXA) tablet 5 mg  5 mg Oral At Bedtime     omeprazole (priLOSEC) CR capsule 20 mg  20 mg Oral BID AC       Data     Recent Labs  Lab 02/25/17  0633 02/24/17  0625 02/23/17  0700   WBC  --  9.9  --    HGB  --  11.8  --    MCV  --  96  --    PLT  --  270  --     140 145*   POTASSIUM 4.9 5.3 4.9   CHLORIDE 109 111* 113*   CO2 23 24 27   BUN 43* 43* 38*   CR 3.16* 3.11* 3.19*   ANIONGAP 8 5 5   BENTLEY 9.7 10.0 10.0   * 93 56*   ALBUMIN  --  2.6*  --        No results found for this or any previous visit (from the past 24 hour(s)).

## 2017-02-25 NOTE — PLAN OF CARE
Problem: Goal Outcome Summary  Goal: Goal Outcome Summary  Outcome: No Change  Pt A/O x4 with baseline cognitive delay. VSS. CMS intact. Up Ax2 GB and walker and CAM boot; tolerating standing and taking a few steps. BM x1 per BSC. Otherwise, repositioning for comfort. Dunlap in place and patent. BGM. Appeared to rest between cares. Will cont to monitor. Placement pending.

## 2017-02-26 NOTE — PLAN OF CARE
Problem: Goal Outcome Summary  Goal: Goal Outcome Summary  Outcome: Improving  Patient is at her baseline mentation, has developmental delay and schizoaffective disorder, disoriented to situation asking for her leg to be 'set so she can go home' and if she is 'paralzyed' now, VSS, CMS intact, slight edema to right leg. R Leg minimal pain controlled with tylenol. Up with strong assist of 2 and CAM boot, can get up and move well but needs a lot of encouragement. Voiding in cherry (to be removed) and BSC. Regular Diet. Will continue to monitor.

## 2017-02-26 NOTE — PLAN OF CARE
Problem: Goal Outcome Summary  Goal: Goal Outcome Summary  Outcome: Improving  Pt A/Ox4 with cognitive delay. VSS. CMS intact. Up Ax2 with GB and Walker and CAM Boot. BGM. Ativan given for anxiety. Dunlap in place and patent. BM per BSC. Pain controlled with scheduled tylenol. Will cont to monitor. Placement pending.

## 2017-02-26 NOTE — PROGRESS NOTES
Cherry cath dc'd at 17:21. Awaiting to void on her own and also to scan her afterward. When discontinuing the cherry 1200 ml clear yellow urine was collected continue to monitor.

## 2017-02-26 NOTE — PLAN OF CARE
Problem: Goal Outcome Summary  Goal: Goal Outcome Summary  Outcome: Improving  Scheduled Tylenol effective, appetite good, up to BSC or chair with assist of 2 and walker, Ativan given @ 0938 effective, easier to redirect when yelling, cherry patent with large amount of output, continue to wait on placement.

## 2017-02-26 NOTE — PROGRESS NOTES
[facility]     Renal Progress Note       SHORTHAND KEY FOR MY NOTES:  c = with, s = without, p = after, a = before, x = except, asx = asymptomatic, tx = transplant or treatment, sx = symptoms or symptomatic, cx = canceled or culture, rxn = reaction, yday = yesterday, nl = normal, abx = antibiotics, fxn = function, dx = diagnosis, dz = disease, m/h = melena/hematochezia, c/d/l/ha = cramping/dizziness/lightheadedness/headache, d/c = discharge or diarrhea/constipation, f/c/n/v = fevers/chills/nausea/vomiting, cp/sob = chest pain/shortness of breath.         Assessment/Plan:     1.  LYLA/CKD.  Pt's cr appears to have plateaued ~3.2.  No major uremic sx and volume is ok.  A.  Follow labs, sx.    2.  HTN.  Decently controlled c current regimen.  A.  Continue same meds/doses.    3.  NDI.  Na is ok. She is keeping up c her fluids.   A.  Follow Na, uo.  B.  She should always have free access to water.    Thank you for this consultation.  I will sign off.  Please call if any questions.        Interval History:     Pt feels ok and wants to go home soon.  No cp/sob.  Urinating a lot, per usual.  Drinking plenty of water.            Medications and Allergies:       insulin aspart prot & aspart  50 Units Subcutaneous BID AC     aMILoride  10 mg Oral Daily     hydrALAZINE  25 mg Oral 4x Daily     risperiDONE  1 mg Sublingual BID     sodium chloride (PF)  3 mL Intracatheter Q8H     polyethylene glycol  17 g Oral BID     cholecalciferol  1,000 Units Oral Daily     metoprolol  100 mg Oral BID     diltiazem  240 mg Oral Daily     docusate sodium  100 mg Oral BID     insulin aspart  1-10 Units Subcutaneous TID AC     insulin aspart  1-7 Units Subcutaneous At Bedtime     acetaminophen  1,000 mg Oral TID     cloNIDine  0.3 mg Oral BID     divalproex  500 mg Oral QAM     divalproex  1,500 mg Oral At Bedtime     glipiZIDE (GLUCOTROL XL) 24 hr tablet 10 mg  10 mg Oral BID AC     isosorbide mononitrate  30 mg Oral Daily      "OLANZapine (zyPREXA) tablet 5 mg  5 mg Oral At Bedtime     omeprazole (priLOSEC) CR capsule 20 mg  20 mg Oral BID AC     Allergies   Allergen Reactions     Amoxicillin      Amoxicillin      Haldol [Benzyl Alcohol]      Haldol [Haloperidol]      Pcn [Penicillin G]      Penicillins      Seroquel [Quetiapine] GI Disturbance          Physical Exam:     Vitals were reviewed    Heart Rate: 57, Blood pressure 125/57, pulse 58, temperature 97.9  F (36.6  C), temperature source Oral, resp. rate 16, height 1.6 m (5' 3\"), weight 101.2 kg (223 lb 1.7 oz), last menstrual period 04/27/2012, SpO2 97 %, not currently breastfeeding.  Wt Readings from Last 3 Encounters:   02/26/17 101.2 kg (223 lb 1.7 oz)   06/20/16 108.9 kg (240 lb)   05/27/16 108.9 kg (240 lb)     Intake/Output Summary (Last 24 hours) at 02/26/17 1344  Last data filed at 02/26/17 1000   Gross per 24 hour   Intake             1850 ml   Output             7075 ml   Net            -5225 ml     GENERAL APPEARANCE: pleasant, NAD, alert, loud talker  HEENT:  Eyes/ears/nose/neck grossly normal  RESP: lungs cta b c good efforts, no crackles  CV: RRR, nl S1/S2  EXTREMITIES/SKIN: no significant ble edema         Data:     CBC RESULTS:    Recent Labs  Lab 02/24/17  0625   WBC 9.9   RBC 3.88   HGB 11.8   HCT 37.2        Basic Metabolic Panel:    Recent Labs  Lab 02/26/17  0609 02/25/17  0633 02/24/17  0625 02/23/17  0700 02/22/17  0704 02/21/17  0625    140 140 145* 144 145*   POTASSIUM 5.2 4.9 5.3 4.9 5.1 4.2   CHLORIDE 107 109 111* 113* 113* 114*   CO2 22 23 24 27 23 24   BUN 45* 43* 43* 38* 34* 35*   CR 3.18* 3.16* 3.11* 3.19* 2.91* 2.89*   * 132* 93 56* 148* 90   BENTLEY 9.9 9.7 10.0 10.0 9.7 9.9     INRNo lab results found in last 7 days.   Attestation:   I have reviewed today's relevant vital signs, notes, medications, labs and imaging.    Tommie Matias MD  [facility] - Nephrology  [phone]  "

## 2017-02-26 NOTE — PROGRESS NOTES
[facility]    Hospitalist Progress Note    Assessment & Plan   Nel Farmer is a 61 year old female who was admitted on 2/11/2017 with frequent falls.     1. Multiple falls  - PT at LTC facility  - etiology unclear, likely multi-factorial     2. Right distal fibular fracture  - CAM boot while ambulating  - Unable to get a formal physical therapy evaluation because of observation status, mobilize with the help of nursing staff as much as possible.  - Tylenol for pain     3. DM2  - Continue glipizide and insulin 70/30 and ISS  - Reduced 70/30 to 50 units bid on 2/23  -Good glycemic control at the moment     4. HTN  - Adequate control for the most part  - Continue clonidine, hydralazine, diltiazem, metoprolol, Imdur     5. Schizoaffective disorder  - Continue depakote, risperdone and zyprexa  - Difficulty in finding accepting facility due to loud outbursts  - Psychiatry consult appreciated     6. Vitamin D deficiency  - Continue cholecalciferol     7. LYLA on CKD stage IV  -Patient had urinary retention and obstructive uropathy  -Creatinine improved after placement of Dunlap and IV fluids  -Discussed with Dr. Matias, today, will give a voiding trial after removal of Dunlap  -Discontinue Dunlap today, check q.4 hours PVR  -Discontinued IVF 2/20  -Cr stable ~3  -Nephrology consult appreciated.      D/W: RN  DVT Prophylaxis: Pneumatic Compression Devices  Code Status: Full Code    Disposition: Expected discharge pending placement    Russ Gonzales MD    Interval History      Pain right foot.  Otherwise no new complaints.    -Data reviewed today: I reviewed all new labs and imaging results over the last 24 hours. I personally reviewed no images or EKG's today.    Physical Exam   Temp: 97.6  F (36.4  C) Temp src: Oral BP: 132/56   Heart Rate: 50 Resp: 16 SpO2: 97 % O2 Device: None (Room air)    Vitals:    02/17/17 0531 02/21/17 0538 02/26/17 0543   Weight: 99 kg (218 lb 4.1 oz) 97.9 kg (215 lb 13.3 oz)  101.2 kg (223 lb 1.7 oz)     Vital Signs with Ranges  Temp:  [94.5  F (34.7  C)-97.9  F (36.6  C)] 97.6  F (36.4  C)  Heart Rate:  [50-60] 50  Resp:  [16] 16  BP: (125-140)/(56-64) 132/56  SpO2:  [92 %-97 %] 97 %  I/O last 3 completed shifts:  In: 1900 [P.O.:1900]  Out: 7175 [Urine:7175]    Constitutional: AAOX1, NAD, Appears comfortable, good mood today  Respiratory:  No crackles, No wheezes, CTA B/L, Normal WOB  Cardiovascular: RRR, No murmur  GI: Soft, Non- tender, BS- normoactive, Dunlap in place  Skin/Integument: Warm and dry, no rashes  MSK: no edema  Neuro: CN- grossly intact      Medications        insulin aspart prot & aspart  50 Units Subcutaneous BID AC     aMILoride  10 mg Oral Daily     hydrALAZINE  25 mg Oral 4x Daily     risperiDONE  1 mg Sublingual BID     sodium chloride (PF)  3 mL Intracatheter Q8H     polyethylene glycol  17 g Oral BID     cholecalciferol  1,000 Units Oral Daily     metoprolol  100 mg Oral BID     diltiazem  240 mg Oral Daily     docusate sodium  100 mg Oral BID     insulin aspart  1-10 Units Subcutaneous TID AC     insulin aspart  1-7 Units Subcutaneous At Bedtime     acetaminophen  1,000 mg Oral TID     cloNIDine  0.3 mg Oral BID     divalproex  500 mg Oral QAM     divalproex  1,500 mg Oral At Bedtime     glipiZIDE (GLUCOTROL XL) 24 hr tablet 10 mg  10 mg Oral BID AC     isosorbide mononitrate  30 mg Oral Daily     OLANZapine (zyPREXA) tablet 5 mg  5 mg Oral At Bedtime     omeprazole (priLOSEC) CR capsule 20 mg  20 mg Oral BID AC       Data     Recent Labs  Lab 02/26/17  0609 02/25/17  0633 02/24/17  0625   WBC  --   --  9.9   HGB  --   --  11.8   MCV  --   --  96   PLT  --   --  270    140 140   POTASSIUM 5.2 4.9 5.3   CHLORIDE 107 109 111*   CO2 22 23 24   BUN 45* 43* 43*   CR 3.18* 3.16* 3.11*   ANIONGAP 8 8 5   BENTLEY 9.9 9.7 10.0   * 132* 93   ALBUMIN  --   --  2.6*       No results found for this or any previous visit (from the past 24 hour(s)).

## 2017-02-27 NOTE — PROGRESS NOTES
Paged regarding urinary retention of ~400 ccs post-void. Pt had Dunlap removed earlier this afternoon. No orders for straight caths for PVR. Entered orders. Nursing to continue to monitor.

## 2017-02-27 NOTE — PLAN OF CARE
Problem: Goal Outcome Summary  Goal: Goal Outcome Summary  Outcome: No Change  Pt A/O x4 with cognitive delay. VSS. CMs intact. Up Ax2 GB, walker, and CAM boot to BSC for BM. Minimal to no pain. incont of urine in large quantities. Bladder scan q4 per order. BS 755mL @ 0200. Straight cath until urine stopped flowing for 1600mL. BG 136mg/dL. Progressing per POC. Will cont to monitor. Placement pending.

## 2017-02-27 NOTE — PROVIDER NOTIFICATION
Prescriber Notification Note    The pharmacist has communicated with this patient's provider regarding a concern or therapy recommendation.    Notified Person: Janet  Date/Time of Notification: 2/27/17 0712  Interaction: text page  Concern/Recommendation: informed him that her K is elevated. Amiloride may exacerbate that.     Comments/Additional Details:amiloride d/c'd.

## 2017-02-27 NOTE — PROGRESS NOTES
OCTAVIA  D: OCTAVIA spoke to Tanya with [facility] and she confirmed that she has not worked with this patient since 2015.  Tanya states that the patient does not currently have a  and has no services as they were cancelled.  Tanya recommended that SW contact [facility] ([phone]) and inform them of patients change in condition and that patient needs to be assessed for services and that patient will likely require LTC.  I: OCTAVIA spoke to Shanell at [facility] and she states that the first step is for patient to apply for [facility] and then we can contact Eligibility at [phone].  Per Shanell, once SW contacts the Eligibility Line then they can send someone out to complete an assessment for eligibility for services.  A: Patient is alert and oriented.  P: SW will continue to follow and assist with finalizing the discharge plan as appropriate.    Svetlana Tamez, CHERI

## 2017-02-27 NOTE — PROVIDER NOTIFICATION
Date: 02/26/2017  Time: 20:19  Notified TO: Kayleen LOPEZ  Notification type: cherry cath was dc'd at 17:21, then patient just voided a large incontinent urine, after voiding bladder scan showed 460 ml urine on her bladder.   Order Received: see new order for st. cath

## 2017-02-27 NOTE — PROGRESS NOTES
[facility]    Hospitalist Progress Note    Assessment & Plan   Nel Farmer is a 61 year old female who was admitted on 2/11/2017 with frequent falls.     1. Multiple falls  -Patient presented with multiple falls  -Was living with her mother prior to this admission  - will need LTC facility  - etiology of fall unclear, likely multi-factorial     2. Right distal fibular fracture  - CAM boot while ambulating  - Unable to get a formal physical therapy evaluation because of observation status, mobilize with the help of nursing staff as much as possible.  -Tylenol for pain     3. DM2  - Continue glipizide and insulin 70/30 and ISS  - Reduced 70/30 to 50 units bid on 2/23  -Good glycemic control at the moment     4. HTN  - Adequate control for the most part  - Continue clonidine, hydralazine, diltiazem, metoprolol, Imdur     5. Schizoaffective disorder with baseline cognitive deficits  -Patient does have baseline cognitive deficits  - Difficulty in finding accepting facility due to loud outbursts  - Continue depakote, risperdone and zyprexa  - Psychiatry consult appreciated     6. Vitamin D deficiency  - Continue cholecalciferol     7. LYLA on CKD stage IV  -Patient had urinary retention and obstructive uropathy  -Creatinine improved after placement of Dunlap and IV fluids  -Dunlap discontinued last night to give another voiding trial, patient failed  -We will reinsert Dunlap today, outpatient urology follow-up after discharge  -Cr stable ~3; potassium marginally high today.  Recheck in the morning  -Nephrology consult appreciated.  Now signed off      D/W: RN  DVT Prophylaxis: Pneumatic Compression Devices  Code Status: Full Code    Disposition: Expected discharge pending placement    Russ Gonzales MD    Interval History      Pain right foot.  Patient went into urinary retention again last night after discontinuation of Dunlap catheter. Otherwise no new complaints.    -Data reviewed today: I reviewed  all new labs and imaging results over the last 24 hours. I personally reviewed no images or EKG's today.    Physical Exam   Temp: 98  F (36.7  C) Temp src: Oral BP: 121/54 Pulse: 56 Heart Rate: 54 Resp: 16 SpO2: 96 % O2 Device: None (Room air)    Vitals:    02/17/17 0531 02/21/17 0538 02/26/17 0543   Weight: 99 kg (218 lb 4.1 oz) 97.9 kg (215 lb 13.3 oz) 101.2 kg (223 lb 1.7 oz)     Vital Signs with Ranges  Temp:  [97.5  F (36.4  C)-98  F (36.7  C)] 98  F (36.7  C)  Pulse:  [56-66] 56  Heart Rate:  [50-60] 54  Resp:  [16] 16  BP: (121-150)/(51-65) 121/54  SpO2:  [93 %-97 %] 96 %  I/O last 3 completed shifts:  In: 2500 [P.O.:2500]  Out: 3650 [Urine:3650]    Constitutional: AAOX1, NAD, Appears comfortable  Respiratory:  No crackles, No wheezes, CTA B/L, Normal WOB  Cardiovascular: RRR, No murmur  GI: Soft, Non- tender, BS- normoactive, Dunlap in place  Skin/Integument: Warm and dry, no rashes  MSK: no edema  Neuro: CN- grossly intact      Medications        insulin aspart prot & aspart  50 Units Subcutaneous BID AC     aMILoride  10 mg Oral Daily     hydrALAZINE  25 mg Oral 4x Daily     risperiDONE  1 mg Sublingual BID     sodium chloride (PF)  3 mL Intracatheter Q8H     polyethylene glycol  17 g Oral BID     cholecalciferol  1,000 Units Oral Daily     metoprolol  100 mg Oral BID     diltiazem  240 mg Oral Daily     docusate sodium  100 mg Oral BID     insulin aspart  1-10 Units Subcutaneous TID AC     insulin aspart  1-7 Units Subcutaneous At Bedtime     acetaminophen  1,000 mg Oral TID     cloNIDine  0.3 mg Oral BID     divalproex  500 mg Oral QAM     divalproex  1,500 mg Oral At Bedtime     glipiZIDE (GLUCOTROL XL) 24 hr tablet 10 mg  10 mg Oral BID AC     isosorbide mononitrate  30 mg Oral Daily     OLANZapine (zyPREXA) tablet 5 mg  5 mg Oral At Bedtime     omeprazole (priLOSEC) CR capsule 20 mg  20 mg Oral BID AC       Data     Recent Labs  Lab 02/27/17  0638 02/26/17  0609 02/25/17  0633 02/24/17  0625   WBC  --    --   --  9.9   HGB  --   --   --  11.8   MCV  --   --   --  96   PLT  --   --   --  270    137 140 140   POTASSIUM 5.4* 5.2 4.9 5.3   CHLORIDE 111* 107 109 111*   CO2 22 22 23 24   BUN 47* 45* 43* 43*   CR 3.21* 3.18* 3.16* 3.11*   ANIONGAP 9 8 8 5   BENTLEY 9.9 9.9 9.7 10.0   * 147* 132* 93   ALBUMIN  --   --   --  2.6*       No results found for this or any previous visit (from the past 24 hour(s)).

## 2017-02-27 NOTE — PLAN OF CARE
Problem: Goal Outcome Summary  Goal: Goal Outcome Summary  Outcome: No Change  VSS, minimal to no pain. Dunlap dc'd, she had incontinent urine. She was able to void large incontinent urine. Plan to bladder scan to check for retention, and st. Cath as order if retention exceed 500. CMS WNL. A of 2 with care. Continue to monitor.

## 2017-02-28 NOTE — PROGRESS NOTES
OCTAVIA  D: OCTAVIA following for discharge planning needs.  I: OCTAVIA talked with the [employer] to follow up on where they were at with completing the MA application on patient.  Sukhdeep in the [employer] reports that the patient was not agreeable to him being in the room and that he left a message for the patients mother and is awaiting a return call back.  SW encouraged him to continue to try and call the mother as this application is necessary to proceed forward with discharge planning.  A: Patient is alert and oriented.  P: SW will continue to follow and assist with finalizing the discharge plan as appropriate.    Svetlana Tamez, CHERI

## 2017-02-28 NOTE — PROGRESS NOTES
"BRIEF NUTRITION ASSESSMENT      REASON FOR ASSESSMENT:  LOS    NUTRITION HISTORY:  Deferred. Pt  is a marginal historian due to her cognitive impairment.  She had been living with her elderly mother but  now looking for placement, she is here under observation.     CURRENT DIET AND INTAKE:  Diet:  Mod carb. Per flow sheets has been consistently eating 100%.                ANTHROPOMETRICS:  Height: 5' 3\"  Current Weight: 99 kg  BMI: 38.66 kg/m  IBW:  52.3 kg +/- 10%  Weight Status: Obesity Grade II BMI 35-39.9  %IBW: 189%  Weight History: No recent wt  Wt Readings from Last 10 Encounters:   02/28/17 99 kg (218 lb 4.1 oz)   06/20/16 108.9 kg (240 lb)   05/27/16 108.9 kg (240 lb)   05/21/16 108.9 kg (240 lb)   05/07/16 109.8 kg (242 lb)   05/05/16 110.7 kg (244 lb)   04/30/16 110.2 kg (243 lb)   03/09/16 109.1 kg (240 lb 9.6 oz)   02/22/16 108.9 kg (240 lb)   02/18/16 110.2 kg (243 lb)       LABS:  Labs noted    MALNUTRITION:  Patient does not meet two of the following criteria necessary for diagnosing malnutrition: significant weight loss, reduced intake, subcutaneous fat loss, muscle loss or fluid retention    NUTRITION INTERVENTION:  Nutrition Diagnosis:  No nutrition diagnosis at this time.    Implementation:  Nutrition Education:   not appropriate     FOLLOW UP/MONITORING:   Will re-evaluate in 7 days, or sooner, if re-consulted.    Marcie Levy RD  Pager [phone] (M-F)            [phone] (W/E & Hol)            "

## 2017-02-28 NOTE — PLAN OF CARE
Problem: Goal Outcome Summary  Goal: Goal Outcome Summary  Outcome: Improving  [id number]: A&Ox4, with cognitive delay. VSS on RA. Dunlap patent and draining. Denies pain. Up in chair x1. Up with 2, GB, walker and CAM boot. , no SSI per order parameters.  Awaiting placement. Continue to monitor.

## 2017-02-28 NOTE — PLAN OF CARE
Problem: Goal Outcome Summary  Goal: Goal Outcome Summary  Outcome: Improving  Pt up with 1-2 to BSC. Sat in chair for dinner. Good appetite. Still having occasional, loud outburst, encouraged patient to use soft, inside voice. Dunlap inserted for retention.

## 2017-02-28 NOTE — PLAN OF CARE
Problem: Goal Outcome Summary  Goal: Goal Outcome Summary  Outcome: No Change  Pt A&O with cognitive delay, VSS. C/O pain in foot,CAM boot on when up, Up with Ax2 GB/walker. Dunlap in place and patent. BG 79/ 98mg/dL. Progressing per plan of care, will continue to monitor.

## 2017-03-01 NOTE — PROGRESS NOTES
OCTAVIA  D: OCTAVIA following for discharge planning needs.  I: OCTAVIA spoke to patients mother, Lisa, and discussed the importance of her meeting with the Financial Counselors today.  Patients mother states that she has an MD appointment today at 1340 but could come in after that.  Patients mother states that she will be here by 1500.  OCTAVIA spoke to Sukhdeep with the Financial Counselors Office and informed him on the above.  He will be to patients room by 1500 today.  OCTAVIA explained to patients mother the importance of completing the MA application for Long Term Care Services and discussed what the next steps would be after completing the application.  A: Patient is alert and oriented.  P: SW will continue to follow and assist with finalizing the discharge plan as appropriate.    Svetlana Tamez, CHERI

## 2017-03-01 NOTE — PLAN OF CARE
Problem: Goal Outcome Summary  Goal: Goal Outcome Summary  Outcome: No Change  Pt up to chair for dinner. Denies pain. Pt states fear of falling. Appears anxious, emotional support provided. Good appetite. Large urine output in cherry.

## 2017-03-01 NOTE — PLAN OF CARE
Problem: Goal Outcome Summary  Goal: Goal Outcome Summary  Outcome: No Change  A&O to baseline. VSS on RA. Pt speaks with loud voice and has vocal outbursts. Complains of pain to right foot but declined medication. CAM boot in place. CMS intact to baseline. Dunlap in place. Tolerating diabetic diet.  and 233. Ambulating with 1A to bedside commode. D/c pending placement.

## 2017-03-01 NOTE — PLAN OF CARE
Problem: Goal Outcome Summary  Goal: Goal Outcome Summary  Outcome: Improving  Pt up to chair X3 today. Also up to BSC X1 for large BM. Pt up with assist of 1-2/ww.

## 2017-03-01 NOTE — PLAN OF CARE
Problem: Goal Outcome Summary  Goal: Goal Outcome Summary  Outcome: Improving  Pt's vital are stable, was repositioned on bed, cherry intact draining large output, cooperative with her cares.

## 2017-03-01 NOTE — PROGRESS NOTES
[facility]  Hospitalist Progress Note  Hong Lee MD  03/01/2017    Assessment & Plan   Nel Farmer is a 61 year old female who was admitted on 2/11/2017 with frequent falls.      1. Multiple falls  -Patient presented with multiple falls  -Was living with her mother prior to this admission  - will need LTC facility  - etiology of fall unclear, likely multi-factorial      2. Right distal fibular fracture  - CAM boot while ambulating, increase ambulation x4 daily.  - Unable to get a formal physical therapy evaluation because of observation status, mobilize with the help of nursing staff as much as possible.  - Tylenol for pain      3. DM2  - Continue glipizide and insulin 70/30 and ISS  - Reduced 70/30 to 50 units bid on 2/23  -Good glycemic control at the moment      4. HTN  - Adequate control for the most part  - Continue clonidine, hydralazine, diltiazem, metoprolol, Imdur      5. Schizoaffective disorder with baseline cognitive deficits  -Patient does have baseline cognitive deficits  - Difficulty in finding accepting facility due to loud outbursts  - Continue depakote, risperdone and zyprexa  - Psychiatry consult appreciated      6. Vitamin D deficiency  - Continue cholecalciferol      7. 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 Now signed off  -encourage access and to increase free water intake.     DVT Prophylaxis: Pneumatic Compression Devices, she needs to ambulate    Code Status: Full Code     Disposition:  > 2 days  - social work unable to get a hold of patient's mother  - option of applying to MA on the table but  "needs mother's cooperation  - also having significant difficulty finding places to accept her due to her mental condition/outburst.    Interval History   - chart reviewed  - long discussion with social work  - patient frustrated about not being able to go home  - she is knitting  - noted creatinine bumped up to 3.5    -Data reviewed today: I reviewed all new labs and imaging over the last 24 hours. I personally reviewed no images or EKG's today.    Physical Exam   Heart Rate: 62, Blood pressure 142/64, pulse 59, temperature 97.5  F (36.4  C), temperature source Oral, resp. rate 18, height 1.6 m (5' 3\"), weight 99 kg (218 lb 4.1 oz), last menstrual period 04/27/2012, SpO2 96 %, not currently breastfeeding.  Vitals:    02/21/17 0538 02/26/17 0543 02/28/17 0623   Weight: 97.9 kg (215 lb 13.3 oz) 101.2 kg (223 lb 1.7 oz) 99 kg (218 lb 4.1 oz)     Vital Signs with Ranges  Temp:  [97.5  F (36.4  C)-98  F (36.7  C)] 97.5  F (36.4  C)  Pulse:  [59] 59  Heart Rate:  [52-62] 62  Resp:  [16-18] 18  BP: (116-142)/(45-64) 142/64  SpO2:  [93 %-96 %] 96 %  I/O's Last 24 hours  I/O last 3 completed shifts:  In: 680 [P.O.:680]  Out: 4600 [Urine:4600]    Constitutional: Awake, alert, cooperative, no apparent distress, unable to control her voice volume  Respiratory: Clear to auscultation bilaterally, no crackles or wheezing  Cardiovascular: Regular rate and rhythm, normal S1 and S2, and no murmur noted  GI: Normal bowel sounds, soft, non-distended, non-tender  Skin/Integumen: No rashes, no cyanosis   Other:      Medications   All medications were reviewed.       insulin aspart prot & aspart  50 Units Subcutaneous BID AC     hydrALAZINE  25 mg Oral 4x Daily     risperiDONE  1 mg Sublingual BID     polyethylene glycol  17 g Oral BID     cholecalciferol  1,000 Units Oral Daily     metoprolol  100 mg Oral BID     diltiazem  240 mg Oral Daily     docusate sodium  100 mg Oral BID     insulin aspart  1-10 Units Subcutaneous TID AC     " insulin aspart  1-7 Units Subcutaneous At Bedtime     acetaminophen  1,000 mg Oral TID     cloNIDine  0.3 mg Oral BID     divalproex  500 mg Oral QAM     divalproex  1,500 mg Oral At Bedtime     glipiZIDE (GLUCOTROL XL) 24 hr tablet 10 mg  10 mg Oral BID AC     isosorbide mononitrate  30 mg Oral Daily     OLANZapine (zyPREXA) tablet 5 mg  5 mg Oral At Bedtime     omeprazole (priLOSEC) CR capsule 20 mg  20 mg Oral BID AC        Data     Recent Labs  Lab 03/01/17  0605 02/28/17  0650 02/27/17  0638  02/24/17  0625   WBC  --   --   --   --  9.9   HGB  --   --   --   --  11.8   MCV  --   --   --   --  96   PLT  --   --   --   --  270    141 142  < > 140   POTASSIUM 5.8* 5.8* 5.4*  < > 5.3   CHLORIDE 110* 111* 111*  < > 111*   CO2 24 22 22  < > 24   BUN 51* 48* 47*  < > 43*   CR 3.53* 3.25* 3.21*  < > 3.11*   ANIONGAP 5 8 9  < > 5   BENTLEY 10.1 10.0 9.9  < > 10.0   * 66* 102*  < > 93   ALBUMIN  --   --   --   --  2.6*   < > = values in this interval not displayed.    No results found for this or any previous visit (from the past 24 hour(s)).    Hong Lee MD  Pager [phone]

## 2017-03-02 NOTE — PLAN OF CARE
Problem: Goal Outcome Summary  Goal: Goal Outcome Summary  Outcome: Adequate for Discharge Date Met:  03/02/17  Pt, VSS, pt streams, baseline, emotional support provided, skin intact.continue to monitor.

## 2017-03-02 NOTE — PROGRESS NOTES
OCTAVIA  D: SW following for discharge planning needs.  OCTAVIA spoke to Zander with [facility] to follow up regarding the LTC application that was submitted yesterday.  Zander states that it can take 3-5 days for the application to get into their system.  OCTAVIA asked if there was a way to expedite this and Zander states that the only way is if someone brings the completed application to the [facility] and gives it directly to a .  Zander recommended that OCTAVIA follow up with them on Monday regarding the status of the application.  P: OCTAVIA will continue to follow and assist with finalizing the discharge plan as appropriate.    Svetlana Tamez, CHERI

## 2017-03-03 NOTE — PROGRESS NOTES
OCTAVIA  D: OCTAVIA spoke to Carri with [facility] and she has passed patients information on to the [facility] waiver team to have them set up an appointment to have patient assessed for services.  Carri requested that the [facility] team contact OCTAVIA once the assessment date has been set up.  P: OCTAVIA will continue to follow and assist with finalizing the discharge plan as appropriate.    Svetlana Tamez, CHERI

## 2017-03-03 NOTE — PLAN OF CARE
Problem: Goal Outcome Summary  Goal: Goal Outcome Summary  Outcome: No Change  VSS, illogical speech, screams commands.  Large amounts of clear yellow urine via cherry.  Up to chair with assist of 2, gait belt, walker and CAM boot.  Discharge plan pending placement.

## 2017-03-03 NOTE — PROGRESS NOTES
[facility]  Hospitalist Progress Note  Hong Lee MD  03/03/2017    Assessment & Plan   Nel Farmer is a 61 year old female who was admitted on 2/11/2017 with frequent falls.      1. Multiple falls  -Patient presented with multiple falls  -Was living with her mother prior to this admission  - will need LTC facility  - etiology of fall unclear, likely multi-factorial      2. Right distal fibular fracture  - CAM boot while ambulating, increase ambulation x4 daily.  - Unable to get a formal physical therapy evaluation because of observation status, mobilize with the help of nursing staff as much as possible.  - Tylenol for pain      3. DM2  - Continue glipizide and insulin 70/30 and ISS  - Reduced 70/30 to 50 units bid on 2/23  -Good glycemic control at the moment      4. HTN  - Adequate control for the most part  - Continue clonidine, hydralazine, diltiazem, metoprolol, Imdur      5. Schizoaffective disorder with baseline cognitive deficits  -Patient does have baseline cognitive deficits  - Difficulty in finding accepting facility due to loud outbursts  - Continue depakote, risperdone and zyprexa  - Psychiatry consult appreciated      6. Vitamin D deficiency  - Continue cholecalciferol      7. 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 Now signed off  -encourage access and to increase free water intake.     DVT Prophylaxis: Pneumatic Compression Devices, she needs to ambulate    Code Status: Full Code     Disposition:  > 2 days  - MA application completed.       Interval History   - complaining of catheter  "hurting  - having large amount of urine output    -Data reviewed today: I reviewed all new labs and imaging over the last 24 hours. I personally reviewed no images or EKG's today.    Physical Exam   Heart Rate: 49, Blood pressure 114/49, pulse 62, temperature 98  F (36.7  C), temperature source Axillary, resp. rate 18, height 1.6 m (5' 3\"), weight 99 kg (218 lb 4.1 oz), last menstrual period 04/27/2012, SpO2 95 %, not currently breastfeeding.  Vitals:    02/21/17 0538 02/26/17 0543 02/28/17 0623   Weight: 97.9 kg (215 lb 13.3 oz) 101.2 kg (223 lb 1.7 oz) 99 kg (218 lb 4.1 oz)     Vital Signs with Ranges  Temp:  [98  F (36.7  C)-98.3  F (36.8  C)] 98  F (36.7  C)  Pulse:  [61-62] 62  Heart Rate:  [49-75] 49  Resp:  [16-18] 18  BP: (114-141)/(49-57) 114/49  SpO2:  [94 %-97 %] 95 %  I/O's Last 24 hours  I/O last 3 completed shifts:  In: 1300 [P.O.:1300]  Out: 4925 [Urine:4925]    Constitutional: Awake, alert, cooperative, no apparent distress, unable to control her voice volume  Respiratory: Clear to auscultation bilaterally, no crackles or wheezing  Cardiovascular: Regular rate and rhythm, normal S1 and S2, and no murmur noted  GI: Normal bowel sounds, soft, non-distended, non-tender  Skin/Integumen: No rashes, no cyanosis   Other:      Medications   All medications were reviewed.       insulin aspart prot & aspart  50 Units Subcutaneous BID AC     hydrALAZINE  25 mg Oral 4x Daily     risperiDONE  1 mg Sublingual BID     polyethylene glycol  17 g Oral BID     cholecalciferol  1,000 Units Oral Daily     metoprolol  100 mg Oral BID     diltiazem  240 mg Oral Daily     docusate sodium  100 mg Oral BID     insulin aspart  1-10 Units Subcutaneous TID AC     insulin aspart  1-7 Units Subcutaneous At Bedtime     acetaminophen  1,000 mg Oral TID     cloNIDine  0.3 mg Oral BID     divalproex  500 mg Oral QAM     divalproex  1,500 mg Oral At Bedtime     glipiZIDE (GLUCOTROL XL) 24 hr tablet 10 mg  10 mg Oral BID AC     " isosorbide mononitrate  30 mg Oral Daily     OLANZapine (zyPREXA) tablet 5 mg  5 mg Oral At Bedtime     omeprazole (priLOSEC) CR capsule 20 mg  20 mg Oral BID AC        Data     Recent Labs  Lab 03/03/17  0640 03/02/17  0705 03/01/17  0605    139 139   POTASSIUM 5.0 5.5* 5.8*   CHLORIDE 110* 108 110*   CO2 22 23 24   BUN 47* 48* 51*   CR 3.61* 3.47* 3.53*   ANIONGAP 9 8 5   BENTLEY 9.7 10.3* 10.1   GLC 86 110* 106*       No results found for this or any previous visit (from the past 24 hour(s)).    Hong Lee MD  Pager [phone]

## 2017-03-03 NOTE — PLAN OF CARE
Problem: Goal Outcome Summary  Goal: Goal Outcome Summary  Outcome: Improving  Up with assist to chair today, tolerated well.  Pt alert and oriented x's 4 with developmental delay.  Cherry catheter to straight drainage, continue cherry per md.  Will continue to assess.

## 2017-03-03 NOTE — PLAN OF CARE
Problem: Goal Outcome Summary  Goal: Goal Outcome Summary  Outcome: No Change  Pt A/O x4. Anxious and yelling at beginning of shift. Development delay. Up Ax2 GB and walker to ambulate in room. VSS. CMS intact. C/o minimal RLE pain controlled with tylenol. Hair washed. Teeth brushed. Sheets changed. BG 238mg/dL @ HS. Covered with sliding scl insulin. BG 136mg/dL upon recheck. Dunlap in place and patent with adequate output. Repo overnight for comfort. Progressing per POC. Will cont to monitor.

## 2017-03-04 NOTE — PLAN OF CARE
Problem: Goal Outcome Summary  Goal: Goal Outcome Summary  Patient is A&O, VSS on RA, anxious and yells out with loud voice, Mod carb diet.  Up with assist of 2 and walker, Dunlap patent with adequate output. Awaiting placement, will continue to monitor

## 2017-03-04 NOTE — PLAN OF CARE
Problem: Goal Outcome Summary  Goal: Goal Outcome Summary  Outcome: Improving  Up with 1-2 assist to chair, tolerated fair.  Ambulated in room with assist.  Taking scheduled tylenol for pain.  Dunlap catheter to straight drainage.  Alert and oriented x's 4.  Will continue to assess.

## 2017-03-04 NOTE — PROGRESS NOTES
[facility] Progress Note  [facility] 55    SH paged to unit to attempt to comfort this patient who was upset that her family was unable to visit today.  Pt clearly lives with developmental disability, and although SH was able to distract pt for short periods of time from her upset, pt consistently returned to missing her mother and wanting to cry.  Pt is tired of feeling trapped on this unit, as she has been there for more than three weeks now.    Of note: SH learned from a  earlier this week that speaking to pt in hushed tones can help to encourage pt to speak more quietly.  Pt also prefers to have her door open.    SH is available upon request.  No plans for f/u at this time.                                                                                                                                           Tammy Oglesby M.Div.  Chaplain Resident  Pager [phone]

## 2017-03-04 NOTE — PLAN OF CARE
Problem: Goal Outcome Summary  Goal: Goal Outcome Summary  Outcome: No Change  Alert and oriented, Anxious and calls out at times. VSS.   Up with 2, belt and walker to chair with CAM boot .  Dunlap with adequate output.  D/C pending placement.

## 2017-03-05 NOTE — PLAN OF CARE
Problem: Discharge Planning  Goal: Discharge Planning (Adult, OB, Behavioral, Peds)  Outcome: No Change  A&O x 4, VSS on RA, pain controlled with oral analgesics, drsg CDI, CMS intact, up with assist of 1 walker, voiding adequately in patent cherry, continue to monitor.

## 2017-03-05 NOTE — PLAN OF CARE
Problem: Goal Outcome Summary  Goal: Goal Outcome Summary  Outcome: Improving  A/O, anxious at times. Up with 1 walker to the BSC. CAM boot on when getting up. Dunlap in place. IVSL. Waiting for LTC placement. Denies pain.

## 2017-03-05 NOTE — PROGRESS NOTES
[facility]  Hospitalist Progress Note  Hong Lee MD  03/04/2017    Assessment & Plan   Nel Farmer is a 61 year old female who was admitted on 2/11/2017 with frequent falls.      1. Multiple falls  -Patient presented with multiple falls  -Was living with her mother prior to this admission  - current plan is placement at LTC facility  - etiology of fall unclear, likely multi-factorial      2. Right distal fibular fracture  - CAM boot while ambulating, increase ambulation x4 daily.  - Unable to get a formal physical therapy evaluation because of observation status, mobilize with the help of nursing staff as much as possible.  - Tylenol for pain      3. DM2  - Continue glipizide and insulin 70/30 and ISS  - Reduced 70/30 to 50 units bid on 2/23  -Good glycemic control at the moment      4. HTN  - Adequate control for the most part  - Continue clonidine, hydralazine, diltiazem, metoprolol, Imdur      5. Schizoaffective disorder with baseline cognitive deficits  -Patient does have baseline cognitive deficits  - Difficulty in finding accepting facility due to loud outbursts  - Continue depakote, risperdone and zyprexa  - Psychiatry consult appreciated      6. Vitamin D deficiency  - Continue cholecalciferol      7. 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 Now signed off  -encourage access to free water, she has large volume urine output.         DVT Prophylaxis: Pneumatic Compression Devices, she needs to ambulate    Code Status: Full Code     Disposition:  > 2 days  - MA application completed.       Interval History  "  - she states she walked around the room with assitance  - some bladder catheter irritation   - no new complaints    -Data reviewed today: I reviewed all new labs and imaging over the last 24 hours. I personally reviewed no images or EKG's today.    Physical Exam   Heart Rate: 49, Blood pressure 110/56, pulse (!) 47, temperature 97.7  F (36.5  C), temperature source Oral, resp. rate 16, height 1.6 m (5' 3\"), weight 99 kg (218 lb 4.1 oz), last menstrual period 04/27/2012, SpO2 96 %, not currently breastfeeding.  Vitals:    02/21/17 0538 02/26/17 0543 02/28/17 0623   Weight: 97.9 kg (215 lb 13.3 oz) 101.2 kg (223 lb 1.7 oz) 99 kg (218 lb 4.1 oz)     Vital Signs with Ranges  Temp:  [97.2  F (36.2  C)-97.8  F (36.6  C)] 97.7  F (36.5  C)  Pulse:  [47-55] 47  Heart Rate:  [49-55] 49  Resp:  [16-18] 16  BP: (110-142)/(56-66) 110/56  SpO2:  [95 %-97 %] 96 %  I/O's Last 24 hours  I/O last 3 completed shifts:  In: 900 [P.O.:900]  Out: 2675 [Urine:2675]    Constitutional: Awake, alert, cooperative, no apparent distress, unable to control her voice volume  Respiratory: Clear to auscultation bilaterally, no crackles or wheezing  Cardiovascular: Regular rate and rhythm, normal S1 and S2, and no murmur noted  GI: Normal bowel sounds, soft, non-distended, non-tender  Skin/Integumen: No rashes, no cyanosis   Other:      Medications   All medications were reviewed.       insulin aspart prot & aspart  50 Units Subcutaneous BID AC     hydrALAZINE  25 mg Oral 4x Daily     risperiDONE  1 mg Sublingual BID     polyethylene glycol  17 g Oral BID     cholecalciferol  1,000 Units Oral Daily     metoprolol  100 mg Oral BID     diltiazem  240 mg Oral Daily     docusate sodium  100 mg Oral BID     insulin aspart  1-10 Units Subcutaneous TID AC     insulin aspart  1-7 Units Subcutaneous At Bedtime     acetaminophen  1,000 mg Oral TID     cloNIDine  0.3 mg Oral BID     divalproex  500 mg Oral QAM     divalproex  1,500 mg Oral At Bedtime     " glipiZIDE (GLUCOTROL XL) 24 hr tablet 10 mg  10 mg Oral BID AC     isosorbide mononitrate  30 mg Oral Daily     OLANZapine (zyPREXA) tablet 5 mg  5 mg Oral At Bedtime     omeprazole (priLOSEC) CR capsule 20 mg  20 mg Oral BID AC        Data     Recent Labs  Lab 03/04/17  0630 03/03/17  0640 03/02/17  0705    141 139   POTASSIUM 5.2 5.0 5.5*   CHLORIDE 107 110* 108   CO2 24 22 23   BUN 46* 47* 48*   CR 3.68* 3.61* 3.47*   ANIONGAP 8 9 8   BENTLEY 9.4 9.7 10.3*   * 86 110*       No results found for this or any previous visit (from the past 24 hour(s)).    Hong Lee MD  Pager [phone]

## 2017-03-05 NOTE — PLAN OF CARE
Problem: Goal Outcome Summary  Goal: Goal Outcome Summary  Vitals stable. Up with assist of 1. BM x2 today, cherry patent with adequate output. Scheduled tylenol for discomfort. Discharge pending.

## 2017-03-05 NOTE — PROGRESS NOTES
[facility]    Hospitalist Progress Note    Assessment & Plan   Nel Farmer is a 61 year old female who was admitted on 2/11/2017 with frequent falls.      1. Multiple falls  -Patient presented with multiple falls  -Was living with her mother prior to this admission  -current plan is placement at LTC facility  -etiology of fall unclear, likely multi-factorial      2. Right distal fibular fracture  - CAM boot while ambulating, increase ambulation x4 daily.  - Unable to get a formal physical therapy evaluation because of observation status, mobilize with the help of nursing staff as much as possible.  - Tylenol for pain    3. LYLA on CKD stage IV with Lithium induced Nephrogenic DI  -Patient had urinary retention and obstructive uropathy  -Creatinine improved after placement of Dunlap and IV fluids  -Dunlap discontinued to give another voiding trial, patient failed  -Dunlap was reinsert, outpatient urology follow-up after discharge  -Cr up from 3.3 to 3.68 today  -Recheck in AM.  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 Now signed off  -encourage access to free water, she has large volume urine output.     4. DM2  - Continue glipizide and insulin 70/30 and ISS  - Reduced 70/30 to 50 units bid on 2/23  -Good glycemic control at the moment      5. HTN  - Adequate control for the most part  - Continue clonidine, hydralazine, diltiazem, metoprolol, Imdur  - bradycardic down to 47; will decrease clonidine to 0.2 mg b.i.d. and increase hydralazine to 50 mg q.i.d.  - monitor closely since the patient is on beta blocker, calcium channel blocker and clonidine      6. Schizoaffective disorder with baseline cognitive deficits  -Patient does have baseline cognitive deficits  -Difficulty in finding accepting facility due to loud outbursts  -Continue depakote, risperdone and zyprexa  -Psychiatry consult appreciated      7. Vitamin D deficiency  - Continue  cholecalciferol      D/W: RN  DVT Prophylaxis: Pneumatic Compression Devices  Code Status: Full Code    Disposition: Expected discharge unclear at this time , MD    Interval History    -no acute nursing concerns, patient denies new complaints.  Renal function marginally worse    Data reviewed today: I reviewed all new labs and imaging results over the last 24 hours. I personally reviewed no images or EKG's today.    Physical Exam   Temp: 97.8  F (36.6  C) Temp src: Oral BP: 154/56 Pulse: (!) 47 Heart Rate: 57 Resp: 16 SpO2: 95 % O2 Device: None (Room air)    Vitals:    02/21/17 0538 02/26/17 0543 02/28/17 0623   Weight: 97.9 kg (215 lb 13.3 oz) 101.2 kg (223 lb 1.7 oz) 99 kg (218 lb 4.1 oz)     Vital Signs with Ranges  Temp:  [97.7  F (36.5  C)-97.9  F (36.6  C)] 97.8  F (36.6  C)  Pulse:  [47] 47  Heart Rate:  [49-62] 57  Resp:  [16] 16  BP: (110-154)/(56-58) 154/56  SpO2:  [95 %-96 %] 95 %  I/O last 3 completed shifts:  In: 1200 [P.O.:1200]  Out: 4850 [Urine:4850]    Constitutional: AAOX2, NAD, Appears comfortable  HEENT: Moist oral mucosa  Respiratory:  No crackles, No wheezes, CTA B/L, Normal WOB  Cardiovascular: RRR, No murmur  GI: Soft, Non- tender, BS- normoactive  Skin/Integument: Warm and dry, no rashes  MSK: No joint deformity or swelling, no edema  Neuro: CN- grossly intact      Medications        insulin aspart prot & aspart  50 Units Subcutaneous BID AC     hydrALAZINE  25 mg Oral 4x Daily     risperiDONE  1 mg Sublingual BID     polyethylene glycol  17 g Oral BID     cholecalciferol  1,000 Units Oral Daily     metoprolol  100 mg Oral BID     diltiazem  240 mg Oral Daily     docusate sodium  100 mg Oral BID     insulin aspart  1-10 Units Subcutaneous TID AC     insulin aspart  1-7 Units Subcutaneous At Bedtime     acetaminophen  1,000 mg Oral TID     cloNIDine  0.3 mg Oral BID     divalproex  500 mg Oral QAM     divalproex  1,500 mg Oral At Bedtime     glipiZIDE (GLUCOTROL XL) 24 hr tablet 10 mg  10 mg  Oral BID AC     isosorbide mononitrate  30 mg Oral Daily     OLANZapine (zyPREXA) tablet 5 mg  5 mg Oral At Bedtime     omeprazole (priLOSEC) CR capsule 20 mg  20 mg Oral BID AC       Data     Recent Labs  Lab 03/04/17  0630 03/03/17  0640 03/02/17  0705    141 139   POTASSIUM 5.2 5.0 5.5*   CHLORIDE 107 110* 108   CO2 24 22 23   BUN 46* 47* 48*   CR 3.68* 3.61* 3.47*   ANIONGAP 8 9 8   BENTLEY 9.4 9.7 10.3*   * 86 110*       No results found for this or any previous visit (from the past 24 hour(s)).

## 2017-03-06 NOTE — PROGRESS NOTES
OCTAVIA  D: OCTAVIA following for discharge planning needs.  OCTAVIA left a message for Carri ([phone]) to see where they are at in getting patients case assigned for the [facility] screening.  OCTAVIA will await a return call back.  P: OCTAVIA will continue to follow and assist with finalizing the discharge plan as appropriate.    CHERI Lopez      OCTAVIA  D: OCTAVIA following for discharge planning needs.  OCTAVIA spoke to Carolyn in Admissions at [facility] and requested that she re-assess patient for the [facility].  Carolyn states that she will come in tomorrow to do a new assessment for a possible admission.  P: OCTAVIA will continue to follow and assist with finalizing the discharge plan as appropriate.    CHERI Lopez

## 2017-03-06 NOTE — PLAN OF CARE
Problem: Goal Outcome Summary  Goal: Goal Outcome Summary  Pt A&O, agitated. VSS. Pt ambulated w/1 to BR, stable on feet. , 178. Dunlap patent, BM x1. Denies pain. Will continue to monitor.

## 2017-03-06 NOTE — PLAN OF CARE
Problem: Individualization  Goal: Patient Preferences  Outcome: No Change  A&O x 4, VSS on RA, pain controlled with oral analgesic, drsg CDI, CMS intact, up with assist of 1 walker, voiding adequately in bathroom, IV SL, continue to monitor.

## 2017-03-06 NOTE — PROVIDER NOTIFICATION
Writer notified MD and asked about when cherry catheter can be removed. MD stated that patient failed 2 urine tests and will possibly be discharging with cherry catheter.

## 2017-03-06 NOTE — PLAN OF CARE
Problem: Goal Outcome Summary  Goal: Goal Outcome Summary  Outcome: No Change  A/O. VSS. Denies pain. Per evening RN, pt was unsteady on her feet & leaning forward while using the BSC. Pt is better with assist of 2. CAM boot on while ambulating. Awaiting placement.

## 2017-03-06 NOTE — PROVIDER NOTIFICATION
On call notified about increased agitation, threatening self harm and harm to others. Please see orders.

## 2017-03-06 NOTE — PROGRESS NOTES
[facility]    Hospitalist Progress Note    Assessment & Plan   Nel Farmer is a 61 year old female who was admitted on 2/11/2017 with frequent falls.      1. Multiple falls  -Patient presented with multiple falls  -Was living with her mother prior to this admission  -current plan is placement at LTC facility  -etiology of fall unclear, likely multi-factorial      2. Right distal fibular fracture  - CAM boot while ambulating, increase ambulation x4 daily.  - Unable to get a formal physical therapy evaluation because of observation status, mobilize with the help of nursing staff as much as possible.  - Tylenol for pain    3. 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 Now signed off  -encourage access to free water, she has large volume urine output.     4. DM2  - Continue glipizide and insulin 70/30 and ISS  - Reduced 70/30 to 50 units bid on 2/23  -Good glycemic control at the moment      5. HTN  - Adequate control for the most part  - Continue clonidine, hydralazine, diltiazem, metoprolol, Imdur  - bradycardic down to 47; will decrease clonidine to 0.2 mg b.i.d. and increase hydralazine to 50 mg q.i.d.  - monitor closely since the patient is on beta blocker, calcium channel blocker and clonidine      6. Schizoaffective disorder with baseline cognitive deficits  -Patient does have baseline cognitive deficits  -Difficulty in finding accepting facility due to loud outbursts  -Continue depakote, risperdone and zyprexa  -Psychiatry consult appreciated      7. Vitamin D  "deficiency  - Continue cholecalciferol      D/W: RN  DVT Prophylaxis: Pneumatic Compression Devices  Code Status: Full Code    Disposition: Expected discharge unclear at this time , MD    Interval History    -no acute nursing concerns, patient wants to be discharged to go to \"group home\" .  Renal function slightly better.    Data reviewed today: I reviewed all new labs and imaging results over the last 24 hours. I personally reviewed no images or EKG's today.    Physical Exam   Temp: 97.9  F (36.6  C) Temp src: Oral BP: 123/63 Pulse: 56 Heart Rate: 52 Resp: 16 SpO2: 96 % O2 Device: None (Room air)    Vitals:    02/21/17 0538 02/26/17 0543 02/28/17 0623   Weight: 97.9 kg (215 lb 13.3 oz) 101.2 kg (223 lb 1.7 oz) 99 kg (218 lb 4.1 oz)     Vital Signs with Ranges  Temp:  [97.4  F (36.3  C)-98.4  F (36.9  C)] 97.9  F (36.6  C)  Pulse:  [56-67] 56  Heart Rate:  [52-69] 52  Resp:  [16] 16  BP: (117-157)/(50-78) 123/63  SpO2:  [96 %-97 %] 96 %  I/O last 3 completed shifts:  In: 1290 [P.O.:1290]  Out: 5450 [Urine:5450]    Constitutional: AAOX2, NAD, Appears comfortable  Respiratory:  No crackles, CTA B/L, Normal WOB  Cardiovascular: RRR, No murmur  GI: Soft, Non- tender, BS- normoactive  Skin/Integument: Warm and dry, no rashes  MSK: No joint deformity or swelling, no edema  Neuro: CN- grossly intact      Medications        cloNIDine  0.2 mg Oral BID     hydrALAZINE  50 mg Oral 4x Daily     insulin aspart prot & aspart  50 Units Subcutaneous BID AC     risperiDONE  1 mg Sublingual BID     polyethylene glycol  17 g Oral BID     cholecalciferol  1,000 Units Oral Daily     metoprolol  100 mg Oral BID     diltiazem  240 mg Oral Daily     docusate sodium  100 mg Oral BID     insulin aspart  1-10 Units Subcutaneous TID AC     insulin aspart  1-7 Units Subcutaneous At Bedtime     acetaminophen  1,000 mg Oral TID     divalproex  500 mg Oral QAM     divalproex  1,500 mg Oral At Bedtime     glipiZIDE (GLUCOTROL XL) 24 hr tablet 10 mg "  10 mg Oral BID AC     isosorbide mononitrate  30 mg Oral Daily     OLANZapine (zyPREXA) tablet 5 mg  5 mg Oral At Bedtime     omeprazole (priLOSEC) CR capsule 20 mg  20 mg Oral BID AC       Data     Recent Labs  Lab 03/06/17  1041 03/04/17  0630 03/03/17  0640    139 141   POTASSIUM 4.3 5.2 5.0   CHLORIDE 108 107 110*   CO2 22 24 22   BUN 44* 46* 47*   CR 3.28* 3.68* 3.61*   ANIONGAP 10 8 9   BENTLEY 9.8 9.4 9.7   * 139* 86       No results found for this or any previous visit (from the past 24 hour(s)).

## 2017-03-07 NOTE — PLAN OF CARE
Problem: Goal Outcome Summary  Goal: Goal Outcome Summary  Outcome: Improving  Pt is alert oriented, stable vitals, denies pain, cherry patient, continue to monitor

## 2017-03-07 NOTE — PROGRESS NOTES
A/O x 4. VSS. Germán.  Able to redirect with encouragement. Up with 2. Diabetic diet. BS 82 and 245 covered appropriately. Will continue to monitor.

## 2017-03-07 NOTE — PROGRESS NOTES
OCTAVIA  D: SW following for discharge planning needs.    I: Carolyn from [facility] was here to complete another on-site assessment for the [facility] as patient is now an assist of one for mobility and transfers.  Carolyn states that she will take the information back to the staff at the facility and will call SW back once a decision regarding admission has been made.  A: Patient is alert and oriented.  P: SW will continue to follow and assist with finalizing the discharge plan as appropriate.    Svetlana Tamez, CHERI

## 2017-03-07 NOTE — PROGRESS NOTES
BRIEF NUTRITION RE-ASSESSMENT      CURRENT DIET AND INTAKE:  Diet: Mod CHO       Pt eating 100% of meals.    ANTHROPOMETRICS: Pt maintaining wt. since admit.     Wt Readings from Last 5 Encounters:   02/28/17 99 kg (218 lb 4.1 oz)   06/20/16 108.9 kg (240 lb)   05/27/16 108.9 kg (240 lb)   05/21/16 108.9 kg (240 lb)   05/07/16 109.8 kg (242 lb)       LABS:  Labs noted    NUTRITION INTERVENTION:  Nutrition Diagnosis:  No nutrition diagnosis at this time.    Implementation:  No intervention at this time.    FOLLOW UP/MONITORING:   Nutrition will sign off, please re consult if needed.    Angie Olivo  Dietetic Intern

## 2017-03-07 NOTE — PROGRESS NOTES
[facility]    Hospitalist Progress Note    Assessment & Plan   Nel Farmer is a 61 year old female who was admitted on 2/11/2017 with frequent falls.      1. Multiple falls  -Patient presented with multiple falls  -Was living with her mother prior to this admission  -current plan is placement at LT facility  -etiology of fall unclear, likely multi-factorial      2. Right distal fibular fracture  - CAM boot while ambulating, increase ambulation x4 daily.  - Unable to get a formal physical therapy evaluation because of observation status, mobilize with the help of nursing staff as much as possible.  - Tylenol for pain    3. 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 Now signed off  -encourage access to free water, she has large volume urine output.   -Monitor Cr intermittently    4. DM2  - Continue glipizide and insulin 70/30 and ISS  - Reduced 70/30 to 50 units bid on 2/23  - Adequate glycemic control at the moment      5. HTN  - Adequate control for the most part  - Continue clonidine, hydralazine, diltiazem, metoprolol, Imdur  - monitor closely for bradycardia since the patient is on beta blocker, calcium channel blocker and clonidine      6. Schizoaffective disorder with baseline cognitive deficits  -Patient does have baseline cognitive deficits  -Difficulty in finding accepting facility due to loud outbursts  -Continue depakote, risperdone and zyprexa  -Psychiatry consult appreciated  -Does have periodic agitation       7. Vitamin D deficiency  - Continue  cholecalciferol      D/W: RN  DVT Prophylaxis: Pneumatic Compression Devices  Code Status: Full Code    Disposition: Expected discharge unclear at this time     Interval History    -no acute nursing concerns, agitated last evening but appears calmer today. Denies new complaints    Data reviewed today: I reviewed all new labs and imaging results over the last 24 hours. I personally reviewed no images or EKG's today.    Physical Exam   Temp: 97.3  F (36.3  C) Temp src: Oral BP: 125/58 Pulse: 79 Heart Rate: 60 Resp: 16 SpO2: 98 % O2 Device: None (Room air)    Vitals:    02/21/17 0538 02/26/17 0543 02/28/17 0623   Weight: 97.9 kg (215 lb 13.3 oz) 101.2 kg (223 lb 1.7 oz) 99 kg (218 lb 4.1 oz)     Vital Signs with Ranges  Temp:  [97.3  F (36.3  C)-98.2  F (36.8  C)] 97.3  F (36.3  C)  Pulse:  [66-79] 79  Heart Rate:  [60-67] 60  Resp:  [16] 16  BP: (125-154)/(55-60) 125/58  SpO2:  [93 %-98 %] 98 %  I/O last 3 completed shifts:  In: 740 [P.O.:740]  Out: 6275 [Urine:6275]    Constitutional: AAOX2, NAD, Appears comfortable  Respiratory:  No crackles, CTA B/L, Normal WOB  Cardiovascular: RRR, No murmur  GI: Soft, Non- tender, BS- normoactive  Skin/Integument: Warm and dry  MSK: no edema  Neuro: CN- grossly intact      Medications        cloNIDine  0.2 mg Oral BID     hydrALAZINE  50 mg Oral 4x Daily     insulin aspart prot & aspart  50 Units Subcutaneous BID AC     risperiDONE  1 mg Sublingual BID     polyethylene glycol  17 g Oral BID     cholecalciferol  1,000 Units Oral Daily     metoprolol  100 mg Oral BID     diltiazem  240 mg Oral Daily     docusate sodium  100 mg Oral BID     insulin aspart  1-10 Units Subcutaneous TID AC     insulin aspart  1-7 Units Subcutaneous At Bedtime     acetaminophen  1,000 mg Oral TID     divalproex  500 mg Oral QAM     divalproex  1,500 mg Oral At Bedtime     glipiZIDE (GLUCOTROL XL) 24 hr tablet 10 mg  10 mg Oral BID AC     isosorbide mononitrate  30 mg Oral Daily     OLANZapine (zyPREXA)  tablet 5 mg  5 mg Oral At Bedtime     omeprazole (priLOSEC) CR capsule 20 mg  20 mg Oral BID AC       Data     Recent Labs  Lab 03/06/17  1041 03/04/17  0630 03/03/17  0640    139 141   POTASSIUM 4.3 5.2 5.0   CHLORIDE 108 107 110*   CO2 22 24 22   BUN 44* 46* 47*   CR 3.28* 3.68* 3.61*   ANIONGAP 10 8 9   BENTLEY 9.8 9.4 9.7   * 139* 86       No results found for this or any previous visit (from the past 24 hour(s)).

## 2017-03-07 NOTE — PLAN OF CARE
Problem: Individualization  Goal: Patient Preferences  Outcome: No Change  A&O x 4, VS'S on RA, pain controlled with oral analgesic, drsg CDI, CMS intact, up with assist of 1 walker, voiding adequately in patent cherry, Pt increasingly agitated and throwing things threat ing self harm and harm to others, prn meds given, moderate results, continue to monitor.

## 2017-03-08 NOTE — PROGRESS NOTES
OCTAVIA  D: OCTAVIA following for discharge planning needs.  OCTAVIA received a call from Carolyn in Admissions at [facility] and she states that the Executive Team at the facility has again denied patient for admission due to her yelling out.  I: OCTAVIA left a message for Admissions at [facility] to see if patient would be a good fit at their facility and will await a return call back.  A: Patient is alert and oriented.  P: OCTAVIA will continue to follow and assist with finalizing the discharge plan as appropriate.    CHERI Lopez      OCTAVIA  D: OCTAVIA following for discharge planning needs.  OCTAVIA received a return call from Admissions at [facility] and due to patient requiring assist for transfers and ambulation, patient would not meet criteria for this facility.  This facility is not a skilled facility and patient would need to independent with transfers and ambulation to be appropriate for this facility.  P: OCTAVIA will continue to follow and assist with finalizing the discharge plan as appropriate.    CHERI Lopez      OCTAVIA  D: OCTAVIA following for discharge planning needs.  OCTAVIA left a message for Carri with [facility] to follow up on where we are at with the [facility] screening.  P: OCTAVIA will continue to follow and assist with finalizing the discharge plan as appropriate.    CHERI Lopez

## 2017-03-08 NOTE — PROGRESS NOTES
SPIRITUAL HEALTH SERVICES Progress Note  [facility] 56    SW wondered whether SH might be able to track down some coloring or activity book(s) for pt, who seems to be very frustrated and bored during a 21+ day hospital stay.  SH found some items and brought them to pt.  Pt wanted to sleep, but expressed interest in the activity books, which SH left.    SH will continue to follow this pt as necessary until pt is accepted into a new living situation.                                                                                                                                           Tammy Oglesby M.Div.  Chaplain Resident  Pager [phone]

## 2017-03-08 NOTE — PLAN OF CARE
Problem: Goal Outcome Summary  Goal: Goal Outcome Summary  Outcome: No Change  A/o x4. agitated out burst minimal today. PRN ativan given x1. Up with 1 walker gait belt, foot boot. Dunlap. Pending on placement for discharge. Will continue to monitor.

## 2017-03-08 NOTE — PROGRESS NOTES
[facility]    Hospitalist Progress Note    Assessment & Plan   Nel Farmer is a 61 year old female who was admitted on 2/11/2017 with frequent falls.      1. Multiple falls  -Patient presented with multiple falls  -Was living with her mother prior to this admission  -current plan is placement at LT facility  -etiology of fall unclear, likely multi-factorial      2. Right distal fibular fracture  - CAM boot while ambulating, increase ambulation x4 daily.  - Unable to get a formal physical therapy evaluation because of observation status, mobilize with the help of nursing staff as much as possible.  - Tylenol for pain    3. 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 Now signed off  -encourage access to free water, she has large volume urine output.   -recheck BMP today   4. DM2  - Continue glipizide and insulin 70/30 and ISS  - Reduced 70/30 to 50 units bid on 2/23  - Adequate glycemic control at the moment      5. HTN  - Adequate control for the most part  - Continue clonidine, hydralazine, diltiazem, metoprolol, Imdur  - monitor closely for bradycardia since the patient is on beta blocker, calcium channel blocker and clonidine      6. Schizoaffective disorder with baseline cognitive deficits  -Patient does have baseline cognitive deficits  -Difficulty in finding accepting facility due to loud outbursts  -Continue depakote, risperdone and zyprexa  -Psychiatry consult appreciated  -Does have periodic agitation       7. Vitamin D deficiency  - Continue  cholecalciferol      D/W: RN  DVT Prophylaxis: Pneumatic Compression Devices  Code Status: Full Code    Disposition: Expected discharge unclear at this time     Interval History    -no acute nursing concerns, . Denies new complaints    Data reviewed today: I reviewed all new labs and imaging results over the last 24 hours. I personally reviewed no images or EKG's today.    Physical Exam   Temp: 97.7  F (36.5  C) Temp src: Oral BP: 115/62 Pulse: 68 Heart Rate: 73 Resp: 17 SpO2: 94 % O2 Device: None (Room air)    Vitals:    02/21/17 0538 02/26/17 0543 02/28/17 0623   Weight: 97.9 kg (215 lb 13.3 oz) 101.2 kg (223 lb 1.7 oz) 99 kg (218 lb 4.1 oz)     Vital Signs with Ranges  Temp:  [97.7  F (36.5  C)-98  F (36.7  C)] 97.7  F (36.5  C)  Pulse:  [60-68] 68  Heart Rate:  [58-73] 73  Resp:  [17] 17  BP: (115-140)/(55-68) 115/62  SpO2:  [94 %-98 %] 94 %  I/O last 3 completed shifts:  In: 2200 [P.O.:2200]  Out: 5050 [Urine:5050]    Constitutional: AAOX2, NAD, Appears comfortable  Respiratory:  No crackles, CTA B/L, Normal WOB  Cardiovascular: RRR, No murmur  GI: Soft, Non- tender, BS- normoactive  Skin/Integument: Warm and dry  MSK: no edema  Neuro: CN- grossly intact      Medications        cloNIDine  0.2 mg Oral BID     hydrALAZINE  50 mg Oral 4x Daily     insulin aspart prot & aspart  50 Units Subcutaneous BID AC     risperiDONE  1 mg Sublingual BID     polyethylene glycol  17 g Oral BID     cholecalciferol  1,000 Units Oral Daily     metoprolol  100 mg Oral BID     diltiazem  240 mg Oral Daily     docusate sodium  100 mg Oral BID     insulin aspart  1-10 Units Subcutaneous TID AC     insulin aspart  1-7 Units Subcutaneous At Bedtime     acetaminophen  1,000 mg Oral TID     divalproex  500 mg Oral QAM     divalproex  1,500 mg Oral At Bedtime     glipiZIDE (GLUCOTROL XL) 24 hr tablet 10 mg  10 mg Oral BID AC     isosorbide mononitrate  30 mg Oral Daily     OLANZapine (zyPREXA) tablet 5 mg  5 mg Oral At Bedtime      omeprazole (priLOSEC) CR capsule 20 mg  20 mg Oral BID AC       Data     Recent Labs  Lab 03/06/17  1041 03/04/17  0630 03/03/17  0640    139 141   POTASSIUM 4.3 5.2 5.0   CHLORIDE 108 107 110*   CO2 22 24 22   BUN 44* 46* 47*   CR 3.28* 3.68* 3.61*   ANIONGAP 10 8 9   BENTLEY 9.8 9.4 9.7   * 139* 86       No results found for this or any previous visit (from the past 24 hour(s)).

## 2017-03-08 NOTE — PLAN OF CARE
Problem: Goal Outcome Summary  Goal: Goal Outcome Summary  Outcome: No Change  A&Ox4. Continues to be very loud and yells. Intermittently agitated; minimally reduced with prn ativan and risperdal. VSS on RA. Up with A1, walker and gait belt to BSC. Diabetic diet. No IV access. Dunlap in place and patent. Continue to monitor.

## 2017-03-08 NOTE — PLAN OF CARE
Problem: Goal Outcome Summary  Goal: Goal Outcome Summary  Outcome: No Change  Patient A&Ox4. VSS on RA. Continues to be very loud and yells. Up with A1 + W+ gaitbelt. Sm bowel movement on bedpan this evening. Diet Mod Carb.  No IV access. Dunlap in place and patent. Will continue monitoring

## 2017-03-09 NOTE — PLAN OF CARE
Problem: Goal Outcome Summary  Goal: Goal Outcome Summary  Outcome: No Change  Patient a/o outburst at times but redirectable. Germán rowell. Walking to bathroom for bmx2 with walker 1 assist. Given insulin coverage.

## 2017-03-09 NOTE — PLAN OF CARE
Problem: Goal Outcome Summary  Goal: Goal Outcome Summary  Outcome: No Change  Vss, alert, disoriented to situation.  Occasionally has pain in R foot, but denies pain at other times, managed with tylenol. Denies nausea. Speaks loudly, can be disrespectful at times. Regular diet, up with 2 assist.   Social work is searching for a destination for discharge.

## 2017-03-09 NOTE — PROGRESS NOTES
[facility]    Hospitalist Progress Note    Assessment & Plan   Nel Farmer is a 61 year old female who was admitted on 2/11/2017 with frequent falls.      1. Multiple falls  -Patient presented with multiple falls  -Was living with her mother prior to this admission  -current plan is placement at LT facility  -etiology of fall unclear, likely multi-factorial      2. Right distal fibular fracture  - CAM boot while ambulating, increase ambulation x4 daily.  - Unable to get a formal physical therapy evaluation because of observation status, mobilize with the help of nursing staff as much as possible.  - Tylenol for pain    3. 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 Now signed off  -encourage access to free water, she has large volume urine output.   -recheck BMP was stable yesterday   Will d/c cherry today   4. DM2  - Continue glipizide and insulin 70/30 and ISS  - Reduced 70/30 to 50 units bid on 2/23  - Adequate glycemic control at the moment      5. HTN  - Adequate control for the most part  - Continue clonidine, hydralazine, diltiazem, metoprolol, Imdur  - monitor closely for bradycardia since the patient is on beta blocker, calcium channel blocker and clonidine      6. Schizoaffective disorder with baseline cognitive deficits  -Patient does have baseline cognitive deficits  -Difficulty in finding accepting facility due to loud outbursts  -Continue depakote, risperdone and zyprexa  -Psychiatry consult appreciated  -Does have periodic agitation       7. Vitamin D  deficiency  - Continue cholecalciferol      D/W: RN  DVT Prophylaxis: Pneumatic Compression Devices  Code Status: Full Code    Disposition: Expected discharge unclear at this time. Social work working on placement     Interval History    -no acute nursing concerns, . Denies new complaints    Data reviewed today: I reviewed all new labs and imaging results over the last 24 hours. I personally reviewed no images or EKG's today.    Physical Exam   Temp: 99  F (37.2  C) Temp src: Axillary BP: 137/55   Heart Rate: 60 Resp: 16 SpO2: 94 % O2 Device: None (Room air)    Vitals:    02/26/17 0543 02/28/17 0623 03/09/17 0634   Weight: 101.2 kg (223 lb 1.7 oz) 99 kg (218 lb 4.1 oz) 99.9 kg (220 lb 3.8 oz)     Vital Signs with Ranges  Temp:  [97.4  F (36.3  C)-99  F (37.2  C)] 99  F (37.2  C)  Heart Rate:  [60-71] 60  Resp:  [16-18] 16  BP: (123-143)/(49-58) 137/55  SpO2:  [93 %-97 %] 94 %  I/O last 3 completed shifts:  In: 2540 [P.O.:2540]  Out: 4475 [Urine:4475]    Constitutional: AAOX2, NAD, Appears comfortable  Respiratory:  No crackles, CTA B/L, Normal WOB  Cardiovascular: RRR, No murmur  GI: Soft, Non- tender, BS- normoactive  Skin/Integument: Warm and dry  MSK: no edema  Neuro: CN- grossly intact      Medications        cloNIDine  0.2 mg Oral BID     hydrALAZINE  50 mg Oral 4x Daily     insulin aspart prot & aspart  50 Units Subcutaneous BID AC     risperiDONE  1 mg Sublingual BID     polyethylene glycol  17 g Oral BID     cholecalciferol  1,000 Units Oral Daily     metoprolol  100 mg Oral BID     diltiazem  240 mg Oral Daily     docusate sodium  100 mg Oral BID     insulin aspart  1-10 Units Subcutaneous TID AC     insulin aspart  1-7 Units Subcutaneous At Bedtime     acetaminophen  1,000 mg Oral TID     divalproex  500 mg Oral QAM     divalproex  1,500 mg Oral At Bedtime     glipiZIDE (GLUCOTROL XL) 24 hr tablet 10 mg  10 mg Oral BID AC     isosorbide mononitrate  30 mg Oral Daily     OLANZapine (zyPREXA) tablet 5 mg  5  mg Oral At Bedtime     omeprazole (priLOSEC) CR capsule 20 mg  20 mg Oral BID AC       Data     Recent Labs  Lab 03/08/17  1320 03/06/17  1041 03/04/17  0630    140 139   POTASSIUM 4.9 4.3 5.2   CHLORIDE 107 108 107   CO2 22 22 24   BUN 44* 44* 46*   CR 3.20* 3.28* 3.68*   ANIONGAP 9 10 8   BENTLEY 9.6 9.8 9.4   * 170* 139*       No results found for this or any previous visit (from the past 24 hour(s)).

## 2017-03-09 NOTE — PROGRESS NOTES
SW:    I/P:  OCTAVIA received vm from Carri at [facility] stating that the screening is being 'put through'.  Carri stated she will be back in the office in Friday, 3/10/17.

## 2017-03-09 NOTE — PLAN OF CARE
Problem: Goal Outcome Summary  Goal: Goal Outcome Summary  Outcome: No Change  Pt alert and oriented. Vital signs stable. Pt slept most of the night. BG at 0200 was 324. Will continue to monitor.

## 2017-03-10 NOTE — PLAN OF CARE
Problem: Goal Outcome Summary  Goal: Goal Outcome Summary  Outcome: No Change  Pt A&O, anxious at times, up with assist x 1-2 and walker to the bsc.  VS stable, denies pain, CAM boot on when up.  Had low blood sugar of 47, pt was symtpomatic, blood sugar improved to 109 after orange juice and ricardo crackers.  Voided 1,300 mL, had post void residual of 367 mL, will continue to monitor.

## 2017-03-10 NOTE — PROGRESS NOTES
[facility]    Hospitalist Progress Note    Assessment & Plan   Nel Farmer is a 61 year old female who was admitted on 2/11/2017 with frequent falls.      1. Multiple falls  -Patient presented with multiple falls  -Was living with her mother prior to this admission  -current plan is placement at LT facility  -etiology of fall unclear, likely multi-factorial      2. Right distal fibular fracture  - CAM boot while ambulating, increase ambulation x4 daily.  - Unable to get a formal physical therapy evaluation because of observation status, mobilize with the help of nursing staff as much as possible.  - Tylenol for pain    3. 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 Now signed off  -encourage access to free water, she has large volume urine output.   -recheck BMP was stable yesterday     4. DM2 with hypoglycemia   Blood sugar dropped to 47 last night so reduce will  glipizide to 5mg BID was d/martha   -  insulin 70/30 and SSI will dcrease to medium from high dose   - Reduced 70/30 to 50 units bid on 2/23  - Adequate glycemic control at the moment      5. HTN  - Adequate control for the most part  - Continue clonidine, hydralazine, diltiazem, metoprolol, Imdur  - monitor closely for bradycardia since the patient is on beta blocker, calcium channel blocker and clonidine      6. Schizoaffective disorder with baseline cognitive deficits  -Patient does have baseline cognitive deficits  -Difficulty in finding accepting facility due to loud outbursts  -Continue depakote, risperdone and zyprexa  -Psychiatry consult  appreciated  -Does have periodic agitation       7. Vitamin D deficiency  - Continue cholecalciferol      D/W: RN  DVT Prophylaxis: Pneumatic Compression Devices  Code Status: Full Code    Disposition: Expected discharge unclear at this time. Social work working on placement     Interval History    -blood sugar dropped last night , . Denies new complaints    Data reviewed today: I reviewed all new labs and imaging results over the last 24 hours. I personally reviewed no images or EKG's today.    Physical Exam   Temp: 97.6  F (36.4  C) Temp src: Oral BP: 140/50 Pulse: 83 Heart Rate: 72 Resp: 16 SpO2: 95 % O2 Device: None (Room air)    Vitals:    02/26/17 0543 02/28/17 0623 03/09/17 0634   Weight: 101.2 kg (223 lb 1.7 oz) 99 kg (218 lb 4.1 oz) 99.9 kg (220 lb 3.8 oz)     Vital Signs with Ranges  Temp:  [97.6  F (36.4  C)-98.2  F (36.8  C)] 97.6  F (36.4  C)  Pulse:  [83] 83  Heart Rate:  [58-83] 72  Resp:  [16-18] 16  BP: (124-149)/(50-64) 140/50  SpO2:  [94 %-95 %] 95 %  I/O last 3 completed shifts:  In: 1400 [P.O.:1400]  Out: 4550 [Urine:4550]    Constitutional: AAOX2, NAD, Appears comfortable  Respiratory:  No crackles, CTA B/L, Normal WOB  Cardiovascular: RRR, No murmur  GI: Soft, Non- tender, BS- normoactive  Skin/Integument: Warm and dry  MSK: no edema  Neuro: CN- grossly intact      Medications        insulin aspart prot & aspart  55 Units Subcutaneous BID AC     cloNIDine  0.2 mg Oral BID     hydrALAZINE  50 mg Oral 4x Daily     risperiDONE  1 mg Sublingual BID     polyethylene glycol  17 g Oral BID     cholecalciferol  1,000 Units Oral Daily     metoprolol  100 mg Oral BID     diltiazem  240 mg Oral Daily     docusate sodium  100 mg Oral BID     insulin aspart  1-10 Units Subcutaneous TID AC     insulin aspart  1-7 Units Subcutaneous At Bedtime     acetaminophen  1,000 mg Oral TID     divalproex  500 mg Oral QAM     divalproex  1,500 mg Oral At Bedtime     isosorbide mononitrate  30 mg Oral Daily      OLANZapine (zyPREXA) tablet 5 mg  5 mg Oral At Bedtime     omeprazole (priLOSEC) CR capsule 20 mg  20 mg Oral BID AC       Data     Recent Labs  Lab 03/08/17  1320 03/06/17  1041 03/04/17  0630    140 139   POTASSIUM 4.9 4.3 5.2   CHLORIDE 107 108 107   CO2 22 22 24   BUN 44* 44* 46*   CR 3.20* 3.28* 3.68*   ANIONGAP 9 10 8   BENTLEY 9.6 9.8 9.4   * 170* 139*       No results found for this or any previous visit (from the past 24 hour(s)).

## 2017-03-10 NOTE — PROVIDER NOTIFICATION
Brief update:    Patient regarding hypoglycemia to 47. Patient feeling shaky and symptomatic. Improved following administration of juice.    Patient with a blood glucose of 378 at approximately 7:30 PM, received 5 units NovoLog at that time    190 at bedtime.    Blood glucose of 47 at 4 AM    Discontinued prior to admission glipizide 10 mg b.i.d.    Edgar Ward MD  4:51 AM

## 2017-03-10 NOTE — PLAN OF CARE
Problem: Goal Outcome Summary  Goal: Goal Outcome Summary  Outcome: No Change  Pain controlled w/ Tylenol. Up w/ assist of 1, walker, gait belt and CAM walker. Voiding in the bathroom or bedside commode. Continue to monitor.

## 2017-03-10 NOTE — PROGRESS NOTES
"SWS PROGRESS NOTE:     I: OCTAVIA spoke with pt's mother Lisa today. She confirmed that group home placement is the goal for pt, in her opinion. She reiterated that she cannot tae care of pt at home any longer. She would like pt to find placement close to her home in [place] but her priority is for pt to have \"the best care.\" Lisa agreed to referrals being made to any locations that have openings, keeping in mind her concern that pt gets \"the best care.\"   OCTAVIA reviewed [facility] Vacancy Report and noted that [facility] has various locations throughout the [facility]. OCTAVIA spoke with Abilio in admissions at [facility] (P: [phone], F: [phone]). Abilio plans to e-mail writer a list of current vacancies and also asked that writer fax her information on pt. OCTAVIA informed Abilio that pt is pending CADI Waiver approval through [facility]. OCTAVIA spoke with pt who agreed to allow referral to be sent to [facility]. OCTAVIA will continue to assess possible group home options and send referrals, as appropriate.   P: OCTAVIA following for DC planning.     Aniya Mcbride, MSW, LGSW [phone]    UPDATE 1600:   OCTAVIA met with pt. Her mother has still not arrived form their planned visit. Pt is expressing anxiety and states that she is worried about her mother. Pt could not reach her by phone. SW provided support to pt and stated that pt's mother is likely in route to visit pt which is why she is not able to be reached by phone. OCTAVIA discussed [facility] as a group home placement; pt expressed excitement over the prospect. Pt was told that she may be here a while longer as we are waiting for the [facility] to assess her for CADI Waiver; pt verbalized understanding although writer is unsure how much pt will retain from that conversation. Pt expressed a desire to work with group home staff in making meals and participate in activities; pt likes ceramics. Pt would like to attend [employer] services when she is DC'd into the " "community and she is \"looking forward\" to getting out of the hospital.   Pt's mother arrived at the end of this conversation. She offered that pt has lived in a [facility] in the past; in [place]. Mother and pt were okay with referrals being sent to [facility].   "

## 2017-03-10 NOTE — PLAN OF CARE
Problem: Goal Outcome Summary  Goal: Goal Outcome Summary  Outcome: No Change  Pt A&O, anxious at times, prn ativan given. VSS. Up w/1-2 assist, CAM boot. Voiding BSC, BM x1. , 241. Will continue to monitor.

## 2017-03-11 NOTE — PROVIDER NOTIFICATION
Writer messaged MD about low morning BG levels and asked if okay to still give Novolog mix 70/30 55 units. Waiting for call back.

## 2017-03-11 NOTE — PROGRESS NOTES
BG 57 @ 0230. No s/s of hypoglycemia. O J given re-check to be done in 15 minutes. Continue to monitor and update MD as needed.

## 2017-03-11 NOTE — PROGRESS NOTES
[facility]    Hospitalist Progress Note    Assessment & Plan   Nel Farmer is a 61 year old female who was admitted on 2/11/2017 with frequent falls.      1. Multiple falls  -Patient presented with multiple falls  -Was living with her mother prior to this admission  -current plan is placement at LT facility  -etiology of fall unclear, likely multi-factorial      2. Right distal fibular fracture  - CAM boot while ambulating, increase ambulation x4 daily.  - Unable to get a formal physical therapy evaluation because of observation status, mobilize with the help of nursing staff as much as possible.  - Tylenol for pain    3. LYLA on CKD stage IV with Lithium induced Nephrogenic DI/acute retention of urine:  -Patient had urinary retention and obstructive uropathy  -Creatinine improved after placement of Cherry and IV fluids  -d/martha cherry. 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 Now signed off    4. DM2 with hypoglycemia   Ongoing hypoglycemia in hospital I suspect from more controlled diet. Dropping in am   -  insulin 70/30 and SSI will dcrease to medium from high dose   - Reduced 70/30 to 50 units qam and 35 qhs  - reduced glipizide to 5mg daily        5. HTN  - Adequate control for the most part  - Continue clonidine, hydralazine, diltiazem, metoprolol, Imdur  - monitor closely for bradycardia since the patient is on beta blocker, calcium channel blocker and clonidine      6. Schizoaffective disorder with baseline cognitive deficits  -Patient does have baseline cognitive deficits  -Continue depakote, risperdone and zyprexa  -Psychiatry consult appreciated       7. Vitamin D deficiency  - Continue cholecalciferol      D/W: RN  DVT Prophylaxis: Pneumatic Compression Devices  Code Status: Full Code    Disposition:  Expected discharge unclear at this time. Social work working on placement. Medically stable    Interval History    -blood sugar dropped last night , . Denies new complaints. Wants to leave hospital    Data reviewed today: I reviewed all new labs and imaging results over the last 24 hours. I personally reviewed no images or EKG's today.    Physical Exam   Temp: 97.8  F (36.6  C) Temp src: Oral BP: 145/72   Heart Rate: 78 Resp: 16 SpO2: 97 % O2 Device: None (Room air)    Vitals:    02/26/17 0543 02/28/17 0623 03/09/17 0634   Weight: 101.2 kg (223 lb 1.7 oz) 99 kg (218 lb 4.1 oz) 99.9 kg (220 lb 3.8 oz)     Vital Signs with Ranges  Temp:  [97.8  F (36.6  C)-98.2  F (36.8  C)] 97.8  F (36.6  C)  Heart Rate:  [61-80] 78  Resp:  [16-18] 16  BP: (118-145)/(50-72) 145/72  SpO2:  [92 %-97 %] 97 %  I/O last 3 completed shifts:  In: 1910 [P.O.:1910]  Out: 4250 [Urine:4250]    Constitutional: AAOX2, NAD, Appears comfortable  Respiratory:  No crackles, CTA B/L, Normal WOB  Cardiovascular: RRR, No murmur  GI: Soft, Non- tender, BS- normoactive  Skin/Integument: Warm and dry  MSK: no edema  Neuro: CN- grossly intact      Medications        insulin aspart prot & aspart  55 Units Subcutaneous Daily     insulin aspart prot & aspart  35 Units Subcutaneous At Bedtime     [START ON 3/12/2017] glipiZIDE  5 mg Oral QAM AC     insulin aspart  1-7 Units Subcutaneous TID AC     insulin aspart  1-5 Units Subcutaneous At Bedtime     cloNIDine  0.2 mg Oral BID     hydrALAZINE  50 mg Oral 4x Daily     risperiDONE  1 mg Sublingual BID     polyethylene glycol  17 g Oral BID     cholecalciferol  1,000 Units Oral Daily     metoprolol  100 mg Oral BID     diltiazem  240 mg Oral Daily     docusate sodium  100 mg Oral BID     acetaminophen  1,000 mg Oral TID     divalproex  500 mg Oral QAM     divalproex  1,500 mg Oral At Bedtime     isosorbide mononitrate  30 mg Oral Daily     OLANZapine (zyPREXA) tablet 5 mg  5 mg Oral At Bedtime     omeprazole  (priLOSEC) CR capsule 20 mg  20 mg Oral BID AC       Data     Recent Labs  Lab 03/08/17  1320 03/06/17  1041    140   POTASSIUM 4.9 4.3   CHLORIDE 107 108   CO2 22 22   BUN 44* 44*   CR 3.20* 3.28*   ANIONGAP 9 10   BENTLEY 9.6 9.8   * 170*       No results found for this or any previous visit (from the past 24 hour(s)).

## 2017-03-11 NOTE — PLAN OF CARE
Problem: Goal Outcome Summary  Goal: Goal Outcome Summary  Outcome: No Change  Care Plan Summary Note: Pt A&O to self and place, VSS, C/O Sohail LE foot pain managed by tylenol and oxycodone. Anxious and agitated at times, easily redirectable, PRN Risperidone given.  Up w/1assist with walker and KYM DAVIS CAM boot. Voiding adequately. Last bladder scan was 69mls, BG 57 at 0200, O J given and up to 73. SW following for placement. Will continue to monitor.

## 2017-03-11 NOTE — PLAN OF CARE
Problem: Goal Outcome Summary  Goal: Goal Outcome Summary  Outcome: No Change  Pt A&O, agitated at times, voice raspy. VSS. Up w/1 assist, walker, leg brace. BG 77, 229. Voiding adequately BSC. Will continue to monitor.

## 2017-03-12 NOTE — PROVIDER NOTIFICATION
Writer messaged MD about holding glipizide and dose on insulin Novolog mix 70/30 due to low blood sugar. MD requested todays glipizide cancelled and Novolog mix dose changed to 40 units instead of 55 units.

## 2017-03-12 NOTE — PROGRESS NOTES
Blood sugars 129 with no sliding scale coverage, and 304 with sliding scale coverage and bedtime Novolog mix.

## 2017-03-12 NOTE — PROVIDER NOTIFICATION
Writer notified MD about pt lethargic, sleepy, arouses to touch, pt falls back to sleep after woken. Writer given only scheduled medications. Waiting for call back

## 2017-03-12 NOTE — PLAN OF CARE
Problem: Goal Outcome Summary  Goal: Goal Outcome Summary  Outcome: No Change  Patient up with assist of 1 and walker.  Pain managed with Tylenol and repositioning.  VSS.  A/Ox4.

## 2017-03-12 NOTE — PLAN OF CARE
Problem: Goal Outcome Summary  Goal: Goal Outcome Summary  Outcome: Improving  A&O, w/ baseline cognitive deficits. CMS intact, pt slept well throughout the evening.

## 2017-03-12 NOTE — PROVIDER NOTIFICATION
Notified Dr. Billy with increased confusion, weakness, lethargic, incontinent, and  Hallucination. Ordered BMP and to hold pm Zyprexa dose.

## 2017-03-12 NOTE — PROGRESS NOTES
[facility]    Hospitalist Progress Note    Assessment & Plan   Nel Farmer is a 61 year old female who was admitted on 2/11/2017 with frequent falls.      1. Multiple falls  -Patient presented with multiple falls  -Was living with her mother prior to this admission  -current plan is placement at LT facility  -etiology of fall unclear, likely multi-factorial      2. Right distal fibular fracture  - CAM boot while ambulating, increase ambulation x4 daily.  - Unable to get a formal physical therapy evaluation because of observation status, mobilize with the help of nursing staff as much as possible.  - Tylenol for pain    3. LYLA on CKD stage IV with Lithium induced Nephrogenic DI/acute retention of urine:  -Patient had urinary retention and obstructive uropathy  -Creatinine improved after placement of Cherry and IV fluids  -d/martha cherry. 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 Now signed off    4. DM2 with hypoglycemia   Ongoing hypoglycemia in hospital I suspect from more controlled diet. Dropping in am   -  insulin 70/30 and SSI will dcrease to medium from high dose   - Reduced 70/30 to 40 units qam and 20 qhs  - d/martha glipizide today        5. HTN  - Adequate control for the most part  - Continue clonidine, hydralazine, diltiazem, metoprolol, Imdur  - monitor closely for bradycardia since the patient is on beta blocker, calcium channel blocker and clonidine      6. Schizoaffective disorder with baseline cognitive deficits  -Patient does have baseline cognitive deficits  -Continue depakote, risperdone and zyprexa  -Psychiatry consult appreciated       7. Vitamin D deficiency  - Continue cholecalciferol      D/W: RN  DVT Prophylaxis: Pneumatic Compression Devices  Code Status: Full Code    Disposition: Expected  discharge unclear at this time. Social work working on placement. Medically stable    Interval History    -blood sugar dropped last night , . Denies new complaints. Wants to leave hospital    Data reviewed today: I reviewed all new labs and imaging results over the last 24 hours. I personally reviewed no images or EKG's today.    Physical Exam   Temp: 98.2  F (36.8  C) Temp src: Oral BP: 143/67   Heart Rate: 87 Resp: 16 SpO2: 97 % O2 Device: None (Room air)    Vitals:    02/26/17 0543 02/28/17 0623 03/09/17 0634   Weight: 101.2 kg (223 lb 1.7 oz) 99 kg (218 lb 4.1 oz) 99.9 kg (220 lb 3.8 oz)     Vital Signs with Ranges  Temp:  [97.9  F (36.6  C)-98.2  F (36.8  C)] 98.2  F (36.8  C)  Heart Rate:  [64-87] 87  Resp:  [16] 16  BP: (131-143)/(58-67) 143/67  SpO2:  [95 %-97 %] 97 %  I/O last 3 completed shifts:  In: 1980 [P.O.:1980]  Out: 1600 [Urine:1600]    Constitutional: AAOX2, NAD, Appears comfortable  Respiratory:  No crackles, CTA B/L, Normal WOB  Cardiovascular: RRR, No murmur  GI: Soft, Non- tender, BS- normoactive  Skin/Integument: Warm and dry  MSK: no edema  Neuro: CN- grossly intact      Medications        insulin aspart prot & aspart  20 Units Subcutaneous At Bedtime     insulin aspart prot & aspart  40 Units Subcutaneous Daily with breakfast     insulin aspart  1-7 Units Subcutaneous TID AC     insulin aspart  1-5 Units Subcutaneous At Bedtime     cloNIDine  0.2 mg Oral BID     hydrALAZINE  50 mg Oral 4x Daily     risperiDONE  1 mg Sublingual BID     polyethylene glycol  17 g Oral BID     cholecalciferol  1,000 Units Oral Daily     metoprolol  100 mg Oral BID     diltiazem  240 mg Oral Daily     docusate sodium  100 mg Oral BID     acetaminophen  1,000 mg Oral TID     divalproex  500 mg Oral QAM     divalproex  1,500 mg Oral At Bedtime     isosorbide mononitrate  30 mg Oral Daily     OLANZapine (zyPREXA) tablet 5 mg  5 mg Oral At Bedtime     omeprazole (priLOSEC) CR capsule 20 mg  20 mg Oral BID AC        Data     Recent Labs  Lab 03/08/17  1320 03/06/17  1041    140   POTASSIUM 4.9 4.3   CHLORIDE 107 108   CO2 22 22   BUN 44* 44*   CR 3.20* 3.28*   ANIONGAP 9 10   BENTLEY 9.6 9.8   * 170*       No results found for this or any previous visit (from the past 24 hour(s)).

## 2017-03-13 NOTE — PLAN OF CARE
Problem: Goal Outcome Summary  Goal: Goal Outcome Summary  Outcome: Improving  A&O. Pt did not offer any complaints throughout the night. Pt was up w/ 2 to BSC. Continue to monitor.

## 2017-03-13 NOTE — PROGRESS NOTES
Social Work Progress Note  Pt chart reviewed. Pt discussed in interdisciplinary rounds.     Intervention: Solo contacted Carri with [facility] ([phone]) and left her a voice message requesting an update on pt's assessment.     Plan:  Anticipated Discharge Placement: Possibly [facility].   Barriers: Payor source for group home.   Follow-up plan: Solo to continue to follow.     GILMA Barillas, [facility]  [phone]    ADDENDUM:   Solo received a call from Marcela at [facility] ([phone]) and she informed solo that Mirela will come on Thursday March 16th at 0900 to assess pt.     GILMA Barillas, [facility]  [phone]

## 2017-03-13 NOTE — PLAN OF CARE
Problem: Goal Outcome Summary  Goal: Goal Outcome Summary  Outcome: No Change  Patient assist of 2 and walker.  Denies pain.  VSS.  A/Ox4.

## 2017-03-13 NOTE — PROGRESS NOTES
[facility]    Hospitalist Progress Note    Assessment & Plan   Nel Farmer is a 61 year old female who was admitted on 2/11/2017 with frequent falls.      1. Multiple falls  -Patient presented with multiple falls  -Was living with her mother prior to this admission  -current plan is placement at LT facility  -etiology of fall unclear, likely multi-factorial      2. Right distal fibular fracture  - CAM boot while ambulating, increase ambulation x4 daily.  -  mobilize with the help of nursing staff as much as possible.  - Tylenol for pain    3. LYLA on CKD stage IV with Lithium induced Nephrogenic DI/acute retention of urine:  -Patient had urinary retention and obstructive uropathy  -Creatinine improved after placement of Cherry and IV fluids  -d/martha cherry. Cherry was reinsert due to retention, outpatient urology follow-up after discharge  -Cr stable at 3.35 this am.  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 Now signed off    4. DM2 with hypoglycemia   Ongoing hypoglycemia in hospital I suspect from more controlled diet. Dropping in am   -  insulin 70/30 and SSI will dcrease to medium from high dose   - Reduced 70/30 to 40 units qam and 20 qhs  - d/martha glipizide.        5. HTN  - Adequate control for the most part  - Continue clonidine, hydralazine, diltiazem, metoprolol, Imdur  - monitor closely for bradycardia since the patient is on beta blocker, calcium channel blocker and clonidine      6. Schizoaffective disorder with baseline cognitive deficits  -Patient does have baseline cognitive deficits  -Continue depakote, risperdone and zyprexa  -Psychiatry consult appreciated       7. Vitamin D deficiency  - Continue cholecalciferol      D/W: RN  DVT Prophylaxis: Pneumatic Compression Devices  Code Status: Full Code    Disposition: Expected discharge unclear at this time. Social work working on placement. Medically stable    Interval History    Denies  new complaints. Wants to leave hospital    [place]    Physical Exam   Temp: 98.1  F (36.7  C) Temp src: Oral BP: 111/59   Heart Rate: 69 Resp: 16 SpO2: 96 % O2 Device: None (Room air)    Vitals:    02/28/17 0623 03/09/17 0634 03/13/17 0611   Weight: 99 kg (218 lb 4.1 oz) 99.9 kg (220 lb 3.8 oz) 94.2 kg (207 lb 10.8 oz)     Vital Signs with Ranges  Temp:  [98.1  F (36.7  C)-99.1  F (37.3  C)] 98.1  F (36.7  C)  Heart Rate:  [69-87] 69  Resp:  [16-18] 16  BP: (111-143)/(50-67) 111/59  SpO2:  [95 %-98 %] 96 %  I/O last 3 completed shifts:  In: 1540 [P.O.:1540]  Out: -     Constitutional: AAOX2, NAD, Appears comfortable  Respiratory:  No crackles, CTA B/L, Normal WOB  Cardiovascular: RRR, No murmur  GI: Soft, Non- tender, BS- normoactive  Skin/Integument: Warm and dry  MSK: no edema  Neuro: CN- grossly intact      Medications        insulin aspart prot & aspart  20 Units Subcutaneous At Bedtime     insulin aspart prot & aspart  40 Units Subcutaneous Daily with breakfast     insulin aspart  1-7 Units Subcutaneous TID AC     insulin aspart  1-5 Units Subcutaneous At Bedtime     cloNIDine  0.2 mg Oral BID     hydrALAZINE  50 mg Oral 4x Daily     risperiDONE  1 mg Sublingual BID     polyethylene glycol  17 g Oral BID     cholecalciferol  1,000 Units Oral Daily     metoprolol  100 mg Oral BID     diltiazem  240 mg Oral Daily     docusate sodium  100 mg Oral BID     acetaminophen  1,000 mg Oral TID     divalproex  500 mg Oral QAM     divalproex  1,500 mg Oral At Bedtime     isosorbide mononitrate  30 mg Oral Daily     OLANZapine (zyPREXA) tablet 5 mg  5 mg Oral At Bedtime     omeprazole (priLOSEC) CR capsule 20 mg  20 mg Oral BID AC       Data     Recent Labs  Lab 03/12/17  1950 03/12/17  1810 03/08/17  1320 03/06/17  1041   WBC 9.0  --   --   --    HGB 10.6*  --   --   --    MCV 98  --   --   --      --   --   --    NA  --  140 138 140   POTASSIUM  --  4.7 4.9 4.3   CHLORIDE  --  107 107 108   CO2  --  24 22 22   BUN  --   41* 44* 44*   CR  --  3.35* 3.20* 3.28*   ANIONGAP  --  9 9 10   BENTLEY  --  9.8 9.6 9.8   GLC  --  150* 222* 170*       No results found for this or any previous visit (from the past 24 hour(s)).

## 2017-03-13 NOTE — PROGRESS NOTES
Paged for confusion/hallucinations.    Patient had STAT BMP ordered earlier that appears within normal limits. Patients vitals within normal limits.    Will check CBC and UA given new urinary incontinence to see if infection could be the etiology of patients new confusion.    Faby Monson PA-C  Internal Medicine Hospitalist

## 2017-03-13 NOTE — PROGRESS NOTES
Patient not currently complaining of confusion or hallucinations or SOB.  Patient appears stronger, able to turn with assist of 1.  Patient frustrated with continued hospital stay.

## 2017-03-13 NOTE — PROVIDER NOTIFICATION
Patient complaint of confusion and memory loss.  Patient weak and lethargic.  Pt complaint of slight SOB; pulse in 90s and pulse ox 95% on RA.  Lung sounds clear.

## 2017-03-13 NOTE — PLAN OF CARE
Problem: Goal Outcome Summary  Goal: Goal Outcome Summary  Outcome: Improving  A&O. VSS. Pain managed with scheduled tylenol. Ambulating in room and to bathroom with assist x1, walker and ortho show on. Voiding adequately. Incontinent at times. Tolerating mod carb diet. Agitated and yells out at times, prn ativan given in AM and helped decrease agitation. Up in chair for meals. PCD's on. Plan to discharge when placement found.

## 2017-03-14 NOTE — PLAN OF CARE
Problem: Discharge Planning  Goal: Discharge Planning (Adult, OB, Behavioral, Peds)  Outcome: Improving  RN: Alert and oriented X 4, forgetful. Disorganized speech noted, moods labile. Pleasant and cooperative at times, needs reminders to keep voice down. Yells frequently, but redirectable. Low levels of pain noted, scheduled Tylenol adequate for pain control. Cooperative with brace on RLE. Incontinent of bowel and bladder intermittently. Tolerating CCHO diet without nausea. BG elevated, controlled with SS and mixed insulins. Up with A1 with WW. Scheduled evening meds given, PRN ativan available if agitated overnight.

## 2017-03-14 NOTE — PROGRESS NOTES
[facility]  Hospitalist Progress Note  Hong Lee MD  03/14/2017    Assessment & Plan   Nel Farmer is a 61 year old female who was admitted on 2/11/2017 with frequent falls.      1. Multiple falls  -Patient presented with multiple falls  -Was living with her mother prior to this admission  -current plan is placement at LTC facility  -etiology of fall unclear, likely multi-factorial      2. Right distal fibular fracture  - CAM boot while ambulating, increase ambulation x4 daily.  - mobilize with the help of nursing staff as much as possible.  - Tylenol for pain     3. LYLA on CKD stage IV with Lithium induced Nephrogenic DI/acute retention of urine:  -Patient had urinary retention and obstructive uropathy  -Creatinine improved after placement of Cherry and IV fluids  -d/martha cherry. Cherry was reinsert due to retention, outpatient urology follow-up after discharge  -Cr stable at 3.35 this am.  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 Now signed off     4. DM2 with hypoglycemia   Ongoing hypoglycemia in hospital I suspect from more controlled diet. Dropping in am   - insulin 70/30 and SSI will dcrease to medium from high dose   - Reduced 70/30 to 40 units qam and 20 qhs  - d/martha glipizide.         5. HTN  - Adequate control for the most part  - Continue clonidine, hydralazine, diltiazem, metoprolol, Imdur  - monitor closely for bradycardia since the patient is on beta blocker, calcium channel blocker and clonidine      6. Schizoaffective disorder with baseline cognitive deficits  -Patient does have baseline cognitive deficits  -Continue depakote, risperdone and zyprexa  -Psychiatry consult appreciated      7. Vitamin D deficiency  - Continue cholecalciferol      8. Disposition  - [place] come [place] on Thursday so she can go to group home.    DVT Prophylaxis: Pneumatic Compression Device    Code Status: Full Code     Interval  "History   - discussed with social work  - patient denies any complaints    -Data reviewed today: I reviewed all new labs and imaging over the last 24 hours. I personally reviewed no images or EKG's today.    Physical Exam   Heart Rate: 76, Blood pressure 134/60, pulse 66, temperature 98.7  F (37.1  C), temperature source Oral, resp. rate 18, height 1.6 m (5' 3\"), weight 94.2 kg (207 lb 10.8 oz), last menstrual period 04/27/2012, SpO2 96 %, not currently breastfeeding.  Vitals:    02/28/17 0623 03/09/17 0634 03/13/17 0611   Weight: 99 kg (218 lb 4.1 oz) 99.9 kg (220 lb 3.8 oz) 94.2 kg (207 lb 10.8 oz)     Vital Signs with Ranges  Temp:  [97.9  F (36.6  C)-98.7  F (37.1  C)] 98.7  F (37.1  C)  Pulse:  [66-77] 66  Heart Rate:  [] 76  Resp:  [16-18] 18  BP: (128-150)/(54-88) 134/60  SpO2:  [94 %-96 %] 96 %  I/O's Last 24 hours  I/O last 3 completed shifts:  In: 1360 [P.O.:1360]  Out: -     Constitutional: Awake, alert, cooperative, no apparent distress  Respiratory: Clear to auscultation bilaterally, no crackles or wheezing  Cardiovascular: Regular rate and rhythm, normal S1 and S2, and no murmur noted  GI: Normal bowel sounds, soft, non-distended, non-tender  Skin/Integumen: No rashes, no cyanosis, no edema  Other:      Medications   All medications were reviewed.       insulin aspart prot & aspart  20 Units Subcutaneous At Bedtime     insulin aspart prot & aspart  40 Units Subcutaneous Daily with breakfast     insulin aspart  1-7 Units Subcutaneous TID AC     insulin aspart  1-5 Units Subcutaneous At Bedtime     cloNIDine  0.2 mg Oral BID     hydrALAZINE  50 mg Oral 4x Daily     risperiDONE  1 mg Sublingual BID     polyethylene glycol  17 g Oral BID     cholecalciferol  1,000 Units Oral Daily     metoprolol  100 mg Oral BID     diltiazem  240 mg Oral Daily     docusate sodium  100 mg Oral BID     acetaminophen  1,000 mg Oral TID     divalproex  500 mg Oral QAM     divalproex  1,500 mg Oral At Bedtime     " isosorbide mononitrate  30 mg Oral Daily     OLANZapine (zyPREXA) tablet 5 mg  5 mg Oral At Bedtime     omeprazole (priLOSEC) CR capsule 20 mg  20 mg Oral BID AC        Data     Recent Labs  Lab 03/14/17  0619 03/12/17  1950 03/12/17  1810 03/08/17  1320   WBC  --  9.0  --   --    HGB  --  10.6*  --   --    MCV  --  98  --   --    PLT  --  181  --   --      --  140 138   POTASSIUM 4.2  --  4.7 4.9   CHLORIDE 106  --  107 107   CO2 26  --  24 22   BUN 42*  --  41* 44*   CR 3.53*  --  3.35* 3.20*   ANIONGAP 9  --  9 9   BENTLEY 9.9  --  9.8 9.6   *  --  150* 222*       No results found for this or any previous visit (from the past 24 hour(s)).    Hong Lee MD  Pager [phone]

## 2017-03-14 NOTE — PLAN OF CARE
Problem: Goal Outcome Summary  Goal: Goal Outcome Summary  Outcome: Improving  A&Ox4, with cognitive delay. VSS on RA. Ativan x1 given for anxiety, voiding in bathroom, incontinent sometimes. Denies pain, Up with 2, GB, walker and CAM boot. May dc to [facility], Continue to monitor.

## 2017-03-14 NOTE — PLAN OF CARE
Problem: Goal Outcome Summary  Goal: Goal Outcome Summary  A&Ox4. VSS on RA. No IV access. Tolerating Mod CHO diet. Up with A1-2, walker and gait belt to BR and chair. Refused to go for a walk. Slept most of morning. Was up in chair for meals. Trace edema in BLE. Incontinent at times. Continue to monitor.

## 2017-03-15 NOTE — PLAN OF CARE
Problem: Goal Outcome Summary  Goal: Goal Outcome Summary  Outcome: Improving  Frequently incontinent of urine. Up with assist of 1, walker, and CAM boot to BR. Up to chair for supper.Pt c/o nausea, Zofran given, pt ate supper and had emesis x1. Still reports her stomach feels upset. Was able to tolerate toast and applesauce. Blood glucoses elevated, page out to MD. Plan for D/C when placement found.

## 2017-03-15 NOTE — PROGRESS NOTES
[facility]  Hospitalist Progress Note  Hong Lee MD  03/15/2017    Assessment & Plan   Nel Farmer is a 61 year old female who was admitted on 2/11/2017 with frequent falls.      1. Multiple falls  -Patient presented with multiple falls  -Was living with her mother prior to this admission  -current plan is placement at LTC facility  -etiology of fall unclear, likely multi-factorial      2. Right distal fibular fracture  - CAM boot while ambulating, increase ambulation x4 daily.  - mobilize with the help of nursing staff as much as possible.  - Tylenol for pain     3. LYLA on CKD stage IV with Lithium induced Nephrogenic DI/acute retention of urine:  -Patient had urinary retention and obstructive uropathy  -Creatinine improved after placement of Cherry and IV fluids  -d/martha cherry. Cherry was reinsert due to retention, outpatient urology follow-up after discharge  -Cr stable at 3.35 this am.  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 Now signed off  - some urinary retention and pyuria on 3/10 UA  - will repeat and perform UA/UCx     4. DM2 with hypoglycemia   Ongoing hypoglycemia in hospital I suspect from more controlled diet. Dropping in am   - insulin 70/30 and SSI will dcrease to medium from high dose   - Reduced 70/30 to 40 units qam and 20 qhs  - d/martha glipizide.         5. HTN  - Adequate control for the most part  - Continue clonidine, hydralazine, diltiazem, metoprolol, Imdur  - monitor closely for bradycardia since the patient is on beta blocker, calcium channel blocker and clonidine      6. Schizoaffective disorder with baseline cognitive deficits  -Patient does have baseline cognitive deficits  -Continue depakote, risperdone and zyprexa  -Psychiatry consult appreciated      7. Vitamin D deficiency  - Continue cholecalciferol      8. Disposition  - [place] come [place] on Thursday so she can go to group home.    DVT  "Prophylaxis: Pneumatic Compression Device    Code Status: Full Code     Interval History   - discussed with social work  - patient denies any complaints    -Data reviewed today: I reviewed all new labs and imaging over the last 24 hours. I personally reviewed no images or EKG's today.    Physical Exam   Heart Rate: 78, Blood pressure 135/59, pulse 66, temperature 98.7  F (37.1  C), temperature source Oral, resp. rate 20, height 1.6 m (5' 3\"), weight 94.2 kg (207 lb 10.8 oz), last menstrual period 04/27/2012, SpO2 92 %, not currently breastfeeding.  Vitals:    02/28/17 0623 03/09/17 0634 03/13/17 0611   Weight: 99 kg (218 lb 4.1 oz) 99.9 kg (220 lb 3.8 oz) 94.2 kg (207 lb 10.8 oz)     Vital Signs with Ranges  Temp:  [98  F (36.7  C)-99.3  F (37.4  C)] 98.7  F (37.1  C)  Heart Rate:  [77-89] 78  Resp:  [16-20] 20  BP: (135-172)/(59-72) 135/59  SpO2:  [92 %-97 %] 92 %  I/O's Last 24 hours  I/O last 3 completed shifts:  In: 1820 [P.O.:1820]  Out: -     Constitutional: Awake, alert, cooperative, no apparent distress  Respiratory: Clear to auscultation bilaterally, no crackles or wheezing  Cardiovascular: Regular rate and rhythm, normal S1 and S2, and no murmur noted  GI: Normal bowel sounds, soft, non-distended, non-tender  Skin/Integumen: No rashes, no cyanosis, no edema  Other:      Medications   All medications were reviewed.       insulin aspart prot & aspart  20 Units Subcutaneous At Bedtime     insulin aspart prot & aspart  40 Units Subcutaneous Daily with breakfast     insulin aspart  1-7 Units Subcutaneous TID AC     insulin aspart  1-5 Units Subcutaneous At Bedtime     cloNIDine  0.2 mg Oral BID     hydrALAZINE  50 mg Oral 4x Daily     risperiDONE  1 mg Sublingual BID     polyethylene glycol  17 g Oral BID     cholecalciferol  1,000 Units Oral Daily     metoprolol  100 mg Oral BID     diltiazem  240 mg Oral Daily     docusate sodium  100 mg Oral BID     acetaminophen  1,000 mg Oral TID     divalproex  500 mg " Oral QAM     divalproex  1,500 mg Oral At Bedtime     isosorbide mononitrate  30 mg Oral Daily     OLANZapine (zyPREXA) tablet 5 mg  5 mg Oral At Bedtime     omeprazole (priLOSEC) CR capsule 20 mg  20 mg Oral BID AC        Data     Recent Labs  Lab 03/14/17  0619 03/12/17  1950 03/12/17  1810   WBC  --  9.0  --    HGB  --  10.6*  --    MCV  --  98  --    PLT  --  181  --      --  140   POTASSIUM 4.2  --  4.7   CHLORIDE 106  --  107   CO2 26  --  24   BUN 42*  --  41*   CR 3.53*  --  3.35*   ANIONGAP 9  --  9   BENTLEY 9.9  --  9.8   *  --  150*       No results found for this or any previous visit (from the past 24 hour(s)).    Hong Lee MD  Pager [phone]

## 2017-03-15 NOTE — PROGRESS NOTES
SW:    I/P:  Per RN pt/mother would like to meet with SW.  SW met with pt (mother was not in room) and pt immediately stated 'I want to go to a group home' and SW explained that a referral has been sent to a group home and that a county worker is coming tomorrow to further assist with d/c planning.  SW left vm for mom with SW number for any questions she may have.

## 2017-03-15 NOTE — PLAN OF CARE
Problem: Goal Outcome Summary  Goal: Goal Outcome Summary  Outcome: Improving  VSS except /64, decreased to 135/59 after several scheduled am BP meds. More lethargic today, frequent naps. Denies pain. Up Ax2 GB walker and CAM boot to commode/chair. Incontinent of large amounts of urine. Repositioning every 2 hrs. Small BM. CMS intact. Tolerating Mod Cho diet. BG checks with coverage, 224 and 209. Awaiting placement, possible visit from group home today.

## 2017-03-15 NOTE — PLAN OF CARE
Problem: Goal Outcome Summary  Goal: Goal Outcome Summary  Outcome: Improving  Pt slept well throughout the evening. VSS. B. Placement still pending.

## 2017-03-15 NOTE — PROVIDER NOTIFICATION
MD Notification    Notified Person:  MD    Notified Persons Name: Dr. Iglesias    Notification Date/Time: 3/14 2210    Notification Interaction:  Requested answering service to send page    Purpose of Notification: Elevated blood glucose    Orders Received:    Comments:

## 2017-03-16 PROBLEM — A41.9 SEPSIS DUE TO URINARY TRACT INFECTION (H): Status: ACTIVE | Noted: 2017-01-01

## 2017-03-16 PROBLEM — N39.0 SEPSIS DUE TO URINARY TRACT INFECTION (H): Status: ACTIVE | Noted: 2017-01-01

## 2017-03-16 NOTE — PLAN OF CARE
Problem: Goal Outcome Summary  Goal: Goal Outcome Summary  Outcome: Improving  Patient up with assist of 2 and walker, wearing CAM boot.  Patient denies pain.  VSS.  A/Ox4.  Blood sugars 226 and 303, given sliding scale coverage.

## 2017-03-16 NOTE — PLAN OF CARE
Problem: Goal Outcome Summary  Goal: Goal Outcome Summary  Outcome: No Change  Pt lethargic, falls right back to sleep after conversation. B, 153. VSS. CMS intact. Continue to monitor.

## 2017-03-16 NOTE — PROVIDER NOTIFICATION
MD Notification    Notified Person:  MD Hospitalist    Notified Persons Name: Dr. Lee    Notification Date/Time: 03/16/2017, 8:03 AM    Notification Interaction:  Talked with Physician    Purpose of Notification: Pt very lethargic.  Requiring 2L O2.  Temp 101.2 axillary.  UA looks dirty.  Lactic was flagged as well.    Orders Received: order Cipro.  BC.    Comments:

## 2017-03-16 NOTE — PLAN OF CARE
Problem: Goal Outcome Summary  Goal: Goal Outcome Summary  Outcome: No Change  Pt very lethargic today.  Uxta022.4 axillary (see MD notification), requiring 2L O2 via NC.  Other VSS.  Incontinent of large amounts of urine.  d/t lethargy, pt turned and repo'd q2h.  Able to tolerate a small amount of soft food (i.e. Pudding and apple juice at lunch time); insulin held, d/t not eating.  Pt's mother updated on plan of care.  Continue to monitor.

## 2017-03-16 NOTE — PROGRESS NOTES
[facility]  Hospitalist Progress Note  Hong Lee MD  03/16/2017    Assessment & Plan   Nel Farmer is a 61 year old female who was admitted on 2/11/2017 with frequent falls.      1. Suspected UTI with sepsis.  - had some urinary retention leading up to this.  - UA grossly abnormal from last night  - will start IVF, ceftriaxone 2g q 24 hours  - get blood cultures  - anticipate she will improve clinically in the next 24-48 hours.  - will change observation status to inpatient status given need for ivf, abx.    2. Multiple falls  -Patient presented with multiple falls  -Was living with her mother prior to this admission  -current plan is placement at [facility] facility  -etiology of fall unclear, likely multi-factorial      3. Right distal fibular fracture  - CAM boot while ambulating, increase ambulation x4 daily.  - mobilize with the help of nursing staff as much as possible.  - Tylenol for pain     4. LYLA on CKD stage IV with Lithium induced Nephrogenic DI/acute retention of urine:  -Patient had urinary retention and obstructive uropathy  -Creatinine improved after placement of Cherry and IV fluids  -d/martha cherry. Cherry was reinsert due to retention, outpatient urology follow-up after discharge  -Cr stable 3-3.5 but will recheck today in light of her change in clinical status.  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 Now signed off  - some urinary retention and pyuria on 3/10 UA     5. DM2 with hypoglycemia   Ongoing hypoglycemia in hospital I suspect from more controlled diet. Dropping in am   - insulin 70/30 and SSI will dcrease to medium from high dose   - Reduced 70/30 to 40 units qam and 20 qhs  - d/martha glipizide.     6. HTN  - Adequate control for the most part  - Continue clonidine, hydralazine, diltiazem, metoprolol, Imdur  - monitor closely for bradycardia since the patient is on beta blocker, calcium channel blocker and  "clonidine      7. Schizoaffective disorder with baseline cognitive deficits  -Patient does have baseline cognitive deficits  -Continue depakote, risperdone and zyprexa  -Psychiatry consult appreciated      8. Vitamin D deficiency  - Continue cholecalciferol      9. Disposition  -  in today, unfortunately she is too lethargic to participate  - she may come back later today, hopefully the patient will be more alert.    DVT Prophylaxis: Pneumatic Compression Device    Code Status: Full Code     Interval History   - very lethargic today, arouses but falls back to sleep  - does answer some questions.    -Data reviewed today: I reviewed all new labs and imaging over the last 24 hours. I personally reviewed no images or EKG's today.    Physical Exam   Heart Rate: 103, Blood pressure 143/68, pulse 90, temperature 101.2  F (38.4  C), temperature source Axillary, resp. rate 26, height 1.6 m (5' 3\"), weight 94.2 kg (207 lb 10.8 oz), last menstrual period 04/27/2012, SpO2 92 %, not currently breastfeeding.  Vitals:    02/28/17 0623 03/09/17 0634 03/13/17 0611   Weight: 99 kg (218 lb 4.1 oz) 99.9 kg (220 lb 3.8 oz) 94.2 kg (207 lb 10.8 oz)     Vital Signs with Ranges  Temp:  [98.1  F (36.7  C)-101.2  F (38.4  C)] 101.2  F (38.4  C)  Pulse:  [90] 90  Heart Rate:  [] 103  Resp:  [16-26] 26  BP: (135-157)/(59-72) 143/68  SpO2:  [88 %-93 %] 92 %  I/O's Last 24 hours  I/O last 3 completed shifts:  In: 1460 [P.O.:1460]  Out: -     Constitutional:  lethargic, arouses but goes back to sleep  Respiratory: Clear to auscultation bilaterally, no crackles or wheezing  Cardiovascular: Regular rate and rhythm, normal S1 and S2, and no murmur noted  GI: Normal bowel sounds, soft, non-distended, non-tender  Skin/Integumen: No rashes, no cyanosis, no edema  Other:      Medications   All medications were reviewed.       cefTRIAXone  2 g Intravenous Q24H     insulin aspart prot & aspart  20 Units Subcutaneous At Bedtime     " insulin aspart prot & aspart  40 Units Subcutaneous Daily with breakfast     insulin aspart  1-7 Units Subcutaneous TID AC     insulin aspart  1-5 Units Subcutaneous At Bedtime     cloNIDine  0.2 mg Oral BID     hydrALAZINE  50 mg Oral 4x Daily     risperiDONE  1 mg Sublingual BID     polyethylene glycol  17 g Oral BID     cholecalciferol  1,000 Units Oral Daily     metoprolol  100 mg Oral BID     diltiazem  240 mg Oral Daily     docusate sodium  100 mg Oral BID     acetaminophen  1,000 mg Oral TID     divalproex  500 mg Oral QAM     divalproex  1,500 mg Oral At Bedtime     isosorbide mononitrate  30 mg Oral Daily     OLANZapine (zyPREXA) tablet 5 mg  5 mg Oral At Bedtime     omeprazole (priLOSEC) CR capsule 20 mg  20 mg Oral BID AC        Data     Recent Labs  Lab 03/14/17  0619 03/12/17  1950 03/12/17  1810   WBC  --  9.0  --    HGB  --  10.6*  --    MCV  --  98  --    PLT  --  181  --      --  140   POTASSIUM 4.2  --  4.7   CHLORIDE 106  --  107   CO2 26  --  24   BUN 42*  --  41*   CR 3.53*  --  3.35*   ANIONGAP 9  --  9   BENTLEY 9.9  --  9.8   *  --  150*       No results found for this or any previous visit (from the past 24 hour(s)).    Hong Lee MD  Pager [phone]

## 2017-03-16 NOTE — PLAN OF CARE
Yara [facility] Public Health Nurse from [facility] came to do an assessment of Nel. She explained that she will get a signature from Nel's mother and then submit the paperwork to the [facility]. She will be in touch with Svetlana Tamez, [address] . Referral has been made to [facility] [place]. Will await information from [facility].  Yara () contact number: [phone].    Spoke with patient's mother this afternoon to inform her that the [facility] will be calling her to arrange a time to sign paperwork. The  Yara will plan to come to her house for the signature.

## 2017-03-17 NOTE — PLAN OF CARE
Problem: Goal Outcome Summary  Goal: Goal Outcome Summary  Outcome: No Change  Patient is A&OX4 but very lethargic and has developmental delay at baseline, VSS on 2L,  CMS intact. Bedrest this shift, had been up with 1-2 and walker. Incontinent of large amounts of bowel and bladder. PVR 18cc. Regular Diet, poor intake, total feed right now. Denies Pain. BID meds started at 1700, will be holding heart rate and blood pressure medication bed time doses.  Will continue to monitor.

## 2017-03-17 NOTE — PROVIDER NOTIFICATION
Called on-call hospitalist Dr. Mayes, to update on blood sugar of 248, order to give insulin since pt starting D5W. Also updated on temp, MD to order rectal tylenol.

## 2017-03-17 NOTE — PLAN OF CARE
Problem: Goal Outcome Summary  Goal: Goal Outcome Summary  Outcome: No Change  Pt alert and oriented , very lethargic, blood sugar was 140, vitals are WNL, incontinent of bladder, denies pain. Will continue to monitor.

## 2017-03-17 NOTE — PROGRESS NOTES
[facility]  Hospitalist Progress Note  Anali Trevino MD  03/17/2017    Assessment & Plan   Nel Farmer is a 61 year old female who was admitted on 2/11/2017 with frequent falls. She was noted to be lethargic with high fevers.      Acute encephalopathy:   Etiology of her encephalopathy is not entirely clear at this time. Possible causes include sepsis with high fever, hypernatremia, medications, ?uremia. .she also has hypercalecmia with corrected calcium level of 12.1. ABG does not explain her encephalopathy with pCO2 of only 46.  Has not gotten narcotics or benzos recently. Did  Not get her psych meds yesterday or today due to her lethargy. CXR shows ? Left basilar infiltrate. UCx from 3/12 was +for klebsiella and UCx from 3/15 growing 50-100K LF GNR. She was started on Ceftriaxone on 3/16   - will check head CT  - check valproic acid level  - repeat BMP, CBC and check LFT,   - change antibiotics to Meropenem renal dosing  - see management of hypernatremia as below  - currently on BiPAP  - will transfer to [facility]  Addendum: CT head no acute abnormality. Valproic level in therapeutic range, low normal. LFTs normal range other than low albumin, worsening creatinine        Sepsis ? Due to UTI vs LLL pneumonia  - had hx of urinary retention during hospital course. Dunlap last removed on 3/9 and currently voiding large amounts per discussion with RN. UCx 3/12 growing Klebsiella. UCx from 3/15 +for GNR (50-100k).   - started on ceftriaxone on 3/16. Changed to meropenem as above in light of possible pneumonia  - blood culture from 3/16 NGTD, will repeat another today  - will monitor    Hypernatremia:  This is likely exacerbated by her DI.   - Na 154 this am  - IVF D5W @75cc/hr  - f/u Na level q6hrs  - if difficult to correct, could consider reconsulting nephro again    LYLA on CKD stage IV with Lithium induced Nephrogenic DI/acute retention of urine:  -Patient had urinary retention and obstructive  uropathy. 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 Now signed off. Consider reconsulting worsening renal function and difficult to correct hypernatremia  Addendum: discussed with Dr. Abrams from nephrology, will reconsult for nephrology to follow due to worsening renal function       Multiple falls  -Patient presented with multiple falls  -Was living with her mother prior to this admission  -current plan is placement at LTC facility  -etiology of fall unclear, likely multi-factorial      Right distal fibular fracture  - CAM boot while ambulating, increase ambulation x4 daily when her encephalopathy is resolved  - mobilize with the help of nursing staff as much as possible.  - Tylenol for pain          DM2 with hypoglycemia   Ongoing hypoglycemia in hospital I suspect from more controlled diet. Dropping in am   - she is currently on 70/30 to 40 units qam and 20 qhs. Glipizide discontinued this stay due to hypoglycemia  - d/c her 70/30 since NPO due to her lethargy and while on BiPAP  - will place her on NPH 10 units BID since she is on D5W. Continue with SSI      HTN  - meds include clonidine, hydralazine, diltiazem, metoprolol, Imdur  - currently NPO. pharmD to change Metoprolol to IV  - will have IV hydralazine prn      Schizoaffective disorder with baseline cognitive deficits  -Patient does have baseline cognitive deficits  -Continue depakote, risperdone and zyprexa (when more alert)  -Psychiatry consult appreciated      Vitamin D deficiency  - Continue cholecalciferol       DVT Prophylaxis: Pneumatic Compression Device     Code Status: Full Code     Dispo: unclear at this time. She needs to recover from her acute illness    45 minutes of critical time spent.      Interval History   Called today patient being lethargy and temperature  "102.8.  Patient has been here for ~5 weeks and apparently was lethargic yesterday. She was started on rocephin for UTI.  Today per Nursing report, unable to arouse. Only minimally responding to painful stimuli.  Upon assessment, patient minimally responded to painful stimuli. Has not received narcotics or benzos in the last 24-48 hrs.  During her hospital course, patient has always been vocal and can be heard in the hallway and this is completely different from her baseline per discussion with the nursing staff.     -Data reviewed today: I reviewed all new labs and imaging over the last 24 hours. I personally reviewed the chest x-ray image(s) showing ? LLL infiltrate.    Physical Exam   Heart Rate: 110, Blood pressure 160/72, pulse 115, temperature 102.8  F (39.3  C), temperature source Oral, resp. rate 14, height 1.6 m (5' 3\"), weight 94.2 kg (207 lb 10.8 oz), last menstrual period 04/27/2012, SpO2 98 %, not currently breastfeeding.  Vitals:    02/28/17 0623 03/09/17 0634 03/13/17 0611   Weight: 99 kg (218 lb 4.1 oz) 99.9 kg (220 lb 3.8 oz) 94.2 kg (207 lb 10.8 oz)     Vital Signs with Ranges  Temp:  [98.1  F (36.7  C)-102.8  F (39.3  C)] 102.8  F (39.3  C)  Pulse:  [115] 115  Heart Rate:  [] 110  Resp:  [14-20] 14  BP: (117-167)/(52-79) 160/72  FiO2 (%):  [40 %] 40 %  SpO2:  [93 %-98 %] 98 %  I/O's Last 24 hours  I/O last 3 completed shifts:  In: 1470 [P.O.:1470]  Out: -     Constitutional: Unable to arouse, responds to painful stiumli with sternal rub  Respiratory: Coarse BS bilaterally  Cardiovascular: tachycardic and regular  GI: soft, Nontender, non-distended  Ext: no LE edema bilaterally, no joint effusion or swelling  Neuro: responsive to painful stimuli, pupils are constricted    Medications   All medications I am responsible for were reviewed.    - MEDICATION INSTRUCTIONS -       IV infusion builder WITH additives         meropenem  500 g Intravenous Q12H     insulin aspart prot & aspart  20 Units " Subcutaneous At Bedtime     insulin aspart prot & aspart  40 Units Subcutaneous Daily with breakfast     insulin aspart  1-7 Units Subcutaneous TID AC     insulin aspart  1-5 Units Subcutaneous At Bedtime     cloNIDine  0.2 mg Oral BID     hydrALAZINE  50 mg Oral 4x Daily     risperiDONE  1 mg Sublingual BID     polyethylene glycol  17 g Oral BID     cholecalciferol  1,000 Units Oral Daily     metoprolol  100 mg Oral BID     diltiazem  240 mg Oral Daily     docusate sodium  100 mg Oral BID     acetaminophen  1,000 mg Oral TID     divalproex  500 mg Oral QAM     divalproex  1,500 mg Oral At Bedtime     isosorbide mononitrate  30 mg Oral Daily     OLANZapine (zyPREXA) tablet 5 mg  5 mg Oral At Bedtime     omeprazole (priLOSEC) CR capsule 20 mg  20 mg Oral BID AC        Data     Recent Labs  Lab 03/17/17  1153 03/17/17  0638 03/16/17  1106 03/14/17  0619   WBC 12.7* 13.3* 10.0  --    HGB 11.3* 11.2* 10.7*  --     99 96  --     243 219  --    NA  --  154* 145* 141   POTASSIUM  --  4.1 4.1 4.2   CHLORIDE  --  119* 112* 106   CO2  --  26 24 26   BUN  --  44* 45* 42*   CR  --  3.76* 3.65* 3.53*   ANIONGAP  --  9 9 9   BENTLEY  --  10.8* 10.2* 9.9   GLC  --  159* 139* 139*       No results found for this or any previous visit (from the past 24 hour(s)).

## 2017-03-17 NOTE — SIGNIFICANT EVENT
"1130-pt had been lethargic all am, became worse at this time, only arousing to sternal rub by screaming \"ouch\" and opening eyes. Temp was 102.8. RRT was called. Hospitalist responded and assessed pt and ordered labs, head CT, chest x-ray. Pt was sent to CCU for IMC. Pt mother, Lisa, was called with update.  "

## 2017-03-17 NOTE — PROGRESS NOTES
Renal Medicine Progress Note            Assessment/Plan:     1. CKD III 2/2 lithium toxicity. She has a baseline Scr ~ 2.2 mg/dl.     2. Recurrent LYLA. Presume due to prerenal     3. Worsening hypernatremia 2/2 NDI and lack of access of free water    4. ID. HCAP. Aspiration? UTI?   -meropenem    5. Lethargy/AMS. Like from infection.    6. Hyperacalemia    Plan.   1. Start D5W at 150 cchr  2. Rate of Na correction is 6 meq/24hrs  3. Check SNa q6hrs and adjust IVF rate as needed  4. Monitor closely for aspiration  5. D/C vitamin D           Interval History:     Patient was recently followed by our service for LYLA and NDI. Patient became lethargic yesterday, which is worse today. Serum sodium and Scr are worse. Pt spiked high grade fever with new LLL infiltrate. She was started on Rocephin yesterday and switched to meropenem today.          Medications and Allergies:       sodium chloride (PF)  3 mL Intracatheter Q8H     meropenem  500 mg Intravenous Q12H     metoprolol  5 mg Intravenous Q6H     [START ON 3/18/2017] insulin isophane human  10 Units Subcutaneous QAM AC     insulin isophane human  10 Units Subcutaneous At Bedtime     insulin aspart  1-7 Units Subcutaneous TID AC     insulin aspart  1-5 Units Subcutaneous At Bedtime     cloNIDine  0.2 mg Oral BID     hydrALAZINE  50 mg Oral 4x Daily     risperiDONE  1 mg Sublingual BID     polyethylene glycol  17 g Oral BID     cholecalciferol  1,000 Units Oral Daily     diltiazem  240 mg Oral Daily     docusate sodium  100 mg Oral BID     acetaminophen  1,000 mg Oral TID     divalproex  500 mg Oral QAM     divalproex  1,500 mg Oral At Bedtime     isosorbide mononitrate  30 mg Oral Daily     OLANZapine (zyPREXA) tablet 5 mg  5 mg Oral At Bedtime     omeprazole (priLOSEC) CR capsule 20 mg  20 mg Oral BID AC        Allergies   Allergen Reactions     Amoxicillin      Amoxicillin      Haldol [Benzyl Alcohol]      Haldol [Haloperidol]      Pcn [Penicillin G]      Penicillins  "     Seroquel [Quetiapine] GI Disturbance            Physical Exam:   Vitals were reviewed  Heart Rate: 110, Blood pressure 152/69, pulse 93, temperature 100.1  F (37.8  C), temperature source Axillary, resp. rate 18, height 1.6 m (5' 3\"), weight 94.2 kg (207 lb 10.8 oz), last menstrual period 04/27/2012, SpO2 94 %, not currently breastfeeding.    Wt Readings from Last 3 Encounters:   03/13/17 94.2 kg (207 lb 10.8 oz)   06/20/16 108.9 kg (240 lb)   05/27/16 108.9 kg (240 lb)       Intake/Output Summary (Last 24 hours) at 03/17/17 1639  Last data filed at 03/17/17 1500   Gross per 24 hour   Intake              730 ml   Output                0 ml   Net              730 ml       GENERAL APPEARANCE: Very lethargic  HEENT:  Open eyes to voice.   RESP: Crackles left lung base. Good airflow. No wheezes. On BiPAP  CV: RRR, nl S1/S2, tachy  ABDOMEN: obese, soft, NT  EXTREMITIES/SKIN: no rashes/lesions on observed skin; no edema  Neuro: very lethargic. Open eyes to voice         Data:     CBC RESULTS:     Recent Labs  Lab 03/17/17  1153 03/17/17  0638 03/16/17  1106 03/12/17  1950   WBC 12.7* 13.3* 10.0 9.0   RBC 3.69* 3.71* 3.49* 3.44*   HGB 11.3* 11.2* 10.7* 10.6*   HCT 37.0 36.7 33.5* 33.6*    243 219 181       Basic Metabolic Panel:    Recent Labs  Lab 03/17/17  1153 03/17/17  0638 03/16/17  1106 03/14/17  0619 03/12/17  1810   * 154* 145* 141 140   POTASSIUM 4.2 4.1 4.1 4.2 4.7   CHLORIDE 121* 119* 112* 106 107   CO2 24 26 24 26 24   BUN 51* 44* 45* 42* 41*   CR 4.09* 3.76* 3.65* 3.53* 3.35*   * 159* 139* 139* 150*   BENTLEY 10.9* 10.8* 10.2* 9.9 9.8       INRNo lab results found in last 7 days.   Attestation:   I have reviewed today's relevant vital signs, notes, medications, labs and imaging.    Mario Abrams MD  [facility]phrology  Office phone :[phone]  Pager: [phone]  "

## 2017-03-17 NOTE — PROGRESS NOTES
Responded to RRT, pt lethargic, SpO2 91% on 2 LPM nasal cannula. Increased O2 to 5 LPM, ABG done. Bipap order placed.     Dino Lomas RT

## 2017-03-18 NOTE — PLAN OF CARE
Problem: Goal Outcome Summary  Goal: Goal Outcome Summary  Outcome: Declining  Pt transferred from 55 with acute AMS, obtunded, minimally arousable to pain only. On BIPAP, did require oral suction X1. BP elevated, controlled with PRN hydralazine and metoprolol IV. NPO. Placed Dunlap for strict I&O and retention. Fever, received scheduled tylenol and ICE packed,, fever broke and pt became more responsive, continues to be Disoriented X4, but resume calling out which was reported to be pt baseline. D5W running at 175/hr, following NA level Q6H. Will need CAM boot when up and Active.  Able to wean bipap around 3;30am to 5 liters Oxymask, tolerating well. Following creatinine.  Blood sugar elevated. Tele SR. continue to monitor.

## 2017-03-18 NOTE — PROGRESS NOTES
" Renal Medicine Progress Note            Assessment/Plan:     1. CKD III 2/2 lithium toxicity. She has a baseline Scr ~ 2.2 mg/dl.      2. Recurrent LYLA. Presume due to prerenal. Stable.       3. Worsening hypernatremia 2/2 NDI and lack of access of free water     4. ID. HCAP. Aspiration? UTI?   -meropenem     5. Lethargy/AMS. Like from infection.   -improvement with treatment of infection     Plan  1. Cont to adjust IVF and Na q6hrs check. Goal ~ 6 meq/24hrs correction.          Interval History:     Mental status is much better. SNa is coming down slowly. \"I am hungry,\" she says.          Medications and Allergies:       [START ON 3/19/2017] insulin isophane human  12 Units Subcutaneous QAM AC     insulin isophane human  12 Units Subcutaneous At Bedtime     sodium chloride (PF)  3 mL Intracatheter Q8H     meropenem  500 mg Intravenous Q12H     metoprolol  5 mg Intravenous Q6H     acetaminophen  975 mg Oral TID    Or     acetaminophen  1,000 mg Rectal TID     insulin aspart  1-7 Units Subcutaneous TID AC     insulin aspart  1-5 Units Subcutaneous At Bedtime     cloNIDine  0.2 mg Oral BID     hydrALAZINE  50 mg Oral 4x Daily     risperiDONE  1 mg Sublingual BID     polyethylene glycol  17 g Oral BID     diltiazem  240 mg Oral Daily     docusate sodium  100 mg Oral BID     divalproex  500 mg Oral QAM     divalproex  1,500 mg Oral At Bedtime     isosorbide mononitrate  30 mg Oral Daily     OLANZapine (zyPREXA) tablet 5 mg  5 mg Oral At Bedtime     omeprazole (priLOSEC) CR capsule 20 mg  20 mg Oral BID AC        Allergies   Allergen Reactions     Amoxicillin      Amoxicillin      Haldol [Benzyl Alcohol]      Haldol [Haloperidol]      Pcn [Penicillin G]      Penicillins      Seroquel [Quetiapine] GI Disturbance            Physical Exam:   Vitals were reviewed  Heart Rate: 92, Blood pressure 181/81, pulse 93, temperature 98.9  F (37.2  C), temperature source Axillary, resp. rate 24, height 1.6 m (5' 3\"), weight 97.8 kg " (215 lb 9.8 oz), last menstrual period 04/27/2012, SpO2 98 %, not currently breastfeeding.    Wt Readings from Last 3 Encounters:   03/18/17 97.8 kg (215 lb 9.8 oz)   06/20/16 108.9 kg (240 lb)   05/27/16 108.9 kg (240 lb)       Intake/Output Summary (Last 24 hours) at 03/18/17 0954  Last data filed at 03/18/17 0614   Gross per 24 hour   Intake              649 ml   Output             2200 ml   Net            -1551 ml       GENERAL APPEARANCE: NAD  HEENT:  Eyes/ears/nose/neck grossly normal  RESP: Good airflow anteriorly  CV: RRR, + murmur  ABDOMEN: obese, soft, NT  EXTREMITIES/SKIN: no rashes/lesions on observed skin; no edema  Neuro: much better. She is answering questions  +cherry in place         Data:     CBC RESULTS:     Recent Labs  Lab 03/18/17  0536 03/17/17  1153 03/17/17  0638 03/16/17  1106 03/12/17  1950   WBC 10.0 12.7* 13.3* 10.0 9.0   RBC 3.66* 3.69* 3.71* 3.49* 3.44*   HGB 11.2* 11.3* 11.2* 10.7* 10.6*   HCT 37.5 37.0 36.7 33.5* 33.6*    244 243 219 181       Basic Metabolic Panel:    Recent Labs  Lab 03/18/17  0536 03/18/17  0025 03/17/17  1800 03/17/17  1153 03/17/17  0638 03/16/17  1106 03/14/17  0619 03/12/17  1810   * 156* 157* 155* 154* 145* 141 140   POTASSIUM 4.1  --   --  4.2 4.1 4.1 4.2 4.7   CHLORIDE 121*  --   --  121* 119* 112* 106 107   CO2 25  --   --  24 26 24 26 24   BUN 58*  --   --  51* 44* 45* 42* 41*   CR 4.14*  --   --  4.09* 3.76* 3.65* 3.53* 3.35*   *  --   --  232* 159* 139* 139* 150*   BENTLEY 10.8*  --   --  10.9* 10.8* 10.2* 9.9 9.8       INRNo lab results found in last 7 days.   Attestation:   I have reviewed today's relevant vital signs, notes, medications, labs and imaging.    Mario Abrams MD  [facility]  Office phone :[phone]  Pager: [phone]

## 2017-03-18 NOTE — PHARMACY-VANCOMYCIN DOSING SERVICE
Pharmacy Vancomycin Initial Note  Date of Service 2017  Patient's  1955  61 year old, female    Indication: Sepsis    Current estimated CrCl = Estimated Creatinine Clearance: 16.1 mL/min (based on Cr of 4.14).    Creatinine for last 3 days  3/16/2017: 11:06 AM Creatinine 3.65 mg/dL  3/17/2017: 11:53 AM Creatinine 3.76 mg/dL; 11:53 AM Creatinine 4.09 mg/dL  3/18/2017:  5:36 AM Creatinine 4.14 mg/dL    Recent Vancomycin Level(s) for last 3 days  No results found for requested labs within last 72 hours.      Vancomycin IV Administrations (past 72 hours)      No vancomycin orders with administrations in past 72 hours.                Nephrotoxins and other renal medications (Future)    Start     Dose/Rate Route Frequency Ordered Stop    17 1200  vancomycin (VANCOCIN) 2,000 mg in D5W 500 mL intermittent infusion      2,000 mg Intravenous ONCE 17 1149      17 1143  vancomycin place pruitt - receiving intermittent dosing      1 each Does not apply SEE ADMIN INSTRUCTIONS 17 1143            Contrast Orders - past 72 hours     None                Plan:  1.  Start vancomycin  2000 mg (20 mg/kg) x 1 and then dose intermittently    2.  Goal Trough Level: 15-20 mg/L   3.  Pharmacy will check trough levels as appropriate in ~ 24 hours.    4. Serum creatinine levels will be ordered daily for the first week of therapy and at least twice weekly for subsequent weeks.    5. [place] method utilized to dose vancomycin therapy: Method 2    Joleen Armstrong

## 2017-03-18 NOTE — PROGRESS NOTES
[facility]  Hospitalist Progress Note  Rinku Abreu MD 03/18/2017             Assessment and Plan:        Nel Farmer is a 61 year old female who was admitted on 2/11/2017 with frequent falls. She was noted to be lethargic with high fevers.      Acute encephalopathy:   Etiology of her encephalopathy is not entirely clear at this time. Possible causes include sepsis with high fever, hypernatremia, medications, ?uremia. .she also has hypercalecmia with corrected calcium level of 12.1. ABG does not explain her encephalopathy with pCO2 of only 46. Has not gotten narcotics or benzos recently. Did Not get her psych meds yesterday or today due to her lethargy. CXR shows ? Left basilar infiltrate. UCx from 3/12 was +for klebsiella and UCx from 3/15 growing 50-100K LF GNR. She was started on Ceftriaxone on 3/16   -  check head CT- 3/17 no acute changes   - checked valproic acid level- normal   - repeat BMP, CBC and check LFT,   - changed antibiotics to Meropenem renal dosing 3/17  - see management of hypernatremia as below  -  BiPAP       Sepsis ? Due to UTI vs LLL pneumonia  - had hx of urinary retention during hospital course. Dunlap last removed on 3/9 and currently voiding large amounts per discussion with RN. UCx 3/12 growing Klebsiella. UCx from 3/15 +for GNR (50-100k).   - started on ceftriaxone on 3/16. Changed to meropenem as above in light of possible pneumonia  - blood culture from 3/16 NGTD  - will monitor, assess speech , increased risk for aspiration    called for blood cultures growing gr + cocci in clusters. Will add Vancomycin until final sensitivities available.     Hypernatremia:  This is likely exacerbated by her DI.   - Na 155 this am  - IVF D5W @75cc/hr  - f/u Na level q6hrs       LYLA on CKD stage IV with Lithium induced Nephrogenic DI/acute retention of urine:  -Patient had urinary retention and obstructive uropathy. 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          Multiple falls  -Patient presented with multiple falls  -Was living with her mother prior to this admission  -current plan is placement at LTC facility  -etiology of fall unclear, likely multi-factorial      Right distal fibular fracture  - CAM boot while ambulating, increase ambulation x4 daily when her encephalopathy is resolved  - mobilize with the help of nursing staff as much as possible.  - Tylenol for pain          DM2 with hypoglycemia   Ongoing hypoglycemia in hospital I suspect from more controlled diet. Dropping in am   - she is currently on 70/30 to 40 units qam and 20 qhs. Glipizide discontinued this stay due to hypoglycemia  - d/c her 70/30 since NPO due to her lethargy and while on BiPAP  -  on NPH 10 units BID since she is on D5W. Continue with SSI, increase NPH to 12 units bid      HTN  - meds include clonidine, hydralazine, diltiazem, metoprolol, Imdur  - currently NPO. pharmD to change Metoprolol to IV  - will have IV hydralazine prn      Schizoaffective disorder with baseline cognitive deficits  -Patient does have baseline cognitive deficits  -Continue depakote, risperdone and zyprexa (when more alert)  -Psychiatry consult appreciated      Vitamin D deficiency  - d/c because of hypercalcemia       DVT Prophylaxis: Pneumatic Compression Device      Code Status: Full Code      Dispo: unclear at this time. She needs to recover from her acute illness        Interval History (Subjective):      More alert this am. No fever. Wants to eat. Confused for place, time                   Physical Exam:      Last Vital Signs:  /81  Pulse 93  Temp 98.9  F (37.2  C) (Axillary)  Resp 24  Ht 1.6 m (5' 3\")  Wt 97.8 kg (215 lb 9.8 oz)  LMP 04/27/2012  SpO2 98%  BMI 38.19 kg/m2      Intake/Output Summary (Last 24 hours) at 03/18/17 0936  Last data filed at 03/18/17 " 0614   Gross per 24 hour   Intake              649 ml   Output             2200 ml   Net            -1551 ml       Constitutional: Weak, lethargic, no apparent distress   Respiratory: Clear to auscultation bilaterally, no crackles or wheezing   Cardiovascular: Regular rate and rhythm, normal S1 and S2, and no murmur noted   Abdomen: Normal bowel sounds, soft, non-distended, non-tender   Skin: No rashes, no cyanosis, dry to touch   Neuro: Alert and oriented x1   Extremities: No edema, normal range of motion, weak    Other(s):        All other systems: Negative          Medications:      All current medications were reviewed with changes reflected in problem list.         Data:      All new lab and imaging data was reviewed.   Labs:    Recent Labs  Lab 03/18/17  0536 03/17/17  1153 03/17/17  0638   WBC 10.0 12.7* 13.3*   HGB 11.2* 11.3* 11.2*   HCT 37.5 37.0 36.7   * 100 99    244 243       Recent Labs  Lab 03/18/17  0536 03/18/17  0025 03/17/17  1800 03/17/17  1153 03/17/17  0638   * 156* 157* 155* 154*   POTASSIUM 4.1  --   --  4.2 4.1   CHLORIDE 121*  --   --  121* 119*   CO2 25  --   --  24 26   ANIONGAP 9  --   --  10 9   *  --   --  232* 159*   BUN 58*  --   --  51* 44*   CR 4.14*  --   --  4.09* 3.76*   GFRESTIMATED 11*  --   --  11* 12*   GFRESTBLACK 13*  --   --  13* 15*   BENTLEY 10.8*  --   --  10.9* 10.8*   PROTTOTAL  --   --   --  8.0  --    ALBUMIN  --   --   --  2.4*  --    BILITOTAL  --   --   --  0.2  --    ALKPHOS  --   --   --  72  --    AST  --   --   --  10  --    ALT  --   --   --  13  --       Imaging:   Recent Results (from the past 24 hour(s))   XR Chest Port 1 View    Narrative    XR CHEST PORT 1 VW 3/17/2017 12:03 PM    HISTORY: Loss of consciousness.    COMPARISON: 2/11/2017, 3/12/2014    FINDINGS: Left basilar opacity. Right lung is clear. No pneumothorax.  Stable heart size.      Impression    IMPRESSION: Left basilar opacity may represent atelectasis  or  consolidation.    HEIDI NORTH MD   CT Head w/o Contrast    Narrative    CT OF THE HEAD WITHOUT CONTRAST 3/17/2017 12:52 PM     COMPARISON: Head CT 11/12/2012.    HISTORY: Altered mental status.    TECHNIQUE: 5 mm thick axial CT images of the head were acquired  without IV contrast material.    FINDINGS:  There is mild diffuse cerebral volume loss. There are  subtle patchy areas of decreased density in the cerebral white matter  bilaterally that are consistent with sequela of chronic small vessel  ischemic disease.    The ventricles and basal cisterns are within normal limits in  configuration given the degree of cerebral volume loss.  There is no  midline shift. There are no extra-axial fluid collections.    No intracranial hemorrhage, mass or recent infarct.    The visualized paranasal sinuses are well-aerated. There is no  mastoiditis. There are no fractures of the visualized bones.      Impression    IMPRESSION:  Diffuse cerebral volume loss and cerebral white matter  changes consistent with chronic small vessel ischemic disease. No  evidence for acute intracranial pathology.      Radiation dose for this scan was reduced using automated exposure  control, adjustment of the mA and/or kV according to patient size, or  iterative reconstruction technique.    NARCISA VALDEZ MD

## 2017-03-18 NOTE — PROGRESS NOTES
Hospitalist cross cover    Paged regarding Na up to 157 despite D5W at 150 ml/hr.  Will increase to 175 ml/hr and place cherry for strict I/O.

## 2017-03-18 NOTE — PLAN OF CARE
Problem: Goal Outcome Summary  Goal: Goal Outcome Summary     Clinical Swallow Evaluation Completed                                      Result: Pt upright in bed as able for evaluation. RN changed oxymask to nasal cannula @ 5 LPM with O2 sats 98-99% throughout PO trials. Pt cooperative, although cues needed for quiet voice. Slow, lethargic overall with periods of dazed look, slight jerking movement. Difficulty with dry solid texture and thin liquids via straw/consecutive swallow. + weak cough. Tsp puree, sips of water with slow oral manipulation and swallow delay but appears WFL with feeding assist for rate, bolus size.     Recommend:  1. Diet of Dysphagia Diet Level 1 (Puree) with THIN liquids   (MD paged for order).  2. Follow Aspiration Precautions including:               To Reduce the Risk of Aspiration:  Sit upright 90 degrees for PO in chair, as able  Sit upright 15-30 min after PO  PO only when fully alert  Alternate liquids/solids  No straws  Small bites/sips  Slow rate for PO  Medications whole as able , crushed if needed  Level of supervision: total feeding assist due to lethargy     3. SLP to follow daily to address dysphagia until goals are met.     4. Anticipate D/C to group home with possible further SLP recommended pending progress.      See Progress Notes for full details of today's Clinical Swallowing assessment.    Thank you for this referral, call if concerns @ [facility] Department.  Speech-Language Pathology

## 2017-03-18 NOTE — PROVIDER NOTIFICATION
MD Notification    Notified Person:  MD    Notified Persons Name:     Notification Date/Time: 3-, 1120    Notification Interaction:  Talked with Physician    Purpose of Notification: Call received from [facility] lab regarding positive blood cultures.  notified of positive blood cultures.     Orders Received: No new orders received.    Comments:

## 2017-03-18 NOTE — PROGRESS NOTES
"   Clinical Bedside Swallow Evaluation  [facility]  03/18/17 1409   General Information   Onset Date 03/18/17   Start of Care Date 03/18/17   Referring Physician Dr. GIOVANA Reid   Patient Profile Review/OT: Additional Occupational Profile Info See Profile for full history and prior level of function   Patient/Family Goals Statement \"I want water!\"   Swallowing Evaluation Bedside swallow evaluation   Behaviorial Observations Lethargic;Confused   Mode of current nutrition NPO   Respiratory Status O2 Supply   Type of O2 supply Nasal cannula  (5 LPM)   Comments Pt with complex medical course. Admit 2/11/17 with multiple falls, R distal fibular fx, encephalopathy,  LYLA on CKD Stage IV. Hx DM2, HTN, schizoaffective disorder and developmental delay. Pt lives with her mother at baseline with plan to discharge from hospital to group home. ~2 days ago, pt with decreased appetite, lethargic, fever. RRT called 3/17 AM with BiPap due to lethargy, decreased O2 sats. Transfer to CCU. New dx UTI with sepsis, ? LLL infiltrate. Pt upright in bed for evaluation. Oxymask in place. RN changed to NC for PO trials. Pt sleepy but agreeable, cooperative. Periods of dazed look, slight jerky movement.     Past Medical History   Diagnosis Date     Anxiety      CKD (chronic kidney disease) stage 3, GFR 30-59 ml/min      baseline Cr 1.8-2     Cognitive deficits      Depression      Depressive disorder      Diabetes (H)      DM type 2 (diabetes mellitus, type 2) (H)      insulin dependent      HTN      Hyperlipidemia LDL goal < 70      Hypertension      Osteoarthritis      Schizoaffective disorder (H)        Clinical Swallow Evaluation   Oral Musculature generally intact  (difficult to fully assess; pt unable/unwilling )   Dentition present and adequate   Mucosal Quality sticky;good   Mandibular Strength and Mobility (generalized weakness)   Oral Labial Strength and Mobility (generalized weakness, incoordination)   Lingual Strength and " Mobility (generalized weakness, incoordination)   Buccal Strength and Mobility (weakness)   Laryngeal Function Cough;Throat clear;Swallow;Voicing initiated;Dry swallow palpated  (difficult to initiate swallow)   Oral Musculature Comments Limited assessment. Generalized weakness, incoordination impacted by lethargy.   Additional Documentation Yes   Clinical Swallow Eval: Thin Liquid Texture Trial   Mode of Presentation, Thin Liquids cup;spoon;straw;fed by clinician   Volume of Liquid or Food Presented ice chip x2, tsp water, sips water, consecutive swallows water via straw   Oral Phase of Swallow (oral holding, slow AP movement)   Pharyngeal Phase of Swallow reduction in laryngeal movement   Successful Strategies Trialed During Procedure (small sips, pacing)   Diagnostic Statement WFL for small sips with feeding assist for pacing. + s/sx of aspiration (weak cough) via straw.   Clinical Swallow Eval: Puree Solid Texture Trial   Mode of Presentation, Puree spoon;fed by clinician   Volume of Puree Presented tsp bites applesauce   Oral Phase, Puree (slow AP movement, oral holding)   Pharyngeal Phase, Puree reduction in laryngeal movement  (swallow delay)   Diagnostic Statement Appears WFL for small bites   Clinical Swallow Eval: Solid Food Texture Trial   Mode of Presentation, Solid fed by clinician   Volume of Solid Food Presented 1/4 cracker   Oral Phase, Solid (slow, effortful mastication with incoordination - + cough)   Pharyngeal Phase, Solid coughing/choking;reduction in laryngeal movement   Diagnostic Statement Unable to tolerate dry solid due to weakness, lethargy   General Therapy Interventions   Planned Therapy Interventions Dysphagia Treatment   Dysphagia treatment Modified diet education;Instruction of safe swallow strategies;Compensatory strategies for swallowing   Swallow Eval: Clinical Impressions   Skilled Criteria for Therapy Intervention Skilled criteria met.  Treatment indicated.   Functional  Assessment Scale (FAS) 3   Dysphagia Outcome Severity Scale (LILO) Level 3 - LILO   Diet texture recommendations Dysphagia diet level 1;Thin liquids  (feeding assist)   Recommended Feeding/Eating Techniques no straws;small sips/bites;alternate between small bites and sips of food/liquid;maintain upright posture during/after eating for 30 mins   Therapy Frequency daily   Predicted Duration of Therapy Intervention (days/wks) x5 days   Anticipated Discharge Disposition (group home)   Risks and Benefits of Treatment have been explained. Yes   Patient, family and/or staff in agreement with Plan of Care Yes   Clinical Impression Comments Pt upright in bed as able for evaluation. RN changed oxymask to nasal cannula @ 5 LPM with O2 sats 98-99% throughout PO trials. Pt cooperative, although cues needed for quiet voice. Slow, lethargic overall with periods of dazed look, slight jerking movement. Difficulty with dry solid texture and thin liquids via straw/consecutive swallow. + weak cough. Tsp puree, sips of water with slow oral manipulation and swallow delay but appears WFL with feeding assist for rate, bolus size.   Total Evaluation Time   Total Evaluation Time (Minutes) 10

## 2017-03-19 NOTE — PLAN OF CARE
Problem: Goal Outcome Summary  Goal: Goal Outcome Summary  Outcome: Improving  Pt alert to person and place. Lethargic but spontaneously calling out. Hypertensive. Tachycardic. No PRN meds required. +2 to RUTH MUSA. Repo Ax2-3 q2. meds whole in applesauce. Dunlap in place and patent. LS diminished. 1 large emesis. Otherwise tolerating thin liquids. BGM. MD notified in regards to hyperglycemia. See notes. Generalized pain controlled with tylenol. Will cont to monitor.
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
## 2017-03-19 NOTE — PLAN OF CARE
Problem: Goal Outcome Summary  Goal: Goal Outcome Summary        SLP - Pt seen for swallowing Tx in pm. Pt sleeping but woke easily to verbal prompt. Presented PO trials of thin/ice cream, puree, and nectar thick liquids. Pt exhibited mild-mod decreased laryngeal elevation deficits with all textures. Pt did not swallow puree despite max verbal and tactile cues to swallow and puree was removed from pt's mouth with swab. Pt exhibited cough x2 with nectar thick liquids. Education provided to RN regarding initiating NPO status. Pt is at a high risk for aspiration due to waxing and waning alertness/awareness. Recommend change to NPO status except essentials meds crushed with puree only when pt is fully alert. MD was paged regarding recommendation for NPO status and consideration of converting oral meds to IV form. Plan to reassess pt's swallow safety 3/20 am as able. Overall discharge plan pending.    ADDENDUM: Spoke with RN regarding increased alertness later this pm similar to status 3/18 pm.  RN reports patient tolerated puree and thin liquids without difficulty 3/18 pm when fully alert.  MD re-ordered dysphagia diet level 1 and thin liquids and RN to monitor tolerance.  SLP to continue Tx and ongoing assessment on 3/20.

## 2017-03-19 NOTE — PROGRESS NOTES
Cross cover    Called about elevated BG in 400 + range on D5W at 175 cc.  Chart reviewed.  I have discontinued dextrose and placed her on 1/2 NS at 125 cc/hr.  Accuchecks at midnight and 2 am.  Would proceed with qid with ssi.  Noted now 2/2 bottles + for GPC, staph, noted patient already on vancomycin and meropenem.    Hong Lee MD

## 2017-03-19 NOTE — PROGRESS NOTES
[facility]  Hospitalist Progress Note  Irene Billy MD 03/19/2017             Assessment and Plan:        Nel Farmer is a 61 year old female who was admitted on 2/11/2017 with frequent falls. She was noted to be lethargic with high fevers.      Acute encephalopathy:   Etiology of her encephalopathy is multifactorial. . Possible causes include sepsis with high fever, hypernatremia, medications, ?uremia. .she also has hypercalecmia with corrected calcium level of 12.1. ABG does not explain her encephalopathy with pCO2 of only 46. Has not gotten narcotics or benzos recently. Did Not get her psych meds yesterday or today due to her lethargy. CXR shows ? Left basilar infiltrate. UCx from 3/12 was +for klebsiella and UCx from 3/15 growing 50-100K LF GNR. She was started on Ceftriaxone on 3/16   -  check head CT- 3/17 no acute changes   - checked valproic acid level- normal   - blood cultures 2 out of 2 positive for gram positive cocci in clusters on vancomycin and meropenem      Sepsis ? Due to UTI vs LLL pneumonia  - had hx of urinary retention during hospital course. Cherry last removed on 3/9 and currently voiding large amounts per discussion with RN. UCx 3/12 growing Klebsiella. UCx from 3/15 +for GNR (50-100k).   - started on ceftriaxone on 3/16. Changed to meropenem as above in light of possible pneumonia  - blood culture from 3/16 NGTD  - will monitor, assess speech , increased risk for aspiration    ID consulted today   Check TTE to r/o endocarditis     Hypernatremia:  This is likely exacerbated by her DI.   - Na 155 this am  - IVF was switched to D5w at 125ml/hr per Nephrology. Appreciate their help   - f/u Na level q6hrs       LYLA on CKD stage IV with Lithium induced Nephrogenic DI/acute retention of urine:  -Patient had urinary retention and obstructive uropathy. 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          Multiple falls  -Patient presented with multiple falls  -Was living with her mother prior to this admission  -current plan is placement at LTC facility  -etiology of fall unclear, likely multi-factorial      Right distal fibular fracture  - CAM boot while ambulating, increase ambulation x4 daily when her encephalopathy is resolved  - mobilize with the help of nursing staff as much as possible.  - Tylenol for pain          DM2 with hypoglycemia   Ongoing hypoglycemia in hospital I suspect from more controlled diet. Dropping in am   - she is currently on 70/30 to 40 units qam and 20 qhs. Glipizide discontinued this stay due to hypoglycemia  . Continue with SSI, increase NPH to 20 units bid      HTN  - meds include clonidine, hydralazine, diltiazem, metoprolol, Imdur  - currently NPO. pharmD to change Metoprolol to IV  - will have IV hydralazine prn      Schizoaffective disorder with baseline cognitive deficits  -Patient does have baseline cognitive deficits  -Continue depakote, risperdone and zyprexa (when more alert)  -Psychiatry consult appreciated      Vitamin D deficiency  - d/c because of hypercalcemia       DVT Prophylaxis: Pneumatic Compression Device      Code Status: Full Code      Dispo: unclear at this time. She needs to recover from her acute illness        Interval History (Subjective):      More alert this am. No fever. Wants to eat                  Physical Exam:      Last Vital Signs:  /77  Pulse 117  Temp 100.7  F (38.2  C) (Oral)  Resp 18  Ht 1.6 m (5' 3\")  Wt 97.8 kg (215 lb 9.8 oz)  LMP 04/27/2012  SpO2 95%  BMI 38.19 kg/m2      Intake/Output Summary (Last 24 hours) at 03/18/17 0936  Last data filed at 03/18/17 0614   Gross per 24 hour   Intake              649 ml   Output             2200 ml   Net            -1551 ml       Constitutional: Weak, lethargic, no apparent distress   Respiratory: Clear to auscultation " bilaterally, no crackles or wheezing   Cardiovascular: Regular rate and rhythm, normal S1 and S2, and no murmur noted   Abdomen: Normal bowel sounds, soft, non-distended, non-tender   Skin: No rashes, no cyanosis, dry to touch   Neuro: Alert and oriented x1   Extremities: No edema, normal range of motion, weak    Other(s):        All other systems: Negative          Medications:      All current medications were reviewed with changes reflected in problem list.         Data:      All new lab and imaging data was reviewed.   Labs:    Recent Labs  Lab 03/19/17  0635 03/18/17  0536 03/17/17  1153   WBC 9.8 10.0 12.7*   HGB 11.1* 11.2* 11.3*   HCT 37.7 37.5 37.0   * 103* 100    264 244       Recent Labs  Lab 03/19/17  1155 03/19/17  0635 03/19/17  0030  03/18/17  0536  03/17/17  1153   * 156* 149*  < > 155*  < > 155*   POTASSIUM  --  4.1  --   --  4.1  --  4.2   CHLORIDE  --  123*  --   --  121*  --  121*   CO2  --  25  --   --  25  --  24   ANIONGAP  --  8  --   --  9  --  10   GLC  --  209*  --   --  406*  --  232*   BUN  --  54*  --   --  58*  --  51*   CR  --  3.64*  --   --  4.14*  --  4.09*   GFRESTIMATED  --  13*  --   --  11*  --  11*   GFRESTBLACK  --  15*  --   --  13*  --  13*   BENTLEY  --  10.3*  --   --  10.8*  --  10.9*   PROTTOTAL  --  7.5  --   --   --   --  8.0   ALBUMIN  --  2.3*  --   --   --   --  2.4*   BILITOTAL  --  0.2  --   --   --   --  0.2   ALKPHOS  --  67  --   --   --   --  72   AST  --  34  --   --   --   --  10   ALT  --  28  --   --   --   --  13   < > = values in this interval not displayed.   Imaging:   Recent Results (from the past 24 hour(s))   XR Chest Port 1 View    Narrative    XR CHEST PORT 1 VW 3/17/2017 12:03 PM    HISTORY: Loss of consciousness.    COMPARISON: 2/11/2017, 3/12/2014    FINDINGS: Left basilar opacity. Right lung is clear. No pneumothorax.  Stable heart size.      Impression    IMPRESSION: Left basilar opacity may represent atelectasis  or  consolidation.    HEIDI NORTH MD   CT Head w/o Contrast    Narrative    CT OF THE HEAD WITHOUT CONTRAST 3/17/2017 12:52 PM     COMPARISON: Head CT 11/12/2012.    HISTORY: Altered mental status.    TECHNIQUE: 5 mm thick axial CT images of the head were acquired  without IV contrast material.    FINDINGS:  There is mild diffuse cerebral volume loss. There are  subtle patchy areas of decreased density in the cerebral white matter  bilaterally that are consistent with sequela of chronic small vessel  ischemic disease.    The ventricles and basal cisterns are within normal limits in  configuration given the degree of cerebral volume loss.  There is no  midline shift. There are no extra-axial fluid collections.    No intracranial hemorrhage, mass or recent infarct.    The visualized paranasal sinuses are well-aerated. There is no  mastoiditis. There are no fractures of the visualized bones.      Impression    IMPRESSION:  Diffuse cerebral volume loss and cerebral white matter  changes consistent with chronic small vessel ischemic disease. No  evidence for acute intracranial pathology.      Radiation dose for this scan was reduced using automated exposure  control, adjustment of the mA and/or kV according to patient size, or  iterative reconstruction technique.    NARCISA VALDEZ MD

## 2017-03-19 NOTE — PROVIDER NOTIFICATION
MD paged in regards to Hyperglycemia. BG >500 @ 0000 and >400 @ 0130. Additional 10 units Novolog ordered. Recheck BG @ 0600.

## 2017-03-19 NOTE — PROGRESS NOTES
Renal Medicine Progress Note            Assessment/Plan:     1. CKD III 2/2 lithium toxicity. She has a baseline Scr ~ 2.2 mg/dl.       2. Recurrent LYLA. Prerenal. Improved with IVF.      3. Worsening hypernatremia 2/2 NDI and lack of access to free water. Please have someone to help her push free water intake. D5W was running at a high rate, so she was hyperglycemic, but I think she needs free water.       4. ID. HCAP. Bacteremia   -2/2 blood cultures are growing GPC   -on Meropenem only      5. Lethargy/AMS. Like from infection. Mental status is better than yesterday, but still not at her baseline      Plan  1. D/C 1/2 NS. Will start D5W at a lower rate of 125 cc/hr. Monitor blood sugar.   2. She needs someone to help push oral intake  3. Can consider dDAVP next        Interval History:     Patient mental status is better than yesterday. Blood cultures are growing GPC. D5W was stopped due to hyperglycemia. SNa improved with D5W. She is not taking in much free water.         Medications and Allergies:       insulin isophane human  12 Units Subcutaneous QAM AC     insulin isophane human  12 Units Subcutaneous At Bedtime     vancomycin place pruitt - receiving intermittent dosing  1 each Does not apply See Admin Instructions     sodium chloride (PF)  3 mL Intracatheter Q8H     meropenem  500 mg Intravenous Q12H     metoprolol  5 mg Intravenous Q6H     acetaminophen  975 mg Oral TID    Or     acetaminophen  975 mg Rectal TID     insulin aspart  1-7 Units Subcutaneous TID AC     insulin aspart  1-5 Units Subcutaneous At Bedtime     cloNIDine  0.2 mg Oral BID     hydrALAZINE  50 mg Oral 4x Daily     risperiDONE  1 mg Sublingual BID     polyethylene glycol  17 g Oral BID     diltiazem  240 mg Oral Daily     docusate sodium  100 mg Oral BID     divalproex  500 mg Oral QAM     divalproex  1,500 mg Oral At Bedtime     isosorbide mononitrate  30 mg Oral Daily     OLANZapine (zyPREXA) tablet 5 mg  5 mg Oral At Bedtime      "omeprazole (priLOSEC) CR capsule 20 mg  20 mg Oral BID AC        Allergies   Allergen Reactions     Amoxicillin      Amoxicillin      Haldol [Benzyl Alcohol]      Haldol [Haloperidol]      Pcn [Penicillin G]      Penicillins      Seroquel [Quetiapine] GI Disturbance            Physical Exam:   Vitals were reviewed  Heart Rate: 105, Blood pressure 167/77, pulse 117, temperature 100.7  F (38.2  C), temperature source Oral, resp. rate 18, height 1.6 m (5' 3\"), weight 97.8 kg (215 lb 9.8 oz), last menstrual period 04/27/2012, SpO2 95 %, not currently breastfeeding.    Wt Readings from Last 3 Encounters:   03/18/17 97.8 kg (215 lb 9.8 oz)   06/20/16 108.9 kg (240 lb)   05/27/16 108.9 kg (240 lb)       Intake/Output Summary (Last 24 hours) at 03/19/17 1344  Last data filed at 03/19/17 0624   Gross per 24 hour   Intake              960 ml   Output             3400 ml   Net            -2440 ml     GENERAL APPEARANCE: NAD  HEENT: Eyes/ears/nose/neck grossly normal  RESP: Good airflow anteriorly  CV: RRR, + murmur  ABDOMEN: obese, soft, NT  EXTREMITIES/SKIN: no rashes/lesions on observed skin; no edema  Neuro: Mental is better than yesterfday  +cherry in place         Data:     CBC RESULTS:     Recent Labs  Lab 03/19/17  0635 03/18/17  0536 03/17/17  1153 03/17/17  0638 03/16/17  1106 03/12/17  1950   WBC 9.8 10.0 12.7* 13.3* 10.0 9.0   RBC 3.72* 3.66* 3.69* 3.71* 3.49* 3.44*   HGB 11.1* 11.2* 11.3* 11.2* 10.7* 10.6*   HCT 37.7 37.5 37.0 36.7 33.5* 33.6*    264 244 243 219 181       Basic Metabolic Panel:    Recent Labs  Lab 03/19/17  1155 03/19/17  0635 03/19/17  0030 03/18/17  1805 03/18/17  1200 03/18/17  0536  03/17/17  1153 03/17/17  0638 03/16/17  1106 03/14/17  0619   * 156* 149* 151* 155* 155*  < > 155* 154* 145* 141   POTASSIUM  --  4.1  --   --   --  4.1  --  4.2 4.1 4.1 4.2   CHLORIDE  --  123*  --   --   --  121*  --  121* 119* 112* 106   CO2  --  25  --   --   --  25  --  24 26 24 26   BUN  --  54*  " --   --   --  58*  --  51* 44* 45* 42*   CR  --  3.64*  --   --   --  4.14*  --  4.09* 3.76* 3.65* 3.53*   GLC  --  209*  --   --   --  406*  --  232* 159* 139* 139*   BENTLEY  --  10.3*  --   --   --  10.8*  --  10.9* 10.8* 10.2* 9.9   < > = values in this interval not displayed.    INRNo lab results found in last 7 days.   Attestation:   I have reviewed today's relevant vital signs, notes, medications, labs and imaging.    Mario Abrams MD  [facility] - Nephrology  Office phone :[phone]  Pager: [phone]

## 2017-03-19 NOTE — PROVIDER NOTIFICATION
MD paged in regards to BG >400. D5W infusing @ 175mL/hr. Unit vs IMC  orders. +GPC clusters per [facility]. See new orders.

## 2017-03-20 NOTE — PROGRESS NOTES
Assessment and Plan:   LYLA: Hx CKD. Cr decreased last 3 d, 4.14 > > 3.61. UO 2.7 L yesterday. Wt up 5 L last 3 days. Will change to D51/2 NS a 125 cc/h.   Hypernatremia: Na improved. Hx of nephrogenic DI due to Li. Getting D5W at 125 cc/h.             Interval History:   Hypertension: on clonidine, diltiazem, hydralazine, imdur and metoprolol.   Uncertain if she is taking po or not.   UTI               Review of Systems:   C/O toes hurt. Not eating much.           Medications:       diltiazem  60 mg Oral Q6H MARIANNE     valproic acid  1,500 mg Oral At Bedtime     valproic acid  500 mg Oral QAM     insulin isophane human  20 Units Subcutaneous QAM AC     insulin isophane human  20 Units Subcutaneous At Bedtime     OLANZapine zydis  2.5 mg Sublingual At Bedtime     pantoprazole  40 mg Intravenous BID     hydrALAZINE  10 mg Intravenous Q6H     cefTRIAXone  1 g Intravenous Q24H     insulin aspart  1-7 Units Subcutaneous Q4H     vancomycin place pruitt - receiving intermittent dosing  1 each Does not apply See Admin Instructions     sodium chloride (PF)  3 mL Intracatheter Q8H     metoprolol  5 mg Intravenous Q6H     acetaminophen  975 mg Oral TID    Or     acetaminophen  975 mg Rectal TID     cloNIDine  0.2 mg Oral BID     risperiDONE  1 mg Sublingual BID     polyethylene glycol  17 g Oral BID     docusate sodium  100 mg Oral BID     isosorbide mononitrate  30 mg Oral Daily       IV fluid REPLACEMENT ONLY 125 mL/hr at 03/20/17 0834     Current active medications and PTA medications reviewed, see medication list for details.            Physical Exam:   Vitals were reviewed  Patient Vitals for the past 24 hrs:   BP Temp Temp src Pulse Heart Rate Resp SpO2 Weight   03/20/17 1236 145/60 - - - - - - -   03/20/17 1000 - - - - - 16 - -   03/20/17 0800 155/65 98.9  F (37.2  C) Oral 89 88 18 97 % -   03/20/17 0644 137/66 - - - - - - -   03/20/17 0641 - - - - - - - 99.6 kg (219 lb 9.3 oz)   03/20/17 0045 140/69 99.7  F (37.6   C) Oral - 88 18 96 % -   17 2352 156/74 - - - 100 - - -   17 2154 155/74 101.1  F (38.4  C) Axillary 94 94 16 95 % -   17 2100 155/70 101.3  F (38.5  C) Oral - 113 16 93 % -   17 1703 136/70 99.3  F (37.4  C) Oral 103 103 18 95 % -   17 1431 135/68 102.3  F (39.1  C) - 109 - - - -       Temp:  [98.9  F (37.2  C)-102.3  F (39.1  C)] 98.9  F (37.2  C)  Pulse:  [] 89  Heart Rate:  [] 88  Resp:  [16-18] 16  BP: (135-156)/(60-74) 145/60  SpO2:  [93 %-97 %] 97 %    Temperatures:  Current - Temp: 98.9  F (37.2  C); Max - Temp  Av.4  F (38  C)  Min: 98.9  F (37.2  C)  Max: 102.3  F (39.1  C)  Respiration range: Resp  Av  Min: 16  Max: 18  Pulse range: Pulse  Av.8  Min: 89  Max: 109  Blood pressure range: Systolic (24hrs), Av , Min:135 , Max:156   ; Diastolic (24hrs), Av, Min:60, Max:74    Pulse oximetry range: SpO2  Av.2 %  Min: 93 %  Max: 97 %    I/O last 3 completed shifts:  In: 4725 [P.O.:820; I.V.:3905]  Out: 2350 [Urine:2350]      Intake/Output Summary (Last 24 hours) at 17 1324  Last data filed at 17 0640   Gross per 24 hour   Intake             4725 ml   Output             2350 ml   Net             2375 ml       Alert, agitated, obese  Lungs with clear ant BS  Cor RRR nl S1 S2 no M  LE no edema    I/O 4845/2700       Wt Readings from Last 4 Encounters:   17 99.6 kg (219 lb 9.3 oz)   16 108.9 kg (240 lb)   16 108.9 kg (240 lb)   16 108.9 kg (240 lb)          Data:          Lab Results   Component Value Date     2017     2017     2017    Lab Results   Component Value Date    CHLORIDE 123 2017    CHLORIDE 121 2017    CHLORIDE 121 2017    Lab Results   Component Value Date    BUN 54 2017    BUN 58 2017    BUN 51 2017      Lab Results   Component Value Date    POTASSIUM 4.1 2017    POTASSIUM 4.1 2017    POTASSIUM 4.2 2017     Lab Results   Component Value Date    CO2 25 03/19/2017    CO2 25 03/18/2017    CO2 24 03/17/2017    Lab Results   Component Value Date    CR 3.61 03/20/2017    CR 3.64 03/19/2017    CR 4.14 03/18/2017        Recent Labs   Lab Test  03/19/17   0635  03/18/17   0536  03/17/17   1153   WBC  9.8  10.0  12.7*   HGB  11.1*  11.2*  11.3*   HCT  37.7  37.5  37.0   MCV  101*  103*  100   PLT  255  264  244     Recent Labs   Lab Test  03/19/17   0635  03/17/17   1153  02/12/17   0050   05/14/15   0737   03/27/10   1615   03/22/10   1435   AST  34  10  19   < >   --    < >   --    < >  37   ALT  28  13  26   < >   --    < >   --    < >  8   ALKPHOS  67  72  86   < >   --    < >   --    < >  106   BILITOTAL  0.2  0.2  0.3   < >   --    < >   --    < >  0.3   BILICONJ   --    --    --    --    --    --    --    --   0.0   ZACARIAS   --    --    --    --   18   --   18   --    --     < > = values in this interval not displayed.       Recent Labs   Lab Test  02/03/12   0740  02/02/12   0750  03/27/10   1615   MAG  2.6*  2.4*  3.2*     Recent Labs   Lab Test  02/24/17   0625  02/17/17   0655  02/03/12   0740   PHOS  4.1  3.6  3.6     Recent Labs   Lab Test  03/19/17   0635  03/18/17   0536  03/17/17   1153   BENTLEY  10.3*  10.8*  10.9*       Lab Results   Component Value Date    BENTLEY 10.3 (H) 03/19/2017     Lab Results   Component Value Date    WBC 9.8 03/19/2017    HGB 11.1 (L) 03/19/2017    HCT 37.7 03/19/2017     (H) 03/19/2017     03/19/2017     Lab Results   Component Value Date     (H) 03/20/2017    POTASSIUM 4.1 03/19/2017    CHLORIDE 123 (H) 03/19/2017    CO2 25 03/19/2017     (H) 03/19/2017     Lab Results   Component Value Date    BUN 54 (H) 03/19/2017    CR 3.61 (H) 03/20/2017     Lab Results   Component Value Date    MAG 2.6 (H) 02/03/2012     Lab Results   Component Value Date    PHOS 4.1 02/24/2017       Creatinine   Date Value Ref Range Status   03/20/2017 3.61 (H) 0.52 - 1.04 mg/dL Final    03/19/2017 3.64 (H) 0.52 - 1.04 mg/dL Final   03/18/2017 4.14 (H) 0.52 - 1.04 mg/dL Final   03/17/2017 4.09 (H) 0.52 - 1.04 mg/dL Final   03/17/2017 3.76 (H) 0.52 - 1.04 mg/dL Final   03/16/2017 3.65 (H) 0.52 - 1.04 mg/dL Final       Attestation:  I have reviewed today's vital signs, notes, medications, labs and imaging.     Champ Cain MD

## 2017-03-20 NOTE — PROGRESS NOTES
OCTAVIA  D: OCTAVIA following for discharge planning needs.  I: OCTAVIA left a message for Yara, Public Health Nurse with [facility] ([phone]) to follow up on the County Screening.  SW will await a return call back.  A: Patient is alert and oriented.  P: SW will continue to follow and assist with finalizing the discharge plan as appropriate.    CHERI Lopez      OCTAVIA  D: OCTAVIA following for discharge planning needs.  OCTAVIA received a return call from Yara with [facility] and she states that patient has not been approved for straight MA yet and that she needs to wait until this has been processed. Yara states that she will send a message to the Financial Team and will update OCTAVIA with what she finds out from that.  P: OCTAVIA will continue to follow and assist with finalizing the discharge plan as appropriate.    CHERI Lopez

## 2017-03-20 NOTE — PLAN OF CARE
Problem: Goal Outcome Summary  Goal: Goal Outcome Summary  Outcome: Improving  Pt more alert this afternoon. C/o pain in left foot with little relief from tylenol. Transferred to station 66 with all belongings at this time.

## 2017-03-20 NOTE — PLAN OF CARE
Problem: Goal Outcome Summary  Goal: Goal Outcome Summary        SLP - Pt seen for swallow Tx in am. Increased alertness compared to session yesterday. Presented PO trials of honey thick liquids, puree, and thin liquids. Delayed swallow initiation is most severe with puree textures. Pt requires max verbal cues to initiate swallow 85% of the time. Min-mod oral residue observed with puree textures. Thin liquid x1 cleared oral residue. Client tolerated thin liquid via cup with total clinician assistance. Pt educated regarding diet/liquid upgrade. Updated RN regarding diet upgrade and swallow precautions and strategies. Patient remains at borderline risk of aspiration due baseline cognitive deficits, alertness and distractibility issues. Plan to ensure diet tolerance and upgrade diet as indicated. Recommend continue with dysphagia diet level 1 and thin liquids only when fully alert and with 1:1 feeding assistance. Administer meds crushed in applesauce. Use following precautions: upright at 90 degrees, thin liquids by cup with total assist, small sips/bites, no straws, and alternate textures to clear oral residue. Cue and confirm all swallows. If pt does not swallow, place empty spoon in mouth and apply slight pressure to tongue. Discharge plans to be determined.

## 2017-03-20 NOTE — PLAN OF CARE
Problem: Goal Outcome Summary  Goal: Goal Outcome Summary  Outcome: No Change  Pt confused to situation, lethargic, arouses to voice.  Temp of 101.3, had rectal Tylenol, HR tachy, Tele NSR.  Turning and repositiong using the lift every 2 hours.  [place] intact, IVF infusing, encouraging po fluids frequently.  Pt able to tolerate thin liquids, coughed after taking po pills at bedtime with pudding, night shift RN updated.  Will continue to monitor.

## 2017-03-20 NOTE — PROGRESS NOTES
"[facility]  Infectious Disease Progress Note          Assessment and Plan:   IMP:  1. Schizophrenia  2. Fever.  Probably secondary to Klebsiella UTI  3. Blood cx positive for Staph epidermidis in one bottle and Staph hominis in one bottle.  Given the organisms this likely represents contaminant.    REC:  1. Cont IV ceftriaxone.  If continues to improve probably to PO quinolone tomorrow  2. Will d/c Vancomycin.        Interval History:   Main c/o is that she is hungry.  Afebrile.  WBC now normal.  Blood cx ident as above.              Medications:       diltiazem  60 mg Oral Q6H MARIANNE     valproic acid  1,500 mg Oral At Bedtime     valproic acid  500 mg Oral QAM     insulin isophane human  20 Units Subcutaneous QAM AC     insulin isophane human  20 Units Subcutaneous At Bedtime     OLANZapine zydis  2.5 mg Sublingual At Bedtime     pantoprazole  40 mg Intravenous BID     hydrALAZINE  10 mg Intravenous Q6H     cefTRIAXone  1 g Intravenous Q24H     insulin aspart  1-7 Units Subcutaneous Q4H     vancomycin place pruitt - receiving intermittent dosing  1 each Does not apply See Admin Instructions     sodium chloride (PF)  3 mL Intracatheter Q8H     metoprolol  5 mg Intravenous Q6H     acetaminophen  975 mg Oral TID    Or     acetaminophen  975 mg Rectal TID     cloNIDine  0.2 mg Oral BID     risperiDONE  1 mg Sublingual BID     polyethylene glycol  17 g Oral BID     docusate sodium  100 mg Oral BID     isosorbide mononitrate  30 mg Oral Daily                  Physical Exam:   Blood pressure 155/65, pulse 89, temperature 98.9  F (37.2  C), temperature source Oral, resp. rate 16, height 1.6 m (5' 3\"), weight 99.6 kg (219 lb 9.3 oz), last menstrual period 04/27/2012, SpO2 97 %, not currently breastfeeding.  [unfilled]  Vital Signs with Ranges  Temp:  [98.9  F (37.2  C)-102.3  F (39.1  C)] 98.9  F (37.2  C)  Pulse:  [] 89  Heart Rate:  [] 88  Resp:  [16-18] 16  BP: (135-156)/(65-74) " 155/65  SpO2:  [93 %-97 %] 97 %    Constitutional: Awake, alert, cooperative, no apparent distress   Lungs: Clear to auscultation bilaterally, no crackles or wheezing   Cardiovascular: Regular rate and rhythm, normal S1 and S2, and no murmur noted   Abdomen: Normal bowel sounds, soft, non-distended, non-tender   Skin: No rashes, no cyanosis, no edema   Other:           Data:   All microbiology laboratory data reviewed.  Recent Labs   Lab Test  03/19/17   0635  03/18/17   0536  03/17/17   1153   WBC  9.8  10.0  12.7*   HGB  11.1*  11.2*  11.3*   HCT  37.7  37.5  37.0   MCV  101*  103*  100   PLT  255  264  244     Recent Labs   Lab Test  03/20/17   0715  03/19/17   0635  03/18/17   0536   CR  3.61*  3.64*  4.14*     Recent Labs   Lab Test  04/16/09   1935   SED  13

## 2017-03-20 NOTE — PLAN OF CARE
Problem: Goal Outcome Summary  Goal: Goal Outcome Summary  Outcome: Improving  Pt remains A/O. Up with A2 and lift. Pain controlled with tylenol. DD1 thin liquid diet- no straws. Tele:NSR. Dunlap draining clear yellow urine. IV infusing.

## 2017-03-20 NOTE — PLAN OF CARE
Problem: Goal Outcome Summary  Goal: Goal Outcome Summary  Outcome: No Change  Pt somnolent. Repo Ax2 q2. VSS. Temp 99.7. Tele KEYONNA Dunlap in place and patent. Encouraging PO intake. Tolerating thin liquids. D5 infusing. BG >400. SSI given. Will cont to monitor.

## 2017-03-20 NOTE — PLAN OF CARE
"Problem: Goal Outcome Summary  Goal: Goal Outcome Summary  Outcome: Improving  Patient was lethargic most of shift but did wake at times, able to take am meds whole in pudding, one at a time and tolerated thin liquids without any difficulty, fed by staff evening meal, c/o of feeling like \"I am going to throw up\" Zofran given with no further complaints, turned and repositioned, cherry patent, IV Hydralazine given in am for elevated BP, no further issues, rectal Tylenol given for elevated temp effective.      "

## 2017-03-20 NOTE — PROGRESS NOTES
[facility]  Hospitalist Progress Note  Irene Billy MD 03/20/2017             Assessment and Plan:        Nel Farmer is a 61 year old female who was admitted on 2/11/2017 with frequent falls. She was noted to be lethargic with high fevers.      Acute encephalopathy:   Etiology of her encephalopathy is multifactorial. . Possible causes include sepsis with high fever, hypernatremia, medications, ?uremia. .she also has hypercalecmia with corrected calcium level of 12.1. ABG does not explain her encephalopathy with pCO2 of only 46. Has not gotten narcotics or benzos recently. Did Not get her psych meds yesterday or today due to her lethargy. CXR shows ? Left basilar infiltrate. UCx from 3/12 was +for klebsiella and UCx from 3/15 growing 50-100K LF GNR. She was started on Ceftriaxone on 3/16   -  check head CT- 3/17 no acute changes   - checked valproic acid level- normal   -- had hx of urinary retention during hospital course. Dunlap last removed on 3/9 and currently voiding large amounts per discussion with RN. UCx 3/12 growing Klebsiella. UCx from 3/15 +for GNR (50-100k).   - started on ceftriaxone on 3/16. Changed to meropenem as above in light of possible pneumonia  - blood culture from 3/16 NGTD  - will monitor, assess speech , increased risk for aspiration    ID consulted yesterday  Blood cultures turned out to be one staph epi and one staph hominis so most likely contaminant   So d/martha vancomycin per ID . Meropenem was d/martha and started on ceftriaxone yesterday for the UTI per Id  Continue ceftriaxone for the UTI   Mental status much better and more alert and awake today   D/martha ativan 1mg TID and holding zyprexa last few days   Restart ativan in the next 24-48 hours when more alert and awake . Watch for any signs of withdrawal   ANDREW showed LVH and valves are OK     Hypernatremia:  This is likely exacerbated by her DI.   - Na 147 this am  - IVF was switched to D5w 1/2 NS at 125ml/hr per  Nephrology. Appreciate their help   - f/u Na level q6hrs  Improving with IVF        LYLA on CKD stage IV with Lithium induced Nephrogenic DI/acute retention of urine:  -Patient had urinary retention and obstructive uropathy. 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          Multiple falls  -Patient presented with multiple falls  -Was living with her mother prior to this admission  -current plan is placement at LTC facility  -etiology of fall unclear, likely multi-factorial      Right distal fibular fracture  - CAM boot while ambulating, increase ambulation x4 daily when her encephalopathy is resolved  - mobilize with the help of nursing staff as much as possible.  - Tylenol for pain          DM2 with hypoglycemia   Ongoing hypoglycemia in hospital I suspect from more controlled diet. Dropping in am   - she is currently on 70/30 to 40 units qam and 20 qhs. Glipizide discontinued this stay due to hypoglycemia  . Continue with SSI, increase NPH to 30 units bid      HTN  - meds include clonidine, hydralazine, diltiazem, metoprolol, Imdur  - currently NPO. pharmD to change Metoprolol to IV  - will have IV hydralazine prn  All her BP meds and oral meds need to be changed to PO when she is able to take better PO       Schizoaffective disorder with baseline cognitive deficits  -Patient does have baseline cognitive deficits  -Continue depakote, risperdone and zyprexa (when more alert)  -Psychiatry consult appreciated      Vitamin D deficiency  - d/c because of hypercalcemia       DVT Prophylaxis: Pneumatic Compression Device      Code Status: Full Code      Dispo: unclear at this time. She needs to recover from her acute illness        Interval History (Subjective):      More alert this am. No fever. Wants to eat                  Physical Exam:      Last Vital Signs:  /60  " Pulse 89  Temp 98.9  F (37.2  C) (Oral)  Resp 16  Ht 1.6 m (5' 3\")  Wt 99.6 kg (219 lb 9.3 oz)  LMP 04/27/2012  SpO2 97%  BMI 38.9 kg/m2      Intake/Output Summary (Last 24 hours) at 03/18/17 0936  Last data filed at 03/18/17 0614   Gross per 24 hour   Intake              649 ml   Output             2200 ml   Net            -1551 ml       Constitutional: more alert and awake today    Respiratory: Clear to auscultation bilaterally, no crackles or wheezing   Cardiovascular: Regular rate and rhythm, normal S1 and S2, and no murmur noted   Abdomen: Normal bowel sounds, soft, non-distended, non-tender   Skin: No rashes, no cyanosis, dry to touch   Neuro: Alert and oriented x1   Extremities: No edema, normal range of motion, weak    Other(s):        All other systems: Negative          Medications:      All current medications were reviewed with changes reflected in problem list.         Data:      All new lab and imaging data was reviewed.   Labs:    Recent Labs  Lab 03/19/17  0635 03/18/17  0536 03/17/17  1153   WBC 9.8 10.0 12.7*   HGB 11.1* 11.2* 11.3*   HCT 37.7 37.5 37.0   * 103* 100    264 244       Recent Labs  Lab 03/20/17  0715 03/19/17  1155 03/19/17  0635  03/18/17  0536  03/17/17  1153   * 155* 156*  < > 155*  < > 155*   POTASSIUM  --   --  4.1  --  4.1  --  4.2   CHLORIDE  --   --  123*  --  121*  --  121*   CO2  --   --  25  --  25  --  24   ANIONGAP  --   --  8  --  9  --  10   GLC  --   --  209*  --  406*  --  232*   BUN  --   --  54*  --  58*  --  51*   CR 3.61*  --  3.64*  --  4.14*  --  4.09*   GFRESTIMATED 13*  --  13*  --  11*  --  11*   GFRESTBLACK 16*  --  15*  --  13*  --  13*   BENTLEY  --   --  10.3*  --  10.8*  --  10.9*   PROTTOTAL  --   --  7.5  --   --   --  8.0   ALBUMIN  --   --  2.3*  --   --   --  2.4*   BILITOTAL  --   --  0.2  --   --   --  0.2   ALKPHOS  --   --  67  --   --   --  72   AST  --   --  34  --   --   --  10   ALT  --   --  28  --   --   --  13   < > = " values in this interval not displayed.   Imaging:   Recent Results (from the past 24 hour(s))   XR Chest Port 1 View    Narrative    XR CHEST PORT 1 VW 3/17/2017 12:03 PM    HISTORY: Loss of consciousness.    COMPARISON: 2/11/2017, 3/12/2014    FINDINGS: Left basilar opacity. Right lung is clear. No pneumothorax.  Stable heart size.      Impression    IMPRESSION: Left basilar opacity may represent atelectasis or  consolidation.    HEIDI NORTH MD   CT Head w/o Contrast    Narrative    CT OF THE HEAD WITHOUT CONTRAST 3/17/2017 12:52 PM     COMPARISON: Head CT 11/12/2012.    HISTORY: Altered mental status.    TECHNIQUE: 5 mm thick axial CT images of the head were acquired  without IV contrast material.    FINDINGS:  There is mild diffuse cerebral volume loss. There are  subtle patchy areas of decreased density in the cerebral white matter  bilaterally that are consistent with sequela of chronic small vessel  ischemic disease.    The ventricles and basal cisterns are within normal limits in  configuration given the degree of cerebral volume loss.  There is no  midline shift. There are no extra-axial fluid collections.    No intracranial hemorrhage, mass or recent infarct.    The visualized paranasal sinuses are well-aerated. There is no  mastoiditis. There are no fractures of the visualized bones.      Impression    IMPRESSION:  Diffuse cerebral volume loss and cerebral white matter  changes consistent with chronic small vessel ischemic disease. No  evidence for acute intracranial pathology.      Radiation dose for this scan was reduced using automated exposure  control, adjustment of the mA and/or kV according to patient size, or  iterative reconstruction technique.    NARCISA VALDEZ MD

## 2017-03-20 NOTE — PROVIDER NOTIFICATION
Spoke to on call for Hospitalist regarding pt's blood sugar of 453 and updated on pt's lethargy.  See new order, and MD made aware of pt's bedtime meds that were held due to lethargy.

## 2017-03-20 NOTE — PROGRESS NOTES
Cross cover note:    Patient was started on D5 drip due to hypernatremia, BS >400.  Additional 10 unit of NPH given and sliding scale changed to q4 hours for now.    Carson Schuster MD  Hospitalist

## 2017-03-21 NOTE — PROGRESS NOTES
OCTAVIA  I) Spoke with Cristobal ([phone]) from Admissions at [facility]. She initially stated there was an opening in [place] should patient or her family wish to tour. Reviewed patient's care needs including turning and repositioning every 2 hours and use of a lift for transfers. Cristobal then stated she will need to review patient's care needs with their staff as this may not be an appropriate setting.  Asked Cristobal to communicate with us by phone and offered to fax any additional information they need.  P) Following for discharge planning.

## 2017-03-21 NOTE — PLAN OF CARE
Problem: Goal Outcome Summary  Goal: Goal Outcome Summary  Outcome: No Change  Pt d/t time and situation. Yells out at times. Up with lift. PT ordered. C/o mild pain in feet. bgm elevated, MD aware. IVF d/c. Incontinent of stool. Dunlap with good output. DDL1+thin liquids, fair appetite. Tele NSR. Plan is to d/c to LTC when stable. D/c date pending. Will continue to monitor and support.

## 2017-03-21 NOTE — PROVIDER NOTIFICATION
HS blood glucose 420, gave 6 units novolog insulin per correction dose scale, notified MD. Gave additional 5 units novolog insulin  per Dr. Thornton's order. Will monitor and recheck BG at 0200.

## 2017-03-21 NOTE — PLAN OF CARE
Problem: Goal Outcome Summary  Goal: Goal Outcome Summary  SLP: Pt laying in bed- appearing sleepy but asking for water. Pt demonstrates ability to tolerate thin liquids via straw sips without difficulty- no s/s of aspiraiton noted.  Pt began to doze off- SLP will cont to advance pt's diet as tolerated. Cont Dysphagia Diet level 1 with thin liquids. Safe swallow strategies. Straw OK. D/C group home. SLP will cont POC.

## 2017-03-21 NOTE — PROGRESS NOTES
[facility]  Hospitalist Progress Note  Hong Lee MD  03/21/2017    Assessment & Plan   Nel Farmer is a 61 year old female who was admitted on 2/11/2017 with frequent falls. She was noted to be lethargic with high fevers.       Acute encephalopathy:   Etiology of her encephalopathy is multifactorial.  Possible causes include sepsis with high fever, hypernatremia, medications, ?uremia. .she also has hypercalecmia with corrected calcium level of 12.1. ABG does not explain her encephalopathy with pCO2 of only 46. Has not gotten narcotics or benzos recently. Did Not get her psych meds yesterday or today due to her lethargy. CXR shows ? Left basilar infiltrate. UCx from 3/12 was +for klebsiella and UCx from 3/15 growing 50-100K LF GNR. She was started on Ceftriaxone on 3/16   - check head CT- 3/17 no acute changes   - checked valproic acid level- normal   -- had hx of urinary retention during hospital course. Cherry last removed on 3/9 and currently voiding large amounts per discussion with RN. UCx 3/12 growing Klebsiella. UCx from 3/15 +for GNR (50-100k).   - started on ceftriaxone on 3/16. Changed to meropenem as above in light of possible pneumonia  - blood culture from 3/16 NGTD  - changed to levaquin to complete a 14 day course.  (5/14)  - Blood cultures turned out to be one staph epi and one staph hominis so most likely contaminant   - Mental status back to baseline.  - resume zyprexa   - TTE showed LVH and valves are OK      Hypernatremia:  This is likely exacerbated by her DI.   - Na 135, discontinue IVF     LYLA on CKD stage IV with Lithium induced Nephrogenic DI/acute retention of urine:  -Patient had urinary retention and obstructive uropathy. 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           Multiple falls  -Patient presented with  "multiple falls  -Was living with her mother prior to this admission  -current plan is placement at LTC facility  -etiology of fall unclear, likely multi-factorial      Right distal fibular fracture  - CAM boot while ambulating, increase ambulation x4 daily when her encephalopathy is resolved  - mobilize with the help of nursing staff as much as possible.  - Tylenol for pain  - she is requiring 4 person assist with ambulation.          DM2 with hypoglycemia   Ongoing hyperglycemia in hospita.  - discontinue IVF with dextrose  - restart glipizide 2.5 mg bid  - Continue with SSI, and NPH to 30 units bid      HTN  - meds include clonidine, hydralazine, diltiazem, metoprolol, Imdur  - restart toprol xl  - will have IV hydralazine prn     Schizoaffective disorder with baseline cognitive deficits  -Patient does have baseline cognitive deficits  -Continue depakote, risperdone and zyprexa (when more alert)  -Psychiatry consult appreciated      Vitamin D deficiency  - d/c because of hypercalcemia       DVT Prophylaxis: Pneumatic Compression Device      Code Status: Full Code       Disposition: unclear at this time    Interval History   - chart reviewed  - she is alert and some complaints about her food  - noted numerous iv medications   - hyperglycemia with dextrose in IVF    -Data reviewed today: I reviewed all new labs and imaging over the last 24 hours. I personally reviewed no images or EKG's today.    Physical Exam   Heart Rate: 65, Blood pressure 139/59, pulse 63, temperature 98.8  F (37.1  C), temperature source Oral, resp. rate 18, height 1.6 m (5' 3\"), weight 99.9 kg (220 lb 3.2 oz), last menstrual period 04/27/2012, SpO2 98 %, not currently breastfeeding.  Vitals:    03/18/17 0614 03/20/17 0641 03/21/17 0520   Weight: 97.8 kg (215 lb 9.8 oz) 99.6 kg (219 lb 9.3 oz) 99.9 kg (220 lb 3.2 oz)     Vital Signs with Ranges  Temp:  [97.8  F (36.6  C)-98.8  F (37.1  C)] 98.8  F (37.1  C)  Pulse:  [63] 63  Heart Rate:  " [58-72] 65  Resp:  [16-20] 18  BP: (112-149)/(52-73) 139/59  SpO2:  [96 %-100 %] 98 %  I/O's Last 24 hours  I/O last 3 completed shifts:  In: 1920 [P.O.:420; I.V.:1500]  Out: 3600 [Urine:3600]    Constitutional: Awake, alert, cooperative, no apparent distress  Respiratory: Clear to auscultation bilaterally, no crackles or wheezing  Cardiovascular: Regular rate and rhythm, normal S1 and S2, and no murmur noted  GI: Normal bowel sounds, soft, non-distended, non-tender  Skin/Integumen: No rashes, no cyanosis, no edema  Other:      Medications   All medications were reviewed.       levofloxacin  250 mg Oral Every Other Day     metoprolol (TOPROL-XL) 24 hr tablet 100 mg  100 mg Oral Daily     glipiZIDE (GLUCOTROL XL) 24 hr tablet 2.5 mg  2.5 mg Oral BID AC     diltiazem  60 mg Oral Q6H MARIANNE     valproic acid  1,500 mg Oral At Bedtime     valproic acid  500 mg Oral QAM     insulin isophane human  30 Units Subcutaneous QAM AC     insulin isophane human  30 Units Subcutaneous At Bedtime     OLANZapine zydis  2.5 mg Sublingual At Bedtime     insulin aspart  1-7 Units Subcutaneous Q4H     sodium chloride (PF)  3 mL Intracatheter Q8H     acetaminophen  975 mg Oral TID    Or     acetaminophen  975 mg Rectal TID     cloNIDine  0.2 mg Oral BID     risperiDONE  1 mg Sublingual BID     polyethylene glycol  17 g Oral BID     docusate sodium  100 mg Oral BID     isosorbide mononitrate  30 mg Oral Daily        Data     Recent Labs  Lab 03/21/17  1050 03/20/17  0715 03/19/17  1155 03/19/17  0635  03/18/17  0536  03/17/17  1153   WBC 11.7*  --   --  9.8  --  10.0  --  12.7*   HGB 9.3*  --   --  11.1*  --  11.2*  --  11.3*   MCV 96  --   --  101*  --  103*  --  100     --   --  255  --  264  --  244    147* 155* 156*  < > 155*  < > 155*   POTASSIUM 3.9  --   --  4.1  --  4.1  --  4.2   CHLORIDE 104  --   --  123*  --  121*  --  121*   CO2 20  --   --  25  --  25  --  24   BUN 55*  --   --  54*  --  58*  --  51*   CR 3.28*  3.61*  --  3.64*  --  4.14*  --  4.09*   ANIONGAP 11  --   --  8  --  9  --  10   BENTLEY 8.9  --   --  10.3*  --  10.8*  --  10.9*   *  --   --  209*  --  406*  --  232*   ALBUMIN  --   --   --  2.3*  --   --   --  2.4*   PROTTOTAL  --   --   --  7.5  --   --   --  8.0   BILITOTAL  --   --   --  0.2  --   --   --  0.2   ALKPHOS  --   --   --  67  --   --   --  72   ALT  --   --   --  28  --   --   --  13   AST  --   --   --  34  --   --   --  10   < > = values in this interval not displayed.    No results found for this or any previous visit (from the past 24 hour(s)).    Hong Lee MD  Pager [phone]

## 2017-03-21 NOTE — PLAN OF CARE
Problem: Goal Outcome Summary  Goal: Goal Outcome Summary  Outcome: No Change  Now pt awake and started to call out. Gave scheduled risperdal and Zyprexa, did not give earlier due pt was lethargic and easily falls asleep. Will monitor.

## 2017-03-21 NOTE — PLAN OF CARE
Problem: Goal Outcome Summary  Goal: Goal Outcome Summary  Outcome: No Change  Tele SR/SB at times, other VSS. Confused, calm after risperdal and Zyprexa given. Re-oriented. Turned & repositioned every two hours. Pain controlled with scheduled tylenol.

## 2017-03-21 NOTE — PROGRESS NOTES
"[facility]  Infectious Disease Progress Note          Assessment and Plan:   IMP:  1. Schizophrenia  2. Fever.  Probably secondary to Klebsiella UTI  3. Blood cx positive for Staph epidermidis in one bottle and Staph hominis in one bottle.  Given the organisms this likely represents contaminant.    REC:  1. Will switch to po levaquin, to complete total two wk course of ab.  2. Have pt get f/u UC after completion of rx.        Interval History:   Feels that she is improving.  Afebrile.  WBC now normal.  Blood cx ident as above.              Medications:       diltiazem  60 mg Oral Q6H MARIANNE     valproic acid  1,500 mg Oral At Bedtime     valproic acid  500 mg Oral QAM     insulin isophane human  30 Units Subcutaneous QAM AC     insulin isophane human  30 Units Subcutaneous At Bedtime     OLANZapine zydis  2.5 mg Sublingual At Bedtime     pantoprazole  40 mg Intravenous BID     hydrALAZINE  10 mg Intravenous Q6H     cefTRIAXone  1 g Intravenous Q24H     insulin aspart  1-7 Units Subcutaneous Q4H     sodium chloride (PF)  3 mL Intracatheter Q8H     metoprolol  5 mg Intravenous Q6H     acetaminophen  975 mg Oral TID    Or     acetaminophen  975 mg Rectal TID     cloNIDine  0.2 mg Oral BID     risperiDONE  1 mg Sublingual BID     polyethylene glycol  17 g Oral BID     docusate sodium  100 mg Oral BID     isosorbide mononitrate  30 mg Oral Daily                  Physical Exam:   Blood pressure 132/64, pulse 63, temperature 98.7  F (37.1  C), temperature source Oral, resp. rate 18, height 1.6 m (5' 3\"), weight 99.9 kg (220 lb 3.2 oz), last menstrual period 04/27/2012, SpO2 96 %, not currently breastfeeding.  [unfilled]  Vital Signs with Ranges  Temp:  [97.8  F (36.6  C)-98.9  F (37.2  C)] 98.7  F (37.1  C)  Pulse:  [63-89] 63  Heart Rate:  [58-88] 58  Resp:  [16-20] 18  BP: (112-155)/(52-65) 132/64  SpO2:  [96 %-100 %] 96 %    Constitutional: Awake, alert, cooperative, no apparent distress   Lungs: " Clear to auscultation bilaterally, no crackles or wheezing   Cardiovascular: Regular rate and rhythm, normal S1 and S2, and no murmur noted   Abdomen: Normal bowel sounds, soft, non-distended, non-tender   Skin: No rashes, no cyanosis, no edema   Other:           Data:   All microbiology laboratory data reviewed.  Recent Labs   Lab Test  03/19/17   0635  03/18/17   0536  03/17/17   1153   WBC  9.8  10.0  12.7*   HGB  11.1*  11.2*  11.3*   HCT  37.7  37.5  37.0   MCV  101*  103*  100   PLT  255  264  244     Recent Labs   Lab Test  03/20/17   0715  03/19/17   0635  03/18/17   0536   CR  3.61*  3.64*  4.14*     Recent Labs   Lab Test  04/16/09   1935   SED  13

## 2017-03-21 NOTE — PROVIDER NOTIFICATION
Blood glucose 389, gave 5 units novolog insulin per correction dose scale, notified MD per admin instructions for glucose > 350. Gave additional 5 units novolog insulin  per Dr. Thornton's order. Will monitor and recheck BG at 0600

## 2017-03-22 NOTE — PLAN OF CARE
Problem: Goal Outcome Summary  Goal: Goal Outcome Summary  Outcome: Therapy, progress toward functional goals as expected  SLP: Pt seen for dysphagia tx. Pt tolerated thin liquids via straw, pureed textures, and regular solid textures with no overt s/sx of aspiration. Pt declined semisolid texture trials despite encouragement from SLP. Regular solid textures resulted in prolonged time for mastication and required consistent verbal cues to enforce safe swallow strategies (i.e. Avoid talking with PO). Recommend advance to dysphagia diet 2 and thin liquids. Pt should be fully upright and alert for all PO, take small single sips/bties, alternate consistencies, pace self, and avoid distractions (i.e. Talking) with PO. ST to continue to follow for diet tolerance and advanced trials as appropriate.

## 2017-03-22 NOTE — PROGRESS NOTES
Assessment and Plan:   Hypernatremia: Na now nl. Still has high UO with > 4L per day. 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   CKD/LYLA: Cr improving last 4 days, 4.14 > > 2.99.     Not on IVF. Will try acetazolamide for NDI. Monitor labs.             Interval History:   Hypertension: clonidine, diltiazem, imdur and metoprolol. Will adjust meds.   UTI                 Review of Systems:   C/O thirst. Not ambulating. No pain except in feet.           Medications:       insulin aspart  1-7 Units Subcutaneous 4x Daily AC & HS     levofloxacin  250 mg Oral Every Other Day     metoprolol (TOPROL-XL) 24 hr tablet 100 mg  100 mg Oral Daily     glipiZIDE (GLUCOTROL XL) 24 hr tablet 2.5 mg  2.5 mg Oral BID AC     heparin  5,000 Units Subcutaneous Q12H     diltiazem  60 mg Oral Q6H MARIANNE     valproic acid  1,500 mg Oral At Bedtime     valproic acid  500 mg Oral QAM     insulin isophane human  30 Units Subcutaneous QAM AC     insulin isophane human  30 Units Subcutaneous At Bedtime     OLANZapine zydis  2.5 mg Sublingual At Bedtime     sodium chloride (PF)  3 mL Intracatheter Q8H     acetaminophen  975 mg Oral TID    Or     acetaminophen  975 mg Rectal TID     cloNIDine  0.2 mg Oral BID     risperiDONE  1 mg Sublingual BID     polyethylene glycol  17 g Oral BID     docusate sodium  100 mg Oral BID     isosorbide mononitrate  30 mg Oral Daily        Current active medications and PTA medications reviewed, see medication list for details.            Physical Exam:   Vitals were reviewed  Patient Vitals for the past 24 hrs:   BP Temp Temp src Pulse Heart Rate Resp SpO2 Weight   03/22/17 0804 144/60 98.1  F (36.7  C) Axillary 75 - 16 98 % -   03/22/17 0614 147/70 - - - 68 - - 100.2 kg (220 lb 14.4 oz)   03/22/17 0202 141/51 98.5  F (36.9  C) Oral 65 - 18 98 % -   03/21/17 2106 120/55 - - 68 - - 98 % -   03/21/17 1830 152/67 - - 62 - - - -   03/21/17 1659 150/66 - - 65 - - - -   03/21/17 1625 (!) 117/38 97.9  F (36.6  C) Oral - 59 18 97 % -        Temp:  [97.9  F (36.6  C)-98.5  F (36.9  C)] 98.1  F (36.7  C)  Pulse:  [62-75] 75  Heart Rate:  [59-68] 68  Resp:  [16-18] 16  BP: (117-152)/(38-70) 144/60  SpO2:  [97 %-98 %] 98 %    Temperatures:  Current - Temp: 98.1  F (36.7  C); Max - Temp  Av.2  F (36.8  C)  Min: 97.9  F (36.6  C)  Max: 98.5  F (36.9  C)  Respiration range: Resp  Av.3  Min: 16  Max: 18  Pulse range: Pulse  Av  Min: 62  Max: 75  Blood pressure range: Systolic (24hrs), Av , Min:117 , Max:152   ; Diastolic (24hrs), Av, Min:38, Max:70    Pulse oximetry range: SpO2  Av.8 %  Min: 97 %  Max: 98 %    I/O last 3 completed shifts:  In: 1600 [P.O.:1600]  Out: 4625 [Urine:4625]      Intake/Output Summary (Last 24 hours) at 17 1321  Last data filed at 17 0830   Gross per 24 hour   Intake             1120 ml   Output             5425 ml   Net            -4305 ml       Alert and responsive  Lungs with clear BS  Cor RRR nl S1 S2 no M  LE no edema    I/O 3280/4225    Weights (last 365 days)      Date/Time Weight Who     17 0614 100.2 kg (220 lb 14.4 oz) AMARJIT     17 0520 99.9 kg (220 lb 3.2 oz) ML     17 0641 99.6 kg (219 lb 9.3 oz) KB     17 0614 97.8 kg (215 lb 9.8 oz) TN     17 0611 94.2 kg (207 lb 10.8 oz) TT     17 0634 99.9 kg (220 lb 3.8 oz) KB     17 0623 99 kg (218 lb 4.1 oz) SE     17 0543 101.2 kg (223 lb 1.7 oz) RH     17 0538 97.9 kg (215 lb 13.3 oz) TT     17 0531 99 kg (218 lb 4.1 oz)           Wt Readings from Last 4 Encounters:   17 100.2 kg (220 lb 14.4 oz)   16 108.9 kg (240 lb)   16 108.9 kg (240 lb)   16 108.9 kg (240 lb)          Data:          Lab Results   Component Value Date     2017     2017     2017    Lab Results   Component Value Date    CHLORIDE 112 2017    CHLORIDE 104 2017    CHLORIDE 123 2017    Lab Results   Component Value Date    BUN 52  03/22/2017    BUN 55 03/21/2017    BUN 54 03/19/2017      Lab Results   Component Value Date    POTASSIUM 3.9 03/22/2017    POTASSIUM 3.9 03/21/2017    POTASSIUM 4.1 03/19/2017    Lab Results   Component Value Date    CO2 23 03/22/2017    CO2 20 03/21/2017    CO2 25 03/19/2017    Lab Results   Component Value Date    CR 2.99 03/22/2017    CR 3.28 03/21/2017    CR 3.61 03/20/2017        Recent Labs   Lab Test  03/21/17   1050  03/19/17   0635  03/18/17   0536   WBC  11.7*  9.8  10.0   HGB  9.3*  11.1*  11.2*   HCT  29.8*  37.7  37.5   MCV  96  101*  103*   PLT  207  255  264     Recent Labs   Lab Test  03/19/17   0635  03/17/17   1153  02/12/17   0050   05/14/15   0737   03/27/10   1615   03/22/10   1435   AST  34  10  19   < >   --    < >   --    < >  37   ALT  28  13  26   < >   --    < >   --    < >  8   ALKPHOS  67  72  86   < >   --    < >   --    < >  106   BILITOTAL  0.2  0.2  0.3   < >   --    < >   --    < >  0.3   BILICONJ   --    --    --    --    --    --    --    --   0.0   ZACARIAS   --    --    --    --   18   --   18   --    --     < > = values in this interval not displayed.       Recent Labs   Lab Test  02/03/12   0740  02/02/12   0750  03/27/10   1615   MAG  2.6*  2.4*  3.2*     Recent Labs   Lab Test  02/24/17   0625  02/17/17   0655  02/03/12   0740   PHOS  4.1  3.6  3.6     Recent Labs   Lab Test  03/22/17   0915  03/21/17   1050  03/19/17   0635   BENTLEY  9.5  8.9  10.3*       Lab Results   Component Value Date    BENTLEY 9.5 03/22/2017     Lab Results   Component Value Date    WBC 11.7 (H) 03/21/2017    HGB 9.3 (L) 03/21/2017    HCT 29.8 (L) 03/21/2017    MCV 96 03/21/2017     03/21/2017     Lab Results   Component Value Date     03/22/2017    POTASSIUM 3.9 03/22/2017    CHLORIDE 112 (H) 03/22/2017    CO2 23 03/22/2017     (H) 03/22/2017     Lab Results   Component Value Date    BUN 52 (H) 03/22/2017    CR 2.99 (H) 03/22/2017     Lab Results   Component Value Date    MAG 2.6 (H)  02/03/2012     Lab Results   Component Value Date    PHOS 4.1 02/24/2017       Creatinine   Date Value Ref Range Status   03/22/2017 2.99 (H) 0.52 - 1.04 mg/dL Final   03/21/2017 3.28 (H) 0.52 - 1.04 mg/dL Final   03/20/2017 3.61 (H) 0.52 - 1.04 mg/dL Final   03/19/2017 3.64 (H) 0.52 - 1.04 mg/dL Final   03/18/2017 4.14 (H) 0.52 - 1.04 mg/dL Final   03/17/2017 4.09 (H) 0.52 - 1.04 mg/dL Final       Attestation:  I have reviewed today's vital signs, notes, medications, labs and imaging.     Champ Cain MD

## 2017-03-22 NOTE — PROGRESS NOTES
[facility]  Hospitalist Progress Note  Hong Lee MD  03/22/2017    Assessment & Plan   Nel Farmer is a 61 year old female who was admitted on 2/11/2017 with frequent falls. She was noted to be lethargic with high fevers.       Acute encephalopathy secondary to klebsiella UTI:   Etiology of her encephalopathy is multifactorial.  Possible causes include sepsis with high fever, hypernatremia, medications, ?uremia. .she also has hypercalecmia with corrected calcium level of 12.1. ABG does not explain her encephalopathy with pCO2 of only 46. Has not gotten narcotics or benzos recently. Did Not get her psych meds yesterday or today due to her lethargy. CXR shows ? Left basilar infiltrate. UCx from 3/12 was +for klebsiella and UCx from 3/15 growing 50-100K LF GNR. She was started on Ceftriaxone on 3/16   - check head CT- 3/17 no acute changes   - checked valproic acid level- normal   -- had hx of urinary retention during hospital course. Cherry last removed on 3/9 and currently voiding large amounts per discussion with RN. UCx 3/12 growing Klebsiella. UCx from 3/15 +for GNR (50-100k).   - started on ceftriaxone on 3/16. Changed to meropenem as above in light of possible pneumonia  - blood culture from 3/16 NGTD  - changed to levaquin to complete a 14 day course.  (6/14)  - Blood cultures turned out to be one staph epi and one staph hominis so most likely contaminant   - Mental status back to baseline.  - resume zyprexa   - TTE showed LVH and valves are OK      Hypernatremia:  This is likely exacerbated by her DI.   - Na 135, discontinue IVF     LYLA on CKD stage IV with Lithium induced Nephrogenic DI/acute retention of urine:  -Patient had urinary retention and obstructive uropathy. 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           Multiple  "falls  -Patient presented with multiple falls  -Was living with her mother prior to this admission  -current plan is placement at LTC facility  -etiology of fall unclear, likely multi-factorial      Right distal fibular fracture  - CAM boot while ambulating, increase ambulation x4 daily when her encephalopathy is resolved  - mobilize with the help of nursing staff as much as possible.  - Tylenol for pain  - she is requiring 4 person assist with ambulation.          DM2 with hypoglycemia   Ongoing hyperglycemia in hospita.  - better blood glucose  - restart glipizide 2.5 mg bid  - Continue with SSI, and NPH to 30 units bid      HTN  - meds include clonidine, hydralazine, diltiazem, metoprolol, Imdur  - restart toprol xl  - will have IV hydralazine prn     Schizoaffective disorder with baseline cognitive deficits  -Patient does have baseline cognitive deficits  -Continue depakote, risperdone and zyprexa (when more alert)  -Psychiatry consult appreciated      Vitamin D deficiency  - d/c because of hypercalcemia       DVT Prophylaxis: Pneumatic Compression Device      Code Status: Full Code       Disposition: unclear at this time    Interval History   - blood glucose under better control  - no new complaints other than food  - noted social work notes      -Data reviewed today: I reviewed all new labs and imaging over the last 24 hours. I personally reviewed no images or EKG's today.    Physical Exam   Heart Rate: 68, Blood pressure 144/60, pulse 75, temperature 98.1  F (36.7  C), temperature source Axillary, resp. rate 16, height 1.6 m (5' 3\"), weight 100.2 kg (220 lb 14.4 oz), last menstrual period 04/27/2012, SpO2 98 %, not currently breastfeeding.  Vitals:    03/20/17 0641 03/21/17 0520 03/22/17 0614   Weight: 99.6 kg (219 lb 9.3 oz) 99.9 kg (220 lb 3.2 oz) 100.2 kg (220 lb 14.4 oz)     Vital Signs with Ranges  Temp:  [97.9  F (36.6  C)-98.5  F (36.9  C)] 98.1  F (36.7  C)  Pulse:  [62-75] 75  Heart Rate:  [59-68] " 68  Resp:  [16-18] 16  BP: (117-152)/(38-70) 144/60  SpO2:  [97 %-98 %] 98 %  I/O's Last 24 hours  I/O last 3 completed shifts:  In: 1600 [P.O.:1600]  Out: 4625 [Urine:4625]    Constitutional: Awake, alert, cooperative, no apparent distress  Respiratory: Clear to auscultation bilaterally, no crackles or wheezing  Cardiovascular: Regular rate and rhythm, normal S1 and S2, and no murmur noted  GI: Normal bowel sounds, soft, non-distended, non-tender  Skin/Integumen: No rashes, no cyanosis, no edema  Other:      Medications   All medications were reviewed.       levofloxacin  250 mg Oral Every Other Day     metoprolol (TOPROL-XL) 24 hr tablet 100 mg  100 mg Oral Daily     glipiZIDE (GLUCOTROL XL) 24 hr tablet 2.5 mg  2.5 mg Oral BID AC     heparin  5,000 Units Subcutaneous Q12H     diltiazem  60 mg Oral Q6H MARIANNE     valproic acid  1,500 mg Oral At Bedtime     valproic acid  500 mg Oral QAM     insulin isophane human  30 Units Subcutaneous QAM AC     insulin isophane human  30 Units Subcutaneous At Bedtime     OLANZapine zydis  2.5 mg Sublingual At Bedtime     insulin aspart  1-7 Units Subcutaneous Q4H     sodium chloride (PF)  3 mL Intracatheter Q8H     acetaminophen  975 mg Oral TID    Or     acetaminophen  975 mg Rectal TID     cloNIDine  0.2 mg Oral BID     risperiDONE  1 mg Sublingual BID     polyethylene glycol  17 g Oral BID     docusate sodium  100 mg Oral BID     isosorbide mononitrate  30 mg Oral Daily        Data     Recent Labs  Lab 03/22/17  0915 03/21/17  1050 03/20/17  0715  03/19/17  0635  03/18/17  0536  03/17/17  1153   WBC  --  11.7*  --   --  9.8  --  10.0  --  12.7*   HGB  --  9.3*  --   --  11.1*  --  11.2*  --  11.3*   MCV  --  96  --   --  101*  --  103*  --  100   PLT  --  207  --   --  255  --  264  --  244    135 147*  < > 156*  < > 155*  < > 155*   POTASSIUM 3.9 3.9  --   --  4.1  --  4.1  --  4.2   CHLORIDE 112* 104  --   --  123*  --  121*  --  121*   CO2 23 20  --   --  25  --  25  --   24   BUN 52* 55*  --   --  54*  --  58*  --  51*   CR 2.99* 3.28* 3.61*  --  3.64*  --  4.14*  --  4.09*   ANIONGAP 8 11  --   --  8  --  9  --  10   BENTLEY 9.5 8.9  --   --  10.3*  --  10.8*  --  10.9*   * 456*  --   --  209*  --  406*  --  232*   ALBUMIN  --   --   --   --  2.3*  --   --   --  2.4*   PROTTOTAL  --   --   --   --  7.5  --   --   --  8.0   BILITOTAL  --   --   --   --  0.2  --   --   --  0.2   ALKPHOS  --   --   --   --  67  --   --   --  72   ALT  --   --   --   --  28  --   --   --  13   AST  --   --   --   --  34  --   --   --  10   < > = values in this interval not displayed.    No results found for this or any previous visit (from the past 24 hour(s)).    Hong Lee MD  Pager [phone]

## 2017-03-22 NOTE — PLAN OF CARE
Problem: Goal Outcome Summary  Goal: Goal Outcome Summary  Outcome: No Change  Alert and oriented to person and place. Forgetful. Tele SB-SR,  rest of vs stable at room air. Up with lift into chair x1. C/o of foot pain, gave scheduled tylenol with some relief. Appetite is good. , 175.  Incont of bowel, loose stool x2. Dunlap cath patent, clear yellow urine, cath care done. Turned and repositioned q 2hrs.

## 2017-03-22 NOTE — PLAN OF CARE
Problem: Goal Outcome Summary  Goal: Goal Outcome Summary  PT: Orders received, chart reviewed. Attempted to see patient for PT re-eval, pt eating breakfast.

## 2017-03-22 NOTE — PROGRESS NOTES
"[facility]  Infectious Disease Progress Note          Assessment and Plan:   IMP:  1. Schizophrenia  2. Fever.  Probably secondary to Klebsiella UTI  3. Blood cx positive for Staph epidermidis in one bottle and Staph hominis in one bottle.  Given the organisms this likely represents contaminant.    REC:  1. Cont po levaquin, to complete total two wk course of ab.  2. Have pt get f/u UC after completion of rx.  3. We will sign off.  Thanks.        Interval History:   Feels that she is improving.  Afebrile.  WBC now normal.  Blood cx ident as above.              Medications:       levofloxacin  250 mg Oral Every Other Day     metoprolol (TOPROL-XL) 24 hr tablet 100 mg  100 mg Oral Daily     glipiZIDE (GLUCOTROL XL) 24 hr tablet 2.5 mg  2.5 mg Oral BID AC     heparin  5,000 Units Subcutaneous Q12H     diltiazem  60 mg Oral Q6H MARIANNE     valproic acid  1,500 mg Oral At Bedtime     valproic acid  500 mg Oral QAM     insulin isophane human  30 Units Subcutaneous QAM AC     insulin isophane human  30 Units Subcutaneous At Bedtime     OLANZapine zydis  2.5 mg Sublingual At Bedtime     insulin aspart  1-7 Units Subcutaneous Q4H     sodium chloride (PF)  3 mL Intracatheter Q8H     acetaminophen  975 mg Oral TID    Or     acetaminophen  975 mg Rectal TID     cloNIDine  0.2 mg Oral BID     risperiDONE  1 mg Sublingual BID     polyethylene glycol  17 g Oral BID     docusate sodium  100 mg Oral BID     isosorbide mononitrate  30 mg Oral Daily                  Physical Exam:   Blood pressure 147/70, pulse 65, temperature 98.5  F (36.9  C), temperature source Oral, resp. rate 18, height 1.6 m (5' 3\"), weight 100.2 kg (220 lb 14.4 oz), last menstrual period 04/27/2012, SpO2 98 %, not currently breastfeeding.  [unfilled]  Vital Signs with Ranges  Temp:  [97.9  F (36.6  C)-98.8  F (37.1  C)] 98.5  F (36.9  C)  Pulse:  [62-68] 65  Heart Rate:  [59-72] 68  Resp:  [18] 18  BP: (117-152)/(38-73) 147/70  SpO2:  [97 " %-98 %] 98 %    Constitutional: Awake, alert, cooperative, no apparent distress   Lungs: Clear to auscultation bilaterally, no crackles or wheezing   Cardiovascular: Regular rate and rhythm, normal S1 and S2, and no murmur noted   Abdomen: Normal bowel sounds, soft, non-distended, non-tender   Skin: No rashes, no cyanosis, no edema   Other:           Data:   All microbiology laboratory data reviewed.  Recent Labs   Lab Test  03/21/17   1050  03/19/17   0635  03/18/17   0536   WBC  11.7*  9.8  10.0   HGB  9.3*  11.1*  11.2*   HCT  29.8*  37.7  37.5   MCV  96  101*  103*   PLT  207  255  264     Recent Labs   Lab Test  03/21/17   1050  03/20/17   0715  03/19/17   0635   CR  3.28*  3.61*  3.64*     Recent Labs   Lab Test  04/16/09   1935   SED  13

## 2017-03-22 NOTE — PLAN OF CARE
Problem: Goal Outcome Summary  Goal: Goal Outcome Summary  Outcome: No Change  Alert & oriented to self, total assist with mechanical lift, denies pain, VSS on RA, turn & reposition, incontinence of bowel, Dunlap patent with adequate urine, , Tele NSR.

## 2017-03-22 NOTE — PLAN OF CARE
Problem: Goal Outcome Summary  Goal: Goal Outcome Summary  Outcome: No Change  VSS, O2 wnl RA. A&O to self only. Up with lift. Tolerating DD1/thin liq, fair appetite. C/o neck and left foot pain, scheduled Tylenol given with some relief. Denies CP/SOB. Dunlap patent, adequate UOP. BM x1, large/soft. Blood sugars 372 and 335. On po Levaquin every other day. Tele NSR. DC pending placement, PT/SW following. RN will cont to monitor.

## 2017-03-23 NOTE — PLAN OF CARE
Problem: Goal Outcome Summary  Goal: Goal Outcome Summary  Outcome: No Change  VSS, O2 wnl RA. A&O to self and place only, calm and cooperative. UW lift/FR. Turn/repo q2h. Tolerating DD2/thin liq, fair appetite.  and 208. Tele NSR. IV SL. C/o right foot pain, scheduled Tylenol given with some relief. Dunlap patent, adequate UOP. DC pending placement, PT/SW following. RN will cont to monitor.

## 2017-03-23 NOTE — PLAN OF CARE
Problem: Goal Outcome Summary  Goal: Goal Outcome Summary  Outcome: No Change  Alert and oriented x2, disoriented to time and situation. VV stable at room air. Tele removed per order. Cognitive deficit at baseline. Up with ceiling lift to chair x1. DD2 diet with thin liquid, appetite good. BG 74 at 0800, recheck 132 at 0900. MD updated about BG 74, ordered to lower dose of Humulin. Noon . Non-blanchable redness spot on coccyx. Incontinent of stool, had small soft stool x1. Dunlap cath patent, draining clear yellow urine. Will continue to monitor.

## 2017-03-23 NOTE — PROGRESS NOTES
[facility]    Hospitalist Progress Note    Assessment & Plan   Nel Farmer is a 61 year old female who was admitted on 2/11/2017.    Acute encephalopathy secondary to klebsiella UTI:   Etiology of her encephalopathy is multifactorial. Possible causes include sepsis with high fever, hypernatremia, medications, ?uremia. .she also has hypercalecmia with corrected calcium level of 12.1. ABG does not explain her encephalopathy with pCO2 of only 46. Has not gotten narcotics or benzos recently. Did Not get her psych meds yesterday or today due to her lethargy. CXR shows ? Left basilar infiltrate. UCx from 3/12 was +for klebsiella and UCx from 3/15 growing 50-100K LF GNR. She was started on Ceftriaxone on 3/16   - check head CT- 3/17 no acute changes   - checked valproic acid level- normal   -- had hx of urinary retention during hospital course. Cherry last removed on 3/9 and currently voiding large amounts per discussion with RN. UCx 3/12 growing Klebsiella. UCx from 3/15 +for GNR (50-100k).   - started on ceftriaxone on 3/16. Changed to meropenem as above in light of possible pneumonia  - blood culture from 3/16 NGTD  - changed to levaquin to complete a 14 day course. (7/14)  - Blood cultures turned out to be one staph epi and one staph hominis so most likely contaminant   - Mental status back to baseline.  - resume zyprexa   - TTE showed LVH and valves are OK       Hypernatremia:  This is likely exacerbated by her DI.   - Na 134, discontinued  IVF 3/22      LYLA on CKD stage IV with Lithium induced Nephrogenic DI/acute retention of urine:  -Patient had urinary retention and obstructive uropathy. 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           Multiple falls  -Patient presented with multiple falls  -Was living with her mother prior to this  admission  -current plan is placement at LTC facility  -etiology of fall unclear, likely multi-factorial      Right distal fibular fracture  - CAM boot while ambulating, increase ambulation x4 daily when her encephalopathy is resolved  - mobilize with the help of nursing staff as much as possible.  - Tylenol for pain  - she is requiring 4 person assist with ambulation.          DM2 with hypoglycemia   Ongoing hyperglycemia in hospita.  - better blood glucose  - restart glipizide 2.5 mg bid  - Continue with SSI  -am glucose low this am, dec am lantus from 30 to 20      HTN  - meds include clonidine, hydralazine, diltiazem, metoprolol, Imdur  - restart toprol xl  - will have IV hydralazine prn      Schizoaffective disorder with baseline cognitive deficits  -Patient does have baseline cognitive deficits  -Continue depakote, risperdone and zyprexa (when more alert)  -Psychiatry consult appreciated      Vitamin D deficiency  - d/c because of hypercalcemia       DVT Prophylaxis: Pneumatic Compression Device      Code Status: Full Code     Disposition: Expected discharge once living situation found    Angie Park MD    Interval History   Complains of lucas feet pain    -Data reviewed today: I reviewed all new labs and imaging results over the last 24 hours. I personally reviewed no images or EKG's today.    Physical Exam   Temp: 98.2  F (36.8  C) Temp src: Oral BP: 132/47 Pulse: 70 Heart Rate: 66 Resp: 20 SpO2: 99 % O2 Device: None (Room air)    Vitals:    03/21/17 0520 03/22/17 0614 03/23/17 0513   Weight: 99.9 kg (220 lb 3.2 oz) 100.2 kg (220 lb 14.4 oz) 102.5 kg (225 lb 15.5 oz)     Vital Signs with Ranges  Temp:  [98.2  F (36.8  C)-98.8  F (37.1  C)] 98.2  F (36.8  C)  Pulse:  [70] 70  Heart Rate:  [58-66] 66  Resp:  [20] 20  BP: (130-136)/(47-62) 132/47  SpO2:  [96 %-99 %] 99 %  I/O last 3 completed shifts:  In: 2275 [P.O.:2275]  Out: 7225 [Urine:7225]    Constitutional: alert   Respiratory: clear to  auscultation, no wheezing or rhonchi   Cardiovascular: regular rate and rhythm without murmer or rub   GI:positive bowel sounds, non-tender   Skin/Integumen: no rashes    Other:        Medications        insulin aspart  1-7 Units Subcutaneous TID AC     insulin aspart  1-5 Units Subcutaneous At Bedtime     diltiazem  120 mg Oral BID     cloNIDine  0.3 mg Oral BID     acetaZOLAMIDE  500 mg Oral Q12H MARIANNE     levofloxacin  250 mg Oral Every Other Day     metoprolol (TOPROL-XL) 24 hr tablet 100 mg  100 mg Oral Daily     glipiZIDE (GLUCOTROL XL) 24 hr tablet 2.5 mg  2.5 mg Oral BID AC     heparin  5,000 Units Subcutaneous Q12H     valproic acid  1,500 mg Oral At Bedtime     valproic acid  500 mg Oral QAM     insulin isophane human  30 Units Subcutaneous QAM AC     insulin isophane human  30 Units Subcutaneous At Bedtime     OLANZapine zydis  2.5 mg Sublingual At Bedtime     sodium chloride (PF)  3 mL Intracatheter Q8H     acetaminophen  975 mg Oral TID    Or     acetaminophen  975 mg Rectal TID     risperiDONE  1 mg Sublingual BID     polyethylene glycol  17 g Oral BID     docusate sodium  100 mg Oral BID     isosorbide mononitrate  30 mg Oral Daily       Data     Recent Labs  Lab 03/22/17  0915 03/21/17  1050 03/20/17  0715  03/19/17  0635  03/18/17  0536  03/17/17  1153   WBC  --  11.7*  --   --  9.8  --  10.0  --  12.7*   HGB  --  9.3*  --   --  11.1*  --  11.2*  --  11.3*   MCV  --  96  --   --  101*  --  103*  --  100   PLT  --  207  --   --  255  --  264  --  244    135 147*  < > 156*  < > 155*  < > 155*   POTASSIUM 3.9 3.9  --   --  4.1  --  4.1  --  4.2   CHLORIDE 112* 104  --   --  123*  --  121*  --  121*   CO2 23 20  --   --  25  --  25  --  24   BUN 52* 55*  --   --  54*  --  58*  --  51*   CR 2.99* 3.28* 3.61*  --  3.64*  --  4.14*  --  4.09*   ANIONGAP 8 11  --   --  8  --  9  --  10   BENTLEY 9.5 8.9  --   --  10.3*  --  10.8*  --  10.9*   * 456*  --   --  209*  --  406*  --  232*   ALBUMIN  --    --   --   --  2.3*  --   --   --  2.4*   PROTTOTAL  --   --   --   --  7.5  --   --   --  8.0   BILITOTAL  --   --   --   --  0.2  --   --   --  0.2   ALKPHOS  --   --   --   --  67  --   --   --  72   ALT  --   --   --   --  28  --   --   --  13   AST  --   --   --   --  34  --   --   --  10   < > = values in this interval not displayed.    No results found for this or any previous visit (from the past 24 hour(s)).

## 2017-03-23 NOTE — PLAN OF CARE
Problem: Goal Outcome Summary  Goal: Goal Outcome Summary  Physical Therapy: Order received, evaluation completed and treatment initiated. Unable to obtain prior living situation, as patient is unable to verbalize. Per chart review, pt was living with mom, but mom is unable to continue to care for patient.   This date, patient requires max assist x 2 for bed mobility. Pt able to perform sitting EOB with CGA, has tendency to lean towards the left. Pt able to perform sit to/from stand with mod assist x 2, able to maintain static standing up to 30 seconds with mod assist. Unable to advance LE's to attempt a transfer or ambulation.  Recommendations: Pt will benefit from continued therapy to further address impairments and increase functional independence. Will need assist at discharge, recommend long term care facility.

## 2017-03-23 NOTE — PROGRESS NOTES
"   03/23/17 0900   Quick Adds   Type of Visit Initial PT Evaluation   Living Environment   Lives With parent(s)   Living Environment Comment Unable to obtain prior living situation, as patient is unable to verbalize. Per chart review, pt was living with mom, but mom is unable to continue to care for patient.   Self-Care   Usual Activity Tolerance fair   Current Activity Tolerance poor   Regular Exercise no   Equipment Currently Used at Home none   Activity/Exercise/Self-Care Comment Unable to obtain activity level prior to admit.   Functional Level Prior   Ambulation 0-->independent   Transferring 0-->independent   Fall history within last six months yes   Number of times patient has fallen within last six months 2   Which of the above functional risks had a recent onset or change? ambulation;transferring   Prior Functional Level Comment Per chart review, appears pt was IND with functional mobility at baseline   General Information   Onset of Illness/Injury or Date of Surgery - Date 02/12/17   Referring Physician Angie Park MD   Patient/Family Goals Statement \"to have less pain\"   Pertinent History of Current Problem (include personal factors and/or comorbidities that impact the POC) Pt is 61-year-old with schizoaffective disorder, depression, anxiety, cognitive deficits, diabetes, hypertension, hyperlipidemia, who was admitted due to multiple falls   Precautions/Limitations fall precautions   General Observations Per ortho note, CAM boot if having pain, does not need CAM boot if no pain   Cognitive Status Examination   Orientation person   Level of Consciousness confused   Follows Commands and Answers Questions 25% of the time   Personal Safety and Judgment impaired   Pain Assessment   Patient Currently in Pain (Unable to rate pain)   Posture    Posture Forward head position;Protracted shoulders   Range of Motion (ROM)   ROM Comment Unable to follow ROM commands, however, appears WFL with exception of " "right ankle 2/2 CAM boot.   Strength   Strength Comments Unable to follow MMT commands. Grossly 2+/5 as patient is able to move partially against gravity.   Bed Mobility   Bed Mobility Comments Max assist for supine to sit, max assist for scooting, max assist x 2 sit to supine   Transfer Skills   Transfer Comments Sit to from/stand mod assist x 2   Gait   Gait Comments NT   Balance   Balance Comments Fair in sitting, poor in standing   General Therapy Interventions   Planned Therapy Interventions balance training;bed mobility training;strengthening;transfer training;home program guidelines;progressive activity/exercise   Clinical Impression   Criteria for Skilled Therapeutic Intervention yes, treatment indicated   PT Diagnosis Impaired transfers   Influenced by the following impairments Decreased strength, decreased balance, poor endurance, cognition deficits   Functional limitations due to impairments Difficulty with bed mobility, transfers, ambulation   Clinical Presentation Stable/Uncomplicated   Clinical Presentation Rationale Vital signs stable   Clinical Decision Making (Complexity) Low complexity   Therapy Frequency` 5 times/week   Predicted Duration of Therapy Intervention (days/wks) 1 week   Anticipated Discharge Disposition Transitional Care Facility;Long Term Care Facility   Risk & Benefits of therapy have been explained Yes   Patient, Family & other staff in agreement with plan of care Yes   [facility] [employer] TM \"6 Clicks\"   2016, Trustees of [facility], under license to [employer].  All rights reserved.   6 Clicks Short Forms Basic Mobility Inpatient Short Form   [facility] AM-PAC  \"6 Clicks\" V.2 Basic Mobility Inpatient Short Form   1. Turning from your back to your side while in a flat bed without using bedrails? 2 - A Lot   2. Moving from lying on your back to sitting on the side of a flat bed without using bedrails? 2 - A Lot   3. Moving to and from a bed to a chair (including " a wheelchair)? 1 - Total   4. Standing up from a chair using your arms (e.g., wheelchair, or bedside chair)? 1 - Total   5. To walk in hospital room? 1 - Total   6. Climbing 3-5 steps with a railing? 1 - Total   Basic Mobility Raw Score (Score out of 24.Lower scores equate to lower levels of function) 8   Total Evaluation Time   Total Evaluation Time (Minutes) 15

## 2017-03-23 NOTE — PLAN OF CARE
Problem: Goal Outcome Summary  Goal: Goal Outcome Summary  Outcome: No Change  A&Ox2. Disoriented to situation and time. Cognitive deficit baseline. VSS on RA except bradycardic. Tele SB. BS 94 at 0200. C/o hunger and thirst. Dunlap draining clear yellow urine. Strict I&O every 2 hours. DD2 thin liquid diet. Small, circular, nonblanchable wound to inner R buttock. Turned/repositioned every 2 hours. Up with lift and assist of 2. Nursing will continue to monitor.

## 2017-03-23 NOTE — PROGRESS NOTES
D: SW following for DC planning. SW reviewed chart. Nursing is using a lift with pt. PT recommending LTC facility.  I: New referrals sent to [place] [facility], [facility], [facility], [facility], [facility], [facility] and [facility]. Voicemail left for Cristobal at [facility]. No return call.   P: OCTAVIA will follow.    CHERI Michaud  [phone]

## 2017-03-23 NOTE — PLAN OF CARE
Problem: Goal Outcome Summary  Goal: Goal Outcome Summary        SLP - Pt seen for swallow Tx in pm with mother present. Pt much more alert than in previous sessions. Presented PO trials of thin liquids, puree, soft solids, and solids. No overt signs of aspiration observed with any textures. Pt tolerated soft solids and solids but continues to be impulsive with pace and bite/sip size. Pt at slight risk for aspiration due to impulsiveness and highly variable alertness. Plan to ensure diet tolerance and upgrade diet/liquids as able. Recommend: 1. Continue with dysphagia level diet 2 and thin liquids with following precautions: upright at 90 degrees, small bites/sips, slow pace, and 1:1 supervision. Administer meds in applesauce. 2. Discharge to LTC with continued SLP swallow Tx.

## 2017-03-24 NOTE — PLAN OF CARE
Problem: Goal Outcome Summary  Goal: Goal Outcome Summary  Outcome: Improving  A/O x2, disoriented to time/situation. Cognitive deficit baseline. VSS on RA. Up with Ax2 and lift. Up to chair for supper. Turn/repostion in bed. C/o right foot pain, given scheduled tylenol. Fair appetite, DD2 thin liquids; /282. Encouraging fluids. Dunlap patent with adequate output. Nonblanchable redness spot on coccyx. Creat 2.71, nephrology following. Continue PO levaquin. D/C pending placement to LTC. Nursing will continue to monitor.

## 2017-03-24 NOTE — PROGRESS NOTES
[facility]    Hospitalist Progress Note    Assessment & Plan   Nel Farmer is a 61 year old female who was admitted on 2/11/2017.     Acute encephalopathy secondary to klebsiella UTI:   Etiology of her encephalopathy was multifactorial. Possible causes included sepsis with high fever, hypernatremia, medications, ?uremia. .she also has hypercalecmia with corrected calcium level of 12.1. ABG does not explain her encephalopathy with pCO2 of only 46. Has not gotten narcotics or benzos recently.   UCx from 3/12 was +for klebsiella and UCx from 3/15 growing 50-100K Klebsiella pneumonia.  She was started on Ceftriaxone on 3/16   - head CT- 3/17 no acute changes   - valproic acid level- normal   -- had hx of urinary retention during hospital course. Cherry last removed on 3/9 and currently voiding large amounts per discussion with RN. UCx 3/12 growing Klebsiella. UCx from 3/15 +for (50-100k).   - started on ceftriaxone on 3/16. Changed to meropenem  in light of possible pneumonia  - blood culture from 3/16 NGTD  - changed to levaquin to complete a 14 day course. (8/14)  - Blood cultures turned out to be one staph epi and one staph hominis so most likely contaminant   - Mental status back to baseline.  - resume zyprexa   - TTE showed LVH and valves are OK       Hypernatremia:  This is likely exacerbated by her DI.   - Na 134, discontinued  IVF 3/22  -pt refused blood draw today, will reschedule for tomorrow.      LYLA on CKD stage IV with Lithium induced Nephrogenic DI/acute retention of urine:  -Patient had urinary retention and obstructive uropathy. 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           Multiple falls  -Patient presented with multiple falls  -Was living with her mother prior to this admission  -current plan is placement at [facility]  facility  -etiology of fall unclear, likely multi-factorial      Right distal fibular fracture  - CAM boot while ambulating, encourage ambulation  -sat in chair today for several hours  - mobilize with the help of nursing staff as much as possible.  - Tylenol for pain  - she is requiring 4 person assist with ambulation.          DM2 with hypoglycemia   Ongoing hyperglycemia in hospita.  - better blood glucose  - restart glipizide 2.5 mg bid  - Continue with SSI  -am glucose low 3/23 am, dec'd am lantus from 30 to 20      HTN  - meds include clonidine, hydralazine, diltiazem, metoprolol, Imdur  - Blood pressures soft today  -decreased toprol from 100 to 25, but hold if HR< 60  -diltiazem dec from bid to q evening       Schizoaffective disorder with baseline cognitive deficits  -Patient does have baseline cognitive deficits  -Continue depakote, risperdone and zyprexa (when more alert)  -Psychiatry consult appreciated      Vitamin D deficiency  - d/c because of hypercalcemia       DVT Prophylaxis: Pneumatic Compression Device      Code Status: Full Code      Disposition: Expected discharge once living situation found    Angie Park MD    Interval History   Spent day in chair, no new issues except for lowish blood pressure as above    -Data reviewed today: I reviewed all new labs and imaging results over the last 24 hours. I personally reviewed no images or EKG's today.    Physical Exam   Temp: 98  F (36.7  C) Temp src: Oral BP: 145/62   Heart Rate: 58 Resp: 18 SpO2: 99 % O2 Device: None (Room air)    Vitals:    03/22/17 0614 03/23/17 0513 03/24/17 0704   Weight: 100.2 kg (220 lb 14.4 oz) 102.5 kg (225 lb 15.5 oz) 103.1 kg (227 lb 4.7 oz)     Vital Signs with Ranges  Temp:  [97.8  F (36.6  C)-98  F (36.7  C)] 98  F (36.7  C)  Heart Rate:  [53-64] 58  Resp:  [18] 18  BP: ()/(42-63) 145/62  SpO2:  [96 %-100 %] 99 %  I/O last 3 completed shifts:  In: 2320 [P.O.:2320]  Out: 0867  [Urine:5075]    Constitutional: alert   Respiratory: clear to auscultation, no wheezing or rhonchi   Cardiovascular: regular rate and rhythm without murmer or rub   GI:positive bowel sounds, non-tender   Skin/Integumen: no rashes    Other:        Medications        insulin isophane human  20 Units Subcutaneous QAM AC     levofloxacin  250 mg Oral Daily     insulin aspart  1-7 Units Subcutaneous TID AC     insulin aspart  1-5 Units Subcutaneous At Bedtime     diltiazem  120 mg Oral BID     cloNIDine  0.3 mg Oral BID     acetaZOLAMIDE  500 mg Oral Q12H MARIANNE     metoprolol (TOPROL-XL) 24 hr tablet 100 mg  100 mg Oral Daily     glipiZIDE (GLUCOTROL XL) 24 hr tablet 2.5 mg  2.5 mg Oral BID AC     heparin  5,000 Units Subcutaneous Q12H     valproic acid  1,500 mg Oral At Bedtime     valproic acid  500 mg Oral QAM     insulin isophane human  30 Units Subcutaneous At Bedtime     OLANZapine zydis  2.5 mg Sublingual At Bedtime     sodium chloride (PF)  3 mL Intracatheter Q8H     acetaminophen  975 mg Oral TID    Or     acetaminophen  975 mg Rectal TID     risperiDONE  1 mg Sublingual BID     polyethylene glycol  17 g Oral BID     docusate sodium  100 mg Oral BID     isosorbide mononitrate  30 mg Oral Daily       Data     Recent Labs  Lab 03/23/17  0903 03/22/17  0915 03/21/17  1050  03/19/17  0635  03/18/17  0536   WBC  --   --  11.7*  --  9.8  --  10.0   HGB  --   --  9.3*  --  11.1*  --  11.2*   MCV  --   --  96  --  101*  --  103*   PLT  --   --  207  --  255  --  264   * 143 135  < > 156*  < > 155*   POTASSIUM 3.9 3.9 3.9  --  4.1  --  4.1   CHLORIDE 113* 112* 104  --  123*  --  121*   CO2 24 23 20  --  25  --  25   BUN 41* 52* 55*  --  54*  --  58*   CR 2.71* 2.99* 3.28*  < > 3.64*  --  4.14*   ANIONGAP 8 8 11  --  8  --  9   BENTLEY 9.8 9.5 8.9  --  10.3*  --  10.8*   * 251* 456*  --  209*  --  406*   ALBUMIN  --   --   --   --  2.3*  --   --    PROTTOTAL  --   --   --   --  7.5  --   --    BILITOTAL  --   --    --   --  0.2  --   --    ALKPHOS  --   --   --   --  67  --   --    ALT  --   --   --   --  28  --   --    AST  --   --   --   --  34  --   --    < > = values in this interval not displayed.    No results found for this or any previous visit (from the past 24 hour(s)).

## 2017-03-24 NOTE — PROGRESS NOTES
"D: SW following for DC planning.   I: [facility] can accept pt on Monday. Voicemail left for pt's mother. PAS done. Level 2 screening triggered due to pt considered to have DD. OCTAVIA spoke with [facility] and [facility] will call Monday about the date for the screen. Voicemail left for Cherri at [facility], [phone]. Mirela at [facility] updated. No return call from [facility] with [facility].   P: OCTAVIA will follow.     CHERI Michaud  [phone]    Addendum: Pt's mother called back. She is agreeable. She states pt has always been a \"slow learner.\"    PAS-RR    D: Per DHS regulation, SW completed and submitted PAS-RR to [place] [employer] Direct Connect via the Senior LinkAge Line.  PAS-RR confirmation # is : [id number]    I: SW spoke with mother and they are aware a PAS-RR has been submitted.  SW reviewed with mother that they may be contacted for a follow up appointment within 10 days of hospital discharge if their SNF stay is < 30 days.  Contact information for [facility] LinkAge Line was also provided.    A: Mother verbalized understanding.    P: Further questions may be directed to [facility] LinkAge Line at #[phone], option #4 for PAS-RR staff.    "

## 2017-03-24 NOTE — PLAN OF CARE
Problem: Goal Outcome Summary  Goal: Goal Outcome Summary  Outcome: No Change  A&Ox2. Disoriented to time and situation. Cognitive deficit baseline. VSS on RA except for bradycardia and hypotension. LS diminished. Dunlap draining clear yellow urine. Up with lift and assist of 2. Turned/repositioned every 2 hours.  at 0100 before humulin 30 units was administered and  at 0230 after receiving humulin 30 units. DD2 thin liquid diet. Wound on coccyx is red, nonblanchable. Nursing will continue to monitor.

## 2017-03-24 NOTE — PROGRESS NOTES
Assessment and Plan:   LYLA: Cr improving.   NDI: UO 5225 ml yest. PNa 145. On diamox at 500 mg po bid.             Interval History:   Hypertension  UTI  DM  Psych disease                 Review of Systems:   Notes thirst.           Medications:       metoprolol  25 mg Oral Daily     diltiazem  120 mg Oral QPM     insulin isophane human  20 Units Subcutaneous QAM AC     levofloxacin  250 mg Oral Daily     insulin aspart  1-7 Units Subcutaneous TID AC     insulin aspart  1-5 Units Subcutaneous At Bedtime     cloNIDine  0.3 mg Oral BID     acetaZOLAMIDE  500 mg Oral Q12H MARIANNE     glipiZIDE (GLUCOTROL XL) 24 hr tablet 2.5 mg  2.5 mg Oral BID AC     heparin  5,000 Units Subcutaneous Q12H     valproic acid  1,500 mg Oral At Bedtime     valproic acid  500 mg Oral QAM     insulin isophane human  30 Units Subcutaneous At Bedtime     OLANZapine zydis  2.5 mg Sublingual At Bedtime     sodium chloride (PF)  3 mL Intracatheter Q8H     acetaminophen  975 mg Oral TID    Or     acetaminophen  975 mg Rectal TID     risperiDONE  1 mg Sublingual BID     polyethylene glycol  17 g Oral BID     docusate sodium  100 mg Oral BID     isosorbide mononitrate  30 mg Oral Daily        Current active medications and PTA medications reviewed, see medication list for details.            Physical Exam:   Vitals were reviewed  Patient Vitals for the past 24 hrs:   BP Temp Temp src Heart Rate Resp SpO2 Weight   03/24/17 1033 145/62 - - 58 18 - -   03/24/17 0704 - - - - - - 103.1 kg (227 lb 4.7 oz)   03/24/17 0700 131/52 98  F (36.7  C) - 56 18 99 % -   03/24/17 0006 96/42 - - 53 18 96 % -   03/23/17 2316 114/50 - - 62 - 97 % -   03/23/17 2142 137/63 - - 64 - 99 % -   03/23/17 1654 - - - - 18 - -   03/23/17 1555 119/55 97.8  F (36.6  C) Oral 62 18 100 % -   03/23/17 1554 - - - - 18 - -       Temp:  [97.8  F (36.6  C)-98  F (36.7  C)] 98  F (36.7  C)  Heart Rate:  [53-64] 58  Resp:  [18] 18  BP: ()/(42-63) 145/62  SpO2:  [96 %-100 %] 99  %    Temperatures:  Current - Temp: 98  F (36.7  C); Max - Temp  Av.9  F (36.6  C)  Min: 97.8  F (36.6  C)  Max: 98  F (36.7  C)  Respiration range: Resp  Av  Min: 18  Max: 18  Pulse range: No Data Recorded  Blood pressure range: Systolic (24hrs), Av , Min:96 , Max:145   ; Diastolic (24hrs), Av, Min:42, Max:63    Pulse oximetry range: SpO2  Av.2 %  Min: 96 %  Max: 100 %    I/O last 3 completed shifts:  In: 2970 [P.O.:2970]  Out: 5325 [Urine:5325]      Intake/Output Summary (Last 24 hours) at 17 1529  Last data filed at 17 1349   Gross per 24 hour   Intake             2970 ml   Output             5325 ml   Net            -2355 ml       Resting in bed  Dunlap in place       Wt Readings from Last 4 Encounters:   17 103.1 kg (227 lb 4.7 oz)   16 108.9 kg (240 lb)   16 108.9 kg (240 lb)   16 108.9 kg (240 lb)          Data:          Lab Results   Component Value Date     2017     2017     2017    Lab Results   Component Value Date    CHLORIDE 113 2017    CHLORIDE 112 2017    CHLORIDE 104 2017    Lab Results   Component Value Date    BUN 41 2017    BUN 52 2017    BUN 55 2017      Lab Results   Component Value Date    POTASSIUM 3.9 2017    POTASSIUM 3.9 2017    POTASSIUM 3.9 2017    Lab Results   Component Value Date    CO2 24 2017    CO2 23 2017    CO2 20 2017    Lab Results   Component Value Date    CR 2.71 2017    CR 2.99 2017    CR 3.28 2017        Recent Labs   Lab Test  17   1050  17   0635  17   0536   WBC  11.7*  9.8  10.0   HGB  9.3*  11.1*  11.2*   HCT  29.8*  37.7  37.5   MCV  96  101*  103*   PLT  207  255  264     Recent Labs   Lab Test  17   0635  17   1153  17   0050   05/14/15   0737   03/27/10   1615   03/22/10   1435   AST  34  10  19   < >   --    < >   --    < >  37   ALT  28  13  26    < >   --    < >   --    < >  8   ALKPHOS  67  72  86   < >   --    < >   --    < >  106   BILITOTAL  0.2  0.2  0.3   < >   --    < >   --    < >  0.3   BILICONJ   --    --    --    --    --    --    --    --   0.0   ZACARIAS   --    --    --    --   18   --   18   --    --     < > = values in this interval not displayed.       Recent Labs   Lab Test  02/03/12   0740  02/02/12   0750  03/27/10   1615   MAG  2.6*  2.4*  3.2*     Recent Labs   Lab Test  02/24/17   0625  02/17/17   0655  02/03/12   0740   PHOS  4.1  3.6  3.6     Recent Labs   Lab Test  03/23/17   0903  03/22/17   0915  03/21/17   1050   BENTLEY  9.8  9.5  8.9       Lab Results   Component Value Date    BENTLEY 9.8 03/23/2017     Lab Results   Component Value Date    WBC 11.7 (H) 03/21/2017    HGB 9.3 (L) 03/21/2017    HCT 29.8 (L) 03/21/2017    MCV 96 03/21/2017     03/21/2017     Lab Results   Component Value Date     (H) 03/23/2017    POTASSIUM 3.9 03/23/2017    CHLORIDE 113 (H) 03/23/2017    CO2 24 03/23/2017     (H) 03/23/2017     Lab Results   Component Value Date    BUN 41 (H) 03/23/2017    CR 2.71 (H) 03/23/2017     Lab Results   Component Value Date    MAG 2.6 (H) 02/03/2012     Lab Results   Component Value Date    PHOS 4.1 02/24/2017       Creatinine   Date Value Ref Range Status   03/23/2017 2.71 (H) 0.52 - 1.04 mg/dL Final   03/22/2017 2.99 (H) 0.52 - 1.04 mg/dL Final   03/21/2017 3.28 (H) 0.52 - 1.04 mg/dL Final   03/20/2017 3.61 (H) 0.52 - 1.04 mg/dL Final   03/19/2017 3.64 (H) 0.52 - 1.04 mg/dL Final   03/18/2017 4.14 (H) 0.52 - 1.04 mg/dL Final       Attestation:  I have reviewed today's vital signs, notes, medications, labs and imaging.     Champ Cain MD

## 2017-03-24 NOTE — PROGRESS NOTES
Unable to give bedtime dose of humulin because not enough insulin left in pen. Pharmacy sent a message at 2240 and medication still not on floor. Passed on to next shift nurse.

## 2017-03-24 NOTE — PLAN OF CARE
Problem: Goal Outcome Summary  Goal: Goal Outcome Summary  Outcome: No Change  Disoriented to time/situation. UP with lift, in chair for lunch.  and 240, novolog s/s given. Metoprolol and Cardizem doses adjusted, BP's soft overnight. Sats stable RA. Calls out at times. Dunlap patent with adequate output, adequate intake. Continue to monitor.

## 2017-03-25 NOTE — PROGRESS NOTES
[facility]    Hospitalist Progress Note    Assessment & Plan   Nel Farmer is a 61 year old female who was admitted on 2/11/2017.     Acute encephalopathy secondary to klebsiella UTI:   Etiology of her encephalopathy was multifactorial. Possible causes included sepsis with high fever, hypernatremia, medications, ?uremia. .she also has hypercalecmia with corrected calcium level of 12.1. ABG does not explain her encephalopathy with pCO2 of only 46. Has not gotten narcotics or benzos recently. UCx from 3/12 was +for klebsiella and UCx from 3/15 growing 50-100K Klebsiella pneumonia.  She was started on Ceftriaxone on 3/16   - head CT- 3/17 no acute changes   - valproic acid level- normal   -- had hx of urinary retention during hospital course. Cherry last removed on 3/9 and currently voiding large amounts per discussion with RN. UCx 3/12 growing Klebsiella. UCx from 3/15 +for (50-100k).   - started on ceftriaxone on 3/16. Changed to meropenem in light of possible pneumonia  - blood culture from 3/16 NGTD  - changed to levaquin to complete a 14 day course. (8/14)  - Blood cultures turned out to be one staph epi and one staph hominis so most likely contaminant   - Mental status back to baseline.  - resume zyprexa   - TTE showed LVH and valves are OK       Hypernatremia:  This is likely exacerbated by her DI.   - Na 134, discontinued IVF 3/22  -pt refused blood draw today, will reschedule for tomorrow.      LYLA on CKD stage IV with Lithium induced Nephrogenic DI/acute retention of urine:  -Patient had urinary retention and obstructive uropathy. 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           Multiple falls  -Patient presented with multiple falls  -Was living with her mother prior to this admission  -current plan is placement at [facility]  "facility  -etiology of fall unclear, likely multi-factorial      Right distal fibular fracture  - CAM boot while ambulating, encourage ambulation  -sat in chair today for several hours  - mobilize with the help of nursing staff as much as possible.  - Tylenol for pain  - was able to sit in chair today          DM2 with hypoglycemia   Ongoing hyperglycemia in hospita.  - better blood glucose  - restart glipizide 2.5 mg bid  - Continue with SSI  -am glucose low 3/23 am, dec'd am lantus from 30 to 20  -glucoses 189, 210 today      HTN  - meds include clonidine, hydralazine, diltiazem, metoprolol, Imdur  - Blood pressures soft today  -decreased toprol from 100 to 25, but hold if HR< 60  -diltiazem dec from bid to q evening       Schizoaffective disorder with baseline cognitive deficits  -Patient does have baseline cognitive deficits  -Continue depakote, risperdone and zyprexa (when more alert)  -Psychiatry consult appreciated      Vitamin D deficiency  - d/c because of hypercalcemia       DVT Prophylaxis: Pneumatic Compression Device      Code Status: Full Code       Disposition: Expected discharge once living situation found     Angie Park MD    Interval History   Says she \"feels horrible\" but unable to be more specific.  She actually looks good sitting in the chair    -Data reviewed today: I reviewed all new labs and imaging results over the last 24 hours. I personally reviewed no images or EKG's today.    Physical Exam   Temp: 98.7  F (37.1  C) Temp src: Axillary BP: 131/49   Heart Rate: 60 Resp: 18 SpO2: 94 % O2 Device: None (Room air)    Vitals:    03/23/17 0513 03/24/17 0704 03/25/17 0500   Weight: 102.5 kg (225 lb 15.5 oz) 103.1 kg (227 lb 4.7 oz) 102.2 kg (225 lb 5 oz)     Vital Signs with Ranges  Temp:  [97.4  F (36.3  C)-98.7  F (37.1  C)] 98.7  F (37.1  C)  Heart Rate:  [57-63] 60  Resp:  [16-18] 18  BP: (117-131)/(49-60) 131/49  SpO2:  [94 %-99 %] 94 %  I/O last 3 completed shifts:  In: 2883 " [P.O.:2150]  Out: 5175 [Urine:5175]    Constitutional: alert   Respiratory: clear to auscultation, no wheezing or rhonchi   Cardiovascular: regular rate and rhythm without murmer or rub   GI:positive bowel sounds, non-tender   Skin/Integumen: no rashes    Other:        Medications        metoprolol  25 mg Oral Daily     diltiazem  120 mg Oral QPM     insulin isophane human  20 Units Subcutaneous QAM AC     levofloxacin  250 mg Oral Daily     insulin aspart  1-7 Units Subcutaneous TID AC     insulin aspart  1-5 Units Subcutaneous At Bedtime     cloNIDine  0.3 mg Oral BID     acetaZOLAMIDE  500 mg Oral Q12H MARIANNE     glipiZIDE (GLUCOTROL XL) 24 hr tablet 2.5 mg  2.5 mg Oral BID AC     heparin  5,000 Units Subcutaneous Q12H     valproic acid  1,500 mg Oral At Bedtime     valproic acid  500 mg Oral QAM     insulin isophane human  30 Units Subcutaneous At Bedtime     OLANZapine zydis  2.5 mg Sublingual At Bedtime     sodium chloride (PF)  3 mL Intracatheter Q8H     acetaminophen  975 mg Oral TID    Or     acetaminophen  975 mg Rectal TID     risperiDONE  1 mg Sublingual BID     polyethylene glycol  17 g Oral BID     docusate sodium  100 mg Oral BID     isosorbide mononitrate  30 mg Oral Daily       Data     Recent Labs  Lab 03/25/17  0813 03/23/17  0903 03/22/17  0915 03/21/17  1050  03/19/17  0635   WBC  --   --   --  11.7*  --  9.8   HGB  --   --   --  9.3*  --  11.1*   MCV  --   --   --  96  --  101*   PLT  --   --   --  207  --  255    145* 143 135  < > 156*   POTASSIUM 4.1 3.9 3.9 3.9  --  4.1   CHLORIDE 114* 113* 112* 104  --  123*   CO2 23 24 23 20  --  25   BUN 39* 41* 52* 55*  --  54*   CR 2.94* 2.71* 2.99* 3.28*  < > 3.64*   ANIONGAP 7 8 8 11  --  8   BENTLEY 10.1 9.8 9.5 8.9  --  10.3*   * 132* 251* 456*  --  209*   ALBUMIN  --   --   --   --   --  2.3*   PROTTOTAL  --   --   --   --   --  7.5   BILITOTAL  --   --   --   --   --  0.2   ALKPHOS  --   --   --   --   --  67   ALT  --   --   --   --   --   28   AST  --   --   --   --   --  34   < > = values in this interval not displayed.    No results found for this or any previous visit (from the past 24 hour(s)).

## 2017-03-25 NOTE — PROGRESS NOTES
Assessment and Plan:   LYLA: Cr stable to slightly worse, lytes nominal. Na stable. UO 5.3 L yesterday. Has cherry in place. On diamox at 500 bid without great effect on UO at this point.             Interval History:   Hypertension  UTI  DM  Psych disease                  Review of Systems:   Somewhat agitated.Thin liquid diet with supervision and upright in chair for meals.  Soc Ser looking into NH placement.           Medications:       metoprolol  25 mg Oral Daily     diltiazem  120 mg Oral QPM     insulin isophane human  20 Units Subcutaneous QAM AC     levofloxacin  250 mg Oral Daily     insulin aspart  1-7 Units Subcutaneous TID AC     insulin aspart  1-5 Units Subcutaneous At Bedtime     cloNIDine  0.3 mg Oral BID     acetaZOLAMIDE  500 mg Oral Q12H MARIANNE     glipiZIDE (GLUCOTROL XL) 24 hr tablet 2.5 mg  2.5 mg Oral BID AC     heparin  5,000 Units Subcutaneous Q12H     valproic acid  1,500 mg Oral At Bedtime     valproic acid  500 mg Oral QAM     insulin isophane human  30 Units Subcutaneous At Bedtime     OLANZapine zydis  2.5 mg Sublingual At Bedtime     sodium chloride (PF)  3 mL Intracatheter Q8H     acetaminophen  975 mg Oral TID    Or     acetaminophen  975 mg Rectal TID     risperiDONE  1 mg Sublingual BID     polyethylene glycol  17 g Oral BID     docusate sodium  100 mg Oral BID     isosorbide mononitrate  30 mg Oral Daily        Current active medications and PTA medications reviewed, see medication list for details.            Physical Exam:   Vitals were reviewed  Patient Vitals for the past 24 hrs:   BP Temp Temp src Heart Rate Resp SpO2 Weight   03/25/17 0747 131/49 98.7  F (37.1  C) Axillary 60 18 94 % -   03/25/17 0500 - - - - - - 102.2 kg (225 lb 5 oz)   03/25/17 0154 128/56 97.4  F (36.3  C) Axillary 57 18 98 % -   03/24/17 2009 117/55 - - 63 - 99 % -   03/24/17 1550 123/60 97.7  F (36.5  C) Oral 57 16 99 % -       Temp:  [97.4  F (36.3  C)-98.7  F (37.1  C)] 98.7  F (37.1  C)  Heart  Rate:  [57-63] 60  Resp:  [16-18] 18  BP: (117-131)/(49-60) 131/49  SpO2:  [94 %-99 %] 94 %    Temperatures:  Current - Temp: 98.7  F (37.1  C); Max - Temp  Av.9  F (36.6  C)  Min: 97.4  F (36.3  C)  Max: 98.7  F (37.1  C)  Respiration range: Resp  Av.3  Min: 16  Max: 18  Pulse range: No Data Recorded  Blood pressure range: Systolic (24hrs), Av , Min:117 , Max:131   ; Diastolic (24hrs), Av, Min:49, Max:60    Pulse oximetry range: SpO2  Av.5 %  Min: 94 %  Max: 99 %    I/O last 3 completed shifts:  In: 2150 [P.O.:2150]  Out: 5175 [Urine:5175]      Intake/Output Summary (Last 24 hours) at 17 1108  Last data filed at 17 0650   Gross per 24 hour   Intake             1050 ml   Output             3475 ml   Net            -2425 ml       Alertdilip in place.       Wt Readings from Last 4 Encounters:   17 102.2 kg (225 lb 5 oz)   16 108.9 kg (240 lb)   16 108.9 kg (240 lb)   16 108.9 kg (240 lb)          Data:          Lab Results   Component Value Date     2017     2017     2017    Lab Results   Component Value Date    CHLORIDE 114 2017    CHLORIDE 113 2017    CHLORIDE 112 2017    Lab Results   Component Value Date    BUN 39 2017    BUN 41 2017    BUN 52 2017      Lab Results   Component Value Date    POTASSIUM 4.1 2017    POTASSIUM 3.9 2017    POTASSIUM 3.9 2017    Lab Results   Component Value Date    CO2 2017    CO2 24 2017    CO2 23 2017    Lab Results   Component Value Date    CR 2.94 2017    CR 2.71 2017    CR 2.99 2017        Recent Labs   Lab Test  17   1050  17   0635  17   0536   WBC  11.7*  9.8  10.0   HGB  9.3*  11.1*  11.2*   HCT  29.8*  37.7  37.5   MCV  96  101*  103*   PLT  207  255  264     Recent Labs   Lab Test  17   0635  17   1153  17   0050   05/14/15   0737   03/27/10   1615    03/22/10   1435   AST  34  10  19   < >   --    < >   --    < >  37   ALT  28  13  26   < >   --    < >   --    < >  8   ALKPHOS  67  72  86   < >   --    < >   --    < >  106   BILITOTAL  0.2  0.2  0.3   < >   --    < >   --    < >  0.3   BILICONJ   --    --    --    --    --    --    --    --   0.0   ZACARIAS   --    --    --    --   18   --   18   --    --     < > = values in this interval not displayed.       Recent Labs   Lab Test  02/03/12   0740  02/02/12   0750  03/27/10   1615   MAG  2.6*  2.4*  3.2*     Recent Labs   Lab Test  02/24/17   0625  02/17/17   0655  02/03/12   0740   PHOS  4.1  3.6  3.6     Recent Labs   Lab Test  03/25/17   0813  03/23/17   0903  03/22/17   0915   BENTLEY  10.1  9.8  9.5       Lab Results   Component Value Date    BENTLEY 10.1 03/25/2017     Lab Results   Component Value Date    WBC 11.7 (H) 03/21/2017    HGB 9.3 (L) 03/21/2017    HCT 29.8 (L) 03/21/2017    MCV 96 03/21/2017     03/21/2017     Lab Results   Component Value Date     03/25/2017    POTASSIUM 4.1 03/25/2017    CHLORIDE 114 (H) 03/25/2017    CO2 23 03/25/2017     (H) 03/25/2017     Lab Results   Component Value Date    BUN 39 (H) 03/25/2017    CR 2.94 (H) 03/25/2017     Lab Results   Component Value Date    MAG 2.6 (H) 02/03/2012     Lab Results   Component Value Date    PHOS 4.1 02/24/2017       Creatinine   Date Value Ref Range Status   03/25/2017 2.94 (H) 0.52 - 1.04 mg/dL Final   03/23/2017 2.71 (H) 0.52 - 1.04 mg/dL Final   03/22/2017 2.99 (H) 0.52 - 1.04 mg/dL Final   03/21/2017 3.28 (H) 0.52 - 1.04 mg/dL Final   03/20/2017 3.61 (H) 0.52 - 1.04 mg/dL Final   03/19/2017 3.64 (H) 0.52 - 1.04 mg/dL Final       Attestation:  I have reviewed today's vital signs, notes, medications, labs and imaging.     Champ Cain MD

## 2017-03-25 NOTE — PLAN OF CARE
Problem: Goal Outcome Summary  Goal: Goal Outcome Summary  SLP: Swallow tx completed during lunch to assess tolerance of diet. Pt eating sandwich and banana; drinking liquids via straw. 1x cough at start of meal; did not appear directly related to swallowing. No other overt s/sx of aspiration. Swallow response prompt with consistent hyolaryngeal elevation. Mastication slow but adequate. Mild oral residue noted with solid textures. Generally cleared with use of liquid wash; however, pt needed cue to alternate textures/utilize liquid wash. She consistently took large bites of food during meal. Suspect pt is near baseline and will meet goals soon. Anticipate she will benefit from long-term supervision during meals and softer-solid diet with foods being cut up 2/2 cognitive status. Recommend continue dysphagia diet level 3 and thin liquids w/ supervision and verbal cues for use of strategies. Recommend small bites and sips, slow pace and chew foods well, alternate solids/liquids, up in chair for meals, regular oral cares. Continue meds in puree. Recommend 1x SLP follow-up to ensure tolerance of diet and provide written education re: swallowing precautions. Recommend discharge to LTC. Suspect she will not need SLP services at discharge.

## 2017-03-25 NOTE — PLAN OF CARE
Problem: Goal Outcome Summary  Goal: Goal Outcome Summary  Outcome: No Change  Pt oriented x2, lethargic this shift. Bradycardic at times, otherwise VSS on RA. C/O bilateral foot pain- scheduled tylenol given. MEZA- lungs diminished. Dunlap in place and patent. Pt got up to chair for dinner- poor appetite- c/o nausea after- zofran given x1.  and 229. Repositioned q2 hours.

## 2017-03-25 NOTE — PLAN OF CARE
Problem: Goal Outcome Summary  Goal: Goal Outcome Summary  Outcome: No Change  A&O to self, place. Speaks in loud voice. Slept majority of shift. Bradycardia noted at 53-57bpm. Blood sugars monitored - 189 overnight. Dunlap patent, light yellow output. DD3/Thin liquid diet with supervision and upright in chair for meals. Up with mechanical lift. When up - uses CAM boot to R foot. D/c placement pending scheduled for Monday 3/27.

## 2017-03-25 NOTE — PLAN OF CARE
Problem: Goal Outcome Summary  Goal: Goal Outcome Summary  Outcome: No Change  A&O to self, place. Speaks/shouts loudly. Speech illogical at times. Makes needs known. Bradycardic 60's this shift. BS checks, given scheduled and SSI. Dunlap patent, light yellow output. DD3/Thin liquid diet with supervision, in chair for meals. Poor appetite today. Up with mechanical lift. CAM boot on R foot when up in chair. D/c to [facility] Monday 3/27.

## 2017-03-26 NOTE — PLAN OF CARE
Problem: Goal Outcome Summary  Goal: Goal Outcome Summary  Outcome: No Change  Pt oriented to person and place, lethargic at times. VSS on RA. C/O bilateral foot pain- scheduled tylenol given x2. Up in chair for dinner.  and 275. Repositioned q2 hours.

## 2017-03-26 NOTE — PLAN OF CARE
Problem: Individualization  Goal: Patient Preferences  Outcome: Improving  Alert, oriented x 2, lethargic this morning.  Chest x ray negative,  UA/UC sent .  Has cognitive developments, yells , loudly. Morbid obese, needs to be turned and repositioned every 2 hours,  Has a cherry good urinary output.  She is total care, up with the lift.  Looking for placement on TCU. Diabetic, covered.  Up for meals, needs to be fed.

## 2017-03-26 NOTE — PLAN OF CARE
Problem: Goal Outcome Summary  Goal: Goal Outcome Summary  Pt lethargic with difficulty following commands.  Completed supine active assisted/passive left LE exercises for strengthening with RLE exercises deferred due to pain.  Pt stated she wanted to get OOB but was going to be having a chest x-ray so remained in bed.  Recommend TCU at discharge.

## 2017-03-26 NOTE — PROGRESS NOTES
[facility]    Hospitalist Progress Note    Assessment & Plan   Nel Farmer is a 61 year old female who was admitted on 2/11/2017.     Acute encephalopathy secondary to klebsiella UTI:   Etiology of her encephalopathy was multifactorial. Possible causes included sepsis with high fever, hypernatremia, medications, ?uremia. .she also has hypercalecmia with corrected calcium level of 12.1. ABG does not explain her encephalopathy with pCO2 of only 46. Has not gotten narcotics or benzos recently. UCx from 3/12 was +for klebsiella and UCx from 3/15 growing 50-100K Klebsiella pneumonia. She was started on Ceftriaxone on 3/16   - head CT- 3/17 no acute changes   - valproic acid level- normal   -- had hx of urinary retention during hospital course. Cherry last removed on 3/9 and currently voiding large amounts per discussion with RN. UCx 3/12 growing Klebsiella. UCx from 3/15 +for (50-100k).   - started on ceftriaxone on 3/16. Changed to meropenem in light of possible pneumonia  - blood culture from 3/16 NGTD  - changed to levaquin to complete a 14 day course. (9/14)  - Blood cultures turned out to be one staph epi and one staph hominis so most likely contaminant   - Mental status returned to baseline  - resumed zyprexa   - TTE showed LVH and valves are OK     Lethargy  -patient was in chair a lot on 3/25  -today more lethargic, although calm  -will check CXR and UA/UC to eval for occult infection--> CXR showed no infiltrates         Hypernatremia:  This is likely exacerbated by her DI.   - Na 134, discontinued IVF 3/22  -sodium 145 today      LYLA on CKD stage IV with Lithium induced Nephrogenic DI/acute retention of urine:  -Patient had urinary retention and obstructive uropathy. 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   -urine out 5525  yesterday  -on diamox but does not appear to be helping  -will discuss with nephrology if there are any other treatment options, as she may be able to discharge to care facility but with her renal issues, she is high risk          Multiple falls  -Patient presented with multiple falls  -Was living with her mother prior to this admission  -current plan is placement at LTC facility  -etiology of fall unclear, likely multi-factorial      Right distal fibular fracture  - CAM boot while ambulating, encourage ambulation  -sat in chair today for several hours  - mobilize with the help of nursing staff as much as possible.  - Tylenol for pain  - was able to sit in chair yesterday          DM2 with hypoglycemia   Ongoing hyperglycemia in hospita.  - better blood glucose  - restart glipizide 2.5 mg bid  - Continue with SSI  - glucose 173, 187, yesterday 189, 2110, 276      HTN  - meds include clonidine, hydralazine, diltiazem, metoprolol, Imdur  - Blood pressures soft today  -decreased toprol from 100 to 25, but hold if HR< 60  -diltiazem dec from bid to q evening   -BP acceptable today, pulse in the 50's to 60's      Schizoaffective disorder with baseline cognitive deficits  -Patient does have baseline cognitive deficits  -Continue depakote, risperdone and zyprexa (when more alert)  -Psychiatry consult appreciated      Vitamin D deficiency  - d/c because of hypercalcemia       DVT Prophylaxis: Pneumatic Compression Device      Code Status: Full Code       Disposition: Expected discharge once living situation found        Angie Park MD    Interval History   Today pt is lethargic, but denies pain    -Data reviewed today: I reviewed all new labs and imaging results over the last 24 hours. I personally reviewed the chest x-ray image(s) showing no infiltrate.    Physical Exam   Temp: 97.5  F (36.4  C) Temp src: Axillary BP: 126/56   Heart Rate: 67 Resp: 18 SpO2: 95 % O2 Device: None (Room air)    Vitals:    03/23/17  0513 03/24/17 0704 03/25/17 0500   Weight: 102.5 kg (225 lb 15.5 oz) 103.1 kg (227 lb 4.7 oz) 102.2 kg (225 lb 5 oz)     Vital Signs with Ranges  Temp:  [97.5  F (36.4  C)-98.7  F (37.1  C)] 97.5  F (36.4  C)  Heart Rate:  [60-67] 67  Resp:  [18] 18  BP: (111-139)/(49-65) 126/56  SpO2:  [94 %-99 %] 95 %  I/O last 3 completed shifts:  In: -   Out: 5525 [Urine:5525]    Constitutional: alert   Respiratory: clear to auscultation, no wheezing or rhonchi   Cardiovascular: regular rate and rhythm without murmer or rub   GI:positive bowel sounds, non-tender   Skin/Integumen: no rashes    Other:        Medications        metoprolol  25 mg Oral Daily     diltiazem  120 mg Oral QPM     insulin isophane human  20 Units Subcutaneous QAM AC     levofloxacin  250 mg Oral Daily     insulin aspart  1-7 Units Subcutaneous TID AC     insulin aspart  1-5 Units Subcutaneous At Bedtime     cloNIDine  0.3 mg Oral BID     acetaZOLAMIDE  500 mg Oral Q12H MARIANNE     glipiZIDE (GLUCOTROL XL) 24 hr tablet 2.5 mg  2.5 mg Oral BID AC     heparin  5,000 Units Subcutaneous Q12H     valproic acid  1,500 mg Oral At Bedtime     valproic acid  500 mg Oral QAM     insulin isophane human  30 Units Subcutaneous At Bedtime     OLANZapine zydis  2.5 mg Sublingual At Bedtime     sodium chloride (PF)  3 mL Intracatheter Q8H     acetaminophen  975 mg Oral TID    Or     acetaminophen  975 mg Rectal TID     risperiDONE  1 mg Sublingual BID     polyethylene glycol  17 g Oral BID     docusate sodium  100 mg Oral BID     isosorbide mononitrate  30 mg Oral Daily       Data     Recent Labs  Lab 03/25/17  0813 03/23/17  0903 03/22/17  0915 03/21/17  1050   WBC  --   --   --  11.7*   HGB  --   --   --  9.3*   MCV  --   --   --  96   PLT  --   --   --  207    145* 143 135   POTASSIUM 4.1 3.9 3.9 3.9   CHLORIDE 114* 113* 112* 104   CO2 23 24 23 20   BUN 39* 41* 52* 55*   CR 2.94* 2.71* 2.99* 3.28*   ANIONGAP 7 8 8 11   BENTLEY 10.1 9.8 9.5 8.9   * 132* 251* 456*        No results found for this or any previous visit (from the past 24 hour(s)).

## 2017-03-26 NOTE — PLAN OF CARE
Problem: Goal Outcome Summary  Goal: Goal Outcome Summary  Outcome: No Change  Pt oriented to person and place, slept majority of overnight. VSS on RA. On scheduled tylenol for foot pain, has CAM boot for when out of bed. DD3 thin liquid diet, meds with applesauce. Repo q2 hour w/ lift. PCD's on. BG checks. Dunlap patent with large amounts of output. No BM overnight. Plans for d/c Monday. Will continue to monitor.

## 2017-03-27 NOTE — PLAN OF CARE
Problem: Goal Outcome Summary  Goal: Goal Outcome Summary  Outcome: No Change  Pt very lethargic this shift, difficult to arouse at times. VSS on RA. Denies pain.  and 121. PM medications held due to lethargy- MD aware. Repositioned q2 hours.    Was able to rouse pt during shift change- valproic acid given.

## 2017-03-27 NOTE — PLAN OF CARE
Problem: Goal Outcome Summary  Goal: Goal Outcome Summary  PT: Pt up in chair upon arrival, family at bedside. Patient participated in seated LE exercises/AAROM. Some difficulty following commands during session. Reporting pain with RLE. Pt remained up in chair at end of session as lunch tray arriving. Recommend pt discharge to TCU.

## 2017-03-27 NOTE — PROGRESS NOTES
Problem: Goal Outcome Summary  Goal: Goal Outcome Summary  Outcome: No Change  Pt very lethargic this shift,  VSS on RA. [place] diminished T/O. Denies pain.  at 0200. PM medications held due to lethargy- MD aware. IVF infusing.Repositioned q2 hours. Poor appetite.D/C to [facility] today per  note. Up with lift.

## 2017-03-27 NOTE — PROGRESS NOTES
OCTAVIA DUNHAM) Patient has been accepted at [facility], but a Level 2 screening is needed.  I) Spoke with Nati with the [facility] ([phone], ex. [phone]). Nati states [facility] will make a final determination whether or not the Level 2 screening is truly needed, and they would then have 7-9 days to complete this. She believes they will communicate directly with the nursing facility, but may call  also.  P) Following.    Addendum  Patient's mother and sister are here visiting and were updated on the D/C plan and process.    Addendum  Updated Cherri at [facility] that patient is not medically ready for discharge today and we are awaiting the Level 2 screening appointment with [facility]. Cherri states that once patient is ready for discharge and the date of the screening and name and phone number of the screener is available, they can accept patient. The screening needs to be within 10 days of her admission there.

## 2017-03-27 NOTE — PROGRESS NOTES
[facility]    Hospitalist Progress Note    Assessment & Plan   Nel Farmer is a 61 year old female who was admitted on 2/11/2017.       Acute encephalopathy secondary to klebsiella UTI:   Etiology of her encephalopathy was multifactorial. Possible causes included sepsis with high fever, hypernatremia, medications, ?uremia. .she also has hypercalecmia with corrected calcium level of 12.1. ABG does not explain her encephalopathy with pCO2 of only 46. Has not gotten narcotics or benzos recently. UCx from 3/12 was +for klebsiella and UCx from 3/15 growing 50-100K Klebsiella pneumonia. She was started on Ceftriaxone on 3/16   - head CT- 3/17 no acute changes   - valproic acid level- normal   -- had hx of urinary retention during hospital course. Cherry last removed on 3/9 and currently voiding large amounts per discussion with RN. UCx 3/12 growing Klebsiella. UCx from 3/15 +for (50-100k).   - started on ceftriaxone on 3/16. Changed to meropenem in light of possible pneumonia  - blood culture from 3/16 NGTD  - changed to levaquin to complete a 14 day course. (10/14)  - Blood cultures turned out to be one staph epi and one staph hominis so most likely contaminant   - Mental status returned to baseline  - resumed zyprexa   - TTE showed LVH and valves are OK      Lethargy  -patient was in chair a lot on 3/25  -yesterday more lethargic, although calm  -CXR 3/26--> CXR showed no infiltrates   -UA not remarkable  -UC Pending           Hypernatremia:  This is likely exacerbated by her DI.   - Na 134, discontinued IVF 3/22  -sodium 148 today< 145 <144  -due to lethargy, did not drink enough free water  -started iv fluids again 3/26  -dec rate today as she is up in chair and drinking      LYLA on CKD stage IV with Lithium induced Nephrogenic DI/acute retention of urine:  -Patient had urinary retention and obstructive uropathy. 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   --on diamox but does not appear to be helping  -was on amiloride previously  -difficult situation due to her diabetes insipidus, as when she is lethargic she does not drink enough and then her creatinine and her sodium increases.  Therefore she is high risk for this to happen in the TCU.   -discussed with nephrology today and there are no good treatments except for her to drink enough water and have free access to water.           Multiple falls  -Patient presented with multiple falls  -Was living with her mother prior to this admission  -mother who is 85 is not able to care for her anymore  -current plan is placement at LT facility, [facility]  -etiology of fall unclear, likely multi-factorial      Right distal fibular fracture  - CAM boot while ambulating, encourage ambulation  -sat in chair today for several hours  - mobilize with the help of nursing staff as much as possible.  - Tylenol for pain  - was able to sit in chair the last 2 days          DM2 with hypoglycemia   Ongoing hyperglycemia in hospita.  - better blood glucose  - restart glipizide 2.5 mg bid  - Continue with SSI  - glucose 145, 253, 184       HTN  - meds include clonidine, hydralazine, diltiazem, metoprolol, Imdur  - Blood pressures soft 3/25  - due to dec BP 3/25 decreased toprol from 100 to 25, but hold if HR< 60  -diltiazem dec from bid to q evening   -BP acceptable today, pulse in the 60's and 70's      Schizoaffective disorder with baseline cognitive deficits  -Patient does have baseline cognitive deficits  -Continue depakote, risperdone and zyprexa (when more alert)  -Psychiatry consult appreciated      Vitamin D deficiency  - d/c because of hypercalcemia       DVT Prophylaxis: Pneumatic Compression Device      Code Status: Full Code       Disposition: Expected discharge once [facility] does their level 2  screening       Angie Park MD    Interval History   Interviewed with her mother and sister today.  Patient is unwilling to feed herself but will eat if fed    -Data reviewed today: I reviewed all new labs and imaging results over the last 24 hours. I personally reviewed no images or EKG's today.    Physical Exam   Temp: 97.9  F (36.6  C) Temp src: Oral BP: 155/65 Pulse: 70 Heart Rate: 99 Resp: 18 SpO2: 96 % O2 Device: None (Room air)    Vitals:    03/23/17 0513 03/24/17 0704 03/25/17 0500   Weight: 102.5 kg (225 lb 15.5 oz) 103.1 kg (227 lb 4.7 oz) 102.2 kg (225 lb 5 oz)     Vital Signs with Ranges  Temp:  [97.9  F (36.6  C)-98.4  F (36.9  C)] 97.9  F (36.6  C)  Pulse:  [67-70] 70  Heart Rate:  [99] 99  Resp:  [18-20] 18  BP: (125-155)/(51-65) 155/65  SpO2:  [95 %-98 %] 96 %  I/O last 3 completed shifts:  In: 2086.67 [P.O.:740; I.V.:1346.67]  Out: 4700 [Urine:4700]    Constitutional: alert   Respiratory: clear to auscultation, no wheezing or rhonchi   Cardiovascular: regular rate and rhythm without murmer or rub   GI:positive bowel sounds, non-tender   Skin/Integumen: no rashes    Other:        Medications     NaCl 100 mL/hr at 03/27/17 0907       metoprolol  25 mg Oral Daily     diltiazem  120 mg Oral QPM     insulin isophane human  20 Units Subcutaneous QAM AC     levofloxacin  250 mg Oral Daily     insulin aspart  1-7 Units Subcutaneous TID AC     insulin aspart  1-5 Units Subcutaneous At Bedtime     cloNIDine  0.3 mg Oral BID     acetaZOLAMIDE  500 mg Oral Q12H MARIANNE     glipiZIDE (GLUCOTROL XL) 24 hr tablet 2.5 mg  2.5 mg Oral BID AC     heparin  5,000 Units Subcutaneous Q12H     valproic acid  1,500 mg Oral At Bedtime     valproic acid  500 mg Oral QAM     insulin isophane human  30 Units Subcutaneous At Bedtime     OLANZapine zydis  2.5 mg Sublingual At Bedtime     sodium chloride (PF)  3 mL Intracatheter Q8H     acetaminophen  975 mg Oral TID    Or     acetaminophen  975 mg Rectal TID      risperiDONE  1 mg Sublingual BID     polyethylene glycol  17 g Oral BID     docusate sodium  100 mg Oral BID     isosorbide mononitrate  30 mg Oral Daily       Data     Recent Labs  Lab 03/27/17  0851 03/26/17  1036 03/26/17  0830  03/21/17  1050   WBC 10.7  --   --   --  11.7*   HGB 10.0*  --   --   --  9.3*     --   --   --  96     --   --   --  207   * 145* Canceled, Test credited Specimen hemolyzed  < > 135   POTASSIUM 4.1 4.3 Canceled, Test credited Specimen hemolyzed  < > 3.9   CHLORIDE 121* 114* Canceled, Test credited Specimen hemolyzed  < > 104   CO2 21 20 Canceled, Test credited Specimen hemolyzed  < > 20   BUN 38* 39* Canceled, Test credited Specimen hemolyzed  < > 55*   CR 2.91* 3.00* Canceled, Test credited Specimen hemolyzed  < > 3.28*   ANIONGAP 6 11 Canceled, Test credited Specimen hemolyzed  < > 11   BENTLEY 10.0 10.3* Canceled, Test credited Specimen hemolyzed  < > 8.9   * 225* Canceled, Test credited Specimen hemolyzed  < > 456*   < > = values in this interval not displayed.    No results found for this or any previous visit (from the past 24 hour(s)).

## 2017-03-27 NOTE — PLAN OF CARE
Problem: Goal Outcome Summary  Goal: Goal Outcome Summary  Outcome: No Change  Alert to self. Hypertensive otherwise VSS. Scheduled tylenol for pain. Open skin on coccyx; mepilex applied. Reposition q/2 hrs. Lift to chair. Generalized edema. FS=725 and 253; coverage given. Dunlap patent. Poor appetite. RN will continue to monitor.

## 2017-03-27 NOTE — PROGRESS NOTES
Renal Medicine Progress Note                                Nel Farmer MRN# [id number]   Age: 61 year old YOB: 1955   Date of Admission: 2/11/2017 Hospital LOS: 11                  Assessment/Plan:     61 year old female who was admitted on 2/11/2017 for evaluation of falls and for placement.   She has A/CKD.         1) Baseline Stage 4 CKD: Cr 2.2 and GFR 23. Lithium nephropathy.     2) A/CKD: Prerenal ? Urine retention? No obvious nephrotoxic exposure. Lithium nephropathy normally progresses very slowly.     3) HTN: Borderline control on diltiazem, clonidine and metoprolol.      4) Metabolic Bone Disease   -secondary hyperparathyroidism   -in part related to vitamin D deficiency (on replacement)  5) Polyuria/DI   -presumed related to Li effect on concentrating ability   -previous trial of amiloride    -now on acetazolamide         Free access to water  OK to decrease IVF    Follow labs        Interval History:     UO remains in liter range.  Na up based on in adequate water intake.    ROS:     No patient complaints    Medications and Allergies:     Reviewed    Physical Exam:     Vitals were reviewed  Patient Vitals for the past 8 hrs:   BP Temp Temp src Heart Rate Resp SpO2   03/27/17 1007 - - - - 18 -   03/27/17 0907 155/65 97.9  F (36.6  C) Oral 99 18 96 %     I/O last 3 completed shifts:  In: 2086.67 [P.O.:740; I.V.:1346.67]  Out: 4700 [Urine:4700]    Vitals:    03/21/17 0520 03/22/17 0614 03/23/17 0513 03/24/17 0704   Weight: 99.9 kg (220 lb 3.2 oz) 100.2 kg (220 lb 14.4 oz) 102.5 kg (225 lb 15.5 oz) 103.1 kg (227 lb 4.7 oz)    03/25/17 0500   Weight: 102.2 kg (225 lb 5 oz)       GENERAL: resting   HEENT: NC/AT, PERRLA, EOMI, non icteric, pharynx moist without lesion  RESP:  clear anteriorly  CV: RRR, normal S1 S2  ABDOMEN: soft, nontender, no HSM or masses and bowel sounds normal  MS: no clubbing, cyanosis   SKIN: clear without significant rashes or lesions  NEURO: speech normal and  cranial nerves 2-12 intact  EXT: warm, no edema    Data:       Recent Labs  Lab 03/27/17  0851 03/26/17  1036 03/26/17  0830 03/25/17  0813   * 145* Canceled, Test credited Specimen hemolyzed 144   POTASSIUM 4.1 4.3 Canceled, Test credited Specimen hemolyzed 4.1   CHLORIDE 121* 114* Canceled, Test credited Specimen hemolyzed 114*   CO2 21 20 Canceled, Test credited Specimen hemolyzed 23   ANIONGAP 6 11 Canceled, Test credited Specimen hemolyzed 7   * 225* Canceled, Test credited Specimen hemolyzed 164*   BUN 38* 39* Canceled, Test credited Specimen hemolyzed 39*   CR 2.91* 3.00* Canceled, Test credited Specimen hemolyzed 2.94*   GFRESTIMATED 16* 16* Canceled, Test credited Specimen hemolyzed 16*   GFRESTBLACK 20* 19* Canceled, Test credited Specimen hemolyzed 20*   BENTLEY 10.0 10.3* Canceled, Test credited Specimen hemolyzed 10.1          Recent Labs   Lab Test  03/27/17   0851  03/26/17   1036  03/26/17   0830  03/25/17   0813  03/23/17   0903  03/22/17   0915  03/21/17   1050  03/20/17   0715  03/19/17   0635  03/18/17   0536   CR  2.91*  3.00*  Canceled, Test credited   Specimen hemolyzed    2.94*  2.71*  2.99*  3.28*  3.61*  3.64*  4.14*         G Dino Renae    [facility] - Nephrology  [phone]

## 2017-03-27 NOTE — PLAN OF CARE
Problem: Goal Outcome Summary  Goal: Goal Outcome Summary        SLP: Attempted to see for swallowing Tx this am. Pt receiving cares. Will attempt to see later as able.

## 2017-03-28 NOTE — DISCHARGE SUMMARY
DATE OF ADMISSION:  02/11/2017.      DATE OF DISCHARGE:  02/27/2017.      PRIMARY CARE PROVIDER:  Unknown.      DISCHARGE DIAGNOSES:   1.  [place] urinary tract infection with sepsis.   2.  Probable healthcare-associated pneumonia.   3.  Acute toxic encephalopathy due to urinary tract infection and sepsis.   4.  Hypernatremia, resolved.   5.  Acute kidney injury on stage IV chronic kidney disease.   6.  Recurrent falls.   7.  Right distal fracture.   8.  Diabetes mellitus type 2.   9.  Hypertension.   10.  Schizoaffective disorder with baseline cognitive deficit.      PAST MEDICAL HISTORY:   1.  Schizoaffective disorder.   2.  Anxiety and depression.   3.  Cognitive deficit.   4.  Osteoarthritis.   5.  Diabetes mellitus type 2.   6.  Hypertension.   7.  Hyperlipidemia.   8.  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      PAST SURGICAL HISTORY:   1.  Tonsillectomy and adenoidectomy.   2.  History of D&C.   3.  Left total knee arthroplasty.   4.  Prior colonoscopy.      DISCHARGE MEDICATIONS:   1.  Metoprolol XL 25 mg p.o. b.i.d. (was on 100 mg p.o. b.i.d. prior to admission).   2.  Depakote 1500 mg p.o. each day at bedtime.   3.  Depakote 500 mg p.o. q.a.m.   4.  Insulin 70/30, 20 units sq qam and 30 units sq qhs.  5.  Glipizide XL 2.5 mg p.o. b.i.d.   6.  Clonidine 0.3 mg p.o. b.i.d.   7.  Imdur 30 mg p.o. daily.   8.  Risperdal 1 mg under the tongue each day at bedtime.   9.  Risperdal 0.5 mg under the tongue t.i.d. p.r.n. agitation.   10.  Senna docusate 1 tablet p.o. b.i.d.   11.  MiraLax 17 g powder p.o. daily p.r.n. constipation.   12.  Diltiazem sustained release 120 mg p.o. daily.   13.  Lasix 20 mg p.o. daily.   14.  Omeprazole 20 mg p.o. b.i.d.   15.  Zofran ODT 4 mg q. 8 hours p.r.n.      CONSULTANTS:  Psychiatry, Infectious Disease and Orthopedic Service.      HOSPITAL COURSE:  Nel Farmer is a 61-year-old  female with schizoaffective disorder who lives with her elderly mother.   She also has history of cognitive deficit, anxiety, depression, diabetes, hypertension and recurrent falls.  She presented to [facility] ED on 02/11/2017 following an episode of fall.  Workup in the emergency room revealed acute renal failure with creatinine of 2.73, grossly positive urinalysis in a patient who was encephalopathic.     1.   UTI with sepsis:   Patient met criteria for sepsis at the time of admission with high-grade fever, grossly positive urinalysis and altered mental status.  Urine culture ultimately grew 2 strains of Klebsiella pneumoniae.  Blood culture grew Staph epi and Staph hominis which were felt to be a contaminant.  TTE showed LVH, otherwise negative for valve vegetation.  Patient received IV ceftriaxone followed by meropenem when she was suspected to have healthcare-associated pneumonia.  She has completed a course of treatment.  Sepsis has resolved at the time of this discharge.     2.   Acute toxic encephalopathy:   Due to UTI with sepsis and possible healthcare-associated pneumonia.  CT head without contrast on 3/17/17 was negative for acute intracranial pathology.  See above for urine and blood culture results.  Following treatment of underlying culprit, patient's mental status returned to baseline.  She is being discharged to a TCU today with her PTA p.r.n. Zyprexa and Ativan.     3.    LYLA on CKD IV:    She has known history of CKD IV with baseline creatinine of 2.2 and GFR of 23 due to lithium-induced nephropathy and nephrogenic diabetic insipidus.  She developed acute urinary retention during this hospital course.  She was suspected to have obstructive uropathy.  She was hydrated with IV fluid and seen by Nephrology Consult Service.  Creatinine peaked at 3.0, but has since been trending down.  Creatinine is 2.63 today.  The patient is strongly encouraged to increase her oral hydration.  Patient's polyuria/DI presumed to be related to lithium effect on her  concentrating ability.  Failed previous trial of amiloride. Treated with acetazolamide during this hospital course, but this was discontinued at discharge.  Patient also has secondary hyperparathyroidism in part related to vitamin D deficiency, currently on replacement.     4.    Hypertension:   Fairly well controlled.  Resume PTA diltiazem, clonidine and Imdur.  Metoprolol XL decreased from 100 mg p.o. b.i.d. to 25 mg b.i.d. per Nephrology recommendation.     5.    Probable healthcare-associated pneumonia:   Patient developed pulmonary symptoms after admissions and imaging studies consistent with pneumonia.  She received broad-spectrum antibiotics, which she has completed.  She also required oxygen supplement, but was able to wean off.  Currently on room air.     6.    Hypernatremia:   This is likely exacerbated by diabetic insipidus.  Sodium level peaked at 148, but after IV fluid hydration, it is down to 142 at discharge.     7.    Anemia of chronic disease:   Hemoglobin stable at 10 grams.     8.    Multiple falls:   Due to unsteady gait and recent UTI and resulting generalized weakness.  PT/OT evaluated the patient and recommended transfer to TCU.     9.    Right distal fibular fracture:  Likely resulting from recent mechanical fall.  Evaluated by Ortho.  Non-surgical management recommended. Cam boot while ambulating.  Tylenol for pain.     10.    Diabetes mellitus type 2 with history of hypoglycemia:   Better toward the end of her hospitalization.   Resume PTA glipizide xr but at decreased dose of 2.5 mg po bid and PTA insulin 70/30 20 units qam and 30 units qpm.     11.    Schizoaffective disorder with baseline cognitive deficits, anxiety and depression:   At baseline at the time of this discharge.  Seen by Psychiatry Consult Service.  Their recommendation is for patient to remain on Depakote, Risperdal, Zyprexa and p.r.n. Ativan, all of which I have ordered.     12.   Vitamin D deficiency:   Replacement.      13.    Dysphagia:   Evaluated by SLP during this hospital course and recommendation was dysphagia diet level 3 with thin liquid diet.      DISCHARGE PHYSICAL EXAMINATION:   VITAL SIGNS:  Temperature 98.4, blood pressure 135/82, heart rate 66, respirations 16, 97% saturation on room air.   GENERAL:  Awake, alert, follows commands appropriately, no apparent distress.   HEENT:  Normocephalic, atraumatic.  Pupils equal, round, reactive to light.  Neck supple.  Oropharynx clear, mucous membranes moist.  No thyromegaly.   CARDIOVASCULAR:  Normal S1, S2, no murmurs, rubs or gallops.   LUNGS:  Clear to auscultation bilaterally.   ABDOMEN:  Soft, good bowel sounds, nontender, no rebound or guarding.   EXTREMITIES:  No cyanosis or clubbing.   LYMPHATICS:  No edema.   NEUROLOGIC:  Nonfocal.   SKIN:  No rash.   MUSCULOSKELETAL:  No calf tenderness to palpation.   PSYCHIATRY:  Mood and affect are appropriate.      DISCHARGE INSTRUCTIONS:   1.  Will transfer patient to TCU for PT/OT.   2.  Dysphagia diet level 3 with thin liquid.   3.  Activity as tolerated.      CODE STATUS:  FULL.         JYOHTI NUNEZ MD             D: 2017 15:14   T: 2017 16:19   MT: TS      Name:     MARIO ALBERTO PATRICK   MRN:      [id number]        Account:        [id number]   :      1955           Admit Date:                                       Discharge Date:       Document: [id number]

## 2017-03-28 NOTE — PLAN OF CARE
Problem: Goal Outcome Summary  Goal: Goal Outcome Summary  Outcome: No Change  Pt remains forgetful to situation, occ yelling out but coop, following commands. Sleeping after AM risperdone dose. Up with lift to chair x2. VSS. Julian breakfast and AM meds, did not like lunch and refused to eat, drinking water adeq. [place]lap patent, large urine output. No BM. Coccyx drsg CDI, skin intact, pink blanchable. DC to TCU 2203.

## 2017-03-28 NOTE — PLAN OF CARE
Problem: Goal Outcome Summary  Goal: Goal Outcome Summary        SLP: Attempted swallow Tx in am and pm. Pt not appropriate due to low alertness/awareness. Rescheduled for tomorrow.

## 2017-03-28 NOTE — PROGRESS NOTES
Discharged to facility via [facility] stretcher. Family present at discharge, belongings packed and family will transport to facility. VSS on RA at d/c , declined meal prior to D/C. NH packet sent with EMS,  AVS copy given to pt's mother, questions answered.

## 2017-03-28 NOTE — PLAN OF CARE
Problem: Goal Outcome Summary  Goal: Goal Outcome Summary  Outcome: No Change  VSS on RA, up with lift, disoriented and lethargic. Pt slept soundly this shift, easy to rouse but unable to follow commands before falling asleep. Had a BM this shift. Urine output was 1400 in 7 hours, clear, light yellow. Coccyx wound is small, pink/red base, dressing changed.

## 2017-03-28 NOTE — PROGRESS NOTES
OCTAVIA  D) Received a message from Cherri ([phone]) at [facility] stating the Level 2 screen is completed and they can accept patient today.  I) Arranged [facility] stretcher ride at 1630. The stretcher ride is needed due to patient's Schizoaffective disorder with baseline cognitive deficits. Patient often calls out for assistance. The PCS form was completed and faxed.  Will fax script, PAS and the orders when completed. MD has been paged.  Attempted to reach patient's mother, Lisa. No answer but left a voice mail with the discharge information. Left a message for Tana pereira with the same information. Also left a message for Cristobal with [facility] ([phone]) with the discharge information, should a group home opening occur in the future.  P) D/C to [facility] ay 1630 today.    Addendum  SisterTana and Mother, Lisa visited and were given written information on [facility]. They agree to this plan.

## 2017-03-28 NOTE — PROGRESS NOTES
Renal Medicine Progress Note                                Nel Farmer MRN# [id number]   Age: 61 year old YOB: 1955   Date of Admission: 2/11/2017 Hospital LOS: 12                  Assessment/Plan:     61 year old female who was admitted on 2/11/2017 for evaluation of falls and for placement.   She has A/CKD.         1) Baseline Stage 4 CKD: Cr 2.2 and GFR 23. Lithium nephropathy.     2) A/CKD: Prerenal ? Urine retention? No obvious nephrotoxic exposure. Lithium nephropathy normally progresses very slowly.     3) HTN: Borderline control on diltiazem, clonidine and metoprolol.      4) Metabolic Bone Disease   -secondary hyperparathyroidism   -in part related to vitamin D deficiency (on replacement)  5) Polyuria/DI   -presumed related to Li effect on concentrating ability   -previous trial of amiloride    -now on acetazolamide       Stop IVF  Follow labs    Free access to water        Interval History:     UO remains in 5 liter range.  Resting.  IVF and cherry remain    ROS:     No patient complaints    Medications and Allergies:     Reviewed    Physical Exam:     Vitals were reviewed  Patient Vitals for the past 8 hrs:   BP Temp Temp src Heart Rate Resp SpO2   03/28/17 0739 135/62 98.4  F (36.9  C) Axillary 66 16 97 %   03/28/17 0430 125/60 98.5  F (36.9  C) Axillary 63 16 96 %     I/O last 3 completed shifts:  In: 2840 [P.O.:2840]  Out: 4900 [Urine:4900]    Vitals:    03/21/17 0520 03/22/17 0614 03/23/17 0513 03/24/17 0704   Weight: 99.9 kg (220 lb 3.2 oz) 100.2 kg (220 lb 14.4 oz) 102.5 kg (225 lb 15.5 oz) 103.1 kg (227 lb 4.7 oz)    03/25/17 0500   Weight: 102.2 kg (225 lb 5 oz)       GENERAL: resting   HEENT: NC/AT, PERRLA, EOMI, non icteric, pharynx moist without lesion  RESP:  clear anteriorly  CV: RRR, normal S1 S2  ABDOMEN: soft, nontender, no HSM or masses and bowel sounds normal  MS: no clubbing, cyanosis   SKIN: clear without significant rashes or lesions  NEURO: speech normal and  cranial nerves 2-12 intact  EXT: warm, no edema    Data:       Recent Labs  Lab 03/28/17  0826 03/27/17  0851 03/26/17  1036 03/26/17  0830    148* 145* Canceled, Test credited Specimen hemolyzed   POTASSIUM 4.0 4.1 4.3 Canceled, Test credited Specimen hemolyzed   CHLORIDE 115* 121* 114* Canceled, Test credited Specimen hemolyzed   CO2 20 21 20 Canceled, Test credited Specimen hemolyzed   ANIONGAP 7 6 11 Canceled, Test credited Specimen hemolyzed   * 201* 225* Canceled, Test credited Specimen hemolyzed   BUN 37* 38* 39* Canceled, Test credited Specimen hemolyzed   CR 2.63* 2.91* 3.00* Canceled, Test credited Specimen hemolyzed   GFRESTIMATED 18* 16* 16* Canceled, Test credited Specimen hemolyzed   GFRESTBLACK 22* 20* 19* Canceled, Test credited Specimen hemolyzed   BENTLEY 9.5 10.0 10.3* Canceled, Test credited Specimen hemolyzed          Recent Labs   Lab Test  03/28/17   0826  03/27/17   0851  03/26/17   1036  03/26/17   0830  03/25/17   0813  03/23/17   0903  03/22/17   0915  03/21/17   1050  03/20/17   0715  03/19/17   0635   CR  2.63*  2.91*  3.00*  Canceled, Test credited   Specimen hemolyzed    2.94*  2.71*  2.99*  3.28*  3.61*  3.64*         G Dino Renae    [facility] - Nephrology  [phone]

## 2017-03-28 NOTE — PLAN OF CARE
Problem: Goal Outcome Summary  Goal: Goal Outcome Summary  Outcome: No Change  Neuro: Oriented x4, lethargic, slept most of shift  CV: WNL  Resp: Lungs dim  GI/Diet: DD3 with thins, poor appetite, emesis x1 shortly after taking pills and depakene solution, lg incont BM x1  : cherry patent with large output  Skin: Mepilex on coccyx CDI  Activity: Lift to chair for supper, T/R Q2H  Lines: 1/2 NS at 75  Labs/tests: glucoses 184 and 181  Pain: Denies  DC Plan: Needs placement, SWS following

## 2017-03-28 NOTE — PLAN OF CARE
Problem: Goal Outcome Summary  Goal: Goal Outcome Summary  PT: Patient discharged to LTC before therapy session. PT goals not met.     Physical Therapy Discharge Summary     Reason for therapy discharge:    Discharged to long term care facility.     Progress towards therapy goal(s). See goals on Care Plan in Epic electronic health record for goal details.  Goals not met.  Barriers to achieving goals:   discharge from facility.     Therapy recommendation(s):    Continued therapy is recommended.  Rationale/Recommendations:  skilled PT at LTC to improve mobility.

## 2017-03-29 NOTE — PLAN OF CARE
Problem: Goal Outcome Summary  Goal: Goal Outcome Summary        Speech Language Therapy Discharge Summary     Reason for therapy discharge:    Discharged to long term care facility.     Progress towards therapy goal(s). See goals on Care Plan in [facility] electronic health record for goal details.  Goals met     Therapy recommendation(s):    No further therapy is recommended. Current, tolerated diet is dysphagia diet level 3 with thin liquids. May be re-assessed in future if needed.

## 2017-03-30 PROBLEM — R41.82 ALTERED MENTAL STATUS: Status: ACTIVE | Noted: 2017-01-01

## 2017-03-30 NOTE — IP AVS SNAPSHOT
` [place] [address] ONCOLOGY: [phone]            Medication Administration Report for Nel Farmer as of 04/04/17 1228   Legend:    Given Hold Not Given Due Canceled Entry Other Actions    Time Time (Time) Time  Time-Action       Inactive    Active    Linked        Medications 03/29/17 03/30/17 03/31/17 04/01/17 04/02/17 04/03/17 04/04/17    acetaminophen (TYLENOL) Suppository 650 mg  Dose: 650 mg Freq: EVERY 4 HOURS PRN Route: RE  PRN Reason: mild pain  Start: 03/30/17 1944   Admin Instructions: Alternate ibuprofen (if ordered) with acetaminophen.  Maximum acetaminophen dose from all sources = 75 mg/kg/day not to exceed 4 grams/day.               acetaminophen (TYLENOL) tablet 650 mg  Dose: 650 mg Freq: EVERY 4 HOURS PRN Route: PO  PRN Reason: mild pain  Start: 03/30/17 1944   Admin Instructions: Alternate ibuprofen (if ordered) with acetaminophen.  Maximum acetaminophen dose from all sources = 75 mg/kg/day not to exceed 4 grams/day.               aMILoride (MIDAMOR) tablet 10 mg  Dose: 10 mg Freq: DAILY Route: PO  Start: 04/04/17 0900          0909 (10 mg)-Given           amLODIPine (NORVASC) tablet 5 mg  Dose: 5 mg Freq: DAILY Route: PO  Start: 04/04/17 0900   Admin Instructions: Hold for SBP < 100           1046 (5 mg)-Given           bisacodyl (DULCOLAX) EC tablet 5-15 mg  Dose: 5-15 mg Freq: DAILY PRN Route: PO  PRN Reason: constipation  PRN Comment:    Start: 03/30/17 1944   Admin Instructions: Do not crush or chew. Swallow whole. May titrate 1 tablet each day until response. Maximum of 3 tablets daily. Hold for loose stools.  This is the first step of a three step constipation treatment protocol.               bisacodyl (DULCOLAX) Suppository 10 mg  Dose: 10 mg Freq: DAILY PRN Route: RE  PRN Reason: constipation  Start: 03/30/17 1944   Admin Instructions: Hold for loose stools.  This is the third step of a three step constipation treatment protocol.               cloNIDine (CATAPRES)  tablet 0.2 mg  Dose: 0.2 mg Freq: 2 TIMES DAILY Route: PO  Start: 04/04/17 0900          1048 (0.2 mg)-Given       [ ] 2100           divalproex (DEPAKOTE) EC tablet 1,500 mg  Dose: 1,500 mg Freq: AT BEDTIME Route: PO  Start: 03/31/17 2200   Admin Instructions: DO NOT CRUSH.       2123 (1,500 mg)-Given        2029 (1,500 mg)-Given               1957 (1,500 mg)-Given               2108 (1,500 mg)-Given        [ ] 2200           divalproex (DEPAKOTE) EC tablet 500 mg  Dose: 500 mg Freq: EVERY MORNING Route: PO  Start: 04/01/17 0900   Admin Instructions: DO NOT CRUSH.        0913 (500 mg)-Given        1022 (500 mg)-Given        0927 (500 mg)-Given        0910 (500 mg)-Given           glucose 40 % gel 15-30 g  Dose: 15-30 g Freq: EVERY 15 MIN PRN Route: PO  PRN Reason: low blood sugar  Start: 04/02/17 0815   Admin Instructions: Give 15 g for BG 51 to 69 mg/dL IF patient is conscious and able to swallow. Give 30 g for BG less than or equal to 50 mg/dL IF patient is conscious and able to swallow. Do NOT give glucose gel via enteral tube.  IF patient has enteral tube: give apple juice 120 mL (4 oz or 15 g of CHO) via enteral tube for BG 51 to 69 mg/dL.  Give apple juice 240 mL (8 oz or 30 g of CHO) via enteral tube for BG less than or equal to 50 mg/dL.              Or  dextrose 50 % injection 25-50 mL  Dose: 25-50 mL Freq: EVERY 15 MIN PRN Route: IV  PRN Reason: low blood sugar  Start: 04/02/17 0815   Admin Instructions: Use if have IV access, BG less than 70 mg/dL and meet dose criteria below:  Dose if conscious and alert (or disorientated) and NPO = 25 mL  Dose if unconscious / not alert = 50 mL  Vesicant.              Or  glucagon injection 1 mg  Dose: 1 mg Freq: EVERY 15 MIN PRN Route: SC  PRN Reason: low blood sugar  PRN Comment: May repeat x 1 only  Start: 04/02/17 0815   Admin Instructions: May give SQ or IM. IF BG less than or equal to 50 mg/dL and no IV access.  ONLY use glucagon IF patient has NO IV access AND  is UNABLE to swallow.               heparin sodium PF injection 5,000 Units  Dose: 5,000 Units Freq: EVERY 12 HOURS Route: SC  Start: 03/30/17 2100   Admin Instructions: HOLD heparin IF platelet count falls below 50% baseline or less than 100,000 /  L and notify provider. Use this product If CrCl less than 30 mL/min.      2101 (5,000 Units)-Given        0853 (5,000 Units)-Given       2121 (5,000 Units)-Given        0914 (5,000 Units)-Given       2029 (5,000 Units)-Given        0847 (5,000 Units)-Given       1957 (5,000 Units)-Given               0928 (5,000 Units)-Given       2108 (5,000 Units)-Given        0904 (5,000 Units)-Given       [ ] 2100           hydrochlorothiazide (MICROZIDE) capsule 25 mg  Dose: 25 mg Freq: DAILY Route: PO  Start: 04/02/17 1045        1127 (25 mg)-Given        0925 (25 mg)-Given        0904 (25 mg)-Given           insulin aspart (NovoLOG) inj (RAPID ACTING)  Dose: 1-7 Units Freq: AT BEDTIME Route: SC  Start: 04/02/17 2200   Admin Instructions: HIGH INSULIN RESISTANCE DOSING    Do Not give Bedtime Correction Insulin if BG less than 200.   For  - 224 give 1 units.   For  - 249 give 2 units.   For  - 274 give 3 units.   For  - 299 give 4 units.   For  - 324 give 5 units.   For  - 349 give 6 units.   For BG greater than or equal to 350 give 7 units.   Notify provider if glucose greater than or equal to 350 mg/dL after administration of correction dose.  If given at mealtime, must be administered 5 min before meal or immediately after.         (2136)-Not Given        2227 (4 Units)-Given        [ ] 2200           insulin aspart (NovoLOG) inj (RAPID ACTING)  Dose: 1-10 Units Freq: 3 TIMES DAILY BEFORE MEALS Route: SC  Start: 04/02/17 0830   Admin Instructions: Correction Scale - HIGH INSULIN RESISTANCE DOSING     Do Not give Correction Insulin if Pre-Meal BG less than 140.   For Pre-Meal  - 164 give 1 unit.   For Pre-Meal  - 189 give 2 units.    For Pre-Meal  - 214 give 3 units.   For Pre-Meal  - 239 give 4 units.   For Pre-Meal  - 264 give 5 units.   For Pre-Meal  - 289 give 6 units.   For Pre-Meal  - 314 give 7 units.   For Pre-Meal  - 339 give 8 units.   For Pre-Meal  - 364 give 9 units.   For Pre-Meal BG greater than or equal to 365 give 10 units  To be given with prandial insulin, and based on pre-meal blood glucose.   Notify provider if glucose greater than or equal to 350 mg/dL after administration of correction dose.  If given at mealtime, must be administered 5 min before meal or immediately after.         1100 (2 Units)-Given       1438 (4 Units)-Given [C]       1700 (1 Units)-Given During Downtime        0744 (3 Units)-Given       1429 (1 Units)-Given       1829 (5 Units)-Given        0917 (1 Units)-Given       1205 (3 Units)-Given [C]       [ ] 1700           insulin isophane & regular (HumuLIN MIX 70/30 PEN , NovoLIN MIX 70/30 PEN) injection 18 Units  Dose: 18 Units Freq: EVERY MORNING Route: SC  Start: 04/03/17 0900         0924 (18 Units)-Given        0916 (18 Units)-Given           insulin isophane & regular (HumuLIN MIX 70/30 PEN , NovoLIN MIX 70/30 PEN) injection 20 Units  Dose: 20 Units Freq: DAILY WITH SUPPER Route: SC  Start: 04/01/17 1700       1741 (20 Units)-Given        1700 (20 Units)-Given During Downtime        1829 (20 Units)-Given        [ ] 1700           isosorbide mononitrate (IMDUR) 24 hr tablet 30 mg  Dose: 30 mg Freq: DAILY Route: PO  Start: 04/01/17 0900   Admin Instructions: DO NOT CRUSH. Can split tablet in half along score clarisse.        0914 (30 mg)-Given        1022 (30 mg)-Given        0927 (30 mg)-Given        0909 (30 mg)-Given           levofloxacin (LEVAQUIN) tablet 250 mg  Dose: 250 mg Freq: DAILY Route: PO  Indications of Use: COMMUNITY ACQUIRED PNEUMONIA  Start: 04/04/17 0900   Admin Instructions: Dose adjusted per renal dosing policy.  Estimated CrCl = 20-49 mL/min.            0909 (250 mg)-Given           LORazepam (ATIVAN) tablet 0.5 mg  Dose: 0.5 mg Freq: 2 TIMES DAILY PRN Route: PO  PRN Reason: anxiety  Start: 04/02/17 0814        1938 (0.5 mg)-Given        1957 (0.5 mg)-Given        1153 (0.5 mg)-Given           metoprolol (TOPROL-XL) 24 hr tablet 25 mg  Dose: 25 mg Freq: DAILY Route: PO  Start: 03/31/17 1615   Admin Instructions: DO NOT CRUSH. Tablet may be split in half along score line.       1658 (25 mg)-Given        0913 (25 mg)-Given        (1021)-Not Given [C]        (1000)-Not Given [C]        (1025)-Not Given [C]           miconazole (MICATIN; MICRO GUARD) 2 % powder  Freq: 2 TIMES DAILY Route: Top  Start: 03/31/17 2100   Admin Instructions: Apply to affected area/groin       2129 ( )-Given        0941 ( )-Given       2155 ( )-Given        1023 ( )-Given [C]       1956 ( )-Given               0928 ( )-Given       2100 ( )-Given        1049 ( )-Given       [ ] 2100           naloxone (NARCAN) injection 0.1-0.4 mg  Dose: 0.1-0.4 mg Freq: EVERY 2 MIN PRN Route: IV  PRN Reason: opioid reversal  Start: 03/30/17 1944   Admin Instructions: For respiratory rate LESS than or EQUAL to 8.  Partial reversal dose:  0.1 mg titrated q 2 minutes for Analgesia Side Effects Monitoring Sedation Level of 3 (frequently drowsy, arousable, drifts to sleep during conversation).Full reversal dose:  0.4 mg bolus for Analgesia Side Effects Monitoring Sedation Level of 4 (somnolent, minimal or no response to stimulation).               OLANZapine (zyPREXA) tablet 5 mg  Dose: 5 mg Freq: AT BEDTIME Route: PO  Start: 03/31/17 2200   Admin Instructions: Combined IM and PO doses may significantly increase the risk of orthostatic hypotension at 30 mg per day or higher.       2129 (5 mg)-Given        2030 (5 mg)-Given               1957 (5 mg)-Given               2109 (5 mg)-Given        [ ] 2200           omeprazole (priLOSEC) CR capsule 20 mg  Dose: 20 mg Freq: 2 TIMES DAILY BEFORE MEALS Route:  PO  Start: 03/31/17 1630      1658 (20 mg)-Given        0912 (20 mg)-Given       1741 (20 mg)-Given        1021 (20 mg)-Given       (2004)-Not Given        0643 (20 mg)-Given       1659 (20 mg)-Given        0909 (20 mg)-Given       [ ] 1630           ondansetron (ZOFRAN-ODT) ODT tab 4 mg  Dose: 4 mg Freq: EVERY 6 HOURS PRN Route: PO  PRN Reason: nausea  Start: 03/30/17 1944   Admin Instructions: This is Step 1 of nausea and vomiting management.  If nausea not resolved in 15 minutes, go to Step 2 prochlorperazine (COMPAZINE). Do not push through foil backing. Peel back foil and gently remove. Place on tongue immediately. Administration with liquid unnecessary              Or  ondansetron (ZOFRAN) injection 4 mg  Dose: 4 mg Freq: EVERY 6 HOURS PRN Route: IV  PRN Reasons: nausea,vomiting  Start: 03/30/17 1944   Admin Instructions: This is Step 1 of nausea and vomiting management.  If nausea not resolved in 15 minutes, go to Step 2 prochlorperazine (COMPAZINE).  Irritant.               polyethylene glycol (MIRALAX/GLYCOLAX) Packet 17 g  Dose: 17 g Freq: DAILY PRN Route: PO  PRN Reason: constipation  Start: 03/30/17 1944   Admin Instructions: Give in 8oz of  water, juice, or soda. Hold for loose stools.  This is the second step of a three step constipation treatment protocol.  1 Packet = 17 grams. Mixed prescribed dose in 8 ounces of water. Follow with 8 oz. of water.               risperiDONE (risperDAL M-TABS) ODT tab 0.5 mg  Dose: 0.5 mg Freq: 2 TIMES DAILY Route: SL  Start: 03/31/17 1100      1406 (0.5 mg)-Given [C]       (2328)-Not Given [C]        0913 (0.5 mg)-Given       1315 (0.5 mg)-Given        1022 (0.5 mg)-Given       1438 (0.5 mg)-Given        0925 (0.5 mg)-Given       1435 (0.5 mg)-Given        0904 (0.5 mg)-Given       [ ] 1400           risperiDONE (risperDAL) tablet 1 mg  Dose: 1 mg Freq: AT BEDTIME Route: PO  Start: 03/31/17 2200 2123 (1 mg)-Given        2029 (1 mg)-Given               1957 (1  mg)-Given               2109 (1 mg)-Given        [ ] 2200          Discontinued Medications  Medications 03/29/17 03/30/17 03/31/17 04/01/17 04/02/17 04/03/17 04/04/17         Dose: 10 mg Freq: DAILY Route: PO  Start: 04/03/17 0900   End: 04/03/17 1046         0925 (10 mg)-Given       1046-Med Discontinued          Dose: 5 mg Freq: DAILY Route: PO  Start: 03/31/17 1130   End: 04/02/17 1038      1406 (5 mg)-Given [C]        0913 (5 mg)-Given        1020 (5 mg)-Given [C]       1038-Med Discontinued           Dose: 0.3 mg Freq: 2 TIMES DAILY Route: PO  Start: 03/31/17 2100   End: 04/04/17 0815      2122 (0.3 mg)-Given        0914 (0.3 mg)-Given       2030 (0.3 mg)-Given        1021 (0.3 mg)-Given       1958 (0.3 mg)-Given               0925 (0.3 mg)-Given       2108 (0.3 mg)-Given        0815-Med Discontinued         Rate: 75 mL/hr Freq: CONTINUOUS Route: IV  Start: 04/01/17 0715   End: 04/02/17 0611       0703 ( )-New Bag       1739 ( )-Rate/Dose Change        0611-Med Discontinued           Dose: 120 mg Freq: DAILY Route: PO  Start: 03/31/17 1615   End: 04/03/17 1034   Admin Instructions: DO NOT CRUSH.       1658 (120 mg)-Given        0914 (120 mg)-Given        1021 (120 mg)-Given        0927 (120 mg)-Given       1034-Med Discontinued          Dose: 15-30 g Freq: EVERY 15 MIN PRN Route: PO  PRN Reason: low blood sugar  Start: 03/30/17 1944   End: 04/02/17 0836   Admin Instructions: Give 15 g for BG 51 to 69 mg/dL IF patient is conscious and able to swallow. Give 30 g for BG less than or equal to 50 mg/dL IF patient is conscious and able to swallow. Do NOT give glucose gel via enteral tube.  IF patient has enteral tube: give apple juice 120 mL (4 oz or 15 g of CHO) via enteral tube for BG 51 to 69 mg/dL.  Give apple juice 240 mL (8 oz or 30 g of CHO) via enteral tube for BG less than or equal to 50 mg/dL.         0836-Med Discontinued        Or    Dose: 25-50 mL Freq: EVERY 15 MIN PRN Route: IV  PRN Reason: low blood  sugar  Start: 03/30/17 1944   End: 04/02/17 0836   Admin Instructions: Use if have IV access, BG less than 70 mg/dL and meet dose criteria below:  Dose if conscious and alert (or disorientated) and NPO = 25 mL  Dose if unconscious / not alert = 50 mL  Vesicant.         0836-Med Discontinued        Or    Dose: 1 mg Freq: EVERY 15 MIN PRN Route: SC  PRN Reason: low blood sugar  PRN Comment: May repeat x 1 only  Start: 03/30/17 1944   End: 04/02/17 0836   Admin Instructions: May give SQ or IM. IF BG less than or equal to 50 mg/dL and no IV access.  ONLY use glucagon IF patient has NO IV access AND is UNABLE to swallow.         0836-Med Discontinued           Dose: 1-12 Units Freq: EVERY 4 HOURS Route: SC  Start: 03/30/17 2300   End: 04/02/17 0815   Admin Instructions: Correction Scale - HIGH INSULIN RESISTANCE DOSING     Do Not give Correction Insulin if BG less than 140  For  - 164 give 1 unit.  For  - 189 give 2 units.  For  - 214 give 3 units.  For  - 239 give 4 units.  For  - 264 give 5 units.  For  - 289 give 6 units.  For  - 314 give 7 units.  For  - 339 give 8 units  For  - 364 give 9 units  For  - 389 give 10 units  For  - 414 give 11 units  For BG greater than or equal to 415 give 12 units  Check blood glucose Q4H and administer based on blood glucose.  Notify provider if glucose greater than or equal to 350 mg/dL after administration of correction dose.  If given at mealtime, must be administered 5 min before meal or immediately after.      (2307)-Not Given [C]        0116 (4 Units)-Given       0517 (4 Units)-Given       0904 (4 Units)-Given       (1400)-Not Given [C]       1649 (9 Units)-Given       2118 (4 Units)-Given [C]        0150 (6 Units)-Given       0447 (4 Units)-Given       0915 (1 Units)-Given       1318 (3 Units)-Given       1741 (3 Units)-Given       2158 (5 Units)-Given        0108 (2 Units)-Given       0552 (3 Units)-Given        0815-Med Discontinued           Dose: 10 Units Freq: EVERY MORNING Route: SC  Start: 04/01/17 0900   End: 04/02/17 0838       0915 (10 Units)-Given        0838-Med Discontinued           Dose: 15 Units Freq: EVERY MORNING Route: SC  Start: 04/02/17 0900   End: 04/03/17 0747        1100 (15 Units)-Given        0747-Med Discontinued          Dose: 10 Units Freq: AT BEDTIME Route: SC  Start: 03/31/17 2200   End: 04/01/17 1533      2119 (10 Units)-Given        1533-Med Discontinued            Dose: 500 mg Freq: DAILY Route: PO  Indications of Use: COMMUNITY ACQUIRED PNEUMONIA  Start: 04/03/17 0900   End: 04/03/17 1138         0927 (500 mg)-Given       1138-Med Discontinued          Dose: 1 mg Freq: 2 TIMES DAILY PRN Route: PO  PRN Reason: anxiety  Start: 03/31/17 1614   End: 04/02/17 0814       1757 (1 mg)-Given       2304 (1 mg)-Given        0814-Med Discontinued           Dose: 500 mg Freq: EVERY 8 HOURS Route: IV  Indications of Use: HEALTHCARE-ASSOCIATED PNEUMONIA  Last Dose: 500 mg (04/01/17 2347)  Start: 04/01/17 1300   End: 04/03/17 0831   Admin Instructions: Dose adjusted per renal dosing policy.  Estimated CrCl = 27 mL/min        1647 (500 mg)-New Bag       2347 (500 mg)-New Bag        0847 (500 mg)-New Bag       1600 (500 mg)-Started During Downtime        0015 (500 mg)-New Bag       0831-Med Discontinued  (0900)-Not Given [C]              Dose: 1 each Freq: SEE ADMIN INSTRUCTIONS Route: XX  Indications of Use: HEALTHCARE-ASSOCIATED PNEUMONIA  Start: 03/30/17 2014   End: 04/01/17 1514       1514-Med Discontinued       Medications 03/29/17 03/30/17 03/31/17 04/01/17 04/02/17 04/03/17 04/04/17

## 2017-03-30 NOTE — IP AVS SNAPSHOT
` ` Patient Information     Patient Name Sex     Nel Farmer ([id number]) Female 1955       Room Bed    [facility] 8801-01      Patient Demographics     Address Phone    [address] [place] [address] [phone] (Home) *Preferred*  NONE (Work)  [phone] (Mobile)      Patient Ethnicity & Race     Ethnic Group Patient Race    American White      Emergency Contact(s)     Name Relation Home Work Mobile    Tana Farmer Sister [phone]    Lisa Farmer Mother [phone]      Documents on File        Status Date Received Description       Documents for the Patient    Face Sheet  () 08     Insurance Card Received () 12 Medicare    Consent Form  06     Other  06     Consent Form  08     Other  08 medicare questions    Insurance Card  () 09     Face Sheet Received () 09     External Medication Information Consent Accepted () 09     Insurance Card  () 09 Medicare    KARO Face Sheet Received () 09     Waiver - Payment  09     Face Sheet Received () 03/24/10     KARO Face Sheet Received () 05/03/10     Waiver - Payment  05/03/10     Patient ID Received 12     Consent for Services - Hospital/Clinic Received () 11/02/10     Face Sheet Received () 11/02/10     Consent for Services - Hospital/Clinic Received () 11     Insurance Card Received 12 Medicare    Consent for Services - Hospital/Clinic  ()      Consent for Services - Hospital/Clinic Received () 12     Consent for Services - Hospital/Clinic Received () 12     Community Care Application  12     External Medication Information Consent Accepted () 06/15/12     External Medication Information Consent   Release medication Information 6/15/12    Consent to Communicate Received () 12     HIM JINNY  Authorization - File Only  12 AUTHORIZATION FOR RELEASE OF PROTECTED HEALTH INFORMATION- [facility]    HIM JINNY Authorization  10/26/12 patient    [facility] Specified Other       HIM JINNY Authorization - File Only  13 AUTHORIZATION FOR RELEASE OF PROTECTED HEALTH INFORMATION-2013    Consent for Services - Hospital/Clinic  () 13 CONSENT FOR SERVICE    Privacy Notice - [place]  13 ACKNOWLEDGMENT OF RECEIPT OF NOTICE OF PRIVACY PRACTICE    HIM JINNY Authorization - File Only  13 AUTHORIZATIONATION FOR RELEASE OF PROTECTED HEALTH INFORMATION-5/3/13    Consent for Services - Hospital/Clinic Received () 13     HIM JINNY Authorization - File Only   [facility]  13    HIM JINNY Authorization - File Only  13 DEC RELEASE OF INFORMATION 2013    HIM JINNY Authorization - File Only  14 JERALD PATRICK-1/15/14    Consent for EHR Access Received 14     Consent for Services - Hospital/Clinic Received () 14     HIM JINNY Authorization - File Only  01/31/15 DEC RELEASE OF INFORMATION    HIM JINNY Authorization - File Only  02/05/15 JERALD    HIM JINNY Authorization - File Only  03/20/15 DEC RELEASE OF INFORMATION    Consent for Services - Hospital/Clinic Received () 05/14/15     HIM JINNY Authorization - File Only  05/20/15 JERALD PATRICK    HIM JINNY Authorization - File Only  09/15/15 DEC RELEASE OF INFORMATION--2015    Consent for Services - Hospital/Clinic  () 09/21/15 CONSENT FOR SERVICE    Privacy Notice - [place] Received 09/15/15 ACKNOWLEDGMENT OF RECEIPT OF NOTICE OF PRIVACY PRACTICE    HIM JINNY Authorization - File Only  09/22/15 [facility] / CELESTE TOPETE-09/14/15    HIM JINNY Authorization - File Only  09/22/15 JERALD PATRICK (MOTHER)-09/11/15    HIM JINNY Authorization - File Only  09/22/15 DR. DRIVER-09/14/15    HIM JINNY Authorization - File Only  16 JERALD PATRICK    Consent for  Services/Privacy Notice - Hospital/Clinic Received () 16     Consent for EHR Access   CONSENT FOR EHR ACCESS    Patient ID Received 17 EXP 2019    Insurance Card Received 17     Consent for EHR Access       External Medication Information Consent       [facility] Specified Other       Consent for Services/Privacy Notice - Hospital/Clinic Received 17     Privacy Notice - [place]       HIM JINNY Authorization - File Only  12/15/16 DEC RELEASE OF INFORMATION    Privacy Notice - [place] Received (Deleted) 12     Privacy Notice - [place]  (Deleted) 06     Insurance Card  (Deleted) 08 medicaid    Privacy Notice - [facility]  (Deleted)      Privacy Notice - [facility] Received (Deleted) 10/17/11     Community Care Application  (Deleted)      Privacy Notice - [place]  (Deleted) 12 ACKNOWLEDGMENT OF RECEIPT OF NOTICE OF PRIVACY PRACTICE    Privacy Notice - [place]  (Deleted) 13 ACKNOWLEDGMENT OF RECEIPT OF NOTICE OF PRIVACY PRACTICE    Consent for EHR Access  (Deleted) 13 Copied from existing Consent for services - C/HOD collected on 2012    Insurance Card  (Deleted)      Patient ID  (Deleted)         Documents for the Encounter    CMS IM for Patient Signature         Admission Information     Attending Provider Admitting Provider Admission Type Admission Date/Time    Jamey Thornton MD Melander, Ian M, MD Emergency 17  1512    Discharge Date Hospital Service Auth/Cert Status Service Area     [facility]    Unit Room/Bed Admission Status        88 ONCOLOGY 8801/8801-01 Admission (Confirmed)       Admission     Complaint    Altered mental status      Hospital Account     Name Acct ID Class Status Primary Coverage    Nel Farmer [id number] Inpatient Open MEDICARE - MEDICARE            Guarantor Account (for Hospital Account #[id number])     Name Relation to Pt Service Area Active? Acct Type     Nel Farmer  FCS Yes Personal/Family    Address Phone          [address] [place] [id number] [phone](H)  none(O)              Coverage Information (for Hospital Account #[id number])     1. MEDICARE/MEDICARE     F/O Payor/Plan Precert #    MEDICARE/MEDICARE     Subscriber Subscriber #    Nel Farmer [id number]    Address Phone    [employer]  [address]  [place] [address] [phone]          2. [place]     F/[place] Pay[place]/Plan [place]     Subscriber Subscriber #    Nel Farmer [id number]    Address Phone    [address] [address]  [place] [address] [phone]

## 2017-03-30 NOTE — IP AVS SNAPSHOT
"          [place] [address] ONCOLOGY: [phone]                                              INTERAGENCY TRANSFER FORM - LAB / IMAGING / EKG / EMG RESULTS   3/30/2017                    Hospital Admission Date: 3/30/2017  MARIO ALBERTO PATRICK   : 1955  Sex: Female        Attending Provider: Jamey Thornton MD     Allergies:  Amoxicillin, Amoxicillin, Haldol [Benzyl Alcohol], Haldol [Haloperidol], Pcn [Penicillin G], Penicillins, Seroquel [Quetiapine]    Infection:  None   Service:  INTERMEDIATE    Ht:  1.6 m (5' 3\")   Wt:  92.4 kg (203 lb 11.3 oz)   Admission Wt:  102.1 kg (225 lb)    BMI:  36.08 kg/m 2   BSA:  2.03 m 2            Patient PCP Information     None on File         Lab Results - 3 Days      Glucose by meter [[id number]] (Abnormal)  Resulted: 17 1211, Result status: Final result    Ordering provider: Jamey Thornton MD  17 1148 Resulting lab: POINT OF CARE TEST, GLUCOSE    Specimen Information    Type Source Collected On     17 1148          Components       Value Reference Range Flag Lab   Glucose 283 70 - 99 mg/dL H 170            Glucose by meter [[id number]] (Abnormal)  Resulted: 17 0921, Result status: Final result    Ordering provider: Jamey Thornton MD  17 0907 Resulting lab: POINT OF CARE TEST, GLUCOSE    Specimen Information    Type Source Collected On     17 0907          Components       Value Reference Range Flag Lab   Glucose 152 70 - 99 mg/dL H 170            Basic metabolic panel [[id number]] (Abnormal)  Resulted: 17 0712, Result status: Final result    Ordering provider: Russ Gonzales MD  17 0000 Resulting lab: [facility]    Specimen Information    Type Source Collected On   Blood  17 0645          Components       Value Reference Range Flag Lab   Sodium 140 133 - 144 mmol/L  FrStHsLb   Potassium 3.7 3.4 - 5.3 mmol/L  FrStHsLb   Chloride 111 94 - 109 mmol/L H FrStHsLb   Carbon Dioxide 21 20 - 32 " mmol/L  FrStHsLb   Anion Gap 8 3 - 14 mmol/L  FrStHsLb   Glucose 150 70 - 99 mg/dL H FrStHsLb   Urea Nitrogen 37 7 - 30 mg/dL H FrStHsLb   Creatinine 2.38 0.52 - 1.04 mg/dL H FrStHsLb   GFR Estimate 21 >60 mL/min/1.7m2 L FrStHsLb   Comment:  Non  GFR Calc   GFR Estimate If Black 25 >60 mL/min/1.7m2 L FrStHsLb   Comment:  African American GFR Calc   Calcium 9.7 8.5 - 10.1 mg/dL  FrStHsLb            CSF Culture Aerobic Bacterial [[id number]]  Resulted: 04/04/17 0704, Result status: Final result    Ordering provider: Nacho Schroeder MD  03/30/17 1727 Resulting lab: [facility]    Specimen Information    Type Source Collected On   CSF  03/30/17 1720          Components       Value Reference Range Flag Lab   Specimen Description Cerebrospinal fluid   FrStHsLb   Culture Micro No growth   225   Micro Report Status FINAL 04/04/2017   225            Blood culture [[id number]]  Resulted: 04/04/17 0326, Result status: Preliminary result    Ordering provider: Nacho Schroeder MD  03/30/17 1522 Resulting lab: [facility] B[facility]    Specimen Information    Type Source Collected On   Blood Arm, Right 03/30/17 1515          Components       Value Reference Range Flag Lab   Specimen Description Blood Right Arm   03556   Special Requests Aerobic and anaerobic bottles received   39040   Culture Micro No growth after 5 days   75   Micro Report Status Pending   75            Blood culture [[id number]]  Resulted: 04/04/17 0326, Result status: Preliminary result    Ordering provider: Nacho Schroeder MD  03/30/17 1522 Resulting lab: [facility]    Specimen Information    Type Source Collected On   Blood Arm, Left 03/30/17 1535          Components       Value Reference Range Flag Lab   Specimen Description Blood Left Arm   59763   Special Requests Aerobic and anaerobic bottles received   63706   Culture Micro No growth after 5  days   75   Micro Report Status Pending   75            Glucose by meter [[id number]] (Abnormal)  Resulted: 04/04/17 0216, Result status: Final result    Ordering provider: Jamey Thornton MD  04/04/17 0211 Resulting lab: POINT OF CARE TEST, GLUCOSE    Specimen Information    Type Source Collected On     04/04/17 0211          Components       Value Reference Range Flag Lab   Glucose 129 70 - 99 mg/dL H 170            Glucose by meter [[id number]] (Abnormal)  Resulted: 04/03/17 2211, Result status: Final result    Ordering provider: Jamey Thornton MD  04/03/17 2157 Resulting lab: POINT OF CARE TEST, GLUCOSE    Specimen Information    Type Source Collected On     04/03/17 2157          Components       Value Reference Range Flag Lab   Glucose 294 70 - 99 mg/dL H 170   Comment:  Dr/RN Notified            Glucose by meter [[id number]] (Abnormal)  Resulted: 04/03/17 1816, Result status: Final result    Ordering provider: Jamey Thornton MD  04/03/17 1753 Resulting lab: POINT OF CARE TEST, GLUCOSE    Specimen Information    Type Source Collected On     04/03/17 1753          Components       Value Reference Range Flag Lab   Glucose 255 70 - 99 mg/dL H 170   Comment:  /RN Notified            Glucose by meter [[id number]] (Abnormal)  Resulted: 04/03/17 1335, Result status: Final result    Ordering provider: Jamey Thornton MD  04/03/17 1329 Resulting lab: POINT OF CARE TEST, GLUCOSE    Specimen Information    Type Source Collected On     04/03/17 1329          Components       Value Reference Range Flag Lab   Glucose 161 70 - 99 mg/dL H 170   Comment:  /RN Notified            Basic metabolic panel [[id number]] (Abnormal)  Resulted: 04/03/17 0716, Result status: Final result    Ordering provider: Russ Gonzales MD  04/03/17 0000 Resulting lab: [facility]    Specimen Information    Type Source Collected On   Blood  04/03/17 0642          Components       Value Reference Range Flag Lab   Sodium 144  133 - 144 mmol/L  FrStHsLb   Potassium 4.0 3.4 - 5.3 mmol/L  FrStHsLb   Chloride 116 94 - 109 mmol/L H FrStHsLb   Carbon Dioxide 19 20 - 32 mmol/L L FrStHsLb   Anion Gap 9 3 - 14 mmol/L  FrStHsLb   Glucose 205 70 - 99 mg/dL H FrStHsLb   Urea Nitrogen 35 7 - 30 mg/dL H FrStHsLb   Creatinine 2.37 0.52 - 1.04 mg/dL H FrStHsLb   GFR Estimate 21 >60 mL/min/1.7m2 L FrStHsLb   Comment:  Non  GFR Calc   GFR Estimate If Black 25 >60 mL/min/1.7m2 L FrStHsLb   Comment:  African American GFR Calc   Calcium 9.6 8.5 - 10.1 mg/dL  FrStHsLb            Glucose by meter [[id number]] (Abnormal)  Resulted: 04/02/17 2133, Result status: Final result    Ordering provider: Jamey Thornton MD  04/02/17 2104 Resulting lab: POINT OF CARE TEST, GLUCOSE    Specimen Information    Type Source Collected On     04/02/17 2104          Components       Value Reference Range Flag Lab   Glucose 153 70 - 99 mg/dL H 170            Glucose by meter [[id number]] (Abnormal)  Resulted: 04/02/17 1817, Result status: Final result    Ordering provider: Jamey Thornton MD  04/02/17 1807 Resulting lab: POINT OF CARE TEST, GLUCOSE    Specimen Information    Type Source Collected On     04/02/17 1807          Components       Value Reference Range Flag Lab   Glucose 156 70 - 99 mg/dL H 170            Glucose by meter [[id number]] (Abnormal)  Resulted: 04/02/17 1526, Result status: Final result    Ordering provider: Jamey Thornton MD  04/02/17 1428 Resulting lab: POINT OF CARE TEST, GLUCOSE    Specimen Information    Type Source Collected On     04/02/17 1428          Components       Value Reference Range Flag Lab   Glucose 227 70 - 99 mg/dL H 170            Glucose by meter [[id number]] (Abnormal)  Resulted: 04/02/17 1051, Result status: Final result    Ordering provider: Jamey Thornton MD  04/02/17 1044 Resulting lab: POINT OF CARE TEST, GLUCOSE    Specimen Information    Type Source Collected On     04/02/17 1044          Components        Value Reference Range Flag Lab   Glucose 188 70 - 99 mg/dL H 170   Comment:  /RN Notified            Valproic acid [[id number]] (Abnormal)  Resulted: 04/02/17 0748, Result status: Final result    Ordering provider: Jamey Thornton MD  04/02/17 0000 Resulting lab: [facility]    Specimen Information    Type Source Collected On   Blood  04/02/17 0720          Components       Value Reference Range Flag Lab   Valproic Acid Level 14 50 - 100 mg/L L FrStHsLb            Basic metabolic panel [[id number]] (Abnormal)  Resulted: 04/02/17 0742, Result status: Final result    Ordering provider: Russ Gonzales MD  04/02/17 0000 Resulting lab: [facility]    Specimen Information    Type Source Collected On   Blood  04/02/17 0720          Components       Value Reference Range Flag Lab   Sodium 143 133 - 144 mmol/L  FrStHsLb   Potassium 3.5 3.4 - 5.3 mmol/L  FrStHsLb   Chloride 115 94 - 109 mmol/L H FrStHsLb   Carbon Dioxide 20 20 - 32 mmol/L  FrStHsLb   Anion Gap 8 3 - 14 mmol/L  FrStHsLb   Glucose 205 70 - 99 mg/dL H FrStHsLb   Urea Nitrogen 36 7 - 30 mg/dL H FrStHsLb   Creatinine 2.26 0.52 - 1.04 mg/dL H FrStHsLb   GFR Estimate 22 >60 mL/min/1.7m2 L FrStHsLb   Comment:  Non  GFR Calc   GFR Estimate If Black 27 >60 mL/min/1.7m2 L FrStHsLb   Comment:  African American GFR Calc   Calcium 9.3 8.5 - 10.1 mg/dL  FrStHsLb            Platelet count [[id number]]  Resulted: 04/02/17 0729, Result status: Final result    Ordering provider: Jamey Thornton MD  04/02/17 0000 Resulting lab: [facility]    Specimen Information    Type Source Collected On   Blood  04/02/17 0720          Components       Value Reference Range Flag Lab   Platelet Count 182 150 - 450 10e9/L  FrStHsLb            Glucose by meter [[id number]] (Abnormal)  Resulted: 04/02/17 0556, Result status: Final result    Ordering provider: Jamey Thornton MD  04/02/17 0549 Resulting lab: POINT OF CARE TEST,  GLUCOSE    Specimen Information    Type Source Collected On     04/02/17 0549          Components       Value Reference Range Flag Lab   Glucose 206 70 - 99 mg/dL H 170   Comment:  /RN Notified            Glucose by meter [[id number]] (Abnormal)  Resulted: 04/02/17 0110, Result status: Final result    Ordering provider: Jamey Thornton MD  04/02/17 0059 Resulting lab: POINT OF CARE TEST, GLUCOSE    Specimen Information    Type Source Collected On     04/02/17 0059          Components       Value Reference Range Flag Lab   Glucose 168 70 - 99 mg/dL H 170   Comment:  /RN Notified            Glucose by meter [[id number]] (Abnormal)  Resulted: 04/02/17 0001, Result status: Final result    Ordering provider: Jamey Thornton MD  04/01/17 2125 Resulting lab: POINT OF CARE TEST, GLUCOSE    Specimen Information    Type Source Collected On     04/01/17 2125          Components       Value Reference Range Flag Lab   Glucose 233 70 - 99 mg/dL H 170            Sodium [[id number]]  Resulted: 04/01/17 2225, Result status: Final result    Ordering provider: Russ Gonzales MD  04/01/17 1600 Resulting lab: [facility]    Specimen Information    Type Source Collected On   Blood  04/01/17 2210          Components       Value Reference Range Flag Lab   Sodium 142 133 - 144 mmol/L  FrStHsLb            Glucose by meter [[id number]] (Abnormal)  Resulted: 04/01/17 1726, Result status: Final result    Ordering provider: Jamey Thornton MD  04/01/17 1720 Resulting lab: POINT OF CARE TEST, GLUCOSE    Specimen Information    Type Source Collected On     04/01/17 1720          Components       Value Reference Range Flag Lab   Glucose 180 70 - 99 mg/dL H 170            Glucose by meter [[id number]] (Abnormal)  Resulted: 04/01/17 1231, Result status: Final result    Ordering provider: Jamey Thornton MD  04/01/17 1224 Resulting lab: POINT OF CARE TEST, GLUCOSE    Specimen Information    Type Source Collected On     04/01/17  1224          Components       Value Reference Range Flag Lab   Glucose 192 70 - 99 mg/dL H 170            Sodium [[id number]] (Abnormal)  Resulted: 04/01/17 1058, Result status: Final result    Ordering provider: Jamey Thornton MD  04/01/17 0400 Resulting lab: [facility]    Specimen Information    Type Source Collected On   Blood  04/01/17 1036          Components       Value Reference Range Flag Lab   Sodium 150 133 - 144 mmol/L H FrStHsLb            Glucose by meter [[id number]] (Abnormal)  Resulted: 04/01/17 0736, Result status: Final result    Ordering provider: Jamey Thornton MD  04/01/17 0733 Resulting lab: POINT OF CARE TEST, GLUCOSE    Specimen Information    Type Source Collected On     04/01/17 0733          Components       Value Reference Range Flag Lab   Glucose 165 70 - 99 mg/dL H 170            Vancomycin level [[id number]]  Resulted: 04/01/17 0641, Result status: Final result    Ordering provider: Pavel Medina [facility]  04/01/17 0000 Resulting lab: [facility]    Specimen Information    Type Source Collected On   Blood  04/01/17 0610          Components       Value Reference Range Flag Lab   Vancomycin Level 19.2 mg/L  FrStHsLb   Comment:         Traditional Dosing therapeutic Range:          Trough 8-20 mg/L          Peak 20-50 mg/L              Basic metabolic panel [[id number]] (Abnormal)  Resulted: 04/01/17 0640, Result status: Final result    Ordering provider: NISA Renae MD  04/01/17 0000 Resulting lab: [facility]    Specimen Information    Type Source Collected On   Blood  04/01/17 0610          Components       Value Reference Range Flag Lab   Sodium 151 133 - 144 mmol/L H FrStHsLb   Potassium 3.8 3.4 - 5.3 mmol/L  FrStHsLb   Chloride 121 94 - 109 mmol/L H FrStHsLb   Carbon Dioxide 21 20 - 32 mmol/L  FrStHsLb   Anion Gap 9 3 - 14 mmol/L  FrStHsLb   Glucose 205 70 - 99 mg/dL H FrStHsLb   Urea Nitrogen 37 7 - 30 mg/dL H FrStHsLb    Creatinine 2.35 0.52 - 1.04 mg/dL H FrStHsLb   GFR Estimate 21 >60 mL/min/1.7m2 L FrStHsLb   Comment:  Non  GFR Calc   GFR Estimate If Black 25 >60 mL/min/1.7m2 L FrStHsLb   Comment:  African American GFR Calc   Calcium 9.9 8.5 - 10.1 mg/dL  FrStHsLb            Glucose by meter [[id number]] (Abnormal)  Resulted: 04/01/17 0450, Result status: Final result    Ordering provider: Jamey Thornton MD  04/01/17 0443 Resulting lab: POINT OF CARE TEST, GLUCOSE    Specimen Information    Type Source Collected On     04/01/17 0443          Components       Value Reference Range Flag Lab   Glucose 222 70 - 99 mg/dL H 170            Sodium [[id number]] (Abnormal)  Resulted: 04/01/17 0214, Result status: Final result    Ordering provider: Jamey Thornton MD  03/31/17 2000 Resulting lab: [facility]    Specimen Information    Type Source Collected On   Blood  04/01/17 0200          Components       Value Reference Range Flag Lab   Sodium 148 133 - 144 mmol/L H FrStHsLb            Glucose by meter [[id number]] (Abnormal)  Resulted: 04/01/17 0151, Result status: Final result    Ordering provider: Jamey Thornton MD  04/01/17 0146 Resulting lab: POINT OF CARE TEST, GLUCOSE    Specimen Information    Type Source Collected On     04/01/17 0146          Components       Value Reference Range Flag Lab   Glucose 275 70 - 99 mg/dL H 170            Testing Performed By     Lab - Abbreviation Name Director Address Valid Date Range    14 - FrStHsLb [facility] Unknown [address] [place] [place] [address] 05/08/15 1057 - Present    75 - Unknown [facility] Unknown [address] [facility] [address] 01/15/15 1019 - Present    170 - Unknown POINT OF CARE TEST, GLUCOSE Unknown Unknown 10/31/11 1114 - Present    225 - Unknown [facility] Unknown [address] [facility] [address] 12/19/14 0954 - Present    [id number] - Unknown [place]  [facility] Unknown [address] [place] [address] 07/23/15 1146 - Present            Unresulted Labs (24h ago through future)    Start       Ordered    17 0600  Platelet count  (Pharmacological Prophylaxis- heparin (If CrCl less than 30 mL/min)- UU,UR,UA,SH,RH,PH,WY)  EVERY THREE DAYS,   Routine     Comments:  Repeat every 3 days while on VTE prophylaxis. If no result is listed, this lab has not been done the past 365 days. LATEST LAB RESULT: Platelet Count (10e9/L)       Date                     Value                 2017               351              ----------      17 1944    17 0600  Creatinine  AM DRAW,   Routine     Comments:  While receiving Vancomycin therapy    17         ECG/EMG Results      Echocardiogram Complete [[id number]]  Resulted: 17 160, Result status: In process    Ordering provider: Irene Palacios MD  17 1449 Performed: 17 1607 - 17 160    Resulting lab: [facility]             Echo Complete with Lumason [[id number]]  Resulted: 17 1608, Result status: Edited Result - FINAL    Ordering provider: Irene Palacios MD  17 1449 Resulted by: Grant Spring MD    Performed: 17 1607 - 17 1624 Resulting lab: RADIOLOGY RESULTS    Narrative:       [id number]  [id number]  [id number]  [id number]^PHILIP^IRENE^           [facility]  Echocardiography Laboratory  [address] [place] [address]        Name: MARIO ALBERTO PATRICK  MRN: [id number]  : 1955  Study Date: 2017 04:08 PM  Age: 61 yrs  Gender: Female  Patient Location: [facility]  Reason For Study: Other, Please Specify in Comments  Ordering Physician: IRENE PALACIOS  Referring Physician: NO PMD  Performed By: Zander Villanueva RDCS     BSA: 2.0 m2  Height: 63 in  Weight: 215 lb  HR: 102  BP: 135/68 mmHg  _____________________________________________________________________________  __        Procedure  Complete Portable Echo Adult. Contrast  Lumason.  _____________________________________________________________________________  __        Interpretation Summary     Hyperdynamic right and left ventricular systolic function  The visual LV ejection fraction is estimated at >70%.  There is borderline concentric left ventricular hypertrophy.  All four valves are normal in structure and function.There is no evidence of a  mass or vegetation. This does not rule out endocarditis.  A ANDREW is more sensitive/specific to rule in or out SBE.  The study was technically difficult.  _____________________________________________________________________________  __        Left Ventricle  The left ventricle is normal in size. There is borderline concentric left  ventricular hypertrophy. The left ventricular ejection fraction is normal. The  visual ejection fraction is estimated at >70%. Hyperdynamic left ventricular  function. Grade I or early diastolic dysfunction. Normal left ventricular wall  motion. There is no thrombus seen in the left ventricle.     Right Ventricle  Normal right ventricle structure and size. The right ventricular systolic  function is normal. Hyperdynamic right ventricular function.     Atria  Normal left atrial size. Right atrial size is normal. Intact atrial septum.     Mitral Valve  The mitral valve is normal in structure and function. There is no evidence of  mitral valve prolapse. There is no mitral valve stenosis.        Tricuspid Valve  The tricuspid valve is normal in structure and function. There is physiologic  tricuspid regurgitation. Right ventricular systolic pressure could not be  approximated due to inadequate tricuspid regurgitation.     Aortic Valve  The aortic valve is normal in structure and function. Normal tricuspid aortic  valve. No aortic regurgitation is present. No aortic stenosis is present.     Pulmonic Valve  The pulmonic valve is not well seen, but is grossly normal. There is trace  pulmonic valvular regurgitation.      Vessels  Normal size aorta. The inferior vena cava is not dilated.     Pericardium  The pericardium appears normal. There is no pleural effusion.     _____________________________________________________________________________  __  MMode/2D Measurements & Calculations  IVSd: 1.2 cm  LVIDd: 4.1 cm  LVIDs: 2.7 cm  LVPWd: 1.2 cm  FS: 34.8 %  EDV(Teich): 75.5 ml  ESV(Teich): 26.8 ml  LV mass(C)d: 173.4 grams     Ao root diam: 3.2 cm  asc Aorta Diam: 3.3 cm        Doppler Measurements & Calculations  MV E max den: 61.4 cm/sec  MV A max den: 87.1 cm/sec  MV E/A: 0.70  MV dec time: 0.19 sec  Lateral E/e': 9.3  Medial E/e': 10.3           _____________________________________________________________________________  __           Report approved by: Rayo Romano 03/19/2017 04:54 PM       1    Type Source Collected On     03/19/17 1608          View Image (below)              Encounter-Level Documents:     There are no encounter-level documents.      Order-Level Documents:     There are no order-level documents.

## 2017-03-30 NOTE — IP AVS SNAPSHOT
"` [place] [address] ONCOLOGY: [phone]                                              INTERAGENCY TRANSFER FORM - NURSING   3/30/2017                    Hospital Admission Date: 3/30/2017  MARIO ALBERTO PATRICK   : 1955  Sex: Female        Attending Provider: Jamey Thornton MD     Allergies:  Amoxicillin, Amoxicillin, Haldol [Benzyl Alcohol], Haldol [Haloperidol], Pcn [Penicillin G], Penicillins, Seroquel [Quetiapine]    Infection:  None   Service:  INTERMEDIATE    Ht:  1.6 m (5' 3\")   Wt:  92.4 kg (203 lb 11.3 oz)   Admission Wt:  102.1 kg (225 lb)    BMI:  36.08 kg/m 2   BSA:  2.03 m 2            Patient PCP Information     None on File      Current Code Status     Date Active Code Status Order ID Comments User Context       3/30/2017  7:44 PM Full Code [id number]  Jamey Thornton MD Inpatient       Code Status History     Date Active Date Inactive Code Status Order ID Comments User Context    3/28/2017  2:50 PM 3/30/2017  7:44 PM Full Code [id number]  Wilfrido Flores MD Outpatient    2017  6:38 AM 3/28/2017  2:50 PM Full Code [id number]  Hong Lee MD Inpatient    3/10/2016 11:07 AM 2017  6:38 AM Full Code [id number]  Kayleen Trevino PA-C Outpatient    3/9/2016  8:44 PM 3/10/2016 11:07 AM Full Code [id number]  Carolee Kline MD ED    2015  3:52 PM 2015  2:38 PM Full Code [id number]  Brenda Duncan RN Inpatient    2015  5:39 PM 2015  2:02 PM Full Code [id number]  Veronica Salmon RN Inpatient    5/10/2015  7:45 PM 2015  5:11 PM Full Code [id number]  Natalia Garibay MD Inpatient    2015  5:33 AM 2015  6:46 PM Full Code [id number]  Luna Duncan MD Inpatient    3/20/2015  6:00 AM 3/23/2015  5:43 PM Full Code [id number]  Kevin Hewitt MD Inpatient    2015  4:00 AM 2015  3:50 PM Full Code [id number]  Marlene Corrales RN Inpatient    1/15/2014  5:59 PM 2014  3:27 PM Full Code [id number]  Shereen, " Keena LAURA, RN Inpatient    4/30/2013  4:35 PM 5/3/2013  6:24 PM Full Code [id number]  Nel Wallace, RN Inpatient    3/27/2013  5:08 PM 4/2/2013  2:03 PM Full Code [id number]  Brenda Mishra, RN Inpatient    12/4/2012  3:08 PM 12/11/2012  2:53 PM Full Code [id number]  Jesenia Cabello, RN Inpatient    5/18/2012  7:38 PM 5/23/2012  4:06 PM Full Code [id number]  Veronica Salmon RN Inpatient    1/31/2012  3:58 PM 2/3/2012  2:14 PM Full Code [id number]  Shiva Feng, DO Inpatient      Advance Directives        Does patient have a scanned Advance Directive/ACP document in EPIC?           No        Hospital Problems as of 4/4/2017              Priority Class Noted POA    Altered mental status Medium  3/30/2017 Yes      Non-Hospital Problems as of 4/4/2017              Priority Class Noted    HTN (hypertension)   Unknown    Hyperlipidemia with target LDL less than 70   Unknown    TYPE 2 DIABETES, HBA1C GOAL < 7%   10/31/2010    acute Mastitis   1/31/2012    CKD (chronic kidney disease) stage 3, GFR 30-59 ml/min   Unknown    Advanced directives, counseling/discussion   6/25/2012    Health Care Home   2/1/2013    Suicidal ideation   4/30/2013    Schizoaffective disorder, bipolar type (H)   5/22/2013    Depression (emotion)   1/15/2014    Paranoia (H)   1/25/2015    Schizoaffective disorder, manic type (H)   3/20/2015    Psychosis   4/8/2015    Depression with suicidal ideation Medium  9/11/2015    Depression Medium  12/29/2015    Nausea Medium  3/9/2016    Falls frequently Medium  2/12/2017    Sepsis due to urinary tract infection (H) Medium  3/16/2017      Immunizations     Name Date      Influenza (H1N1) 12/01/09     Influenza (IIV3) 08/29/12     Influenza (IIV3) 08/28/12     Influenza (IIV3) 11/01/11     Influenza (IIV3) 10/29/08     Tdap (Adacel,Boostrix) 03/25/12          END      ASSESSMENT     Discharge Profile Flowsheet     EXPECTED DISCHARGE     Other Resources  Transportation Services 04/04/17 7863     Expected Discharge Date  04/04/17 (tcu) 04/04/17 1101   Transportation Agency  [facility] 04/04/17 1041    DISCHARGE NEEDS ASSESSMENT     Transportation Contact Info  [phone] 04/04/17 1041    Readmission Within The Last 30 Days  other (see comments) (re admission RT Diabetic insipidus? see  Notes) 03/31/17 1007   Skilled Nursing Facility  [facility] [phone], Fax: [phone] 04/04/17 1041    Equipment Currently Used at Home  none 03/23/17 0944   SKIN      Transportation Available  -- (mom provides. ) 02/03/12 0940   Inspection  Full 04/04/17 1035    # of Referrals Placed by CTS  Senior Linkage Line;Post Acute Facilities;[facility] 03/24/17 1611   Procedural focused assessment (identify areas inspected)   Sacrum;Coccyx;Buttock, left;Buttock, right;Hip, left;Hip, right;Abdomen 04/01/17 1441    GASTROINTESTINAL (ADULT,PEDIATRIC,OB)     Skin WDL  ex 04/04/17 1035    GI WDL  WDL 04/04/17 1035   Skin Color/Characteristics  redness blanchable;bruised (ecchymotic) 04/04/17 1035    Abdominal Appearance  obese 04/04/17 1035   Skin Moisture  moist 04/04/17 1035    All Quadrants Bowel Sounds  audible and active in all quadrants 04/04/17 1035   Skin Integrity  bruise(s);scar(s) 04/04/17 1035    Last Bowel Movement  04/02/17 04/04/17 1035   Additional Documentation  Rash (LDA) 04/02/17 0123    GI Signs/Symptoms  fecal incontinence 04/04/17 1035   Skin Temperature  warm 04/04/17 1035    Passing flatus  yes 04/04/17 1035   Skin Elasticity  quick return to original state 04/04/17 1035    COMMUNICATION ASSESSMENT     Skin areas NOT inspected  Buttock, left;Buttock, right;Hip, right;Hip, left;Sacrum;Coccyx 04/04/17 0205    Patient's communication style  spoken language (English or Bilingual) 03/30/17 1512   SAFETY      FINAL RESOURCES     Safety WDL  WDL 04/04/17 1035    Resources List  Cardiac Rehab;Skilled Nursing Facility 04/04/17 1041                      Assessment WDL (Within Defined  "Limits) Definitions           Safety WDL     Effective: 09/28/15    Row Information: <b>WDL Definition:</b> Bed in low position, wheels locked; call light in reach; upper side rails up x 2; ID band on<br> <font color=\"gray\"><i>Item=AS safety wdl>>List=AS safety wdl>>Version=F14</i></font>      Skin WDL     Effective: 09/28/15    Row Information: <b>WDL Definition:</b> Warm; dry; intact; elastic; without discoloration; pressure points without redness<br> <font color=\"gray\"><i>Item=AS skin wdl>>List=AS skin wdl>>Version=F14</i></font>      Vitals     Vital Signs Flowsheet     VITAL SIGNS     Best Motor Response  6-->(M6) obeys commands 04/01/17 1310    Temp  98.3  F (36.8  C) 04/04/17 0707   Best Verbal Response  4-->(V4) confused 04/01/17 1310    Temp src  Oral 04/04/17 0707   [place] Coma Scale Score  14 04/01/17 1310    Resp  16 04/04/17 0707   RESPIRATORY MONITORING      Pulse  (!)  45 04/04/17 0707   Respiratory Monitoring (EtCO2)  20 mmHg 03/30/17 1853    Heart Rate  43 04/04/17 0922   POSITIONING      Pulse/Heart Rate Source  Monitor 04/04/17 0707   Body Position  left, side-lying 04/04/17 1036    BP  120/46 04/04/17 0922   Head of Bed (HOB)  HOB at 20-30 degrees 04/04/17 1036    BP Location  Right arm 04/04/17 0922   Positioning/Transfer Devices  pillows;in use 04/04/17 1036    OXYGEN THERAPY     Chair  Upright in chair 03/31/17 2307    SpO2  95 % 04/04/17 0707   DAILY CARE      O2 Device  None (Room air) 04/04/17 0707   Activity Type  up in chair 04/04/17 1035    PAIN/COMFORT     Activity Level of Assistance  assistance, 2 people 04/04/17 1035    Patient Currently in Pain  sleeping: patient not able to self report 04/04/17 0207   Activity Assistive Device  mechanical lift 04/04/17 1035    Preferred Pain Scale  number (Numeric Rating Pain Scale) 03/27/17 0915   Additional Documentation  Activity Device Assistance (Row) 03/31/17 1652    0-10 Pain Scale  0 03/30/17 1823   ECG      Word Pain Scale  4 04/03/17 " "2146   ECG Rhythm  Sinus bradycardia 04/01/17 0630    FACES Pain Rating  0-->no hurt 03/30/17 1514   Lead Monitored  Lead II;V 1 04/01/17 0630    Pain Location  Back 04/03/17 2146   Equipment  telemetry batteries changed;electrodes changed 03/23/17 1023    Pain Management Interventions  analgesia administered 03/13/17 1930   PAIN ASSESSMENT/FLACC      Pain Intervention(s)  Repositioned 04/03/17 2146   Pain Rating: FLACC (rest) - Face  0 04/02/17 1915    Response to Interventions  Decrease in pain 04/03/17 2146   Pain Rating: FLACC (rest) - Legs  0 04/02/17 1915    HEIGHT AND WEIGHT     Pain Rating: FLACC (rest) - Activity  0 04/02/17 1915    Height  1.6 m (5' 3\") 04/02/17 0830   Pain Rating: FLACC (rest) - Cry  0 04/02/17 1915    Height Method  Estimated 03/30/17 1514   Pain Rating: FLACC (rest) - Consolability  0 04/02/17 1915    Weight  92.4 kg (203 lb 11.3 oz) (Bed weight) 04/04/17 0525   Score: FLACC (rest)  0 04/02/17 1915    EKG MONITORING     ANALGESIA SIDE EFFECTS MONITORING      Cardiac Regularity  Regular 03/30/17 1604   Side Effects Monitoring: Respiratory Quality  R 04/02/17 2328    Cardiac Rhythm  NSR 03/30/17 1604   Side Effects Monitoring: Respiratory Depth  N 04/02/17 2328    DURAN COMA SCALE     Side Effects Monitoring: Sedation Level  S 04/02/17 2328    Best Eye Response  4-->(E4) spontaneous 04/01/17 1310                 Patient Lines/Drains/Airways Status    Active LINES/DRAINS/AIRWAYS     Name: Placement date: Placement time: Site: Days: Last dressing change:    Urethral Catheter Temperature probe 03/30/17   1520   Temperature probe   4     Peripheral IV 03/30/17 Left Upper forearm 03/30/17   1625   Upper forearm   4     Rash 03/31/17 1200 Bilateral groin 03/31/17   1200    4             Patient Lines/Drains/Airways Status    Active PICC/CVC     None            Intake/Output Detail Report     Date Intake     Output   Net    Shift P.O. I.V. IV Piggyback Total Urine Emesis/NG output Total       " Noc 04/02/17 2300 - 04/03/17 0659 -- -- -- -- 1375 -- 1375 -1375    Day 04/03/17 0700 - 04/03/17 1459 920 -- -- 920 1500 -- 1500 -580    Genna 04/03/17 1500 - 04/03/17 2259 200 -- -- 200 2300 -- 2300 -2100    Noc 04/03/17 2300 - 04/04/17 0659 -- -- -- -- 1500 -- 1500 -1500    Day 04/04/17 0700 - 04/04/17 1459 -- -- -- -- -- -- -- 0      Last Void/BM       Most Recent Value    Urine Occurrence 1 at 04/03/2017 1900    Stool Occurrence 1 at 04/03/2017 1900      Case Management/Discharge Planning     Case Management/Discharge Planning Flowsheet     REFERRAL INFORMATION     EXPECTED DISCHARGE      Admission Type  observation 03/10/17 1246   Expected Discharge Date  04/04/17 (tcu) 04/04/17 1101    Arrived From  home or self-care 03/10/17 1246   DISCHARGE PLANNING      Referral Source  interdisciplinary rounds 03/10/17 1246   Readmission Within The Last 30 Days  other (see comments) (re admission RT Diabetic insipidus? see  Notes) 03/31/17 1007    New Steerage to  Clinics?  No 02/12/17 1457   Transportation Available  -- (mom provides. ) 02/03/12 0940    # of Referrals Placed by [facility]  Senior Linkage Line;Post Acute Facilities;[facility] 03/24/17 1611   FINAL NOTE      Reason For Consult  discharge planning 03/13/17 1544   Final Note  -- (D/C to [facility]) 03/28/17 1433    Record Reviewed  medical record 03/13/17 1544   FINAL RESOURCES       Assigned to Case  Lauren Cali 04/04/17 1041   Equipment Currently Used at Home  none 03/23/17 0944    Primary Care Clinic Name  [facility] 03/13/17 1544   Resources List  Cardiac Rehab;Skilled Nursing Facility 04/04/17 1041    Primary Care MD Name  Dr. Myles 03/13/17 1544   Other Resources  Transportation Services 04/04/17 1041    LIVING ENVIRONMENT     Transportation Agency  [facility] 04/04/17 1041    Lives With  parent(s) (But was recently in TCU for 2 days) 03/30/17 1926   Transportation Contact Info  [phone] 04/04/17 1045    Living Arrangements  house  03/10/16 1338   Skilled Nursing Facility  [facility] [phone], Fax: [phone] 04/04/17 1041    Provides Primary Care For  no one, unable/limited ability to care for self 03/10/17 1246   ABUSE RISK SCREEN      Able to Return to Prior Living Arrangements  no 03/10/17 1246   QUESTION TO PATIENT:  Has a member of your family or a partner(now or in the past) intimidated, hurt, manipulated, or controlled you in any way?  patient declined to answer or is unable to answer 03/30/17 1514    Living Arrangement Comments  -- (SW attempting LTC/ GH Placement--pt denied several times) 03/10/17 1246   QUESTION TO PATIENT: Do you feel safe going back to the place where you are living?  patient declined to answer or is unable to answer 03/30/17 1514    ASSESSMENT OF FAMILY/SOCIAL SUPPORT     OBSERVATION: Is there reason to believe there has been maltreatment of a vulnerable adult (ie. Physical/Sexual/Emotional abuse, self neglect, lack of adequate food, shelter, medical care, or financial exploitation)?  no 03/30/17 1514    Who is your support system?  Parent(s) (Elderly Mom/ whom can't con't to care for pt) 03/10/17 1246   (R) MENTAL HEALTH SUICIDE RISK      EMPLOYMENT     Are you depressed or being treated for depression?  Yes 03/31/17 1809    Do you work full or part-time?  no 03/10/17 1246   HOMICIDE RISK      COPING/STRESS     Homicidal Ideation  no 03/30/17 1514    Major Change/Loss/Stressor  hospitalization 03/30/17 1933

## 2017-03-30 NOTE — ED NOTES
"[facility]  ED Nurse Handoff Report    ED Chief complaint: Altered Mental Status (comes from rehab facility - fever, AMS, obtunded, deviation to L )      ED Diagnosis:   Final diagnoses:   Sepsis, due to unspecified organism (H)   Pneumonia of left lower lobe due to infectious organism   Hypernatremia   Renal failure       Code Status: Full Code    Allergies:   Allergies   Allergen Reactions     Amoxicillin      Amoxicillin      Haldol [Benzyl Alcohol]      Haldol [Haloperidol]      Pcn [Penicillin G]      Penicillins      Seroquel [Quetiapine] GI Disturbance       Activity level:  Total Care     Needed?: No    Isolation: No  Infection: Not Applicable    Bariatric?: Yes      Vital Signs:   Vitals:    03/30/17 1630 03/30/17 1645 03/30/17 1700 03/30/17 1715   BP: 161/68 165/59  159/77   Pulse:       Resp: 8      Temp: 99.3  F (37.4  C) 99  F (37.2  C) 98.8  F (37.1  C) 98.8  F (37.1  C)   TempSrc:       SpO2: 98% 98%  98%   Weight:           Cardiac Rhythm: ,   Cardiac  Cardiac Rhythm: Normal sinus rhythm    Pain level:      Is this patient confused?: yes    Patient Report: Initial Complaint: pt came in via EMS for altered mental status  Focused Assessment: Pt came in via EMS with 4L O2 as at the nursing home she was hypoxic and with O2 Sats in the 80s. Pt was responsive to painful stimuli, obtunded, preferred L gaze. Pupils pinpoint, sluggish reacting, Recently discharged from [facility] for possible pneumonia vs UTI - on antibiotics.  Rectal temp 101. Replaced cherry cath.  Started antibiotics.  CT negative.  CXR - mild pneumonia.  No elevation to WBC or lactic acid.  ABG in process.  Initial VBG showed ph of 7.13.  On 2L NC.  RR 10.  Other VSS stable.  NSR.  Pt able to follow commands after 1L NS.  Patient mostly nonverbal - opens eyes to voice.  States \"ouch\" at times with certain movements.  Prior to LP procedure patient was able to state name and asked writer \"are you god?\" LP completed to " r/o meningitis - negative exam.  Ketamine given for procedure.  Skin baseline coccyx was red and blanchable.   Tests Performed: EKG, labs, CT of the head, chest X ray, lumbar tap  Abnormal Results: Chest x ray resulted in mild left basilar atelectasis and small left pleural effusion. Resulting labs abnormal.  Treatments provided: 2L O2, 500mg Merrem, 650 mg rectal tylenol, 100mg of Vancomycin, 1L NS    Family Comments: mother contacted via phone    OBS brochure/video discussed/provided to patient: N/A    ED Medications:   Medications   0.9% sodium chloride BOLUS (0 mLs Intravenous Stopped 3/30/17 1712)     Followed by   0.9% sodium chloride infusion (1,000 mLs Intravenous New Bag 3/30/17 1719)   acetaminophen (TYLENOL) Suppository 650 mg (650 mg Rectal Given 3/30/17 1621)   meropenem (MERREM) 500 mg vial to attach to  mL bag for ADULTS or 25 mL bag for PEDS (0 mg Intravenous Stopped 3/30/17 1600)   vancomycin (VANCOCIN) 1000 mg in dextrose 5% 200 mL PREMIX (0 mg Intravenous Stopped 3/30/17 1712)   ketamine (KETALAR) injection 100 mg (100 mg Intravenous Given 3/30/17 1643)       Drips infusing?:  Yes      ED NURSE PHONE NUMBER: [phone]

## 2017-03-30 NOTE — ED NOTES
DATE:  3/30/2017   TIME OF RECEIPT FROM LAB:  5653  LAB TEST:  ph  LAB VALUE:  7.14  RESULTS GIVEN WITH READ-BACK TO (PROVIDER):  Nacho Schroeder MD  TIME LAB VALUE REPORTED TO PROVIDER:   [id number]

## 2017-03-30 NOTE — IP AVS SNAPSHOT
"    [place] [address] ONCOLOGY: [phone]                                              INTERAGENCY TRANSFER FORM - PHYSICIAN ORDERS   3/30/2017                    Hospital Admission Date: 3/30/2017  MARIO ALBERTO PATRICK   : 1955  Sex: Female        Attending Provider: Jamey Thornton MD     Allergies:  Amoxicillin, Amoxicillin, Haldol [Benzyl Alcohol], Haldol [Haloperidol], Pcn [Penicillin G], Penicillins, Seroquel [Quetiapine]    Infection:  None   Service:  INTERMEDIATE    Ht:  1.6 m (5' 3\")   Wt:  92.4 kg (203 lb 11.3 oz)   Admission Wt:  102.1 kg (225 lb)    BMI:  36.08 kg/m 2   BSA:  2.03 m 2            Patient PCP Information     None on File      ED Clinical Impression     Diagnosis Description Comment Added By Time Added    Sepsis, due to unspecified organism (H) [A41.9] Sepsis, due to unspecified organism (H) [A41.9]  Nacho Schroeder MD 3/30/2017  5:39 PM    Pneumonia of left lower lobe due to infectious organism [J18.9] Pneumonia of left lower lobe due to infectious organism [J18.9]  Nacho Schroeder MD 3/30/2017  5:39 PM    Hypernatremia [E87.0] Hypernatremia [E87.0]  Nacho Schroeder MD 3/30/2017  5:43 PM    Renal failure [N19] Renal failure [N19]  Nacho Schroeder MD 3/30/2017  5:43 PM      Hospital Problems as of 2017              Priority Class Noted POA    Altered mental status Medium  3/30/2017 Yes      Non-Hospital Problems as of 2017              Priority Class Noted    HTN (hypertension)   Unknown    Hyperlipidemia with target LDL less than 70   Unknown    TYPE 2 DIABETES, HBA1C GOAL < 7%   10/31/2010    acute Mastitis   2012    CKD (chronic kidney disease) stage 3, GFR 30-59 ml/min   Unknown    Advanced directives, counseling/discussion   2012    Health Care Home   2013    Suicidal ideation   2013    Schizoaffective disorder, bipolar type (H)   2013    Depression (emotion)   1/15/2014    Paranoia (H)   2015    " Schizoaffective disorder, manic type (H)   3/20/2015    Psychosis   4/8/2015    Depression with suicidal ideation Medium  9/11/2015    Depression Medium  12/29/2015    Nausea Medium  3/9/2016    Falls frequently Medium  2/12/2017    Sepsis due to urinary tract infection (H) Medium  3/16/2017      Code Status History     Date Active Date Inactive Code Status Order ID Comments User Context    3/28/2017  2:50 PM 3/30/2017  7:44 PM Full Code [id number]  Wilfrido Flores MD Outpatient    2/12/2017  6:38 AM 3/28/2017  2:50 PM Full Code [id number]  Hong Lee MD Inpatient    3/10/2016 11:07 AM 2/12/2017  6:38 AM Full Code [id number]  Kayleen Trevino PA-C Outpatient    3/9/2016  8:44 PM 3/10/2016 11:07 AM Full Code [id number]  Carolee Kline MD ED    12/29/2015  3:52 PM 12/31/2015  2:38 PM Full Code [id number]  Brenda Duncan RN Inpatient    9/11/2015  5:39 PM 9/14/2015  2:02 PM Full Code [id number]  Veronica Salmon RN Inpatient    5/10/2015  7:45 PM 5/14/2015  5:11 PM Full Code [id number]  Natalia Garibay MD Inpatient    4/8/2015  5:33 AM 4/13/2015  6:46 PM Full Code [id number]  Luna Duncan MD Inpatient    3/20/2015  6:00 AM 3/23/2015  5:43 PM Full Code [id number]  Kevin Hewitt MD Inpatient    1/25/2015  4:00 AM 1/28/2015  3:50 PM Full Code [id number]  Marlene Corrales, VERONA Inpatient    1/15/2014  5:59 PM 1/17/2014  3:27 PM Full Code [id number]  Keena Regan RN Inpatient    4/30/2013  4:35 PM 5/3/2013  6:24 PM Full Code [id number]  Nel Wallace RN Inpatient    3/27/2013  5:08 PM 4/2/2013  2:03 PM Full Code [id number]  Brenda Mishra, RN Inpatient    12/4/2012  3:08 PM 12/11/2012  2:53 PM Full Code [id number]  Jesenia Cabello, RN Inpatient    5/18/2012  7:38 PM 5/23/2012  4:06 PM Full Code [id number]  Veronica Salmon RN Inpatient    1/31/2012  3:58 PM 2/3/2012  2:14 PM Full Code [id number]  Shiva Feng DO Inpatient         Medication Review      START taking         Dose / Directions Comments    aMILoride 5 MG tablet   Commonly known as:  MIDAMOR   Used for:  Diabetes insipidus (H)        Dose:  10 mg   Take 2 tablets (10 mg) by mouth daily   Refills:  0        amLODIPine 5 MG tablet   Commonly known as:  NORVASC   Used for:  Benign essential hypertension        Dose:  5 mg   Take 1 tablet (5 mg) by mouth daily   Quantity:  30 tablet   Refills:  0        levofloxacin 250 MG tablet   Commonly known as:  LEVAQUIN   Indication:  Community Acquired Pneumonia   Used for:  Sepsis, due to unspecified organism (H)        Dose:  250 mg   Take 1 tablet (250 mg) by mouth daily   Quantity:  3 tablet   Refills:  0          CONTINUE these medications which may have CHANGED, or have new prescriptions. If we are uncertain of the size of tablets/capsules you have at home, strength may be listed as something that might have changed.        Dose / Directions Comments    cloNIDine 0.2 MG tablet   Commonly known as:  CATAPRES   This may have changed:    - medication strength  - how much to take   Used for:  Benign essential hypertension        Dose:  0.2 mg   Take 1 tablet (0.2 mg) by mouth 2 times daily   Refills:  0        LORazepam 0.5 MG tablet   Commonly known as:  ATIVAN   This may have changed:  how much to take   Used for:  Schizoaffective disorder, bipolar type (H)        Dose:  0.5 mg   Take 1 tablet (0.5 mg) by mouth 2 times daily as needed for anxiety   Quantity:  14 tablet   Refills:  0          CONTINUE these medications which have NOT CHANGED        Dose / Directions Comments    * divalproex 500 MG EC tablet   Commonly known as:  DEPAKOTE        Dose:  500 mg   Take 500 mg by mouth every morning   Refills:  0        * divalproex 500 MG EC tablet   Commonly known as:  DEPAKOTE        Dose:  1500 mg   Take 1,500 mg by mouth At Bedtime   Refills:  0        glipiZIDE 2.5 MG 24 hr tablet   Commonly known as:  GLUCOTROL XL   Indication:  Type 2 Diabetes   Used for:  Type 2 diabetes  "mellitus with complication, with long-term current use of insulin (H)        Dose:  2.5 mg   Take 1 tablet (2.5 mg) by mouth 2 times daily (before meals)   Quantity:  30 tablet   Refills:  0        insulin isophane & regular susp   Commonly known as:  HumuLIN MIX 70/30 PEN   Used for:  Type 2 diabetes mellitus with complication, with long-term current use of insulin (H)        20 units before breakfast  30 units before dinner   Quantity:  30 mL   Refills:  1        insulin syringe-needle U-100 31G X 5/16\" 1 ML   Commonly known as:  BD insulin syringe ULTRAFINE   Used for:  Type 2 diabetes, HbA1c goal < 7% (H)        Use one syringe bid daily or as directed.   Quantity:  60 each   Refills:  prn        isosorbide mononitrate 30 MG 24 hr tablet   Commonly known as:  IMDUR   Used for:  HTN (hypertension)        Dose:  30 mg   Take 1 tablet by mouth daily.   Quantity:  30 tablet   Refills:  prn        * LASIX PO        Dose:  20 mg   Take 20 mg by mouth daily   Refills:  0        * LASIX PO        Dose:  20 mg   Take 20 mg by mouth daily as needed (Take at noon for leg swelling if needed)   Refills:  0        metoprolol 25 MG 24 hr tablet   Commonly known as:  TOPROL-XL   Used for:  Essential hypertension        Dose:  25 mg   Take 1 tablet (25 mg) by mouth daily   Quantity:  30 tablet   Refills:  0        polyethylene glycol powder   Commonly known as:  MIRALAX/GLYCOLAX        Dose:  17 g   Take 17 g by mouth daily as needed for constipation   Refills:  0        PRILOSEC PO        Dose:  20 mg   Take 20 mg by mouth 2 times daily (before meals)   Refills:  0        * RISPERDAL PO        Dose:  1 mg   Take 1 mg by mouth At Bedtime   Refills:  0        * risperiDONE 0.5 MG ODT tab   Commonly known as:  risperDAL M-TABS        Dose:  0.5 mg   Place 1 tablet (0.5 mg) under the tongue 3 times daily as needed   Quantity:  60 tablet   Refills:  0    Take 1 - 2 three times a day as needed for agitation. (0.5 mg - 1 mg TID PRN " agitation)       senna-docusate 8.6-50 MG per tablet   Commonly known as:  SENOKOT-S;PERICOLACE   Used for:  Constipation, unspecified constipation type        Dose:  1 tablet   Take 1 tablet by mouth 2 times daily as needed for constipation   Quantity:  60 tablet   Refills:  0        ZOFRAN ODT PO        Dose:  4 mg   Take 4 mg by mouth every 8 hours as needed for nausea   Refills:  0        ZYPREXA PO        Dose:  5 mg   Take 5 mg by mouth At Bedtime   Refills:  0        * Notice:  This list has 6 medication(s) that are the same as other medications prescribed for you. Read the directions carefully, and ask your doctor or other care provider to review them with you.      STOP taking     diltiazem 120 MG 24 hr capsule                     Further instructions from your care team       [address] [id number] [phone] Fax: [phone]      After Care     Activity - Up with nursing assistance           Advance Diet as Tolerated       Follow this diet upon discharge: Orders Placed This Encounter      Room Service      Combination Diet Renal Diet; 0200-7889 Calories: Moderate Consistent CHO (4-6 CHO units/meal)  -Unrestricted access to water       Daily weights       Call Provider for weight gain of more than 2 pounds per day or 5 pounds per week.       Fall precautions           Dunlap catheter       To straight gravity drainage. Change catheter every 2 weeks and PRN for leaking or decreased uring output with signs of bladder distention. DO NOT change catheter without a specific MD order IF diagnosis of benign prostatic hypertrophy (BPH), neurogenic bladder, or other urological conditions       General info for SNF       Length of Stay Estimate: Short Term Care: Estimated # of Days <30  Condition at Discharge: Improving  Level of care:skilled   Rehabilitation Potential: Good  Admission H&P remains valid and up-to-date: Yes  Recent Chemotherapy: N/A  Use Nursing Home  Standing Orders: N/A       Intake and output       Every shift       Mantoux instructions       Give two-step Mantoux (PPD) Per Facility Policy Yes             Referrals     Occupational Therapy Adult Consult       Evaluate and treat as clinically indicated.    Reason:       Physical Therapy Adult Consult       Evaluate and treat as clinically indicated.    Reason:             Follow-Up Appointment Instructions     Future Labs/Procedures    Follow Up and recommended labs and tests     Comments:    Follow up with half-way physician.  The following labs/tests are recommended: BMP in 1 week  OP urology f/u- new pt in 1 week      Follow-Up Appointment Instructions     Follow Up and recommended labs and tests       Follow up with half-way physician.  The following labs/tests are recommended: BMP in 1 week  OP urology f/u- new pt in 1 week             Statement of Approval     Ordered          04/04/17 0835  I have reviewed and agree with all the recommendations and orders detailed in this document.  EFFECTIVE NOW     Approved and electronically signed by:  Russ Gonzales MD

## 2017-03-30 NOTE — ED NOTES
Bed: ST03  Expected date:   Expected time:   Means of arrival:   Comments:  [place] - 71's altered mental status eta 1500

## 2017-03-30 NOTE — IP AVS SNAPSHOT
MRN:[id number]                      After Visit Summary   3/30/2017    Nel Farmer    MRN: [id number]           Thank you!     Thank you for choosing [facility] for your care. Our goal is always to provide you with excellent care. Hearing back from our patients is one way we can continue to improve our services. Please take a few minutes to complete the written survey that you may receive in the mail after you visit with us. Thank you!        Patient Information     Date Of Birth          1955        About your hospital stay     You were admitted on:  March 30, 2017 You last received care in the:  [facility] [address] [facility]    You were discharged on:  April 4, 2017       Who to Call     For medical emergencies, please call 911.  For non-urgent questions about your medical care, please call your primary care provider or clinic, None          Attending Provider     Provider Specialty    Nacho Schroeder MD Emergency Medicine    [facility] Medicine       Primary Care Provider    None Specified       No primary provider on file.        After Care Instructions     Activity - Up with nursing assistance           Advance Diet as Tolerated       Follow this diet upon discharge: Orders Placed This Encounter      Room Service      Combination Diet Renal Diet; 2403-6639 Calories: Moderate Consistent CHO (4-6 CHO units/meal)  -Unrestricted access to water            Daily weights       Call Provider for weight gain of more than 2 pounds per day or 5 pounds per week.            Fall precautions           Dunlap catheter       To straight gravity drainage. Change catheter every 2 weeks and PRN for leaking or decreased uring output with signs of bladder distention. DO NOT change catheter without a specific MD order IF diagnosis of benign prostatic hypertrophy (BPH), neurogenic bladder, or other urological conditions            General info for SNF       Length of Stay Estimate:  "Short Term Care: Estimated # of Days <30  Condition at Discharge: Improving  Level of care:skilled   Rehabilitation Potential: Good  Admission H&P remains valid and up-to-date: Yes  Recent Chemotherapy: N/A  Use Nursing Home Standing Orders: N/A            Intake and output       Every shift            Mantoux instructions       Give two-step Mantoux (PPD) Per Facility Policy Yes                  Follow-up Appointments     Follow Up and recommended labs and tests       Follow up with assisted physician.  The following labs/tests are recommended: BMP in 1 week  OP urology f/u- new pt in 1 week                  Additional Services     Occupational Therapy Adult Consult       Evaluate and treat as clinically indicated.    Reason:            Physical Therapy Adult Consult       Evaluate and treat as clinically indicated.    Reason:                  Further instructions from your care team       [address] [address] [phone] Fax: [phone]      Pending Results     Date and Time Order Name Status Description    3/30/2017 1522 Blood culture Preliminary     3/30/2017 1522 Blood culture Preliminary             Statement of Approval     Ordered          04/04/17 0835  I have reviewed and agree with all the recommendations and orders detailed in this document.  EFFECTIVE NOW     Approved and electronically signed by:  Russ Gonzales MD             Admission Information     Date & Time Provider Department Dept. Phone    3/30/2017 Jamey Thornton MD [place] [address] [facility] [phone]      Your Vitals Were     Blood Pressure Pulse Temperature Respirations Height Weight    120/46 (BP Location: Right arm) 45 98.3  F (36.8  C) (Oral) 16 1.6 m (5' 3\") 92.4 kg (203 lb 11.3 oz)    Last Period Pulse Oximetry BMI (Body Mass Index)             04/27/2012 95% 36.08 kg/m2         MyChart Information     streamit lets you send messages to your doctor, view your test " "results, renew your prescriptions, schedule appointments and more. To sign up, go to [email or web address] . Click on \"Log in\" on the left side of the screen, which will take you to the Welcome page. Then click on \"Sign up Now\" on the right side of the page.     You will be asked to enter the access code listed below, as well as some personal information. Please follow the directions to create your username and password.     Your access code is: [email or web address]  Expires: 2017  8:54 AM     Your access code will  in 90 days. If you need help or a new code, please call your [facility] clinic or [phone].        Care EveryWhere ID     This is your Care EveryWhere ID. This could be used by other organizations to access your [facility] medical records  [id number]           Review of your medicines      START taking        Dose / Directions    aMILoride 5 MG tablet   Commonly known as:  MIDAMOR   Used for:  Diabetes insipidus (H)        Dose:  10 mg   Take 2 tablets (10 mg) by mouth daily   Refills:  0       amLODIPine 5 MG tablet   Commonly known as:  NORVASC   Used for:  Benign essential hypertension        Dose:  5 mg   Take 1 tablet (5 mg) by mouth daily   Quantity:  30 tablet   Refills:  0       hydrochlorothiazide 12.5 MG capsule   Commonly known as:  MICROZIDE   Used for:  Diabetes insipidus (H)        Dose:  25 mg   Take 2 capsules (25 mg) by mouth daily   Quantity:  30 capsule   Refills:  0       levofloxacin 250 MG tablet   Commonly known as:  LEVAQUIN   Indication:  Community Acquired Pneumonia   Used for:  Sepsis, due to unspecified organism (H)        Dose:  250 mg   Take 1 tablet (250 mg) by mouth daily   Quantity:  3 tablet   Refills:  0         CONTINUE these medicines which may have CHANGED, or have new prescriptions. If we are uncertain of the size of tablets/capsules you have at home, strength may be listed as something that might have changed.        Dose / Directions    cloNIDine 0.2 MG " "tablet   Commonly known as:  CATAPRES   This may have changed:    - medication strength  - how much to take   Used for:  Benign essential hypertension        Dose:  0.2 mg   Take 1 tablet (0.2 mg) by mouth 2 times daily   Refills:  0       LORazepam 0.5 MG tablet   Commonly known as:  ATIVAN   This may have changed:  how much to take   Used for:  Schizoaffective disorder, bipolar type (H)        Dose:  0.5 mg   Take 1 tablet (0.5 mg) by mouth 2 times daily as needed for anxiety   Quantity:  14 tablet   Refills:  0         CONTINUE these medicines which have NOT CHANGED        Dose / Directions    * divalproex 500 MG EC tablet   Commonly known as:  DEPAKOTE        Dose:  500 mg   Take 500 mg by mouth every morning   Refills:  0       * divalproex 500 MG EC tablet   Commonly known as:  DEPAKOTE        Dose:  1500 mg   Take 1,500 mg by mouth At Bedtime   Refills:  0       glipiZIDE 2.5 MG 24 hr tablet   Commonly known as:  GLUCOTROL XL   Indication:  Type 2 Diabetes   Used for:  Type 2 diabetes mellitus with complication, with long-term current use of insulin (H)        Dose:  2.5 mg   Take 1 tablet (2.5 mg) by mouth 2 times daily (before meals)   Quantity:  30 tablet   Refills:  0       insulin isophane & regular susp   Commonly known as:  HumuLIN MIX 70/30 PEN   Used for:  Type 2 diabetes mellitus with complication, with long-term current use of insulin (H)        20 units before breakfast  30 units before dinner   Quantity:  30 mL   Refills:  1       insulin syringe-needle U-100 31G X 5/16\" 1 ML   Commonly known as:  BD insulin syringe ULTRAFINE   Used for:  Type 2 diabetes, HbA1c goal < 7% (H)        Use one syringe bid daily or as directed.   Quantity:  60 each   Refills:  prn       isosorbide mononitrate 30 MG 24 hr tablet   Commonly known as:  IMDUR   Used for:  HTN (hypertension)        Dose:  30 mg   Take 1 tablet by mouth daily.   Quantity:  30 tablet   Refills:  prn       * LASIX PO        Dose:  20 mg "   Take 20 mg by mouth daily   Refills:  0       * LASIX PO        Dose:  20 mg   Take 20 mg by mouth daily as needed (Take at noon for leg swelling if needed)   Refills:  0       metoprolol 25 MG 24 hr tablet   Commonly known as:  TOPROL-XL   Used for:  Essential hypertension        Dose:  25 mg   Take 1 tablet (25 mg) by mouth daily   Quantity:  30 tablet   Refills:  0       polyethylene glycol powder   Commonly known as:  MIRALAX/GLYCOLAX        Dose:  17 g   Take 17 g by mouth daily as needed for constipation   Refills:  0       PRILOSEC PO        Dose:  20 mg   Take 20 mg by mouth 2 times daily (before meals)   Refills:  0       * RISPERDAL PO        Dose:  1 mg   Take 1 mg by mouth At Bedtime   Refills:  0       * risperiDONE 0.5 MG ODT tab   Commonly known as:  risperDAL M-TABS        Dose:  0.5 mg   Place 1 tablet (0.5 mg) under the tongue 3 times daily as needed   Quantity:  60 tablet   Refills:  0       senna-docusate 8.6-50 MG per tablet   Commonly known as:  SENOKOT-S;PERICOLACE   Used for:  Constipation, unspecified constipation type        Dose:  1 tablet   Take 1 tablet by mouth 2 times daily as needed for constipation   Quantity:  60 tablet   Refills:  0       ZOFRAN ODT PO        Dose:  4 mg   Take 4 mg by mouth every 8 hours as needed for nausea   Refills:  0       ZYPREXA PO        Dose:  5 mg   Take 5 mg by mouth At Bedtime   Refills:  0       * Notice:  This list has 6 medication(s) that are the same as other medications prescribed for you. Read the directions carefully, and ask your doctor or other care provider to review them with you.      STOP taking     diltiazem 120 MG 24 hr capsule                Where to get your medicines      Some of these will need a paper prescription and others can be bought over the counter. Ask your nurse if you have questions.     Bring a paper prescription for each of these medications     LORazepam 0.5 MG tablet       You don't need a prescription for these  medications     aMILoride 5 MG tablet    amLODIPine 5 MG tablet    cloNIDine 0.2 MG tablet    hydrochlorothiazide 12.5 MG capsule    levofloxacin 250 MG tablet                Protect others around you: Learn how to safely use, store and throw away your medicines at www.disposemymeds.org.             Medication List: This is a list of all your medications and when to take them. Check marks below indicate your daily home schedule. Keep this list as a reference.      Medications           Morning Afternoon Evening Bedtime As Needed    aMILoride 5 MG tablet   Commonly known as:  MIDAMOR   Take 2 tablets (10 mg) by mouth daily   Last time this was given:  10 mg on 4/4/2017  9:09 AM                                amLODIPine 5 MG tablet   Commonly known as:  NORVASC   Take 1 tablet (5 mg) by mouth daily   Last time this was given:  5 mg on 4/4/2017 10:46 AM                                cloNIDine 0.2 MG tablet   Commonly known as:  CATAPRES   Take 1 tablet (0.2 mg) by mouth 2 times daily   Last time this was given:  0.2 mg on 4/4/2017 10:48 AM                                * divalproex 500 MG EC tablet   Commonly known as:  DEPAKOTE   Take 500 mg by mouth every morning   Last time this was given:  500 mg on 4/4/2017  9:10 AM                                * divalproex 500 MG EC tablet   Commonly known as:  DEPAKOTE   Take 1,500 mg by mouth At Bedtime   Last time this was given:  500 mg on 4/4/2017  9:10 AM                                glipiZIDE 2.5 MG 24 hr tablet   Commonly known as:  GLUCOTROL XL   Take 1 tablet (2.5 mg) by mouth 2 times daily (before meals)                                hydrochlorothiazide 12.5 MG capsule   Commonly known as:  MICROZIDE   Take 2 capsules (25 mg) by mouth daily   Last time this was given:  25 mg on 4/4/2017  9:04 AM                                insulin isophane & regular susp   Commonly known as:  HumuLIN MIX 70/30 PEN   20 units before breakfast  30 units before dinner   Last  "time this was given:  18 Units on 4/4/2017  9:16 AM                                insulin syringe-needle U-100 31G X 5/16\" 1 ML   Commonly known as:  BD insulin syringe ULTRAFINE   Use one syringe bid daily or as directed.                                isosorbide mononitrate 30 MG 24 hr tablet   Commonly known as:  IMDUR   Take 1 tablet by mouth daily.   Last time this was given:  30 mg on 4/4/2017  9:09 AM                                * LASIX PO   Take 20 mg by mouth daily                                * LASIX PO   Take 20 mg by mouth daily as needed (Take at noon for leg swelling if needed)                                levofloxacin 250 MG tablet   Commonly known as:  LEVAQUIN   Take 1 tablet (250 mg) by mouth daily   Last time this was given:  250 mg on 4/4/2017  9:09 AM                                LORazepam 0.5 MG tablet   Commonly known as:  ATIVAN   Take 1 tablet (0.5 mg) by mouth 2 times daily as needed for anxiety   Last time this was given:  0.5 mg on 4/4/2017 11:53 AM                                metoprolol 25 MG 24 hr tablet   Commonly known as:  TOPROL-XL   Take 1 tablet (25 mg) by mouth daily   Last time this was given:  25 mg on 4/1/2017  9:13 AM                                polyethylene glycol powder   Commonly known as:  MIRALAX/GLYCOLAX   Take 17 g by mouth daily as needed for constipation                                PRILOSEC PO   Take 20 mg by mouth 2 times daily (before meals)   Last time this was given:  20 mg on 4/4/2017  9:09 AM                                * RISPERDAL PO   Take 1 mg by mouth At Bedtime   Last time this was given:  1 mg on 4/3/2017  9:09 PM                                * risperiDONE 0.5 MG ODT tab   Commonly known as:  risperDAL M-TABS   Place 1 tablet (0.5 mg) under the tongue 3 times daily as needed   Last time this was given:  0.5 mg on 4/4/2017  9:04 AM                                senna-docusate 8.6-50 MG per tablet   Commonly known as:  " SENOKOT-S;PERICOLACE   Take 1 tablet by mouth 2 times daily as needed for constipation                                ZOFRAN ODT PO   Take 4 mg by mouth every 8 hours as needed for nausea                                ZYPREXA PO   Take 5 mg by mouth At Bedtime   Last time this was given:  5 mg on 4/3/2017  9:09 PM                                * Notice:  This list has 6 medication(s) that are the same as other medications prescribed for you. Read the directions carefully, and ask your doctor or other care provider to review them with you.

## 2017-03-30 NOTE — IP AVS SNAPSHOT
` `     [place] [address] ONCOLOGY: [phone]                 INTERAGENCY TRANSFER FORM - NOTES (H&P, Discharge Summary, Consults, Procedures, Therapies)   3/30/2017                    Hospital Admission Date: 3/30/2017  MARIO ALBERTO FARMER   : 1955  Sex: Female        Patient PCP Information     None on File         History & Physicals      H&P signed by Hong Lee MD at 2017 12:58 AM      Author:  Hong Lee MD Service:  Hospitalist Author Type:  Physician    Filed:  2017 12:58 AM Date of Service:  2017  7:01 AM Note Created:  2017  8:12 AM    Status:  Signed :  Hong Lee MD (Physician)         REVISED REPORT      PRIMARY CARE PROVIDER:  Nick Myles MD, [facility].      CHIEF COMPLAINT:  Multiple falls.      HISTORY OF PRESENT ILLNESS:  Mario Alberto Farmer is a 61-year-old  female with schizoaffective disorder, who lives with her elderly mother who has significant cognitive deficits, anxiety and depression along with her schizoaffective disorder, hypertension, diabetes, and other medical issues.  The patient was admitted earlier today after she apparently fell out of a car and she had some foot pain.  She had x-rays of her chest, and left foot and knee and shoulder, all of which were negative for any fractures.  The patient was subsequently discharged under her mother's care back to her home.      The patient apparently had another fall out of bed later on in the day, and came back to [facility].  The patient was seen at this time by Dr. Jay Bethea.  The patient had a low-grade temperature of 99.7, normal blood pressure, normal oxygen saturations.  Electrolytes were normal with BUN of 40 and creatinine of 2.73, which is slightly above her baseline kidney function.  CBC was normal.  Urinalysis had greater than 1000 glucose.  She had 3 white cells, less than 1 red cell, and a few bacteria.  The patient was  road-tested, and was able to ambulate only with a lot of effort.  The patient's mother was called, and the patient's mother stated that she is unable to care for her, and that she needs placement in some sort of assisted living facility.  The patient is therefore being admitted under observation status for placement.      PAST MEDICAL HISTORY:   1.  Schizoaffective disorder.   2.  Anxiety and depression.   3.  Cognitive deficits.   4.  Osteoarthritis.   5.  Diabetes.   6.  Hypertension.   7.  Hyperlipidemia.      PAST SURGICAL HISTORY:   1.  Tonsillectomy and adenoidectomy.   2.  History of D&C.   3.  Total knee arthroplasty on the left.   4.  Prior colonoscopy.      FAMILY HISTORY:  Mother with hypertension.  Father with colorectal cancer.      SOCIAL HISTORY:  She is single.  She lives with her elderly mother.  No alcohol or tobacco.  She is FULL CODE.      REVIEW OF SYSTEMS:  Ten-point system reviewed.  The only complaint is pain in her foot.      PHYSICAL EXAM:   VITAL SIGNS:  Temperature 99.7, heart rate 92, respirations 16, blood pressure 172/84, sats 100% on room air.   GENERAL:  The patient is an obese, 61-year-old, hirsute female.  She speaks very, very loudly.   HEENT:  Atraumatic.  Pupils equal.  Mucous membranes are tacky.   NECK:  Veins are not distended.   LUNGS:  Clear to auscultation.   CARDIOVASCULAR:  S1, S2, regular rate and rhythm.   ABDOMEN:  Soft, nontender, nondistended.   EXTREMITIES:  No edema.  She has some bruising noted over her left knee.   NEUROLOGIC:  She is able to move all four extremities.  Cranial nerves II-XII are grossly intact.      LABS/IMAGING STUDIES:  As dictated in the history of present illness.      ASSESSMENT:  Nel Farmer is a 61-year-old with schizoaffective disorder, depression, anxiety, and cognitive deficits, with multiple falls, metabolically intact with the exception of slightly worsening kidney function as well as glucosuria of greater than 1000, being admitted  under observation status for further placement.      PLAN:   1.  Multiple falls:  Unclear.  She is spilling quite a bit of glucose in her urine.  She states that she is compliant with her medications.  We will continue the patient on her medications.  I am going to give her some IV fluids, as maybe she is volume-down from osmotic loss from spilling glucose, so we will give her IV fluids.  A chest x-ray earlier showed no evidence of infiltrate or congestion.  The patient will be seen by PT for home safety.   2.  Diabetes with glycosuria:  The patient will be continued on her home doses of Lantus, metformin, and glipizide.   3.  Hypertension:  We will continue the patient on her metoprolol and diltiazem.   4.  Schizoaffective disorder:  We will continue the patient on all of her psychiatric medications including Depakote and Risperdal and Zyprexa.   5.  DVT prophylaxis:  The patient will have compression boots.   6.  CODE STATUS:  FULL.   7.  Disposition:  The patient is pending placement at an alternative care facility instead of her home situation due to her elderly mother and inability to care for her.   8.  Chronic kidney disease.  Mario Alberto Farmer's creatinine is about baseline and given her glucose loss in the urine she may be relatively fluid depleted.  Will check orthostatics and give her IV fluids.      Continuation:  D&T:  2017, Document #[id number], AMS/sp         HONG LAUREN MD             D: 2017 07:01   T: 2017 08:12   MT: #[id number]      Name:     MARIO ALBERTO FARMER   MRN:      [id number]        Account:      [id number]   :      1955           Admitted:     [id number]      Document: [id number]       cc: STEPHAN Myles MD   [PD1.1]   Revision History        User Key Date/Time User Provider Type Action    > PD1.1 2017 12:58 AM Hong Lauren MD Physician Sign     [N/A] 2017  8:12 AM Hong Lauren MD Physician Edit                     Discharge Summaries       Discharge Summaries signed by Wilfrido Flores MD at 3/28/2017  9:02 PM      Author:  Wilfrido Flores MD Service:  Hospitalist Author Type:  Physician    Filed:  3/28/2017  9:02 PM Date of Service:  3/28/2017  3:14 PM Note Created:  3/28/2017  4:20 PM    Status:  Signed :  Wilfrido Flores MD (Physician)         DATE OF ADMISSION:  02/11/2017.      DATE OF DISCHARGE:  02/27/2017.      PRIMARY CARE PROVIDER:  Unknown.      DISCHARGE DIAGNOSES:   1.  [place] urinary tract infection with sepsis.   2.  Probable healthcare-associated pneumonia.   3.  Acute toxic encephalopathy due to urinary tract infection and sepsis.   4.  Hypernatremia, resolved.   5.  Acute kidney injury on stage IV chronic kidney disease.   6.  Recurrent falls.   7.  Right distal fracture.   8.  Diabetes mellitus type 2.   9.  Hypertension.   10.  Schizoaffective disorder with baseline cognitive deficit.      PAST MEDICAL HISTORY:   1.  Schizoaffective disorder.   2.  Anxiety and depression.   3.  Cognitive deficit.   4.  Osteoarthritis.   5.  Diabetes mellitus type 2.   6.  Hypertension.   7.  Hyperlipidemia.   8.  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      PAST SURGICAL HISTORY:   1.  Tonsillectomy and adenoidectomy.   2.  History of D&C.   3.  Left total knee arthroplasty.   4.  Prior colonoscopy.      DISCHARGE MEDICATIONS:   1.  Metoprolol XL 25 mg p.o. b.i.d. (was on 100 mg p.o. b.i.d. prior to admission).   2.  Depakote 1500 mg p.o. each day at bedtime.   3.  Depakote 500 mg p.o. q.a.m.   4.  Insulin 70/30, 20 units sq qam and 30 units sq qhs.  5.  Glipizide XL 2.5 mg p.o. b.i.d.   6.  Clonidine 0.3 mg p.o. b.i.d.   7.  Imdur 30 mg p.o. daily.   8.  Risperdal 1 mg under the tongue each day at bedtime.   9.  Risperdal 0.5 mg under the tongue t.i.d. p.r.n. agitation.   10.  Senna docusate 1 tablet p.o. b.i.d.   11.  MiraLax 17 g powder p.o. daily p.r.n. constipation.   12.  Diltiazem sustained release 120 mg p.o. daily.    13.  Lasix 20 mg p.o. daily.   14.  Omeprazole 20 mg p.o. b.i.d.   15.  Zofran ODT 4 mg q. 8 hours p.r.n.      CONSULTANTS:  Psychiatry, Infectious Disease and Orthopedic Service.      HOSPITAL COURSE:  Nel Farmer is a 61-year-old  female with schizoaffective disorder who lives with her elderly mother.  She also has history of cognitive deficit, anxiety, depression, diabetes, hypertension and recurrent falls.  She presented to [facility] ED on 02/11/2017 following an episode of fall.  Workup in the emergency room revealed acute renal failure with creatinine of 2.73, grossly positive urinalysis in a patient who was encephalopathic.     1.   UTI with sepsis:   Patient met criteria for sepsis at the time of admission with high-grade fever, grossly positive urinalysis and altered mental status.  Urine culture ultimately grew 2 strains of Klebsiella pneumoniae.  Blood culture grew Staph epi and Staph hominis which were felt to be a contaminant.  TTE showed LVH, otherwise negative for valve vegetation.  Patient received IV ceftriaxone followed by meropenem when she was suspected to have healthcare-associated pneumonia.  She has completed a course of treatment.  Sepsis has resolved at the time of this discharge.     2.   Acute toxic encephalopathy:   Due to UTI with sepsis and possible healthcare-associated pneumonia.  CT head without contrast on 3/17/17 was negative for acute intracranial pathology.  See above for urine and blood culture results.  Following treatment of underlying culprit, patient's mental status returned to baseline.  She is being discharged to a TCU today with her PTA p.r.n. Zyprexa and Ativan.     3.    LYLA on CKD IV:    She has known history of CKD IV with baseline creatinine of 2.2 and GFR of 23 due to lithium-induced nephropathy and nephrogenic diabetic insipidus.  She developed acute urinary retention during this hospital course.  She was suspected to have obstructive  uropathy.  She was hydrated with IV fluid and seen by Nephrology Consult Service.  Creatinine peaked at 3.0, but has since been trending down.  Creatinine is 2.63 today.  The patient is strongly encouraged to increase her oral hydration.  Patient's polyuria/DI presumed to be related to lithium effect on her concentrating ability.  Failed previous trial of amiloride. Treated with acetazolamide during this hospital course, but this was discontinued at discharge.  Patient also has secondary hyperparathyroidism in part related to vitamin D deficiency, currently on replacement.     4.    Hypertension:   Fairly well controlled.  Resume PTA diltiazem, clonidine and Imdur.  Metoprolol XL decreased from 100 mg p.o. b.i.d. to 25 mg b.i.d. per Nephrology recommendation.     5.    Probable healthcare-associated pneumonia:   Patient developed pulmonary symptoms after admissions and imaging studies consistent with pneumonia.  She received broad-spectrum antibiotics, which she has completed.  She also required oxygen supplement, but was able to wean off.  Currently on room air.     6.    Hypernatremia:   This is likely exacerbated by diabetic insipidus.  Sodium level peaked at 148, but after IV fluid hydration, it is down to 142 at discharge.     7.    Anemia of chronic disease:   Hemoglobin stable at 10 grams.     8.    Multiple falls:   Due to unsteady gait and recent UTI and resulting generalized weakness.  PT/OT evaluated the patient and recommended transfer to TCU.     9.    Right distal fibular fracture:  Likely resulting from recent mechanical fall.  Evaluated by Ortho.  Non-surgical management recommended. Cam boot while ambulating.  Tylenol for pain.     10.    Diabetes mellitus type 2 with history of hypoglycemia:   Better toward the end of her hospitalization.   Resume PTA glipizide xr but at decreased dose of 2.5 mg po bid and PTA insulin 70/30 20 units qam and 30 units qpm.     11.    Schizoaffective disorder with  baseline cognitive deficits, anxiety and depression:   At baseline at the time of this discharge.  Seen by Psychiatry Consult Service.  Their recommendation is for patient to remain on Depakote, Risperdal, Zyprexa and p.r.n. Ativan, all of which I have ordered.     12.   Vitamin D deficiency:   Replacement.     13.    Dysphagia:   Evaluated by SLP during this hospital course and recommendation was dysphagia diet level 3 with thin liquid diet.      DISCHARGE PHYSICAL EXAMINATION:   VITAL SIGNS:  Temperature 98.4, blood pressure 135/82, heart rate 66, respirations 16, 97% saturation on room air.   GENERAL:  Awake, alert, follows commands appropriately, no apparent distress.   HEENT:  Normocephalic, atraumatic.  Pupils equal, round, reactive to light.  Neck supple.  Oropharynx clear, mucous membranes moist.  No thyromegaly.   CARDIOVASCULAR:  Normal S1, S2, no murmurs, rubs or gallops.   LUNGS:  Clear to auscultation bilaterally.   ABDOMEN:  Soft, good bowel sounds, nontender, no rebound or guarding.   EXTREMITIES:  No cyanosis or clubbing.   LYMPHATICS:  No edema.   NEUROLOGIC:  Nonfocal.   SKIN:  No rash.   MUSCULOSKELETAL:  No calf tenderness to palpation.   PSYCHIATRY:  Mood and affect are appropriate.      DISCHARGE INSTRUCTIONS:   1.  Will transfer patient to TCU for PT/OT.   2.  Dysphagia diet level 3 with thin liquid.   3.  Activity as tolerated.      CODE STATUS:  FULL.         JYOTHI FLORES MD             D: 2017 15:14   T: 2017 16:19   MT: TS      Name:     MARIO ALBERTO PATRICK   MRN:      [id number]        Account:        [id number]   :      1955           Admit Date:                                       Discharge Date:       Document: [id number][AO1.1]         Revision History        User Key Date/Time User Provider Type Action    > AO1.1 3/28/2017  9:02 PM Jyothi Flores MD Physician Sign     [N/A] 3/28/2017  4:20 PM Jyothi Flores MD Physician Edit                      Consult Notes      Consults by Tommie Matias MD at 3/31/2017 11:06 AM     Author:  Tommie Matias MD Service:  Nephrology Author Type:  Physician    Filed:  4/4/2017 11:06 AM Date of Service:  3/31/2017 11:06 AM Note Created:  4/4/2017 11:05 AM    Status:  Signed :  Tommie Matias MD (Physician)     Consult Orders:    1. Nephrology IP Consult: Patient to be seen: Routine - within 24 hours; Non-anion gap metabolic acidosis; hx of DI and CKD stage IV; Consultant may enter orders: Yes [[id number]] ordered by Jamey Thornton MD at 03/30/17 1851                Seen by Dr. Renae.[RS1.1]     Revision History        User Key Date/Time User Provider Type Action    > RS1.1 4/4/2017 11:06 AM Tommie Matias MD Physician Sign            Consults by José Kraft MD at 3/31/2017 10:50 AM     Author:  José Kraft MD Service:  Psychiatry Author Type:  Physician    Filed:  3/31/2017 10:50 AM Date of Service:  3/31/2017 10:50 AM Note Created:  3/31/2017 10:49 AM    Status:  Signed :  José Kraft MD (Physician)     Consult Orders:    1. Psychiatry IP Consult: schizoaffective disorder, AMS; Consultant may enter orders: Yes; Patient to be seen: Routine; Call back #: : [[id number]] ordered by Jamey Thornton MD at 03/30/17 1851                Detailed psych consult dictated.  1 hour spent[SV1.1]     Revision History        User Key Date/Time User Provider Type Action    > SV1.1 3/31/2017 10:50 AM José Kraft MD Physician Sign            Consults by David Steel MD at 3/19/2017  2:41 PM     Author:  David Steel MD Service:  Infectious Disease Author Type:  Physician    Filed:  3/19/2017  5:46 PM Date of Service:  3/19/2017  2:41 PM Note Created:  3/19/2017  2:40 PM    Status:  Signed :  David Steel MD (Physician)     Consult Orders:    1. Infectious Diseases IP Consult: Patient to be seen: Routine - within 24 hours; bacteremia; Consultant may enter orders: Yes [[id number]]  ordered by Irene Billy MD at 03/19/17 1317                Infectious Diseases Consultation  March 19, 2017  Nel Farmer MRN# [id number]   Age: 61 year old YOB: 1955   Date of Admission: 2/11/2017     Reason for consult: I was asked to see this patient for evaluation of staph bacteremia, klebsiella UTI          Requesting physician Mariajose              Past Medical History:  Past Medical History   Diagnosis Date     Anxiety      CKD (chronic kidney disease) stage 3, GFR 30-59 ml/min      baseline Cr 1.8-2     Cognitive deficits      Depression      Depressive disorder      Diabetes (H)      DM type 2 (diabetes mellitus, type 2) (H)      insulin dependent      HTN      Hyperlipidemia LDL goal < 70      Hypertension      Osteoarthritis      Schizoaffective disorder (H)        Past Surgical History:  Past Surgical History   Procedure Laterality Date     Tonsillectomy & adenoidectomy       Dilation and curettage  age 31     C total knee arthroplasty Left 3/2010     Colonoscopy  10/19/2011     Procedure:COMBINED COLONOSCOPY, REMOVE TUMOR/POLYP/LESION BY SNARE; Colonoscopy; Surgeon:MARY ALICE RUSH; Location: GI       History of Present Illness:[SS1.1]  61-year-old  female with schizoaffective disorder, who lives with her elderly mother who has significant cognitive deficits, anxiety and depression along with her schizoaffective disorder, hypertension, diabetes, and other medical issues. The patient was admitted earlier today after she apparently fell out of a car and she had some foot pain. She had x-rays of her chest, and left foot and knee and shoulder, all of which were negative for any fractures. The patient was subsequently discharged under her mother's care back to her home.       The patient apparently had another fall out of bed later on in the day, and came back to [facility]. The patient was seen at this time by Dr. Jay Bethea. The patient had a low-grade  temperature of 99.7, normal blood pressure, normal oxygen saturations. Electrolytes were normal with BUN of 40 and creatinine of 2.73, which is slightly above her baseline kidney function. CBC was normal. Urinalysis had greater than 1000 glucose. She had 3 white cells, less than 1 red cell, and a few bacteria.[SS1.2]  P[SS1.3]lida's mother stated that she is unable to care for her, and that she needs placement in some sort of assisted living facility. The patient[SS1.2] has been here 1 mo as placement difficult.[SS1.3]     [SS1.2]   She was started on Ceftriaxone on 3/16[SS1.1] but changed to meropenem for ? Pneumonia.[SS1.3]    head CT- 3/17 no acute changes    valproic acid level- normal   - blood cultures 2 out of 2 positive for gram positive cocci in clusters[SS1.1] --started[SS1.3] on vancomycin and meropenem      Sepsis ? Due to UTI vs LLL pneumonia  - had hx of urinary retention during hospital course. Cherry last removed on 3/9 and currently voiding large amounts per discussion with RN. UCx 3/12 growing Klebsiella. UCx from 3/15 +for GNR (50-100k).   - started on ceftriaxone on 3/16. Changed to meropenem as above in light of possible pneumonia[SS1.1]      Pt says she is getting better  Denies pain, cough, SOB, diarrhea  C/o being hot and was 102 earlier  Not sure how long she has had cherry  Pt was out of it last night when transferred from ICU but apperently ok by 9 pm last night  Today out of it most of day but now has woken up[SS1.4]    Antibiotics:  Meropenem  Vancomycin[SS1.1]    Tm 102.3 3/19, 101.5 3/18[SS1.5]    Review of Systems: as per HPI    Social History:[SS1.1] has been living with mother[SS1.3] in [place], single, no etoh or smoking[SS1.6]  Social History   Substance Use Topics     Smoking status: Never Smoker     Smokeless tobacco: Not on file     Alcohol use No        Family History:  Family History   Problem Relation Age of Onset     Hypertension Mother      Cancer - colorectal Father   "    Family History Negative Sister      Family History Negative Brother         Allergies:  This patient is allergic to is allergic to amoxicillin; amoxicillin; haldol [benzyl alcohol]; haldol [haloperidol]; pcn [penicillin g]; penicillins; and seroquel [quetiapine].     Physical Exam:  Vitals were reviewed  Blood pressure 135/68, pulse 109, temperature 102.3  F (39.1  C), resp. rate 18, height 1.6 m (5' 3\"), weight 97.8 kg (215 lb 9.8 oz), last menstrual period 04/27/2012, SpO2 95 %, not currently breastfeeding.  GEN:[SS1.1] awake, looks hot, recall of details poor, mentation not normal, loud speech[SS1.4]  HEENT:[SS1.1] oral clear[SS1.4]  LUNG:[SS1.1] CTA[SS1.4]  COR:[SS1.1] sinus tach[SS1.4]  ABDOMEN:[SS1.1] obese, soft,. NT,. ND  [place] clear yellow urine w/o sediment or cloudiness[SS1.4]  EXT:[SS1.1] w/o edema, skin warm[SS1.4]  SKIN:[SS1.1] no rash[SS1.4]    Data:  CBC  Recent Labs  Lab 03/19/17  0635 03/18/17  0536 03/17/17  1153 03/17/17  0638   WBC 9.8 10.0 12.7* 13.3*   HGB 11.1* 11.2* 11.3* 11.2*    264 244 243       BMP  Recent Labs  Lab 03/19/17  1155 03/19/17  0635 03/19/17  0030 03/18/17  1805  03/18/17  0536  03/17/17  1153 03/17/17  0638   * 156* 149* 151*  < > 155*  < > 155* 154*   POTASSIUM  --  4.1  --   --   --  4.1  --  4.2 4.1   CHLORIDE  --  123*  --   --   --  121*  --  121* 119*   BENTLEY  --  10.3*  --   --   --  10.8*  --  10.9* 10.8*   CO2  --  25  --   --   --  25  --  24 26   BUN  --  54*  --   --   --  58*  --  51* 44*   CR  --  3.64*  --   --   --  4.14*  --  4.09* 3.76*   GLC  --  209*  --   --   --  406*  --  232* 159*   < > = values in this interval not displayed.    CULTURES  Recent Labs  Lab 03/17/17  1333 03/17/17  1322 03/16/17  1106 03/15/17  2245 03/12/17  2300   CULT Cultured on the 1st day of incubation: Gram positive cocci in clustersCritical Value/Significant Value, preliminary result only, called to and read back by Dawn Benton RN SH55 @ 1925 3/18/17 CS* " Cultured on the 1st day of incubation: Gram positive cocci in clustersCritical Value/Significant Value, preliminary result only, called to and read back by Jacqueline Coreas R.N.  on [facility] at 10:32am 03.18.17 JTCulture in progress(Note)POSITIVE for Staphylococci other than S.aureus, S.epidermidis andS.lugdunensis, by Verigene multiplex nucleic acid test.Coagulase-negative staphylococci are the most common venipuncture orcollection associated skin CONTAMINANTS grown in blood cultures.Final identification and antimicrobial susceptibility testing will beverified by standard methods.Specimen tested with Verigene multiplex, gram-positive blood culturenucleic acid test for the following targets: Staph aureus, Staphepidermidis, Staph lugdunensis, other Staph species, Enterococcusfaecalis, Enterococcus faecium, Streptococcus species, S. agalactiae,S. anginosus grp., S. pneumoniae, S. pyogenes, Listeria sp., mecA(methicillin resistance) and Nicole/B (vancomycin resi stance).Critical Value/Significant Value called to and read back by  Lorena 3.18.2017 at 1.25 pm* No growth after 3 days 50,000 to 100,000 colonies/mL Klebsiella pneumoniae* >100,000 colonies/mL Klebsiella pneumoniae*   BC 3/16b x 1 NG  BC 3/17 x 2  Staph in 2 BC  UC 3/15-K.pneumonia > 100k S ceftriaxone     Attestation:  I have reviewed today's vital signs, notes, medications, labs and imaging.    ASSESSMENT:[SS1.1]  1. STAPH IN 2 BC-probably real, no CVP line, not sure where coming from, febrile, on vancomycin-agree[SS1.4]; she does not seem to have pneumonia, if S.aureus will need 4 weeks iv RX, ? If had CVP line earlier this admit but find no procedure note[SS1.6]    2. UTI-KLEBSIELLA PNEUMONIA-would change/narrow to ceftriaxone    3. FEVERS-probably related to staph bacteremia[SS1.4]    4. SCHIZOPHRENIA[SS1.3]    REC  1. Cont vancomycin  2. D/c meropenem  3. Ceftriaxone 1 g iv q 24 hours  4. F/u on ID on Staph in BC  5. Serial BC[SS1.4]--check  "tomorrow[SS1.3]  6. reassess[SS1.6]  Thanks for asking me to see her![SS1.4]    JACQUELIN STEEL M.D.  O:[phone]   B:[phone][SS1.1]            Revision History        User Key Date/Time User Provider Type Action    > SS1.6 3/19/2017  5:46 PM David Setel MD Physician Sign     SS1.3 3/19/2017  5:42 PM David Steel MD Physician      SS1.4 3/19/2017  5:33 PM David Steel MD Physician      SS1.2 3/19/2017  2:44 PM David Steel MD Physician      SS1.5 3/19/2017  2:42 PM David Steel MD Physician      SS1.1 3/19/2017  2:40 PM David Steel MD Physician             Consults signed by Hong Orta MD at 2/20/2017 11:49 AM      Author:  Hong Orta MD Service:  Psychiatry Author Type:  Physician    Filed:  2/20/2017 11:49 AM Date of Service:  2/20/2017  9:02 AM Note Created:  2/20/2017  9:42 AM    Status:  Signed :  Hong Orta MD (Physician)         PSYCHIATRY CONSULTATION       DATE OF CONSULTATION:  02/20/2017      REQUESTING PHYSICIAN:   Jaime Parr MD      REASON FOR CONSULTATION:  Outbursts, schizoaffective disorder.      IDENTIFYING DATA:  Nel Farmer is a 69-year-old woman with mental retardation and schizoaffective disorder who comes in with multiple falls and is convalescing on station 55 with a foot fracture.      CHIEF COMPLAINT:  \"I'm on too many medications.\"       HISTORY OF PRESENT ILLNESS:  Nel is a very unfortunate 61-year-old woman with chronic schizoaffective disorder and mental retardation.  She comes in after falling and sustaining a foot fracture.  She is convalescing on station 55.  She has long time health issues and has been hospitalized here at [facility] as well as other facilities.  She is typically on Depakote, Risperdal and Zyprexa.  She is a marginal historian due to her cognitive impairment.  She speaks in a very loud, aprosodic voice.  She is not actively psychotic and says that she has been " sleeping good.  The nursing notes reflect that she has been intermittently uncooperative, not always cooperative with cares.   has been exploring placement options.  She had been living with her mother prior to coming in to the hospital.  It looks like she is going to need a transitional care unit to address her concerns.  She is unable to walk because of a nondisplaced fracture of her distal fibula.  She has had some issues with renal failure.      On interview, the patient is very concrete, she is fairly loquacious with me, but speaks in a very loud, monotone voice.  She is not a particularly good historian.  She says she is sleeping okay and does not appear to be actively hallucinating.  In reviewing her medication history, it looks like she has been on slightly higher doses of Risperdal in the past to manage her mood instability.  They did do a Depakote level on 02/12/2017 and this was normal at 96.  She is not reporting thoughts of wanting to hurt self or others.      PAST PSYCHIATRIC HISTORY:  As above.  She has longstanding mental health problems and was last under our care back in the fall of 2015.  She typically sees Dr. Kraft.      PAST CHEMICAL DEPENDENCY HISTORY:  Negative.      PAST MEDICAL HISTORY:  Impaired cognition, diabetes, hyperlipidemia, hypertension, knee arthroplasty, foot fracture, tonsillectomy, adenoidectomy, D&C, frequent falls.      LISTED MEDICATIONS:  At this time:     1.  Tylenol.   2.  Vitamin D.   3.  Catapres.   4.  Diltiazem.   5.  Depakote  mg in the morning, 1500 mg at bedtime.   6.  Colace.   7.  Glucotrol.   8.  NovoLog insulin.   9.  NovoLog mix.   10.  Imdur.   11.  Lopressor.   12.  Zyprexa 5 mg each day at bedtime.   13.  Prilosec.   14.  Risperdal M-Tab 1 mg each day at bedtime.   15.  Dulcolax.   16.  Zofran.   17.  MiraLax.   18.  Senokot-S.   19.  Roxicodone p.r.n.      FAMILY AND SOCIAL HISTORY:  I have limited details, but I believe that she was  "utilizing support from her mother prior to coming into the hospital and they are currently looking into the possibility of nursing home option for her so that she can recover.  I think she is in some sort of a work program and has supportive services in the community.      REVIEW OF SYSTEMS:  A 10-point review of systems is conducted and is notable for a foot fracture on review of her extremities, an elevated creatinine on genitourinary examination.  All other systems negative.        VITAL SIGNS:  Most recent vital signs:  Temperature 98, pulse 82, respiratory rate 16, blood pressure 148/69, oxygen saturation 95%.      MENTAL STATUS EXAMINATION:  Appearance:  The patient is a disheveled woman.  She has some facial hair.  She is lying in bed complaining of a sore foot.  She is judged to be a poor historian.  Speech is loud in aprosodic.  Use of language poor.  Motor exam is somewhat tense.  Coordination, station, gait impaired due to her foot fracture.  Muscle strength and tone adequate.  Affect is slightly agitated.  Mood \"okay.\"  Thought process is generally logical, coherent and goal directed.  No loosening of associations.  No flight of ideas.  No formal thought disorder.  Thought content is very concrete, she is not verbalizing any active hallucinations, delusions, paranoia or suicidal ideation.  Insight and judgment poor.  Cognitive exam:  The patient is alert and oriented x2 out of 3.  Concentration poor.  Recent memory  poor, remote memory poor.  General fund of knowledge below average.      IMPRESSION:  The patient is a 61-year-old woman with schizoaffective disorder.  She also has significant developmental disability.  She is probably close to baseline with her psychiatric status and has a therapeutic dose of Depakote.  We can increase Risperdal slightly for mood stabilization.  She has a p.r.n. available.      DIAGNOSES:   1.  Schizoaffective disorder, bipolar type.   2.  Intellectual disability with " moderate impairment.   3.  Foot fracture with frequent falls.      PLAN:   1.  Increase Risperdal M-Tab 1 mg b.i.d.   2.  Agree with community placement in a nursing home setting for rehabilitation.   3.  She has followup in the community,  I believe with Dr. Kraft, for psychotropic meds.         HONG ORTA MD             D: 2017 09:02   T: 2017 09:42   MT: CD      Name:     MARIO ALBERTO PATRICK   MRN:      [id number]        Account:       [id number]   :      1955           Consult Date:  2017      Document: [id number]    [PR1.1]   Revision History        User Key Date/Time User Provider Type Action    > PR1.1 2017 11:49 AM Hong Orta MD Physician Sign            Consults by Hong Orta MD at 2017  8:52 AM     Author:  Hong Orta MD Service:  Psychiatry Author Type:  Physician    Filed:  2017  8:52 AM Date of Service:  2017  8:52 AM Note Created:  2017  8:52 AM    Status:  Signed :  Hong Orta MD (Physician)     Consult Orders:    1. Psychiatry IP Consult: Outbursts causing difficulty for placement; Consultant may enter orders: Yes; Patient to be seen: Routine; Call back #: [phone] [[id number]] ordered by Jaime Parr MD at 17 1202                Pt seen for new psychiatric consultation, see my dictation.    Hong Orta MD[PR1.1]      Revision History        User Key Date/Time User Provider Type Action    > PR1.1 2017  8:52 AM Hong Orta MD Physician Sign            Consults by Pavel Ruiz MD at 2017  4:22 PM     Author:  Pavel Ruiz MD Service:  Nephrology Author Type:  Physician    Filed:  2017  4:39 PM Date of Service:  2017  4:22 PM Note Created:  2017  4:22 PM    Status:  Signed :  Pavel Ruiz MD (Physician)     Consult Orders:    1. Nephrology IP Consult: Patient to be seen: Routine - within 24 hours; chronic renal  insufficiency, creatinine bumped up.; Consultant may enter orders: Yes [[id number]] ordered by Angie Park MD at 02/16/17 1320                [facility]    Nephrology Consultation     Date of Admission:  2/11/2017    Assessment & Plan   Nel Farmer is a 61 year old female who was admitted on 2/11/2017 for evaluation of falls and for placement.  She has A/CKD.        1) Baseline Stage 4 CKD:  Cr 2.2 and GFR 23.  Lithium nephropathy.    2) A/CKD:  Prerenal ?  Urine retention?  No obvious nephrotoxic exposure.  Lithium nephropathy normally progresses very slowly.    3) HTN:  Borderline control on diltiazem, clonidine and metoprolol.      4) Metabolic Bone Disease:  Need to check phos, PTH and Vit D given CKD and fracture.    Plan:    NS 75 mL/hour  UA  Bladder scan  Phos, PTH and Vit D.        Pavel Ruiz MD  [facility] - Nephrology  [phone]    Reason for Consult   I was asked to see the patient for A/CKD.      Primary Care Physician   No primary care provider on file.    Chief Complaint      I AM VERY SICK !!    History is obtained from the patient and her chart.    History of Present Illness      Nel Farmer is a 61 year old female who was admitted for evaluation of falls and for placement.    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    She was admitted for evaluation of falls.  She has been found to have a distal R fibula fx.    Her kidney function is recent weeks is not as good as baseline.    1/29 cr 2.56  2/12 cr 2.73  2/16 cr 3.22    Reasons are unclear.  She says that she has not had enough fluid, but I am not sure how reliable she is.    No recent NSAID or contrast exposure.      Past Medical History   I have reviewed this patient's medical history and updated it with pertinent information if needed.   Past Medical History   Diagnosis Date     Anxiety      CKD (chronic kidney disease) stage 3, GFR 30-59 ml/min       baseline Cr 1.8-2     Cognitive deficits      Depression      Depressive disorder      Diabetes (H)      DM type 2 (diabetes mellitus, type 2) (H)      insulin dependent      HTN      Hyperlipidemia LDL goal < 70      Hypertension      Osteoarthritis      Schizoaffective disorder (H)        Past Surgical History   I have reviewed this patient's surgical history and updated it with pertinent information if needed.  Past Surgical History   Procedure Laterality Date     Tonsillectomy & adenoidectomy       Dilation and curettage  age 31     C total knee arthroplasty Left 3/2010     Colonoscopy  10/19/2011     Procedure:COMBINED COLONOSCOPY, REMOVE TUMOR/POLYP/LESION BY SNARE; Colonoscopy; Surgeon:MARY ALICE RUSH; Location: GI       Prior to Admission Medications   Prior to Admission Medications   Prescriptions Last Dose Informant Patient Reported? Taking?   Furosemide (LASIX PO)  Pharmacy Yes No   Sig: Take 20 mg by mouth daily   Furosemide (LASIX PO)  Pharmacy Yes No   Sig: Take 20 mg by mouth daily as needed (Take at noon for leg swelling if needed)   GlipiZIDE (GLUCOTROL XL PO)  Pharmacy Yes Yes   Sig: Take 10 mg by mouth 2 times daily (before meals)   LORAZEPAM PO  Pharmacy Yes Yes   Sig: Take 1 mg by mouth 2 times daily as needed for anxiety   Metoprolol Succinate (TOPROL XL PO)  Pharmacy Yes Yes   Sig: Take 100 mg by mouth daily (50 mg x 2 tablets)   OLANZapine (ZYPREXA PO)  Pharmacy Yes No   Sig: Take 5 mg by mouth At Bedtime   Omeprazole (PRILOSEC PO)  Pharmacy Yes No   Sig: Take 20 mg by mouth 2 times daily (before meals)   Ondansetron (ZOFRAN ODT PO)  Pharmacy Yes No   Sig: Take 4 mg by mouth every 8 hours as needed for nausea   RisperiDONE (RISPERDAL PO)  Pharmacy Yes No   Sig: Take 1 mg by mouth At Bedtime   cloNIDine (CATAPRES) 0.3 MG tablet  Pharmacy No Yes   Sig: Take 1 tablet (0.3 mg) by mouth 2 times daily   diltiazem 120 MG 24 hr CD capsule  Pharmacy Yes No   Sig: Take 120 mg by mouth daily  "  divalproex (DEPAKOTE) 500 MG EC tablet  Pharmacy Yes No   Sig: Take 500 mg by mouth every morning   divalproex (DEPAKOTE) 500 MG EC tablet  Pharmacy Yes Yes   Sig: Take 1,500 mg by mouth At Bedtime   insulin aspart prot & aspart (NOVOLOG MIX 70/30 PEN) 100 UNITS/ML susp  Pharmacy Yes Yes   Sig: Inject 55 Units Subcutaneous 2 times daily (before meals)    insulin syringe-needle U-100 (BD INSULIN SYRINGE ULTRAFINE) 31G X 5/16\" 1 ML  Pharmacy No No   Sig: Use one syringe bid daily or as directed.   isosorbide mononitrate (IMDUR) 30 MG 24 hr tablet  Pharmacy No Yes   Sig: Take 1 tablet by mouth daily.   polyethylene glycol (MIRALAX/GLYCOLAX) powder  Self Yes No   Sig: Take 17 g by mouth daily as needed for constipation   risperiDONE (RISPERDAL M-TAB) 0.5 MG disintegrating tablet   No No   Sig: Place 2 tablets (1 mg) under the tongue At Bedtime   risperiDONE (RISPERDAL M-TABS) 0.5 MG disintegrating tablet   No No   Sig: Place 1 tablet (0.5 mg) under the tongue 3 times daily as needed   senna-docusate (SENOKOT-S;PERICOLACE) 8.6-50 MG per tablet   No No   Sig: Take 1 tablet by mouth 2 times daily as needed for constipation      Facility-Administered Medications: None     Allergies   Allergies   Allergen Reactions     Amoxicillin      Amoxicillin      Haldol [Benzyl Alcohol]      Haldol [Haloperidol]      Pcn [Penicillin G]      Penicillins      Seroquel [Quetiapine] GI Disturbance       Social History   I have reviewed this patient's social history and updated it with pertinent information if needed. Nel Farmer  reports that she has never smoked. She does not have any smokeless tobacco history on file. She reports that she does not drink alcohol or use illicit drugs.    Family History   I have reviewed this patient's family history and updated it with pertinent information if needed.   Family History   Problem Relation Age of Onset     Hypertension Mother      Cancer - colorectal Father      Family History Negative " Sister      Family History Negative Brother        Review of Systems   The 10 point Review of Systems is not considered reliable due to her mental status.     Physical Exam   Temp: 98.7  F (37.1  C) Temp src: Oral BP: 148/62   Heart Rate: 69 Resp: 16 SpO2: 92 % O2 Device: None (Room air) Oxygen Delivery: 2 LPM  Vital Signs with Ranges  Temp:  [97.6  F (36.4  C)-99.3  F (37.4  C)] 98.7  F (37.1  C)  Heart Rate:  [] 69  Resp:  [16] 16  BP: (125-177)/(52-90) 148/62  SpO2:  [92 %-94 %] 92 %  0 lbs 0 oz    GENERAL: alert, NAD  HEENT:  Normocephalic. No gross abnormalities.  Pupils equal.  MMM.  Dentition is fair.   CV: RRR, no murmurs, no clicks, gallops, or rubs, no edema, no carotid bruits  RESP: Clear bilaterally with good efforts  GI: Abdomen soft/nt/nd, BS normal. No masses, organomegaly  MUSCULOSKELETAL: extremities boot applied to RLE  SKIN: no suspicious lesions or rashes, dry to touch  NEURO:  Strength normal and symmetric.   PSYCH: inappropriate comments at times  LYMPH: No palpable ant/post cervical and supraclavicular adenopathy    Data   BMP  Recent Labs  Lab 02/16/17  0650 02/12/17  0050    136   POTASSIUM 3.9 4.4   CHLORIDE 107 105   BENTLEY 9.5 9.7   CO2 25 20   BUN 38* 40*   CR 3.22* 2.73*   * 307*     Phos@LABRCNTIPR(phos:4)  CBC)  Recent Labs  Lab 02/12/17  0050   WBC 10.9   HGB 11.9   HCT 36.0   MCV 92          Recent Labs  Lab 02/12/17  0050   AST 19   ALT 26   ALKPHOS 86   BILITOTAL 0.3     No lab results found in last 7 days.  25 OH Vit D2   Date Value Ref Range Status   07/02/2010 <5 ug/L Final     25 OH Vit D3   Date Value Ref Range Status   07/02/2010 36 ug/L Final     25 OH Vit D total   Date Value Ref Range Status   07/02/2010 <41 30 - 75 ug/L Final     Comment:     Season, race, dietary intake, and treatment affect the concentration of   25-hydroxy-Vitamin D. Values may decrease during winter months and increase   during summer months. Values less than 30 ug/L may  indicate Vitamin D   deficiency.       Recent Labs  Lab 02/12/17  0050   HGB 11.9   HCT 36.0   MCV 92     No results for input(s): PTHI in the last 168 hours.[DB1.1]     Revision History        User Key Date/Time User Provider Type Action    > DB1.1 2/16/2017  4:39 PM Pavel Ruiz MD Physician Sign            Consults signed by Sam Lamas MD at 2/14/2017  7:49 AM      Author:  Sam Lamas MD Service:  Orthopedics Author Type:  Physician    Filed:  2/14/2017  7:49 AM Date of Service:  2/13/2017  9:50 PM Note Created:  2/13/2017 10:16 PM    Status:  Signed :  Sam Lamas MD (Physician)     Consult Orders:    1. Orthopedic Surgery IP Consult: Patient to be seen: Routine - within 24 hours; Right subacute fibula fracture due to fall.; Consultant may enter orders: Yes [[id number]] ordered by Michael Suarez MD at 02/13/17 1600                ORTHOPEDIC SURGERY CONSULTATION       DATE OF SERVICE:  02/13/2017       CHIEF COMPLAINT:  Right ankle pain.      REQUESTING PHYSICIAN:  Dr. Suarez from the Hospitalist Service.      HISTORY OF PRESENT ILLNESS:  Nel Farmer is a 61-year-old female with multiple medical comorbidities who presents with generalized weakness and pain after multiple falls.  She was evaluated during her hospital stay and found to have a subacute right distal fibula fracture.  Orthopedics was consulted for further evaluation.  The patient has a significant schizoaffective disorder with again cognitive deficits.  It is very hard to get any history from her as she yells at a very high pitched noise and does not follow the conversation very well.  She states that she has had multiple falls to both legs.  She is unsure when these have happened.  She describes pain in both feet.      PAST MEDICAL HISTORY:   1.  Schizoaffective disorder.   2.  Anxiety, depression.   3.  Cognitive status.   4.  Diabetes.   5.  Hyperlipidemia.   6.  Hypertension.      PAST SURGICAL HISTORY:   1.   Left total knee arthroplasty.   2.  D&C.   3.  Tonsillectomy and adenoidectomy.      SOCIAL HISTORY:  The patient lives with her elderly mother.  She does not smoke or drink.      MEDICATIONS:  The patient is on a multitude of medications.  Please see H&P for details.      ALLERGIES:  Amoxicillin, Haldol, penicillin, Seroquel.      REVIEW OF SYSTEMS:  Unable to get fully assessed due to the patient's current cognitive status.      PHYSICAL EXAMINATION:  Examination shows a 61-year-old female in no acute distress.  Focused examination of her right lower extremity shows some mild swelling diffusely over the right ankle.  No significant swelling over the left foot.  She screams in pain before I actually touch her foot and ankle.  Hard to assess pain as she is again screaming without even being touched.  She does have again mild swelling over the medial and lateral aspects of the ankle.  No significant  crepitus.  No redness.  No erythema.  She moves her ankle freely when not being examined.      X-RAYS:  X-rays of her right foot were reviewed.  On these views shows a subacute fracture of the right distal fibula with early healing noted.  There is callus formation seen.      IMPRESSION:  A 61-year-old female with multiple medical comorbidities with a minimally to nondisplaced fracture of the right distal fibula.  Subacute in nature with early healing.      RECOMMENDATIONS:  At this point, it is unclear of how long that has been there.  It is most likely at least a month old.  She has early healing on the x-ray.  At this point, I would recommend symptomatic treatment.  If she is doing well ambulating, she does not need any type of ambulatory device or boot.  If she is having pain with weightbearing then I would recommend a Cam boot.  This could be ordered through orthotics.  I am happy to order this if needed.  Otherwise, the nursing staff or primary team can order again a Cam walker.  She can follow up with her primary  care physician.  No surgical intervention is necessary.      Please contact me with any further questions or concerns.         CHARLEE HOLBROOK MD             D: 2017 21:50   T: 2017 22:16   MT: CD      Name:     MARIO ALBERTO PATRICK   MRN:      [id number]        Account:       [id number]   :      1955           Consult Date:  2017      Document: [id number][BB1.1]         Revision History        User Key Date/Time User Provider Type Action    > BB1.1 2017  7:49 AM Charlee Holbrook MD Physician Sign                     Progress Notes - Physician (Notes from 17 through 17)      Progress Notes by Tommie Matias MD at 2017 11:04 AM     Author:  Tommie Matias MD Service:  Nephrology Author Type:  Physician    Filed:  2017 11:05 AM Date of Service:  2017 11:04 AM Note Created:  2017 11:04 AM    Status:  Signed :  Tommie Matias MD (Physician)         Notes, labs, vitals reviewed.  High uo - standard for pt bc of NDI.  She should have unrestricted access to water.  Cr at baseline.  BP ok.  We will sign off.  Thank you for this consultation.  Please call if any ?s.[RS1.1]     Revision History        User Key Date/Time User Provider Type Action    > RS1.1 2017 11:05 AM Tommie Matias MD Physician Sign            Progress Notes by Lauren Cali at 2017  9:12 AM     Author:  Lauren Cali Service:  Social Work Author Type:      Filed:  2017 10:37 AM Date of Service:  2017  9:12 AM Note Created:  2017  9:12 AM    Status:  Addendum :  Lauren Cali ()         Social Work Progress Note     D:   Discharge orders received for pt. OCTAVIA arranged for stretcher via [facility] for today at 12:30pm[NF1.1] due to patient being confused, requiring physical monitoring for safety. OCTAVIA completed PCS and faxed to [facility] at 1035.    OCTAVIA updated CC, charge RN and bedside RN. Bedside RN will update patient. SW updated admissions staff  Janet, who confirms they can accept pt back today. [facility]40[facility].[NF1.2]    P: Pt will discharge back to [facility] today at 12:30pm.     GILMA Em SW  [phone][NF1.1]       Revision History        User Key Date/Time User Provider Type Action    > NF1.2 4/4/2017 10:37 AM Lauren Cali  Addend     NF1.1 4/4/2017  9:13 AM Lauren Cali  Sign            Progress Notes by Tommie Matias MD at 4/3/2017  3:04 PM     Author:  Tommie Matias MD Service:  Nephrology Author Type:  Physician    Filed:  4/3/2017  3:09 PM Date of Service:  4/3/2017  3:04 PM Note Created:  4/3/2017  3:04 PM    Status:  Signed :  Tommie Matias MD (Physician)         [facility]     Renal Progress Note       SHORTHAND KEY FOR MY NOTES:  c = with, s = without, p = after, a = before, x = except, asx = asymptomatic, tx = transplant or treatment, sx = symptoms or symptomatic, cx = canceled or culture, rxn = reaction, yday = yesterday, nl = normal, abx = antibiotics, fxn = function, dx = diagnosis, dz = disease, m/h = melena/hematochezia, c/d/l/ha = cramping/dizziness/lightheadedness/headache, d/c = discharge or diarrhea/constipation, f/c/n/v = fevers/chills/nausea/vomiting, cp/sob = chest pain/shortness of breath.         Assessment/Plan:     1.  CKD IV.  Pt's renal fxn is stable.  A.  Follow clinically.    2.  Hypernatremia 2 NDI.  Na is ok now.  There was some confusion earlier re not giving her water, but it was explained that she should have unrestricted access to water given her NDI.    A.  Continue unrestricted free water access.  B.  Follow Na.  C.  Continue amiloride.    3.  Met acidosis.  K is ok, bicarb is a bit low.  Acetazolamide has been stopped.  A.  Follow labs.    4.  FEN.  Electrolytes are fine.        Interval History:     Pt feels ok right now, but is complaining that she is not getting any water.  There was some confusion re this, it  "seems.    No other issues.            Medications and Allergies:       insulin isophane & regular  18 Units Subcutaneous QAM     [START ON 4/4/2017] amLODIPine  5 mg Oral Daily     [START ON 4/4/2017] aMILoride  10 mg Oral Daily     [START ON 4/4/2017] levofloxacin  250 mg Oral Daily     insulin aspart  1-10 Units Subcutaneous TID AC     insulin aspart  1-7 Units Subcutaneous At Bedtime     hydrochlorothiazide  25 mg Oral Daily     risperiDONE  0.5 mg Sublingual BID     miconazole   Topical BID     insulin isophane & regular  20 Units Subcutaneous Daily with supper     cloNIDine  0.3 mg Oral BID     divalproex  500 mg Oral QAM     divalproex  1,500 mg Oral At Bedtime     isosorbide mononitrate  30 mg Oral Daily     metoprolol  25 mg Oral Daily     OLANZapine (zyPREXA) tablet 5 mg  5 mg Oral At Bedtime     omeprazole (priLOSEC) CR capsule 20 mg  20 mg Oral BID AC     risperiDONE (risperDAL) tablet 1 mg  1 mg Oral At Bedtime     heparin  5,000 Units Subcutaneous Q12H     Allergies   Allergen Reactions     Amoxicillin      Amoxicillin      Haldol [Benzyl Alcohol]      Haldol [Haloperidol]      Pcn [Penicillin G]      Penicillins      Seroquel [Quetiapine] GI Disturbance          Physical Exam:     Vitals were reviewed    Heart Rate: 45, Blood pressure 114/50, pulse 94, temperature 95.9  F (35.5  C), temperature source Axillary, resp. rate 16, height 1.6 m (5' 3\"), weight 90.3 kg (199 lb 1.2 oz), last menstrual period 04/27/2012, SpO2 97 %, not currently breastfeeding.  Wt Readings from Last 3 Encounters:   04/01/17 90.3 kg (199 lb 1.2 oz)   03/25/17 102.2 kg (225 lb 5 oz)   06/20/16 108.9 kg (240 lb)     Intake/Output Summary (Last 24 hours) at 04/03/17 1504  Last data filed at 04/03/17 1444   Gross per 24 hour   Intake              920 ml   Output             4125 ml   Net            -3205 ml     GENERAL APPEARANCE: pleasant, NAD, alert, sitting in chair  HEENT:  Eyes/ears/nose/neck grossly normal  RESP: lungs cta b c " good efforts, no crackles, rhonchi or wheezes  CV: RRR, nl S1/S2  ABDOMEN: o/s/nt/nd  EXTREMITIES/SKIN: no rashes/lesions on observed skin; no ble edema  OTHER:  + cherry         Data:     CBC RESULTS:     Recent Labs  Lab 04/02/17  0720 03/31/17  0733 03/30/17  2148 03/30/17  1515 03/28/17  0826   WBC  --  8.9  --  9.5 10.4   RBC  --  3.54*  --  3.57* 3.37*   HGB  --  10.6*  --  10.9* 10.2*   HCT  --  34.6*  --  35.3 33.3*    290 299 351 375     Basic Metabolic Panel:    Recent Labs  Lab 04/03/17  0642 04/02/17  0720 04/01/17  2210 04/01/17  1036 04/01/17  0610 04/01/17  0200  03/31/17  0733  03/30/17  1515 03/28/17  0826    143 142 150* 151* 148*  < > 152*  < > 150* 142   POTASSIUM 4.0 3.5  --   --  3.8  --   --  3.6  --  4.0 4.0   CHLORIDE 116* 115*  --   --  121*  --   --  122*  --  121* 115*   CO2 19* 20  --   --  21  --   --  22  --  17* 20   BUN 35* 36*  --   --  37*  --   --  40*  --  40* 37*   CR 2.37* 2.26*  --   --  2.35*  --   --  2.49*  --  2.91* 2.63*   * 205*  --   --  205*  --   --  244*  --  179* 175*   BENTLEY 9.6 9.3  --   --  9.9  --   --  10.0  --  10.5* 9.5   < > = values in this interval not displayed.  INR  Recent Labs  Lab 03/30/17  1515   INR 1.19*      Attestation:   I have reviewed today's relevant vital signs, notes, medications, labs and imaging.    Tommie Matias MD  [facility]  [phone][RS1.1]     Revision History        User Key Date/Time User Provider Type Action    > RS1.1 4/3/2017  3:09 PM Tommie Matias MD Physician Sign            Progress Notes by Lauren Cali at 4/3/2017 12:13 PM     Author:  Lauren Cali Service:  Social Work Author Type:      Filed:  4/3/2017 12:20 PM Date of Service:  4/3/2017 12:13 PM Note Created:  4/3/2017 12:13 PM    Status:  Signed :  Lauren Cali ()         Social Work Progress Note     D:   SW following for discharge planning. Per chart review, pt was admitted to [facility] from [place]  [facility]. Pt has MA and therefore has an 18-day MA bed hold at this facility.     A/I: SW called [facility] admissions staff, Cherri (ph:[phone]); Cherri confirms pt has an MA bed hold and they can accept patient back tomorrow. SW inquired if they can provide mechanical lift for transfers and Cherri stated yes, as they were doing this for pt prior to her hospitalization.       P: SW will arrange for transport back to [facility]. SW will complete PAS. SW will fax orders/PAS and update facility with transport time.       GILMA Em SW  [phone][NF1.1]       Revision History        User Key Date/Time User Provider Type Action    > NF1.1 4/3/2017 12:20 PM Lauren Cali  Sign            Progress Notes by Manav Harden RN at 4/3/2017 10:30 AM     Author:  Manav Harden RN Service:  (none) Author Type:  Registered Nurse    Filed:  4/3/2017 10:40 AM Date of Service:  4/3/2017 10:30 AM Note Created:  4/3/2017 10:38 AM    Status:  Signed :  Manav Harden RN (Registered Nurse)         MD Notification    Notified Person:  MD    Notified Persons Name: Dr. Gonzales    Notification Date/Time: 4/3/2017, 1030    Notification Interaction:  Paged Physician    Purpose of Notification: HR 47-48bpm    Orders Received: Discontinued diltiazem, Started on 5mg Amlodipine. Will continue to monitor.     Comments:[BM1.1]       Revision History        User Key Date/Time User Provider Type Action    > BM1.1 4/3/2017 10:40 AM Manav Harden RN Registered Nurse Sign            Progress Notes by Theo Higgins PT at 4/3/2017  9:26 AM     Author:  Theo Higgins PT Service:  (none) Author Type:  Physical Therapist    Filed:  4/3/2017  9:26 AM Date of Service:  4/3/2017  9:26 AM Note Created:  4/3/2017  9:26 AM    Status:  Signed :  Theo Higgins PT (Physical Therapist)            04/03/17 0830   Quick Adds   Type of Visit Initial PT Evaluation   Living Environment   Living Environment Comment  Per chart from recent admission, patient was in LTC prior to current stay.    Functional Level Prior   Prior Functional Level Comment Per patient, she was able to ambulate when she was living at home. She is not able to provide level of mobility when she was at nursing home/LTC prior to admission.    General Information   Onset of Illness/Injury or Date of Surgery - Date 03/30/17   Referring Physician MD Norberto   Patient/Family Goals Statement Did not specifically state   Pertinent History of Current Problem (include personal factors and/or comorbidities that impact the POC) 61 year old female being treated for acute metabolic encephalopathy. PMH: HTN, schizophrenia, and recent admisstion for toxic encephalopathy.    Precautions/Limitations fall precautions   General Info Comments Activity: up with assist   Cognitive Status Examination   Orientation person;place   Level of Consciousness alert;confused   Follows Commands and Answers Questions (Intermittent one step command following)   Personal Safety and Judgment impaired   Cognitive Comment (Baseline cognitive impairments)   Pain Assessment   Patient Currently in Pain (Low back and R ankle pain - not rated on scale)   Posture    Posture Forward head position   Posture Comments (In sitting and laying patient demonstrates left lateral lean)   Range of Motion (ROM)   ROM Comment No noted deficits - PROM of LE's WFL's   Strength   Strength Comments Decreased significantly. Needs heavy assist with mobility.    Bed Mobility   Bed Mobility Comments Rolling to the L with mod A x 2. Rolling to the R with max A x 2.    Transfer Skills   Transfer Comments Trialed sit to/from stand with max A x 2 persons - not able to achieve. Chair to bed with mechanical lift and total assist.    Gait   Gait Comments Not able based on current status.    Balance   Balance Comments Seated balance poor - able to briefly find midline with cues. Will almost immediately return to L lateral lean.  "   Coordination   Coordination no deficits were identified   General Therapy Interventions   Planned Therapy Interventions bed mobility training;gait training;strengthening;transfer training;balance training   Clinical Impression   Criteria for Skilled Therapeutic Intervention yes, treatment indicated   PT Diagnosis Impaired gait, generalized weakness.    Influenced by the following impairments Pain, impaired balance, decreased strength   Functional limitations due to impairments Decreased independence with bed mobility, tranfers and ambulation   Clinical Presentation Stable/Uncomplicated   Clinical Presentation Rationale Patient currently limited by pain, motvation, baseline cognition impairments, decreased strength and balance   Clinical Decision Making (Complexity) Low complexity   Therapy Frequency` 5 times/week   Predicted Duration of Therapy Intervention (days/wks) One week   Anticipated Discharge Disposition (Return to LT with HHPT services)   Risk & Benefits of therapy have been explained Yes   Patient, Family & other staff in agreement with plan of care Yes   Clinical Impression Comments Patient appropriate for skilled acute care PT to maximize independence with functional mobility and to determine safe DC plan   1x Eval Only-Outpatient/Observation Medicare G-Code   G-code Mobility: Walking & Moving Around   [facility] [employer]  \"6 Clicks\"   2016, Trustees of [facility], under license to [facility].  All rights reserved.   6 Clicks Short Forms Basic Mobility Inpatient Short Form   [facility] AM-PAC  \"6 Clicks\" V.2 Basic Mobility Inpatient Short Form   1. Turning from your back to your side while in a flat bed without using bedrails? 2 - A Lot   2. Moving from lying on your back to sitting on the side of a flat bed without using bedrails? 2 - A Lot   3. Moving to and from a bed to a chair (including a wheelchair)? 1 - Total   4. Standing up from a chair using your arms (e.g., " wheelchair, or bedside chair)? 1 - Total   5. To walk in hospital room? 1 - Total   6. Climbing 3-5 steps with a railing? 1 - Total   Basic Mobility Raw Score (Score out of 24.Lower scores equate to lower levels of function) 8   Total Evaluation Time   Total Evaluation Time (Minutes) 10[BB1.1]        Revision History        User Key Date/Time User Provider Type Action    > BB1.1 4/3/2017  9:26 AM Theo Higgins PT Physical Therapist Sign            Progress Notes by Russ Gonzales MD at 4/3/2017  7:46 AM     Author:  Russ Gonzales MD Service:  Hospitalist Author Type:  Physician    Filed:  4/3/2017  8:30 AM Date of Service:  4/3/2017  7:46 AM Note Created:  4/3/2017  7:46 AM    Status:  Signed :  Russ Gonzales MD (Physician)         [facility]    Hospitalist Progress Note[NB1.1]    Assessment & Plan[NB1.2]   Nel Farmer is a 61-year-old female with past medical history significant for schizoaffective disorder, diabetes mellitus type 2, chronic kidney disease stage 4 with recent acute kidney injury, diabetes insipidus due to lithium toxicity, hypertension with recent admission for toxic encephalopathy, healthcare-associated pneumonia, Klebseilla urinary tract infection discharged to nursing home on 03/28/2017 who presents again 03/30/2017 with lethargy and altered mental status. She was admitted on 03/30/2017 for further workup and treatment.      Acute Metabolic encephalopathy:  Presented with lethargy and altered mental status.   -She was noted to have metabolic acidosis due to her renal failure and also mild hyperantremia  -she was febrile to 101 at presentation.   -Possible pneumonia may be contributing as well[NB1.1]-[NB1.3] see below.   -Valproic acid below the therapeutic range.   -LP not suggestive of CNS infection.   -CT head[NB1.1]-[NB1.3] negative for acute abnormality.   -Mental status seems to have improved to baseline now.      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 Her white blood cell count is normal and there were no reported preceding respiratory symptoms, although she does have a mild left basilar consolidation with associated small pleural effusion noted on her x-ray which appears new compared to interval study on 03/26/2017 during recent admission.  -[NB1.1] d[NB1.3]iscontinued vancomycin 4/1  - blood culture remains negative to date  -[NB1.1] Afebrile[NB1.3]  -[NB1.1] DC meropenem  -Start Levaquin to complete 7 days total[NB1.3]      Diabetes mellitus type 2:   A1c 9.2% on 03/10/2016.  Prior to admission, she appears to be on 70/30 mix 20 units before breakfast, 30 units before dinner. She is also on glipizide twice daily.   -Continue 70/30 mix[NB1.1]; increase AM to 18 units; continue PM at 20 units  -[NB1.3]continue with SSI  -hold her oral sulfonylurea while in hospital  -monitor BG     Hypernatremia  -Improved      Schizoaffective disorder:   -Continue prior to admission on risperidone, divalproex, olanzapine.  -Also has baseline cognitive deficits     Hypertension:   -Continue clonidine, Dilacor XR and metoprolol  -Blood pressure adequately controlled at the moment      D/W: RN  DVT Prophylaxis: Pneumatic Compression Devices  Code Status:[NB1.1] Full Code[NB1.2]    Disposition: Expected discharge in 1-2 days[NB1.1]    Russ Gonzales[NB1.2], MD[NB1.1]    Interval History[NB1.2]   Much more awake and interactive this  AM. Afebrile. No n/v[NB1.3]    -Data reviewed today: I reviewed all new labs and imaging results over the last 24 hours. I personally reviewed no images or EKG's today.[NB1.1]    Physical Exam   Temp: 97.3  F (36.3  C) Temp src: Axillary BP: 117/40   Heart Rate: 57 Resp: 18 SpO2: 98 % O2 Device: None (Room air)    Vitals:    03/30/17 2100 03/31/17 0500 04/01/17 0400   Weight: 91.4 kg (201 lb 9.6 oz) 91.4 kg (201 lb 8 oz) 90.3 kg (199 lb 1.2 oz)[NB1.2]     Vital Signs with Ranges[NB1.1]  Temp:  [95.7  F (35.4  C)-97.4  F (36.3  C)] 97.3  F (36.3  C)  Heart Rate:  [52-66] 57  Resp:  [18-22] 18  BP: (111-122)/(40-54) 117/40  SpO2:  [96 %-98 %] 98 %  I/O last 3 completed shifts:  In: 120 [P.O.:120]  Out: 5075 [Urine:5075][NB1.2]    Constitutional: AAOX2, NAD  Respiratory:  No crackles, No wheezes, CTA B/L, Normal WOB  Cardiovascular: RRR, No murmur  GI: Soft, Non- tender, BS- normoactive  Skin/Integument: Warm and dry, no rashes  MSK: No joint deformity or swelling, no edema  Neuro: CN- grossly intact[NB1.1]    Medications        insulin aspart  1-10 Units Subcutaneous TID AC     insulin aspart  1-7 Units Subcutaneous At Bedtime     insulin isophane & regular  15 Units Subcutaneous QAM     aMILoride  10 mg Oral Daily     hydrochlorothiazide  25 mg Oral Daily     meropenem  500 mg Intravenous Q8H     risperiDONE  0.5 mg Sublingual BID     miconazole   Topical BID     insulin isophane & regular  20 Units Subcutaneous Daily with supper     cloNIDine  0.3 mg Oral BID     diltiazem  120 mg Oral Daily     divalproex  500 mg Oral QAM     divalproex  1,500 mg Oral At Bedtime     isosorbide mononitrate  30 mg Oral Daily     metoprolol  25 mg Oral Daily     OLANZapine (zyPREXA) tablet 5 mg  5 mg Oral At Bedtime     omeprazole (priLOSEC) CR capsule 20 mg  20 mg Oral BID AC     risperiDONE (risperDAL) tablet 1 mg  1 mg Oral At Bedtime     heparin  5,000 Units Subcutaneous Q12H       Data     Recent Labs  Lab 04/03/17  0698  04/02/17  0720 04/01/17  2210  04/01/17  0610  03/31/17  0733  03/30/17  2148 03/30/17  1515 03/28/17  0826   WBC  --   --   --   --   --   --  8.9  --   --  9.5 10.4   HGB  --   --   --   --   --   --  10.6*  --   --  10.9* 10.2*   MCV  --   --   --   --   --   --  98  --   --  99 99   PLT  --  182  --   --   --   --  290  --  299 351 375   INR  --   --   --   --   --   --   --   --   --  1.19*  --     143 142  < > 151*  < > 152*  < > 153* 150* 142   POTASSIUM 4.0 3.5  --   --  3.8  --  3.6  --   --  4.0 4.0   CHLORIDE 116* 115*  --   --  121*  --  122*  --   --  121* 115*   CO2 19* 20  --   --  21  --  22  --   --  17* 20   BUN 35* 36*  --   --  37*  --  40*  --   --  40* 37*   CR 2.37* 2.26*  --   --  2.35*  --  2.49*  --   --  2.91* 2.63*   ANIONGAP 9 8  --   --  9  --  8  --   --  12 7   BENTLEY 9.6 9.3  --   --  9.9  --  10.0  --   --  10.5* 9.5   * 205*  --   --  205*  --  244*  --   --  179* 175*   ALBUMIN  --   --   --   --   --   --   --   --   --  3.0*  --    PROTTOTAL  --   --   --   --   --   --   --   --   --  7.7  --    BILITOTAL  --   --   --   --   --   --   --   --   --  0.2  --    ALKPHOS  --   --   --   --   --   --   --   --   --  75  --    ALT  --   --   --   --   --   --   --   --   --  20  --    AST  --   --   --   --   --   --   --   --   --  12  --    < > = values in this interval not displayed.    No results found for this or any previous visit (from the past 24 hour(s)).[NB1.2]     Revision History        User Key Date/Time User Provider Type Action    > NB1.3 4/3/2017  8:30 AM Russ Gonzales MD Physician Sign     NB1.2 4/3/2017  7:47 AM Russ Gonzales MD Physician      NB1.1 4/3/2017  7:46 AM Russ Gonzales MD Physician             Progress Notes by NISA Renae MD at 4/2/2017 10:36 AM     Author:  NISA Renae MD Service:  Nephrology Author Type:  Physician    Filed:  4/2/2017 10:37 AM Date of Service:  4/2/2017 10:36 AM Note Created:  4/2/2017 10:36 AM  "   Status:  Signed :  NISA Renae MD (Physician)         Renal Medicine Progress Note                                Nel Farmer MRN# [id number]   Age: 61 year old YOB: 1955   Date of Admission: 2/11/2017 Hospital LOS: 3                  Assessment/Plan:     61 year old female who was re admitted with AMS.  Recent prolonged hospitalization  with discharge  03/28/17        1. Baseline Stage 4 CKD: Cr 2.5 range and GFR 20. Lithium nephropathy.     2. 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  HTN: Borderline control on diltiazem, clonidine and metoprolol.      4.  Metabolic acidosis   -etiology   -may preclude further use of acetazolamide   5. Metabolic Bone Disease   -secondary hyperparathyroidism   -in part related to vitamin D deficiency (on replacement)        Stop IVF  Increase amiloride to 10    Add HCTZ 25 mg         Interval History:     Resting in bed.  Comfortable.  UO reviewed.        ROS:     No patient complaints    Medications and Allergies:     Reviewed    Physical Exam:     Vitals were reviewed  Patient Vitals for the past 8 hrs:   BP Temp Temp src Heart Rate Resp SpO2 Height   04/02/17 0759 120/51 97.4  F (36.3  C) Axillary 52 22 96 % 1.6 m (5' 3\")     I/O last 3 completed shifts:  In: 4669.5 [P.O.:2760; I.V.:1909.5]  Out: 2540 [Urine:2300; Emesis/NG output:240]    Vitals:    03/30/17 1513 03/30/17 2100 03/31/17 0500 04/01/17 0400   Weight: 102.1 kg (225 lb) 91.4 kg (201 lb 9.6 oz) 91.4 kg (201 lb 8 oz) 90.3 kg (199 lb 1.2 oz)       GENERAL: resting   HEENT: NC/AT, PERRLA, EOMI, non icteric, pharynx moist without lesion  RESP:  clear anteriorly  CV: RRR, normal S1 S2  MS: no clubbing, cyanosis   ABDOMEN: soft, nontender  SKIN: clear without significant rashes or lesions  NEURO: speech normal and cranial nerves 2-12 intact  EXT: warm, no edema    Data:       Recent Labs  Lab 04/02/17  0720 04/01/17  2210 " 04/01/17  1036 04/01/17  0610  03/31/17  0733  03/30/17  1515    142 150* 151*  < > 152*  < > 150*   POTASSIUM 3.5  --   --  3.8  --  3.6  --  4.0   CHLORIDE 115*  --   --  121*  --  122*  --  121*   CO2 20  --   --  21  --  22  --  17*   ANIONGAP 8  --   --  9  --  8  --  12   *  --   --  205*  --  244*  --  179*   BUN 36*  --   --  37*  --  40*  --  40*   CR 2.26*  --   --  2.35*  --  2.49*  --  2.91*   GFRESTIMATED 22*  --   --  21*  --  20*  --  16*   GFRESTBLACK 27*  --   --  25*  --  24*  --  20*   BENTLEY 9.3  --   --  9.9  --  10.0  --  10.5*   < > = values in this interval not displayed.       Recent Labs   Lab Test  04/02/17   0720  04/01/17   0610  03/31/17   0733  03/30/17   1515  03/28/17   0826  03/27/17   0851  03/26/17   1036  03/26/17   0830  03/25/17   0813  03/23/17   0903   CR  2.26*  2.35*  2.49*  2.91*  2.63*  2.91*  3.00*  Canceled, Test credited   Specimen hemolyzed    2.94*  2.71*         NSIA Renae    [facility]  [phone][GO1.1]     Revision History        User Key Date/Time User Provider Type Action    > GO1.1 4/2/2017 10:37 AM NISA Renae MD Physician Sign            Progress Notes by Russ Gonzales MD at 4/2/2017  8:16 AM     Author:  Russ Gonzales MD Service:  Hospitalist Author Type:  Physician    Filed:  4/2/2017 10:06 AM Date of Service:  4/2/2017  8:16 AM Note Created:  4/2/2017  8:16 AM    Status:  Signed :  Russ Gonzales MD (Physician)         [facility]    Hospitalist Progress Note    Assessment & Plan   Nel Farmer is a 61-year-old female with past medical history significant for schizoaffective disorder, diabetes mellitus type 2, chronic kidney disease stage 4 with recent acute kidney injury, diabetes insipidus due to lithium toxicity, hypertension with recent admission for toxic encephalopathy, healthcare-associated pneumonia, Klebseilla urinary tract infection discharged to nursing home on  03/28/2017 who presents again 03/30/2017 with lethargy and altered mental status. She was admitted on 03/30/2017 for further workup and treatment.      Acute Metabolic encephalopathy:  Presented with lethargy and altered mental status.   -She was noted to have metabolic acidosis due to her renal failure and also mild hyperantremia  -she was febrile to 101 at presentation.   -Possible pneumonia may be contributing as well see below.   -Valproic acid below the therapeutic range.   -LP not suggestive of CNS infection.   -CT head is negative for acute abnormality.   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 Her white blood cell count is normal and there were no reported preceding respiratory symptoms, although she does have a mild left basilar consolidation with associated small pleural effusion noted on her x-ray which appears new compared to interval study on 03/26/2017 during recent admission.  - continue meropenem; discontinue[NB1.1]d[NB1.3] vancomycin[NB1.1] 4/1[NB1.3]  - blood culture remains negative to date  - monitor fever curve[NB1.1]-no fever in the last 48 hours  - de-escalate antibiotics tomorrow if cultures continue to stay negative and she  remains afebrile[NB1.3]      Diabetes mellitus type 2:   A1c 9.2% on 03/10/2016.  Prior to admission, she appears to be on 70/30 mix 20 units before breakfast, 30 units before dinner. She is also on glipizide twice daily.   - Continue 70/30 mix 1[NB1.1]5[NB1.3] units qAM and 20 units with supper   - continue with SSI  - will hold her oral sulfonylurea while in hospital  - monitor BG     Hypernatremia  -[NB1.1]Improved to 143  -Discontinue D5W  -Recheck in the morning[NB1.3]     Schizoaffective disorder:   -Continue prior to admission on risperidone, divalproex, olanzapine.  -Also has baseline cognitive deficits     Hypertension:   -Continue clonidine, Dilacor XR and metoprolol  -Blood pressure adequately controlled at the moment      D/W: RN  DVT Prophylaxis: Pneumatic Compression Devices  Code Status: Full Code    Disposition: Expected discharge in[NB1.1] 1[NB1.3]-[NB1.1]2[NB1.3] days    Russ Gonzales MD    Interval History[NB1.1]   Sleepy this morning.  Denies new complaints.  Sodium much better today.[NB1.3]    -Data reviewed today: I reviewed all new labs and imaging results over the last 24 hours. I personally reviewed no images or EKG's today.    Physical Exam   Temp: 97.4  F (36.3  C) Temp src: Axillary BP: 120/51   Heart Rate: 52 Resp: 22 SpO2: 96 % O2 Device: None (Room air)    Vitals:    03/30/17 2100 03/31/17 0500 04/01/17 0400   Weight: 91.4 kg (201 lb 9.6 oz) 91.4 kg (201 lb 8 oz) 90.3 kg (199 lb 1.2 oz)     Vital Signs with Ranges  Temp:  [96.2  F (35.7  C)-97.4  F (36.3  C)] 97.4  F (36.3  C)  Heart Rate:  [52-71] 52  Resp:  [16-22] 22  BP: (115-148)/(40-80) 120/51  SpO2:  [95 %-99 %] 96 %  I/O last 3 completed shifts:  In: 4669.5 [P.O.:2760; I.V.:1909.5]  Out: 2540 [Urine:2300; Emesis/NG output:240]    Constitutional: AAOX[NB1.1]2[NB1.3], NAD[NB1.1], sleepy[NB1.3]  HEENT: Moist oral mucosa, no oral lesions, No pallor or icterus  Respiratory:  No crackles, No wheezes, CTA B/L, Normal  WOB  Cardiovascular: RRR, No murmur  GI: Soft, Non- tender, BS- normoactive  Skin/Integument: Warm and dry, no rashes  MSK: No joint deformity or swelling, no edema  Neuro: CN- grossly intact    Medications        insulin aspart  1-10 Units Subcutaneous TID AC     insulin aspart  1-7 Units Subcutaneous At Bedtime     meropenem  500 mg Intravenous Q8H     risperiDONE  0.5 mg Sublingual BID     aMILoride  5 mg Oral Daily     miconazole   Topical BID     insulin isophane & regular  10 Units Subcutaneous QAM     insulin isophane & regular  20 Units Subcutaneous Daily with supper     cloNIDine  0.3 mg Oral BID     diltiazem  120 mg Oral Daily     divalproex  500 mg Oral QAM     divalproex  1,500 mg Oral At Bedtime     isosorbide mononitrate  30 mg Oral Daily     metoprolol  25 mg Oral Daily     OLANZapine (zyPREXA) tablet 5 mg  5 mg Oral At Bedtime     omeprazole (priLOSEC) CR capsule 20 mg  20 mg Oral BID AC     risperiDONE (risperDAL) tablet 1 mg  1 mg Oral At Bedtime     heparin  5,000 Units Subcutaneous Q12H       Data     Recent Labs  Lab 04/02/17  0720 04/01/17  2210 04/01/17  1036 04/01/17  0610  03/31/17  0733  03/30/17  2148 03/30/17  1515 03/28/17  0826   WBC  --   --   --   --   --  8.9  --   --  9.5 10.4   HGB  --   --   --   --   --  10.6*  --   --  10.9* 10.2*   MCV  --   --   --   --   --  98  --   --  99 99     --   --   --   --  290  --  299 351 375   INR  --   --   --   --   --   --   --   --  1.19*  --     142 150* 151*  < > 152*  < > 153* 150* 142   POTASSIUM 3.5  --   --  3.8  --  3.6  --   --  4.0 4.0   CHLORIDE 115*  --   --  121*  --  122*  --   --  121* 115*   CO2 20  --   --  21  --  22  --   --  17* 20   BUN 36*  --   --  37*  --  40*  --   --  40* 37*   CR 2.26*  --   --  2.35*  --  2.49*  --   --  2.91* 2.63*   ANIONGAP 8  --   --  9  --  8  --   --  12 7   BENTLEY 9.3  --   --  9.9  --  10.0  --   --  10.5* 9.5   *  --   --  205*  --  244*  --   --  179* 175*   ALBUMIN  --    --   --   --   --   --   --   --  3.0*  --    PROTTOTAL  --   --   --   --   --   --   --   --  7.7  --    BILITOTAL  --   --   --   --   --   --   --   --  0.2  --    ALKPHOS  --   --   --   --   --   --   --   --  75  --    ALT  --   --   --   --   --   --   --   --  20  --    AST  --   --   --   --   --   --   --   --  12  --    < > = values in this interval not displayed.    No results found for this or any previous visit (from the past 24 hour(s)).[NB1.1]     Revision History        User Key Date/Time User Provider Type Action    > NB1.3 4/2/2017 10:06 AM Russ Gonzales MD Physician Sign     NB1.2 4/2/2017 10:00 AM Russ Gonzales MD Physician      NB1.1 4/2/2017  8:16 AM Russ Gonzales MD Physician             Progress Notes by Mario Abrams MD at 4/1/2017  4:00 PM     Author:  Mairo Abrams MD Service:  Nephrology Author Type:  Physician    Filed:  4/1/2017  4:04 PM Date of Service:  4/1/2017  4:00 PM Note Created:  4/1/2017  4:00 PM    Status:  Signed :  Mario Abrams MD (Physician)          Renal Medicine Progress Note            Assessment/Plan:     1. CKD IV, bl Scr 2.5 range  2. Recurrent hypernatremia due to free water deficit  3. NDI 2/2 lithium  4. Severe schizoaffective disorder    Plan.   1. Continue D5W. I discussed free water intake with RN, who will intermittent fill her water container. Continue serum Na check. Avoid rapid correction (6-8 meq/24hrs)        Interval History:     Pt is calm. No new issues per RN report.         Medications and Allergies:       meropenem  500 mg Intravenous Q8H     risperiDONE  0.5 mg Sublingual BID     aMILoride  5 mg Oral Daily     miconazole   Topical BID     insulin isophane & regular  10 Units Subcutaneous QAM     insulin isophane & regular  20 Units Subcutaneous Daily with supper     cloNIDine  0.3 mg Oral BID     diltiazem  120 mg Oral Daily     divalproex  500 mg Oral QAM     divalproex  1,500 mg Oral At Bedtime     isosorbide  mononitrate  30 mg Oral Daily     metoprolol  25 mg Oral Daily     OLANZapine (zyPREXA) tablet 5 mg  5 mg Oral At Bedtime     omeprazole (priLOSEC) CR capsule 20 mg  20 mg Oral BID AC     risperiDONE (risperDAL) tablet 1 mg  1 mg Oral At Bedtime     heparin  5,000 Units Subcutaneous Q12H     insulin aspart  1-12 Units Subcutaneous Q4H        Allergies   Allergen Reactions     Amoxicillin      Amoxicillin      Haldol [Benzyl Alcohol]      Haldol [Haloperidol]      Pcn [Penicillin G]      Penicillins      Seroquel [Quetiapine] GI Disturbance            Physical Exam:   Vitals were reviewed  Heart Rate: 68, Blood pressure 115/64, pulse 94, temperature 96.2  F (35.7  C), temperature source Axillary, resp. rate 16, weight 90.3 kg (199 lb 1.2 oz), last menstrual period 04/27/2012, SpO2 99 %, not currently breastfeeding.    Wt Readings from Last 3 Encounters:   04/01/17 90.3 kg (199 lb 1.2 oz)   03/25/17 102.2 kg (225 lb 5 oz)   06/20/16 108.9 kg (240 lb)       Intake/Output Summary (Last 24 hours) at 04/01/17 1600  Last data filed at 04/01/17 1457   Gross per 24 hour   Intake          2820.83 ml   Output             4095 ml   Net         -1274.17 ml       GENERAL APPEARANCE: NAD  HEENT:  Eyes/ears/nose/neck grossly normal  RESP: lungs cta b c good efforts, no crackles, rhonchi or wheezes  CV: RRR, nl S1/S2  ABDOMEN: o/s/nt/nd, bs present  EXTREMITIES/SKIN: no rashes/lesions on observed skin; no edema  Neuro: calm         Data:     CBC RESULTS:     Recent Labs  Lab 03/31/17  0733 03/30/17  2148 03/30/17  1515 03/28/17  0826 03/27/17  0851   WBC 8.9  --  9.5 10.4 10.7   RBC 3.54*  --  3.57* 3.37* 3.37*   HGB 10.6*  --  10.9* 10.2* 10.0*   HCT 34.6*  --  35.3 33.3* 33.6*    299 351 375 405       Basic Metabolic Panel:    Recent Labs  Lab 04/01/17  1036 04/01/17  0610 04/01/17  0200 03/31/17  2131 03/31/17  1851 03/31/17  1402  03/31/17  0733  03/30/17  1515 03/28/17  0826 03/27/17  0851 03/26/17  1036   * 151*  148* 154* 151* 155*  < > 152*  < > 150* 142 148* 145*   POTASSIUM  --  3.8  --   --   --   --   --  3.6  --  4.0 4.0 4.1 4.3   CHLORIDE  --  121*  --   --   --   --   --  122*  --  121* 115* 121* 114*   CO2  --  21  --   --   --   --   --  22  --  17* 20 21 20   BUN  --  37*  --   --   --   --   --  40*  --  40* 37* 38* 39*   CR  --  2.35*  --   --   --   --   --  2.49*  --  2.91* 2.63* 2.91* 3.00*   GLC  --  205*  --   --   --   --   --  244*  --  179* 175* 201* 225*   BENTLEY  --  9.9  --   --   --   --   --  10.0  --  10.5* 9.5 10.0 10.3*   < > = values in this interval not displayed.    INR  Recent Labs  Lab 03/30/17  1515   INR 1.19*      Attestation:   I have reviewed today's relevant vital signs, notes, medications, labs and imaging.    Mario Abrams MD  [facility] - Nephrology  Office phone :[phone]  Pager: [phone][OP1.1]     Revision History        User Key Date/Time User Provider Type Action    > OP1.1 4/1/2017  4:04 PM Mario Abrams MD Physician Sign            Progress Notes by Russ Gonzales MD at 4/1/2017  2:11 PM     Author:  Russ Gonzales MD Service:  Hospitalist Author Type:  Physician    Filed:  4/1/2017  3:19 PM Date of Service:  4/1/2017  2:11 PM Note Created:  4/1/2017  2:11 PM    Status:  Signed :  Russ Gonzales MD (Physician)         [facility]    Hospitalist Progress Note    Assessment & Plan   Nel Farmer is a 61-year-old female with past medical history significant for schizoaffective disorder, diabetes mellitus type 2, chronic kidney disease stage 4 with recent acute kidney injury, diabetes insipidus due to lithium toxicity, hypertension with recent admission for toxic encephalopathy, a healthcare-associated pneumonia, Klebseilla urinary tract infection discharged to nursing home on 03/28/2017 who presents again 03/30/2017 with lethargy and altered mental status. She[NB1.1] wa[NB1.2]s admitted on 03/30/2017 for further workup and  "treatment.    [NB1.1]  Acute[NB1.2] Metabolic encephalopathy:  Presented with lethargy and altered mental status.[NB1.1]   -[NB1.2]She[NB1.1] was[NB1.2] noted to have metabolic acidosis due to her renal failure and also mild hyperantremia[NB1.1]  -s[NB1.2]he was febrile to 101 at presentation.[NB1.1]   -[NB1.2]Possible pneumonia may be contributing as well see below.[NB1.1]   -[NB1.2]Valproic acid is below the therapeutic range.[NB1.1]   -[NB1.2]LP not suggestive of CNS infection.[NB1.1]   -[NB1.2]CT head is negative for acute abnormality.   -Mental status seems to have improved to baseline[NB1.1] now[NB1.2]. She is awake and loudly screaming \" i need water\" , \"i'm hungry\" which seems to be near her baseline and similar behavior during her recent hospitalization.   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 Her white blood cell count is normal and there were no reported preceding respiratory symptoms, although she does have a mild left basilar consolidation with associated small pleural effusion noted on her x-ray which appears new compared to interval study on 03/26/2017 during recent admission.  - continue merop[NB1.1]enem; discontinue vancomycin today[NB1.2]  - blood culture remains negative to date  - monitor fever curve and wbc (no leukocytosis at this time)      Diabetes mellitus " type 2:   A1c 9.2% on 03/10/2016.  Prior to admission, she appears to be on 70/30 mix 20 units before breakfast, 30 units before dinner. She is also on glipizide twice daily.   -[NB1.1] Continue[NB1.2] 70/30 mix 10 units qAM and 20 units with supper   - continue with SSI  - will hold her oral sulfonylurea while in hospital  - monitor BG     Hypernatremia  - currently on D5W  -[NB1.1] Continue to monitor serial sodiums  - Last sodium 150 at 10:30 AM[NB1.2]  - appreciate nephrology assistance     Schizoaffective disorder:   -[NB1.1]Continue p[NB1.2]rior to admission on risperidone, divalproex, olanzapine[NB1.1].  -Also has baseline cognitive deficits[NB1.2]     Hypertension:   -[NB1.1] Continue clonidine, Dilacor XR and metoprolol  -Blood pressure adequately controlled at the moment[NB1.2]      D/W: RN  DVT Prophylaxis:[NB1.1] Pneumatic Compression Devices[NB1.2]  Code Status: Full Code    Disposition: Expected discharge in[NB1.1] 2-3 days[NB1.2]    Russ Gonzales MD    Interval History[NB1.1]   Much more awake and back to baseline.  Denies nausea or vomiting.  Afebrile.[NB1.2]    -Data reviewed today: I reviewed all new labs and imaging results over the last 24 hours. I personally reviewed[NB1.1] no images or EKG's today[NB1.2].    Physical Exam   Temp: 96.8  F (36  C) Temp src: Oral BP: 134/56 Pulse: 94 Heart Rate: 67 Resp: 16 SpO2: 99 % O2 Device: None (Room air)    Vitals:    03/30/17 2100 03/31/17 0500 04/01/17 0400   Weight: 91.4 kg (201 lb 9.6 oz) 91.4 kg (201 lb 8 oz) 90.3 kg (199 lb 1.2 oz)     Vital Signs with Ranges  Temp:  [96.8  F (36  C)-98.6  F (37  C)] 96.8  F (36  C)  Pulse:  [94] 94  Heart Rate:  [] 67  Resp:  [8-19] 16  BP: (129-170)/(49-88) 134/56  SpO2:  [92 %-99 %] 99 %  I/O last 3 completed shifts:  In: 1533.33 [P.O.:640; I.V.:893.33]  Out: 4255 [Urine:4255]    Constitutional: AAOX[NB1.1]1[NB1.2], NAD  HEENT: Moist oral mucosa, no oral lesions, No pallor or icterus  Neck- Supple, Good  ROM, No JVD  Respiratory:  No crackles, No wheezes, CTA B/L, Normal WOB  Cardiovascular: RRR, No murmur  GI: Soft, Non- tender, BS- normoactive  Skin/Integument: Warm and dry, no rashes  MSK: No joint deformity or swelling, no edema  Neuro: CN- grossly intac[NB1.1]t[NB1.2]    Medications     IV infusion builder WITH additives 50 mL/hr at 04/01/17 0703       meropenem  500 mg Intravenous Q8H     risperiDONE  0.5 mg Sublingual BID     aMILoride  5 mg Oral Daily     miconazole   Topical BID     insulin isophane & regular  10 Units Subcutaneous QAM     insulin isophane & regular  20 Units Subcutaneous Daily with supper     insulin isophane human  10 Units Subcutaneous At Bedtime     cloNIDine  0.3 mg Oral BID     diltiazem  120 mg Oral Daily     divalproex  500 mg Oral QAM     divalproex  1,500 mg Oral At Bedtime     isosorbide mononitrate  30 mg Oral Daily     metoprolol  25 mg Oral Daily     OLANZapine (zyPREXA) tablet 5 mg  5 mg Oral At Bedtime     omeprazole (priLOSEC) CR capsule 20 mg  20 mg Oral BID AC     risperiDONE (risperDAL) tablet 1 mg  1 mg Oral At Bedtime     heparin  5,000 Units Subcutaneous Q12H     vancomycin place pruitt - receiving intermittent dosing  1 each Does not apply See Admin Instructions     insulin aspart  1-12 Units Subcutaneous Q4H       Data     Recent Labs  Lab 04/01/17  1036 04/01/17  0610 04/01/17  0200  03/31/17  0733  03/30/17  2148 03/30/17  1515 03/28/17  0826   WBC  --   --   --   --  8.9  --   --  9.5 10.4   HGB  --   --   --   --  10.6*  --   --  10.9* 10.2*   MCV  --   --   --   --  98  --   --  99 99   PLT  --   --   --   --  290  --  299 351 375   INR  --   --   --   --   --   --   --  1.19*  --    * 151* 148*  < > 152*  < > 153* 150* 142   POTASSIUM  --  3.8  --   --  3.6  --   --  4.0 4.0   CHLORIDE  --  121*  --   --  122*  --   --  121* 115*   CO2  --  21  --   --  22  --   --  17* 20   BUN  --  37*  --   --  40*  --   --  40* 37*   CR  --  2.35*  --   --  2.49*  --    --  2.91* 2.63*   ANIONGAP  --  9  --   --  8  --   --  12 7   BENTLEY  --  9.9  --   --  10.0  --   --  10.5* 9.5   GLC  --  205*  --   --  244*  --   --  179* 175*   ALBUMIN  --   --   --   --   --   --   --  3.0*  --    PROTTOTAL  --   --   --   --   --   --   --  7.7  --    BILITOTAL  --   --   --   --   --   --   --  0.2  --    ALKPHOS  --   --   --   --   --   --   --  75  --    ALT  --   --   --   --   --   --   --  20  --    AST  --   --   --   --   --   --   --  12  --    < > = values in this interval not displayed.    No results found for this or any previous visit (from the past 24 hour(s)).[NB1.1]     Revision History        User Key Date/Time User Provider Type Action    > NB1.2 4/1/2017  3:19 PM Russ Gonzales MD Physician Sign     NB1.1 4/1/2017  2:11 PM Russ Gonzales MD Physician             Progress Notes by Edgar Ward MD at 4/1/2017  3:47 AM     Author:  Edgar Ward MD Service:  Hospitalist Author Type:  Physician    Filed:  4/1/2017  7:02 AM Date of Service:  4/1/2017  3:47 AM Note Created:  4/1/2017  3:47 AM    Status:  Addendum :  Edgar Ward MD (Physician)         Paged for rapid sodium correction down to 148 from 154 4-5 hours previous. Will plan to stop D5W and repeat Na at 6AM.[RJ1.1]    ADDENDUM:     Recheck sodium 151.    Resumed D5 at 50 per hour (had been at 80/hr w/ rapid correction)    Edgar Ward MD  7:01 AM[JW1.1]       Revision History        User Key Date/Time User Provider Type Action    > JW1.1 4/1/2017  7:02 AM Edgar Ward MD Physician Addend     RJ1.1 4/1/2017  3:48 AM Yvan Diallo MD Physician Sign                  Procedure Notes     No notes of this type exist for this encounter.         Progress Notes - Therapies (Notes from 04/01/17 through 04/04/17)      Progress Notes by Theo Higgins PT at 4/3/2017  9:26 AM     Author:  Theo Higgins PT Service:  (none) Author Type:  Physical Therapist    Filed:  4/3/2017   9:26 AM Date of Service:  4/3/2017  9:26 AM Note Created:  4/3/2017  9:26 AM    Status:  Signed :  Theo Higgins PT (Physical Therapist)            04/03/17 5530   Quick Adds   Type of Visit Initial PT Evaluation   Living Environment   Living Environment Comment Per chart from recent admission, patient was in LTC prior to current stay.    Functional Level Prior   Prior Functional Level Comment Per patient, she was able to ambulate when she was living at home. She is not able to provide level of mobility when she was at nursing home/LTC prior to admission.    General Information   Onset of Illness/Injury or Date of Surgery - Date 03/30/17   Referring Physician MD Norberto   Patient/Family Goals Statement Did not specifically state   Pertinent History of Current Problem (include personal factors and/or comorbidities that impact the POC) 61 year old female being treated for acute metabolic encephalopathy. PMH: HTN, schizophrenia, and recent admisstion for toxic encephalopathy.    Precautions/Limitations fall precautions   General Info Comments Activity: up with assist   Cognitive Status Examination   Orientation person;place   Level of Consciousness alert;confused   Follows Commands and Answers Questions (Intermittent one step command following)   Personal Safety and Judgment impaired   Cognitive Comment (Baseline cognitive impairments)   Pain Assessment   Patient Currently in Pain (Low back and R ankle pain - not rated on scale)   Posture    Posture Forward head position   Posture Comments (In sitting and laying patient demonstrates left lateral lean)   Range of Motion (ROM)   ROM Comment No noted deficits - PROM of LE's WFL's   Strength   Strength Comments Decreased significantly. Needs heavy assist with mobility.    Bed Mobility   Bed Mobility Comments Rolling to the L with mod A x 2. Rolling to the R with max A x 2.    Transfer Skills   Transfer Comments Trialed sit to/from stand with max A x 2 persons -  "not able to achieve. Chair to bed with mechanical lift and total assist.    Gait   Gait Comments Not able based on current status.    Balance   Balance Comments Seated balance poor - able to briefly find midline with cues. Will almost immediately return to L lateral lean.    Coordination   Coordination no deficits were identified   General Therapy Interventions   Planned Therapy Interventions bed mobility training;gait training;strengthening;transfer training;balance training   Clinical Impression   Criteria for Skilled Therapeutic Intervention yes, treatment indicated   PT Diagnosis Impaired gait, generalized weakness.    Influenced by the following impairments Pain, impaired balance, decreased strength   Functional limitations due to impairments Decreased independence with bed mobility, tranfers and ambulation   Clinical Presentation Stable/Uncomplicated   Clinical Presentation Rationale Patient currently limited by pain, motvation, baseline cognition impairments, decreased strength and balance   Clinical Decision Making (Complexity) Low complexity   Therapy Frequency` 5 times/week   Predicted Duration of Therapy Intervention (days/wks) One week   Anticipated Discharge Disposition (Return to LTC with HHPT services)   Risk & Benefits of therapy have been explained Yes   Patient, Family & other staff in agreement with plan of care Yes   Clinical Impression Comments Patient appropriate for skilled acute care PT to maximize independence with functional mobility and to determine safe DC plan   1x Eval Only-Outpatient/Observation Medicare G-Code   G-code Mobility: Walking & Moving Around   [facility] [employer] TM \"6 Clicks\"   2016, Trustees of [facility], under license to [employer].  All rights reserved.   6 Clicks Short Forms Basic Mobility Inpatient Short Form   [facility] AM-PAC  \"6 Clicks\" V.2 Basic Mobility Inpatient Short Form   1. Turning from your back to your side while in a flat bed " without using bedrails? 2 - A Lot   2. Moving from lying on your back to sitting on the side of a flat bed without using bedrails? 2 - A Lot   3. Moving to and from a bed to a chair (including a wheelchair)? 1 - Total   4. Standing up from a chair using your arms (e.g., wheelchair, or bedside chair)? 1 - Total   5. To walk in hospital room? 1 - Total   6. Climbing 3-5 steps with a railing? 1 - Total   Basic Mobility Raw Score (Score out of 24.Lower scores equate to lower levels of function) 8   Total Evaluation Time   Total Evaluation Time (Minutes) 10[BB1.1]        Revision History        User Key Date/Time User Provider Type Action    > BB1.1 4/3/2017  9:26 AM Theo Higgins PT Physical Therapist Sign

## 2017-03-30 NOTE — IP AVS SNAPSHOT
[address]    [facility] [address]    Phone:  [phone]                                       After Visit Summary   3/30/2017    Nel Farmer    MRN: [id number]           After Visit Summary Signature Page     I have received my discharge instructions, and my questions have been answered. I have discussed any challenges I see with this plan with the nurse or doctor.    ..........................................................................................................................................  Patient/Patient Representative Signature      ..........................................................................................................................................  Patient Representative Print Name and Relationship to Patient    ..................................................               ................................................  Date                                            Time    ..........................................................................................................................................  Reviewed by Signature/Title    ...................................................              ..............................................  Date                                                            Time

## 2017-03-31 NOTE — CONSULTS
"PSYCHIATRY CONSULTATION         REQUESTING PHYSICIAN:  Jamey Thornton MD      REASON FOR CONSULTATION:  Altered mental status and lethargy with request for Psychiatry consultation for management of schizoaffective disease.      HISTORY OF PRESENT ILLNESS:  The patient Nel Farmer is a 61-year-old single  female who is known to us.  She has been admitted several times here at [facility].     She is also my outpatient, although I have not seen her in almost 1 year.  She was admitted most recently and seen by us in consultation on 02/20/2017.  At that time she was uncooperative.  She also has hypernatremia.  Her sodium is as high as 153.  The patient is a very poor historian, and I am unable to get much out of her.  She insists that her sister Divine is responsible for everything.  .         From the minute I walked into the room the patient kept insisting that she needed water and feels very thirsty.  She has delusions that her family is out to \"do things\" to her.  She still lives with her mother.  It was recommended that she stay in a group home because her mother is not able to take care of her.  The patient has had delusions in the past of people stalking her and following her outside her house.  She also has significant mood issues which are also compounded by her intellectual issues.  She has been on Depakote, Risperdal and Zyprexa in the past.        CHEMICAL DEPENDENCY HISTORY:  None.      PAST MEDICAL HISTORY:  See medical notes.      FAMILY HISTORY:  Denies psychopathology in family of origin.      PERSONAL/SOCIAL HISTORY:  Single, no children, lives with her mother.      REVIEW OF SYSTEMS:   CONSTITUTIONAL:  Tired.   HEENT:  Negative.   RESPIRATORY:  Negative.   CARDIOVASCULAR:  Negative.   GASTROINTESTINAL:  Negative.   GENITOURINARY:  Negative.   MUSCULOSKELETAL:  Negative.   INTEGUMENTARY:  Negative.   NEUROLOGIC:  Negative.   PSYCHIATRIC:  Irritable.   ENDOCRINE:  Negative. "   HEMATOLOGIC:  Hypernatremia.      ALLERGIES:  Negative.      MENTAL STATUS EXAMINATION:  General appearance - The patient is dressed in scrubs, makes poor eye contact.  Speech is loud volume.  Thought process is rigid.  Thought content - Denies active suicidal or homicidal ideations, no loosening of associations, some persistent paranoid delusions.  Judgment and insight are poor.  She is oriented briefly to person.  Memory - Unable to assess adequately.  Attention is aroused, not fully sustained.  Fund of knowledge is poor.  Mood and affect are irritable, but reactive.  Muscle strength, tone and gait - The patient is lying in bed.      IMPRESSION:   Axis I:  a.  Schizoaffective disorder.   b.  Delirium, not otherwise specified.   Axis II:  Deferred.   Axis III:  See medical notes.   Axis IV:  Social stressors, chronic medical and mental health issues.   Axis V:  Global Assessment of Functioning 35.      PLAN:  We will offer the patient Risperdal M-Tab 0.5 mg p.o. b.i.d. which may help with the agitation and delirium and also might benefit the hypernatremia.        Please call Psychiatry for follow-up if needed.         ASHUTOSH DRIVER MD             D: 2017 10:49   T: 2017 11:53   MT: [id number]      Name:     MARIO ALBERTO PATRICK   MRN:      [id number]        Account:       [id number]   :      1955           Consult Date:  2017      Document: [id number]       cc: Jamey Thornton MD

## 2017-03-31 NOTE — PROGRESS NOTES
On admission skin: red, mild rash to perineal area, cleansed and powder applied. Blanchable pink coccynx, turned q2hr, dry, callused heels elevated on two vertical pillows.

## 2017-03-31 NOTE — ED PROVIDER NOTES
CHIEF COMPLAINT:  Altered level of consciousness.      HISTORY OF PRESENT ILLNESS:  Nel Farmer is a 61-year-old woman who is well known to the hospital and just left 2 days ago after having an admission with staph sepsis and possible pneumonia.  She went to the nursing home where she has been noted to be lethargic since and they returned her to the ED today.  The patient herself cannot give me any significant history.      PAST MEDICAL HISTORY:  Includes diabetes, hypertension, schizoaffective disorder, chronic renal failure, depression, cognitive defect, anxiety.  She has had knee arthroplasty and tonsillectomy.      MEDICATIONS:  Catapres, diltiazem, Depakote, Lasix, Glucotrol, insulin, Imdur, Ativan, Toprol, Zyprexa, Prilosec, Zofran, Risperdal.      ALLERGIES:  Amoxicillin, Haldol, penicillin and Seroquel.      FAMILY AND SOCIAL HISTORY:  Lives with her mom until last admission.      REVIEW OF SYSTEMS:  Are unable to be obtained due to altered level of consciousness.      PHYSICAL EXAMINATION:   GENERAL:  Reveals a very heavyset white female supine in no respiratory distress, head and eyes somewhat to the left.   INITIAL VITAL SIGNS:  Blood pressure 160/68, pulse ox 98% on room air, temp 101.3 rectal, pulse 111, respirations 18, weight 102 kg.   HEAD & NECK EXAM:  As noted above.  The pupils are 3 mm but deviated to the left.  She does turn look at me to the right but then goes back to the left.  Oropharynx appears dry.  No neck vein distention is noted.  She is unable to cooperate with EOMs.   LUNGS:  Reveal diminished breath sounds bilaterally.   CARDIAC:  Heart tones regular, slightly rapid, no murmurs appreciated.   ABDOMEN:  Massive but is nontender and no masses can be appreciated.   EXTREMITIES:  Reveal no edema.     SKIN:  There is a very superficial presacral decubitus.     : Rectal reveals normal stool.  Dunlap catheter is in place.     LYMPHATICS:  No lymph adenopathy is appreciated.    NEUROLOGIC:  She is lethargic to obtunded; she does open her eyes to voice and will say a few words but then becomes unresponsive again.  She is protecting her airway, the right arm and leg moves spontaneously.  There is less movement of the left arm and leg although there is withdrawal to pain.      COURSE:  The patient was brought to the critical care area, placed on EKG, blood pressure and oximetry monitoring.  A small hand IV had been started by paramedics and a second IV in the antecubital was started.  A Dunlap catheter was removed and a new Dunlap catheter replaced and urine obtained.  Rectal temperature was done and patient received rectal Tylenol.  IV fluids were begun with an initial bolus of 1 liter normal saline and a second liter started.        LABS: And chest x-ray and head scan obtained; head scan is unremarkable, chest x-ray appears to show a left basilar infiltrate. Urine revealed a few white cells after the Dunlap was changed. Electrolytes abnormal sodium 150, chloride 121, CO2 of 17, glucose 179, BUN 40, creatinine 2.91, calcium 10.5, albumin 3.0, INR 1.19.  White count 9.5, hemoglobin 10.9, platelet count 351.       FURTHER COURSE:  Because of the patient's obtundation and fever I decided to do a spinal tap to rule out meningitis.        PROCEDURE NOTE LP.  The patient was placed in a left lateral decubitus position, her back was prepped in a sterile manner.  A PA student was with us at the time and she made several attempts to get CSF fluid and was unable to.  The patient did  receive IV ketamine at a low dose to help her stay relaxed.  Once the student was unable to obtain this I took over the procedure and with difficulty was able to obtain CSF fluid.  This was sent for evaluation; there was 0, white cell, 2 red cells and no organisms on Gram stain       MEDICAL DECISION MAKING:  The patient empirically had 2 blood cultures done and was started on meropenem and vancomycin for sepsis along with  a second liter of fluid.  An initial blood gas revealed a pH of 7.14, this was venous blood gas, pCO2 of 47, pO2 of 75, bicarb of 16 and a lactate of 0.7.  An additional arterial blood gas was performed after the above procedures were obtained.  This showed a pH of 7.13, pCO2 of 45, pO2 of 93 and a bicarb of 15.  Dr. Thornton was contacted for admission and he requested a bicarb drip was to be started and this was ordered with 3 amps of bicarb in 1 liter at 150 cc an hour.  The patient still has diminished responsiveness but is protecting her airway, her vital signs are stable, her fever has come down and she has been treated for sepsis.  [place] [facility] [place].      Critical care time exclusive of procedures 45 minutes.      Diagnosis:  Sepsis, pneumonia     LEANNA SANCHEZ MD             D: 2017 18:24   T: 2017 19:01   MT: [id number]      Name:     MARIO ALBERTO PATRICK   MRN:      [id number]        Account:      [id number]   :      1955           Visit Date:   2017      Document: [id number]

## 2017-03-31 NOTE — PROGRESS NOTES
"[facility]  Hospitalist Progress Note  Anali Trevino MD  03/31/2017    Assessment & Plan   Nel Farmer is a 61-year-old female with past medical history significant for schizoaffective disorder, diabetes mellitus type 2, chronic kidney disease stage 4 with recent acute kidney injury, diabetes insipidus due to lithium toxicity, hypertension with recent admission for toxic encephalopathy, a healthcare-associated pneumonia, [place] urinary tract infection discharged to nursing home on 03/28/2017 who presents again 03/30/2017 with lethargy and altered mental status. She is admitted on 03/30/2017 for further workup and treatment.     Metabolic encephalopathy:  Presented with lethargy and altered mental status. She is noted to have metabolic acidosis due to her renal failure and also mild hyperantremia which may be attributing to her altered in mental status.  She was febrile to 101 at presentation. Possible pneumonia may be contributing as well see below. Valproic acid is below the therapeutic range.  LP not suggestive of CNS infection. CT head is negative for acute abnormality.    - Mental status seems to have improved to baseline on 3/31. She is awake and loudly screaming \" i need water\" , \"i'm hungry\" which seems to be near her baseline and similar behavior during her recent hospitalization.   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 Her white blood cell count is normal and there were no reported preceding respiratory symptoms, although she does have a mild left basilar consolidation with associated small pleural effusion noted on her x-ray which appears new compared to interval study on 03/26/2017 during recent admission.  - continue merop and vanc today and antibiotics could likely be tapered in the next 24 hrs if cultures remain negative  - blood culture remains negative to date  - monitor fever curve and wbc (no leukocytosis at this time)      Diabetes mellitus type 2:   A1c 9.2% on 03/10/2016. We will recheck here. Prior to admission, she appears to be on 70/30 mix 20 units before breakfast, 30 units before dinner. She is also on glipizide twice daily.   - since she is more alert and near he baseline, will stop Lantus  - will give her NPH 10 units tonight, then start her 70/30 tomorro  - ordered 70/30 mix 10 units qAM and 20 units with supper to star tomorrow  - continue with SSI  - will hold her oral sulfonylurea while in hospital  - monitor BG    Hypernatremia  - currently on D5W per nephrology  - most recent Na 155 at 2pm, if further increase on next check, may need to increase rate of IVF  - monitor Na q4h  - appreciate nephrology assistance    Vaginal yeast infection:  - fluconazole PO x 1 dose ordered    Schizoaffective disorder:   - Prior to admission on risperidone, divalproex, olanzapine, reordred    Hypertension:   - restart PTA antihypertensives    Communications: discussed w/RN    Deep venous thrombosis prophylaxis: Heparin subcutaneously.       CODE STATUS: Full code.       DISPOSITION: [place] out of [facility] [place]. Likely 2-3 days if mental status, lytes and renal function stays stable      Interval History   Patient alert today. Intermittently calling out \" i need water\", \"I am hungry\", which seems to be her baseline and behavior during her last hospitaliation no recurrent fever.per RN report, had some vaginal discharge " which appear yeast infection.      -Data reviewed today: I reviewed all new labs and imaging over the last 24 hours. I personally reviewed no images or EKG's today.    Physical Exam   Heart Rate: 87, Blood pressure 149/74, pulse 111, temperature 98.2  F (36.8  C), temperature source Oral, resp. rate 11, weight 91.4 kg (201 lb 8 oz), last menstrual period 04/27/2012, SpO2 97 %, not currently breastfeeding.  Vitals:    03/30/17 1513 03/30/17 2100 03/31/17 0500   Weight: 102.1 kg (225 lb) 91.4 kg (201 lb 9.6 oz) 91.4 kg (201 lb 8 oz)     Vital Signs with Ranges  Temp:  [97  F (36.1  C)-99.7  F (37.6  C)] 98.2  F (36.8  C)  Heart Rate:  [] 87  Resp:  [6-16] 11  BP: (127-172)/(50-90) 149/74  SpO2:  [95 %-100 %] 97 %  I/O's Last 24 hours  I/O last 3 completed shifts:  In: 3322.17 [P.O.:600; I.V.:1722.17; IV Piggyback:1000]  Out: 4350 [Urine:4350]    Constitutional: Alert, awake and oriented to self and place  Respiratory: Clear to auscultation bilaterally, no wheezing  Cardiovascular: tachy, regular  GI: soft, non-tender, non-distended  Skin/Integumen: warm and dry  Ext: no LE edema bilaterally    Medications   All medications I am responsible for were reviewed.    IV infusion builder WITH additives 80 mL/hr at 03/31/17 1405       risperiDONE  0.5 mg Sublingual BID     aMILoride  5 mg Oral Daily     insulin glargine  15 Units Subcutaneous At Bedtime     heparin  5,000 Units Subcutaneous Q12H     meropenem  500 mg Intravenous Q12H     vancomycin place pruitt - receiving intermittent dosing  1 each Does not apply See Admin Instructions     insulin aspart  1-12 Units Subcutaneous Q4H        Data     Recent Labs  Lab 03/31/17  1402 03/31/17  1054 03/31/17  0733  03/30/17  2148 03/30/17  1515 03/28/17  0826   WBC  --   --  8.9  --   --  9.5 10.4   HGB  --   --  10.6*  --   --  10.9* 10.2*   MCV  --   --  98  --   --  99 99   PLT  --   --  290  --  299 351 375   INR  --   --   --   --   --  1.19*  --    * 154* 152*   < > 153* 150* 142   POTASSIUM  --   --  3.6  --   --  4.0 4.0   CHLORIDE  --   --  122*  --   --  121* 115*   CO2  --   --  22  --   --  17* 20   BUN  --   --  40*  --   --  40* 37*   CR  --   --  2.49*  --   --  2.91* 2.63*   ANIONGAP  --   --  8  --   --  12 7   BENTLEY  --   --  10.0  --   --  10.5* 9.5   GLC  --   --  244*  --   --  179* 175*   ALBUMIN  --   --   --   --   --  3.0*  --    PROTTOTAL  --   --   --   --   --  7.7  --    BILITOTAL  --   --   --   --   --  0.2  --    ALKPHOS  --   --   --   --   --  75  --    ALT  --   --   --   --   --  20  --    AST  --   --   --   --   --  12  --    < > = values in this interval not displayed.    Recent Results (from the past 24 hour(s))   XR Chest Port 1 View    Narrative    XR CHEST PORT 1 VW 3/30/2017 3:45 PM    COMPARISON: 3/26/2017    HISTORY: Fever      Impression    IMPRESSION: Mild left basilar atelectasis/consolidation and possible  small left pleural effusion. Right lung is clear. No pneumothorax.    JOHNSON ARRIETA   CT Head w/o Contrast    Narrative    CT SCAN OF THE HEAD WITHOUT CONTRAST   3/30/2017 3:52 PM     HISTORY: Altered level of consciousness.     TECHNIQUE:  Axial images of the head and coronal reformations without  IV contrast material.  Radiation dose for this scan was reduced using  automated exposure control, adjustment of the mA and/or kV according  to patient size, or iterative reconstruction technique.    COMPARISON: 3/17/2017.    FINDINGS: Mild cerebral atrophy is present. There are some minimal  nonspecific white matter changes without mass effect. There is no  evidence for intracranial hemorrhage, mass effect, acute infarct, or  skull fracture.      Impression    IMPRESSION: Chronic changes. No evidence for intracranial hemorrhage  or any acute process.    JESSICA ADAMS MD

## 2017-03-31 NOTE — PROGRESS NOTES
Renal Medicine Progress Note                                Nel Farmer MRN# [id number]   Age: 61 year old YOB: 1955   Date of Admission: 2/11/2017 Hospital LOS: 1                  Assessment/Plan:     61 year old female who was re admitted with AMS.  Recent prolonged hospitalization  with discharge  03/28/17        1. Baseline Stage 4 CKD: Cr 2.5 range and GFR 20. Lithium nephropathy.     2. 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  HTN: Borderline control on diltiazem, clonidine and metoprolol.      4.  Metabolic acidosis   -etiology   -may preclude further use of acetazolamide   5. Metabolic Bone Disease   -secondary hyperparathyroidism   -in part related to vitamin D deficiency (on replacement)        Adjust IVF  Restart amiloride    Follow labs        Interval History:     Appears comfortable.  MS seems at baseline per my previous interactions.  Loud.      ROS:     No patient complaints    Medications and Allergies:     Reviewed    Physical Exam:     Vitals were reviewed  Patient Vitals for the past 8 hrs:   BP Temp Temp src Heart Rate Resp SpO2 Weight   03/31/17 0900 146/83 - - 90 14 98 % -   03/31/17 0848 155/85 - - 85 9 98 % -   03/31/17 0800 - - - 86 (!) 7 98 % -   03/31/17 0750 - 98  F (36.7  C) Oral - - - -   03/31/17 0600 159/74 - - 79 (!) 7 99 % -   03/31/17 0500 141/88 - - 75 8 97 % 91.4 kg (201 lb 8 oz)   03/31/17 0400 136/68 - - 71 12 97 % -     I/O last 3 completed shifts:  In: 3202.17 [P.O.:480; I.V.:1722.17; IV Piggyback:1000]  Out: 3000 [Urine:3000]    Vitals:    03/30/17 1513 03/30/17 2100 03/31/17 0500   Weight: 102.1 kg (225 lb) 91.4 kg (201 lb 9.6 oz) 91.4 kg (201 lb 8 oz)       GENERAL: resting   HEENT: NC/AT, PERRLA, EOMI, non icteric, pharynx moist without lesion  RESP:  clear anteriorly  CV: RRR, normal S1 S2  MS: no clubbing, cyanosis   ABDOMEN: soft, nontender  SKIN: clear without significant rashes or  lesions  NEURO: speech normal and cranial nerves 2-12 intact  EXT: warm, no edema    Data:       Recent Labs  Lab 03/31/17  0733 03/31/17  0310 03/30/17  2148 03/30/17  1515 03/28/17  0826 03/27/17  0851   * 150* 153* 150* 142 148*   POTASSIUM 3.6  --   --  4.0 4.0 4.1   CHLORIDE 122*  --   --  121* 115* 121*   CO2 22  --   --  17* 20 21   ANIONGAP 8  --   --  12 7 6   *  --   --  179* 175* 201*   BUN 40*  --   --  40* 37* 38*   CR 2.49*  --   --  2.91* 2.63* 2.91*   GFRESTIMATED 20*  --   --  16* 18* 16*   GFRESTBLACK 24*  --   --  20* 22* 20*   BENTLEY 10.0  --   --  10.5* 9.5 10.0          Recent Labs   Lab Test  03/31/17   0733  03/30/17   1515  03/28/17   0826  03/27/17   0851  03/26/17   1036  03/26/17   0830  03/25/17   0813  03/23/17   0903  03/22/17   0915  03/21/17   1050   CR  2.49*  2.91*  2.63*  2.91*  3.00*  Canceled, Test credited   Specimen hemolyzed    2.94*  2.71*  2.99*  3.28*         G Dino Renae    [facility] - Nephrology  [phone]

## 2017-03-31 NOTE — H&P
DATE OF ADMISSION AND SERVICE:   03/30/2017.      PRIMARY CARE PHYSICIAN:  None given.      CHIEF COMPLAINT:  Altered mental status and lethargy for 1 day.    HISTORY OF PRESENT ILLNESS:  Nel Farmer is a 61 year-old female with past medical history as detailed below who presents with the above chief complaint.    History is unable to be obtained from the patient due to her lethargy and altered mental status which was present on admission and in part exacerbated by recent ketamine given in ED to facilitate lumbar puncture.     Per discussion with emergency department provider, review of medical record, as well as direct discussion with the nursing home staff who called EMS, she was discharged from [facility] on 03/28/2017 where she was treated for Klebsiella urinary tract infection, probable healthcare associated pneumonia, toxic encephalopathy, hypernatremia and acute kidney injury on chronic kidney disease.  This morning when her nurse checked on her at 6:00 AM in the morning she was noted to be lethargic and remained in bed all day.  The nurse reports that she did not demonstrate any interest in eating or taking her medications.  She did not seem to be responding significantly to commands.  She was not responding verbally to questions.  She otherwise seemed to be breathing normally and her vital signs were normal.  When this persisted throughout the course of the day without change, eventually her nurse called EMS for further assessment.  Per the day nurses discussion with the evening/night nurse from the day prior, she was in her normal state at that time with no notable new issues since her discharge.  The nurse from today did not otherwise note any focal symptoms such as cough, diarrhea, signs or complaints of pain associated with this lethargy.  There have been no known specific medication issues per discussion with the nurse at her nursing home.    In the Emergency Department, the patient  was seen by Dr. Hardy Schroeder.  Temperature on arrival was 101.3 degrees Fahrenheit, heart rate was 111, blood pressure was 151/85, respiratory rate was 18, SpO2 was 98% on room air.  She underwent laboratory workup which included a CBC which demonstrated a normal white blood cell count of 9.5, hemoglobin was 10.9, platelet count was 351.  INR was 1.19.  Comprehensive metabolic panel was notable for a sodium of 150, chloride of 121, bicarbonate of 17, anion gap of 12, BUN of 40, creatinine of 2.91, calcium of 10.5, albumin of 3.0.  LFTs were otherwise within normal limits.  Urinalysis on a catheterized specimen demonstrated a normal urine specific gravity, small leukocyte esterase, negative nitrate, 2-5 white blood cells, 0-2 red blood cells.  Venous blood gas demonstrated a pH of 7.14 with pCO2 of 47 and lactic acid of 0.7.  Repeat ABG demonstrated a pH of 7.13 with normal pCO2 of 45.  Due to her fever and unresponsiveness,  she underwent an LP with preliminary results of 0 white blood cells, overall unremarkable for acute CNS infection. Chest x-ray demonstrated a mild left basilar atelectasis or consolidation and possible small left pleural effusion.  CT head demonstrated chronic changes with no evidence for intracranial hemorrhage or acute process. The patient was given IV fluids and given her recent hospitalization started on meropenem and vancomycin for healthcare-associated pneumonia.  She was also started on a sodium bicarbonate drip in the Emergency Department given her acidosis, and admission to the hospital was requested for further cares.      PAST MEDICAL HISTORY:   1.  Schizoaffective disorder.   2.  Anxiety and depression.   3.  Cognitive deficits.   4.  Osteoarthritis.   5.  Diabetes mellitus type 2.   6.  Hypertension.   7.  Hyperlipidemia.   8.  Chronic kidney disease stage 4 with recent acute kidney injury.   9.  Diabetes insipidus, presumed related to lithium effect on concentrating ability.    10.  Metabolic bone disease with secondary hyperparathyroidism.   11.  Vitamin D deficiency.      PAST SURGICAL HISTORY:   1.  Tonsillectomy and adenoidectomy.   2.  History of D&C.   3.  Total knee arthroplasty, left.      FAMILY HISTORY:  Per chart review, mother had hypertension, father had colorectal cancer.  Unable to review with patient due to her altered mental status.      SOCIAL HISTORY:  Per chart review, she is single, lives with her elderly mother, although most recently was at a nursing home following discharge from the hospital.  No reported history of alcohol or tobacco abuse.  Cannot review with patient due to her altered mental status.      REVIEW OF SYSTEMS:  Cannot obtain from the patient given her altered mental status.  Pertinents able to be obtained through peripheral sources are included in the HPI.      PHYSICAL EXAMINATION:   VITAL SIGNS:  Temperature 98.8 degrees Fahrenheit, heart rate 82, blood pressure 159/77, respiratory rate 10, SpO2 of 98% on 2 liters nasal cannula.     GENERAL:  Lethargic, chronically ill-appearing female but in no acute distress.   EYES:  Pupils small but equal and reactive bilaterally.  Cannot assess extraocular movements due to her level of participation at this point.   ENT:  Dry mucous membranes with somewhat poor oral cares.  Not opening mouth enough to visualize posterior oropharynx.   NECK:  Supple, no rigidity.   RESPIRATORY:  Limited by shallow inspirations with inability to perform deep inspirations on command, perhaps slight inspiratory crackles on the left, otherwise no obvious crackles or wheezes.   GASTROINTESTINAL:  Soft, nontender, nondistended.  Bowel sounds present.  No apparent rebound tenderness or guarding.   SKIN:  Warm, dry.  Minimal blanching erythema over the coccyx.  Otherwise, no obvious wounds on gross examination.   PSYCHIATRIC:  Unable to assess given altered mental status.   NEUROLOGIC:  Able to follow some simple partial commands  such as opening her mouth, squeezing hands bilaterally and moving her feet, although cannot participate well enough for full neurologic exam.  Does not respond meaningfully to questions.      DATA:  Labs and imaging reviewed in Epic.  Pertinents included in HPI and assessment and plan.     .   ASSESSMENT AND PLAN:  Nel Farmer is a 61-year-old female with past medical history significant for schizoaffective disorder, diabetes mellitus type 2, chronic kidney disease stage 4 with recent acute kidney injury, diabetes insipidus due to lithium toxicity, hypertension with recent admission for toxic encephalopathy, a healthcare-associated pneumonia, Klebseilla urinary tract infection discharged to nursing home on 03/28/2017 who presents again 03/30/2017 with lethargy and altered mental status.  She is admitted on 03/30/2017 for further workup and treatment.     1.  Lethargy and altered mental status:  Per discussion with her nursing home provider, she was normal the evening prior to presentation.  She was noted to be lethargic and minimally responsive beginning early this morning which ultimately led to EMS bring her to the Emergency Department.  Her nursing home providers otherwise do not report any notable focal symptoms or complaints associated with this.  Primary workup in the Emergency Department has been significant for a fever on arrival, mild hypernatremia, pH of 7.13 to 7.14, all of which may be contributing to her altered mental status and discussed separately below.  Given that she was in a nursing home and medications have been managed for her, it seems less likely that this is medication effect, although she is on valproic acid and will add on a valproic acid level to confirm that this is not at toxic levels.  She does not appear to have a CNS infection based on her initial lumbar puncture results.  Her head CT was negative and her blood glucoses have been normal to elevated during this episode.  Despite her  level of lethargy and altered mental status, she is currently protecting her airway and is otherwise hemodynamically stable and at this point able to follow some very simple commands.  Would continue to treat as below and reorient as needed.  Monitor for improvement in mental status.   2.  Nonanion gap metabolic acidosis:  On her admission workup, she is noted to have a serum bicarbonate of 17 with associated pH of 7.13 to 7.14 on VBG and ABG respectively.  Her pCO2s are normal indicating a metabolic acidosis, without respiratory compensation.  Her anion gap is also normal as is her lactic acid.  Etiology of this is unclear.  I have ordered valproic acid level as above.  She does have known chronic kidney disease and she may have renal tubular acidosis secondary to this, although otherwise seems to be at her baseline renal function.  There have been no reports from the nursing home of any diarrhea to suggest gastrointestinal losses of bicarbonate. Given her unexplained acidosis as well as her underlying chronic kidney disease and diabetes insipidus, will consult Nephrology for further assessment.  She has been started on a bicarbonate drip in the Emergency Department and will be continued.   3.  Diabetes insipidus:  Suspected secondary to impaired concentrating effect from previous lithium use.  Her sodium on admission is 150 and she has had minimal intake today.  Will monitor her sodium serially while she is on a bicarbonate drip and if increasing, may need to increase free water content if she continues to be unable to take any orally.  Nephrology has been consulted as above.  Per review of previous notes, she had been on acetazolamide during her previous hospitalization but was discontinued at discharge.   4.  Chronic kidney disease stage 4:  Baseline creatinine is 2.63 on 03/28/2017 at time of discharge.  She is 2.91 on admission, which is roughly at the baseline that she had established prior to her most recent  discharge.  Nephrology has been consulted as above.  Will avoid nephrotoxins.   5.  Diabetes mellitus type 2:  HgbA1c 9.2% on 03/10/2016.  Will recheck here.  Prior to admission, she appears to be on 70/30 mix - 20 units before breakfast, 30 units before dinner.  She is also on glipizide twice daily.  As her intake is unreliable at this point, will start her on scheduled low dose glargine to cover her basal needs, monitor her blood sugars per the n.p.o. protocol and have sliding scale available to her.  Once her intake is more reliable, can begin to advance to her prior to admission regimen.   6.  Schizoaffective disorder:   Prior to admission on risperidone, divalproex, olanzapine.  Will hold her prior to admission regimen for now.  Valproic acid level has been ordered as above.  Will consult Psychiatry with assistance in managing her regimen, especially if she is not able to resume any oral medications in the near future.   7.  Hypertension:  Blood pressure well controlled in the Emergency Department.  As she is unable to take oral medications right now, will hold her prior to admission oral regimen and have p.r.n. IV medications available to her, can advance back to her prior to admission regimen once she is able to take oral medications.   8.  Fever with possible healthcare-associated pneumonia:  Her temperature on arrival was 101.3 degrees Fahrenheit.  Her white blood cell count is normal and there were no reported preceding respiratory symptoms, although she does have a mild left basilar consolidation with associated small pleural effusion noted on her x-ray which appears new compared to interval study on 03/26/2017 during recent admission.  Given her recent hospitalization, will continue meropenem and vancomycin for now; however, with her underlying renal function and no noted history of MRSA, would discontinue vancomycin if no MRSA identified on cultures in the next 24-48 hours.  She otherwise does not have  any significant evidence for infection on urinalysis,   CNS infection is negative, LFTs are unremarkable, and there has been no reported diarrhea to this point to suspect an alternative source of infection.     Deep venous thrombosis prophylaxis:  Heparin subcutaneously.      CODE STATUS:  Full code.      DISPOSITION:  Admitted to [facility] initially given her high level of cares needed per discussion with Emergency Department providers.  If she is otherwise stable, can likely transfer shortly to a general medical floor.         TRISH PAL MD             D: 2017 18:40   T: 2017 20:41   MT: ALBERT      Name:     MARIO ALBERTO PATRICK   MRN:      [id number]        Account:      [id number]   :      1955           Admitted:     [id number]      Document: [id number]

## 2017-03-31 NOTE — PLAN OF CARE
Problem: Goal Outcome Summary  Goal: Goal Outcome Summary  Outcome: Improving  Awoke around 2245 after turning. C/O B leg pain, stated this is at baseline. Itnermittent yelling out for water and her mother. C/O intermittent leg pain B - states this happens at home. Dr Thornton following labs, aware of RR. Neph and psych consults in am.

## 2017-03-31 NOTE — PHARMACY-VANCOMYCIN DOSING SERVICE
Pharmacy Vancomycin Initial Note  Date of Service 2017  Patient's  1955  61 year old, female    Indication: Healthcare-Associated Pneumonia    Current estimated CrCl = Estimated Creatinine Clearance: 23.5 mL/min (based on Cr of 2.91).    Creatinine for last 3 days  3/28/2017:  8:26 AM Creatinine 2.63 mg/dL  3/30/2017:  3:15 PM Creatinine 2.91 mg/dL    Recent Vancomycin Level(s) for last 3 days  No results found for requested labs within last 72 hours.      Vancomycin IV Administrations (past 72 hours)                   vancomycin (VANCOCIN) 1000 mg in dextrose 5% 200 mL PREMIX (mg) 1,000 mg New Bag 17 1601                Nephrotoxins and other renal medications (Future)    Start     Dose/Rate Route Frequency Ordered Stop    17  vancomycin (VANCOCIN) 1000 mg in dextrose 5% 200 mL PREMIX      1,000 mg Intravenous ONCE 17  vancomycin place pruitt - receiving intermittent dosing      1 each Does not apply SEE ADMIN INSTRUCTIONS 17            Contrast Orders - past 72 hours     None                Plan:  1.  Vancomycin intermittent dosing based upon measured drug levels.  Received Vancomycin 1000 mg IV x 1 in [facility] ED, added an additional 1000mg IV upon admission to approximate Vancomycin 20mg/kg.   2.  Drug level appropriate for redosing: 15-20 mg/L   3.  Pharmacy will check trough levels as appropriate in 24 hours after supplemental dose  4. Serum creatinine levels will be ordered daily for the first week of therapy and at least twice weekly for subsequent weeks.    5. [place] method utilized to dose vancomycin therapy: Method 2    Pavel SyedD

## 2017-03-31 NOTE — PROVIDER NOTIFICATION
Critical lab value of ph of 7.15. Improved from initial 7.3 value. Dr. Thornton aware. Continue IV NA bicarb in water IVF.

## 2017-03-31 NOTE — PROGRESS NOTES
Called regarding sodium 153, VBG 7.15/50/-/-. Patient is more alert at this point. Given worsening hypernatremia will change to hypotonic bicarbonate 75 meq/L. Continue to monitor serial sodiums and will need to further decrease tonicity if not improving. Repeat VBG ordered for AM.     Jamey Thornton MD   Hospitalist  [phone]

## 2017-03-31 NOTE — PLAN OF CARE
Problem: Goal Outcome Summary  Goal: Goal Outcome Summary  Outcome: Improving  Pt sleeping on and off today but alert and talkative when awake.  VSS, monitor shows SR. Pt with no c/o pain. Pt does have a red rash bilat groin and has a yellowish vaginal discharge. Dr. Trevino notified. Plan is for continued IV fluids and monitoring of labs and mentation. Will continue to monitor.

## 2017-03-31 NOTE — PLAN OF CARE
Problem: Goal Outcome Summary  Goal: Goal Outcome Summary  Outcome: Improving  VSS.  Pt is somulent at times, alert to self only. Calls out at times, especially for water. IVF infusing, Na+ improving. Good urine output from cherry. Pupils sluggish and pt is not able to follow all commands, otherwise neuros intact. Nephrology and psych consults today.

## 2017-04-01 NOTE — PLAN OF CARE
"Problem: Goal Outcome Summary  Goal: Goal Outcome Summary  Outcome: Improving  VS stable, BP is up when agitated, loud when awake. Confused to situation, time/date.  Neuro remains unchanged. Denies any pain or discomfort, developed bilateral groin rash, antifungal powder applied, diflucan one dose given.  Blood sugar 200-300.  Stated \"I am thirsty\" often.  Taking po well without difficulty.  Up in the chair most of this shift.  Back to bed now and resting comfortably.  Continue to monitor closely.      "

## 2017-04-01 NOTE — PHARMACY-VANCOMYCIN DOSING SERVICE
Pharmacy Vancomycin Note  Date of Service 2017  Patient's  1955   61 year old, female    Indication: Healthcare-Associated Pneumonia  Goal Trough Level: 15-20 mg/L  Day of Therapy: 2  Current Vancomycin regimen:  Intermittent dosing based upon measured drug levels.  Received a total dose of Vancomycin equaling 2000 mg on 3 PM    Current estimated CrCl = Estimated Creatinine Clearance: 25.8 mL/min (based on Cr of 2.49).    Creatinine for last 3 days  3/30/2017:  3:15 PM Creatinine 2.91 mg/dL  3/31/2017:  7:33 AM Creatinine 2.49 mg/dL    Recent Vancomycin Levels (past 3 days)  3/31/2017:  9:31 PM Vancomycin Level 22.1 mg/L    Vancomycin IV Administrations (past 72 hours)                   vancomycin (VANCOCIN) 1000 mg in dextrose 5% 200 mL PREMIX (mg) 1,000 mg New Bag 17 2101    vancomycin (VANCOCIN) 1000 mg in dextrose 5% 200 mL PREMIX (mg) 1,000 mg New Bag 17 1601                Nephrotoxins and other renal medications (Future)    Start     Dose/Rate Route Frequency Ordered Stop    17  vancomycin place pruitt - receiving intermittent dosing      1 each Does not apply SEE ADMIN INSTRUCTIONS 17               Contrast Orders - past 72 hours     None          Interpretation of levels and current regimen:  Drug level is not yet appropriate for redosing   Has serum creatinine changed > 50% in last 72 hours: No    Urine output:  unable to determine    Renal Function: Improving    Plan:  1.  As AM labs are already ordered, will recheck Vancomycin level with AM labs  2. Serum creatinine levels will be ordered daily for the first week of therapy and at least twice weekly for subsequent weeks.      Pavel Medina PharmD        .

## 2017-04-01 NOTE — PROGRESS NOTES
"[facility]    Hospitalist Progress Note    Assessment & Plan   Nel Farmer is a 61-year-old female with past medical history significant for schizoaffective disorder, diabetes mellitus type 2, chronic kidney disease stage 4 with recent acute kidney injury, diabetes insipidus due to lithium toxicity, hypertension with recent admission for toxic encephalopathy, a healthcare-associated pneumonia, Klebseilla urinary tract infection discharged to nursing home on 03/28/2017 who presents again 03/30/2017 with lethargy and altered mental status. She was admitted on 03/30/2017 for further workup and treatment.      Acute Metabolic encephalopathy:  Presented with lethargy and altered mental status.   -She was noted to have metabolic acidosis due to her renal failure and also mild hyperantremia  -she was febrile to 101 at presentation.   -Possible pneumonia may be contributing as well see below.   -Valproic acid is below the therapeutic range.   -LP not suggestive of CNS infection.   -CT head is negative for acute abnormality.   -Mental status seems to have improved to baseline now. She is awake and loudly screaming \" i need water\" , \"i'm hungry\" which seems to be near her baseline and similar behavior during her recent hospitalization.   - will continue to monitor mental status     Chronic kidney disease stage 4  Nephrogenic diabetic insipidus due to prolonged lithium exposure  Non-anion gap Metabolic acidosis   - Baseline creatinine is 2.63 on 03/28/2017 at time of discharge and 2.91 on current admission which is not far off her baseline  - she was initiated on bicarb drip due to her acidosis  - acidosis has improved with bicarb drip, currently discontinued  - started on amiloride per nephrology  - appreciate nephrology help  -Creatinine slightly better at 2.25 today     Fever with possible healthcare-associated pneumonia:   Her temperature on arrival was 101.3 degrees Fahrenheit. Her white blood cell count " is normal and there were no reported preceding respiratory symptoms, although she does have a mild left basilar consolidation with associated small pleural effusion noted on her x-ray which appears new compared to interval study on 03/26/2017 during recent admission.  - continue meropenem; discontinue vancomycin today  - blood culture remains negative to date  - monitor fever curve and wbc (no leukocytosis at this time)      Diabetes mellitus type 2:   A1c 9.2% on 03/10/2016.  Prior to admission, she appears to be on 70/30 mix 20 units before breakfast, 30 units before dinner. She is also on glipizide twice daily.   - Continue 70/30 mix 10 units qAM and 20 units with supper   - continue with SSI  - will hold her oral sulfonylurea while in hospital  - monitor BG     Hypernatremia  - currently on D5W  - Continue to monitor serial sodiums  - Last sodium 150 at 10:30 AM  - appreciate nephrology assistance     Schizoaffective disorder:   -Continue prior to admission on risperidone, divalproex, olanzapine.  -Also has baseline cognitive deficits     Hypertension:   - Continue clonidine, Dilacor XR and metoprolol  -Blood pressure adequately controlled at the moment      D/W: RN  DVT Prophylaxis: Pneumatic Compression Devices  Code Status: Full Code    Disposition: Expected discharge in 2-3 days    Russ Gonzales MD    Interval History   Much more awake and back to baseline.  Denies nausea or vomiting.  Afebrile.    -Data reviewed today: I reviewed all new labs and imaging results over the last 24 hours. I personally reviewed no images or EKG's today.    Physical Exam   Temp: 96.8  F (36  C) Temp src: Oral BP: 134/56 Pulse: 94 Heart Rate: 67 Resp: 16 SpO2: 99 % O2 Device: None (Room air)    Vitals:    03/30/17 2100 03/31/17 0500 04/01/17 0400   Weight: 91.4 kg (201 lb 9.6 oz) 91.4 kg (201 lb 8 oz) 90.3 kg (199 lb 1.2 oz)     Vital Signs with Ranges  Temp:  [96.8  F (36  C)-98.6  F (37  C)] 96.8  F (36  C)  Pulse:  [94]  94  Heart Rate:  [] 67  Resp:  [8-19] 16  BP: (129-170)/(49-88) 134/56  SpO2:  [92 %-99 %] 99 %  I/O last 3 completed shifts:  In: 1533.33 [P.O.:640; I.V.:893.33]  Out: 4255 [Urine:4255]    Constitutional: AAOX1, NAD  HEENT: Moist oral mucosa, no oral lesions, No pallor or icterus  Neck- Supple, Good ROM, No JVD  Respiratory:  No crackles, No wheezes, CTA B/L, Normal WOB  Cardiovascular: RRR, No murmur  GI: Soft, Non- tender, BS- normoactive  Skin/Integument: Warm and dry, no rashes  MSK: No joint deformity or swelling, no edema  Neuro: CN- grossly intact    Medications     IV infusion builder WITH additives 50 mL/hr at 04/01/17 0703       meropenem  500 mg Intravenous Q8H     risperiDONE  0.5 mg Sublingual BID     aMILoride  5 mg Oral Daily     miconazole   Topical BID     insulin isophane & regular  10 Units Subcutaneous QAM     insulin isophane & regular  20 Units Subcutaneous Daily with supper     insulin isophane human  10 Units Subcutaneous At Bedtime     cloNIDine  0.3 mg Oral BID     diltiazem  120 mg Oral Daily     divalproex  500 mg Oral QAM     divalproex  1,500 mg Oral At Bedtime     isosorbide mononitrate  30 mg Oral Daily     metoprolol  25 mg Oral Daily     OLANZapine (zyPREXA) tablet 5 mg  5 mg Oral At Bedtime     omeprazole (priLOSEC) CR capsule 20 mg  20 mg Oral BID AC     risperiDONE (risperDAL) tablet 1 mg  1 mg Oral At Bedtime     heparin  5,000 Units Subcutaneous Q12H     vancomycin place pruitt - receiving intermittent dosing  1 each Does not apply See Admin Instructions     insulin aspart  1-12 Units Subcutaneous Q4H       Data     Recent Labs  Lab 04/01/17  1036 04/01/17  0610 04/01/17  0200  03/31/17  0733  03/30/17  2148 03/30/17  1515 03/28/17  0826   WBC  --   --   --   --  8.9  --   --  9.5 10.4   HGB  --   --   --   --  10.6*  --   --  10.9* 10.2*   MCV  --   --   --   --  98  --   --  99 99   PLT  --   --   --   --  290  --  299 617 375   INR  --   --   --   --   --   --   --   1.19*  --    * 151* 148*  < > 152*  < > 153* 150* 142   POTASSIUM  --  3.8  --   --  3.6  --   --  4.0 4.0   CHLORIDE  --  121*  --   --  122*  --   --  121* 115*   CO2  --  21  --   --  22  --   --  17* 20   BUN  --  37*  --   --  40*  --   --  40* 37*   CR  --  2.35*  --   --  2.49*  --   --  2.91* 2.63*   ANIONGAP  --  9  --   --  8  --   --  12 7   BENTLEY  --  9.9  --   --  10.0  --   --  10.5* 9.5   GLC  --  205*  --   --  244*  --   --  179* 175*   ALBUMIN  --   --   --   --   --   --   --  3.0*  --    PROTTOTAL  --   --   --   --   --   --   --  7.7  --    BILITOTAL  --   --   --   --   --   --   --  0.2  --    ALKPHOS  --   --   --   --   --   --   --  75  --    ALT  --   --   --   --   --   --   --  20  --    AST  --   --   --   --   --   --   --  12  --    < > = values in this interval not displayed.    No results found for this or any previous visit (from the past 24 hour(s)).

## 2017-04-01 NOTE — PROGRESS NOTES
Renal Medicine Progress Note            Assessment/Plan:     1. CKD IV, bl Scr 2.5 range  2. Recurrent hypernatremia due to free water deficit  3. NDI 2/2 lithium  4. Severe schizoaffective disorder    Plan.   1. Continue D5W. I discussed free water intake with RN, who will intermittent fill her water container. Continue serum Na check. Avoid rapid correction (6-8 meq/24hrs)        Interval History:     Pt is calm. No new issues per RN report.         Medications and Allergies:       meropenem  500 mg Intravenous Q8H     risperiDONE  0.5 mg Sublingual BID     aMILoride  5 mg Oral Daily     miconazole   Topical BID     insulin isophane & regular  10 Units Subcutaneous QAM     insulin isophane & regular  20 Units Subcutaneous Daily with supper     cloNIDine  0.3 mg Oral BID     diltiazem  120 mg Oral Daily     divalproex  500 mg Oral QAM     divalproex  1,500 mg Oral At Bedtime     isosorbide mononitrate  30 mg Oral Daily     metoprolol  25 mg Oral Daily     OLANZapine (zyPREXA) tablet 5 mg  5 mg Oral At Bedtime     omeprazole (priLOSEC) CR capsule 20 mg  20 mg Oral BID AC     risperiDONE (risperDAL) tablet 1 mg  1 mg Oral At Bedtime     heparin  5,000 Units Subcutaneous Q12H     insulin aspart  1-12 Units Subcutaneous Q4H        Allergies   Allergen Reactions     Amoxicillin      Amoxicillin      Haldol [Benzyl Alcohol]      Haldol [Haloperidol]      Pcn [Penicillin G]      Penicillins      Seroquel [Quetiapine] GI Disturbance            Physical Exam:   Vitals were reviewed  Heart Rate: 68, Blood pressure 115/64, pulse 94, temperature 96.2  F (35.7  C), temperature source Axillary, resp. rate 16, weight 90.3 kg (199 lb 1.2 oz), last menstrual period 04/27/2012, SpO2 99 %, not currently breastfeeding.    Wt Readings from Last 3 Encounters:   04/01/17 90.3 kg (199 lb 1.2 oz)   03/25/17 102.2 kg (225 lb 5 oz)   06/20/16 108.9 kg (240 lb)       Intake/Output Summary (Last 24 hours) at 04/01/17 1600  Last data filed at  04/01/17 1457   Gross per 24 hour   Intake          2820.83 ml   Output             4095 ml   Net         -1274.17 ml       GENERAL APPEARANCE: NAD  HEENT:  Eyes/ears/nose/neck grossly normal  RESP: lungs cta b c good efforts, no crackles, rhonchi or wheezes  CV: RRR, nl S1/S2  ABDOMEN: o/s/nt/nd, bs present  EXTREMITIES/SKIN: no rashes/lesions on observed skin; no edema  Neuro: calm         Data:     CBC RESULTS:     Recent Labs  Lab 03/31/17  0733 03/30/17  2148 03/30/17  1515 03/28/17  0826 03/27/17  0851   WBC 8.9  --  9.5 10.4 10.7   RBC 3.54*  --  3.57* 3.37* 3.37*   HGB 10.6*  --  10.9* 10.2* 10.0*   HCT 34.6*  --  35.3 33.3* 33.6*    299 351 375 405       Basic Metabolic Panel:    Recent Labs  Lab 04/01/17  1036 04/01/17  0610 04/01/17  0200 03/31/17  2131 03/31/17  1851 03/31/17  1402  03/31/17  0733  03/30/17  1515 03/28/17  0826 03/27/17  0851 03/26/17  1036   * 151* 148* 154* 151* 155*  < > 152*  < > 150* 142 148* 145*   POTASSIUM  --  3.8  --   --   --   --   --  3.6  --  4.0 4.0 4.1 4.3   CHLORIDE  --  121*  --   --   --   --   --  122*  --  121* 115* 121* 114*   CO2  --  21  --   --   --   --   --  22  --  17* 20 21 20   BUN  --  37*  --   --   --   --   --  40*  --  40* 37* 38* 39*   CR  --  2.35*  --   --   --   --   --  2.49*  --  2.91* 2.63* 2.91* 3.00*   GLC  --  205*  --   --   --   --   --  244*  --  179* 175* 201* 225*   BENTLEY  --  9.9  --   --   --   --   --  10.0  --  10.5* 9.5 10.0 10.3*   < > = values in this interval not displayed.    INR  Recent Labs  Lab 03/30/17  1515   INR 1.19*      Attestation:   I have reviewed today's relevant vital signs, notes, medications, labs and imaging.    Mario Abrams MD  [facility] - Nephrology  Office phone :[phone]  Pager: [phone]

## 2017-04-01 NOTE — PHARMACY-VANCOMYCIN DOSING SERVICE
Pharmacy Vancomycin Note  Date of Service 2017  Patient's  1955   61 year old, female    Indication: Healthcare-Associated Pneumonia  Goal Trough Level: 15-20 mg/L  Day of Therapy: 3  Current Vancomycin regimen:  Intermittent based on levels    Current estimated CrCl = Estimated Creatinine Clearance: 27.2 mL/min (based on Cr of 2.35).    Creatinine for last 3 days  3/30/2017:  3:15 PM Creatinine 2.91 mg/dL  3/31/2017:  7:33 AM Creatinine 2.49 mg/dL  2017:  6:10 AM Creatinine 2.35 mg/dL    Recent Vancomycin Levels (past 3 days)  3/31/2017:  9:31 PM Vancomycin Level 22.1 mg/L  2017:  6:10 AM Vancomycin Level 19.2 mg/L    Vancomycin IV Administrations (past 72 hours)                   vancomycin (VANCOCIN) 1000 mg in dextrose 5% 200 mL PREMIX (mg) 1,000 mg New Bag 17 2101    vancomycin (VANCOCIN) 1000 mg in dextrose 5% 200 mL PREMIX (mg) 1,000 mg New Bag 17 1601                Nephrotoxins and other renal medications (Future)    Start     Dose/Rate Route Frequency Ordered Stop    17 0900  vancomycin (VANCOCIN) 2,000 mg in NaCl 0.9 % 500 mL intermittent infusion      2,000 mg Intravenous ONCE 17 0807      17  vancomycin place pruitt - receiving intermittent dosing      1 each Does not apply SEE ADMIN INSTRUCTIONS 17               Contrast Orders - past 72 hours     None          Interpretation of levels and current regimen:  Trough level is  Therapeutic    Has serum creatinine changed > 50% in last 72 hours: No    Urine output:  good urine output    Renal Function: Improving    Plan:  1.  Will redose @ 2gm x1 today  2.  Pharmacy will check trough levels as appropriate in 1-3 Days.    3. Serum creatinine levels will be ordered daily for the first week of therapy and at least twice weekly for subsequent weeks.      Shahrzad Gregory        .

## 2017-04-01 NOTE — PLAN OF CARE
Problem: Goal Outcome Summary  Goal: Goal Outcome Summary  Outcome: No Change  Pt able to rest throughout shift, confused baseline.  Lung sounds are clear, diminished, pt on RA.  Bowel sounds are active.  Urine output adequate, Dr. Robert updated regarding Na and urine output this shift.  No family updated.

## 2017-04-01 NOTE — PLAN OF CARE
Problem: Goal Outcome Summary  Goal: Goal Outcome Summary  Outcome: No Change  Plans for DC: Pending labs     DO NOT REMOVE ANY FIELDS BELOW - TYPE N/A OR WNL IF NO INFO TO ADD  Procedure/POD: Transfer from CCU on 4-1. Focused assessment done on arrival.   Neuro/Psych/Sleep: Disoriented to time. Lethargic at times. Calls out at times with demands. This is patient's baseline.  CV/Tele/Abnormal VS: VSS  Resp: Dim LS  Skin/Wound: Partial skin assessment; skin pale with bruising. Powder for yeast infection.  /GI/Diet: Dunlap. Mod carb/renal. Likes water. No need to restrict per MD unless drinking to point of emesis like earlier today. Declined intervention for nausea.  IV: L PIV infusing vanco. Needs merrem next and then fluids (D5) to be increased to 75cc/hr   Pain: Denies  Chemo/Radiation: N/A  Abnormal Labs/Glucose: Na 151; Creat 2.35  Activity/Safety: Lift; PT ordered  Consults: Nephrology, PT  Education: Room orientation upon transfer

## 2017-04-01 NOTE — PROGRESS NOTES
Paged for rapid sodium correction down to 148 from 154 4-5 hours previous. Will plan to stop D5W and repeat Na at 6AM.    ADDENDUM:     Recheck sodium 151.    Resumed D5 at 50 per hour (had been at 80/hr w/ rapid correction)    Edgar Ward MD  7:01 AM

## 2017-04-02 NOTE — PROGRESS NOTES
[facility]    Hospitalist Progress Note    Assessment & Plan   Nel Farmer is a 61-year-old female with past medical history significant for schizoaffective disorder, diabetes mellitus type 2, chronic kidney disease stage 4 with recent acute kidney injury, diabetes insipidus due to lithium toxicity, hypertension with recent admission for toxic encephalopathy, healthcare-associated pneumonia, Klebseilla urinary tract infection discharged to nursing home on 03/28/2017 who presents again 03/30/2017 with lethargy and altered mental status. She was admitted on 03/30/2017 for further workup and treatment.      Acute Metabolic encephalopathy:  Presented with lethargy and altered mental status.   -She was noted to have metabolic acidosis due to her renal failure and also mild hyperantremia  -she was febrile to 101 at presentation.   -Possible pneumonia may be contributing as well see below.   -Valproic acid below the therapeutic range.   -LP not suggestive of CNS infection.   -CT head is negative for acute abnormality.   -Mental status seems to have improved to baseline now.  Although is a little more sleepy this morning, she did receive Ativan last  -will continue to monitor mental status     Chronic kidney disease stage 4  Nephrogenic diabetic insipidus due to prolonged lithium exposure  Non-anion gap Metabolic acidosis   - Baseline creatinine is 2.63 on 03/28/2017 at time of discharge and 2.91 on current admission which is not far off her baseline  - she was initiated on bicarb drip due to her acidosis  - acidosis has improved with bicarb drip, currently discontinued  - started on amiloride per nephrology  - appreciate nephrology help  - Creatinine slightly better at 2.26 today     Fever with possible healthcare-associated pneumonia:   Her temperature on arrival was 101.3 degrees Fahrenheit. Her white blood cell count is normal and there were no reported preceding respiratory symptoms, although she does  have a mild left basilar consolidation with associated small pleural effusion noted on her x-ray which appears new compared to interval study on 03/26/2017 during recent admission.  - continue meropenem; discontinued vancomycin 4/1  - blood culture remains negative to date  - monitor fever curve-no fever in the last 48 hours  - de-escalate antibiotics tomorrow if cultures continue to stay negative and she remains afebrile      Diabetes mellitus type 2:   A1c 9.2% on 03/10/2016.  Prior to admission, she appears to be on 70/30 mix 20 units before breakfast, 30 units before dinner. She is also on glipizide twice daily.   - Continue 70/30 mix 15 units qAM and 20 units with supper   - continue with SSI  - will hold her oral sulfonylurea while in hospital  - monitor BG     Hypernatremia  -Improved to 143  -Discontinue D5W  -Recheck in the morning     Schizoaffective disorder:   -Continue prior to admission on risperidone, divalproex, olanzapine.  -Also has baseline cognitive deficits     Hypertension:   -Continue clonidine, Dilacor XR and metoprolol  -Blood pressure adequately controlled at the moment      D/W: RN  DVT Prophylaxis: Pneumatic Compression Devices  Code Status: Full Code    Disposition: Expected discharge in 1-2 days    Russ Gonzales MD    Interval History   Sleepy this morning.  Denies new complaints.  Sodium much better today.    -Data reviewed today: I reviewed all new labs and imaging results over the last 24 hours. I personally reviewed no images or EKG's today.    Physical Exam   Temp: 97.4  F (36.3  C) Temp src: Axillary BP: 120/51   Heart Rate: 52 Resp: 22 SpO2: 96 % O2 Device: None (Room air)    Vitals:    03/30/17 2100 03/31/17 0500 04/01/17 0400   Weight: 91.4 kg (201 lb 9.6 oz) 91.4 kg (201 lb 8 oz) 90.3 kg (199 lb 1.2 oz)     Vital Signs with Ranges  Temp:  [96.2  F (35.7  C)-97.4  F (36.3  C)] 97.4  F (36.3  C)  Heart Rate:  [52-71] 52  Resp:  [16-22] 22  BP: (115-148)/(40-80) 120/51  SpO2:   [95 %-99 %] 96 %  I/O last 3 completed shifts:  In: 4669.5 [P.O.:2760; I.V.:1909.5]  Out: 2540 [Urine:2300; Emesis/NG output:240]    Constitutional: AAOX2, NAD, sleepy  HEENT: Moist oral mucosa, no oral lesions, No pallor or icterus  Respiratory:  No crackles, No wheezes, CTA B/L, Normal WOB  Cardiovascular: RRR, No murmur  GI: Soft, Non- tender, BS- normoactive  Skin/Integument: Warm and dry, no rashes  MSK: No joint deformity or swelling, no edema  Neuro: CN- grossly intact    Medications        insulin aspart  1-10 Units Subcutaneous TID AC     insulin aspart  1-7 Units Subcutaneous At Bedtime     meropenem  500 mg Intravenous Q8H     risperiDONE  0.5 mg Sublingual BID     aMILoride  5 mg Oral Daily     miconazole   Topical BID     insulin isophane & regular  10 Units Subcutaneous QAM     insulin isophane & regular  20 Units Subcutaneous Daily with supper     cloNIDine  0.3 mg Oral BID     diltiazem  120 mg Oral Daily     divalproex  500 mg Oral QAM     divalproex  1,500 mg Oral At Bedtime     isosorbide mononitrate  30 mg Oral Daily     metoprolol  25 mg Oral Daily     OLANZapine (zyPREXA) tablet 5 mg  5 mg Oral At Bedtime     omeprazole (priLOSEC) CR capsule 20 mg  20 mg Oral BID AC     risperiDONE (risperDAL) tablet 1 mg  1 mg Oral At Bedtime     heparin  5,000 Units Subcutaneous Q12H       Data     Recent Labs  Lab 04/02/17  0720 04/01/17  2210 04/01/17  1036 04/01/17  0610  03/31/17  0733  03/30/17  2148 03/30/17  1515 03/28/17  0826   WBC  --   --   --   --   --  8.9  --   --  9.5 10.4   HGB  --   --   --   --   --  10.6*  --   --  10.9* 10.2*   MCV  --   --   --   --   --  98  --   --  99 99     --   --   --   --  290  --  299 351 375   INR  --   --   --   --   --   --   --   --  1.19*  --     142 150* 151*  < > 152*  < > 153* 150* 142   POTASSIUM 3.5  --   --  3.8  --  3.6  --   --  4.0 4.0   CHLORIDE 115*  --   --  121*  --  122*  --   --  121* 115*   CO2 20  --   --  21  --  22  --   --   17* 20   BUN 36*  --   --  37*  --  40*  --   --  40* 37*   CR 2.26*  --   --  2.35*  --  2.49*  --   --  2.91* 2.63*   ANIONGAP 8  --   --  9  --  8  --   --  12 7   BENTLEY 9.3  --   --  9.9  --  10.0  --   --  10.5* 9.5   *  --   --  205*  --  244*  --   --  179* 175*   ALBUMIN  --   --   --   --   --   --   --   --  3.0*  --    PROTTOTAL  --   --   --   --   --   --   --   --  7.7  --    BILITOTAL  --   --   --   --   --   --   --   --  0.2  --    ALKPHOS  --   --   --   --   --   --   --   --  75  --    ALT  --   --   --   --   --   --   --   --  20  --    AST  --   --   --   --   --   --   --   --  12  --    < > = values in this interval not displayed.    No results found for this or any previous visit (from the past 24 hour(s)).

## 2017-04-02 NOTE — PLAN OF CARE
"Problem: Goal Outcome Summary  Goal: Goal Outcome Summary  Outcome: Improving  Reason for Admission: Hypernatremia [E87.0]  Renal failure [N19]  Sepsis, due to unspecified organism (H) [A41.9]  Pneumonia of left lower lobe due to infectious organism [J18.9]  Pertinent Medical History: Diabetes Type 2, CKD, HTN, schizoaffective disorder, bipolar, cognitive delays     Plans for DC: pending     DO NOT REMOVE ANY FIELDS BELOW - TYPE N/A OR WNL IF NO INFO TO ADD  Procedure/POD: N/A  Neuro/Psych/Sleep: Schizoaffective, Bipolar disorder, cognitive delays, disoriented to situation, impulsive, yells out. PRN Ativan helpful  CV/Tele/Abnormal VS: WNL  Resp: 99% on RA  Skin/Wound: Rash on groin, miconazole ordered  /GI/Diet: Dunlap, Mod Carb/Renal diet, needs help ordering meals, able to eat independently.  IV: Abx scheduled, next dose 4/2 0000. IV D5NS 75 ml/hr  Pain: Denies  Chemo/Radiation: n/a  Abnormal Labs/Glucose: Na 150 at 1030 and 142 at 2210. Attending requested free water, pitcher removed from bedside r/t rapid sodium drop. BG Checks q 4hr, results 180 and 233.   Activity/Safety: mechanical lift  Consults: psych  Education:room orientation     Frequent rounding, refilling of water, and psych PRNs helpful in managing behaviors. Pt states that she is \"sick\" and that \"they're trying to make me sick\" but is unable to state who is doing this. Pt c/o nausea to aide, but when writer came to room, pt was eating meal and stated belief that someone was trying to make her sick through food. Pt made several requests to see her mother, and also told aide that she wanted to go to bed but did not know how. Pt was otherwise redirectable and cooperative with cares. Pt moved to 801, pt asked writer if her fingers were okay, expressed belief that fingers were not okay, accepting of writer's reassurance.                 "

## 2017-04-02 NOTE — PLAN OF CARE
Problem: Goal Outcome Summary  Goal: Goal Outcome Summary  Outcome: No Change  Plans for DC: Expected discharge in 1-2 days     DO NOT REMOVE ANY FIELDS BELOW - TYPE N/A OR WNL IF NO INFO TO ADD  Procedure/POD: N/A  Neuro/Psych/Sleep: Schizoaffective, Bipolar disorder, cognitive delays, disoriented to situation, impulsive, yells out. PRN Ativan helpful. Pt slept restfully throughout shift.  CV/Tele/Abnormal VS: WNL ex bradycardic (HR=54, notify <50)  Resp: WDL  Skin/Wound: Rash on groin, miconazole in use  /GI/Diet: Dunlap, Mod Carb/Renal diet, needs help ordering meals, able to eat independently.  IV: IV D5NS D/Cd. SL.  Pain: Denies  Chemo/Radiation: n/a  Abnormal Labs/Glucose: Na previously elevated. Attending requested free water but pt drank excessively and Na dropped quickly. Pitcher removed from bedside r/t rapid sodium drop but should still be given in moderation. BG Checks q4hr with Novolog if >140.  Activity/Safety: mechanical lift  Consults: psych   Education: room orientation

## 2017-04-02 NOTE — PROGRESS NOTES
"Renal Medicine Progress Note                                Nel Farmer MRN# [id number]   Age: 61 year old YOB: 1955   Date of Admission: 2/11/2017 Hospital LOS: 3                  Assessment/Plan:     61 year old female who was re admitted with AMS.  Recent prolonged hospitalization  with discharge  03/28/17        1. Baseline Stage 4 CKD: Cr 2.5 range and GFR 20. Lithium nephropathy.     2. 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  HTN: Borderline control on diltiazem, clonidine and metoprolol.      4.  Metabolic acidosis   -etiology   -may preclude further use of acetazolamide   5. Metabolic Bone Disease   -secondary hyperparathyroidism   -in part related to vitamin D deficiency (on replacement)        Stop IVF  Increase amiloride to 10    Add HCTZ 25 mg         Interval History:     Resting in bed.  Comfortable.  UO reviewed.        ROS:     No patient complaints    Medications and Allergies:     Reviewed    Physical Exam:     Vitals were reviewed  Patient Vitals for the past 8 hrs:   BP Temp Temp src Heart Rate Resp SpO2 Height   04/02/17 0759 120/51 97.4  F (36.3  C) Axillary 52 22 96 % 1.6 m (5' 3\")     I/O last 3 completed shifts:  In: 4669.5 [P.O.:2760; I.V.:1909.5]  Out: 2540 [Urine:2300; Emesis/NG output:240]    Vitals:    03/30/17 1513 03/30/17 2100 03/31/17 0500 04/01/17 0400   Weight: 102.1 kg (225 lb) 91.4 kg (201 lb 9.6 oz) 91.4 kg (201 lb 8 oz) 90.3 kg (199 lb 1.2 oz)       GENERAL: resting   HEENT: NC/AT, PERRLA, EOMI, non icteric, pharynx moist without lesion  RESP:  clear anteriorly  CV: RRR, normal S1 S2  MS: no clubbing, cyanosis   ABDOMEN: soft, nontender  SKIN: clear without significant rashes or lesions  NEURO: speech normal and cranial nerves 2-12 intact  EXT: warm, no edema    Data:       Recent Labs  Lab 04/02/17  0720 04/01/17  2210 04/01/17  1036 04/01/17  0610  03/31/17  0733  03/30/17  1515    142 " 150* 151*  < > 152*  < > 150*   POTASSIUM 3.5  --   --  3.8  --  3.6  --  4.0   CHLORIDE 115*  --   --  121*  --  122*  --  121*   CO2 20  --   --  21  --  22  --  17*   ANIONGAP 8  --   --  9  --  8  --  12   *  --   --  205*  --  244*  --  179*   BUN 36*  --   --  37*  --  40*  --  40*   CR 2.26*  --   --  2.35*  --  2.49*  --  2.91*   GFRESTIMATED 22*  --   --  21*  --  20*  --  16*   GFRESTBLACK 27*  --   --  25*  --  24*  --  20*   BENTLEY 9.3  --   --  9.9  --  10.0  --  10.5*   < > = values in this interval not displayed.       Recent Labs   Lab Test  04/02/17   0720  04/01/17   0610  03/31/17   0733  03/30/17   1515  03/28/17   0826  03/27/17   0851  03/26/17   1036  03/26/17   0830  03/25/17   0813  03/23/17   0903   CR  2.26*  2.35*  2.49*  2.91*  2.63*  2.91*  3.00*  Canceled, Test credited   Specimen hemolyzed    2.94*  2.71*         G Dino Renae    [facility]  [phone]

## 2017-04-02 NOTE — PLAN OF CARE
Problem: Goal Outcome Summary  Goal: Goal Outcome Summary  Outcome: No Change  Plans for DC: Pending progress     Procedure/POD: N/A  Neuro/Psych/Sleep: Schizoaffective, Bipolar disorder, cognitive delays, disoriented to situation, impulsive, yells out. Pt very lethargic this AM. Po meds given once alert. MD aware. PRN ativan dose decreased.  CV/Tele/Abnormal VS: WNL ex bradycardic (HR=54, notify <50)  Resp: LS dim  Skin/Wound: Rash on groin, miconazole in use. Improving  /GI/Diet: [place]; plan to keep in for now as she came with it in. Will likely need it changed prior to d/c. Incontinent of BM this AM. Mod Carb/Renal diet, needs help ordering meals, able to eat independently. Poor appetite.   IV: IV D5NS D/Cd. SL.  Pain: Aches from bed; improves with repositioning  Chemo/Radiation: n/a  Abnormal Labs/Glucose: Na previously elevated. Attending requested free water but pt drank excessively and Na dropped quickly. Pitcher removed from bedside r/t rapid sodium drop but should still be given in moderation. BG Checks with insulin sliding scale.  Activity/Safety: mechanical lift. Turn/repo q2h.   Consults: psych   Education: room orientation

## 2017-04-02 NOTE — PLAN OF CARE
"Problem: Goal Outcome Summary  Goal: Goal Outcome Summary  PT: Orders received, chart reviewed, patient attempted. Pt rouses easily to voice initially agreeable on PT entry, while inspecting legs and attempting to assess mobility pt reports pain with light brushing strokes at different parts of her legs, however when attempted 2nd time pt made no comment. Pt requesting PT return later, \"did not want to be awake\" this early. Will attempt back as able.      "

## 2017-04-03 NOTE — PLAN OF CARE
Problem: Goal Outcome Summary  Goal: Goal Outcome Summary  PT: Orders received, evaluation completed and treatment initiated. 61 year old female being treated for acute metabolic encephalopathy. PMH: HTN, schizophrenia, and recent admisstion for toxic encephalopathy. Patient unable to report accurate PLOF this session. Per chart, patient was recently admitted to [facility] and discharged to NH/LTC. Prior to earlier admission, patient was living at home. She states she was able to ambulate at this time. Patient not able to state how she was moving at the nursing home or if she was receiving PT services. On evaluation, patient is oriented to self/place. General confusion noted. Intermittent one-step command following. Trialed sit to/from stand with max A x 2 persons. Not able to achieve. Chair to bed with total assist via mechanical lift. Rolling in bed bilaterally with mod to max A x 2. Patient currently limited by decreased strength and impaired balance. Will benefit from continued PT to address these deficits. Recommend return to LTC facility (if they are able to provide mechanical assist for transfers) and HHPT.

## 2017-04-03 NOTE — PROGRESS NOTES
MD Notification    Notified Person:  MD    Notified Persons Name: Dr. Gonzales    Notification Date/Time: 4/3/2017, 1030    Notification Interaction:  Paged Physician    Purpose of Notification: HR 47-48bpm    Orders Received: Discontinued diltiazem, Started on 5mg Amlodipine. Will continue to monitor.     Comments:

## 2017-04-03 NOTE — PROGRESS NOTES
[facility]    Hospitalist Progress Note    Assessment & Plan   Nel Farmer is a 61-year-old female with past medical history significant for schizoaffective disorder, diabetes mellitus type 2, chronic kidney disease stage 4 with recent acute kidney injury, diabetes insipidus due to lithium toxicity, hypertension with recent admission for toxic encephalopathy, healthcare-associated pneumonia, [place] urinary tract infection discharged to nursing home on 03/28/2017 who presents again 03/30/2017 with lethargy and altered mental status. She was admitted on 03/30/2017 for further workup and treatment.      Acute Metabolic encephalopathy:  Presented with lethargy and altered mental status.   -She was noted to have metabolic acidosis due to her renal failure and also mild hyperantremia  -she was febrile to 101 at presentation.   -Possible pneumonia may be contributing as well- see below.   -Valproic acid below the therapeutic range.   -LP not suggestive of CNS infection.   -CT head- negative for acute abnormality.   -Mental status seems to have improved to baseline now.      Chronic kidney disease stage 4  Nephrogenic diabetic insipidus due to prolonged lithium exposure  Non-anion gap Metabolic acidosis   - Baseline creatinine is 2.63 on 03/28/2017 at time of discharge and 2.91 on current admission which is not far off her baseline  - she was initiated on bicarb drip due to her acidosis  - acidosis has improved  - started on amiloride per nephrology  - appreciate nephrology help  - Creatinine slightly better at 2.37 today     Fever with possible healthcare-associated pneumonia:   Her temperature on arrival was 101.3 degrees Fahrenheit. Her white blood cell count is normal and there were no reported preceding respiratory symptoms, although she does have a mild left basilar consolidation with associated small pleural effusion noted on her x-ray which appears new compared to interval study on 03/26/2017  during recent admission.  - discontinued vancomycin 4/1  - blood culture remains negative to date  - Afebrile  - DC meropenem  -Start Levaquin to complete 7 days total      Diabetes mellitus type 2:   A1c 9.2% on 03/10/2016.  Prior to admission, she appears to be on 70/30 mix 20 units before breakfast, 30 units before dinner. She is also on glipizide twice daily.   -Continue 70/30 mix; increase AM to 18 units; continue PM at 20 units  -continue with SSI  -hold her oral sulfonylurea while in hospital  -monitor BG     Hypernatremia  -Improved      Schizoaffective disorder:   -Continue prior to admission on risperidone, divalproex, olanzapine.  -Also has baseline cognitive deficits     Hypertension:   -Continue clonidine, Dilacor XR and metoprolol  -Blood pressure adequately controlled at the moment      D/W: RN  DVT Prophylaxis: Pneumatic Compression Devices  Code Status: Full Code    Disposition: Expected discharge in 1-2 days    Russ Gonzales MD    Interval History   Much more awake and interactive this AM. Afebrile. No n/v    -Data reviewed today: I reviewed all new labs and imaging results over the last 24 hours. I personally reviewed no images or EKG's today.    Physical Exam   Temp: 97.3  F (36.3  C) Temp src: Axillary BP: 117/40   Heart Rate: 57 Resp: 18 SpO2: 98 % O2 Device: None (Room air)    Vitals:    03/30/17 2100 03/31/17 0500 04/01/17 0400   Weight: 91.4 kg (201 lb 9.6 oz) 91.4 kg (201 lb 8 oz) 90.3 kg (199 lb 1.2 oz)     Vital Signs with Ranges  Temp:  [95.7  F (35.4  C)-97.4  F (36.3  C)] 97.3  F (36.3  C)  Heart Rate:  [52-66] 57  Resp:  [18-22] 18  BP: (111-122)/(40-54) 117/40  SpO2:  [96 %-98 %] 98 %  I/O last 3 completed shifts:  In: 120 [P.O.:120]  Out: 5075 [Urine:5075]    Constitutional: AAOX2, NAD  Respiratory:  No crackles, No wheezes, CTA B/L, Normal WOB  Cardiovascular: RRR, No murmur  GI: Soft, Non- tender, BS- normoactive  Skin/Integument: Warm and dry, no rashes  MSK: No joint  deformity or swelling, no edema  Neuro: CN- grossly intact    Medications        insulin aspart  1-10 Units Subcutaneous TID AC     insulin aspart  1-7 Units Subcutaneous At Bedtime     insulin isophane & regular  15 Units Subcutaneous QAM     aMILoride  10 mg Oral Daily     hydrochlorothiazide  25 mg Oral Daily     meropenem  500 mg Intravenous Q8H     risperiDONE  0.5 mg Sublingual BID     miconazole   Topical BID     insulin isophane & regular  20 Units Subcutaneous Daily with supper     cloNIDine  0.3 mg Oral BID     diltiazem  120 mg Oral Daily     divalproex  500 mg Oral QAM     divalproex  1,500 mg Oral At Bedtime     isosorbide mononitrate  30 mg Oral Daily     metoprolol  25 mg Oral Daily     OLANZapine (zyPREXA) tablet 5 mg  5 mg Oral At Bedtime     omeprazole (priLOSEC) CR capsule 20 mg  20 mg Oral BID AC     risperiDONE (risperDAL) tablet 1 mg  1 mg Oral At Bedtime     heparin  5,000 Units Subcutaneous Q12H       Data     Recent Labs  Lab 04/03/17  0642 04/02/17  0720 04/01/17  2210  04/01/17  0610  03/31/17  0733  03/30/17  2148 03/30/17  1515 03/28/17  0826   WBC  --   --   --   --   --   --  8.9  --   --  9.5 10.4   HGB  --   --   --   --   --   --  10.6*  --   --  10.9* 10.2*   MCV  --   --   --   --   --   --  98  --   --  99 99   PLT  --  182  --   --   --   --  290  --  299 351 375   INR  --   --   --   --   --   --   --   --   --  1.19*  --     143 142  < > 151*  < > 152*  < > 153* 150* 142   POTASSIUM 4.0 3.5  --   --  3.8  --  3.6  --   --  4.0 4.0   CHLORIDE 116* 115*  --   --  121*  --  122*  --   --  121* 115*   CO2 19* 20  --   --  21  --  22  --   --  17* 20   BUN 35* 36*  --   --  37*  --  40*  --   --  40* 37*   CR 2.37* 2.26*  --   --  2.35*  --  2.49*  --   --  2.91* 2.63*   ANIONGAP 9 8  --   --  9  --  8  --   --  12 7   BENTLEY 9.6 9.3  --   --  9.9  --  10.0  --   --  10.5* 9.5   * 205*  --   --  205*  --  244*  --   --  179* 175*   ALBUMIN  --   --   --   --   --   --    --   --   --  3.0*  --    PROTTOTAL  --   --   --   --   --   --   --   --   --  7.7  --    BILITOTAL  --   --   --   --   --   --   --   --   --  0.2  --    ALKPHOS  --   --   --   --   --   --   --   --   --  75  --    ALT  --   --   --   --   --   --   --   --   --  20  --    AST  --   --   --   --   --   --   --   --   --  12  --    < > = values in this interval not displayed.    No results found for this or any previous visit (from the past 24 hour(s)).

## 2017-04-03 NOTE — PROGRESS NOTES
[facility]     Renal Progress Note       SHORTHAND KEY FOR MY NOTES:  c = with, s = without, p = after, a = before, x = except, asx = asymptomatic, tx = transplant or treatment, sx = symptoms or symptomatic, cx = canceled or culture, rxn = reaction, yday = yesterday, nl = normal, abx = antibiotics, fxn = function, dx = diagnosis, dz = disease, m/h = melena/hematochezia, c/d/l/ha = cramping/dizziness/lightheadedness/headache, d/c = discharge or diarrhea/constipation, f/c/n/v = fevers/chills/nausea/vomiting, cp/sob = chest pain/shortness of breath.         Assessment/Plan:     1.  CKD IV.  Pt's renal fxn is stable.  A.  Follow clinically.    2.  Hypernatremia 2 NDI.  Na is ok now.  There was some confusion earlier re not giving her water, but it was explained that she should have unrestricted access to water given her NDI.    A.  Continue unrestricted free water access.  B.  Follow Na.  C.  Continue amiloride.    3.  Met acidosis.  K is ok, bicarb is a bit low.  Acetazolamide has been stopped.  A.  Follow labs.    4.  FEN.  Electrolytes are fine.        Interval History:     Pt feels ok right now, but is complaining that she is not getting any water.  There was some confusion re this, it seems.    No other issues.            Medications and Allergies:       insulin isophane & regular  18 Units Subcutaneous QAM     [START ON 4/4/2017] amLODIPine  5 mg Oral Daily     [START ON 4/4/2017] aMILoride  10 mg Oral Daily     [START ON 4/4/2017] levofloxacin  250 mg Oral Daily     insulin aspart  1-10 Units Subcutaneous TID AC     insulin aspart  1-7 Units Subcutaneous At Bedtime     hydrochlorothiazide  25 mg Oral Daily     risperiDONE  0.5 mg Sublingual BID     miconazole   Topical BID     insulin isophane & regular  20 Units Subcutaneous Daily with supper     cloNIDine  0.3 mg Oral BID     divalproex  500 mg Oral QAM     divalproex  1,500 mg Oral At Bedtime     isosorbide mononitrate  30 mg Oral Daily      "metoprolol  25 mg Oral Daily     OLANZapine (zyPREXA) tablet 5 mg  5 mg Oral At Bedtime     omeprazole (priLOSEC) CR capsule 20 mg  20 mg Oral BID AC     risperiDONE (risperDAL) tablet 1 mg  1 mg Oral At Bedtime     heparin  5,000 Units Subcutaneous Q12H     Allergies   Allergen Reactions     Amoxicillin      Amoxicillin      Haldol [Benzyl Alcohol]      Haldol [Haloperidol]      Pcn [Penicillin G]      Penicillins      Seroquel [Quetiapine] GI Disturbance          Physical Exam:     Vitals were reviewed    Heart Rate: 45, Blood pressure 114/50, pulse 94, temperature 95.9  F (35.5  C), temperature source Axillary, resp. rate 16, height 1.6 m (5' 3\"), weight 90.3 kg (199 lb 1.2 oz), last menstrual period 04/27/2012, SpO2 97 %, not currently breastfeeding.  Wt Readings from Last 3 Encounters:   04/01/17 90.3 kg (199 lb 1.2 oz)   03/25/17 102.2 kg (225 lb 5 oz)   06/20/16 108.9 kg (240 lb)     Intake/Output Summary (Last 24 hours) at 04/03/17 1504  Last data filed at 04/03/17 1444   Gross per 24 hour   Intake              920 ml   Output             4125 ml   Net            -3205 ml     GENERAL APPEARANCE: pleasant, NAD, alert, sitting in chair  HEENT:  Eyes/ears/nose/neck grossly normal  RESP: lungs cta b c good efforts, no crackles, rhonchi or wheezes  CV: RRR, nl S1/S2  ABDOMEN: o/s/nt/nd  EXTREMITIES/SKIN: no rashes/lesions on observed skin; no ble edema  OTHER:  + cherry         Data:     CBC RESULTS:     Recent Labs  Lab 04/02/17  0720 03/31/17  0733 03/30/17  2148 03/30/17  1515 03/28/17  0826   WBC  --  8.9  --  9.5 10.4   RBC  --  3.54*  --  3.57* 3.37*   HGB  --  10.6*  --  10.9* 10.2*   HCT  --  34.6*  --  35.3 33.3*    290 299 351 375     Basic Metabolic Panel:    Recent Labs  Lab 04/03/17  0642 04/02/17  0720 04/01/17  2210 04/01/17  1036 04/01/17  0610 04/01/17  0200  03/31/17  0733  03/30/17  1515 03/28/17  0826    143 142 150* 151* 148*  < > 152*  < > 150* 142   POTASSIUM 4.0 3.5  --   --  " 3.8  --   --  3.6  --  4.0 4.0   CHLORIDE 116* 115*  --   --  121*  --   --  122*  --  121* 115*   CO2 19* 20  --   --  21  --   --  22  --  17* 20   BUN 35* 36*  --   --  37*  --   --  40*  --  40* 37*   CR 2.37* 2.26*  --   --  2.35*  --   --  2.49*  --  2.91* 2.63*   * 205*  --   --  205*  --   --  244*  --  179* 175*   BENTLEY 9.6 9.3  --   --  9.9  --   --  10.0  --  10.5* 9.5   < > = values in this interval not displayed.  INR  Recent Labs  Lab 03/30/17  1515   INR 1.19*      Attestation:   I have reviewed today's relevant vital signs, notes, medications, labs and imaging.    Tommie Matias MD  [facility] - [facility]  [phone]

## 2017-04-03 NOTE — PLAN OF CARE
Problem: Goal Outcome Summary  Goal: Goal Outcome Summary  Outcome: Improving  Reason for Admission: Hypernatremia [E87.0]  Renal failure [N19]  Sepsis, due to unspecified organism (H) [A41.9]  Pneumonia of left lower lobe due to infectious organism [J18.9]  Pertinent Medical History: Schizoaffective disorder, CKD stage 3, HTN, cognitive delays, bipolar disorder     Plans for DC: Pending     DO NOT REMOVE ANY FIELDS BELOW - TYPE N/A OR WNL IF NO INFO TO ADD  Procedure/POD: N/A  Neuro/Psych/Sleep: Pt impulsive, yells out, does not use call light. Per family, pt paranoid re: food intake, and yells whenever feet are lightly brushed. Pt minimally redirectable.   CV/Tele/Abnormal VS: WNL  Resp: WNL  Skin/Wound: Rash groin; antifungal powder applied, improving  /GI/Diet: Mod Carb/Renal diet. Pt needs assistance ordering meals. Pt enjoys milk. Catheter in place.   IV: SL, IV abx q 8 hr  Pain: No nonverbal indicators. Pt complained of foot pain which she states was relieved by wearing socks.   Chemo/Radiation: N/A  Abnormal Labs/Glucose: 156 and 153  Activity/Safety: Bed alarm, use of lift  Consults: Psych  Education: Diet, medication, safety.

## 2017-04-03 NOTE — PROGRESS NOTES
04/03/17 0830   Quick Adds   Type of Visit Initial PT Evaluation   Living Environment   Living Environment Comment Per chart from recent admission, patient was in LTC prior to current stay.    Functional Level Prior   Prior Functional Level Comment Per patient, she was able to ambulate when she was living at home. She is not able to provide level of mobility when she was at nursing home/LTC prior to admission.    General Information   Onset of Illness/Injury or Date of Surgery - Date 03/30/17   Referring Physician MD Norberto   Patient/Family Goals Statement Did not specifically state   Pertinent History of Current Problem (include personal factors and/or comorbidities that impact the POC) 61 year old female being treated for acute metabolic encephalopathy. PMH: HTN, schizophrenia, and recent admisstion for toxic encephalopathy.    Precautions/Limitations fall precautions   General Info Comments Activity: up with assist   Cognitive Status Examination   Orientation person;place   Level of Consciousness alert;confused   Follows Commands and Answers Questions (Intermittent one step command following)   Personal Safety and Judgment impaired   Cognitive Comment (Baseline cognitive impairments)   Pain Assessment   Patient Currently in Pain (Low back and R ankle pain - not rated on scale)   Posture    Posture Forward head position   Posture Comments (In sitting and laying patient demonstrates left lateral lean)   Range of Motion (ROM)   ROM Comment No noted deficits - PROM of LE's WFL's   Strength   Strength Comments Decreased significantly. Needs heavy assist with mobility.    Bed Mobility   Bed Mobility Comments Rolling to the L with mod A x 2. Rolling to the R with max A x 2.    Transfer Skills   Transfer Comments Trialed sit to/from stand with max A x 2 persons - not able to achieve. Chair to bed with mechanical lift and total assist.    Gait   Gait Comments Not able based on current status.    Balance   Balance  "Comments Seated balance poor - able to briefly find midline with cues. Will almost immediately return to L lateral lean.    Coordination   Coordination no deficits were identified   General Therapy Interventions   Planned Therapy Interventions bed mobility training;gait training;strengthening;transfer training;balance training   Clinical Impression   Criteria for Skilled Therapeutic Intervention yes, treatment indicated   PT Diagnosis Impaired gait, generalized weakness.    Influenced by the following impairments Pain, impaired balance, decreased strength   Functional limitations due to impairments Decreased independence with bed mobility, tranfers and ambulation   Clinical Presentation Stable/Uncomplicated   Clinical Presentation Rationale Patient currently limited by pain, motvation, baseline cognition impairments, decreased strength and balance   Clinical Decision Making (Complexity) Low complexity   Therapy Frequency` 5 times/week   Predicted Duration of Therapy Intervention (days/wks) One week   Anticipated Discharge Disposition (Return to LTC with HHPT services)   Risk & Benefits of therapy have been explained Yes   Patient, Family & other staff in agreement with plan of care Yes   Clinical Impression Comments Patient appropriate for skilled acute care PT to maximize independence with functional mobility and to determine safe DC plan   1x Eval Only-Outpatient/Observation Medicare G-Code   G-code Mobility: Walking & Moving Around   [employer] TM \"6 Clicks\"   2016, Trustees of [employer], under license to [employer].  All rights reserved.   6 Clicks Short Forms Basic Mobility Inpatient Short Form   [employer] AM-PAC  \"6 Clicks\" V.2 Basic Mobility Inpatient Short Form   1. Turning from your back to your side while in a flat bed without using bedrails? 2 - A Lot   2. Moving from lying on your back to sitting on the side of a flat bed without using bedrails? 2 - A Lot   3. Moving to " and from a bed to a chair (including a wheelchair)? 1 - Total   4. Standing up from a chair using your arms (e.g., wheelchair, or bedside chair)? 1 - Total   5. To walk in hospital room? 1 - Total   6. Climbing 3-5 steps with a railing? 1 - Total   Basic Mobility Raw Score (Score out of 24.Lower scores equate to lower levels of function) 8   Total Evaluation Time   Total Evaluation Time (Minutes) 10

## 2017-04-03 NOTE — PLAN OF CARE
"Problem: Goal Outcome Summary  Goal: Goal Outcome Summary  Outcome: No Change  Plans for DC: Plan to return to [facility] 4/4. See SW note for details.      Procedure/POD: N/A  Neuro/Psych/Sleep: Pt impulsive, yells out, does not use call light. Per family, pt paranoid re: food intake, and yells whenever feet are lightly brushed. Pt minimally redirectable.   CV/Tele/Abnormal VS: Bradycardic HR 46-48bpm. MD aware, see notification note.   Resp: WNL  Skin/Wound: Rash groin; antifungal powder applied.   /GI/Diet: Mod Carb/Renal diet. Pt needs assistance ordering meals. Pt enjoys milk. Catheter patent, adequate output.   IV: PIV SL.  Pain: No nonverbal indicators. Pt c/o \"funny feeling\" in feet. According to family this is her baseline.   Chemo/Radiation: N/A  Abnormal Labs/Glucose: BS checks, SSI given as needed.  Activity/Safety: Bed alarm on. Up w/ lift. In chair for meals.   Consults: Psych, nephrology  Education: Diet. Working with PT/OT. BG checks/insulin.      "

## 2017-04-03 NOTE — DOWNTIME EVENT NOTE
The EMR was down for 4 hours on 4/2/2017.    Ami Sandy was responsible for completing the paper charting during this time period.     The following information was re-entered into the system by Ami Sandy: Flowsheet data and XIMENA Sandy  4/2/2017

## 2017-04-03 NOTE — PROGRESS NOTES
Social Work Progress Note     D:   SW following for discharge planning. Per chart review, pt was admitted to [facility] from [facility]. Pt has MA and therefore has an 18-day MA bed hold at this facility.     A/I: OCTAVIA called [facility] admissions staff, Cherri (ph:[phone]); Cherri confirms pt has an MA bed hold and they can accept patient back tomorrow. OCTAVIA inquired if they can provide mechanical lift for transfers and Cherri stated yes, as they were doing this for pt prior to her hospitalization.       P: SW will arrange for transport back to [facility]. SW will complete PAS. SW will fax orders/PAS and update facility with transport time.       GILMA Em [facility]  [phone]

## 2017-04-03 NOTE — PLAN OF CARE
Problem: Goal Outcome Summary  Goal: Goal Outcome Summary  Outcome: Improving  A&O to self, place. CMS intact. Bowel sounds present. VSS. Dunlap catheter in place and draining. Repo q 2 hours. Pt cooperative with cares, sleeping.  Use of lift to reposition.  Psych consult pending.

## 2017-04-03 NOTE — PLAN OF CARE
Problem: Goal Outcome Summary  Goal: Goal Outcome Summary  OT: Received eval orders.  Noted [facility] with plan to return to facility tomorrow per social work note.  PT evaluation completed this morning, will defer OT evaluation back to facility due to discharge anticipated tomorrow.

## 2017-04-04 NOTE — DISCHARGE SUMMARY
DISCHARGE SUMMARY      PRIMARY CARE PHYSICIAN:  None listed.      DATE OF ADMISSION:  03/30/2017.      DATE OF DISCHARGE:  04/04/2017.      DISCHARGE DIAGNOSES:   1.  Acute metabolic encephalopathy, improving.   2.  Chronic kidney disease stage III-IV.   3.  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   4.  Non-anion gap metabolic acidosis.   5.  Suspected healthcare-associated pneumonia; unspecified organism.   6.  Diabetes mellitus type 2.   7.  Hypernatremia.   8.  Schizoaffective disorder.   9.  Baseline cognitive deficit.   10.  Hypertension.      CONSULTATIONS:  Nephrology and Psychiatry.      IMPORTANT STUDIES AND PROCEDURES DONE:   1.  CT head without contrast, which was negative for acute changes.      HISTORY OF PRESENT ILLNESS:  Ms. Nel Farmer is a 61-year-old female with multiple medical problems who presented to [facility] from nursing home on 03/30/2017 with lethargy and mental state change.  She was just discharged to the nursing home from [facility] a couple of days prior to presentation.  Please see admission note dictated by Dr. Jamey Thornton on 03/30/2017 for the details of presentation.      HOSPITAL COURSE:  The following problems were addressed for Ms. Farmer during the hospital stay.   1.  Acute metabolic encephalopathy.  She presented with lethargy and altered mental state.  She was found to have metabolic acidosis, worsening of her chronic kidney disease and mild hypernatremia.  She was started on IV fluids.  Her metabolic acidosis was corrected with bicarbonate and with this she improved back to her baseline.  She also had a lumbar puncture done.  There was no suggestion of CNS infection.  CT head was negative for acute process.  There was some concern about possible pneumonia.  She was started on broad-spectrum antibiotics, see description below.   2.  Chronic kidney disease stage III-IV with possible acute worsening.   3.  Nephrogenic  diabetes insipidus.   4.  Non-anion gap metabolic acidosis.  Patient's baseline creatinine is fluctuating between 2.2-2.6.  She was slightly off her baseline.  She was started on bicarbonate drip due to her acidosis.  This improved and by the day of discharge her creatinine was stable around 2.3 clarisse.  Nephrology saw the patient and recommended unrestricted access to free water due to her history of nephrogenic diabetes insipidus.   5.  Fever with suspected healthcare-associated pneumonia.  The patient's temperature on presentation was 101.3.  Culture was done which was negative.  She was initially placed on broad-spectrum coverage with meropenem and vancomycin antibiotic deescalated Levaquin.  Will recommend 2 more days to complete a 7-day course.     6.  Diabetes mellitus type 2.  Her oral sulfonylurea was held her 70/30 mix was continued.  No acute issues.   7.  Schizoaffective disorder.  Psychiatry evaluated the patient.  The patient also obviously has some baseline cognitive deficits.  There were no new recommendations.   8.  Hypertension.  She was on clonidine, Cardizem and metoprolol.  She was bradycardic.  I have discontinued her Cardizem and switched her to amlodipine.  Likewise I have decreased the dose of clonidine to 0.2 mg b.i.d. along with metoprolol.  If her blood pressure is inadequately controlled her amlodipine can be increased to 10 mg daily.      PHYSICAL EXAMINATION:  On the day of discharge:   GENERAL:  The patient was not in any obvious distress.   VITAL SIGNS:  Temperature 98.3, blood pressure 120/46, heart rate 45, respiration rate 16, O2 sat 95% on room air.   HEENT:  Atraumatic, normocephalic.   NECK:  Supple, with good range of motion.   RESPIRATORY:  Good air entry bilaterally, normal effort of breathing.   CARDIOVASCULAR:  Regular rate and rhythm.   ABDOMEN:  Soft, nontender.   EXTREMITIES:  Trace edema.   NEUROLOGIC:  She was awake, answering simple questions appropriately and was back  to her neurological baseline.      DISPOSITION:  To transitional care facility.      CONDITION AT DISCHARGE:  Improved.      CODE STATUS:  Full code.      DISCHARGE MEDICATIONS:   1.  Amiloride 10 mg daily.   2.  Amlodipine 5 mg daily.   3.  Levaquin 250 mg daily for 3 days.   4.  Clonidine decreased to 0.2 mg twice a day from 0.3 mg twice a day.   5.  Lorazepam 0.5 mg twice daily as needed.   6.  Depakote 500 mg every morning and 1500 mg at bedtime.   7.  Glipizide XL 2.5 mg daily.   8.  Humulin mix 70/30, 20 units before breakfast and 30 units before dinner.   9.  Imdur 30 mg daily.   10.  Lasix 20 mg daily.   11.  Metoprolol extended release 25 mg daily.   12.  Prilosec 20 mg daily.   13.  Risperdal 1 mg at bedtime.   14.  Risperdal ODT 0.5 mg 3 times daily as needed.   15.  Senokot 1 tablet twice daily as needed.   16.  Zofran 4 mg every 8 hours as needed.   17.  Zyprexa 5 mg at bedtime.      FOLLOWUP INSTRUCTIONS AND PLAN:     1.  With physician at the transitional care facility.   2.  Recommend repeating a BMP in 1 week.      Time spent on discharge was more than 30 minutes.         ELEANOR HAJI MD             D: 2017 11:49   T: 2017 12:05   MT: SHANE      Name:     MARIO ALBERTO PATRICK   MRN:      [id number]        Account:        [id number]   :      1955           Admit Date:     [id number]                                  Discharge Date:       Document: [id number]

## 2017-04-04 NOTE — PROVIDER NOTIFICATION
"MD Notification    Notified Person:  MD    Notified Persons Name: AGUILAR Gonzales  Notification Date/Time: 4/4/17 11:44 a.m.    Notification Interaction:  Talked with Physician    Purpose of Notification: Informed MD that patient is informing bedside RN  \"I am not going\" asking if we should put a transport hold as ride is set for 12:30.      Orders Received: MD to see the patient    Comments:    "

## 2017-04-04 NOTE — PROGRESS NOTES
Social Work Progress Note     D:   Discharge orders received for pt. OCTAVIA arranged for stretcher via [facility] for today at 12:30pm due to patient being confused, requiring physical monitoring for safety. OCTAVIA completed PCS and faxed to [facility] at 1035.    OCTAVIA updated CC, charge RN and bedside RN. Bedside RN will update patient. OCATVIA updated admissions staff Janet, who confirms they can accept pt back today. OCTAVIA faxed orders to [facility] at 1040a.    P: Pt will discharge back to [facility] today at 12:30pm.     GILMA Em [facility]  [phone]

## 2017-04-04 NOTE — PROGRESS NOTES
Notes, labs, vitals reviewed.  High uo - standard for pt bc of NDI.  She should have unrestricted access to water.  Cr at baseline.  BP ok.  We will sign off.  Thank you for this consultation.  Please call if any ?s.

## 2017-04-04 NOTE — PLAN OF CARE
Problem: Goal Outcome Summary  Goal: Goal Outcome Summary  Outcome: Improving  Pt alert but not oriented to time and persone, calls out for needs,  Slightly bradycardiac in this shift HR 57,  complain of back pain, resolved with repositioning, no  SOB or discomfort, Plan to DC to [facility] tomorrow

## 2017-04-04 NOTE — PLAN OF CARE
"Problem: Goal Outcome Summary  Goal: Goal Outcome Summary  Outcome: No Change     Plans for DC: Plan to return to [facility] 4/4. See SW note for details  Neuro/Psych/Sleep: Pt impulsive, yells out, does not use call light. Per family, pt paranoid re: food intake, and yells whenever feet are lightly brushed. Pt minimally redirectable, but slept well last night.  CV/Tele/Abnormal VS: Bradycardic HR 52 bpm. MD aware, see notification note.   Resp: WNL  Skin/Wound: Rash groin; antifungal powder applied.   /GI/Diet: Mod Carb/Renal diet. Pt needs assistance ordering meals. Pt enjoys milk. Catheter patent, adequate output.  No BM tonight.  IV: PIV SL.  Pain: No nonverbal indicators. Pt c/o \"funny feeling\" in feet, also pain with touch , According to family this is her baseline.   Chemo/Radiation: N/A  Abnormal Labs/Glucose: BS checks, SSI given as needed.  Activity/Safety: Bed alarm on. Up w/ lift. In chair for meals.   Consults: Psych, nephrology  Education: Diet. Working with PT/OT. BG checks/insulin.       "

## 2017-04-04 NOTE — PLAN OF CARE
Problem: Goal Outcome Summary  Goal: Goal Outcome Summary  Physical Therapy Discharge Summary     Reason for therapy discharge:    Discharged to transitional care facility.     Progress towards therapy goal(s). See goals on Care Plan in [facility] electronic health record for goal details.  Goals not met.  Barriers to achieving goals:   discharge from facility.     Therapy recommendation(s):    Continued therapy is recommended.  Rationale/Recommendations:  PT at TCU to progress mobility.

## 2017-05-16 PROBLEM — N17.9 ACUTE RENAL FAILURE (ARF) (H): Status: ACTIVE | Noted: 2017-01-01

## 2017-05-16 NOTE — IP AVS SNAPSHOT
"[place] [address] ONCOLOGY: [phone]                                              INTERAGENCY TRANSFER FORM - PHYSICIAN ORDERS   2017                    Hospital Admission Date: 2017  MARIO ALBERTO PATRICK   : 1955  Sex: Female        Attending Provider: Isabel Ojeda MD     Allergies:  Amoxicillin, Amoxicillin, Haldol [Benzyl Alcohol], Haldol [Haloperidol], Pcn [Penicillin G], Penicillins, Seroquel [Quetiapine]    Infection:  None   Service:  HOSPITALIST    Ht:  1.626 m (5' 4\")   Wt:  84.4 kg (186 lb 1.1 oz)   Admission Wt:  91.2 kg (201 lb)    BMI:  31.94 kg/m 2   BSA:  1.95 m 2            Patient PCP Information     None on File      ED Clinical Impression     Diagnosis Description Comment Added By Time Added    Acidosis [E87.2] Acidosis [E87.2]  Sridhar Barros DO 2017  9:01 PM    Urinary tract infection without hematuria, site unspecified [N39.0] Urinary tract infection without hematuria, site unspecified [N39.0]  Sridhar Barros DO 2017  9:02 PM    LYLA (acute kidney injury) (H) [N17.9] LYLA (acute kidney injury) (H) [N17.9]  Sridhar Barros DO 2017  9:02 PM    Hyperkalemia [E87.5] Hyperkalemia [E87.5]  Sridhar Barros DO 2017  9:02 PM    Encephalopathy [G93.40] Encephalopathy [G93.40]  Sridhar Barros DO 2017  9:03 PM    Elevated lipase [R74.8] Elevated lipase [R74.8]  Sridhar Barros DO 2017  9:03 PM      Hospital Problems as of 2017              Priority Class Noted POA    Acute renal failure (ARF) (H) Medium  2017 Yes      Non-Hospital Problems as of 2017              Priority Class Noted    HTN (hypertension)   Unknown    Hyperlipidemia with target LDL less than 70   Unknown    TYPE 2 DIABETES, HBA1C GOAL < 7%   10/31/2010    acute Mastitis   2012    CKD (chronic kidney disease) stage 3, GFR 30-59 ml/min   Unknown    Advanced directives, " counseling/discussion   6/25/2012    Health Care Home   2/1/2013    Suicidal ideation   4/30/2013    Schizoaffective disorder, bipolar type (H)   5/22/2013    Depression (emotion)   1/15/2014    Paranoia (H)   1/25/2015    Schizoaffective disorder, manic type (H)   3/20/2015    Psychosis   4/8/2015    Depression with suicidal ideation Medium  9/11/2015    Depression Medium  12/29/2015    Nausea Medium  3/9/2016    Falls frequently Medium  2/12/2017    Sepsis due to urinary tract infection (H) Medium  3/16/2017    Altered mental status Medium  3/30/2017      Code Status History     Date Active Date Inactive Code Status Order ID Comments User Context    6/1/2017 11:05 AM  DNR/DNI [id number]  Carson Schuster MD Outpatient    5/25/2017  2:25 PM 6/1/2017 11:05 AM DNR/DNI [id number]  Carson Schuster MD Inpatient    5/16/2017 10:13 PM 5/25/2017  2:25 PM Full Code [id number]  Le Bates MD Inpatient    3/30/2017  7:44 PM 4/4/2017  2:51 PM Full Code [id number]  Jamey Thornton MD Inpatient    3/28/2017  2:50 PM 3/30/2017  7:44 PM Full Code [id number]  Wilfrido Flores MD Outpatient    2/12/2017  6:38 AM 3/28/2017  2:50 PM Full Code [id number]  Hong Lee MD Inpatient    3/10/2016 11:07 AM 2/12/2017  6:38 AM Full Code [id number]  Kayleen Trevino PA-C Outpatient    3/9/2016  8:44 PM 3/10/2016 11:07 AM Full Code [id number]  Carolee Kline MD ED    12/29/2015  3:52 PM 12/31/2015  2:38 PM Full Code [id number]  Brenda Duncan RN Inpatient    9/11/2015  5:39 PM 9/14/2015  2:02 PM Full Code [id number]  Veronica Salmon RN Inpatient    5/10/2015  7:45 PM 5/14/2015  5:11 PM Full Code [id number]  Natalia Garibay MD Inpatient    4/8/2015  5:33 AM 4/13/2015  6:46 PM Full Code [id number]  Luna Duncan MD Inpatient    3/20/2015  6:00 AM 3/23/2015  5:43 PM Full Code [id number]  Kevin Hewitt MD Inpatient    1/25/2015  4:00 AM 1/28/2015  3:50 PM Full Code [id number]  Marlene Corrales, RN  Inpatient    1/15/2014  5:59 PM 1/17/2014  3:27 PM Full Code [id number]  Keena Regan, RN Inpatient    4/30/2013  4:35 PM 5/3/2013  6:24 PM Full Code [id number]  Nel Wallace, RN Inpatient    3/27/2013  5:08 PM 4/2/2013  2:03 PM Full Code [id number]  Brenda Mishra, RN Inpatient    12/4/2012  3:08 PM 12/11/2012  2:53 PM Full Code [id number]  Jesenia Cabello, RN Inpatient    5/18/2012  7:38 PM 5/23/2012  4:06 PM Full Code [id number]  Veronica Salmon RN Inpatient    1/31/2012  3:58 PM 2/3/2012  2:14 PM Full Code [id number]  Shiva Feng, DO Inpatient         Medication Review      START taking        Dose / Directions Comments    acetaminophen 650 MG Suppository   Commonly known as:  TYLENOL   Used for:  Fever, unspecified        Dose:  650 mg   Place 1 suppository (650 mg) rectally every 4 hours as needed for fever or mild pain   Quantity:  60 suppository   Refills:  0        atropine 1 % Soln   Used for:  Comfort measures only status        Dose:  2 drop   Place 2 drops under the tongue every 2 hours as needed for secretions   Quantity:  5 mL   Refills:  0        bisacodyl 10 MG Suppository   Commonly known as:  DULCOLAX   Used for:  Constipation, unspecified constipation type        Dose:  10 mg   Place 1 suppository (10 mg) rectally daily as needed for constipation   Quantity:  25 suppository   Refills:  1        HYDROmorphone 10 mg/mL Liqd (HIGH CONC) solution   Commonly known as:  DILAUDID high concentration   Used for:  Comfort measures only status        Dose:  1 mg   Place 0.1 mLs (1 mg) under the tongue every 2 hours as needed for moderate to severe pain (anxiety or restlessness)   Quantity:  30 mL   Refills:  0        LORazepam 2 MG/ML (HIGH CONC) solution   Commonly known as:  ATIVAN   Used for:  Comfort measures only status, Convulsions, unspecified convulsion type (H)   Replaces:  LORazepam 0.5 MG tablet        Dose:  0.5 mg   Place 0.25 mLs (0.5 mg) under the tongue every 4 hours  "  Quantity:  30 mL   Refills:  0        OLANZapine zydis 5 MG ODT tab   Commonly known as:  zyPREXA   Used for:  Agitation   Replaces:  ZYPREXA PO        Dose:  5 mg   Place 1 tablet (5 mg) under the tongue every 6 hours as needed   Quantity:  40 tablet   Refills:  0          CONTINUE these medications which have NOT CHANGED        Dose / Directions Comments    insulin syringe-needle U-100 31G X 5/16\" 1 ML   Commonly known as:  BD insulin syringe ULTRAFINE   Used for:  Type 2 diabetes, HbA1c goal < 7% (H)        Use one syringe bid daily or as directed.   Quantity:  60 each   Refills:  prn          STOP taking     aMILoride 5 MG tablet   Commonly known as:  MIDAMOR           amLODIPine 5 MG tablet   Commonly known as:  NORVASC           cloNIDine 0.2 MG tablet   Commonly known as:  CATAPRES           divalproex 500 MG EC tablet   Commonly known as:  DEPAKOTE           glipiZIDE 2.5 MG 24 hr tablet   Commonly known as:  GLUCOTROL XL           insulin isophane & regular susp   Commonly known as:  HumuLIN MIX 70/30 PEN           isosorbide mononitrate 30 MG 24 hr tablet   Commonly known as:  IMDUR           LASIX PO           LORazepam 0.5 MG tablet   Commonly known as:  ATIVAN   Replaced by:  LORazepam 2 MG/ML (HIGH CONC) solution           metoprolol 25 MG 24 hr tablet   Commonly known as:  TOPROL-XL           polyethylene glycol powder   Commonly known as:  MIRALAX/GLYCOLAX           PRILOSEC PO           RISPERDAL PO           risperiDONE 0.5 MG ODT tab   Commonly known as:  risperDAL M-TABS           senna-docusate 8.6-50 MG per tablet   Commonly known as:  SENOKOT-S;PERICOLACE           ZYPREXA PO   Replaced by:  OLANZapine zydis 5 MG ODT tab                     Further instructions from your care team         PIP ACTIVITY MEASURES:    CHAIR: Pt should sit on a chair cushion ([id number]) when up to the chair and not sit for more than one hour at a time before fully offloading backside (either stand for a couple of " "minutes and/or return to bed, positioning on a side) to relieve pressure and re-perfuse tissue. Additionally, encourage pt to shift side to side every 15 minutes, too.     NOTE: the Z-Flow Pad is NOT a cushion, pt should NOT sit on the Z-Flow pads          BED: Reposition side to side every 1-2 hours when awake.     Consider Z-Norman Pillows to help keep pt on side (#810848-medium or #[id number]- large). *Z-Flows are for positioning, DO NOT SIT ON!    Keep heels elevated    As able keep HOB below 30 degrees    Low air loss specialty mattress ordered    NOTE: If pt is refusing to turn or reposition they must be educated on the potential injury from not offloading pressure. Then this \"educated refusal\" needs to be documented as an \"educated refusal to turn/ reposition\" and document if alert, etc. Additionally please notify MD and charge nurse of refusal(s).    Plan for wound care to wound located on coccyx/Right Gluteal Cleft:  1. Clean wound with MicroKlenz Spray, pat dry  2. Paint with No Sting Skin Prep (#668569) and allow to dry thoroughly  3. Press a Mepilex Sacral Dressing (#494319) to the area, making sure to conform nicely to skin curvatures  4. Time and date dressing change and clarisse with a \"T\" for treatment of a wound  5. Reposition pt every 1 to 2 hours when in bed and hourly when up to the chair to relieve pressure and promote perfusion to tissue  NOTE** make sure to continue to assess under the Mepilex Dressing BID and document findings.        Summary of Visit     Reason for your hospital stay       Acute encephalopathy, dehydration, renal failure             After Care     Additional Discharge Instructions       Follow up with hospice       Advance Diet as Tolerated       Follow this diet upon discharge: Orders Placed This Encounter      Room Service      Regular Diet Adult       Dunlap catheter       To straight gravity drainage. Change catheter every 2 weeks and PRN for leaking or decreased uring output with " signs of bladder distention. DO NOT change catheter without a specific MD order IF diagnosis of benign prostatic hypertrophy (BPH), neurogenic bladder, or other urological conditions       General info for SNF       Length of Stay Estimate: Short Term Care: Estimated # of Days <30  Condition at Discharge: Declining  Level of care:board and care  Rehabilitation Potential: Poor  Admission H&P remains valid and up-to-date: Yes  Recent Chemotherapy: N/A  Use Nursing Home Standing Orders: Yes       Mantoux instructions       Give two-step Mantoux (PPD) Per Facility Policy Yes             Procedures     Oxygen - Nasal cannula       2 Lpm by nasal cannula for comfort.             Statement of Approval     Ordered          06/01/17 1121  I have reviewed and agree with all the recommendations and orders detailed in this document.  EFFECTIVE NOW     Approved and electronically signed by:  Carson Schuster MD

## 2017-05-16 NOTE — IP AVS SNAPSHOT
"` [place] [address] ONCOLOGY: [phone]                                              INTERAGENCY TRANSFER FORM - NURSING   2017                    Hospital Admission Date: 2017  MRAIO ALBERTO PATRICK   : 1955  Sex: Female        Attending Provider: Isabel Ojeda MD     Allergies:  Amoxicillin, Amoxicillin, Haldol [Benzyl Alcohol], Haldol [Haloperidol], Pcn [Penicillin G], Penicillins, Seroquel [Quetiapine]    Infection:  None   Service:  HOSPITALIST    Ht:  1.626 m (5' 4\")   Wt:  84.4 kg (186 lb 1.1 oz)   Admission Wt:  91.2 kg (201 lb)    BMI:  31.94 kg/m 2   BSA:  1.95 m 2            Patient PCP Information     None on File      Current Code Status     Date Active Code Status Order ID Comments User Context       Prior      Code Status History     Date Active Date Inactive Code Status Order ID Comments User Context    2017 11:05 AM  DNR/DNI [id number]  Carson Schuster MD Outpatient    2017  2:25 PM 2017 11:05 AM DNR/DNI [id number]  Carson Schuster MD Inpatient    2017 10:13 PM 2017  2:25 PM Full Code [id number]  Le Bates MD Inpatient    3/30/2017  7:44 PM 2017  2:51 PM Full Code [id number]  Jamey Thornton MD Inpatient    3/28/2017  2:50 PM 3/30/2017  7:44 PM Full Code [id number]  Wilfrido Flores MD Outpatient    2017  6:38 AM 3/28/2017  2:50 PM Full Code [id number]  Hong Lee MD Inpatient    3/10/2016 11:07 AM 2017  6:38 AM Full Code [id number]  Kayleen Trevino PA-C Outpatient    3/9/2016  8:44 PM 3/10/2016 11:07 AM Full Code [id number]  Carolee Kline MD ED    2015  3:52 PM 2015  2:38 PM Full Code [id number]  Brenda Duncan RN Inpatient    2015  5:39 PM 2015  2:02 PM Full Code [id number]  Veronica Salmon RN Inpatient    5/10/2015  7:45 PM 2015  5:11 PM Full Code [id number]  Natalia Garibay MD Inpatient    2015  5:33 AM 2015  6:46 PM Full Code [id number]  Perla, " MD Luna Inpatient    3/20/2015  6:00 AM 3/23/2015  5:43 PM Full Code [id number]  Kevin Hewitt MD Inpatient    1/25/2015  4:00 AM 1/28/2015  3:50 PM Full Code [id number]  Marlene Corrales, VERONA Inpatient    1/15/2014  5:59 PM 1/17/2014  3:27 PM Full Code [id number]  Keena Regan, RN Inpatient    4/30/2013  4:35 PM 5/3/2013  6:24 PM Full Code [id number]  Nel Wallace RN Inpatient    3/27/2013  5:08 PM 4/2/2013  2:03 PM Full Code [id number]  Brenda Mishra, RN Inpatient    12/4/2012  3:08 PM 12/11/2012  2:53 PM Full Code [id number]  Jesenia Cabello, RN Inpatient    5/18/2012  7:38 PM 5/23/2012  4:06 PM Full Code [id number]  Veronica Salmon, RN Inpatient    1/31/2012  3:58 PM 2/3/2012  2:14 PM Full Code [id number]  Shiva Feng, DO Inpatient      Advance Directives        Does patient have a scanned Advance Directive/ACP document in EPIC?           No        Hospital Problems as of 6/1/2017              Priority Class Noted POA    Acute renal failure (ARF) (H) Medium  5/16/2017 Yes      Non-Hospital Problems as of 6/1/2017              Priority Class Noted    HTN (hypertension)   Unknown    Hyperlipidemia with target LDL less than 70   Unknown    TYPE 2 DIABETES, HBA1C GOAL < 7%   10/31/2010    acute Mastitis   1/31/2012    CKD (chronic kidney disease) stage 3, GFR 30-59 ml/min   Unknown    Advanced directives, counseling/discussion   6/25/2012    Health Care Home   2/1/2013    Suicidal ideation   4/30/2013    Schizoaffective disorder, bipolar type (H)   5/22/2013    Depression (emotion)   1/15/2014    Paranoia (H)   1/25/2015    Schizoaffective disorder, manic type (H)   3/20/2015    Psychosis   4/8/2015    Depression with suicidal ideation Medium  9/11/2015    Depression Medium  12/29/2015    Nausea Medium  3/9/2016    Falls frequently Medium  2/12/2017    Sepsis due to urinary tract infection (H) Medium  3/16/2017    Altered mental status Medium  3/30/2017      Immunizations      Name Date      Influenza (H1N1) 12/01/09     Influenza (IIV3) 08/29/12     Influenza (IIV3) 08/28/12     Influenza (IIV3) 11/01/11     Influenza (IIV3) 10/29/08     Tdap (Adacel,Boostrix) 03/25/12          END      ASSESSMENT     Discharge Profile Flowsheet     EXPECTED DISCHARGE     FINAL RESOURCES      Expected Discharge Date  06/02/17 ([facility]) 05/30/17 1429   Resources List  Cardiac Rehab;Skilled Nursing Facility 04/04/17 1041    DISCHARGE NEEDS ASSESSMENT     Other Resources  Transportation Services 04/04/17 1041    Equipment Currently Used at Home  none 03/23/17 0944   SKIN      Transportation Available  -- (mom provides. ) 02/03/12 0940   Inspection  -- (Deferred comfort care) 06/01/17 1008    # of Referrals Placed by CTS  Senior Linkage Line;Post Acute Facilities;[facility] 03/24/17 1611   Skin WDL  ex 05/31/17 1936    GASTROINTESTINAL (ADULT,PEDIATRIC,OB)     Skin Temperature  warm 05/31/17 1936    GI WDL  -- (Deferred comfort care) 06/01/17 1008   Skin Moisture  dry 05/31/17 1936    Abdominal Appearance  obese 05/31/17 1936   Skin Integrity  bruise(s);pressure ulcer(s) 05/31/17 1936    All Quadrants Bowel Sounds  hypoactive 05/31/17 1936   Skin Color/Characteristics  bruised (ecchymotic) 05/31/17 1936    Last Bowel Movement  05/25/17 05/25/17 0849   Skin Elasticity  quick return to original state 05/31/17 1936    GI Signs/Symptoms  passing flatus;fecal incontinence 05/29/17 1756   SAFETY      Passing flatus  -- (UTV) 05/30/17 1353   Safety WDL  WDL 06/01/17 0309    COMMUNICATION ASSESSMENT     Safety Equipment  suction regulator;suction equipment 05/29/17 1756    Patient's communication style  spoken language (English or Bilingual) 05/16/17 1831                      Assessment WDL (Within Defined Limits) Definitions           Safety WDL     Effective: 09/28/15    Row Information: <b>WDL Definition:</b> Bed in low position, wheels locked; call light in reach; upper side rails up x 2; ID band on<br>  "<font color=\"gray\"><i>Item=AS safety wdl>>List=AS safety wdl>>Version=F14</i></font>      Skin WDL     Effective: 09/28/15    Row Information: <b>WDL Definition:</b> Warm; dry; intact; elastic; without discoloration; pressure points without redness<br> <font color=\"gray\"><i>Item=AS skin wdl>>List=AS skin wdl>>Version=F14</i></font>      Vitals     Vital Signs Flowsheet     VITAL SIGNS     Total  0 05/25/17 1655    Temp  99.1  F (37.3  C) 05/31/17 1230   HEIGHT AND WEIGHT      Temp src  Axillary 05/31/17 1230   Height  1.626 m (5' 4\") 05/16/17 2321    Resp  16 06/01/17 1115   Height Method  Estimated 05/16/17 2321    Pulse  68 05/28/17 0749   Weight  84.4 kg (186 lb 1.1 oz) 05/31/17 0501    Heart Rate  100 05/31/17 1230   BSA (Calculated - sq m)  1.92 05/16/17 2321    Pulse/Heart Rate Source  Monitor 05/31/17 1230   BMI (Calculated)  31.05 05/16/17 2321    BP  135/65 05/31/17 1748   POSITIONING      BP Location  Left arm 05/31/17 1230   Body Position  right, side-lying 06/01/17 1104    OXYGEN THERAPY     Head of Bed (HOB)  HOB at 15 degrees 06/01/17 0901    SpO2  94 % 06/01/17 0901   Positioning/Transfer Devices  pillows 06/01/17 1104    O2 Device  Nasal cannula 06/01/17 0901   DAILY CARE      Oxygen Delivery  2 LPM 06/01/17 0901   Activity Type  bedrest 06/01/17 1008    PAIN/COMFORT     Activity Level of Assistance  assistance, 2 people 06/01/17 1008    Patient Currently in Pain  LONA 06/01/17 0437   Activity Assistive Device  mechanical lift 06/01/17 1008    Preferred Pain Scale  FLACC (Face Legs Arms Cry Consolability Scale) 06/01/17 1115   EKG MONITORING      rFLACC Pain Rating: Face  0-->no particular expression or smile 06/01/17 1115   Cardiac Rhythm  NSR 05/21/17 1628    rFLACC Pain Rating - Legs  0-->normal position or relaxed 06/01/17 1115   DURAN COMA SCALE      rFLACC Pain Rating: Activity  0-->lying quietly, normal position, moves easily 06/01/17 1115   Best Eye Response  -- (Deferred comfort care) " 06/01/17 1008    rFLACC Pain Rating - Cry  0-->no cry (awake or asleep) 06/01/17 1115   Best Motor Response  4-->(M4) withdraws from pain 05/31/17 0503    rFLACC Pain Rating - Consolability  0-->content, relaxed 06/01/17 1115   Best Verbal Response  4-->(V4) confused 05/31/17 0503    rFLACC Score: Activity  0 06/01/17 1115   Petty Coma Scale Score  12 05/31/17 0503    Nonverbal Indicators Of Pain  -- (none present) 05/28/17 1942   POINT OF CARE TESTING      PAIN ASSESSMENT/FLACC     Puncture Site  fingertip 05/25/17 1253    Pain Rating: FLACC (rest) - Face  0 05/31/17 1937   Bedside Glucose (mg/dl )   243 mg/dl 05/25/17 1253    Pain Rating: FLACC (rest) - Legs  0 05/31/17 1937   [REMOVED] LEFT GROIN INTERVENTIONAL PROCEDURE ACCESS      Pain Rating: FLACC (rest) - Activity  0 05/31/17 1937   Site Assessment  -- 05/18/17 1746    Pain Rating: FLACC (rest) - Cry  0 05/31/17 1937   Hemostasis management  -- 05/18/17 1746    Pain Rating: FLACC (rest) - Consolability  0 05/31/17 1937   CMS Left Extremity  -- 05/18/17 1746    Score: FLACC (rest)  0 05/31/17 1937   Dorsalis Pulse - Left Leg  -- 05/18/17 1746    CRITICAL-CARE PAIN OBSERVATION TOOL (CPOT)     Posterior Tibial Pulse - Left Leg  -- 05/18/17 1746    Facial Expression  0 05/29/17 1731   ECG      Body Movements  0 05/29/17 1731   ECG Rhythm  Sinus rhythm 05/25/17 1655    Compliance w/ventilator (intubated patients)  Extubated 05/25/17 1655   Equipment  electrodes changed;telemetry batteries changed 05/31/17 0901    Vocalization (extubated patients)  0 05/25/17 1655   Ectopy  None 05/25/17 1655    Muscle Tension  0 05/25/17 1655   Lead Monitored  Lead II;V 1 05/25/17 1655            Patient Lines/Drains/Airways Status    Active LINES/DRAINS/AIRWAYS     Name: Placement date: Placement time: Site: Days: Last dressing change:    Urethral Catheter Temperature probe 03/30/17   1520   Temperature probe   62     Urethral Catheter 05/16/17   2000      15      Peripheral IV 03/30/17 Left Upper forearm 03/30/17   1625   Upper forearm   62     Wound 05/16/17 Medial Coccyx Suspected deep tissue injury 05/16/17 2200   Coccyx   15     Wound 05/16/17 Bilateral Buttocks Suspected pressure ulcer bruising, slow to noah 05/16/17 2200   Buttocks   15     Rash 03/31/17 1200 Bilateral groin 03/31/17   1200    61             Patient Lines/Drains/Airways Status    Active PICC/CVC     Name: Placement date: Placement time: Site: Days: Additional Info Last dressing change:    CVC Triple Lumen 05/20/17 Internal jugular 05/20/17 2131   Internal jugular   11 Catheter Brand: arrow            Dressing Intervention: Transparent            Total Catheter Length (cm): 20 cm               Intake/Output Detail Report     Date Intake         Output     Net    Shift P.O. I.V. NG/GT IV Piggyback Enteral Total Urine Emesis/NG output Stool Total       Noc 05/30/17 2300 - 05/31/17 0659 -- -- -- -- -- -- 1500 -- -- 1500 -1500    Day 05/31/17 0700 - 05/31/17 1459 -- -- 195 -- -- 195 1375 -- -- 1375 -1180    Genna 05/31/17 1500 - 05/31/17 2259 0 -- -- -- -- -- 675 -- -- 675 -675    Noc 05/31/17 2300 - 06/01/17 0659 -- -- -- -- -- -- 50 -- -- 50 -50    Day 06/01/17 0700 - 06/01/17 1459 -- -- -- -- -- -- 350 -- -- 350 -350      Last Void/BM       Most Recent Value    Urine Occurrence     Stool Occurrence 1 at 05/30/2017 2200      Case Management/Discharge Planning     Case Management/Discharge Planning Flowsheet     REFERRAL INFORMATION     Quality of Family Relationships  supportive;involved 05/18/17 1148    Did the Initial Social Work Assessment result in a Social Work Case?  Yes 05/18/17 1148   EMPLOYMENT      Admission Type  inpatient 05/18/17 1148   Do you work full or part-time?  no 05/18/17 1148    Arrived From  home or self-care 03/10/17 1246   COPING/STRESS      Referral Source  physician 05/18/17 1148   Major Change/Loss/Stressor  hospitalization;illness 05/18/17 0804    # of Referrals Placed  by [facility]  Senior Linkage Line;Post Acute Facilities;[facility] 03/24/17 1611   EXPECTED DISCHARGE      Record Reviewed  medical record 05/18/17 1148   Expected Discharge Date  06/02/17 ([facility]) 05/30/17 1429     Assigned to Case  Yu Gayle 05/18/17 1148   DISCHARGE PLANNING      Primary Care Clinic Name  [facility] 03/13/17 1544   Transportation Available  -- (mom provides. ) 02/03/12 0940    Primary Care MD Name  Dr. Myles 03/13/17 1544   FINAL RESOURCES      LIVING ENVIRONMENT     Equipment Currently Used at Home  none 03/23/17 0944    Lives With  facility resident 05/18/17 1148   Resources List  Cardiac Rehab;Skilled Nursing Facility 04/04/17 1041    Living Arrangements  [facility] 05/18/17 1148   Other Resources  Transportation Services 04/04/17 1041    Provides Primary Care For  no one 05/18/17 1148   MH/BH CAREGIVER      Primary Care Provided By  other (see comments) (facility) 05/18/17 1148   Filed Complexity Screen Score  11 05/22/17 1017    Quality Of Family Relationships  supportive;involved 05/18/17 1148   ABUSE RISK SCREEN      Able to Return to Prior Living Arrangements  yes (18 day MA bed hold) 05/18/17 1148   QUESTION TO PATIENT:  Has a member of your family or a partner(now or in the past) intimidated, hurt, manipulated, or controlled you in any way?  patient declined to answer or is unable to answer 05/16/17 1916    ASSESSMENT OF FAMILY/SOCIAL SUPPORT     QUESTION TO PATIENT: Do you feel safe going back to the place where you are living?  patient declined to answer or is unable to answer 05/16/17 1916    Marital Status  Single 05/18/17 1148   OBSERVATION: Is there reason to believe there has been maltreatment of a vulnerable adult (ie. Physical/Sexual/Emotional abuse, self neglect, lack of adequate food, shelter, medical care, or financial exploitation)?  no 05/16/17 1916    Who is your support system?  Parent(s) 05/18/17 1148   (R) MENTAL  HEALTH SUICIDE RISK      Description of Support System  Supportive;Involved 05/18/17 1148   Are you depressed or being treated for depression?  No 05/18/17 0803    Support Assessment  Adequate family and caregiver support 05/18/17 1144

## 2017-05-16 NOTE — ED NOTES
Bed: ED19  Expected date: 5/16/17  Expected time: 6:26 PM  Means of arrival: Ambulance  Comments:  710 61f weakness, kidney failure

## 2017-05-16 NOTE — IP AVS SNAPSHOT
` `     [place] [address] ONCOLOGY: [phone]                 INTERAGENCY TRANSFER FORM - NOTES (H&P, Discharge Summary, Consults, Procedures, Therapies)   2017                    Hospital Admission Date: 2017  NEL PATRICK   : 1955  Sex: Female        Patient PCP Information     None on File         History & Physicals      H&P signed by Le Bates MD at 2017  3:35 PM      Author:  Le Bates MD Service:  Hospitalist Author Type:  Physician    Filed:  2017  3:35 PM Date of Service:  2017 10:33 PM Creation Time:  2017 12:31 AM    Status:  Signed :  Le Bates MD (Physician)         CHIEF COMPLAINT:  lethargy, decreased intake for the last 2 days.      HISTORY OF PRESENT ILLNESS:  Nel Patrick is a 61-year-old woman with history of intellectual disability, schizoaffective disorder, chronic kidney disease, recent admissions for acute encephalopathy associated with infection who most recently was admitted on 2017-2017 for acute metabolic encephalopathy associated with acute kidney injury, metabolic acidosis and suspected healthcare-associated pneumonia.  Prior to that, she was admitted in March for acute encephalopathy associated with a UTI as well as acute kidney injury.  She is usually just oriented x 1 to self and sometimes to place, per my discussion with TCU.     Patient is unable to give a history at this time, so all of my history is obtained from the ER physician and discussion with the TCU staff.  Apparently, Nel was more lethargic, not interactive, not getting up out of bed, eating less for the last 2 days.  Due to this, they got labs today and her creatinine was elevated at 4 with a potassium of 6.1.  They therefore sent her to the ED.  They did not notice any fevers or chills or shortness of breath or cough.  No abdominal pain was noted.  No rashes or any other symptoms, just lethargy and decreased interactiveness and  decreased appetite.      At arrival to the ED, her temperature is 99.3.  Her heart rate was 110 with a blood pressure of 152/82, respirations 16, oxygenation 96% on room air.  She is alert but unable to follow commands but not able to answer most questions.  Her labs were significant for a potassium of 6.8, verified with repeat lab.  A creatinine of 3.4 up from her baseline of 2.4.  Her bicarb is only 15 with a normal anion gap.  Liver panel was normal.  A1c 7.  Her lactic acid was normal.  Lipase was obtained and was mildly elevated at 756 associated with no abdominal pain.  TSH was normal.        A CT of the abdomen and pelvis was obtained for the elevated lipase and showed just an atrophied pancreas but no evidence of acute pancreatitis.  They noted a heterogeneous appearance of the liver, possibly related to streak artifact and a few tiny nonobstructive renal stones but nothing to suggest acute infection or acute process.  Her glucose was when 198.  VBG was obtained and revealed a mild acidosis with a pH of 7.22, bicarb calculated at 16 with a CO2 of 39.  Her CBC revealed a normal white blood cell count, hemoglobin 12.9 up from previous hemoglobins around 10 to suggest hemoconcentration.  Her platelets are 143.  Her UA is positive with  white blood cells and with 2-5 white blood cell casts.  Also noted is some trace ketones, moderate blood with 10-25 RBCs, many bacteria and moderate yeast.  Blood cultures were obtained.  Chest x-ray was done and was clear, reviewed by myself.  EKG was performed and revealed no acute changes of hyperkalemia.  She is in normal sinus rythym, reveals sinus tachycardia with no evidence of acute ischemia.  There is very minimal peaking of the T waves, no QRS prolongation.      She received bicarbonate, insulin, calcium gluconate in the ED with repeat potassium of 5.5.      PAST MEDICAL HISTORY:   1.  Schizoaffective disorder with intellectual disability associated with anxiety  and depression.   2.  Diabetes mellitus type 2, last A1c was 7.   3.  Hypertension.   4.  Hyperlipidemia.   5.  Chronic kidney disease with a creatinine around 2.4.  She has had previous hospitalizations with acute kidney injury with metabolic alkalosis and her bicarb usually runs around 20-21 when she is stable.   6.  History of diabetes insipidus, presumed related to lithium effect or prior prolonged lithium treatment.   7.  Metabolic bone disease with secondary hyperparathyroidism.   8.  Vitamin D deficiency.      MEDICATIONS:    Prior to Admission Medications   Prescriptions Last Dose Informant Patient Reported? Taking?   Furosemide (LASIX PO) 5/16/2017 at Unknown time Pharmacy Yes Yes   Sig: Take 10 mg by mouth daily    Furosemide (LASIX PO) prn med Pharmacy Yes Yes   Sig: Take 20 mg by mouth daily as needed (Take at noon for leg swelling if needed)   LORazepam (ATIVAN) 0.5 MG tablet prn med  No Yes   Sig: Take 1 tablet (0.5 mg) by mouth 2 times daily as needed for anxiety   OLANZapine (ZYPREXA PO) 5/15/2017 at Unknown time Pharmacy Yes Yes   Sig: Take 5 mg by mouth At Bedtime   Omeprazole (PRILOSEC PO) 5/15/2017 at pm Pharmacy Yes Yes   Sig: Take 20 mg by mouth 2 times daily (before meals)   RisperiDONE (RISPERDAL PO) 5/15/2017 at Unknown time Pharmacy Yes Yes   Sig: Take 1 mg by mouth At Bedtime   aMILoride (MIDAMOR) 5 MG tablet 5/15/2017 at Unknown time  No Yes   Sig: Take 2 tablets (10 mg) by mouth daily   amLODIPine (NORVASC) 5 MG tablet 5/15/2017 at Unknown time  No Yes   Sig: Take 1 tablet (5 mg) by mouth daily   cloNIDine (CATAPRES) 0.2 MG tablet 5/15/2017 at Unknown time  No Yes   Sig: Take 1 tablet (0.2 mg) by mouth 2 times daily   divalproex (DEPAKOTE) 500 MG EC tablet 5/15/2017 at Unknown time Pharmacy Yes Yes   Sig: Take 500 mg by mouth every morning   divalproex (DEPAKOTE) 500 MG EC tablet 5/15/2017 at Unknown time Pharmacy Yes Yes   Sig: Take 1,500 mg by mouth At Bedtime   glipiZIDE (GLUCOTROL  "XL) 2.5 MG 24 hr tablet 5/15/2017 at pm  No Yes   Sig: Take 1 tablet (2.5 mg) by mouth 2 times daily (before meals)   insulin isophane & regular (HUMULIN MIX 70/30 PEN) susp 5/15/2017 at Unknown time  No Yes   Si units before breakfast  30 units before dinner   Patient taking differently: 20 units before breakfast  34 units before dinner   insulin syringe-needle U-100 (BD INSULIN SYRINGE ULTRAFINE) 31G X \" 1 ML  Pharmacy No No   Sig: Use one syringe bid daily or as directed.   isosorbide mononitrate (IMDUR) 30 MG 24 hr tablet 5/15/2017 at Unknown time Pharmacy No Yes   Sig: Take 1 tablet by mouth daily.   metoprolol (TOPROL-XL) 25 MG 24 hr tablet 5/15/2017 at Unknown time  No Yes   Sig: Take 1 tablet (25 mg) by mouth daily   polyethylene glycol (MIRALAX/GLYCOLAX) powder prn med Self Yes Yes   Sig: Take 17 g by mouth daily as needed for constipation   risperiDONE (RISPERDAL M-TABS) 0.5 MG disintegrating tablet prn med  No Yes   Sig: Place 1 tablet (0.5 mg) under the tongue 3 times daily as needed   senna-docusate (SENOKOT-S;PERICOLACE) 8.6-50 MG per tablet prn med  No Yes   Sig: Take 1 tablet by mouth 2 times daily as needed for constipation      Facility-Administered Medications: None           ALLERGIES:       Allergies   Allergen Reactions     Amoxicillin      Amoxicillin      Haldol [Benzyl Alcohol]      Haldol [Haloperidol]      Pcn [Penicillin G]      Penicillins      Seroquel [Quetiapine] GI Disturbance        PAST SURGICAL HISTORY:  Status post tonsillectomy, adenoidectomy, D&C and total knee arthroplasty.      FAMILY HISTORY:  Per chart review, her mother had hypertension.  Father had colorectal cancer.  Patient is unable to give me any more information.      SOCIAL HISTORY:  Again, per chart review, she cannot give me any information.  She is single and currently living at [facility].  It sounds like she was transferred there around February or March around her recent " hospitalizations.  There is no history of alcohol or tobacco abuse that is known.      REVIEW OF SYSTEMS:  A complete 10-point review of systems cannot be obtained from patient, but as above, I was able to verify that she has had no other symptoms of acute illness other than lethargy and decreased appetite as I have dictated up in the HPI.      PHYSICAL EXAMINATION:   VITAL SIGNS:  Her temperature is 99.3, heart rate currently 70, up to 110 while she is in the ED for while.  Her blood pressure is 160/93, respirations 18, oxygenation 97% on room air.   GENERAL:  She does not appear in any distress.   NEUROPSYCHIATRIC:  She is alert but does not make good eye contact, staring up at the ceiling.  She is able to tell me at [place] but not able to answer any further questions.  She has nonsensical answers to most of my questions.  She does follow commands.   HEENT:  Her pupils are round and reactive to light at about 3 mm in diameter.  She has full extraocular movements.  Her cranial nerves II-XII are grossly intact.  She is moving all 4 extremities without gross focal neurological deficit.  Her sclerae is clear without icterus or injection.  Her oropharynx is quite dry.  There is no erythema or swelling.   NECK:  Supple without lymphadenopathy or thyromegaly.  Her extremities are warm with just trace edema.   HEART:  When I was examining her was tachycardic.  She has a soft systolic murmur.   LUNGS:  Clear throughout without wheezes, rhonchi or crackles.   ABDOMEN:  Soft throughout without hepatosplenomegaly or masses.   SKIN:  Reveals no acute rashes or ecchymoses or petechiae.      LABORATORY DATA AND OTHER STUDIES:  As above in HPI.      ASSESSMENT AND PLAN:  Nel Farmer is a 61-year-old woman with history of schizoaffective disorder, cognitive impairment usually oriented to just self and place per Transitional Care Unit report who has had 2 days of lethargy, decreased activity, decreased intake and therefore  laboratories were obtained at [facility] and she was found to have a potassium of 6.1 with a creatinine of 4.  She was therefore transferred to the emergency department.  Here, she has a temperature of 99.3.  She is slightly tachycardic.  Blood pressure was elevated in the 160s.  She had good oxygenation.  Her white cell count is normal, hemoglobin elevated, suggestive of hemoconcentration.  UA does show signs of infection with  white blood cells with 2-5 white blood cell casts.  She had a thorough workup for infection including chest x-ray, CT of the abdomen and pelvis as her lipase was mildly elevated, but she has no abdominal pain.  Her CT showed just mild pancreatic atrophy, but no acute signs of acute pancreatitis.  Also notable on her laboratories is that is acidotic with a bicarbonate of 15, normal anion gap; pH VBG was 7.22.  Her glucoses were elevated here in the 200s-250s.   1.  Acute metabolic encephalopathy likely secondary to severe dehydration with acute kidney injury and hyperkalemia.  Notably, she is on amelioride which likely contributes and we will be holding this. At this time, she will be admitted to the hospital, will continue her prior to admission psychiatric medications that she is able to take.  She has as needed medications if she develops agitation.  She will need frequent reorientation.  We will continue her prior to admission olanzapine, Depakote, risperidone.  I am going to hold her Ativan.     2.  Severe dehydration with acute kidney injury and hyperkalemia.  Her baseline creatinine is around 2.4 with creatinine on admission of 3.41, K 6.8 at admission. In the ED, she was given 2 liters of fluid, bicarbonate, insulin, calcium and her potassium is down to 5.5 at this point. We will continue a bicarbonate drip for her metabolic acidosis. She will be on telemetry.  If her potassium goes up again, I would favor giving her Lasix with a fluid bolus, as well as further insulin and  albuterol.  Calcium if there are EKG changes.  With regards to her acute kidney injury, this is very likely prerenal.  Will continue intravenous fluids with 75 mEq of bicarbonate with one-half normal saline at 100 mL per hour.   3.  Probable urinary tract infection with  white blood cells and 2-5 WBC casts on a catheter specimen of a UA.  She has a history of Klebsiella urinary tract infection in the past with sensitivity to most antibiotics tested including ceftriaxone and fluoroquinolones.  She was given Levaquin 750 mg in the emergency department and will continue 500 mg every other day, which is renally dosed.  Urine culture and blood cultures were sent.   4.  Hypertension.  Her blood pressure is elevated here, so I am going to continue her prior to admission medications if she is able to take them.  She is alert and following commands at this point.  Will continue her prior to admission amlodipine, clonidine, isosorbide, mononitrate, metoprolol.  I am going to hold her Lasix and amiloride at this time but would resume Lasix while giving IV fluids if her potassium remains elevated.   5.  Diabetes mellitus type 2 on insulin.  Will be holding her prior to admission glipizide.  I am also going to hold her 70/30 insulin until she is eating again and will just give her a dose of 10 units of Lantus tonight.  Will follow her sliding scale insulin every 4 hours.   6.  Gastroesophageal reflux disease.  Continue omeprazole.   7.  Code status:  Per prior hospitalizations is full.  Notably, the emergency room doctor was able to talk to family who noted she would likely not want dialysis if needed.  She is a full code at this time.  Of course discussions with family and patient, if able, would be important going forward regarding care given her multiple hospitalizations and declining course.   8.  Deep venous thrombosis prophylaxis.  I am going to place her on heparin subcutaneously, currently she is not very mobile.    9.  Disposition:  She will be admitted under inpatient.   10. Mildly elevated lipase, without abdominal pain and just pancreatic atrophy and no signs of acute pancreatitis on CT. Follow up in AM, she will be NPO over night.         LE BATES MD             D: 2017 22:33   T: 2017 00:30   MT: TD      Name:     MARIO ALBERTO FARMER   MRN:      [id number]        Account:      [id number]   :      1955           Admitted:     [id number]      Document: [id number][MM1.1]         Revision History        User Key Date/Time User Provider Type Action    > MM1.1 2017  3:35 PM Le Bates MD Physician Sign     [N/A] 2017 12:31 AM Le Bates MD Physician Edit                  Discharge Summaries     No notes of this type exist for this encounter.         Consult Notes      Consults by Hue Orozco APRN CNP at 2017  2:05 PM     Author:  Hue Orozco APRN CNP Service:  Palliative Author Type:  Nurse Practitioner    Filed:  2017  3:25 PM Date of Service:  2017  2:05 PM Creation Time:  2017  2:05 PM    Status:  Signed :  Hue Orozco APRN CNP (Nurse Practitioner)         [facility]    Palliative Care Consultation     Mario Alberto Farmer  MRN# [id number]  Date of Admission:  2017  Date of Service (when I saw the patient):[KW1.1] 17[KW1.2]  Reason for consult: Consulted by [KW1.1] Peña[KW1.3] for Goals of care    Assessment & Plan[KW1.1]   Mario Alberto Farmer is a 61-year-old woman with history of schizoaffective disorder, cognitive impairment usually oriented to just self and place per TCU report, who has had 2 days of lethargy, decreased activity, decreased intake. Lab work up for evaluation at TCU showed potassium of 6.1 with a creatinine of 4 and so was brought to ER 17. Work up in ER showed possible UTI, LYLA, hyperkalemia. UTI consistent with yeast, no bacteria found.[KW1.3]      Symptoms/Recommendations   1. Goals of Care. Met with Nel's mother Lisa and ANNIKA Fajardo this afternoon (see details of discussion below). At this time, they agree that transitioning to comfort measures and focusing on management of Nel's symptoms is appropriate at this time. They are interested in meeting with the hospice liaison (Lety notified). They also have questions re: placement as they do not wish for Nel to return to her previous TCU in [place]. Relayed this message to unit SW.  -comfort measures  -d/c tube feeds, vitals, labs, telemetry     Support/Coping  -Pt's mother (Lisa) and ANNIKA (Tana) at bedside and present for care conference   -Will involve Palliative LICSW, Maite Erickson, and/or Palliative , Lillie Medina    Decisional Support, Goals of Care, Counseling & Coordination  Decisional Capacity Intact?  -No  Health Care Directive on File?  -No  Code Status/Resuscitation Preferences?  -DNR/DNI  Plan of Care?  -d/c with comfort cares/hospice when placement arranged     Discussion  Introduced the scope of our practice to Nel's mother and ANNIKA. Discussed our potential roles for symptom management, support/coping, and decisional support (aka goals of care).[KW1.1]     Met with Nel's mother Lisa and ANNIKA Fajardo this afternoon. We discussed Nel's overall health and Lisa and Tana feel Nel's health began to decline in February. Since then she has spent a great deal of time in the hospital and they have seen a gradual downward spiral. At this point, Nel spends most of her time in bed sleeping. Lisa and Tana both state that this is not how Nel would want to live. We discussed 2 paths that Nel could continue on. A restorative pathway would include continuing to try and manage Nel's medical complications, this would likely result in a feeding tube being placed to ensure adequate nutrition which she would need to have the best chance at recovery. Lisa and  Tana both understand that she may never get back to her prior level of functioning and that another complication will mean she will return to the hospital and be further set back. The other path Nel could follow would be a comfort focused pathway in which we stop trying to correct problems and instead shift our focus to managing Nel's symptoms and keeping her as comfortable as possible for whatever time she has left. Lisa and Tana feel this path would be best for Nel at this point. We discussed how hospice could provide an additional layer of support to ensure Nel's comfort. Lisa and Tana are agreeable with a hospice meeting and also would like to discuss placement with the unit SW. They expressed a strong desire not to have Nel return to [facility] as they did not feel she received good care and also because it is too far for Lisa to drive to. I explained that we could begin the comfort care plan while Nel is in the hospital by discontinuing labs, vitals, tele monitoring, and the NJ tube as these interventions are not providing Nel comfort. I explained that she could be allowed to eat/drink for comfort/pleasure knowing the risk for aspiration will be present. Lisa and Tana are agreeable with this plan of care. I relayed this information to the unit SW, Lety (hospice liaison), Nel's bedside nurse, and Dr Schuster.[KW1.3]     Thank you for involving us in the care of this patient and family. We will continue to follow. Please do not hesitate to contact me with questions or concerns or the on-call provider for our team if evening or weekend.    Hue GRIFFIN, [facility]  Palliative Medicine   Pager [phone]    Attestation:  Total time spent in counseling/care coordination: >50%    Chief Complaint   Goals of Care.    History is obtained from the pt's family, staff, and extensive chart review.     Adopted from H&P.  Nel Farmer is a 61-year-old woman with history of  intellectual disability, schizoaffective disorder, chronic kidney disease, recent admissions for acute encephalopathy associated with infection who most recently was admitted on 03/30/2017-04/04/2017 for acute metabolic encephalopathy associated with acute kidney injury, metabolic acidosis and suspected healthcare-associated pneumonia.  Prior to that, she was admitted in March for acute encephalopathy associated with a UTI as well as acute kidney injury.  She is usually just oriented x 1 to self and sometimes to place, per my discussion with TCU.      Patient is unable to give a history at this time, so all of my history is obtained from the ER physician and discussion with the TCU staff.  Apparently, Nel was more lethargic, not interactive, not getting up out of bed, eating less for the last 2 days.  Due to this, they got labs today and her creatinine was elevated at 4 with a potassium of 6.1.  They therefore sent her to the ED.  They did not notice any fevers or chills or shortness of breath or cough.  No abdominal pain was noted.  No rashes or any other symptoms, just lethargy and decreased interactiveness and decreased appetite.       At arrival to the ED, her temperature is 99.3.  Her heart rate was 110 with a blood pressure of 152/82, respirations 16, oxygenation 96% on room air.  She is alert but unable to follow commands but not able to answer most questions.  Her labs were significant for a potassium of 6.8, verified with repeat lab.  A creatinine of 3.4 up from her baseline of 2.4.  Her bicarb is only 15 with a normal anion gap.  Liver panel was normal.  A1c 7.  Her lactic acid was normal.  Lipase was obtained and was mildly elevated at 756 associated with no abdominal pain.  TSH was normal.         A CT of the abdomen and pelvis was obtained for the elevated lipase and showed just an atrophied pancreas but no evidence of acute pancreatitis.  They noted a heterogeneous appearance of the liver, possibly  related to streak artifact and a few tiny nonobstructive renal stones but nothing to suggest acute infection or acute process.  Her glucose was when 198.  VBG was obtained and revealed a mild acidosis with a pH of 7.22, bicarb calculated at 16 with a CO2 of 39.  Her CBC revealed a normal white blood cell count, hemoglobin 12.9 up from previous hemoglobins around 10 to suggest hemoconcentration.  Her platelets are 143.  Her UA is positive with  white blood cells and with 2-5 white blood cell casts.  Also noted is some trace ketones, moderate blood with 10-25 RBCs, many bacteria and moderate yeast.  Blood cultures were obtained.  Chest x-ray was done and was clear, reviewed by myself.  EKG was performed and revealed no acute changes of hyperkalemia.  She is in normal sinus rythym, reveals sinus tachycardia with no evidence of acute ischemia.  There is very minimal peaking of the T waves, no QRS prolongation.       She received bicarbonate, insulin, calcium gluconate in the ED with repeat potassium of 5.5.     Past Medical History    I have reviewed this patient's medical history and updated it with pertinent information if needed.   Past Medical History:   Diagnosis Date     Anxiety      CKD (chronic kidney disease) stage 3, GFR 30-59 ml/min     baseline Cr 1.8-2     Cognitive deficits      Depression      Depressive disorder      Diabetes (H)      DM type 2 (diabetes mellitus, type 2) (H)     insulin dependent      HTN      Hyperlipidemia LDL goal < 70      Hypertension      Osteoarthritis      Schizoaffective disorder (H)        Past Surgical History   I have reviewed this patient's surgical history and updated it with pertinent information if needed.  Past Surgical History:   Procedure Laterality Date     C TOTAL KNEE ARTHROPLASTY Left 3/2010     COLONOSCOPY  10/19/2011    Procedure:COMBINED COLONOSCOPY, REMOVE TUMOR/POLYP/LESION BY SNARE; Colonoscopy; Surgeon:MARY ALICE RUSH; Location: GI      DILATION AND CURETTAGE  age 31     TONSILLECTOMY & ADENOIDECTOMY         Social History   Living situation:[KW1.1] most recently has been residing at [facility][KW1.3]    Family system:[KW1.1] mother (Lisa), Brother (Charanjit), sister in law (Tana), and another sister in [place][KW1.3]    Self-identified support system:[KW1.1] unable to assess due to cognitive impairment[KW1.3]    Employment/education:[KW1.1] did not assess[KW1.3]    Activities/interests:[KW1.1] did not assess[KW1.3]    Use of community resources:[KW1.1] did not assess[KW1.3]    Faith affiliation:[KW1.1] did not assess[KW1.3]    Involvement in [place] community:[KW1.1] did not assess[KW1.3]    Impact of illness on patient:[KW1.1] unable to assess due to cognitive impairment[KW1.3]    Family History   I have reviewed this patient's family history and updated it with pertinent information if needed.   Family History   Problem Relation Age of Onset     Hypertension Mother      Cancer - colorectal Father      Family History Negative Sister      Family History Negative Brother        Allergies   Allergies   Allergen Reactions     Amoxicillin      Amoxicillin      Haldol [Benzyl Alcohol]      Haldol [Haloperidol]      Pcn [Penicillin G]      Penicillins      Seroquel [Quetiapine] GI Disturbance       Medications   Current Facility-Administered Medications Ordered in Epic   Medication Dose Route Frequency Last Rate Last Dose     atropine 1 % ophthalmic solution 1-2 drop  1-2 drop Sublingual Q1H PRN         hypromellose-dextran (ARTIFICAL TEARS) ophthalmic solution 1-2 drop  1-2 drop Both Eyes Q8H PRN         valproic acid (DEPAKENE) solution 500 mg  500 mg Oral or Feeding Tube Q8H   500 mg at 05/31/17 0937     acetaminophen (TYLENOL) solution 650 mg  650 mg Oral or Feeding Tube Q4H PRN         melatonin tablet 1 mg  1 mg Per G Tube At Bedtime PRN         metoprolol (LOPRESSOR) suspension 25 mg  25 mg Oral or Feeding Tube BID   25 mg at  05/31/17 0936     polyethylene glycol (MIRALAX/GLYCOLAX) Packet 17 g  17 g Oral or Feeding Tube Daily PRN         pantoprazole (PROTONIX) suspension 40 mg  40 mg Per NG tube Daily   40 mg at 05/31/17 0937     sodium chloride (PF) 0.9% PF flush 10-20 mL  10-20 mL Intracatheter Q1H PRN         heparin lock flush 10 UNIT/ML injection 5-10 mL  5-10 mL Intracatheter Q24H   10 mL at 05/30/17 1904     heparin lock flush 10 UNIT/ML injection 5-10 mL  5-10 mL Intracatheter Q1H PRN   5 mL at 05/30/17 2106     dextrose 10 % 1,000 mL infusion   Intravenous Continuous PRN         insulin glargine (LANTUS) injection 25 Units  25 Units Subcutaneous QAM AC   25 Units at 05/31/17 0936     OLANZapine zydis (zyPREXA) ODT half-tab 2.5-5 mg  2.5-5 mg Oral BID PRN         insulin aspart (NovoLOG) inj (RAPID ACTING)  1-7 Units Subcutaneous TID AC   5 Units at 05/31/17 1217     insulin aspart (NovoLOG) inj (RAPID ACTING)  1-5 Units Subcutaneous At Bedtime   3 Units at 05/30/17 2151     cloNIDine (CATAPRES-TTS) Patch in Place   Transdermal Q8H         cloNIDine (CATAPRES-TTS) patch REMOVAL   Transdermal Weekly         cloNIDine (CATAPRES-TTS1) 0.1 MG/24HR WK patch 1 patch  1 patch Transdermal Weekly         hydrALAZINE (APRESOLINE) injection 10-20 mg  10-20 mg Intravenous Q2H PRN   10 mg at 05/26/17 0011     miconazole (MICATIN; MICRO GUARD) 2 % powder   Topical Q1H PRN         sodium chloride (PF) 0.9% PF flush 10-20 mL  10-20 mL Intracatheter Q1H PRN   10 mL at 05/25/17 0755     heparin lock flush 10 UNIT/ML injection 5-10 mL  5-10 mL Intracatheter Q1H PRN   5 mL at 05/29/17 0607     sodium chloride (PF) 0.9% PF flush 3 mL  3 mL Intracatheter Q1H PRN         metoprolol (LOPRESSOR) injection 2.5 mg  2.5 mg Intravenous Q4H PRN         risperiDONE (risperDAL M-TABS) ODT tab 0.5 mg  0.5 mg Sublingual TID PRN         glucose 40 % gel 15-30 g  15-30 g Oral Q15 Min PRN        Or     dextrose 50 % injection 25-50 mL  25-50 mL Intravenous Q15 Min  PRN        Or     glucagon injection 1 mg  1 mg Subcutaneous Q15 Min PRN         naloxone (NARCAN) injection 0.1-0.4 mg  0.1-0.4 mg Intravenous Q2 Min PRN         senna-docusate (SENOKOT-S;PERICOLACE) 8.6-50 MG per tablet 1-2 tablet  1-2 tablet Oral BID PRN         bisacodyl (DULCOLAX) Suppository 10 mg  10 mg Rectal Daily PRN         ondansetron (ZOFRAN-ODT) ODT tab 4 mg  4 mg Oral Q6H PRN        Or     ondansetron (ZOFRAN) injection 4 mg  4 mg Intravenous Q6H PRN         No current Epic-ordered outpatient prescriptions on file.       Review of Systems   The comprehensive review of systems is negative other than noted in the assessment/plan.    Physical Exam   Temp: 99.1  F (37.3  C) Temp src: Axillary BP: 138/70   Heart Rate: 100 Resp: 16 SpO2: (!) 88 % O2 Device: None (Room air)    Vitals:    05/29/17 0315 05/30/17 0500 05/31/17 0500   Weight: 81.5 kg (179 lb 10.8 oz) 82 kg (180 lb 12.4 oz) 84.4 kg (186 lb 1.1 oz)     CONSTITUTIONAL: NAD, A&O[KW1.1] to self[KW1.3]. Calm and cooperative[KW1.1], mostly sleepy but did arouse to my voice.[KW1.3]  HEENT: NCAT, MMM  NECK: Supple  CARDIOVASCULAR:[KW1.1] exam deferred[KW1.3]  RESPIRATORY: NL respiratory effort on[KW1.1] RA[KW1.3]  GASTROINTESTINAL:[KW1.1] exam deferred[KW1.3]  MUSCULOSKELETAL: Moving freely in bed   SKIN: Warm and intact. No concerning lesions or rashes on exposed skin surfaces   NEUROLOGIC:[KW1.1] Aroused to my voice and answered some yes/no questions but not reliable.[KW1.3]  PSYCH: Affect[KW1.1] flat[KW1.3]    Data   Results for orders placed or performed during the hospital encounter of 05/16/17 (from the past 24 hour(s))   Glucose by meter   Result Value Ref Range    Glucose 278 (H) 70 - 99 mg/dL   Glucose by meter   Result Value Ref Range    Glucose 282 (H) 70 - 99 mg/dL   Lactic acid level STAT   Result Value Ref Range    Lactic Acid 1.8 0.7 - 2.1 mmol/L   Glucose by meter   Result Value Ref Range    Glucose 320 (H) 70 - 99 mg/dL   Glucose by  meter   Result Value Ref Range    Glucose 299 (H) 70 - 99 mg/dL   Basic metabolic panel   Result Value Ref Range    Sodium 148 (H) 133 - 144 mmol/L    Potassium 4.4 3.4 - 5.3 mmol/L    Chloride 115 (H) 94 - 109 mmol/L    Carbon Dioxide 27 20 - 32 mmol/L    Anion Gap 6 3 - 14 mmol/L    Glucose 389 (H) 70 - 99 mg/dL    Urea Nitrogen 34 (H) 7 - 30 mg/dL    Creatinine 2.04 (H) 0.52 - 1.04 mg/dL    GFR Estimate 25 (L) >60 mL/min/1.7m2    GFR Estimate If Black 30 (L) >60 mL/min/1.7m2    Calcium 10.1 8.5 - 10.1 mg/dL   CBC with platelets   Result Value Ref Range    WBC 14.4 (H) 4.0 - 11.0 10e9/L    RBC Count 3.44 (L) 3.8 - 5.2 10e12/L    Hemoglobin 10.0 (L) 11.7 - 15.7 g/dL    Hematocrit 33.8 (L) 35.0 - 47.0 %    MCV 98 78 - 100 fl    MCH 29.1 26.5 - 33.0 pg    MCHC 29.6 (L) 31.5 - 36.5 g/dL    RDW 16.5 (H) 10.0 - 15.0 %    Platelet Count 203 150 - 450 10e9/L   Glucose by meter   Result Value Ref Range    Glucose 439 (H) 70 - 99 mg/dL[KW1.1]                    Revision History        User Key Date/Time User Provider Type Action    > KW1.3 5/31/2017  3:25 PM Hue Orozco APRN CNP Nurse Practitioner Sign     KW1.2 5/31/2017  2:06 PM Hue Orozco APRN CNP Nurse Practitioner      KW1.1 5/31/2017  2:05 PM Hue Orozco APRN CNP Nurse Practitioner             Consults by Dino Smalls MD at 5/29/2017 11:36 AM     Author:  Dino Smalls MD Service:  Psychiatry Author Type:  Physician    Filed:  5/29/2017  7:20 PM Date of Service:  5/29/2017 11:36 AM Creation Time:  5/29/2017 11:35 AM    Status:  Signed :  Dino Smalls MD (Physician)         [facility] Psychiatric Consult Progress Note      Interval History:   Pt seen, care reviewed with treatment team. Patient was seen for follow-up. She has not been agitated nor displayed overt psychosis.[TW1.1] Pt not saying much in exa, staff report the same. No outbursts and has not had to take Antipsychotics to calm down. Pt still  getting IV Edpakon and level is in the therepeutic range for seizures but is low for Psychiatry.  Pt is entirely off Lithium. Given her Kidney failure from Lithium toxicity would highly recommend not restarting Lithium.  Pt has multiple reasons for delirium and has not yet cleared. Will continue to make Zyprexa and Risperdal prn. Along with IV Depakon.[TW1.2]     Review of systems:   The Review of Systems is negative other than noted in the HPI     Medications:       potassium chloride  20 mEq Oral or NG Tube Once     multivitamins with minerals  15 mL Per Feeding Tube Daily     insulin glargine  25 Units Subcutaneous QAM AC     insulin aspart  1-7 Units Subcutaneous TID AC     insulin aspart  1-5 Units Subcutaneous At Bedtime     valproate (DEPACON) intermittent infusion  500 mg Intravenous Q8H     pantoprazole  40 mg Intravenous QAM AC     metoprolol  5 mg Intravenous Q6H     cloNIDine   Transdermal Q8H     [START ON 5/31/2017] cloNIDine   Transdermal Weekly     cloNIDine  1 patch Transdermal Weekly     IV fluid REPLACEMENT ONLY, acetaminophen, OLANZapine zydis, hydrALAZINE, miconazole, sodium chloride (PF), heparin lock flush, sodium chloride (PF), metoprolol, risperiDONE, glucose **OR** dextrose **OR** glucagon, naloxone, melatonin, senna-docusate, polyethylene glycol, bisacodyl, ondansetron **OR** ondansetron      Mental Status Examination:     Appearance:  somnolent  Eye Contact:  poor   Speech:  mute  Psychomotor Behavior:  no evidence of tardive dyskinesia, dystonia, or tics  Mood:  NA  Affect:  intensity is blunted  Thought Process:  NA NA  Thought Content:  Internally preoccupied  Oriented to:  NA  Attention Span and Concentration:  poor  Recent and Remote Memory:  poor  Fund of Knowledge: NA  Muscle Strength and Tone: flaccid  Gait and Station: NA  Insight:  NA  Judgment:  NA        Labs/vitals:     Recent Results (from the past 24 hour(s))   Glucose by meter    Collection Time: 05/28/17 12:03 PM   Result  Value Ref Range    Glucose 254 (H) 70 - 99 mg/dL   Glucose by meter    Collection Time: 05/28/17  5:27 PM   Result Value Ref Range    Glucose 193 (H) 70 - 99 mg/dL   Glucose by meter    Collection Time: 05/28/17  9:28 PM   Result Value Ref Range    Glucose 303 (H) 70 - 99 mg/dL   Lactic acid level STAT    Collection Time: 05/28/17 11:30 PM   Result Value Ref Range    Lactic Acid 2.7 (H) 0.7 - 2.1 mmol/L   Glucose by meter    Collection Time: 05/29/17  2:06 AM   Result Value Ref Range    Glucose 265 (H) 70 - 99 mg/dL   Basic metabolic panel    Collection Time: 05/29/17  6:05 AM   Result Value Ref Range    Sodium 139 133 - 144 mmol/L    Potassium 3.3 (L) 3.4 - 5.3 mmol/L    Chloride 106 94 - 109 mmol/L    Carbon Dioxide 25 20 - 32 mmol/L    Anion Gap 8 3 - 14 mmol/L    Glucose 250 (H) 70 - 99 mg/dL    Urea Nitrogen 30 7 - 30 mg/dL    Creatinine 2.15 (H) 0.52 - 1.04 mg/dL    GFR Estimate 23 (L) >60 mL/min/1.7m2    GFR Estimate If Black 28 (L) >60 mL/min/1.7m2    Calcium 9.2 8.5 - 10.1 mg/dL   Magnesium    Collection Time: 05/29/17  6:05 AM   Result Value Ref Range    Magnesium 2.1 1.6 - 2.3 mg/dL   Phosphorus    Collection Time: 05/29/17  6:05 AM   Result Value Ref Range    Phosphorus 3.0 2.5 - 4.5 mg/dL   Lactic acid whole blood    Collection Time: 05/29/17  6:05 AM   Result Value Ref Range    Lactic Acid 1.5 0.7 - 2.1 mmol/L   Glucose by meter    Collection Time: 05/29/17  8:16 AM   Result Value Ref Range    Glucose 218 (H) 70 - 99 mg/dL     B/P: 116/68, T: 97.6, P: 90, R: 16    Impression:   Nel Farmer is a 61-year-old woman who was referred to the hospital on account of worsening lethargy and abnormal labs.  She was found to have evidence of urinary tract infection in the emergency room and was also noted to be dehydrated.  She has a history of chronic kidney disease and diabetes insipidus that appear to have worsened in the course of the preceding days.  She does have a history of prior use of lithium that  essentially led to her diabetes insipidus.  Her current valproic acid level was 56 mg/L and she is on low doses of antipsychotic medications.  It is unclear if her lethargy is a result of her psychiatric medications, but they certainly can contribute to her lethargy.        DIagnoses:     1.  Delirium due to multiple factors including dehydration, medications and urinary tract infection.   2.  Schizoaffective disorder, bipolar type by history.   3.  Intellectual disability, unspecified severity.   4.  Hypokalemia.   5.  Hypertension.   6.  Urinary tract infection.   7.  Diabetes mellitus type 2.   8.  Diabetes insipidus.          Plan:   1. Written information given on medications. Side effects, risks, benefits reviewed.  2. Discontinue scheduled antipsychotics and offer Zyprexa Zydis 2.5-5 mg bid prn agitation or psychosis.[TW1.1]Continue IV Depkon.[TW1.2]  3. Medical management as you are.    TIME SPENT = 15 MINUTES    Attestation:  Patient has been seen and evaluated by meDino MD[TW1.1]       Revision History        User Key Date/Time User Provider Type Action    > TW1.2 5/29/2017  7:20 PM Dino Smalls MD Physician Sign     TW1.1 5/29/2017 11:35 AM Dino Smalls MD Physician             Consults by Dino Smalls MD at 5/29/2017 11:37 AM     Author:  Dino Smalls MD Service:  Psychiatry Author Type:  Physician    Filed:  5/29/2017 11:37 AM Date of Service:  5/29/2017 11:37 AM Creation Time:  5/29/2017 11:36 AM    Status:  Signed :  Dino Smalls MD (Physician)     Consult Orders:    1. Psychiatry IP Consult: ams, schizoaffective; Consultant may enter orders: Yes; Patient to be seen: Routine; Call back #: [phone] [[id number]] ordered by Naga Pompa DO at 05/28/17 0853                Follow-Up Psychiatric Consult: Time Spent: 15 Minutes  Dino Smalls MD.[TW1.1]      Revision History        User Key Date/Time User Provider  Type Action    > TW1.1 5/29/2017 11:37 AM Dino Smalls MD Physician Sign            Consults by Lilia Nick RD, LD at 5/28/2017  3:24 PM     Author:  Lilia Nick RD, LD Service:  Nutrition Author Type:  Registered Dietitian    Filed:  5/28/2017  3:24 PM Date of Service:  5/28/2017  3:24 PM Creation Time:  5/28/2017  3:15 PM    Status:  Signed :  Lilia Nick RD, LD (Registered Dietitian)     Consult Orders:    1. Nutrition Services Adult IP Consult [[id number]] ordered by Naga Pompa DO at 05/28/17 9253                MD CONSULT FOR TF  CLINICAL NUTRITION SERVICES - REASSESSMENT NOTE      Recommendations Ordered by Registered Dietitian (IMANI):     Once FT placed, rec Isosource 1.5 @ 45 mL/hr = 1620 cals (26 ayden/kg), 73 gm pro (1.2 gm/kg), 16 gm fiber, 820 mL free water, 100% RDI, 5543 IU Vit A, 345 mg Vit C, 22 mg Zn    Plan to start TF at 15 mL/hr.  Increase by 10 mL every 12 hrs to goal rate of 45 mL/hr.    Standard water flushes of 30 mL every 4 hrs (tube patency)    Daily multivitamin           EVALUATION OF PROGRESS TOWARD GOALS   Diet:  DD1. NTL + Magic Cup with meals           Not Appropriate for Room Service          Chart reviewed  Pt is a total feed  Intake has varied --> 0-50% meals  Plan to place a FT tomorrow      Dosing Weight: 61.4 kg (adjusted wt for overwt)    ASSESSED NUTRITION NEEDS:  2385-1292 cals (25-30 cals/kg - maintenance)  75-90 gm protein (1.2-1.5 gm/kg - preservation LBM)      NEW FINDINGS:   5/26: WOCN ---> Pressure Injury (PI) located on coccyx/ R gluteal cleft:  DTI (Deep tissue injury) present on admission, which has evolved into an Unstageable PI over the coccyx    IVF (D5) at 100 mL/hr  (408 cals)    Last noted BM (5/25)    5/27: SLP - continues to rec DD1, NTL diet[SJ1.1]      Vitals:    05/16/17 1833 05/16/17 2300 05/17/17 0527 05/18/17 0600   Weight: 91.2 kg (201 lb) 81.9 kg (180 lb 8 oz) 80.2 kg (176 lb 12.9 oz) 81.8 kg (180 lb  5.4 oz)    05/20/17 0552 05/23/17 0513 05/24/17 0608 05/25/17 0400   Weight: 89.5 kg (197 lb 4.8 oz) 86 kg (189 lb 11.2 oz) 85.5 kg (188 lb 7.9 oz) 84.5 kg (186 lb 4.6 oz)    05/26/17 0631 05/27/17 0500 05/28/17 0500   Weight: 80.3 kg (177 lb 0.5 oz) 79.8 kg (175 lb 14.8 oz) 82 kg (180 lb 12.4 oz)[SJ1.2]         Previous Goals (5/23):   No previous goals identified    Previous Nutrition Diagnosis (5/23):   No nutrition diagnosis  Evaluation: Declining        CURRENT NUTRITION DIAGNOSIS  Predicted suboptimal nutrient intake related to mental status and dysphagia as evidenced by intake fluctuating from 0%-50% meals    INTERVENTIONS  Recommendations / Nutrition Prescription  DD1, NTL diet (per SLP)    Once FT placed, rec Isosource 1.5 @ 45 mL/hr = 1620 cals (26 ayden/kg), 73 gm pro (1.2 gm/kg), 16 gm fiber, 820 mL free water, 100% RDI, 5543 IU Vit A, 345 mg Vit C, 22 mg Zn    Plan to start TF at 15 mL/hr.  Increase by 10 mL every 12 hrs to goal rate of 45 mL/hr.    Standard water flushes of 30 mL every 4 hrs (tube patency)    Daily multivitamin    Implementation  EN Composition and EN Schedule --> ordered TF in EPIC    Goals  EN of Isosource 1.5 @ 45 mL/hr to meet % est needs      MONITORING AND EVALUATION:  Progress towards goals will be monitored and evaluated per protocol and Practice Guidelines[SJ1.1]           Revision History        User Key Date/Time User Provider Type Action    > SJ1.2 5/28/2017  3:24 PM Lilia Nick RD, NALINI Registered Dietitian Sign     SJ1.1 5/28/2017  3:15 PM Lilia Nick RD, LD Registered Dietitian             Consults signed by Dahlia Artis MD at 5/26/2017  3:40 PM      Author:  Dahlia Artis MD Service:  Neurology Author Type:  Physician    Filed:  5/26/2017  3:40 PM Date of Service:  5/24/2017  4:22 PM Creation Time:  5/24/2017  5:57 PM    Status:  Signed :  Dahlia Artis MD (Physician)         NEUROLOGIC CONSULTATION        REQUESTING PHYSICIAN:  Dr. Schuster.       REASON FOR CONSULTATION:  Altered mental status.      HISTORY OF PRESENT ILLNESS:  Nel Farmer is a 61-year-old woman with a past medical history significant for intellectual disabilities, schizoaffective disorder, chronic kidney disease who has had intermittent hospitalizations recently for encephalopathy related to renal failure and metabolic acidosis.  The patient has been admitted to the hospital since 05/17, she has been noted by nursing and by physicians who are taking care of her that she has some fluctuating changes in mental status that were felt to be most psych related.  Dr. Schuster has been following her and became a little bit concerned about some episodes of left-sided weakness or not wanting to move her left side, he sent her for a CT scan of the head on 05/22 and then an MRI scan of the brain today.  The MRI scan of the brain came back as showing a right subdural hematoma measuring 2 mm in thickness that was not seen on the CT scan of the head, this prompted neurological consultation.  The patient will be transferred to the seventh floor.      Talking to nursing, it sounds as though the patient in the past hospital stays has not been particularly cooperative with nurses and often does not respond immediately, earlier today she was not even having her eyes open and responding, now she is, it is definitely fluctuating.      PAST MEDICAL HISTORY:  Significant for schizoaffective disorder, intellectual disability, osteoarthritis, hypertension, hyperlipidemia, type 2 diabetes, depression, chronic kidney disease stage 3 and anxiety.      PAST SURGICAL HISTORY:  Tonsil and adenoidectomy, D&C, colonoscopy and total knee arthroplasty on the left.      SOCIAL HISTORY:  The patient is not a tobacco user, not an alcohol user, she does not have any children, I believe that she lives with her mother.      FAMILY HISTORY:  Significant for mother with hypertension,  father with colorectal cancer.      REVIEW OF SYSTEMS:  Unable to obtain due to patient's lack of cooperation with examination.      CURRENT HOSPITAL MEDICATIONS:   1.  Tylenol to be used as needed.   2.  Amlodipine 5 mg daily.   3.  Bisacodyl 10 mg daily p.r.n. constipation.   4.  Clonidine 0.2 mg twice daily.   5.  Divalproex sprinkles 1000 mg at bedtime, divalproex sprinkles 500 mg q.a.m.   6.  Hydralazine 10 mg every 4 hours for elevated blood pressure.     7.  Humulin mix 70/30, 20 units each morning and 34 units daily with supper.     8.  Metoprolol 2.5 mg every 4 hours as needed for elevated blood pressure via IV.     9.  Metoprolol XL 25 mg daily.   10.  Omeprazole 20 mg twice daily.    11.  Zofran used as needed.   12.  Imdur 30 mg daily.   13.  Melatonin 1 mg each day at bedtime.   14.  Olanzapine 5 mg daily at bedtime.      PHYSICAL EXAMINATION:   VITAL SIGNS:  The patient's blood pressure is 102/59, pulse oximetry 96% on room air, respiratory rate 18, heart rate 95, temperature 98.9.   GENERAL APPEARANCE:  The patient is in the hospital bed not cooperative with examination but in no acute distress.   NEUROLOGICAL EXAMINATION:  The patient is awake lying in the bed with her eyes open, she responds to her name and says 'yes' but then really does not respond to any further, she does not follow any commands.  Her pupils are equal and reactive to light, her face does appear to be symmetric, blink rate appears normal.  She has no significant rigidity in the upper or lower extremities, she does appear to withdraw to painful stimulation but will not participate in strength testing to command.  The patient's muscle bulk appears normal in all 4 extremities.  Reflexes are normal and bilaterally symmetric in the upper and lower extremities.  Plantar responses are flexor bilaterally.  Sensation appears to be intact in all 4 extremities to painful stimuli and the patient withdraws.   CARDIOVASCULAR EXAMINATION:   Regular rate and rhythm with no significant murmurs.   LUNGS:  Clear to auscultation bilaterally.   EXTREMITIES:  The patient has multiple bruises on her knees and on her arms.  No peripheral edema.      IMPRESSION:  Mario Alberto has been admitted to the Hospitalist Service for the past week, she had a fluctuating mental status of unclear significance and an MRI scan today showed a possible subdural hemorrhage, I do not think the subdural hemorrhages anything that would need surgical intervention, we would just monitor and she is not on any antiplatelets; we certainly would not want to start any of antiplatelets or anticoagulation at this time.  The patient can have mechanical clot prevention and for her legs.  The biggest risk or concern is that potentially she has subclinical seizures related to subdural hemorrhage; we will obtain an EEG to rule that out and follow from there.      I should note that Risperdal was started and then stopped, Risperdal could create some Parkinson's- like symptoms and maybe that was what was seen the other day when she was using one side.  It looks to me as though it might have been stopped now and I would recommend continuing to avoid that; the patient certainly has many other medicines that would perhaps cause encephalopathy including lithium and Depakote and dosing is being directed by the psychiatry team.      Dr. Beasley will see the patient tomorrow.      Thank you for the consultation.         DAHLIA PATRICIA MD             D: 2017 16:22   T: 2017 17:57   MT: #[id number]      Name:     MARIO ALBERTO PATRICK   MRN:      [id number]        Account:       [id number]   :      1955           Consult Date:  2017      Document: [id number]    [SB1.1]   Revision History        User Key Date/Time User Provider Type Action    > SB1.1 2017  3:40 PM Dahlia Patricia MD Physician Sign            Consults by Liam Vasquez MD at 2017 12:24 PM      Author:  Liam Vasquez MD Service:  Psychiatry Author Type:  Physician    Filed:  5/26/2017 12:25 PM Date of Service:  5/26/2017 12:24 PM Creation Time:  5/26/2017 12:14 PM    Status:  Signed :  Liam Vasquez MD (Physician)         [facility] Psychiatric Consult Progress Note      Interval History:   Pt seen, care reviewed with treatment team. Patient was seen for follow-up. She was described by her nurse as being a little more alert.  The MRI scan of the brain done on 5/24/17 came back as showing a right subdural hematoma measuring 2 mm in thickness that was not seen on the CT scan of the head, this prompted neurological consultation but was not thought to be contributory. She was seen at bedside today and she was not responsive at all to verbal or tactile stimulation. She is now on IV Depacon. She has not been agitated nor displayed overt psychosis.    Review of systems:   The Review of Systems is negative other than noted in the HPI     Medications:       [START ON 5/27/2017] insulin glargine  25 Units Subcutaneous QAM AC     valproate (DEPACON) intermittent infusion  500 mg Intravenous Q8H     pantoprazole  40 mg Intravenous QAM AC     metoprolol  5 mg Intravenous Q6H     cloNIDine   Transdermal Q8H     [START ON 5/31/2017] cloNIDine   Transdermal Weekly     cloNIDine  1 patch Transdermal Weekly     insulin aspart  1-6 Units Subcutaneous Q4H     OLANZapine (zyPREXA) tablet 5 mg  5 mg Oral At Bedtime     hydrALAZINE, miconazole, sodium chloride (PF), heparin lock flush, sodium chloride (PF), metoprolol, risperiDONE, glucose **OR** dextrose **OR** glucagon, acetaminophen, naloxone, melatonin, senna-docusate, polyethylene glycol, bisacodyl, ondansetron **OR** ondansetron      Mental Status Examination:     Appearance:  somnolent  Eye Contact:  poor   Speech:  mute  Psychomotor Behavior:  no evidence of tardive dyskinesia, dystonia, or tics  Mood:  NA  Affect:  intensity is  blunted  Thought Process:  NA NA  Thought Content:  Internally preoccupied  Oriented to:  NA  Attention Span and Concentration:  poor  Recent and Remote Memory:  poor  Fund of Knowledge: NA  Muscle Strength and Tone: flaccid  Gait and Station: NA  Insight:  NA  Judgment:  NA        Labs/vitals:     Recent Results (from the past 24 hour(s))   Fractional excretion of sodium    Collection Time: 05/25/17  1:30 PM   Result Value Ref Range    Creatinine Urine 19 mg/dL    Sodium Urine mmol/L 25 mmol/L    %FENA 2.3 %   Creatinine    Collection Time: 05/25/17  1:30 PM   Result Value Ref Range    Creatinine 2.50 (H) 0.52 - 1.04 mg/dL    GFR Estimate 20 (L) >60 mL/min/1.7m2    GFR Estimate If Black 24 (L) >60 mL/min/1.7m2   Sodium    Collection Time: 05/25/17  1:30 PM   Result Value Ref Range    Sodium 142 133 - 144 mmol/L   Glucose by meter    Collection Time: 05/25/17  4:03 PM   Result Value Ref Range    Glucose 247 (H) 70 - 99 mg/dL   Glucose by meter    Collection Time: 05/25/17  7:42 PM   Result Value Ref Range    Glucose 267 (H) 70 - 99 mg/dL   Glucose by meter    Collection Time: 05/25/17 11:50 PM   Result Value Ref Range    Glucose 247 (H) 70 - 99 mg/dL   Glucose by meter    Collection Time: 05/26/17  3:38 AM   Result Value Ref Range    Glucose 237 (H) 70 - 99 mg/dL   Basic metabolic panel    Collection Time: 05/26/17  6:20 AM   Result Value Ref Range    Sodium 142 133 - 144 mmol/L    Potassium 2.9 (L) 3.4 - 5.3 mmol/L    Chloride 107 94 - 109 mmol/L    Carbon Dioxide 28 20 - 32 mmol/L    Anion Gap 7 3 - 14 mmol/L    Glucose 229 (H) 70 - 99 mg/dL    Urea Nitrogen 39 (H) 7 - 30 mg/dL    Creatinine 2.41 (H) 0.52 - 1.04 mg/dL    GFR Estimate 20 (L) >60 mL/min/1.7m2    GFR Estimate If Black 25 (L) >60 mL/min/1.7m2    Calcium 9.2 8.5 - 10.1 mg/dL   CBC with platelets differential    Collection Time: 05/26/17  6:20 AM   Result Value Ref Range    WBC 9.5 4.0 - 11.0 10e9/L    RBC Count 3.14 (L) 3.8 - 5.2 10e12/L    Hemoglobin  9.3 (L) 11.7 - 15.7 g/dL    Hematocrit 29.5 (L) 35.0 - 47.0 %    MCV 94 78 - 100 fl    MCH 29.6 26.5 - 33.0 pg    MCHC 31.5 31.5 - 36.5 g/dL    RDW 16.1 (H) 10.0 - 15.0 %    Platelet Count 150 150 - 450 10e9/L    Diff Method Manual Differential     % Neutrophils 65.0 %    % Lymphocytes 20.0 %    % Monocytes 11.0 %    % Eosinophils 1.0 %    % Basophils 0.0 %    % Metamyelocytes 2.0 %    % Myelocytes 1.0 %    Absolute Neutrophil 6.2 1.6 - 8.3 10e9/L    Absolute Lymphocytes 1.9 0.8 - 5.3 10e9/L    Absolute Monocytes 1.0 0.0 - 1.3 10e9/L    Absolute Eosinophils 0.1 0.0 - 0.7 10e9/L    Absolute Basophils 0.0 0.0 - 0.2 10e9/L    Absolute Metamyelocytes 0.2 (H) 0 10e9/L    Absolute Myelocytes 0.1 (H) 0 10e9/L   Glucose by meter    Collection Time: 05/26/17  7:53 AM   Result Value Ref Range    Glucose 227 (H) 70 - 99 mg/dL   Glucose by meter    Collection Time: 05/26/17 11:09 AM   Result Value Ref Range    Glucose 203 (H) 70 - 99 mg/dL     B/P: 116/68, T: 97.6, P: 90, R: 16    Impression:   Nel Farmer is a 61-year-old woman who was referred to the hospital on account of worsening lethargy and abnormal labs.  She was found to have evidence of urinary tract infection in the emergency room and was also noted to be dehydrated.  She has a history of chronic kidney disease and diabetes insipidus that appear to have worsened in the course of the preceding days.  She does have a history of prior use of lithium that essentially led to her diabetes insipidus.  Her current valproic acid level was 56 mg/L and she is on low doses of antipsychotic medications.  It is unclear if her lethargy is a result of her psychiatric medications, but they certainly can contribute to her lethargy.        DIagnoses:     1.  Delirium due to multiple factors including dehydration, medications and urinary tract infection.   2.  Schizoaffective disorder, bipolar type by history.   3.  Intellectual disability, unspecified severity.   4.  Hypokalemia.    5.  Hypertension.   6.  Urinary tract infection.   7.  Diabetes mellitus type 2.   8.  Diabetes insipidus.          Plan:   1. Written information given on medications. Side effects, risks, benefits reviewed.  2. Discontinue scheduled antipsychotics and offer Zyprexa Zydis 2.5-5 mg bid prn agitation or psychosis.  3. Medical management as you are.    TIME SPENT = 15 MINUTES    Attestation:  Patient has been seen and evaluated by Liam kumar MD[OA1.1]       Revision History        User Key Date/Time User Provider Type Action    > OA1.1 5/26/2017 12:25 PM Liam Vasquez MD Physician Sign            Consults by Liam Vasquez MD at 5/26/2017 12:14 PM     Author:  Liam Vasquez MD Service:  Psychiatry Author Type:  Physician    Filed:  5/26/2017 12:14 PM Date of Service:  5/26/2017 12:14 PM Creation Time:  5/26/2017 12:14 PM    Status:  Signed :  Liam Vasquez MD (Physician)     Consult Orders:    1. Psychiatry IP Consult: fluctuating mental status, follow up; Consultant may enter orders: Yes; Patient to be seen: Routine; Call back #: [phone] [[id number]] ordered by Carson Schuster MD at 05/25/17 1145                Patient seen for follow-up psychiatric consultation. Please see my note for details. Thanks.    Time spent = 15 minutes.[OA1.1]     Revision History        User Key Date/Time User Provider Type Action    > OA1.1 5/26/2017 12:14 PM Liam Vasquez MD Physician Sign            Consults by Tobi Sun MD at 5/24/2017  6:21 PM     Author:  Tobi Sun MD Service:  ICU Author Type:  Physician    Filed:  5/24/2017  8:05 PM Date of Service:  5/24/2017  6:21 PM Creation Time:  5/24/2017  7:43 PM    Status:  Signed :  Tobi Sun MD (Physician)     Consult Orders:    1. Intensivist IP Consult: Patient to be seen: Routine - within 24 hours; mental obtundation, unclear etiology. Concern if able  to protect airway.; Consultant may enter orders: Yes [[id number]] ordered by Carson Schutser MD at 05/24/17 7956                  ICU consult note      61-year-old female with a recent admission to [facility] for LYLA was readmitted for lethargy, decreased activity and low intake. Her past medical history is significant for chronic kidney disease with schizoaffective disorder, cognitive impairment usually oriented to just self and place.  She was found to have hyperkalemia (6.1) with a creatinine of 4. Work up in ER on 5/16 showed possible UTI, LYLA, hyperkalemia. UTI consistent with yeast, no bacteria found.     She was treated on the floor up until today but was transferred to the ICU in view of her tenuous mental status. Apparently her mental status waxes and wanes to the point of obtundation but being arousable on noxious stimuli.     Currently her working diagnosis includes   acute metabolic encephalopathy worsened by dehydration (now corrected).  She also has chronic stage 4 renal failure (secondary to lithium toxicity)  DI   UTI    Neuro:   Continues to wax and wane, the obtundation at times is worrisome and hence the ICU admission  Head CT on 5/22 was negative for any acute process  EEG was done today  Considering MRI tomorrow  Neurology following  Schizoaffective disorder  Paranoid Schizophrenia  Cognitive Deficits  on depakote, risperidal and zyprexa and ativan.  risperidal stopped and HS depakote dose decreased 5/23 per psychiatry    Respiratory Status    Currently breathing spontaneously on room air. She is not in any respiratory distress, not snoring and seems stable with oxygen sats above 96%    Cardiovascular Status  Hemodynamically stable, not on any pressors  Hypertension at baseline on amlodipine, clonidine, isosorbide, mononitrate, metoprolol  Plan: To continue monitoring   PRN hydralazine    GI:  Enteral feeds, patient not eating well still  GERD  Plan: Continue current therapy  Nutritional consult  as necessary    Renal:   Chronic kidney disease baseline creat 2.4   DI  Continue monitoring input/ouput    ID:   Currently not on any antibiotics    Endocrine:       DM type 2 on insulin  On glipizide and 70/30 insulin (20 units in am and 34 units in the pm).   - Increased 70/30 insulin to PTA dosin units in the evening and 20 units in the morning.  - Moderate insulin resistance ISS   - Moderate CHO diet.    DI      Induced by lithium. (no longer on lithium)    DVT prophylaxis: D/c Heparin SQ  due to dropping platelet count     Code Status: Full Code     Disposition: ICU for tenuous neurological status.[JT1.1]         Revision History        User Key Date/Time User Provider Type Action    > JT1.1 2017  8:05 PM Tobi Sun MD Physician Sign            Consults by Dahlia Artis MD at 2017  4:03 PM     Author:  Dahlia Artis MD Service:  Neurology Author Type:  Physician    Filed:  2017  4:03 PM Date of Service:  2017  4:03 PM Creation Time:  2017  4:02 PM    Status:  Signed :  Dahlia Artis MD (Physician)     Consult Orders:    1. Neurology IP Consult: Patient to be seen: Routine - within 24 hours; possible SDH.; Consultant may enter orders: Yes [[id number]] ordered by Carson Schuster MD at 17 [id number]                Please see dictation for full details    60 yo woman with fluctuating change sin mental status. MRI brain showed small SDH. Concern for seizures Will check EEG.    Neuro will follow.    Dahlia Artis MD  [facility]  2017 4:03 PM[SB1.1]             Revision History        User Key Date/Time User Provider Type Action    > SB1.1 2017  4:03 PM Dahlia Artis MD Physician Sign            Consults signed by Liam Vasquez MD at 2017  6:19 AM      Author:  Liam Vasquez MD Service:  Psychiatry Author Type:  Physician    Filed:  2017   6:19 AM Date of Service:  5/23/2017  1:14 PM Creation Time:  5/23/2017 11:59 PM    Status:  Signed :  Liam Vasquez MD (Physician)         DATE OF ADMISSION:  05/16/2017     DATE OF SERVICE:  05/23/2017      PSYCHIATRIC CONSULTATION       REQUESTING PHYSICIAN:  Carson Schuster MD      PRIMARY CARE PHYSICIAN:  None given      REASON FOR CONSULTATION:  Intermittent lethargy in a patient with schizoaffective disorder, schizophrenia and cognitive deficits.      IDENTIFYING DATA:  Nel Farmer is a 61-year-old woman who was recently discharged from [facility] on 04/04/2017 on account of acute metabolic encephalopathy associated with acute kidney injury, metabolic acidosis and suspected healthcare-associated pneumonia.  She has an established history of schizoaffective disorder and intellectual disability.  She had recently been placed in a TCU where she was found to be more lethargic and not interactive, not getting out of bed and eating less for the last 2 days on account of which her labs were obtained and she was found to have an elevated creatinine level of 4, potassium of 6.1 on account of which was sent to the emergency department here at [facility].  Psychiatry was consulted to help render an opinion on her intermittent lethargy.  Information was gathered through direct patient contact as well as chart review as the patient is a very poor historian.      HISTORY OF PRESENT ILLNESS:  Nel Farmer has had previous psychiatric hospitalizations and followed on an outpatient basis by Dr. José Kraft for psychiatric care.  She had typically been on the combination of Zyprexa, Risperdal and Depakote during this episode of care.  Depakote level has been therapeutic at 56 mg per liter and she has remained on prior to admission doses of Zyprexa at 1 mg each night at bedtime and Zyprexa 5 mg each night at bedtime.  Depakote had been maintained at a total daily dose  of 2000 mg.  Prior to admission lorazepam has been discontinued on account of her lethargy.  It appears that she did not have any signs suggestive of fevers or shortness of breath but did have laboratory evidence of a urinary tract infection with urinalysis positive with  white blood cells, 2-5 white blood cell casts, trace of ketones, moderate blood, 10-25 RBCs, many bacteria and moderate yeast.  It is also noted that she had become quite dehydrated, likely due to a combination of diabetes insipidus and her prior to admission use of amiloride.  Her fluid intake had been limited at the [facility] nursing facility on account of her reported maladaptive behavior at the [facility] nursing [facility] where she was throwing cups of water at staff.  She received Levaquin 750 mg in the emergency department, but this has been discontinued.  Urine culture did not isolate any bacteria, but showed over 100,000 colonies of Candida albicans and Candida dubliniensis.  Blood culture resulted on 05/19/2017 was positive for Staphylococcus capitis and head CT without contrast done on 05/22/2017 suggested no interval change from last study on 03/30/2017 with findings only notable for moderate atrophy, benign calcification of the arterial falx with no hemorrhage, mass lesion or focal area of acute infarction.      On direct interview, the patient is a poor historian.  She appears lethargic.  She responds to her name but is not able to express herself.  She is not able to identify her current location and is not even able to name a pan that was shown to her.  She appears very drowsy and easily drifts to sleep.  She does not appear to be responding to internal stimuli and does not display any self-injurious behaviors.      PAST PSYCHIATRIC HISTORY:  As described above, the patient has been managed long-term by Dr. José Kraft with a combination of Depakote, Risperdal and Zyprexa.  Just had a couple of hospitalizations this year for medical  reasons, but no recent psychiatric hospitalization.      CHEMICAL USE HISTORY:  None reported.      PAST MEDICAL HISTORY:     1.  Diabetes insipidus.   2.  Hyperlipidemia.     3.  Hypertension.     4.  Knee arthroplasty.     5.  Foot fracture.   6.  Tonsillectomy and adenoidectomy.   7.  D&C.   8.  Frequent falls.      MEDICATIONS PRIOR TO ADMISSION:   1.  Ativan 0.5 mg p.o. b.i.d. p.r.n.    2.  Catapres 0.2 mg p.o. b.i.d.   3.  Norvasc 5 mg p.o. daily.   4.  Amiloride 5 mg 2 p.o. daily.   5.  Toprol-XL 25 mg p.o. daily.   6.  Glucotrol-XL 2.5 mg p.o. b.i.d.    7.  Humulin mix 70/30 pen 20 units before breakfast, 30 units before dinner.   8.  Risperdal M-Tab 0.5 mg t.i.d. p.r.n.   9.  Senokot 8.6/50 mg 1 p.o. b.i.d. p.r.n. constipation.   10.  MiraLax 17 g p.o. daily as needed for constipation.   11.  Depakote  mg p.o. q.a.m.   12.  Depakote EC 1500 mg p.o. each night at bedtime.   13.  Lasix 20 mg p.o. daily p.r.n.   14.  Zyprexa 5 mg p.o. each night at bedtime.   15.  Risperdal 1 mg p.o. each night at bedtime.   16.  Omeprazole 20 mg p.o. b.i.d.    17.  Imdur 30 mg p.o. daily.      ALLERGIES:  Amoxicillin, haloperidol, penicillin and Seroquel.      FAMILY PSYCHIATRIC HISTORY:  Unknown.      SOCIAL HISTORY:  Unable to obtain from patient.  Records suggest that she currently resides at a skilled nursing facility, but prior to that had resided with her mother.  She had been in some sort of work program, but this has not been the case in recent time.      REVIEW OF SYSTEMS:  A 10-point review of systems could not be completed on account of the patient's lethargy.      VITAL SIGNS:  Blood pressure 144/63, respirations 16, temperature 97.5, pulse 81.     Weight 189 pounds.      MENTAL STATUS EXAMINATION:  This is a middle-aged woman who appears her stated age of 61.  She is seen at her bedside where she is lying supine and receiving IV hydration.  She is minimally responsive verbally.  She does respond to her name  but does not respond to queries.  She looks around the room and intermittently screams out loudly.  Her thought process is difficult to assess as she has been minimally verbal and she appears very internally preoccupied but does not respond to queries regarding the presence or absence of auditory or visual hallucinations.  Her speech is sparse.  Her language is limited.  Her gait and station are not assessed as she is currently bed bound.  She does not display any abnormal involuntary movements.  Her associations are concrete.  Her recall of recent and remote events cannot be assessed as patient was not responsive to queries.  Her attention span and concentration are poor.  Her insight and judgment are impaired.  Risk assessment at this time is considered moderate.      DIAGNOSTIC IMPRESSION:  Nel Farmer is a 61-year-old woman who was referred to the hospital on account of worsening lethargy and abnormal labs.  She was found to have evidence of urinary tract infection in the emergency room and was also noted to be dehydrated.  She has a history of chronic kidney disease and diabetes insipidus that appear to have worsened in the course of the preceding days.  She does have a history of prior use of lithium that essentially led to her diabetes insipidus.  Her current valproic acid level was 56 mg/L and she is on low doses of antipsychotic medications.  It is unclear if her lethargy is a result of her psychiatric medications, but they certainly can contribute to her lethargy.  Her baseline is already impaired.  Her case was discussed with her attending physician, Dr. Schuster, and it was agreed that her Depakote dose will be reduced to 1000 mg while discontinuing Risperdal at this time.      DIAGNOSES:   1.  Delirium due to multiple factors including dehydration, medications and urinary tract infection.   2.  Schizoaffective disorder, bipolar type by history.   3.  Intellectual disability, unspecified severity.   4.   Hypokalemia.   5.  Hypertension.   6.  Urinary tract infection.   7.  Diabetes mellitus type 2.   8.  Diabetes insipidus.      RECOMMENDATIONS:   1.  Medical management as you are.   2.  It is possible that her psychiatric medications may be contributing to her current presentation, but they are unlikely to be the primary reason she is lethargic plus these medications are not new for her and they are actually low dose of both medications.  She will therefore be placed at a lower dose of Depakote EC at 1000 mg at bedtime while Risperdal will be held and she will be maintained on olanzapine at 5 mg daily at bedtime.  Psychiatry will follow with you tomorrow to reevaluate her.         LIAM ALLEN MD             D: 2017 13:14   T: 2017 21:15   MT:       Name:     MARIO ALBERTO PATRICK   MRN:      [id number]        Account:       [id number]   :      1955           Consult Date:  2017      Document: [id number][OA1.1]         Revision History        User Key Date/Time User Provider Type Action    > OA1.1 2017  6:19 AM Liam Allen MD Physician Sign     [N/A] 2017 11:59 PM Liam Allen MD Physician Edit            Consults by Liam Allen MD at 2017 12:49 PM     Author:  Liam Allen MD Service:  Psychiatry Author Type:  Physician    Filed:  2017 12:49 PM Date of Service:  2017 12:49 PM Creation Time:  2017 12:48 PM    Status:  Signed :  Liam Allen MD (Physician)     Consult Orders:    1. Psychiatry IP Consult: intermittent lethargy, patient with schizoaffective disorder, schizophrenia and cognitive deficit; Consultant may enter orders: Yes; Patient to be seen: Routine; Call back #: [phone] [[id number]] ordered by Carson Schuster MD at 17 1006                Pt seen for initial psychiatric evaluation, please see my dictation for details and recommendations.[OA1.1]      Revision History        User Key Date/Time User Provider Type Action    > OA1.1 5/23/2017 12:49 PM Liam Vasquez MD Physician Sign            Consults by Mario Abrams MD at 5/17/2017 10:47 AM     Author:  Mario Abrams MD Service:  Nephrology Author Type:  Physician    Filed:  5/17/2017 11:20 AM Date of Service:  5/17/2017 10:47 AM Creation Time:  5/17/2017 10:41 AM    Status:  Signed :  Mario Abrams MD (Physician)     Consult Orders:    1. Nephrology IP Consult: Patient to be seen: Routine - within 24 hours; acute on chronic kidney disease, hyperkalemia.; Consultant may enter orders: Yes [[id number]] ordered by Le Bates MD at 05/16/17 2202                RENAL CONSULTATION NOTE    REFERRING MD:  Le Bates MD    REASON FOR CONSULTATION:  A/CKD and hyperkalemia    HPI:[OP1.1]  61 y.o woman with schizoaffective disorder, IDDM, hypertension and CKD IV, who was admitted on 5/16 altered mental status. She had a prolong hospitalization recently. Patient was noted to be more lethargic two days PTA. She had multiple episodes of being lethargy and less interactive during her previous hospitalization. She has NDI from lithium toxicity, which makes her prone to hypernatremia. She does not have fever or leukocytosis, and CXR without infiltrate. UA with many WBC and bacteria. She has a cherry catheter. Urine and blood cultures are pending.CT without contrast of the abdomen did not show any acute pathologies.    Currently, pt is getting d5 + 1/2 NS at 150 cc/hr.     Potasium was 6.8, which improved to 5.5 with insulin, calcium and bicarbonate. She received 2 liters of NS. Her serum sodium increased from 141 to 151 today.[OP1.2]     ROS:  A complete review of systems was performed and is negative except as noted above.    PMH:[OP1.1]    Past Medical History:   Diagnosis Date     Anxiety      CKD (chronic kidney disease) stage 3, GFR 30-59 ml/min     baseline Cr 1.8-2     Cognitive  deficits      Depression      Depressive disorder      Diabetes (H)      DM type 2 (diabetes mellitus, type 2) (H)     insulin dependent      HTN      Hyperlipidemia LDL goal < 70      Hypertension      Osteoarthritis      Schizoaffective disorder (H)[OP1.3]        PSH:[OP1.1]    Past Surgical History:   Procedure Laterality Date     C TOTAL KNEE ARTHROPLASTY Left 3/2010     COLONOSCOPY  10/19/2011    Procedure:COMBINED COLONOSCOPY, REMOVE TUMOR/POLYP/LESION BY SNARE; Colonoscopy; Surgeon:MARY ALICE RUSH; Location: GI     DILATION AND CURETTAGE  age 31     TONSILLECTOMY & ADENOIDECTOMY[OP1.3]         MEDICATIONS:[OP1.1]      amLODIPine  5 mg Oral Daily     cloNIDine  0.2 mg Oral BID     divalproex  500 mg Oral QAM     divalproex  1,500 mg Oral At Bedtime     isosorbide mononitrate  30 mg Oral Daily     metoprolol  25 mg Oral Daily     OLANZapine (zyPREXA) tablet 5 mg  5 mg Oral At Bedtime     omeprazole (priLOSEC) CR capsule 20 mg  20 mg Oral BID AC     risperiDONE (risperDAL) tablet 1 mg  1 mg Oral At Bedtime     [START ON 5/18/2017] levofloxacin  500 mg Intravenous Q48H     insulin aspart  1-6 Units Subcutaneous Q4H     heparin  5,000 Units Subcutaneous Q12H[OP1.3]       ALLERGIES:[OP1.1]    Allergies as of 05/16/2017 - Jaime as Reviewed 05/16/2017   Allergen Reaction Noted     Amoxicillin  10/18/2011     Amoxicillin  02/22/2016     Haldol [benzyl alcohol]  09/26/2005     Haldol [haloperidol]  02/22/2016     Pcn [penicillin g]  10/18/2011     Penicillins  02/22/2016     Seroquel [quetiapine] GI Disturbance 10/19/2011[OP1.3]       FH:[OP1.1]    Family History   Problem Relation Age of Onset     Hypertension Mother      Cancer - colorectal Father      Family History Negative Sister      Family History Negative Brother[OP1.3]        SH:[OP1.1]    Social History     Social History     Marital status: Single     Spouse name: N/A     Number of children: N/A     Years of education: N/A     Occupational History  "    Not on file.     Social History Main Topics     Smoking status: Never Smoker     Smokeless tobacco: Not on file     Alcohol use No     Drug use: No     Sexual activity: No     Other Topics Concern     Not on file     Social History Narrative    ** Merged History Encounter **         Intake 1/25/15:   Verbalized to be living with her mother.     Single. No h/o children.     Denied tobacco use, alcohol use, drug use.[OP1.3]        PHYSICAL EXAM:[OP1.1]    /76  Pulse 110  Temp 98.2  F (36.8  C) (Axillary)  Resp 8  Ht 1.626 m (5' 4\")  Wt 80.2 kg (176 lb 12.9 oz)  LMP 04/27/2012  SpO2 96%  BMI 30.35 kg/m2[OP1.3]  GENERAL:[OP1.1] NAD[OP1.2]  HEENT:[OP1.1]  EOMI. Lips and mouth are dry.[OP1.2]   CV: RRR,[OP1.1] tachy, no[OP1.2] murmurs, no clicks, gallops, or rubs,[OP1.1] no[OP1.2] delma[OP1.1]a[OP1.2]  RESP: Clear bilaterally with good efforts[OP1.1]. No wheezes or crackles.[OP1.2]  GI: Abdomen o[OP1.1]bese, soft, NT[OP1.2]  MUSCULOSKELETAL: extremities nl - no gross deformities noted  SKIN: no suspicious lesions or rashes, dry to touch  NEURO:[OP1.1]  Awake. She is alert to self and place. She answered a few questions. She denies pain.[OP1.2]  PSYCH:[OP1.1] flat[OP1.2]  LYMPH: No palpable ant/post cervical a[OP1.1]  +cherry[OP1.2]    LABS:      CBC RESULTS:[OP1.1]     Recent Labs  Lab 05/17/17  0519 05/16/17  1909 05/16/17 1907   WBC 5.5  --  6.8   RBC 4.02  --  4.22   HGB 12.0 12.9 12.6   HCT 38.6  --  40.1   *  --  143*[OP1.3]       BMP RESULTS:[OP1.1]    Recent Labs  Lab 05/17/17  0519 05/17/17  0015 05/16/17 2034 05/16/17 1909 05/16/17 1907   *  --   --  141 143   POTASSIUM 5.5* 6.1* 5.5* 6.8* 6.8*   CHLORIDE 122*  --   --   --  117*   CO2 21  --   --   --  15*   BUN 51*  --   --   --  55*   CR 3.10*  --   --   --  3.41*   *  --  250*  --  198*   BENTLEY 12.0*  --   --   --  12.3*[OP1.3]       INR[OP1.1]No lab results found in last 7 days.[OP1.3]     DIAGNOSTICS:  " Reviewed    A/P:[OP1.1]  61 y.o woman with schizoaffective disorder and CKD IV from lithium toxicity, admitted for recurrent altered mental status, consulted for A/CKD and hyperkalemia.     1. CKD IV, baseline Scr ~ 2.5 mg/dl. Previous episodes of LYLA. Scr was 2.38 mg/dl when she was discharged last month. She has NDI from lithium.     2. Acute kidney injury from dehydration. Scr has improved with IVF.     3. Hypernatremia. She is prone to hypernatremia from NDI and lack of access to free water   -change IVF to d5W     4. Altered mental status. No clear infection of systemic infection. UA is dirty but she has a cherry. She is alert and oriented to self and place.     5. Hyperkalemia   -she was on amilodride, which has been held   -recheck renal panel in the afternoon   -2 grams K restricted diet    6. IDDM   -monitor glucose level closely while on D5W    7. Schizoaffective disorder.[OP1.2]        Mario Abrams MD  [facility] - Nephrology  Office Phone: [phone]  Pager: [phone][OP1.1]     Revision History        User Key Date/Time User Provider Type Action    > OP1.2 5/17/2017 11:20 AM Mario Abrams MD Physician Sign     OP1.3 5/17/2017 10:42 AM Mario Abrams MD Physician      OP1.1 5/17/2017 10:41 AM Mario Abrams MD Physician                      Progress Notes - Physician (Notes from 05/29/17 through 06/01/17)      Progress Notes by Brittney Dacosta at 6/1/2017 10:43 AM     Author:  Brittney Dacosta Service:  (none) Author Type:      Filed:  6/1/2017 10:54 AM Date of Service:  6/1/2017 10:43 AM Creation Time:  6/1/2017 10:43 AM    Status:  Signed :  Brittney Dacosta ()         TORI  D:  SW following pt for coordination of hospice at facility discharge plan.  [facility] can accept pt for admission today.   Hospice is aware of this and plans to visit pt at [facility] around 1530  today.  I:  Due to pt having encephalopathy, renal failure, and requiring  O2 with minimal responsiveness, OCTAVIA arranged stretcher transport thru [facility] for 1400 today.  PCS form completed and faxed.  Pt has MA, so transport should be covered.  OCTAVIA also informed pt's mother Lisa of [facility] acceptance and 1400 transport.  She was pleased to hear of [facility] acceptance as [place] is too far for her to drive.  Lisa will inform [facility] that pt will not be returning there and arrange a time to  pt's belongings.  Meds to be filled here and sent with pt.  Cathy at [facility] informed of transport time.  No PAS required.  P:  Pt to discharge to [facility] at 1400 today with  Hospice services.[LL1.1]     Revision History        User Key Date/Time User Provider Type Action    > LL1.1 6/1/2017 10:54 AM Brittney Dacosta  Sign            Progress Notes by Lety Griffith RN at 5/31/2017  3:15 PM     Author:  Nacho, Lety, RN Service:  Hospice Author Type:  Registered Nurse    Filed:  5/31/2017  3:59 PM Date of Service:  5/31/2017  3:15 PM Creation Time:  5/31/2017  3:15 PM    Status:  Signed :  Lety Griffith RN (Registered Nurse)          Hospice: Met with patients mother Lisa and sister in law Tana to explain the Medicare hospice program. Nel has a history of intellectual disability, schizoaffective disorder, chronic kidney disease, recent admissions for acute encephalopathy associated with infection who most recently was admitted on 03/30/2017-04/04/2017 for acute metabolic encephalopathy associated with acute kidney injury, metabolic acidosis and suspected healthcare-associated pneumonia.  Prior to that, she was admitted in March for acute encephalopathy associated with a UTI as well as acute kidney injury. Pt seen today by Eugenie DUNLAP Np from Palliative and the family has decided on hospice for Nel. Both Lisa and Tana have had prior hospice experience. I reviewed the hospice medicare benefit, services available for support,  medication and equipment coverage. Lisa signed the hospice consent forms for hospice services to begin at discharge. A POLST was completed and put on the front of the hospital chart for signature. Brittney CAR  has informed me that there is a bed available at [facility] for tomorrow. The [facility] hospice team will complete the admission at the facility tomorrow at 3:30pm. Please order the following hospice comfort meds for discharge:  Hydromorphone 10mg/ml, 1 mg or 0.1 ML PO/SL every 2 hours PRN pain/dyspnea #30 ml  Lorazepam 0.25mg PO/SL every 4 hours PRN anxiety/restlessness # 30  Haloperidol 0.5mg PO/SL every 6 hours PRN agitation/nausea # 15  Atropine 1% 2 drops PO/SL every 2 hours PRN terminal congestion # 5ml  Bisacodyl supp 10mg MA daily PRN constipation # 2  Acetaminophen supp 650mg MA every 4 hours PRN fever/mild pain # 2  No ranges and bubblepack please.   Lisa was given her signed copies of the hospice consent forms along with the hospice handbook with the 24 hr number. I have updated admission person Cathy at [facility]. I have ordered an ROSAMARIA overlay mattress for the bed to be delivered by noon tomorrow by [facility]. Thank you for the referral. Lety Griffith RN, BSN  [facility] Admission nurse  [phone]     [HR1.1]     Revision History        User Key Date/Time User Provider Type Action    > HR1.1 5/31/2017  3:59 PM Lety Griffith, RN Registered Nurse Sign            Progress Notes by Carson Schuster MD at 5/31/2017  3:03 PM     Author:  Carson Schuster MD Service:  Hospitalist Author Type:  Physician    Filed:  5/31/2017  3:12 PM Date of Service:  5/31/2017  3:03 PM Creation Time:  5/31/2017  3:03 PM    Status:  Signed :  Carson Schuster MD (Physician)         [facility]    Hospitalist Progress Note    Date of Service (when I saw the patient):[TK1.1] 05/31/2017    Assessment & Plan[TK1.2]     Nel Farmer is a 61-year-old woman with history of  schizoaffective disorder, cognitive impairment usually oriented to just self and place per TCU report, who has had 2 days of lethargy, decreased activity, decreased intake. Lab work up for evaluation at TCU showed potassium of 6.1 with a creatinine of 4 and so was brought to ER 5/16/17. Work up in ER showed possible UTI, LYAL, hyperkalemia. UTI consistent with yeast, no bacteria found.       Acute metabolic encephalopathy likely secondary to severe dehydration and Acute subdural hematoma, Acute on chronic stage 4 renal failure secondary to lithium toxicity. LYLA resolved Patient has diabetes insipidus, and was also on amiloride, likely contributing to dehydration and metabolic encephalopathy, though mostly with decreased response frequently at baseline. Also on ativan, risperidone, zyprexa and depakote (500 mg at am and 1500 mg at HS)which could contribute to encephalopathy. UA also suggestive of UTI possibly contributing but no bacterial growth seen. Depakote level normal. Head CT 5/22 negative for acute process. Psychiatrist consulted, had discussed with Dr Vasquez. Neuro consulted, EEG did not show seizure like activity, ABG without hypercarbia. MRI brain done 5/24, showed SDH along the posterior aspect of the right cerebral convexity measuring up to 0.2 cm in thickness, patient not on anticoagulation. Unlikely this MRI finding is contributing to her mentation. Transferred to ICU 5/24 and monitored overnight, transferred out of ICU 5/25.  - Holding PTA ativan   - Seizure precautions.   - Appreciate Nephrology /neurology/psych assistance.      Diarrhea and emesis: started having diarrhea and vomited while in ICU. NG and rectal tube as well were placed. c diff is negative. Had mildly elevated lipase which trended down.  - Tube feeding initiated but now comfort care and feeding stopped 5/31.      Severe dehydration with acute kidney injury/hypernatremia and hyperkalemia, now hypokalemic Her baseline creatinine is  around 2.4 with creatinine on admission of 3.41, K 6.8 at admission. In the ED, she was given 2 liters of fluid, bicarbonate, insulin. Poor oral intake, DI and being on amiloride PTA likely causing dehydration and acute kidney injury on top of CKD. Her fluid intake had been limited at the skilled nursing facility since patient was prone to throwing cups at staff.   - Nephrology signed off.       Urinary tract infection, yeast Had  white blood cells and 2-5 WBC casts on a catheter specimen of a UA. She has a history of Klebsiella urinary tract infection in the past with sensitivity to most antibiotics tested including ceftriaxone and fluoroquinolones. She was given Levaquin 750 mg in the emergency department. >100k yeast. D/c'd levofloxacin. Blood 1/2 from 5/19 shows Staph capitis   - Central line placed (RIJ) on 5/20.   - Treated for yeast, completed.       Hypertension BP elevated, also tachycardic which could be due to dehydration. Amiloride discontinued due to hyperkalemia  - On clonidine patch will leave it for now.  - PRN hydralazine/metoprolol      Diabetes mellitus type 2 on insulin: On glipizide and 70/30 insulin (20 units in am and 34 units in the pm).    - Lantus to 20 units as TF stopped, will review with family regarding continuing this at discharge.  - Moderate insulin resistance ISS       Elevated Lipase Unclear significance. Denies abd pain. Vomited once in ICU but none since then. CT abd-pelvis (though non contrast) shows atrophic pancreas. Unlikely this is pancreatitis.   - Lipase trended down.       Diabetes insipidus Induced by lithium. Lithium was discontinued before this hospitalization.       Schizoaffective disorder and Paranoid Schizophrenia and Cognitive Deficits PTA is on depakote, risperidal and zyprexa and ativan.  - Continue Valproic acid, switched from IV to NG route, level high, hold for now, readdress with family at discharge.       Thrombocytopenia: Unclear  etiology      Gastroesophageal reflux disease  - Continue PPI     DVT Prophylaxis: Pneumatic Compression Devices  Code Status:[TK1.1] DNR/DNI[TK1.2]    Disposition: Expected discharge:  for disposition, back to TCU with comfort care likely tomorrow[TK1.1]    Carson Schuster[TK1.2], MD  Hospitalist[TK1.1]    Interval History[TK1.2]   Patient was seen and examined, intermittently somnolent per RN.   Unable to obtain ROS given mentation.    -Data reviewed today: I reviewed all new labs and imaging results over the last 24 hours. I personally reviewed no images or EKG's today.[TK1.1]    Physical Exam   Temp: 99.1  F (37.3  C) Temp src: Axillary BP: 138/70   Heart Rate: 100 Resp: 16 SpO2: (!) 88 % O2 Device: None (Room air)    Vitals:    05/29/17 0315 05/30/17 0500 05/31/17 0500   Weight: 81.5 kg (179 lb 10.8 oz) 82 kg (180 lb 12.4 oz) 84.4 kg (186 lb 1.1 oz)[TK1.2]     Vital Signs with Ranges[TK1.1]  Temp:  [97.9  F (36.6  C)-99.6  F (37.6  C)] 99.1  F (37.3  C)  Heart Rate:  [] 100  Resp:  [15-16] 16  BP: (111-162)/(51-77) 138/70  SpO2:  [88 %-95 %] 88 %  I/O last 3 completed shifts:  In: 330 [NG/GT:330]  Out: 3250 [Urine:3250][TK1.2]    GENERAL: sleepy but responds to verbal stimuli.  HEENT: Normocephalic. No icterus or injection.    LUNGS: Decrement to bases. Normal work of breathing.  HEART: Regular rate. Extremities perfused.   ABDOMEN: Soft, nontender, and nondistended. Positive bowel sounds.   EXTREMITIES: No LE edema noted.   NEUROLOGIC: Rt UE seems spastic with decrease movement. Sleepy but wakes up with verbal stimuli, only follows 1-2 commands then goes back to sleep.[TK1.1]    Medications     IV fluid REPLACEMENT ONLY         [START ON 6/1/2017] insulin glargine  20 Units Subcutaneous QAM AC     heparin lock flush  5-10 mL Intracatheter Q24H     insulin aspart  1-7 Units Subcutaneous TID AC     insulin aspart  1-5 Units Subcutaneous At Bedtime     cloNIDine   Transdermal Q8H     cloNIDine    Transdermal Weekly     cloNIDine  1 patch Transdermal Weekly       Data     Recent Labs  Lab 05/31/17  0600 05/30/17  0700 05/29/17  0605 05/28/17  0635  05/25/17  0415   WBC 14.4* 13.7*  --  13.3*  < > 6.9   HGB 10.0* 9.6*  --  9.5*  < > 9.8*   MCV 98 95  --  94  < > 96    195  --  199  < > 145*   * 140 139 140  < > 143   POTASSIUM 4.4 4.1 3.3* 3.4  < > 3.6   CHLORIDE 115* 107 106 106  < > 111*   CO2 27 25 25 26  < > 22   BUN 34* 27 30 34*  < > 44*   CR 2.04* 1.96* 2.15* 2.04*  < > 2.74*   ANIONGAP 6 8 8 8  < > 10   BENTLEY 10.1 9.4 9.2 9.4  < > 9.8   * 316* 250* 185*  < > 282*   ALBUMIN  --   --   --   --   --  2.0*   PROTTOTAL  --   --   --   --   --  5.7*   BILITOTAL  --   --   --   --   --  0.2   ALKPHOS  --   --   --   --   --  50   ALT  --   --   --   --   --  8   AST  --   --   --   --   --  5   LIPASE  --   --   --   --   --  105   < > = values in this interval not displayed.    No results found for this or any previous visit (from the past 24 hour(s)).[TK1.2]     Revision History        User Key Date/Time User Provider Type Action    > TK1.2 5/31/2017  3:12 PM Carson Schuster MD Physician Sign     TK1.1 5/31/2017  3:03 PM Carson Schuster MD Physician             Progress Notes by Lilia Nick RD, LD at 5/31/2017  3:04 PM     Author:  Lilia Nick RD, LD Service:  Nutrition Author Type:  Registered Dietitian    Filed:  5/31/2017  3:05 PM Date of Service:  5/31/2017  3:04 PM Creation Time:  5/31/2017  3:04 PM    Status:  Signed :  Lilia Nick RD, LD (Registered Dietitian)         Chart reviewed  Note pt is now comfort cares  TF has been dc'd  Regular diet as tolerates    Will be available as needed[SJ1.1]     Revision History        User Key Date/Time User Provider Type Action    > SJ1.1 5/31/2017  3:05 PM Lilia Nick RD, LD Registered Dietitian Sign            Progress Notes by Brittney Dacosta at 5/31/2017  2:44 PM     Author:  Brittney Dacosta Service:   (none) Author Type:      Filed:  5/31/2017  2:49 PM Date of Service:  5/31/2017  2:44 PM Creation Time:  5/31/2017  2:44 PM    Status:  Signed :  Brittney Dacosta ()         TORI  D:  Per palliative update, pt is now comfort care.  Mother Lisa would like pt to discharge to alternate SNF which is closer to mother's home in [place].  I:  SW met with mother Lisa and sister with Lety from  Hospice present.  Alternate facility options discussed.  Mother gave consent for SW to seek LTC bed for pt at:  1[facility]  2[facility]   3[facility].  Referral sent by [facility] process.  Lety from  Hospice meeting with family now to discuss hospice services available to pt.  P:  SW following pt for alternate SNF placement.[LL1.1]     Revision History        User Key Date/Time User Provider Type Action    > LL1.1 5/31/2017  2:49 PM Brittney Dacosta  Sign            Progress Notes by Naga Pompa DO at 5/30/2017  7:18 AM     Author:  Naga Pompa DO Service:  Hospitalist Author Type:  Physician    Filed:  5/30/2017  2:47 PM Date of Service:  5/30/2017  7:18 AM Creation Time:  5/30/2017  7:18 AM    Status:  Addendum :  Naga Pompa DO (Physician)         [facility]  Hospitalist Progress Note        Naga Pompa DO  05/30/2017        Interval History:[ZA1.1]      Patient more alert today but remains confused.[ZA1.2]          Assessment and Plan:        Nel Farmer is a 61-year-old woman with history of schizoaffective disorder, cognitive impairment usually oriented to just self and place per TCU report, who has had 2 days of lethargy, decreased activity, decreased intake. Lab work up for evaluation at TCU showed potassium of 6.1 with a creatinine of 4 and so was brought to ER 5/16/17. Work up in ER showed possible UTI, LYLA, hyperkalemia. UTI consistent with yeast, no bacteria found.       Acute metabolic  encephalopathy likely secondary to severe dehydration and Acute subdural hematoma, Acute on chronic stage 4 renal failure secondary to lithium toxicity. LYLA resolved Patient has diabetes insipidus, and was also on amiloride, likely contributing to dehydration and metabolic encephalopathy, though mostly with decreased response frequently at baseline. Also on ativan, risperidone, zyprexa and depakote (500 mg at am and 1500 mg at HS)which could contribute to encephalopathy. UA also suggestive of UTI possibly contributing but no bacterial growth seen. Depakote level normal. Head CT 5/22 negative for acute process. Psychiatrist consulted, had discussed with Dr Vasquez. Neuro consulted, EEG did not show seizure like activity, ABG without hypercarbia. MRI brain done 5/24, showed SDH along the posterior aspect of the right cerebral convexity measuring up to 0.2 cm in thickness, patient not on anticoagulation. Unlikely this MRI finding is contributing to her mentation. Transferred to ICU 5/24 and monitored overnight, transferred out of ICU 5/25.  - Holding PTA ativan   - Seizure precautions.   - Nephrology[ZA1.1] signed off[ZA1.2].   - Psychiatry[ZA1.1] re-consultation as clinically indicated[ZA1.2].       Diarrhea and emesis: started having diarrhea and vomited while in ICU. NG and rectal tube as well were placed. c diff is negative. Had mildly elevated lipase which trended down.  - SLP following.   - Tube feeding initiated.   - Nutrition following.[ZA1.1]   - Palliative consulted for goals of care and potential for longer term tube feeding.[ZA1.2]       Severe dehydration with acute kidney injury/hypernatremia and hyperkalemia, now hypokalemic Her baseline creatinine is around 2.4 with creatinine on admission of 3.41, K 6.8 at admission. In the ED, she was given 2 liters of fluid, bicarbonate, insulin. Poor oral intake, DI and being on amiloride PTA likely causing dehydration and acute kidney injury on top of CKD. Her  fluid intake had been limited at the skilled nursing facility since patient was prone to throwing cups at staff.   - Nephrology[ZA1.1] signed off[ZA1.2].   - Monitor intake output       Urinary tract infection, yeast Had  white blood cells and 2-5 WBC casts on a catheter specimen of a UA. She has a history of Klebsiella urinary tract infection in the past with sensitivity to most antibiotics tested including ceftriaxone and fluoroquinolones. She was given Levaquin 750 mg in the emergency department. >100k yeast. D/c'd levofloxacin. Blood 1/2 from 5/19 shows Staph capitis   - Central line placed (RIJ) on 5/20[ZA1.1].   - Treated for yeast, completed.[ZA1.2]       Hypertension BP elevated, also tachycardic which could be due to dehydration.  - metoprolol and clonidine patch.  - PRN hydralazine/metoprolol  - Holding amiloride       Diabetes mellitus type 2 on insulin On glipizide and 70/30 insulin (20 units in am and 34 units in the pm).    - Lantus to 25 units daily.  - Moderate insulin resistance ISS       Elevated Lipase Unclear significance. Denies abd pain. Vomited once in ICU but none since then. CT abd-pelvis (though non contrast) shows atrophic pancreas. Unlikely this is pancreatitis.   - Lipase trended down.       Diabetes insipidus Induced by lithium. Lithium was discontinued before this hospitalization.  - Continue to monitor output and sodium      Schizoaffective disorder and Paranoid Schizophrenia and Cognitive Deficits PTA is on depakote, risperidal and zyprexa and ativan.  - Psych[ZA1.1] intermittently following[ZA1.2].[ZA1.1]   - Continue Valproic acid, switched from IV to NG route.[ZA1.2]       Thrombocytopenia: Unclear etiology  - Monitor.       Gastroesophageal reflux disease  - Continue PPI       DVT prophylaxis: D/c Heparin SQ 5/22 due to dropping platelet count, use pcds      Code: DNR/DNI      Disposition: Once mental status stable and nutrition plan consolidated. Palliative consulted to  "discuss goals of care and tube feeding[ZA1.1], ultimately palliative continuing discussion to reach consensus among patient's children to establish disposition.[ZA1.3]                    Physical Exam:      Heart Rate: 82    Blood pressure 135/72, pulse 68, temperature 98.4  F (36.9  C), temperature source Oral, resp. rate 18, height 1.626 m (5' 4\"), weight 82 kg (180 lb 12.4 oz), last menstrual period 2012, SpO2 96 %, not currently breastfeeding.    Vitals:    17 0500 17 0315 17 0500   Weight: 82 kg (180 lb 12.4 oz) 81.5 kg (179 lb 10.8 oz) 82 kg (180 lb 12.4 oz)       Vital Sign Ranges  Temperature Temp  Av.9  F (36.6  C)  Min: 97.5  F (36.4  C)  Max: 98.4  F (36.9  C)   Blood pressure Systolic (24hrs), Av , Min:112 , Max:151        Diastolic (24hrs), Av, Min:54, Max:73      Pulse No Data Recorded   Respirations Resp  Av.2  Min: 16  Max: 18   Pulse oximetry SpO2  Av.2 %  Min: 93 %  Max: 98 %     Vital Signs with Ranges  Temp:  [97.5  F (36.4  C)-98.4  F (36.9  C)] 98.4  F (36.9  C)  Heart Rate:  [71-84] 82  Resp:  [16-18] 18  BP: (112-151)/(54-73) 135/72  SpO2:  [93 %-98 %] 96 %    I/O Last 3 Shifts:   I/O last 3 completed shifts:  In: 2389 [I.V.:2124; NG/GT:105]  Out: 2675 [Urine:2675]    I/O past 24 hours:     Intake/Output Summary (Last 24 hours) at 17 0718  Last data filed at 17 0600   Gross per 24 hour   Intake              815 ml   Output             2675 ml   Net            -1860 ml     GENERAL: Alert and disoriented. NAD.[ZA1.1] Alert but limited insight.[ZA1.2]   HEENT: Normocephalic. No icterus or injection. Nares normal. NG in place.   LUNGS: Decrement to bases. No dyspnea at rest.   HEART: Regular rate. Extremities perfused.   ABDOMEN: Soft, nontender, and nondistended. Positive bowel sounds.   EXTREMITIES: No LE edema noted.   NEUROLOGIC: Moves extremities x4. Oriented to place and person.          Prior to Admission Medications:    "     Prescriptions Prior to Admission   Medication Sig Dispense Refill Last Dose     LORazepam (ATIVAN) 0.5 MG tablet Take 1 tablet (0.5 mg) by mouth 2 times daily as needed for anxiety 14 tablet 0 prn med     cloNIDine (CATAPRES) 0.2 MG tablet Take 1 tablet (0.2 mg) by mouth 2 times daily   5/15/2017 at Unknown time     amLODIPine (NORVASC) 5 MG tablet Take 1 tablet (5 mg) by mouth daily 30 tablet  5/15/2017 at Unknown time     aMILoride (MIDAMOR) 5 MG tablet Take 2 tablets (10 mg) by mouth daily   5/15/2017 at Unknown time     metoprolol (TOPROL-XL) 25 MG 24 hr tablet Take 1 tablet (25 mg) by mouth daily 30 tablet  5/15/2017 at Unknown time     glipiZIDE (GLUCOTROL XL) 2.5 MG 24 hr tablet Take 1 tablet (2.5 mg) by mouth 2 times daily (before meals) 30 tablet  5/15/2017 at pm     insulin isophane & regular (HUMULIN MIX 70/30 PEN) susp 20 units before breakfast  30 units before dinner (Patient taking differently: 20 units before breakfast  34 units before dinner) 30 mL 1 5/15/2017 at Unknown time     risperiDONE (RISPERDAL M-TABS) 0.5 MG disintegrating tablet Place 1 tablet (0.5 mg) under the tongue 3 times daily as needed 60 tablet 0 prn med     senna-docusate (SENOKOT-S;PERICOLACE) 8.6-50 MG per tablet Take 1 tablet by mouth 2 times daily as needed for constipation 60 tablet 0 prn med     polyethylene glycol (MIRALAX/GLYCOLAX) powder Take 17 g by mouth daily as needed for constipation   prn med     divalproex (DEPAKOTE) 500 MG EC tablet Take 500 mg by mouth every morning   5/15/2017 at Unknown time     divalproex (DEPAKOTE) 500 MG EC tablet Take 1,500 mg by mouth At Bedtime   5/15/2017 at Unknown time     Furosemide (LASIX PO) Take 10 mg by mouth daily    5/16/2017 at Unknown time     Furosemide (LASIX PO) Take 20 mg by mouth daily as needed (Take at noon for leg swelling if needed)   prn med     OLANZapine (ZYPREXA PO) Take 5 mg by mouth At Bedtime   5/15/2017 at Unknown time     RisperiDONE (RISPERDAL PO) Take 1  "mg by mouth At Bedtime   5/15/2017 at Unknown time     Omeprazole (PRILOSEC PO) Take 20 mg by mouth 2 times daily (before meals)   5/15/2017 at pm     isosorbide mononitrate (IMDUR) 30 MG 24 hr tablet Take 1 tablet by mouth daily. 30 tablet prn 5/15/2017 at Unknown time     insulin syringe-needle U-100 (BD INSULIN SYRINGE ULTRAFINE) 31G X 5/16\" 1 ML Use one syringe bid daily or as directed. 60 each prn Taking            Medications:        Current Facility-Administered Medications   Medication Last Rate     IV fluid REPLACEMENT ONLY       D5W 100 mL/hr at 05/30/17 0150     Current Facility-Administered Medications   Medication Dose Route Frequency     metoprolol  25 mg Oral BID     pantoprazole  40 mg Per NG tube Daily     heparin lock flush  5-10 mL Intracatheter Q24H     multivitamins with minerals  15 mL Per Feeding Tube Daily     insulin glargine  25 Units Subcutaneous QAM AC     insulin aspart  1-7 Units Subcutaneous TID AC     insulin aspart  1-5 Units Subcutaneous At Bedtime     valproate (DEPACON) intermittent infusion  500 mg Intravenous Q8H     cloNIDine   Transdermal Q8H     [START ON 5/31/2017] cloNIDine   Transdermal Weekly     cloNIDine  1 patch Transdermal Weekly     Current Facility-Administered Medications   Medication Dose Route Frequency     sodium chloride (PF)  10-20 mL Intracatheter Q1H PRN     heparin lock flush  5-10 mL Intracatheter Q1H PRN     IV fluid REPLACEMENT ONLY   Intravenous Continuous PRN     acetaminophen  650 mg Oral Q4H PRN     OLANZapine zydis  2.5-5 mg Oral BID PRN     hydrALAZINE  10-20 mg Intravenous Q2H PRN     miconazole   Topical Q1H PRN     sodium chloride (PF)  10-20 mL Intracatheter Q1H PRN     heparin lock flush  5-10 mL Intracatheter Q1H PRN     sodium chloride (PF)  3 mL Intracatheter Q1H PRN     metoprolol  2.5 mg Intravenous Q4H PRN     risperiDONE  0.5 mg Sublingual TID PRN     glucose  15-30 g Oral Q15 Min PRN    Or     dextrose  25-50 mL Intravenous Q15 Min PRN "    Or     glucagon  1 mg Subcutaneous Q15 Min PRN     naloxone  0.1-0.4 mg Intravenous Q2 Min PRN     melatonin  1 mg Oral At Bedtime PRN     senna-docusate  1-2 tablet Oral BID PRN     polyethylene glycol  17 g Oral Daily PRN     bisacodyl  10 mg Rectal Daily PRN     ondansetron  4 mg Oral Q6H PRN    Or     ondansetron  4 mg Intravenous Q6H PRN            Data:      Lab data reviewed.     Recent Labs  Lab 05/29/17  0605 05/28/17  0635 05/27/17  0630  05/26/17  0620   HGB  --  9.5* 9.4*  --  9.3*   MCV  --  94 94  --  94   PLT  --  199 180  --  150    140 143  --  142   POTASSIUM 3.3* 3.4 3.4  < > 2.9*   CHLORIDE 106 106 109  --  107   CO2 25 26 27  --  28   BUN 30 34* 35*  --  39*   CR 2.15* 2.04* 2.22*  --  2.41*   ANIONGAP 8 8 7  --  7   BENTLEY 9.2 9.4 9.0  --  9.2   * 185* 183*  --  229*   < > = values in this interval not displayed.        Imaging:      Imaging data reviewed.     Dr. Naga Pompa D.O.  [facility]  Pager [phone][ZA1.1]       Revision History        User Key Date/Time User Provider Type Action    > ZA1.3 5/30/2017  2:47 PM Naga Pompa DO Physician Addend     ZA1.2 5/30/2017 12:16 PM Naga Pompa DO Physician Sign     ZA1.1 5/30/2017  7:18 AM Naga Pompa DO Physician             Progress Notes by Champ Cain MD at 5/25/2017 12:11 PM     Author:  Champ Cain MD Service:  Nephrology Author Type:  Physician    Filed:  5/30/2017 10:52 AM Date of Service:  5/25/2017 12:11 PM Creation Time:  5/25/2017 12:11 PM    Status:  Signed :  Champ Cain MD (Physician)                  Assessment and Plan:   LYLA/CKD: Cr drifting up last 4-5 days: 2.4 > > 2.7. I/O 1175/525 yesterday. Hx of lithium treatment and perhaps lithium nephropathy. Hx of diabetes insipidus due to Li. Na now nl. K 3.6 and HCO3 22. Wt 81.9 on 5/16 > > 84.5 5/25. Getting IVF: D5W with 50 of HCO3 at 150 per h.[JT1.1] She remains  NPO.   Monitor labs. Check Laura and FENa. No indication for dialysils.[JT1.2]             Interval History:   Encephalopathy: neuro following.     Hypertension: on catapres patch, 0.1 mg, IV metoprolol 5 mg q 6 h.   Appears adequately controlled.             Review of Systems:[JT1.1]   Unable.[JT1.2]           Medications:       valproate (DEPACON) intermittent infusion  500 mg Intravenous Q8H     insulin isophane & regular  25 Units Subcutaneous Daily with supper     insulin isophane & regular  15 Units Subcutaneous QAM     pantoprazole  40 mg Intravenous QAM AC     metoprolol  5 mg Intravenous Q6H     cloNIDine   Transdermal Q8H     [START ON 5/31/2017] cloNIDine   Transdermal Weekly     cloNIDine  1 patch Transdermal Weekly     insulin aspart  1-6 Units Subcutaneous Q4H     OLANZapine (zyPREXA) tablet 5 mg  5 mg Oral At Bedtime       IV infusion builder WITH additives 150 mL/hr at 05/25/17 0730     Current active medications and PTA medications reviewed, see medication list for details.            Physical Exam:   Vitals were reviewed  Patient Vitals for the past 24 hrs:   BP Temp Temp src Heart Rate Resp SpO2 Weight   05/25/17 1100 155/85 - - 87 24 99 % -   05/25/17 1000 139/76 - - 83 24 98 % -   05/25/17 0900 135/77 - - 76 25 99 % -   05/25/17 0830 154/81 - - 75 20 99 % -   05/25/17 0800 151/78 98.1  F (36.7  C) Axillary 90 18 99 % -   05/25/17 0700 112/65 - - 81 17 99 % -   05/25/17 0600 122/78 97.5  F (36.4  C) Axillary 96 15 98 % -   05/25/17 0500 111/76 - - 95 13 98 % -   05/25/17 0400 102/58 98.2  F (36.8  C) Axillary 78 19 99 % 84.5 kg (186 lb 4.6 oz)   05/25/17 0300 119/65 - - 79 15 99 % -   05/25/17 0200 114/57 - - 78 13 100 % -   05/25/17 0100 114/56 - - 92 12 98 % -   05/25/17 0015 - - - - - 90 % -   05/25/17 0000 134/70 97.9  F (36.6  C) Axillary 85 9 98 % -   05/24/17 2300 105/61 - - 93 13 95 % -   05/24/17 2200 131/70 98.9  F (37.2  C) Axillary 100 15 96 % -   05/24/17 2100 120/72 - - 101 11 94 % -    17 124/76 98.9  F (37.2  C) Axillary 93 14 96 % -   17 1900 99/48 - - 90 17 96 % -   17 1845 107/62 - - 92 15 95 % -   17 1830 97/50 - - 92 12 95 % -   17 1815 115/60 - - 99 12 96 % -   17 1800 - 98.6  F (37  C) Axillary - - - -   17 1532 102/59 98.9  F (37.2  C) Oral 95 18 96 % -       Temp:  [97.5  F (36.4  C)-98.9  F (37.2  C)] 98.1  F (36.7  C)  Heart Rate:  [] 87  Resp:  [9-25] 24  BP: ()/(48-85) 155/85  SpO2:  [90 %-100 %] 99 %    Temperatures:  Current - Temp: 98.1  F (36.7  C); Max - Temp  Av.4  F (36.9  C)  Min: 97.5  F (36.4  C)  Max: 98.9  F (37.2  C)  Respiration range: Resp  Av  Min: 9  Max: 25  Pulse range: No Data Recorded  Blood pressure range: Systolic (24hrs), Av , Min:97 , Max:155   ; Diastolic (24hrs), Av, Min:48, Max:85    Pulse oximetry range: SpO2  Av %  Min: 90 %  Max: 100 %    I/O last 3 completed shifts:  In:  [P.O.:300; I.V.:1925]  Out: 1575 [Urine:1450; Emesis/NG output:125]      Intake/Output Summary (Last 24 hours) at 17 1211  Last data filed at 17 1000   Gross per 24 hour   Intake             2765 ml   Output             2050 ml   Net              715 ml[JT1.1]       Obtunded, NG in place  No LE edema  Lungs with clear ant BS  Cor RRR nl S1 S2 no M[JT1.2]       Wt Readings from Last 4 Encounters:   17 84.5 kg (186 lb 4.6 oz)   17 92.4 kg (203 lb 11.3 oz)   17 102.2 kg (225 lb 5 oz)   16 108.9 kg (240 lb)          Data:          Lab Results   Component Value Date     2017     2017     2017    Lab Results   Component Value Date    CHLORIDE 111 2017    CHLORIDE 117 2017    CHLORIDE 116 2017    Lab Results   Component Value Date    BUN 44 2017    BUN 41 2017    BUN 36 2017      Lab Results   Component Value Date    POTASSIUM 3.6 2017    POTASSIUM 4.3 2017    POTASSIUM 4.0 2017     Lab Results   Component Value Date    CO2 22 05/25/2017    CO2 17 05/24/2017    CO2 17 05/24/2017    Lab Results   Component Value Date    CR 2.74 05/25/2017    CR 2.82 05/24/2017    CR 2.54 05/24/2017        Recent Labs   Lab Test  05/25/17   0415  05/24/17   1600  05/23/17   0530   05/18/17   0550   WBC  6.9   --   9.0   --   5.8   HGB  9.8*   --   9.1*   --   12.2   HCT  31.8*   --   28.8*   --   40.2   MCV  96   --   94   --   97   PLT  145*  141*  83*   < >  126*    < > = values in this interval not displayed.     Recent Labs   Lab Test  05/25/17   1055  05/25/17   0415  05/16/17 1907 03/30/17   1515   05/14/15   0737   03/27/10   1615   03/22/10   1435   AST   --   5  19  12   < >   --    < >   --    < >  37   ALT   --   8  14  20   < >   --    < >   --    < >  8   ALKPHOS   --   50  68  75   < >   --    < >   --    < >  106   BILITOTAL   --   0.2  0.3  0.2   < >   --    < >   --    < >  0.3   BILICONJ   --    --    --    --    --    --    --    --    --   0.0   ZACARIAS  20   --    --    --    --   18   --   18   --    --     < > = values in this interval not displayed.       Recent Labs   Lab Test  05/16/17 1907 02/03/12   0740  02/02/12   0750   MAG  3.0*  2.6*  2.4*     Recent Labs   Lab Test  05/21/17   0936  05/19/17   0700  05/17/17   2005   PHOS  2.5  2.8  2.1*     Recent Labs   Lab Test  05/25/17 0415  05/24/17   1600  05/24/17   0600   BENTLEY  9.8  10.2*  10.3*       Lab Results   Component Value Date    BENTLEY 9.8 05/25/2017     Lab Results   Component Value Date    WBC 6.9 05/25/2017    HGB 9.8 (L) 05/25/2017    HCT 31.8 (L) 05/25/2017    MCV 96 05/25/2017     (L) 05/25/2017     Lab Results   Component Value Date     05/25/2017    POTASSIUM 3.6 05/25/2017    CHLORIDE 111 (H) 05/25/2017    CO2 22 05/25/2017     (H) 05/25/2017     Lab Results   Component Value Date    BUN 44 (H) 05/25/2017    CR 2.74 (H) 05/25/2017     Lab Results   Component Value Date    MAG 3.0 (H) 05/16/2017      Lab Results   Component Value Date    PHOS 2.5 05/21/2017       Creatinine   Date Value Ref Range Status   05/25/2017 2.74 (H) 0.52 - 1.04 mg/dL Final   05/24/2017 2.82 (H) 0.52 - 1.04 mg/dL Final   05/24/2017 2.54 (H) 0.52 - 1.04 mg/dL Final   05/23/2017 2.40 (H) 0.52 - 1.04 mg/dL Final   05/21/2017 2.33 (H) 0.52 - 1.04 mg/dL Final   05/20/2017 2.54 (H) 0.52 - 1.04 mg/dL Final       Attestation:  I have reviewed today's vital signs, notes, medications, labs and imaging.     Champ Cain MD[JT1.1]               Revision History        User Key Date/Time User Provider Type Action    > JT1.2 5/30/2017 10:52 AM Champ Cain MD Physician Sign     JT1.1 5/25/2017 12:11 PM Champ Cain MD Physician             Progress Notes by Marcie Levy RD, LD at 5/30/2017  8:31 AM     Author:  Macrie Levy RD, LD Service:  Nutrition Author Type:  Registered Dietitian    Filed:  5/30/2017  8:38 AM Date of Service:  5/30/2017  8:31 AM Creation Time:  5/30/2017  8:31 AM    Status:  Signed :  Marcie Levy RD, LD (Registered Dietitian)         Pt had an NJ feeding tube placed yesterday and started Isosource 1.5 @ 15 mL/hr, now increased to 30 mL/hr.   Goal is 45 mL/hr, should reach goal later today.  IVF d/c'd, will increase H2O flushes to 135 mL q 4 hours for total fluids of 1600 mL/day.    Note BS 200s, on med SSI and Lantus, 25 units.  K low, 3.3.    Pt also on a DD1, NT diet but intake has been 0-25%. SLP is following.    Last documented BM was 5/25.    Marcie Levy RD  Pager [phone] (M-F)            [phone] (W/E & Hol)[CM1.1]           Revision History        User Key Date/Time User Provider Type Action    > CM1.1 5/30/2017  8:38 AM Marcie Levy RD, LD Registered Dietitian Sign            Progress Notes by Mario Abrams MD at 5/29/2017  3:32 PM     Author:  Mario Abrams MD Service:  Nephrology Author Type:  Physician    Filed:  5/29/2017  3:33 PM Date of Service:   5/29/2017  3:32 PM Creation Time:  5/29/2017  3:32 PM    Status:  Signed :  Mario Abrams MD (Physician)         I reviewed her chart. No further recommendation from us. I am signing off. Please call us back with any questions or concerns. Patient is prone to LYLA and hypernatremia due to NDI and psych issue. Push oral intake and prn IVF as needed. Thank you.[OP1.1]     Revision History        User Key Date/Time User Provider Type Action    > OP1.1 5/29/2017  3:33 PM Mario Abrams MD Physician Sign            Progress Notes by Janna Medina at 5/29/2017  2:46 PM     Author:  Janna Medina Service:  Spiritual Health Author Type:      Filed:  5/29/2017  2:53 PM Date of Service:  5/29/2017  2:46 PM Creation Time:  5/29/2017  2:46 PM    Status:  Signed :  Janna Medina ()         SPIRITUAL HEALTH SERVICES  SPIRITUAL ASSESSMENT Progress Note    [address]    PRIMARY FOCUS:     Goals of care    Support for coping    ILLNESS CIRCUMSTANCES:   Reviewed documentation. Reflective conversation shared with pt's mother Lisa and pt's sister-in-law Tana which integrated elements of illness and family narratives.     Context of Serious Illness/Symptom(s) - Tana shared with me some of the details of pt's long and challenging medical situation.  She has been in hospitals and rehab facilities since February.  I believe before that she was living at home.  Pt has a number of developmental disabilities and mental health issues, in addition to her physical challenges.    Resources for Support - Pt's mother has been her primary decision-maker (medical and financial), but pt's sister-in-law is attempting to become more involved.  Pt's sister-in-law is  to pt's brother, but he prefers that she be involved in pt's care needs.  Pt also has a sister who lives in [place].    DISTRESS:     Emotional/Existential/Relational Distress - Pt herself was able to name her mother and sister-in-law,  but was not able to otherwise engage in conversation.  Today is her birthday.    Spiritual/Anglican Distress - Not to my knowledge.    Social/Cultural/Economic Distress - Unknown.    SPIRITUAL/Gnosticist COPING:     Worship/Damaris - Zoroastrianism.  Pt and her mother attend [facility], although they aren't easily able to get to [employer] any more.    Spiritual Practice(s) - Not discussed.    Emotional/Existential/Relational Connections - Unknown.    GOALS OF CARE:    Goals of Care - Pt's family members are considering next steps in pt's care plan.  Pt's sister-in-law requested a palliative care consult, and is anxious to consider options for pt.  Pt's niece is getting  in mid-June, and thus family members are anxious to make get plans in place as soon as possible.    Meaning/Sense-Making - Not discussed.    PLAN:   team will continue to provide support.      Janna Medina M.A.  Staff   Pager [phone]  Phone [phone][PL1.1]     Revision History        User Key Date/Time User Provider Type Action    > PL1.1 5/29/2017  2:53 PM Janna Medina  Sign            Progress Notes by Naga Pompa DO at 5/29/2017  9:42 AM     Author:  Naga Pompa DO Service:  Hospitalist Author Type:  Physician    Filed:  5/29/2017 12:34 PM Date of Service:  5/29/2017  9:42 AM Creation Time:  5/29/2017  9:42 AM    Status:  Signed :  aNga Pompa DO (Physician)         [facility]  Hospitalist Progress Note        Naga Pompa DO  05/29/2017        Interval History:[ZA1.1]      Tube feeding initiated. Patient more verbal today.[ZA1.2]          Assessment and Plan:        Nel Farmer is a 61-year-old woman with history of schizoaffective disorder, cognitive impairment usually oriented to just self and place per U report, who has had 2 days of lethargy, decreased activity, decreased intake. Lab work up for evaluation at U showed potassium of 6.1  with a creatinine of 4 and so was brought to ER 5/16/17. Work up in ER showed possible UTI, LYLA, hyperkalemia. UTI consistent with yeast, no bacteria found.       Acute metabolic encephalopathy likely secondary to severe dehydration and Acute subdural hematoma, Acute on chronic stage 4 renal failure secondary to lithium toxicity. LYLA resolved Patient has diabetes insipidus, and was also on amiloride, likely contributing to dehydration and metabolic encephalopathy, though mostly with decreased response frequently at baseline. Also on ativan, risperidone, zyprexa and depakote (500 mg at am and 1500 mg at HS)which could contribute to encephalopathy. UA also suggestive of UTI possibly contributing but no bacterial growth seen. Depakote level normal. Head CT 5/22 negative for acute process. Psychiatrist consulted, had discussed with Dr Vasquez. Neuro consulted, EEG did not show seizure like activity, ABG without hypercarbia. MRI brain done 5/24, showed SDH along the posterior aspect of the right cerebral convexity measuring up to 0.2 cm in thickness, patient not on anticoagulation. Unlikely this MRI finding is contributing to her mentation. Transferred to ICU 5/24 and monitored overnight, transferred out of ICU 5/25.  - Holding PTA ativan   - Seizure precautions.   - Nephrology following.   - Psychiatry following.       Diarrhea and emesis: started having diarrhea and vomited while in ICU. NG and rectal tube as well were placed. c diff is negative. Had mildly elevated lipase which trended down.  - SLP following.[ZA1.1]   - Tube feeding initiated.   - Nutrition following.[ZA1.2]       Severe dehydration with acute kidney injury/hypernatremia and hyperkalemia, now hypokalemic Her baseline creatinine is around 2.4 with creatinine on admission of 3.41, K 6.8 at admission. In the ED, she was given 2 liters of fluid, bicarbonate, insulin. Poor oral intake, DI and being on amiloride PTA likely causing dehydration and acute  kidney injury on top of CKD. Her fluid intake had been limited at the skilled nursing facility since patient was prone to throwing cups at staff.   - Nephrology following.   - Monitor intake output       Urinary tract infection, yeast Had  white blood cells and 2-5 WBC casts on a catheter specimen of a UA. She has a history of Klebsiella urinary tract infection in the past with sensitivity to most antibiotics tested including ceftriaxone and fluoroquinolones. She was given Levaquin 750 mg in the emergency department. >100k yeast. D/c'd levofloxacin. Blood 1/2 from 5/19 shows Staph capitis   - Central line placed (RIJ) on 5/20 since unable to get PICC at that time and needed access for IVF and medications       Hypertension BP elevated, also tachycardic which could be due to dehydration.[ZA1.1]  -[ZA1.2] metoprolol, and clonidine patch.  - PRN hydralazine/metoprolol  - Holding amiloride       Diabetes mellitus type 2 on insulin On glipizide and 70/30 insulin (20 units in am and 34 units in the pm).    - Lantus to 25 units daily.  - Moderate insulin resistance ISS       Elevated Lipase Unclear significance. Denies abd pain. Vomited once in ICU but none since then. CT abd-pelvis (though non contrast) shows atrophic pancreas. Unlikely this is pancreatitis.   - Lipase trended down.       Diabetes insipidus Induced by lithium. Lithium was discontinued before this hospitalization.  - Continue to monitor output and sodiums      Schizoaffective disorder and Paranoid Schizophrenia and Cognitive Deficits PTA is on depakote, risperidal and zyprexa and ativan.  - Psych following.       Thrombocytopenia: Unclear etiology  - Monitor.       Gastroesophageal reflux disease  - Continue PPI       DVT prophylaxis: D/c Heparin SQ 5/22 due to dropping platelet count, use pcds      Code: DNR/DNI      Disposition: Once[ZA1.1] mental status stable and nutrition plan consolidated. Palliative consulted to discuss goals of care and tube  "feeding.[ZA1.2]                    Physical Exam:      Heart Rate: 81    Blood pressure 141/60, pulse 68, temperature 98.1  F (36.7  C), temperature source Axillary, resp. rate 16, height 1.626 m (5' 4\"), weight 81.5 kg (179 lb 10.8 oz), last menstrual period 2012, SpO2 98 %, not currently breastfeeding.    Vitals:    17 0500 17 0500 17 0315   Weight: 79.8 kg (175 lb 14.8 oz) 82 kg (180 lb 12.4 oz) 81.5 kg (179 lb 10.8 oz)       Vital Sign Ranges  Temperature Temp  Av.1  F (36.7  C)  Min: 98  F (36.7  C)  Max: 98.1  F (36.7  C)   Blood pressure Systolic (24hrs), Av , Min:107 , Max:159        Diastolic (24hrs), Av, Min:57, Max:83      Pulse No Data Recorded   Respirations Resp  Av  Min: 16  Max: 16   Pulse oximetry SpO2  Av.5 %  Min: 89 %  Max: 98 %     Vital Signs with Ranges  Temp:  [98  F (36.7  C)-98.1  F (36.7  C)] 98.1  F (36.7  C)  Heart Rate:  [] 81  Resp:  [16] 16  BP: (107-159)/(57-83) 141/60  SpO2:  [89 %-98 %] 98 %    I/O Last 3 Shifts:   I/O last 3 completed shifts:  In: 957 [P.O.:140; I.V.:817]  Out: 2600 [Urine:2600]    I/O past 24 hours:     Intake/Output Summary (Last 24 hours) at 17 0942  Last data filed at 17 0700   Gross per 24 hour   Intake             2411 ml   Output             2600 ml   Net             -189 ml     GENERAL: Alert and disoriented. NAD.[ZA1.1] More alert today.[ZA1.2]   HEENT: Normocephalic. No icterus or injection. Nares normal.[ZA1.1] NG in place.[ZA1.2]   LUNGS: Decrement to bases. No dyspnea at rest.   HEART: Regular rate. Extremities perfused.   ABDOMEN: Soft, nontender, and nondistended. Positive bowel sounds.   EXTREMITIES: No LE edema noted.   NEUROLOGIC: Moves extremities x4. Oriented to place and person.          Prior to Admission Medications:        Prescriptions Prior to Admission   Medication Sig Dispense Refill Last Dose     LORazepam (ATIVAN) 0.5 MG tablet Take 1 tablet (0.5 mg) by mouth 2 times " daily as needed for anxiety 14 tablet 0 prn med     cloNIDine (CATAPRES) 0.2 MG tablet Take 1 tablet (0.2 mg) by mouth 2 times daily   5/15/2017 at Unknown time     amLODIPine (NORVASC) 5 MG tablet Take 1 tablet (5 mg) by mouth daily 30 tablet  5/15/2017 at Unknown time     aMILoride (MIDAMOR) 5 MG tablet Take 2 tablets (10 mg) by mouth daily   5/15/2017 at Unknown time     metoprolol (TOPROL-XL) 25 MG 24 hr tablet Take 1 tablet (25 mg) by mouth daily 30 tablet  5/15/2017 at Unknown time     glipiZIDE (GLUCOTROL XL) 2.5 MG 24 hr tablet Take 1 tablet (2.5 mg) by mouth 2 times daily (before meals) 30 tablet  5/15/2017 at pm     insulin isophane & regular (HUMULIN MIX 70/30 PEN) susp 20 units before breakfast  30 units before dinner (Patient taking differently: 20 units before breakfast  34 units before dinner) 30 mL 1 5/15/2017 at Unknown time     risperiDONE (RISPERDAL M-TABS) 0.5 MG disintegrating tablet Place 1 tablet (0.5 mg) under the tongue 3 times daily as needed 60 tablet 0 prn med     senna-docusate (SENOKOT-S;PERICOLACE) 8.6-50 MG per tablet Take 1 tablet by mouth 2 times daily as needed for constipation 60 tablet 0 prn med     polyethylene glycol (MIRALAX/GLYCOLAX) powder Take 17 g by mouth daily as needed for constipation   prn med     divalproex (DEPAKOTE) 500 MG EC tablet Take 500 mg by mouth every morning   5/15/2017 at Unknown time     divalproex (DEPAKOTE) 500 MG EC tablet Take 1,500 mg by mouth At Bedtime   5/15/2017 at Unknown time     Furosemide (LASIX PO) Take 10 mg by mouth daily    5/16/2017 at Unknown time     Furosemide (LASIX PO) Take 20 mg by mouth daily as needed (Take at noon for leg swelling if needed)   prn med     OLANZapine (ZYPREXA PO) Take 5 mg by mouth At Bedtime   5/15/2017 at Unknown time     RisperiDONE (RISPERDAL PO) Take 1 mg by mouth At Bedtime   5/15/2017 at Unknown time     Omeprazole (PRILOSEC PO) Take 20 mg by mouth 2 times daily (before meals)   5/15/2017 at pm      "isosorbide mononitrate (IMDUR) 30 MG 24 hr tablet Take 1 tablet by mouth daily. 30 tablet prn 5/15/2017 at Unknown time     insulin syringe-needle U-100 (BD INSULIN SYRINGE ULTRAFINE) 31G X 5/16\" 1 ML Use one syringe bid daily or as directed. 60 each prn Taking            Medications:        Current Facility-Administered Medications   Medication Last Rate     IV fluid REPLACEMENT ONLY       D5W 100 mL/hr at 05/29/17 0314     Current Facility-Administered Medications   Medication Dose Route Frequency     multivitamins with minerals  15 mL Per Feeding Tube Daily     insulin glargine  25 Units Subcutaneous QAM AC     insulin aspart  1-7 Units Subcutaneous TID AC     insulin aspart  1-5 Units Subcutaneous At Bedtime     valproate (DEPACON) intermittent infusion  500 mg Intravenous Q8H     pantoprazole  40 mg Intravenous QAM AC     metoprolol  5 mg Intravenous Q6H     cloNIDine   Transdermal Q8H     [START ON 5/31/2017] cloNIDine   Transdermal Weekly     cloNIDine  1 patch Transdermal Weekly     Current Facility-Administered Medications   Medication Dose Route Frequency     IV fluid REPLACEMENT ONLY   Intravenous Continuous PRN     acetaminophen  650 mg Oral Q4H PRN     OLANZapine zydis  2.5-5 mg Oral BID PRN     hydrALAZINE  10-20 mg Intravenous Q2H PRN     miconazole   Topical Q1H PRN     sodium chloride (PF)  10-20 mL Intracatheter Q1H PRN     heparin lock flush  5-10 mL Intracatheter Q1H PRN     sodium chloride (PF)  3 mL Intracatheter Q1H PRN     metoprolol  2.5 mg Intravenous Q4H PRN     risperiDONE  0.5 mg Sublingual TID PRN     glucose  15-30 g Oral Q15 Min PRN    Or     dextrose  25-50 mL Intravenous Q15 Min PRN    Or     glucagon  1 mg Subcutaneous Q15 Min PRN     naloxone  0.1-0.4 mg Intravenous Q2 Min PRN     melatonin  1 mg Oral At Bedtime PRN     senna-docusate  1-2 tablet Oral BID PRN     polyethylene glycol  17 g Oral Daily PRN     bisacodyl  10 mg Rectal Daily PRN     ondansetron  4 mg Oral Q6H PRN    Or "     ondansetron  4 mg Intravenous Q6H PRN            Data:      Lab data reviewed.     Recent Labs  Lab 05/29/17  0605 05/28/17  0635 05/27/17  0630  05/26/17  0620   HGB  --  9.5* 9.4*  --  9.3*   MCV  --  94 94  --  94   PLT  --  199 180  --  150    140 143  --  142   POTASSIUM 3.3* 3.4 3.4  < > 2.9*   CHLORIDE 106 106 109  --  107   CO2 25 26 27  --  28   BUN 30 34* 35*  --  39*   CR 2.15* 2.04* 2.22*  --  2.41*   ANIONGAP 8 8 7  --  7   BENTLEY 9.2 9.4 9.0  --  9.2   * 185* 183*  --  229*   < > = values in this interval not displayed.        Imaging:      Imaging data reviewed.     Dr. Naga Pompa D.O.  [facility]  Pager [phone][ZA1.1]       Revision History        User Key Date/Time User Provider Type Action    > ZA1.2 5/29/2017 12:34 PM Naga Pompa,  Physician Sign     ZA1.1 5/29/2017  9:42 AM Naga Pompa,  Physician             Progress Notes by Fawad Bunch MD at 5/29/2017 12:02 AM     Author:  Fawad Bunch MD Service:  Hospitalist Author Type:  Physician    Filed:  5/29/2017 12:04 AM Date of Service:  5/29/2017 12:02 AM Creation Time:  5/29/2017 12:02 AM    Status:  Signed :  Fawad Bunch MD (Physician)         [facility]    Sepsis Evaluation Progress Note    Date of Service: 05/29/2017    I was called to see Nel Farmer due to abnormal vital signs triggering the Sepsis SIRS screening alert. She is not known to have an infection.     Physical Exam    Vital Signs:  Temp: 98.1  F (36.7  C) Temp src: Axillary BP: 124/65 Pulse: 68 Heart Rate: 93 Resp: 16 SpO2: 95 % O2 Device: Nasal cannula Oxygen Delivery: 2 LPM    Lab:  Lactic Acid   Date Value Ref Range Status   05/28/2017 2.7 (H) 0.7 - 2.1 mmol/L Final           Assessment and Plan    The SIRS and exam findings are likely due to renal failure, there is no sign of sepsis at this time.    Disposition: The patient will remain on the current  unit. We will continue to monitor this patient closely.    Fawad Bunch MD[AR1.1]       Revision History        User Key Date/Time User Provider Type Action    > AR1.1 5/29/2017 12:04 AM Fawad Bunch MD Physician Sign                     Procedure Notes      Procedures signed by David Tijerina MD at 5/30/2017  8:20 AM      Author:  David Tijerina MD Service:  Neurology Author Type:  Physician    Filed:  5/30/2017  8:20 AM Date of Service:  5/24/2017  8:21 PM Creation Time:  5/25/2017  4:05 PM    Status:  Signed :  David Tijerina MD (Physician)     Procedure Orders:    1. EEG [[id number]] ordered by David Tijerina MD at 05/25/17 1605                ELECTROENCEPHALOGRAM      ORDERING PHYSICIAN:  Dahlia Artis MD      EEG #       EEG performed at [facility] 05/24/2017       Report and electroencephalogram is obtained in [facility] Alberto Farmer during wakefulness.  EKG activity is monitored.  Background activity primarily consists of moderate amplitude delta and theta frequencies with occasional superimposed fast activity.  There is considerable muscle artifact throughout the recording period, in fact, it is fairly persistent over the left temporal region. There are times when this activity does lessen though it never goes away fully.  However, there is no definite epileptiform activity appreciated.  Heart rate is around 100.        IMPRESSION:  This is an abnormal recording by virtue of diffuse slowing of brain frequencies.  There is considerable muscle artifact throughout the recording, particularly in the left temporal region.  This does make interpretation difficult; however, no definite epileptiform activity was identified.  This pattern could be consistent with a metabolic encephalopathy.  Clinical correlation is advised.          DAVID TIJERINA MD             D: 05/24/2017 20:21   T: 05/25/2017 12:19   MT: mone      Name:     MARIO ALBERTO FARMER   MRN:      [id number]         Account:        [id number]   :      1955           Procedure Date: 2017      Document: [id number]    [SS1.1]   Revision History        User Key Date/Time User Provider Type Action    > SS1.1 2017  8:20 AM David Beasley MD Physician Sign                  Progress Notes - Therapies (Notes from 17 through 17)     No notes of this type exist for this encounter.

## 2017-05-16 NOTE — IP AVS SNAPSHOT
` ` Patient Information     Patient Name Sex     Nel Farmer ([id number]) Female 1955       Room Bed    16 8816-02      Patient Demographics     Address Phone    [facility]  [address]  [facility] [address] [phone] (Home) *Preferred*  NONE (Work)      Patient Ethnicity & Race     Ethnic Group Patient Race    American White      Emergency Contact(s)     Name Relation Home Work Mobile    Tana Farmer Sister [phone]    Lisa Farmer Mother [phone]      Documents on File        Status Date Received Description       Documents for the Patient    Face Sheet  () 08     Insurance Card Received () 12 Medicare    Consent Form  06     Other  06     Consent Form  08     Other  08 medicare questions    Insurance Card  () 09     Face Sheet Received () 09     External Medication Information Consent Accepted () 09     Insurance Card  () 09 Medicare    KARO Face Sheet Received () 09     Waiver - Payment  09     Face Sheet Received () 03/24/10     KARO Face Sheet Received () 05/03/10     Waiver - Payment  05/03/10     Patient ID Received 12     Consent for Services - Hospital/Clinic Received () 11/02/10     Face Sheet Received () 11/02/10     Consent for Services - Hospital/Clinic Received () 11     Insurance Card Received 12 Medicare    Consent for Services - Hospital/Clinic  ()      Consent for Services - Hospital/Clinic Received () 12     Consent for Services - Hospital/Clinic Received () 12     Community Care Application  12     External Medication Information Consent Accepted () 06/15/12     External Medication Information Consent   Release medication Information 6/15/12    Consent to Communicate Received ()  12     HIM JINNY Authorization - File Only  12 AUTHORIZATION FOR RELEASE OF PROTECTED HEALTH INFORMATION- [facility]    HIM JINNY Authorization  10/26/12 patient    [facility] Specified Other       HIM JINNY Authorization - File Only  13 AUTHORIZATION FOR RELEASE OF PROTECTED HEALTH INFORMATION-2013    Consent for Services - Hospital/Clinic  () 13 CONSENT FOR SERVICE    Privacy Notice - [place]  13 ACKNOWLEDGMENT OF RECEIPT OF NOTICE OF PRIVACY PRACTICE    HIM JINNY Authorization - File Only  13 AUTHORIZATIONATION FOR RELEASE OF PROTECTED HEALTH INFORMATION-5/3/13    Consent for Services - Hospital/Clinic Received () 13     HIM JINNY Authorization - File Only   [facility]  13    HIM JINNY Authorization - File Only  13 DEC RELEASE OF INFORMATION 2013    HIM JINNY Authorization - File Only  14 JERALD PATRICK-1/15/14    Consent for EHR Access Received 14     Consent for Services - Hospital/Clinic Received () 14     HIM JINNY Authorization - File Only  01/31/15 DEC RELEASE OF INFORMATION    HIM JINNY Authorization - File Only  02/05/15 JERALD    HIM JINNY Authorization - File Only  03/20/15 DEC RELEASE OF INFORMATION    Consent for Services - Hospital/Clinic Received () 05/14/15     HIM JINNY Authorization - File Only  05/20/15 JERALD PATRICK    HIM JINNY Authorization - File Only  09/15/15 DEC RELEASE OF INFORMATION--2015    Consent for Services - Hospital/Clinic  () 09/21/15 CONSENT FOR SERVICE    Privacy Notice - [place] Received 09/15/15 ACKNOWLEDGMENT OF RECEIPT OF NOTICE OF PRIVACY PRACTICE    HIM JINNY Authorization - File Only  09/22/15 [facility] / CELESTE TOPETE-09/14/15    HIM JINNY Authorization - File Only  09/22/15 JERALD PATRICK (MOTHER)-09/11/15    HIM JINNY Authorization - File Only  09/22/15 DR. DRIVER-09/14/15    HIM JINNY Authorization - File Only  16 JERALD  CELINA    Consent for Services/Privacy Notice - Hospital/Clinic Received () 16     Consent for EHR Access   CONSENT FOR EHR ACCESS    Patient ID Received 17 EXP 2019    Insurance Card Received 17     Consent for EHR Access       External Medication Information Consent       [facility] Specified Other       Consent for Services/Privacy Notice - Hospital/Clinic Received 17     Privacy Notice - [place]       HIM JINNY Authorization - File Only  12/15/16 DEC RELEASE OF INFORMATION    Physical Therapy Certification Received 17 eval only    Privacy Notice - [place] Received (Deleted) 12     Privacy Notice - [place]  (Deleted) 06     Insurance Card  (Deleted) 08 medicaid    Privacy Notice - [place]  (Deleted)      Privacy Notice - [place] Received (Deleted) 10/17/11     Community Care Application  (Deleted)      Privacy Notice - [place]  (Deleted) 12 ACKNOWLEDGMENT OF RECEIPT OF NOTICE OF PRIVACY PRACTICE    Privacy Notice - [place]  (Deleted) 13 ACKNOWLEDGMENT OF RECEIPT OF NOTICE OF PRIVACY PRACTICE    Consent for EHR Access  (Deleted) 13 Copied from existing Consent for services - C/HOD collected on 2012    Insurance Card  (Deleted)      Patient ID  (Deleted)         Documents for the Encounter    CMS IM for Patient Signature Received 17 1MM      Admission Information     Attending Provider Admitting Provider Admission Type Admission Date/Time    Isabel Ojeda MD Martin, Molly E, MD Emergency 17  1831    Discharge Date Hospital Service Auth/Cert Status Service Area     Hospitalist [facility]    Unit Room/Bed Admission Status        88 ONCOLOGY 8816/8816-02 Admission (Confirmed)       Admission     Complaint    Acute renal failure (ARF) (H)      Hospital Account     Name Acct ID Class Status Primary Coverage    Nel Farmer [id number] Inpatient Open MEDICARE - MEDICARE             Guarantor Account (for Hospital Account #[id number])     Name Relation to Pt Service Area Active? Acct Type    Nel Farmer  FCS Yes Personal/Family    Address Phone          [facility]  [address] [place] [address] [phone](H)  none(O)              Coverage Information (for Hospital Account #[id number])     1. MEDICARE/MEDICARE     F/O Payor/Plan Precert #    MEDICARE/MEDICARE     Subscriber Subscriber #    Nel Farmer [id number]    Address Phone    [employer]  [address]  [place] [address] [phone]          2. [place]     F/[place]     Subscriber Subscriber #    Nel Farmer [id number]    Address Phone    [address] [address]  [place] [address] [phone]

## 2017-05-16 NOTE — ED PROVIDER NOTES
History     Chief Complaint:  Abnormal Labs       HPI History limited secondary to patient's altered mental status. Partially obtained by nursing home staff over the phone.      Nel Farmer is a 61 year old female with a history of Schizoaffective disorder and baseline intellectual disability who presents via EMS for evaluation of abnormal labs obtained at her nursing home this morning, including a potassium of 6.1 and creatinine of 4.0, full results copied below. The patient was recently admitted to the hospital on 3/30/17 for altered mental status and was discharged 5 days later, discharge diagnosis copied below. En route, blood pressures were within normal limits; Glucose >70.  Here, patient states that her butt hurts though is unable to provide any meaningful history. Nursing home staff states that the patient has demonstrated increased lethargy off and on over the past week with worsening altered mental status, prompting to blood test this morning. Hx insulin dependent DM2.    9:40 am Nursing home CMP: Potassium 6.1 (HH), Chloride 114 (H), CO2, 16 (L), Glucose 149 (H), BUN 55 (H), Creatinine 4.00 (H), GFR 11 (L), Calcium 12.9 (HH), Albumin 3.1 (L), o/w WNL     4/4/17 Discharge Summary:  1. Acute metabolic encephalopathy, improving.   2. Chronic kidney disease stage III-IV.   3. 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   4. Non-anion gap metabolic acidosis.   5. Suspected healthcare-associated pneumonia; unspecified organism.   6. Diabetes mellitus type 2.   7. Hypernatremia.   8. Schizoaffective disorder.   9. Baseline cognitive deficit.   10. Hypertension.     Allergies:  Amoxicillin  Haldol [Benzyl Alcohol]  Haldol [Haloperidol]  Penicillins  Seroquel [Quetiapine]     Medications:    Lorazepam (ativan) 0.5 mg tablet  Clonidine (catapres) 0.2 mg tablet  Amlodipine (norvasc) 5 mg tablet  Amiloride (midamor) 5 mg tablet  Levofloxacin (levaquin) 250 mg tablet  Hydrochlorothiazide  (microzide) 12.5 mg capsule  Metoprolol (toprol-xl) 25 mg 24 hr tablet  Glipizide (glucotrol xl) 2.5 mg 24 hr tablet  Insulin isophane & regular (humulin mix 70/30 pen) susp  Risperidone (risperdal m-tabs) 0.5 mg disintegrating tablet  Senna-docusate (senokot-s;pericolace) 8.6-50 mg per tablet  Polyethylene glycol (miralax/glycolax) powder  Divalproex (depakote) 500 mg ec tablet  Furosemide (lasix po)  Olanzapine (zyprexa po)  Risperidone (risperdal po)  Omeprazole (prilosec po)  Ondansetron (zofran odt po)  Isosorbide mononitrate (imdur) 30 mg 24 hr tablet     Past Medical History:    Anxiety   CKD (chronic kidney disease) stage 3, GFR 30-59 ml/min   Cognitive deficits   Depressive disorder   DM type 2 (diabetes mellitus, type 2) (H)   HTN  Hyperlipidemia LDL goal < 70   Hypertension   Osteoarthritis   Paranoia  Schizoaffective disorder (H)   Sepsis due to urinary tract infection  Suicidal ideation    Past Surgical History:    Left total knee replacement  Colonoscopy  D & C  T & A    Family History:    HTN  Colorectal cancer    Social History:  Relationship status: Single  Tobacco use: Neg  Alcohol use: Neg  The patient presents alone.       Review of Systems   Unable to perform ROS: Mental status change   Constitutional: Positive for fatigue.   All other systems reviewed and are negative.    Physical Exam   First Vitals:  BP: 162/82  Pulse: 110  Temp: 99.3  F (37.4  C)  Resp: 16  SpO2: 95 %    Physical Exam  General: Alert and unable to cooperate with exam, altered mentation.  Patient in mild distress.  Head:  Scalp is NC/AT  Eyes:  No scleral icterus, PERRL  ENT:  The external nose and ears are normal. The oropharynx is normal and without erythema; mucus membranes are somewhat dry.  Neck:  Normal range of motion without rigidity.  CV:  Regular rhythm; tachycardic  Resp:  Breath sounds are clear bilaterally; though exam limited by patients ability to cooperate    Non-labored, no retractions or accessory muscle  use  GI:  Abdomen is soft, no distension, mild diffuse tenderness. No peritoneal signs. Obese  MS:  No lower extremity edema   Skin:  Warm and dry, No rash or lesions noted.  Neuro: Altered mentation. Known to have low IQ. No gross motor deficits.    Strength and sensation grossly intact in all 4 extremities.         Emergency Department Course   ECG (18:38:51):  Indication: Abnormal labs.   Rate 107 bpm. MI interval 154. QRS duration 82. QT/QTc 284/379. P-R-T axes -30.   Interpretation: Sinus tachycardia, Possible left atrial enlargement, Left axis deviation, Inferior infarct, age undetermined, Anterior infarct, age undetermined, Abnormal ECG.   Agree with computer interpretation.   No significant change compared to EKG dated 3/30/12.   Interpreted at 1902 by Dr. Barros.      Imaging:  Radiographic findings were communicated with the family who voiced understanding of the findings.    Chest XR, per radiology:  Clear lungs    CT Abdomen and Pelvis, wihtout contrast, per radiology:   1. The pancreas is atrophied, but otherwise unremarkable in appearance  without evidence of acute pancreatitis. Mild acute pancreatitis can be  occult on CT.  2. Heterogeneous appearance of the liver, possibly related to streak  artifact.  3. A few tiny nonobstructing renal stones bilaterally.    Laboratory:  CBC: WBC 6.8, HGB 12.6,  (L)  CMP: Potassium 6.8 (HH), Chloride 117 (H), Carbon dioxide 15 (L), Glucose 198 (H), BUN 55 (H), Creatinine 3.41 (H), GFR 14 (L), Calcium 12.3 (H), Albumin 3.1 (L), o/w WNL   Magnesium: 3.0 (H)  Lipase: 756 (H)  Lactate: 1.3  TSH with free T4 reflex: 2.93  Hemoglobin A1c: 7.0 (H)       1909: ISTAT Electrolytes POCT: Sodium 141, Potassium 6.8 (HH), HGB 12.9, o/w WNL   2034: Glucose by Meter: 250 (H)  2034: Potassium: 5.5 (H)       Blood culture 1: Pending  Blood culture 2: Pending     UA: Clear, yellow urine: Glucose 250 (A), Ketone Trace (A), Blood Moderate (A), Protein albumin 100 (A), Leukocyte  Esterase Large (A), WBC  (A), RBC 10-25 (A), WBC casts 2-5 (A), Bacteria Many (A), Yeast Moderate (A), o/w WNL  Urine Culture: Pending     Interventions:   1922: Sodium bicarbonate 50 mEq, IV  1927: Dextrose 25 g, IV  1927: Calcium Gluconate, 1 g, IV  1948: Insulin, 9 units, IV  2043: Normal Saline, 500 mL, IV  2043: Levoquin, 750 mg, IV     Emergency Department Course:  Nursing notes and vitals reviewed.  I performed an exam of the patient as documented above.  The above workup was undertaken.  2105: I discussed the patient with Dr. Bates of the hospitalist service who agrees to admit the patient to the hospital.   2113 : I discussed the patient over the phone with her sister in law who states that the patient would not like to undergo dialysis. Sister in Law, Haily Farmer, contact info: [phone].  2119: I discussed the patient with her mother.   2125: I rechecked the patient and discussed results.    Findings and plan explained to the Patient who consents to admission. Discussed the patient with Dr. Bates, who will admit the patient to a [facility] bed for further monitoring, evaluation, and treatment.     Impression & Plan      Medical Decision Making:  Nel Farmer is a 61 year old female who presents from a skilled care facility due to report of increased confusion and lethargy; noted to have a significantly elevated potassium and creatinine on labs obtained earlier today. The patient s medical history and records were reviewed. Initial consideration for, but not limited to, metabolic abnormality, electrolyte abnormality, infectious process, arrhythmia, UTI, thyroid dysfunction, among others. Labs, imaging, ECG were obtained. ECG demonstrates sinus tachycardia without significant change from previous; no evidence of acute ischemia, infarction, or arhythmia. Chest xray unremarkable. Labs notable for significant hyperkalemia (6.8), evidence of UTI on cath UA, significant acidosis (pH 7.22 on VBG),  acute kidney injury (creatinine 3.41, increased from 2.38), elevated magnesium (3.0), and elevation of lipase (756). Notably, patient had normal lactic acid and no significant increase in WBC count. She was borderline febrile, mildly tachycardic, and noted to have mild diffuse abdominal pain. CT of the abdomen obtained and demonstrates no significant acute findings. Blood and urine sent for culture. For hyperkalemia, patient was provided bicarb, insulin/glucose, and calcium gluconate. On repeat potassium significantly improved (5.5). For UTI, patient was provided Levaquin (750 mg). At this time, patient's encephalopathy is likely secondary to UTI with associated kidney injury and acidosis. Case was discussed with Dr. Bates of the hospitalist service who accepts her for admission to [facility] for close monitoring. Patient remained hemodynamically stable throughout my care.      Critical Care time:  40 minutes.     Diagnosis:    ICD-10-CM    1. Acidosis E87.2 Glucose     Potassium     CK total     CK total     CANCELED: CK total   2. Urinary tract infection without hematuria, site unspecified N39.0    3. LYLA (acute kidney injury) (H) N17.9    4. Hyperkalemia E87.5    5. Encephalopathy G93.40    6. Elevated lipase R74.8        Disposition:  Admitted to a [facility] bed under the care of Dr. Bates of the hospitalist service.     I, Devin Sneed, am serving as a scribe on 5/16/2017 at 6:40 PM to personally document services performed by Sridhar Barros DO, based on my observations and the provider's statements to me.     EMERGENCY DEPARTMENT       Sridhar Barros DO  05/16/17 2904

## 2017-05-16 NOTE — IP AVS SNAPSHOT
` [place] [address] ONCOLOGY: [phone]            Medication Administration Report for Nel Farmer as of 06/01/17 1129   Legend:    Given Hold Not Given Due Canceled Entry Other Actions    Time Time (Time) Time  Time-Action       Inactive    Active    Linked        Medications 05/26/17 05/27/17 05/28/17 05/29/17 05/30/17 05/31/17 06/01/17    acetaminophen (TYLENOL) solution 650 mg  Dose: 650 mg Freq: EVERY 4 HOURS PRN Route: ORAL OR FEED  PRN Reasons: mild pain,fever  Start: 05/30/17 1121   Admin Instructions: Maximum acetaminophen dose from all sources= 75 mg/kg/day not to exceed 4 grams/day.               acetaminophen (TYLENOL) Suppository 650 mg  Dose: 650 mg Freq: EVERY 4 HOURS PRN Route: RE  PRN Reason: mild pain  Start: 06/01/17 1106   Admin Instructions: Maximum acetaminophen dose from all sources = 75 mg/kg/day not to exceed 4 grams/day.               atropine 1 % ophthalmic solution 1-2 drop  Dose: 1-2 drop Freq: EVERY 1 HOUR PRN Route: SL  PRN Reason: other  PRN Comment: secretions  Start: 05/31/17 1403              bisacodyl (DULCOLAX) Suppository 10 mg  Dose: 10 mg Freq: DAILY PRN Route: RE  PRN Reason: constipation  Start: 05/16/17 2213   Admin Instructions: Hold for loose stools.  This is the third step of a three step constipation treatment protocol.               cloNIDine (CATAPRES-TTS) Patch in Place  Freq: EVERY 8 HOURS Route: TD  Start: 05/24/17 1730   Admin Instructions: Chart every shift, confirming that patch is still in place on patient (no barcode scan needed). See patch order for dose information.     (0032)-Not Given [C]       1053 ( )-Given [C]       (1849)-Not Given        (0113)-Not Given [C]       (1009)-Not Given [C]       (1907)-Not Given [C]        (0312)-Not Given [C]       (0912)-Not Given [C]       (1658)-Not Given [C]        (0152)-Not Given [C]              (1723)-Not Given [C]        (0112)-Not Given [C]       (0838)-Not Given [C]       (4733)-Not Given  [C]        (0056)-Not Given [C]       (1047)-Not Given [C]       1748 ( )-Given        0130 ( )-Patch in Place [C]       1035 ( )-Patch in Place       [ ] 1730           cloNIDine (CATAPRES-TTS) patch REMOVAL  Freq: WEEKLY Route: TD  Start: 05/31/17 1800   Admin Instructions: Remove patch when new patch is applied or patch is discontinued.          1748 ( )-Given            cloNIDine (CATAPRES-TTS1) 0.1 MG/24HR WK patch 1 patch  Dose: 1 patch Freq: WEEKLY Route: TD  Start: 05/24/17 1800         1748 (1 patch)-Given            dextrose 10 % 1,000 mL infusion  Freq: CONTINUOUS PRN Route: IV  PRN Comment: Hypoglycemia prevention  Start: 05/28/17 1524   Admin Instructions: For Hypoglycemia Prevention for patients on long-acting subcutaneous basal insulin (Glargine, Detemir, NPH) or continuous insulin infusion. Whenever nutrition support is held or interrupted:   1) Infuse IV D10W at nutrition support rate  2) Notify provider for further instructions               glucose 40 % gel 15-30 g  Dose: 15-30 g Freq: EVERY 15 MIN PRN Route: PO  PRN Reason: low blood sugar  Start: 05/16/17 2213   Admin Instructions: Give 15 g for BG 51 to 69 mg/dL IF patient is conscious and able to swallow. Give 30 g for BG less than or equal to 50 mg/dL IF patient is conscious and able to swallow. Do NOT give glucose gel via enteral tube.  IF patient has enteral tube: give apple juice 120 mL (4 oz or 15 g of CHO) via enteral tube for BG 51 to 69 mg/dL.  Give apple juice 240 mL (8 oz or 30 g of CHO) via enteral tube for BG less than or equal to 50 mg/dL.              Or  dextrose 50 % injection 25-50 mL  Dose: 25-50 mL Freq: EVERY 15 MIN PRN Route: IV  PRN Reason: low blood sugar  Start: 05/16/17 2213   Admin Instructions: Use if have IV access, BG less than 70 mg/dL and meet dose criteria below:  Dose if conscious and alert (or disorientated) and NPO = 25 mL  Dose if unconscious / not alert = 50 mL  Vesicant.              Or  glucagon injection  1 mg  Dose: 1 mg Freq: EVERY 15 MIN PRN Route: SC  PRN Reason: low blood sugar  PRN Comment: May repeat x 1 only  Start: 05/16/17 2213   Admin Instructions: May give SQ or IM. IF BG less than or equal to 50 mg/dL and no IV access.  ONLY use glucagon IF patient has NO IV access AND is UNABLE to swallow.               heparin lock flush 10 UNIT/ML injection 5-10 mL  Dose: 5-10 mL Freq: EVERY 1 HOUR PRN Route: IK  PRN Reason: other  PRN Comment: to lock each CVC - Open Ended (Tunneled and Non-Tunneled) dormant lumen.  Start: 05/29/17 1737   Admin Instructions: MAX: 5 mL per lumen.         1904 (5 mL)-Given       2106 (5 mL)-Given        1751 (5 mL)-Given [C]            heparin lock flush 10 UNIT/ML injection 5-10 mL  Dose: 5-10 mL Freq: EVERY 24 HOURS Route: IK  Start: 05/29/17 1745   Admin Instructions: To lock each CVC - Open Ended (Tunneled and Non-Tunneled) dormant lumen. Check PRN heparin flush order to see when last dose of PRN heparin was given before administering.  MAX: 5 mL per lumen..        (1845)-Not Given [C]        (1727)-Not Given       1904 (10 mL)-Given        1752 (5 mL)-Given        [ ] 1745           heparin lock flush 10 UNIT/ML injection 5-10 mL  Dose: 5-10 mL Freq: EVERY 1 HOUR PRN Route: IK  PRN Reason: other  PRN Comment: to lock each CVC - Open Ended (Tunneled and Non-Tunneled) dormant lumen.  Start: 05/21/17 0256   Admin Instructions: MAX: 5 mL per lumen.      0619 (5 mL)-Given [C]         0607 (5 mL)-Given [C]              hydrALAZINE (APRESOLINE) injection 10-20 mg  Dose: 10-20 mg Freq: EVERY 2 HOURS PRN Route: IV  PRN Reason: high blood pressure  PRN Comment: give for SBP > 140  Start: 05/24/17 1745    0011 (10 mg)-Given                 hypromellose-dextran (ARTIFICAL TEARS) ophthalmic solution 1-2 drop  Dose: 1-2 drop Freq: EVERY 8 HOURS PRN Route: Both Eyes  PRN Reason: dry eyes  Start: 05/31/17 1400   Admin Instructions: Offer every shift.               insulin aspart (NovoLOG) inj  (RAPID ACTING)  Dose: 1-5 Units Freq: AT BEDTIME Route: SC  Start: 05/26/17 2200   Admin Instructions: MEDIUM INSULIN RESISTANCE DOSING    Do Not give Bedtime Correction Insulin if BG less than  200.   For  - 249 give 1 units.   For  - 299 give 2 units.   For  - 349 give 3 units.   For  -399 give 4 units.   For BG greater than or equal to 400 give 5 units.  Notify provider if glucose greater than or equal to 350 mg/dL after administration of correction dose.  If given at mealtime, must be administered 5 min before meal or immediately after.     (2143)-Not Given [C]        2141 (1 Units)-Given        2219 (3 Units)-Given [C]        (2122)-Not Given [C]        2151 (3 Units)-Given        (2239)-Not Given        [ ] 2200           insulin aspart (NovoLOG) inj (RAPID ACTING)  Dose: 1-7 Units Freq: 3 TIMES DAILY BEFORE MEALS Route: SC  Start: 05/26/17 1700   Admin Instructions: Correction Scale - MEDIUM INSULIN RESISTANCE DOSING     Do Not give Correction Insulin if Pre-Meal BG less than 140.   For Pre-Meal  - 189 give 1 unit.   For Pre-Meal  - 239 give 2 units.   For Pre-Meal  - 289 give 3 units.   For Pre-Meal  - 339 give 4 units.   For Pre-Meal - 399 give 5 units.   For Pre-Meal -449 give 6 units  For Pre-Meal BG greater than or equal to 450 give 7 units.   To be given with prandial insulin, and based on pre-meal blood glucose.    Notify provider if glucose greater than or equal to 350 mg/dL after administration of correction dose.  If given at mealtime, must be administered 5 min before meal or immediately after.     1851 (1 Units)-Given        1008 (1 Units)-Given       (1423)-Not Given [C]       (1914)-Not Given [C]        0933 (1 Units)-Given       (1352)-Not Given [C]       1854 (2 Units)-Given        (1208)-Not Given       1209 (2 Units)-Given       1729 (3 Units)-Given [C]        0814 (3 Units)-Given       1312 (3 Units)-Given       1841 (3  Units)-Given        0936 (6 Units)-Given       1217 (5 Units)-Given       1740 (2 Units)-Given        (0750)-Not Given       [ ] 1200       [ ] 1700           insulin glargine (LANTUS) injection 20 Units  Dose: 20 Units Freq: EVERY MORNING BEFORE BREAKFAST Route: SC  Start: 06/01/17 0730          (0750)-Not Given           melatonin tablet 1 mg  Dose: 1 mg Freq: AT BEDTIME PRN Route: PER G TUBE  PRN Reason: sleep  Start: 05/30/17 1122   Admin Instructions: Medication may be crushed for administration down feeding tube.  Do not give unless at least 6 hours of uninterrupted sleep is expected.               metoprolol (LOPRESSOR) injection 2.5 mg  Dose: 2.5 mg Freq: EVERY 4 HOURS PRN Route: IV  PRN Reason: other  PRN Comment: HR > 120, hold for SBP < 90  Start: 05/17/17 1132              miconazole (MICATIN; MICRO GUARD) 2 % powder  Freq: EVERY 1 HOUR PRN Route: Top  PRN Reason: other  PRN Comment: topical candidiasis  Start: 05/23/17 0246   Admin Instructions: Apply to affected area.                 naloxone (NARCAN) injection 0.1-0.4 mg  Dose: 0.1-0.4 mg Freq: EVERY 2 MIN PRN Route: IV  PRN Reason: opioid reversal  Start: 05/16/17 2213   Admin Instructions: For respiratory rate LESS than or EQUAL to 8.  Partial reversal dose:  0.1 mg titrated q 2 minutes for Analgesia Side Effects Monitoring Sedation Level of 3 (frequently drowsy, arousable, drifts to sleep during conversation).Full reversal dose:  0.4 mg bolus for Analgesia Side Effects Monitoring Sedation Level of 4 (somnolent, minimal or no response to stimulation).               OLANZapine zydis (zyPREXA) ODT half-tab 2.5-5 mg  Dose: 2.5-5 mg Freq: 2 TIMES DAILY PRN Route: PO  PRN Reasons: agitation,aggression  PRN Comment: psychosis  Start: 05/26/17 1225   Admin Instructions: Combined IM and PO doses may significantly increase the risk of orthostatic hypotension at 30 mg per day or higher.  With dry hands, peel back foil backing and gently remove tablet; do not  push oral disintegrating tablet through foil backing; administer immediately on tongue and oral disintegrating tablet dissolves in seconds; then swallow with saliva; liquid not required.               ondansetron (ZOFRAN-ODT) ODT tab 4 mg  Dose: 4 mg Freq: EVERY 6 HOURS PRN Route: PO  PRN Reason: nausea  Start: 05/16/17 2213   Admin Instructions: This is Step 1 of nausea and vomiting management.  If nausea not resolved in 15 minutes, go to Step 2 prochlorperazine (COMPAZINE). Do not push through foil backing. Peel back foil and gently remove. Place on tongue immediately. Administration with liquid unnecessary              Or  ondansetron (ZOFRAN) injection 4 mg  Dose: 4 mg Freq: EVERY 6 HOURS PRN Route: IV  PRN Reasons: nausea,vomiting  Start: 05/16/17 2213   Admin Instructions: This is Step 1 of nausea and vomiting management.  If nausea not resolved in 15 minutes, go to Step 2 prochlorperazine (COMPAZINE).  Irritant.               oxyCODONE (ROXICODONE INTENSOL) 100 MG/5ML (HIGH CONC) solution 5-10 mg  Dose: 5-10 mg Freq: EVERY 2 HOURS PRN Route: PO  PRN Reasons: moderate to severe pain,shortness of breath / dyspnea  Start: 05/31/17 1415   Admin Instructions: CAUTION: solution is 20 times more concentrated than standard solution. Verify dose volume.               polyethylene glycol (MIRALAX/GLYCOLAX) Packet 17 g  Dose: 17 g Freq: DAILY PRN Route: ORAL OR FEED  PRN Reason: constipation  Start: 05/30/17 1124   Admin Instructions: Give in 8oz of  water, juice, or soda. Hold for loose stools.  This is the second step of a three step constipation treatment protocol.  1 Packet = 17 grams. Mixed prescribed dose in 8 ounces of water. Follow with 8 oz. of water.               risperiDONE (risperDAL M-TABS) ODT tab 0.5 mg  Dose: 0.5 mg Freq: 3 TIMES DAILY PRN Route: SL  PRN Comment: agitation  Start: 05/16/17 2213              senna-docusate (SENOKOT-S;PERICOLACE) 8.6-50 MG per tablet 1-2 tablet  Dose: 1-2 tablet Freq: 2  TIMES DAILY PRN Route: PO  PRN Comment: constipation   Start: 05/16/17 2213   Admin Instructions: If no bowel movement in 24 hours, increase to 2 tablets PO BID.  Hold for loose stools.   This is the first step of a three step constipation treatment protocol.               sodium chloride (PF) 0.9% PF flush 10-20 mL  Dose: 10-20 mL Freq: EVERY 1 HOUR PRN Route: IK  PRN Reasons: line flush,post meds or blood draw  Start: 05/29/17 1737   Admin Instructions: to flush CVC - Open Ended (Tunneled and Non-Tunneled).   10 mL post IV meds; 20 mL post blood draw.          1751 (10 mL)-Given [C]            sodium chloride (PF) 0.9% PF flush 10-20 mL  Dose: 10-20 mL Freq: EVERY 1 HOUR PRN Route: IK  PRN Reasons: line flush,post meds or blood draw  Start: 05/21/17 0256   Admin Instructions: to flush CVC - Open Ended (Tunneled and Non-Tunneled).   10 mL post IV meds; 20 mL post blood draw.               sodium chloride (PF) 0.9% PF flush 3 mL  Dose: 3 mL Freq: EVERY 1 HOUR PRN Route: IK  PRN Reason: line flush  Start: 05/18/17 0851   Admin Instructions: for peripheral IV flush post IV meds              Completed Medications  Medications 05/26/17 05/27/17 05/28/17 05/29/17 05/30/17 05/31/17 06/01/17         Dose: 20 mEq Freq: ONCE Route: ORAL OR NG T  Start: 05/29/17 1015   End: 05/29/17 1151       1151 (20 mEq)-Given             Discontinued Medications  Medications 05/26/17 05/27/17 05/28/17 05/29/17 05/30/17 05/31/17 06/01/17         Dose: 650 mg Freq: EVERY 4 HOURS PRN Route: PO  PRN Reasons: mild pain,fever  Start: 05/28/17 1650   End: 05/30/17 1125   Admin Instructions: Maximum acetaminophen dose from all sources= 75 mg/kg/day not to exceed 4 grams/day.         1125-Med Discontinued           Rate: 100 mL/hr Freq: CONTINUOUS Route: IV  Start: 05/26/17 1430   End: 05/30/17 0747    1447 ( )-New Bag       2135 ( )-Rate/Dose Verify        0136 ( )-New Bag       1114 ( )-New Bag       2124 ( )-New Bag        1729 ( )-New Bag         0314 ( )-New Bag       1329 ( )-New Bag       1655 ( )-Rate/Dose Verify        0150 ( )-New Bag       0747-Med Discontinued           Dose: 25 Units Freq: EVERY MORNING BEFORE BREAKFAST Route: SC  Start: 05/27/17 0730   End: 05/31/17 1503     0813 (25 Units)-Given        0931 (25 Units)-Given        1151 (25 Units)-Given        0818 (25 Units)-Given        0936 (25 Units)-Given       1503-Med Discontinued          Dose: 1 mg Freq: AT BEDTIME PRN Route: PO  PRN Reason: sleep  Start: 05/16/17 2213   End: 05/30/17 1125   Admin Instructions: Do not give unless at least 6 hours of uninterrupted sleep is expected.         1125-Med Discontinued           Dose: 5 mg Freq: EVERY 6 HOURS Route: IV  Start: 05/24/17 1800   End: 05/29/17 1231   Admin Instructions: Hold if SBP<100 DBP<60 or HR <60     0030 (5 mg)-Given       0634 (5 mg)-Given       (1406)-Not Given [C]       1855 (5 mg)-Given        0132 (5 mg)-Given       0813 (5 mg)-Given       (1456)-Not Given       2125 (5 mg)-Given        0312 (5 mg)-Given       (0815)-Not Given       (1526)-Not Given       2014 (5 mg)-Given [C]        0153 (5 mg)-Given       0930 (5 mg)-Given       1231-Med Discontinued            Dose: 25 mg Freq: 2 TIMES DAILY Route: ORAL OR FEED  Start: 05/30/17 2100   End: 05/31/17 1503   Admin Instructions: Shake well.         2105 (25 mg)-Given        0936 (25 mg)-Given       1503-Med Discontinued          Dose: 25 mg Freq: 2 TIMES DAILY Route: PO  Start: 05/29/17 2100   End: 05/30/17 1125   Admin Instructions: Hold for SBP < 100 or HR < 60        2032 (25 mg)-Given        0817 (25 mg)-Given       1125-Med Discontinued           Dose: 15 mL Freq: DAILY Route: PER FEEDING   Start: 05/28/17 1530   End: 05/31/17 1405      (1657)-Not Given [C]        1151 (15 mL)-Given        0816 (15 mL)-Given        0937 (15 mL)-Given       1405-Med Discontinued          Dose: 40 mg Freq: EVERY MORNING BEFORE BREAKFAST Route: IV  Start: 05/25/17 0730   End: 05/29/17  1232   Admin Instructions: Reconstitute vial with 10mLs Saline and administer IV Push  Irritant.     0634 (40 mg)-Given        0630 (40 mg)-Given        0630 (40 mg)-Given        0607 (40 mg)-Given              1232-Med Discontinued            Dose: 40 mg Freq: DAILY Route: PER NG TUBE  Start: 05/30/17 0900   End: 05/31/17 1503        0816 (40 mg)-Given        0937 (40 mg)-Given       1503-Med Discontinued          Dose: 17 g Freq: DAILY PRN Route: PO  PRN Reason: constipation  Start: 05/16/17 2213   End: 05/30/17 1125   Admin Instructions: Give in 8oz of  water, juice, or soda. Hold for loose stools.  This is the second step of a three step constipation treatment protocol.  1 Packet = 17 grams. Mixed prescribed dose in 8 ounces of water. Follow with 8 oz. of water.         1125-Med Discontinued           Dose: 500 mg Freq: EVERY 8 HOURS Route: IV  Start: 05/24/17 1800   End: 05/30/17 1026    0341 (500 mg)-Given       1241 (500 mg)-Given       2134 (500 mg)-Given        0337 (500 mg)-Given       1254 (500 mg)-Given       2125 (500 mg)-Given        0324 (500 mg)-Given       1301 (500 mg)-Given       1929 (500 mg)-Given        0314 (500 mg)-Given       1325 (500 mg)-Given       2032 (500 mg)-Given        0430 (500 mg)-Given       1026-Med Discontinued           Dose: 500 mg Freq: EVERY 8 HOURS Route: ORAL OR FEED  Start: 05/30/17 1400   End: 05/31/17 1503        1611 (500 mg)-Given        0058 (500 mg)-Given       0937 (500 mg)-Given       1503-Med Discontinued     Medications 05/26/17 05/27/17 05/28/17 05/29/17 05/30/17 05/31/17 06/01/17

## 2017-05-16 NOTE — IP AVS SNAPSHOT
"[place] [address] ONCOLOGY: [phone]                                              INTERAGENCY TRANSFER FORM - LAB / IMAGING / EKG / EMG RESULTS   2017                    Hospital Admission Date: 2017  MARIO ALBERTO PATRICK   : 1955  Sex: Female        Attending Provider: Isabel Ojeda MD     Allergies:  Amoxicillin, Amoxicillin, Haldol [Benzyl Alcohol], Haldol [Haloperidol], Pcn [Penicillin G], Penicillins, Seroquel [Quetiapine]    Infection:  None   Service:  HOSPITALIST    Ht:  1.626 m (5' 4\")   Wt:  84.4 kg (186 lb 1.1 oz)   Admission Wt:  91.2 kg (201 lb)    BMI:  31.94 kg/m 2   BSA:  1.95 m 2            Patient PCP Information     None on File         Lab Results - 3 Days      Glucose by meter [[id number]] (Abnormal)  Resulted: 17 2246, Result status: Final result    Ordering provider: Le Bates MD  17 Resulting lab: POINT OF CARE TEST, GLUCOSE    Specimen Information    Type Source Collected On     17          Components       Value Reference Range Flag Lab   Glucose 165 70 - 99 mg/dL H 170   Comment:  /RN Notified            Glucose by meter [[id number]] (Abnormal)  Resulted: 17 1746, Result status: Final result    Ordering provider: Le Bates MD  17 1739 Resulting lab: POINT OF CARE TEST, GLUCOSE    Specimen Information    Type Source Collected On     17 1739          Components       Value Reference Range Flag Lab   Glucose 232 70 - 99 mg/dL H 170   Comment:  Dr/RN Notified            Glucose by meter [[id number]] (Abnormal)  Resulted: 17 1426, Result status: Final result    Ordering provider: Le Bates MD  17 1215 Resulting lab: POINT OF CARE TEST, GLUCOSE    Specimen Information    Type Source Collected On     17 1215          Components       Value Reference Range Flag Lab   Glucose 377 70 - 99 mg/dL H 170            Valproic acid free [[id number]] (Abnormal)  Resulted: 17 1334, " Result status: Final result    Ordering provider: Naga Pompa DO  05/30/17 0000 Resulting lab: [facility]    Specimen Information    Type Source Collected On   Blood  05/30/17 0700          Components       Value Reference Range Flag Lab   Valproic Acid Free 33.7  H FrStHsLb   Comment:         Analysis performed by [employer] [place] [address]  Reference range: 6.0  to  20.0  Unit: ug/mL              Glucose by meter [[id number]] (Abnormal)  Resulted: 05/31/17 0921, Result status: Final result    Ordering provider: Le Bates MD  05/31/17 0916 Resulting lab: POINT OF CARE TEST, GLUCOSE    Specimen Information    Type Source Collected On     05/31/17 0916          Components       Value Reference Range Flag Lab   Glucose 439 70 - 99 mg/dL H 170            Glucose by meter [[id number]] (Abnormal)  Resulted: 05/31/17 0801, Result status: Final result    Ordering provider: Le Bates MD  05/31/17 0255 Resulting lab: POINT OF CARE TEST, GLUCOSE    Specimen Information    Type Source Collected On     05/31/17 0255          Components       Value Reference Range Flag Lab   Glucose 299 70 - 99 mg/dL H 170            Basic metabolic panel [[id number]] (Abnormal)  Resulted: 05/31/17 0651, Result status: Final result    Ordering provider: Naga Pompa DO  05/31/17 0000 Resulting lab: [facility]    Specimen Information    Type Source Collected On   Blood  05/31/17 0600          Components       Value Reference Range Flag Lab   Sodium 148 133 - 144 mmol/L H FrStHsLb   Potassium 4.4 3.4 - 5.3 mmol/L  FrStHsLb   Chloride 115 94 - 109 mmol/L H FrStHsLb   Carbon Dioxide 27 20 - 32 mmol/L  FrStHsLb   Anion Gap 6 3 - 14 mmol/L  FrStHsLb   Glucose 389 70 - 99 mg/dL H FrStHsLb   Urea Nitrogen 34 7 - 30 mg/dL H FrStHsLb   Creatinine 2.04 0.52 - 1.04 mg/dL H FrStHsLb   GFR Estimate 25 >60 mL/min/1.7m2 L FrStHsLb   Comment:  Non  GFR Calc   GFR  Estimate If Black 30 >60 mL/min/1.7m2 L FrStHsLb   Comment:  African American GFR Calc   Calcium 10.1 8.5 - 10.1 mg/dL  FrStHsLb            CBC with platelets [[id number]] (Abnormal)  Resulted: 05/31/17 0639, Result status: Final result    Ordering provider: Naga Pompa DO  05/31/17 0000 Resulting lab: [facility]    Specimen Information    Type Source Collected On   Blood  05/31/17 0600          Components       Value Reference Range Flag Lab   WBC 14.4 4.0 - 11.0 10e9/L H FrStHsLb   RBC Count 3.44 3.8 - 5.2 10e12/L L FrStHsLb   Hemoglobin 10.0 11.7 - 15.7 g/dL L FrStHsLb   Hematocrit 33.8 35.0 - 47.0 % L FrStHsLb   MCV 98 78 - 100 fl  FrStHsLb   MCH 29.1 26.5 - 33.0 pg  FrStHsLb   MCHC 29.6 31.5 - 36.5 g/dL L FrStHsLb   RDW 16.5 10.0 - 15.0 % H FrStHsLb   Platelet Count 203 150 - 450 10e9/L  FrStHsLb            Glucose by meter [[id number]] (Abnormal)  Resulted: 05/30/17 2211, Result status: Final result    Ordering provider: Le Bates MD  05/30/17 2139 Resulting lab: POINT OF CARE TEST, GLUCOSE    Specimen Information    Type Source Collected On     05/30/17 2139          Components       Value Reference Range Flag Lab   Glucose 320 70 - 99 mg/dL H 170            Lactic acid level STAT [[id number]]  Resulted: 05/30/17 2125, Result status: Final result    Ordering provider: Naga Pompa DO  05/30/17 2058 Resulting lab: [facility]    Specimen Information    Type Source Collected On   Blood  05/30/17 2105          Components       Value Reference Range Flag Lab   Lactic Acid 1.8 0.7 - 2.1 mmol/L  FrStHsLb            Glucose by meter [[id number]] (Abnormal)  Resulted: 05/30/17 1851, Result status: Final result    Ordering provider: Le Bates MD  05/30/17 1840 Resulting lab: POINT OF CARE TEST, GLUCOSE    Specimen Information    Type Source Collected On     05/30/17 1840          Components       Value Reference Range Flag Lab   Glucose 282 70 - 99 mg/dL  H 170            Glucose by meter [[id number]] (Abnormal)  Resulted: 05/30/17 1726, Result status: Final result    Ordering provider: Le Bates MD  05/30/17 1718 Resulting lab: POINT OF CARE TEST, GLUCOSE    Specimen Information    Type Source Collected On     05/30/17 1718          Components       Value Reference Range Flag Lab   Glucose 278 70 - 99 mg/dL H 170            Glucose by meter [[id number]] (Abnormal)  Resulted: 05/30/17 1321, Result status: Final result    Ordering provider: Le Bates MD  05/30/17 1259 Resulting lab: POINT OF CARE TEST, GLUCOSE    Specimen Information    Type Source Collected On     05/30/17 1259          Components       Value Reference Range Flag Lab   Glucose 271 70 - 99 mg/dL H 170            Valproic acid [[id number]]  Resulted: 05/30/17 1118, Result status: Final result    Ordering provider: Naga Pompa DO  05/30/17 0000 Resulting lab: [facility]    Specimen Information    Type Source Collected On   Blood  05/30/17 0700          Components       Value Reference Range Flag Lab   Valproic Acid Level 95 50 - 100 mg/L  FrStHsLb            Basic metabolic panel [[id number]] (Abnormal)  Resulted: 05/30/17 0820, Result status: Final result    Ordering provider: Naga Pompa DO  05/30/17 0000 Resulting lab: [facility]    Specimen Information    Type Source Collected On   Blood  05/30/17 0700          Components       Value Reference Range Flag Lab   Sodium 140 133 - 144 mmol/L  FrStHsLb   Potassium 4.1 3.4 - 5.3 mmol/L  FrStHsLb   Chloride 107 94 - 109 mmol/L  FrStHsLb   Carbon Dioxide 25 20 - 32 mmol/L  FrStHsLb   Anion Gap 8 3 - 14 mmol/L  FrStHsLb   Glucose 316 70 - 99 mg/dL H FrStHsLb   Urea Nitrogen 27 7 - 30 mg/dL  FrStHsLb   Creatinine 1.96 0.52 - 1.04 mg/dL H FrStHsLb   GFR Estimate 26 >60 mL/min/1.7m2 L FrStHsLb   Comment:  Non  GFR Calc   GFR Estimate If Black 31 >60 mL/min/1.7m2 L FrStHsLb    Comment:  African American GFR Calc   Calcium 9.4 8.5 - 10.1 mg/dL  FrStHsLb            Glucose by meter [[id number]] (Abnormal)  Resulted: 05/30/17 0806, Result status: Final result    Ordering provider: Le Bates MD  05/30/17 0801 Resulting lab: POINT OF CARE TEST, GLUCOSE    Specimen Information    Type Source Collected On     05/30/17 0801          Components       Value Reference Range Flag Lab   Glucose 280 70 - 99 mg/dL H 170            CBC with platelets [[id number]] (Abnormal)  Resulted: 05/30/17 0754, Result status: Final result    Ordering provider: Naga Pompa DO  05/30/17 0000 Resulting lab: [facility]    Specimen Information    Type Source Collected On   Blood  05/30/17 0700          Components       Value Reference Range Flag Lab   WBC 13.7 4.0 - 11.0 10e9/L H FrStHsLb   RBC Count 3.25 3.8 - 5.2 10e12/L L FrStHsLb   Hemoglobin 9.6 11.7 - 15.7 g/dL L FrStHsLb   Hematocrit 31.0 35.0 - 47.0 % L FrStHsLb   MCV 95 78 - 100 fl  FrStHsLb   MCH 29.5 26.5 - 33.0 pg  FrStHsLb   MCHC 31.0 31.5 - 36.5 g/dL L FrStHsLb   RDW 16.0 10.0 - 15.0 % H FrStHsLb   Platelet Count 195 150 - 450 10e9/L  FrStHsLb            Glucose by meter [[id number]] (Abnormal)  Resulted: 05/30/17 0156, Result status: Final result    Ordering provider: Le Bates MD  05/30/17 0146 Resulting lab: POINT OF CARE TEST, GLUCOSE    Specimen Information    Type Source Collected On     05/30/17 0146          Components       Value Reference Range Flag Lab   Glucose 259 70 - 99 mg/dL H 170            Glucose by meter [[id number]] (Abnormal)  Resulted: 05/29/17 2121, Result status: Final result    Ordering provider: Le Bates MD  05/29/17 2112 Resulting lab: POINT OF CARE TEST, GLUCOSE    Specimen Information    Type Source Collected On     05/29/17 2112          Components       Value Reference Range Flag Lab   Glucose 192 70 - 99 mg/dL H 170            Glucose by meter [[id number]] (Abnormal)  Resulted:  05/29/17 1701, Result status: Final result    Ordering provider: Le Bates MD  05/29/17 1656 Resulting lab: POINT OF CARE TEST, GLUCOSE    Specimen Information    Type Source Collected On     05/29/17 1656          Components       Value Reference Range Flag Lab   Glucose 241 70 - 99 mg/dL H 170            Glucose by meter [[id number]] (Abnormal)  Resulted: 05/29/17 1316, Result status: Final result    Ordering provider: Le Bates MD  05/29/17 1312 Resulting lab: POINT OF CARE TEST, GLUCOSE    Specimen Information    Type Source Collected On     05/29/17 1312          Components       Value Reference Range Flag Lab   Glucose 220 70 - 99 mg/dL H 170            Glucose by meter [[id number]] (Abnormal)  Resulted: 05/29/17 1231, Result status: Final result    Ordering provider: Le Bates MD  05/29/17 1154 Resulting lab: POINT OF CARE TEST, GLUCOSE    Specimen Information    Type Source Collected On     05/29/17 1154          Components       Value Reference Range Flag Lab   Glucose 211 70 - 99 mg/dL H 170            Glucose by meter [[id number]] (Abnormal)  Resulted: 05/29/17 0821, Result status: Final result    Ordering provider: Le Bates MD  05/29/17 0816 Resulting lab: POINT OF CARE TEST, GLUCOSE    Specimen Information    Type Source Collected On     05/29/17 0816          Components       Value Reference Range Flag Lab   Glucose 218 70 - 99 mg/dL H 170            Magnesium [[id number]]  Resulted: 05/29/17 0655, Result status: Final result    Ordering provider: Naga Pompa DO  05/29/17 0000 Resulting lab: [facility]    Specimen Information    Type Source Collected On   Blood  05/29/17 0605          Components       Value Reference Range Flag Lab   Magnesium 2.1 1.6 - 2.3 mg/dL  StLb            Phosphorus [[id number]]  Resulted: 05/29/17 0655, Result status: Final result    Ordering provider: Naga Pompa DO  05/29/17 0000 Resulting lab: [place]  [facility]    Specimen Information    Type Source Collected On   Blood  05/29/17 0605          Components       Value Reference Range Flag Lab   Phosphorus 3.0 2.5 - 4.5 mg/dL  FrStHsLb            Basic metabolic panel [[id number]] (Abnormal)  Resulted: 05/29/17 0655, Result status: Final result    Ordering provider: Naga Pompa,   05/29/17 0000 Resulting lab: [facility]    Specimen Information    Type Source Collected On   Blood  05/29/17 0605          Components       Value Reference Range Flag Lab   Sodium 139 133 - 144 mmol/L  FrStHsLb   Potassium 3.3 3.4 - 5.3 mmol/L L FrStHsLb   Chloride 106 94 - 109 mmol/L  FrStHsLb   Carbon Dioxide 25 20 - 32 mmol/L  FrStHsLb   Anion Gap 8 3 - 14 mmol/L  FrStHsLb   Glucose 250 70 - 99 mg/dL H FrStHsLb   Urea Nitrogen 30 7 - 30 mg/dL  FrStHsLb   Creatinine 2.15 0.52 - 1.04 mg/dL H FrStHsLb   GFR Estimate 23 >60 mL/min/1.7m2 L FrStHsLb   Comment:  Non  GFR Calc   GFR Estimate If Black 28 >60 mL/min/1.7m2 L FrStHsLb   Comment:  African American GFR Calc   Calcium 9.2 8.5 - 10.1 mg/dL  FrStHsLb            Lactic acid whole blood [[id number]]  Resulted: 05/29/17 0632, Result status: Final result    Ordering provider: Naga Pompa DO  05/29/17 0419 Resulting lab: [facility]    Specimen Information    Type Source Collected On   Blood  05/29/17 0605          Components       Value Reference Range Flag Lab   Lactic Acid 1.5 0.7 - 2.1 mmol/L  FrStHsLb            Glucose by meter [[id number]] (Abnormal)  Resulted: 05/29/17 0213, Result status: Final result    Ordering provider: Le Bates MD  05/29/17 0206 Resulting lab: POINT OF CARE TEST, GLUCOSE    Specimen Information    Type Source Collected On     05/29/17 0206          Components       Value Reference Range Flag Lab   Glucose 265 70 - 99 mg/dL H 170            Lactic acid level STAT [[id number]] (Abnormal)  Resulted: 05/28/17 2351, Result status:  Final result    Ordering provider: Naga Pompa DO  05/28/17 2259 Resulting lab: [facility]    Specimen Information    Type Source Collected On   Blood  05/28/17 2330          Components       Value Reference Range Flag Lab   Lactic Acid 2.7 0.7 - 2.1 mmol/L H FrStHsLb            Testing Performed By     Lab - Abbreviation Name Director Address Valid Date Range    14 - FrStHsLb [facility] Unknown [address] [place] [address] 05/08/15 1057 - Present    170 - Unknown POINT OF CARE TEST, GLUCOSE Unknown Unknown 10/31/11 1114 - Present            Unresulted Labs     None         Imaging Results - 3 Days      XR Feeding Tube Placement [[id number]]  Resulted: 05/29/17 1032, Result status: Final result    Ordering provider: Naga Pompa DO  05/28/17 1232 Resulted by: Ollie Mendoza MD    Performed: 05/29/17 0930 - 05/29/17 1015 Resulting lab: RADIOLOGY RESULTS    Narrative:       FEEDING TUBE PLACEMENT 5/29/2017 10:15 AM    HISTORY: Nutritional needs.    COMPARISON: None.    FLUOROSCOPY TIME: 1.16 minutes.    IMAGES OBTAINED: 1     FINDINGS: A feeding tube was placed. At the final position, the  feeding tube tip was at the level of the Ligament of Treitz at the  junction of the duodenum and jejunum. 15 mL of Omnipaque 240  contrast  was injected to confirm placement. The tube was marked at the level of  the nose at the 102 cm length. There were no complications of the  procedure.      Impression:       IMPRESSION: Successful feeding tube placement.    OLLIE MENDOZA MD      Testing Performed By     Lab - Abbreviation Name Director Address Valid Date Range    104 - Rad Rslts RADIOLOGY RESULTS Unknown Unknown 02/16/05 1553 - Present            Encounter-Level Documents:     There are no encounter-level documents.      Order-Level Documents:     There are no order-level documents.

## 2017-05-17 NOTE — PLAN OF CARE
Problem: Goal Outcome Summary  Goal: Goal Outcome Summary  SLP: Bedside swallow evaluation revealed moderate dysphagia with overt s/sx of aspiration. Pt alert throughout the evaluation, but was minimally responsive other than saying,  good  to questions. Pt showed difficulty following commands for oral motor exam. Tremor noted. Generalized oral weakness and coordination. Ice chip trialed with adequate oral phase and no overt s/sx of aspiration. Pt previously able to self-feed per RN report, however, unable currently. Due to positioning, cup sip of water unable to be performed. Straw sip of thin water trialed with impulsive serial gulps noted. Cues provided for small sip and on second trial, pt unable to follow max cues and immediate coughing noted.  Vocal quality remained at baseline. Nectar thick liquids via teaspoon trialed with no overt s/sx of aspiration. Dysphagia diet level 1 trialed with adequate oral phase and no overt s/sx of aspiration. Semi-solid textures (baseline dysphagia diet level 3) trialed with delayed coughing. Overt s/sx of aspiration noted with semi-solid and thin liquids. Recommended: cautiously initiate dysphagia level 1 diet and nectar thick liquids via teaspoon. Supervision and assist with all intake due to impulsive behavior. Feed only when alert. Small bites and sips. Ensure swallow after every bites. Alternate solids and liquids. No straws. Discontinue diet if overt s/sx of aspiration. ST to follow for diet tolerance and advance to baseline of dysphagia diet level 3 and thin liquids as appropriate. Continued ST following discharge pending pt progress.

## 2017-05-17 NOTE — PLAN OF CARE
Problem: Goal Outcome Summary  Goal: Goal Outcome Summary  Outcome: No Change  Disoriented to time, place & situations. Re-oriented. Tele SR, VSS. Trending K+ 6.8, 5.5, and 6.1. MD aware, Nacl 0.45% with sodium bicarbonate 75 mEq/L infusing. Large UOP via cherry. Am recheck of K+ pending. Total cares. Turned & repositioned every two hours.Blood glucose check q 4 hours, 205 & 133.

## 2017-05-17 NOTE — PROGRESS NOTES
SW:  D:  Reviewed chart.  [facility].  Call placed to check the bed hold status.  Patient has a 18 day MA bed hold and can return there on discharge.  P:  Will continue to follow.

## 2017-05-17 NOTE — PLAN OF CARE
"Problem: Goal Outcome Summary  Goal: Goal Outcome Summary  Outcome: Improving  Pt became more alert as the day went on. Continues to be slightly tachy 110-120 most of the day otherwise VSS. Now opening eyes spontaneously and answering some questions. Confused to time and person although will say she is at \"[facility]\". Baseline for pt wheelchair bound and confused of which she remains very weak and unable to feed herself. Postassium remains 5.7 and has D5 going at 150, Creatnine is trending down. Will continue to encourage activity and self cares along with monitoring labs and vitals.       "

## 2017-05-17 NOTE — H&P
CHIEF COMPLAINT:  lethargy, decreased intake for the last 2 days.      HISTORY OF PRESENT ILLNESS:  Nel Farmer is a 61-year-old woman with history of intellectual disability, schizoaffective disorder, chronic kidney disease, recent admissions for acute encephalopathy associated with infection who most recently was admitted on 03/30/2017-04/04/2017 for acute metabolic encephalopathy associated with acute kidney injury, metabolic acidosis and suspected healthcare-associated pneumonia.  Prior to that, she was admitted in March for acute encephalopathy associated with a UTI as well as acute kidney injury.  She is usually just oriented x 1 to self and sometimes to place, per my discussion with TCU.     Patient is unable to give a history at this time, so all of my history is obtained from the ER physician and discussion with the TCU staff.  Apparently, Nel was more lethargic, not interactive, not getting up out of bed, eating less for the last 2 days.  Due to this, they got labs today and her creatinine was elevated at 4 with a potassium of 6.1.  They therefore sent her to the ED.  They did not notice any fevers or chills or shortness of breath or cough.  No abdominal pain was noted.  No rashes or any other symptoms, just lethargy and decreased interactiveness and decreased appetite.      At arrival to the ED, her temperature is 99.3.  Her heart rate was 110 with a blood pressure of 152/82, respirations 16, oxygenation 96% on room air.  She is alert but unable to follow commands but not able to answer most questions.  Her labs were significant for a potassium of 6.8, verified with repeat lab.  A creatinine of 3.4 up from her baseline of 2.4.  Her bicarb is only 15 with a normal anion gap.  Liver panel was normal.  A1c 7.  Her lactic acid was normal.  Lipase was obtained and was mildly elevated at 756 associated with no abdominal pain.  TSH was normal.        A CT of the abdomen and pelvis was obtained for the  elevated lipase and showed just an atrophied pancreas but no evidence of acute pancreatitis.  They noted a heterogeneous appearance of the liver, possibly related to streak artifact and a few tiny nonobstructive renal stones but nothing to suggest acute infection or acute process.  Her glucose was when 198.  VBG was obtained and revealed a mild acidosis with a pH of 7.22, bicarb calculated at 16 with a CO2 of 39.  Her CBC revealed a normal white blood cell count, hemoglobin 12.9 up from previous hemoglobins around 10 to suggest hemoconcentration.  Her platelets are 143.  Her UA is positive with  white blood cells and with 2-5 white blood cell casts.  Also noted is some trace ketones, moderate blood with 10-25 RBCs, many bacteria and moderate yeast.  Blood cultures were obtained.  Chest x-ray was done and was clear, reviewed by myself.  EKG was performed and revealed no acute changes of hyperkalemia.  She is in normal sinus rythym, reveals sinus tachycardia with no evidence of acute ischemia.  There is very minimal peaking of the T waves, no QRS prolongation.      She received bicarbonate, insulin, calcium gluconate in the ED with repeat potassium of 5.5.      PAST MEDICAL HISTORY:   1.  Schizoaffective disorder with intellectual disability associated with anxiety and depression.   2.  Diabetes mellitus type 2, last A1c was 7.   3.  Hypertension.   4.  Hyperlipidemia.   5.  Chronic kidney disease with a creatinine around 2.4.  She has had previous hospitalizations with acute kidney injury with metabolic alkalosis and her bicarb usually runs around 20-21 when she is stable.   6.  History of diabetes insipidus, presumed related to lithium effect or prior prolonged lithium treatment.   7.  Metabolic bone disease with secondary hyperparathyroidism.   8.  Vitamin D deficiency.      MEDICATIONS:    Prior to Admission Medications   Prescriptions Last Dose Informant Patient Reported? Taking?   Furosemide (LASIX PO)  "2017 at Unknown time Pharmacy Yes Yes   Sig: Take 10 mg by mouth daily    Furosemide (LASIX PO) prn med Pharmacy Yes Yes   Sig: Take 20 mg by mouth daily as needed (Take at noon for leg swelling if needed)   LORazepam (ATIVAN) 0.5 MG tablet prn med  No Yes   Sig: Take 1 tablet (0.5 mg) by mouth 2 times daily as needed for anxiety   OLANZapine (ZYPREXA PO) 5/15/2017 at Unknown time Pharmacy Yes Yes   Sig: Take 5 mg by mouth At Bedtime   Omeprazole (PRILOSEC PO) 5/15/2017 at pm Pharmacy Yes Yes   Sig: Take 20 mg by mouth 2 times daily (before meals)   RisperiDONE (RISPERDAL PO) 5/15/2017 at Unknown time Pharmacy Yes Yes   Sig: Take 1 mg by mouth At Bedtime   aMILoride (MIDAMOR) 5 MG tablet 5/15/2017 at Unknown time  No Yes   Sig: Take 2 tablets (10 mg) by mouth daily   amLODIPine (NORVASC) 5 MG tablet 5/15/2017 at Unknown time  No Yes   Sig: Take 1 tablet (5 mg) by mouth daily   cloNIDine (CATAPRES) 0.2 MG tablet 5/15/2017 at Unknown time  No Yes   Sig: Take 1 tablet (0.2 mg) by mouth 2 times daily   divalproex (DEPAKOTE) 500 MG EC tablet 5/15/2017 at Unknown time Pharmacy Yes Yes   Sig: Take 500 mg by mouth every morning   divalproex (DEPAKOTE) 500 MG EC tablet 5/15/2017 at Unknown time Pharmacy Yes Yes   Sig: Take 1,500 mg by mouth At Bedtime   glipiZIDE (GLUCOTROL XL) 2.5 MG 24 hr tablet 5/15/2017 at pm  No Yes   Sig: Take 1 tablet (2.5 mg) by mouth 2 times daily (before meals)   insulin isophane & regular (HUMULIN MIX 70/30 PEN) susp 5/15/2017 at Unknown time  No Yes   Si units before breakfast  30 units before dinner   Patient taking differently: 20 units before breakfast  34 units before dinner   insulin syringe-needle U-100 (BD INSULIN SYRINGE ULTRAFINE) 31G X \" 1 ML  Pharmacy No No   Sig: Use one syringe bid daily or as directed.   isosorbide mononitrate (IMDUR) 30 MG 24 hr tablet 5/15/2017 at Unknown time Pharmacy No Yes   Sig: Take 1 tablet by mouth daily.   metoprolol (TOPROL-XL) 25 MG 24 hr " tablet 5/15/2017 at Unknown time  No Yes   Sig: Take 1 tablet (25 mg) by mouth daily   polyethylene glycol (MIRALAX/GLYCOLAX) powder prn med Self Yes Yes   Sig: Take 17 g by mouth daily as needed for constipation   risperiDONE (RISPERDAL M-TABS) 0.5 MG disintegrating tablet prn med  No Yes   Sig: Place 1 tablet (0.5 mg) under the tongue 3 times daily as needed   senna-docusate (SENOKOT-S;PERICOLACE) 8.6-50 MG per tablet prn med  No Yes   Sig: Take 1 tablet by mouth 2 times daily as needed for constipation      Facility-Administered Medications: None           ALLERGIES:       Allergies   Allergen Reactions     Amoxicillin      Amoxicillin      Haldol [Benzyl Alcohol]      Haldol [Haloperidol]      Pcn [Penicillin G]      Penicillins      Seroquel [Quetiapine] GI Disturbance        PAST SURGICAL HISTORY:  Status post tonsillectomy, adenoidectomy, D&C and total knee arthroplasty.      FAMILY HISTORY:  Per chart review, her mother had hypertension.  Father had colorectal cancer.  Patient is unable to give me any more information.      SOCIAL HISTORY:  Again, per chart review, she cannot give me any information.  She is single and currently living at [facility].  It sounds like she was transferred there around February or March around her recent hospitalizations.  There is no history of alcohol or tobacco abuse that is known.      REVIEW OF SYSTEMS:  A complete 10-point review of systems cannot be obtained from patient, but as above, I was able to verify that she has had no other symptoms of acute illness other than lethargy and decreased appetite as I have dictated up in the HPI.      PHYSICAL EXAMINATION:   VITAL SIGNS:  Her temperature is 99.3, heart rate currently 70, up to 110 while she is in the ED for while.  Her blood pressure is 160/93, respirations 18, oxygenation 97% on room air.   GENERAL:  She does not appear in any distress.   NEUROPSYCHIATRIC:  She is alert but does not make good eye  contact, staring up at the ceiling.  She is able to tell me at [place] but not able to answer any further questions.  She has nonsensical answers to most of my questions.  She does follow commands.   HEENT:  Her pupils are round and reactive to light at about 3 mm in diameter.  She has full extraocular movements.  Her cranial nerves II-XII are grossly intact.  She is moving all 4 extremities without gross focal neurological deficit.  Her sclerae is clear without icterus or injection.  Her oropharynx is quite dry.  There is no erythema or swelling.   NECK:  Supple without lymphadenopathy or thyromegaly.  Her extremities are warm with just trace edema.   HEART:  When I was examining her was tachycardic.  She has a soft systolic murmur.   LUNGS:  Clear throughout without wheezes, rhonchi or crackles.   ABDOMEN:  Soft throughout without hepatosplenomegaly or masses.   SKIN:  Reveals no acute rashes or ecchymoses or petechiae.      LABORATORY DATA AND OTHER STUDIES:  As above in HPI.      ASSESSMENT AND PLAN:  Nel Farmer is a 61-year-old woman with history of schizoaffective disorder, cognitive impairment usually oriented to just self and place per [facility]itional Care Unit report who has had 2 days of lethargy, decreased activity, decreased intake and therefore laboratories were obtained at [facility] and she was found to have a potassium of 6.1 with a creatinine of 4.  She was therefore transferred to the emergency department.  Here, she has a temperature of 99.3.  She is slightly tachycardic.  Blood pressure was elevated in the 160s.  She had good oxygenation.  Her white cell count is normal, hemoglobin elevated, suggestive of hemoconcentration.  UA does show signs of infection with  white blood cells with 2-5 white blood cell casts.  She had a thorough workup for infection including chest x-ray, CT of the abdomen and pelvis as her lipase was mildly elevated, but she has no abdominal pain.  Her CT showed  just mild pancreatic atrophy, but no acute signs of acute pancreatitis.  Also notable on her laboratories is that is acidotic with a bicarbonate of 15, normal anion gap; pH VBG was 7.22.  Her glucoses were elevated here in the 200s-250s.   1.  Acute metabolic encephalopathy likely secondary to severe dehydration with acute kidney injury and hyperkalemia.  Notably, she is on amelioride which likely contributes and we will be holding this. At this time, she will be admitted to the hospital, will continue her prior to admission psychiatric medications that she is able to take.  She has as needed medications if she develops agitation.  She will need frequent reorientation.  We will continue her prior to admission olanzapine, Depakote, risperidone.  I am going to hold her Ativan.     2.  Severe dehydration with acute kidney injury and hyperkalemia.  Her baseline creatinine is around 2.4 with creatinine on admission of 3.41, K 6.8 at admission. In the ED, she was given 2 liters of fluid, bicarbonate, insulin, calcium and her potassium is down to 5.5 at this point. We will continue a bicarbonate drip for her metabolic acidosis. She will be on telemetry.  If her potassium goes up again, I would favor giving her Lasix with a fluid bolus, as well as further insulin and albuterol.  Calcium if there are EKG changes.  With regards to her acute kidney injury, this is very likely prerenal.  Will continue intravenous fluids with 75 mEq of bicarbonate with one-half normal saline at 100 mL per hour.   3.  Probable urinary tract infection with  white blood cells and 2-5 WBC casts on a catheter specimen of a UA.  She has a history of Klebsiella urinary tract infection in the past with sensitivity to most antibiotics tested including ceftriaxone and fluoroquinolones.  She was given Levaquin 750 mg in the emergency department and will continue 500 mg every other day, which is renally dosed.  Urine culture and blood cultures were  sent.   4.  Hypertension.  Her blood pressure is elevated here, so I am going to continue her prior to admission medications if she is able to take them.  She is alert and following commands at this point.  Will continue her prior to admission amlodipine, clonidine, isosorbide, mononitrate, metoprolol.  I am going to hold her Lasix and amiloride at this time but would resume Lasix while giving IV fluids if her potassium remains elevated.   5.  Diabetes mellitus type 2 on insulin.  Will be holding her prior to admission glipizide.  I am also going to hold her 70/30 insulin until she is eating again and will just give her a dose of 10 units of Lantus tonight.  Will follow her sliding scale insulin every 4 hours.   6.  Gastroesophageal reflux disease.  Continue omeprazole.   7.  Code status:  Per prior hospitalizations is full.  Notably, the emergency room doctor was able to talk to family who noted she would likely not want dialysis if needed.  She is a full code at this time.  Of course discussions with family and patient, if able, would be important going forward regarding care given her multiple hospitalizations and declining course.   8.  Deep venous thrombosis prophylaxis.  I am going to place her on heparin subcutaneously, currently she is not very mobile.   9.  Disposition:  She will be admitted under inpatient.   10. Mildly elevated lipase, without abdominal pain and just pancreatic atrophy and no signs of acute pancreatitis on CT. Follow up in AM, she will be NPO over night.         GISELLE ORNELAS MD             D: 2017 22:33   T: 2017 00:30   MT: TD      Name:     MARIO ALBERTO PATRICK   MRN:      [id number]        Account:      [id number]   :      1955           Admitted:     [id number]      Document: [id number]

## 2017-05-17 NOTE — PLAN OF CARE
Problem: Goal Outcome Summary  Goal: Goal Outcome Summary  Outcome: No Change  K+ 5.5 this am, MD aware, AM-RN changed sodium bicarb ivfs to D5W + 1/2NS per MD's order.

## 2017-05-17 NOTE — PHARMACY-ADMISSION MEDICATION HISTORY
Admission medication history interview status for the 5/16/2017  admission is complete. See EPIC admission navigator for prior to admission medications     Medication history source reliability:Good    Actions taken by pharmacist (provider contacted, etc): consulted [facility]     Additional medication history information not noted on PTA med list :None    Medication reconciliation/reorder completed by provider prior to medication history? No    Time spent in this activity: 15  minutes    Prior to Admission medications    Medication Sig Last Dose Taking? Auth Provider   LORazepam (ATIVAN) 0.5 MG tablet Take 1 tablet (0.5 mg) by mouth 2 times daily as needed for anxiety prn med Yes Russ Gonzales MD   cloNIDine (CATAPRES) 0.2 MG tablet Take 1 tablet (0.2 mg) by mouth 2 times daily 5/15/2017 at Unknown time Yes Russ Gonzales MD   amLODIPine (NORVASC) 5 MG tablet Take 1 tablet (5 mg) by mouth daily 5/15/2017 at Unknown time Yes Russ Gonzales MD   aMILoride (MIDAMOR) 5 MG tablet Take 2 tablets (10 mg) by mouth daily 5/15/2017 at Unknown time Yes Russ Gonzales MD   metoprolol (TOPROL-XL) 25 MG 24 hr tablet Take 1 tablet (25 mg) by mouth daily 5/15/2017 at Unknown time Yes Wilfrido Flores MD   glipiZIDE (GLUCOTROL XL) 2.5 MG 24 hr tablet Take 1 tablet (2.5 mg) by mouth 2 times daily (before meals) 5/15/2017 at pm Yes Wilfrido Flores MD   insulin isophane & regular (HUMULIN MIX 70/30 PEN) susp 20 units before breakfast  30 units before dinner  Patient taking differently: 20 units before breakfast  34 units before dinner 5/15/2017 at Unknown time Yes Wilfrido Flores MD   risperiDONE (RISPERDAL M-TABS) 0.5 MG disintegrating tablet Place 1 tablet (0.5 mg) under the tongue 3 times daily as needed prn med Yes Omi Lowery MD   senna-docusate (SENOKOT-S;PERICOLACE) 8.6-50 MG per tablet Take 1 tablet by mouth 2 times daily as needed for constipation prn med Yes Paola Richardson MD   polyethylene glycol  "(MIRALAX/GLYCOLAX) powder Take 17 g by mouth daily as needed for constipation prn med Yes Unknown, Entered By History   divalproex (DEPAKOTE) 500 MG EC tablet Take 500 mg by mouth every morning 5/15/2017 at Unknown time Yes Unknown, Entered By History   divalproex (DEPAKOTE) 500 MG EC tablet Take 1,500 mg by mouth At Bedtime 5/15/2017 at Unknown time Yes Unknown, Entered By History   Furosemide (LASIX PO) Take 10 mg by mouth daily  5/16/2017 at Unknown time Yes Unknown, Entered By History   Furosemide (LASIX PO) Take 20 mg by mouth daily as needed (Take at noon for leg swelling if needed) prn med Yes Unknown, Entered By History   OLANZapine (ZYPREXA PO) Take 5 mg by mouth At Bedtime 5/15/2017 at Unknown time Yes Unknown, Entered By History   RisperiDONE (RISPERDAL PO) Take 1 mg by mouth At Bedtime 5/15/2017 at Unknown time Yes Unknown, Entered By History   Omeprazole (PRILOSEC PO) Take 20 mg by mouth 2 times daily (before meals) 5/15/2017 at pm Yes Unknown, Entered By History   isosorbide mononitrate (IMDUR) 30 MG 24 hr tablet Take 1 tablet by mouth daily. 5/15/2017 at Unknown time Yes Nick Myles MD   insulin syringe-needle U-100 (BD INSULIN SYRINGE ULTRAFINE) 31G X 5/16\" 1 ML Use one syringe bid daily or as directed.   Nick Myles MD Beth Mulder, PharmD    "

## 2017-05-17 NOTE — CONSULTS
RENAL CONSULTATION NOTE    REFERRING MD:  Le Bates MD    REASON FOR CONSULTATION:  A/CKD and hyperkalemia    HPI:  61 y.o woman with schizoaffective disorder, IDDM, hypertension and CKD IV, who was admitted on 5/16 altered mental status. She had a prolong hospitalization recently. Patient was noted to be more lethargic two days PTA. She had multiple episodes of being lethargy and less interactive during her previous hospitalization. She has NDI from lithium toxicity, which makes her prone to hypernatremia. She does not have fever or leukocytosis, and CXR without infiltrate. UA with many WBC and bacteria. She has a cherry catheter. Urine and blood cultures are pending.CT without contrast of the abdomen did not show any acute pathologies.    Currently, pt is getting d5 + 1/2 NS at 150 cc/hr.     Potasium was 6.8, which improved to 5.5 with insulin, calcium and bicarbonate. She received 2 liters of NS. Her serum sodium increased from 141 to 151 today.     ROS:  A complete review of systems was performed and is negative except as noted above.    PMH:    Past Medical History:   Diagnosis Date     Anxiety      CKD (chronic kidney disease) stage 3, GFR 30-59 ml/min     baseline Cr 1.8-2     Cognitive deficits      Depression      Depressive disorder      Diabetes (H)      DM type 2 (diabetes mellitus, type 2) (H)     insulin dependent      HTN      Hyperlipidemia LDL goal < 70      Hypertension      Osteoarthritis      Schizoaffective disorder (H)        PSH:    Past Surgical History:   Procedure Laterality Date     C TOTAL KNEE ARTHROPLASTY Left 3/2010     COLONOSCOPY  10/19/2011    Procedure:COMBINED COLONOSCOPY, REMOVE TUMOR/POLYP/LESION BY SNARE; Colonoscopy; Surgeon:MARY ALICE RUSH; Location: GI     DILATION AND CURETTAGE  age 31     TONSILLECTOMY & ADENOIDECTOMY         MEDICATIONS:      amLODIPine  5 mg Oral Daily     cloNIDine  0.2 mg Oral BID     divalproex  500 mg Oral QAM     divalproex  1,500  "mg Oral At Bedtime     isosorbide mononitrate  30 mg Oral Daily     metoprolol  25 mg Oral Daily     OLANZapine (zyPREXA) tablet 5 mg  5 mg Oral At Bedtime     omeprazole (priLOSEC) CR capsule 20 mg  20 mg Oral BID AC     risperiDONE (risperDAL) tablet 1 mg  1 mg Oral At Bedtime     [START ON 5/18/2017] levofloxacin  500 mg Intravenous Q48H     insulin aspart  1-6 Units Subcutaneous Q4H     heparin  5,000 Units Subcutaneous Q12H       ALLERGIES:    Allergies as of 05/16/2017 - Jaime as Reviewed 05/16/2017   Allergen Reaction Noted     Amoxicillin  10/18/2011     Amoxicillin  02/22/2016     Haldol [benzyl alcohol]  09/26/2005     Haldol [haloperidol]  02/22/2016     Pcn [penicillin g]  10/18/2011     Penicillins  02/22/2016     Seroquel [quetiapine] GI Disturbance 10/19/2011       FH:    Family History   Problem Relation Age of Onset     Hypertension Mother      Cancer - colorectal Father      Family History Negative Sister      Family History Negative Brother        SH:    Social History     Social History     Marital status: Single     Spouse name: N/A     Number of children: N/A     Years of education: N/A     Occupational History     Not on file.     Social History Main Topics     Smoking status: Never Smoker     Smokeless tobacco: Not on file     Alcohol use No     Drug use: No     Sexual activity: No     Other Topics Concern     Not on file     Social History Narrative    ** Merged History Encounter **         Intake 1/25/15:   Verbalized to be living with her mother.     Single. No h/o children.     Denied tobacco use, alcohol use, drug use.        PHYSICAL EXAM:    /76  Pulse 110  Temp 98.2  F (36.8  C) (Axillary)  Resp 8  Ht 1.626 m (5' 4\")  Wt 80.2 kg (176 lb 12.9 oz)  LMP 04/27/2012  SpO2 96%  BMI 30.35 kg/m2  GENERAL: NAD  HEENT:  EOMI. Lips and mouth are dry.   CV: RRR, tachy, no murmurs, no clicks, gallops, or rubs, no edema  RESP: Clear bilaterally with good efforts. No wheezes or " crackles.  GI: Abdomen obese, soft, NT  MUSCULOSKELETAL: extremities nl - no gross deformities noted  SKIN: no suspicious lesions or rashes, dry to touch  NEURO:  Awake. She is alert to self and place. She answered a few questions. She denies pain.  PSYCH: flat  LYMPH: No palpable ant/post cervical a  +cherry    LABS:      CBC RESULTS:     Recent Labs  Lab 05/17/17 0519 05/16/17 1909 05/16/17 1907   WBC 5.5  --  6.8   RBC 4.02  --  4.22   HGB 12.0 12.9 12.6   HCT 38.6  --  40.1   *  --  143*       BMP RESULTS:    Recent Labs  Lab 05/17/17 0519 05/17/17  0015 05/16/17 2034 05/16/17 1909 05/16/17 1907   *  --   --  141 143   POTASSIUM 5.5* 6.1* 5.5* 6.8* 6.8*   CHLORIDE 122*  --   --   --  117*   CO2 21  --   --   --  15*   BUN 51*  --   --   --  55*   CR 3.10*  --   --   --  3.41*   *  --  250*  --  198*   BENTLEY 12.0*  --   --   --  12.3*       INRNo lab results found in last 7 days.     DIAGNOSTICS:  Reviewed    A/P:  61 y.o woman with schizoaffective disorder and CKD IV from lithium toxicity, admitted for recurrent altered mental status, consulted for A/CKD and hyperkalemia.     1. CKD IV, baseline Scr ~ 2.5 mg/dl. Previous episodes of LYLA. Scr was 2.38 mg/dl when she was discharged last month. She has NDI from lithium.     2. Acute kidney injury from dehydration. Scr has improved with IVF.     3. Hypernatremia. She is prone to hypernatremia from NDI and lack of access to free water   -change IVF to d5W     4. Altered mental status. No clear infection of systemic infection. UA is dirty but she has a cherry. She is alert and oriented to self and place.     5. Hyperkalemia   -she was on amilodride, which has been held   -recheck renal panel in the afternoon   -2 grams K restricted diet    6. IDDM   -monitor glucose level closely while on D5W    7. Schizoaffective disorder.        Mario Abrams MD  [facility]phrology  Office Phone: [phone]  Pager: [phone]

## 2017-05-17 NOTE — PROGRESS NOTES
[facility] Nurse Inpatient Adult Pressure Injury (PI) Assessment     Initial Assessment of PI(s) on pt's:   Coccyx/Buttock BL    Data:   Patient History:      per MD note(s): Nel Farmer is a 61-year-old woman with history of intellectual disability, schizoaffective disorder, chronic kidney disease, recent admissions for acute encephalopathy associated with infection who most recently was admitted on 2017-2017 for acute metabolic encephalopathy associated with acute kidney injury, metabolic acidosis and suspected healthcare-associated pneumonia. Prior to that, she was admitted in March for acute encephalopathy associated with a UTI as well as acute kidney injury. She is usually just oriented to 1 and to possibly place, per my discussion with TCU.   Patient is unable to give a history at this time, so all of my history is obtained from the ER physician and discussion with the TCU staff. Apparently, Nel was more lethargic, not interactive, not getting up out of bed, eating less for the last 2 days. Due to this, they got labs today and her creatinine was elevated at 4 with a potassium of 6.1. They therefore sent her to the ED. They did not notice any fevers or chills or shortness of breath or cough. No abdominal pain was noted. No rashes or any other symptoms, just lethargy and decreased interactiveness and decreased appetite.    Current mattress:  Nurse Manuela Higgins to order YESSICA pressure relief mattress  Current pressure relieving devices:  Foam dressing and Pillows    Moisture Management:  Incontinence Protocol, Diaper and Urinary Catheter    Current Diet / Nutrition:           Active Diet Order      NPO Time Specified    Willis Assessment and sub scores:   Willis Score  Av  Min: 11  Max: 11   Labs:   Recent Labs   Lab Test  17   0519   17   1907   17   1515   ALBUMIN   --    --   3.1*   --   3.0*   HGB  12.0   < >  12.6   < >  10.9*   INR   --    --    --     --   1.19*   WBC  5.5   --   6.8   < >  9.5   A1C   --    --   7.0*   < >   --     < > = values in this interval not displayed.                                                                                                                          Pressure Injury Assessment  (location):   Coccyx and Right Gluteal Cleft    Wound History:   Pt has recent increased weakness and lethargy spending a few days bed bound    Wound Base:     1. Wound is oval open at distal end. Epidermis intact on upper half of wound, deep purple, non blanchable, dry. Distal end of wound is about 1cm exposed dermis that is beefy red and draining    2. Right gluteal cleft deep purple/maroon, epidermis intact, non blanchable, dry    Specific Dimensions (length x width x depth, in cm) :   1. 4.5 x 3.5 x 0.4cm             2. 3 x 1.5 x 0cm    Tunneling:  N/A    Undermining: N/A    Palpation of the wound bed:  boggy    Slough appearance:  none    Eschar appearance:  none    Periwound Skin: non blanchable red erythema 0.5cm,      Temperature  normal     Drainage:  serosanguinous         Odor: none    Pain:  absent          Intervention:     Patient's chart evaluated.      Willis Interventions:  Current Willis Interventions and Care Plan reviewed and updated, appropriate at this time.    Wound care to coccyx/R glut cleft supplies gathered, cleaned with microklenz, applied sacral mepilex    Orders  Reviewed and written    Supplies  Reviewed    Discussed plan of care with Nurse           Assessment:     Pressure Injury (PI) located on coccyx/ R gluteal cleft deep tissue injury    Status: wound  initial assessment    Pt has been spending more time in bed with minimal positioning and developed deep tissue injury present on admission.     Wound has no acute signs or symptoms of infection.          Plan:     Nursing to notify the Provider(s) and re-consult the [facility] Nurse if wound(s) deteriorate(s)or if the wound care plan needs reevaluation.    If pt is  "refusing to turn or reposition they must be educated on the  potential injury from not off loading pressure.  Then this \"educated refusal\" needs to be documented as an \"educated refusal to turn/ reposition\" and document if alert, etc.  PIP ACTIVITY MEASURES:  CHAIR: Pt should sit on a chair cushion ([id number]) when up to the chair and not sit for more than one hour at a time before fully offloading backside (either stand for a couple of minutes and/or return to bed, positioning on a side) to relieve pressure and re-perfuse tissue.  Additionally, encourage pt to shift side to side every 15 minutes, too.    NOTE: the Z-Flow Pad is NOT a cushion, pt should NOT sit on the Z-Flow pads    BED:  Reposition side to side every 1-2 hours when awake.   Consider Z-Norman Pillows to help keep pt on side (#[id number] or #[id number]- large). *Z-Flows are for positioning, DO NOT SIT ON!  Keep heels elevated  As able keep HOB below 30 degrees  Low air loss specialty mattress ordered  NOTE: If pt is refusing to turn or reposition they must be educated on the potential injury from not offloading pressure.  Then this \"educated refusal\" needs to be documented as an \"educated refusal to turn/ reposition\" and document if alert, etc.  Additionally please notify MD and charge nurse of refusal(s).  Plan for wound care to wound located on coccyx/Right Gluteal Cleft:  1. Clean wound with MicroKlenz Spray, pat dry  2. Paint with No Sting Skin Prep (#[id number]) and allow to dry thoroughly  3. Press a Mepilex  Sacral Dressing (#[id number])  to the area, making sure to conform nicely to skin curvatures  4. Time and date dressing change and clarisse with a \"T\" for treatment of a wound  5. Reposition pt every 1 to 2 hours when in bed and hourly when up to the chair to relieve pressure and promote perfusion to tissue  NOTE** make sure to continue to assess under the Mepilex Dressing BID and document findings.    WOC Nurse will return: Weekly and PRN  Face to face time: " 20 Minutes    Reviewed by Donta Masterson CWOCN

## 2017-05-17 NOTE — PROGRESS NOTES
#[id number] dictated H&P,  Admitted with acute encephalopathy associated with LYLA with hyperkalemia and non-anion gap acidosis.

## 2017-05-17 NOTE — PROGRESS NOTES
"   05/17/17 1244   General Information   Onset Date 05/16/17   Start of Care Date 05/17/17   Referring Physician Dr. Schuster   Patient Profile Review/OT: Additional Occupational Profile Info See Profile for full history and prior level of function   Patient/Family Goals Statement Did not state   Swallowing Evaluation Bedside swallow evaluation   Behaviorial Observations Impulsive;Alert   Mode of current nutrition NPO   Respiratory Status O2 Supply   Type of O2 supply Nasal cannula   Comments SLP: This 61 year old female presents with lethargy and decreased intake the last 2 days and was admitted with acute encephalopathy associated with LYLA. Per MD, past medical history includes: \"intellectual disability, schizoaffective disorder, chronic kidney disease, recent admissions for acute encephalopathy associated with infection who most recently was admitted on 03/30/2017-04/04/2017 for acute metabolic encephalopathy associated with acute kidney injury, metabolic acidosis and suspected healthcare-associated pneumonia. Prior to that, she was admitted in March for acute encephalopathy associated with a UTI as well as acute kidney injury. She is usually just oriented to 1 and to possibly place, per my discussion with TCU.\" Pt received previous ST during recent hospitalization recommending dysphagia diet level 3 and thin liquids with supervision.    Clinical Swallow Evaluation   Oral Musculature generally intact;unable to assess due to poor participation/comprehension   Structural Abnormalities none present   Dentition present and adequate   Mucosal Quality dry;sticky   Mandibular Strength and Mobility intact   Oral Labial Strength and Mobility impaired pursing;impaired coordination;impaired retraction  (Question abilty to follow directions)   Lingual Strength and Mobility impaired left lateral movement;impaired right lateral movement;impaired anterior elevation  (Question abilty to follow directions)   Velar Elevation (Unable " to visualize)   Buccal Strength and Mobility impaired   Laryngeal Function Cough   Oral Musculature Comments Overall functional with generalized weakness and incoordination, however, unable to complete full exam due to pt participation.     Additional Documentation Yes   Additional evaluation(s) completed today No   Clinical Swallow Eval: Thin Liquid Texture Trial   Mode of Presentation, Thin Liquids straw   Oral Phase of Swallow Premature pharyngeal entry   Pharyngeal Phase of Swallow coughing/choking;reduction in laryngeal movement   Diagnostic Statement Impulsive serial straw sips with immediate coughing noted   Clinical Swallow Eval: Nectar Thick Liquid Texture Trial   Mode of Presentation, Nectar fed by clinician;spoon   Volume of Nectar Presented 1/2 tsp   Oral Phase, Nectar WFL   Pharyngeal Phase, [place] intact   Diagnostic Statement No overt s/sx of aspiration noted with small sips provided by clinician   Clinical Swallow Eval: Puree Solid Texture Trial   Mode of Presentation, Puree fed by clinician;spoon   Volume of Puree Presented 1/2 tsp   Oral Phase, Puree WFL   Pharyngeal Phase, Puree intact   Diagnostic Statement Adequate oral phase with no overt s/sx of aspiration    Clinical Swallow Eval: Semisolid Texture Trial   Mode of Presentation, Semisolid fed by clinician;spoon   Oral Phase, Semisolid Poor AP movement;Residue in oral cavity;Premature pharyngeal entry   Pharyngeal Phase, Semisolid coughing/choking;reduction in laryngeal movement   Diagnostic Statement Oral deficits with overt s/sx of aspiration   Esophageal Phase of Swallow   Patient reports or presents with symptoms of esophageal dysphagia No   Swallow Eval: Clinical Impressions   Skilled Criteria for Therapy Intervention Skilled criteria met.  Treatment indicated.   Functional Assessment Scale (FAS) 3   Treatment Diagnosis Oropharyngeal dysphagia   Diet texture recommendations Dysphagia diet level 1;Nectar thick liquids   Recommended  "Feeding/Eating Techniques alternate between small bites and sips of food/liquid;check mouth frequently for oral residue/pocketing;maintain upright posture during/after eating for 30 mins;no straws;small sips/bites   Therapy Frequency daily   Predicted Duration of Therapy Intervention (days/wks) 1 week   Anticipated Discharge Disposition extended care facility   Risks and Benefits of Treatment have been explained. Yes   Patient, family and/or staff in agreement with Plan of Care Yes   Clinical Impression Comments SLP: Bedside swallow evaluation revealed moderate dysphagia with overt s/sx of aspiration. Pt alert throughout the evaluation, but was minimally responsive other than saying, \"good\" to questions. Pt showed difficulty following commands for oral motor exam. Tremor noted. Generalized oral weakness and coordination. Ice chip trialed with adequate oral phase and no overt s/sx of aspiration. Pt previously able to self-feed per RN report, however, unable currently. Due to positioning, cup sip of water unable to be performed. Straw sip of thin water trialed with impulsive serial gulps noted. Cues provided for small sip and on second trial, pt unable to follow max cues and immediate coughing noted.  Vocal quality remained at baseline. Nectar thick liquids via teaspoon trialed with no overt s/sx of aspiration. Dysphagia diet level 1 trialed with adequate oral phase and no overt s/sx of aspiration. Semi-solid textures (baseline dysphagia diet level 3) trialed with delayed coughing. Overt s/sx of aspiration noted with semi-solid and thin liquids. Recommended: cautiously initiate dysphagia level 1 diet and nectar thick liquids via teaspoon. Supervision and assist with all intake due to impulsive behavior. Feed only when alert. Small bites and sips. Ensure swallow after every bites. Alternate solids and liquids. No straws. Discontinue diet if overt s/sx of aspiration. ST to follow for diet tolerance and advance to " baseline of dysphagia diet level 3 and thin liquids as appropriate. Continued ST following discharge pending pt progress.    Total Evaluation Time   Total Evaluation Time (Minutes) 16

## 2017-05-17 NOTE — PLAN OF CARE
"Problem: Goal Outcome Summary  Goal: Goal Outcome Summary  Outcome: No Change  Alert to self. K+ 5.5 - sodium bicarb infusing.Tele ST, low 100's. [place], sediment output, on IV levaquin. States \"my butt hurts\". WOCN consult placed for wounds on coccyx/bilateral buttock slow to noah injury. Mepilex applied. . NPO pending speech consult. UC/BC pending. 2LPM NC applied, shallow breathing, O2 90's. Continue to monitor.       "

## 2017-05-17 NOTE — PROGRESS NOTES
[facility]    Hospitalist Progress Note    Date of Service (when I saw the patient): 05/17/2017    Assessment & Plan      Nel Farmer is a 61-year-old woman with history of schizoaffective disorder, cognitive impairment usually oriented to just self and place per TCU report, who has had 2 days of lethargy, decreased activity, decreased intake. Lab work up for evaluation at TCU showed potassium of 6.1 with a creatinine of 4 and so was brought to ER 5/16/17. Work up in ER showed possible UTI, LYLA, hyperkalemia. Was then admitted for further management.    1. Acute metabolic encephalopathy likely secondary to severe dehydration: Patient has diabetes insipidus, and also on amiloride as well likely contributing to dehydration and metabolic encephalopathy, though mostly with decreased response frequently at baseline. Also on ativan, risperidone, zyprexa and depakote which could contribute to encephalopathy. UA also suggestive of UTI possibly contributing.  - IV hydration aggressive, changed to D5 given hypernatremia  - Hold PTA ativan, mental status better, PTA depakote, risperidal and zyprexa continued. Will get depakote level as well.  - Treat UTI as below.    2. Severe dehydration with acute kidney injury/hypernatremia and hyperkalemia: Her baseline creatinine is around 2.4 with creatinine on admission of 3.41, K 6.8 at admission. In the ED, she was given 2 liters of fluid, bicarbonate, insulin.  - Poor oral intake, DI and being on amiloride likely causing dehydration and acute kidney injury on top of CKD.    - Nephrology consulted, IVF changed to D5 @150cc/hr  - Monitor intake output, Cr improved to 2.93.  - Sodium upto 151 likely due to bicarbonate fluid, now down to 148 after IV was changed to D5, still hyperkalemic now 5.7, received kayexalate, recheck now.       3. Probable urinary tract infection:  Has  white blood cells and 2-5 WBC casts on a catheter specimen of a UA. She has a  history of Klebsiella urinary tract infection in the past with sensitivity to most antibiotics tested including ceftriaxone and fluoroquinolones. She was given Levaquin 750 mg in the emergency department.  - Continue Levaquin 500 mg every other day, which is renally dosed.  - Urine culture and blood cultures were sent, follow.    4. Hypertension: BPlevated, also tachycardic which could be due to dehydration.   - More alert awake, and so PTA PO anti HTN meds including amlodipine, clonidine, isosorbide, mononitrate, metoprolol continued.  - PRN hydralazine/metoprolol IV as well ordered.  - Hold amiloride and Lasix     5. Diabetes mellitus type 2 on insulin: On glipizide and 70/30 insulin (20 units in am and 34 units in the pm). Diet was resumed, but PO intake is low so continue to hold glipizide.  - Resumed 70/30 insulin at lower dose, 20 units in the evening and 15 units in the morning.  - Moderate insulin resistance ISS   - Moderate CHO diet.    6. Elevated Lipase: Unclear significance. Denies abd pain. No nausea/vomiting noted. CT abd-pelvis (though non contrast) shows atrophic pancreas. Unlikely this is pancreatitis.   - Diet resumed, monitor for any worsening GI symptoms  - Recheck again in am.    7. Diabetes insipidus: Induced by lithium.   - Lithium was discontinued before this hospitalization.  - Large urine output, continue to monitor output and sodiums  - IVF as above    8. Schizoaffective disorder: As above, PTA depakote, risperidal and zyprexa continued  - hold PTA ativan, likely will resume in 1-2 days if mentation continues to improve.    9. Gastroesophageal reflux disease: Continue omeprazole.     10. Code status: Full     DVT Prophylaxis: Heparin SQ    Code Status: Full Code    Disposition: Expected discharge in 2+ days once above electrolyte disorders and renal function improves. Reviewed with VERONA.    Carson Schuster MD  Hospitalist    Interval History   Patient was seen and examined, more awake  during the day, alert and oriented to place and self, denied any pain, nausea or dyspnea but ROS/history is  limited as did not answer all questions.  - Afebrile  - tachycardic to 110s on tele.    -Data reviewed today: I reviewed all new labs and imaging results over the last 24 hours. I personally reviewed the EKG tracing showing sinus tachycardia upper 110s on tele.    Physical Exam   Temp: 98.2  F (36.8  C) Temp src: Axillary BP: 148/76 Pulse: 110 Heart Rate: 112 Resp: 8 SpO2: 96 % O2 Device: None (Room air) Oxygen Delivery: 2 LPM  Vitals:    05/16/17 1833 05/16/17 2300 05/17/17 0527   Weight: 91.2 kg (201 lb) 81.9 kg (180 lb 8 oz) 80.2 kg (176 lb 12.9 oz)     Vital Signs with Ranges  Temp:  [98.2  F (36.8  C)-99.3  F (37.4  C)] 98.2  F (36.8  C)  Pulse:  [110] 110  Heart Rate:  [] 112  Resp:  [8-29] 8  BP: (139-162)/(76-93) 148/76  SpO2:  [94 %-99 %] 96 %  I/O last 3 completed shifts:  In: 800 [I.V.:800]  Out: 1850 [Urine:1850]    GENERAL: Alert awake, oriented to self and place. Comfortable.  NEUROPSYCHIATRIC: alert, able to tell me she is at [place] but not able to answer any further questions o orientation. Stares vacantly. She is moving all 4 extremities without gross focal neurological deficit. She does follow commands inconsistently.   HEENT: PERRLA, EOMI. Her sclerae is clear without icterus or injection. Her oropharynx is quite dry. There is no erythema or swelling.   NECK: Supple without lymphadenopathy or thyromegaly. Her extremities are warm, has trace edema.   HEART: S1S2 regular, tachycardic to 110s in tele, soft systolic murmur.   LUNGS: Clear throughout without wheezes, rhonchi or crackles. On 2 L O2  ABDOMEN: Soft throughout without hepatosplenomegaly or masses.   SKIN: Reveals no acute rashes or ecchymoses or petechiae.     Medications     IV fluid REPLACEMENT ONLY 150 mL/hr at 05/17/17 1150       amLODIPine  5 mg Oral Daily     cloNIDine  0.2 mg Oral BID     divalproex  500 mg Oral QAM      divalproex  1,500 mg Oral At Bedtime     isosorbide mononitrate  30 mg Oral Daily     metoprolol  25 mg Oral Daily     OLANZapine (zyPREXA) tablet 5 mg  5 mg Oral At Bedtime     omeprazole (priLOSEC) CR capsule 20 mg  20 mg Oral BID AC     risperiDONE (risperDAL) tablet 1 mg  1 mg Oral At Bedtime     [START ON 5/18/2017] levofloxacin  500 mg Intravenous Q48H     insulin aspart  1-6 Units Subcutaneous Q4H     heparin  5,000 Units Subcutaneous Q12H       Data     Recent Labs  Lab 05/17/17  0519 05/17/17  0015 05/16/17  2034 05/16/17  1909 05/16/17  1907   WBC 5.5  --   --   --  6.8   HGB 12.0  --   --  12.9 12.6   MCV 96  --   --   --  95   *  --   --   --  143*   *  --   --  141 143   POTASSIUM 5.5* 6.1* 5.5* 6.8* 6.8*   CHLORIDE 122*  --   --   --  117*   CO2 21  --   --   --  15*   BUN 51*  --   --   --  55*   CR 3.10*  --   --   --  3.41*   ANIONGAP 8  --   --   --  11   BENTLEY 12.0*  --   --   --  12.3*   *  --  250*  --  198*   ALBUMIN  --   --   --   --  3.1*   PROTTOTAL  --   --   --   --  7.7   BILITOTAL  --   --   --   --  0.3   ALKPHOS  --   --   --   --  68   ALT  --   --   --   --  14   AST  --   --   --   --  19   LIPASE  --   --   --   --  756*       Recent Results (from the past 24 hour(s))   CT Abdomen Pelvis w/o Contrast    Narrative    CT ABDOMEN AND PELVIS WITHOUT CONTRAST 5/16/2017 8:10 PM     HISTORY: Elevated lipase; diffuse abdominal pain.    COMPARISON: 5/21/2016    TECHNIQUE: Volumetric helical acquisition of CT images from the lung  bases through the symphysis pubis without intravenous contrast.  Radiation dose for this scan was reduced using automated exposure  control, adjustment of the mA and/or kV according to patient size, or  iterative reconstruction technique.    FINDINGS: Pancreas is atrophied, but otherwise unremarkable without  definite surrounding inflammatory change. Heterogeneous appearance of  the liver which is nonspecific. Adrenal glands and spleen  are  unremarkable. A few tiny bilateral nonobstructing renal stones are  noted. No ureteral stones or hydronephrosis. Normal appendix. There  are minimal atherosclerotic changes of the visualized aorta and its  branches. There is no evidence of aortic aneurysm. There are no  dilated loops of small intestine or large bowel to suggest ileus or  obstruction. No free air or free fluid.       Impression    IMPRESSION:  1. The pancreas is atrophied, but otherwise unremarkable in appearance  without evidence of acute pancreatitis. Mild acute pancreatitis can be  occult on CT.  2. Heterogeneous appearance of the liver, possibly related to streak  artifact.  3. A few tiny nonobstructing renal stones bilaterally.    JONA KAM MD   XR Chest 2 Views    Narrative    CHEST TWO VIEWS  5/16/2017 8:31 PM     COMPARISON: Frontal chest x-ray 3/30/2017    HISTORY: Fever, tachycardia    FINDINGS: The cardiac silhouette, pulmonary vasculature, lungs and  pleural spaces are within normal limits.      Impression    IMPRESSION: Clear lungs.    NARCISA VALDEZ MD

## 2017-05-17 NOTE — ED NOTES
"[facility]  ED Nurse Handoff Report    ED Chief complaint: Abnormal Labs (hyperkalemia & elevated creat.  AMS from baseline known to us here)      ED Diagnosis:   Final diagnoses:   Acidosis   Urinary tract infection without hematuria, site unspecified   LYLA (acute kidney injury) (H)   Hyperkalemia   Encephalopathy   Elevated lipase       Code Status: Full Code    Allergies:   Allergies   Allergen Reactions     Amoxicillin      Amoxicillin      Haldol [Benzyl Alcohol]      Haldol [Haloperidol]      Pcn [Penicillin G]      Penicillins      Seroquel [Quetiapine] GI Disturbance       Activity level - Baseline/Home:  Total Care    Activity Level - Current:   Total Care     Needed?: No    Isolation: No  Infection: Not Applicable    Bariatric?: No    Vital Signs:   Vitals:    05/16/17 1833 05/16/17 1845 05/16/17 1900 05/16/17 1930   BP: 162/82 148/86 139/76 143/81   Pulse: 110      Resp: 16 9 11 9   Temp: 99.3  F (37.4  C)      TempSrc: Oral      SpO2: 96% 96% 94% 96%   Weight: 91.2 kg (201 lb)      Height: 1.626 m (5' 4\")          Cardiac Rhythm: ,   Cardiac  Cardiac Rhythm: Sinus tachycardia    Pain level:      Is this patient confused?: Yes- pt is not agitated nor climbing out of bed but does have a history of yelling out.  Pt orientated to self only  Patient Report: Initial Complaint: pt coming from TCU facility with abnormal labs. Pt has CKD with K+ today of 6.8. Pt comes in with lethargy as well.  Cherry placed here.  Pt family updated.    Focused Assessment: K+ 6.8 here,   Tests Performed: UA, CXR, CT, labs  Abnormal Results: K+, UTI,   Treatments provided: IV, labs, meds, cherry    Family Comments: family updated    OBS brochure/video discussed/provided to patient: N/A    ED Medications:   Medications   dextrose 10% infusion ( Intravenous Hold 5/16/17 2044)   levofloxacin (LEVAQUIN) infusion 750 mg (750 mg Intravenous New Bag 5/16/17 2043)   0.9% sodium chloride BOLUS (500 mLs Intravenous " New Bag 5/16/17 2043)   NaCl 0.45 % 1,000 mL with sodium bicarbonate 75 mEq/L infusion (not administered)   calcium gluconate 1 g in D5W 100 mL intermittent infusion (0 g Intravenous Stopped 5/16/17 2040)   dextrose 50 % injection 25 g (25 g Intravenous Given 5/16/17 1927)   insulin (regular) (HumuLIN R/NovoLIN R) injection 9 Units (9 Units Intravenous Given 5/16/17 1948)   sodium bicarbonate 8.4 % injection 50 mEq (50 mEq Intravenous Given 5/16/17 1922)       Drips infusing?:  Yes-levaquin, bicarb gtt.      ED NURSE PHONE NUMBER: [phone]

## 2017-05-18 NOTE — PLAN OF CARE
Problem: Goal Outcome Summary  Goal: Goal Outcome Summary  Pt alert, disoriented to time and situation. Repositioned q2h. K+ remains elevated. Gave kayexalate per orders. D5 @ 150/hr. Pureed foods with nectar thick liquids. Able to give AM doses of metoprolol and amlodipine as pt was more alert this evening. BMP ordered by MD to recheck K+. Notify oncall if still elevated. Continue to monitor.

## 2017-05-18 NOTE — PLAN OF CARE
Problem: Goal Outcome Summary  Goal: Goal Outcome Summary  Outcome: No Change  Turned and repositioned every 2 hours. Cont. On IV fluids D5W 15O an hour. Fed her diet. Constantly asking for H2O. Belligerent at times

## 2017-05-18 NOTE — PLAN OF CARE
Problem: Goal Outcome Summary  Goal: Goal Outcome Summary  Outcome: Improving  VSS, pt alert but confused,( does answer some questions appropriately). Good urine output, slept on/off during the night, continue to monitor

## 2017-05-18 NOTE — PLAN OF CARE
Problem: Goal Outcome Summary  Goal: Goal Outcome Summary  SLP: Patient was seen for swallow treatment bedside leaning to the left. She was given trials of ice chips and tolerated with a delay but no overt Sx of aspiration. She demonstrated premature entry and delayed swallow response and immediate overt Sx of aspiration via the straw and cup. She was able to tolerate a small amount of nectar thick liquids best by spoon and pudding. Slight vocal changes noted so suspect pharyngeal residue. Patient continues to be a risk for aspiration given her cognition, distractablity and delayed swallow response. Recommend: 1. Continue on the Dysphagia Diet level 1 with nectar thick liquids. 2. Upright, liquids by spoon, small bites, alternate liquids solids and verify each swallow. 3. Discharge back to TCU when stable.

## 2017-05-18 NOTE — PROGRESS NOTES
Renal Medicine Progress Note            Assessment/Plan:     61 y.o woman with schizoaffective disorder and CKD IV from lithium toxicity, admitted for recurrent altered mental status, consulted for A/CKD and hyperkalemia.      1. CKD IV, baseline Scr ~ 2.5 mg/dl. Previous episodes of LYLA. Scr was 2.38 mg/dl when she was discharged last month. She has NDI from lithium.      2. Acute kidney injury from dehydration. Scr has improved with IVF.      3. Hypernatremia. She is prone to hypernatremia from NDI and lack of access to free water   -IVF to d5W      4. Altered mental status. Improving     5. Hyperkalemia   -improved     6. IDDM   -monitor glucose level closely while on D5W     7. Schizoaffective disorder.    Plan.   1. Cont IVF. I asked RN to give her more free water. I w          Interval History:     Pt is more with it today.           Medications and Allergies:       sodium chloride (PF)  3 mL Intracatheter Q8H     insulin aspart  1-7 Units Subcutaneous TID AC     insulin aspart  1-5 Units Subcutaneous At Bedtime     insulin isophane & regular  15 Units Subcutaneous QAM     insulin isophane & regular  20 Units Subcutaneous Daily with supper     amLODIPine  5 mg Oral Daily     cloNIDine  0.2 mg Oral BID     divalproex  500 mg Oral QAM     divalproex  1,500 mg Oral At Bedtime     isosorbide mononitrate  30 mg Oral Daily     metoprolol  25 mg Oral Daily     OLANZapine (zyPREXA) tablet 5 mg  5 mg Oral At Bedtime     omeprazole (priLOSEC) CR capsule 20 mg  20 mg Oral BID AC     risperiDONE (risperDAL) tablet 1 mg  1 mg Oral At Bedtime     heparin  5,000 Units Subcutaneous Q12H        Allergies   Allergen Reactions     Amoxicillin      Amoxicillin      Haldol [Benzyl Alcohol]      Haldol [Haloperidol]      Pcn [Penicillin G]      Penicillins      Seroquel [Quetiapine] GI Disturbance            Physical Exam:   Vitals were reviewed  Heart Rate: 106, Blood pressure 124/75, pulse 110, temperature 98  F (36.7  C),  "temperature source Oral, resp. rate 12, height 1.626 m (5' 4\"), weight 81.8 kg (180 lb 5.4 oz), last menstrual period 04/27/2012, SpO2 98 %, not currently breastfeeding.    Wt Readings from Last 3 Encounters:   05/18/17 81.8 kg (180 lb 5.4 oz)   04/02/17 92.4 kg (203 lb 11.3 oz)   03/25/17 102.2 kg (225 lb 5 oz)       Intake/Output Summary (Last 24 hours) at 05/18/17 1530  Last data filed at 05/18/17 1450   Gross per 24 hour   Intake              560 ml   Output             1850 ml   Net            -1290 ml     GENERAL: NAD  CV: RRR, tachy, no murmurs, no clicks, gallops, or rubs, no edema  RESP: Clear bilaterally with good efforts. No wheezes or crackles.  GI: Abdomen obese, soft, NT  MUSCULOSKELETAL: extremities nl - no gross deformities noted  SKIN: no suspicious lesions or rashes, dry to touch  NEURO: She is answering more questions           Data:     CBC RESULTS:     Recent Labs  Lab 05/18/17  0550 05/17/17  0519 05/16/17  1909 05/16/17  1907   WBC 5.8 5.5  --  6.8   RBC 4.13 4.02  --  4.22   HGB 12.2 12.0 12.9 12.6   HCT 40.2 38.6  --  40.1   * 141*  --  143*       Basic Metabolic Panel:    Recent Labs  Lab 05/18/17  0550 05/17/17  2005 05/17/17  1914 05/17/17  1320 05/17/17  0519 05/17/17  0015 05/16/17 2034 05/16/17 1909 05/16/17  1907   * 146* Canceled, Test credited Unsatisfactory specimen - hemolyzedLAB TO REORDER AND REDRAW. PLEASE SEE RESULTS UNDER ACCN [id number]. 148* 151*  --   --  141 143   POTASSIUM 4.2 4.6 Canceled, Test credited Unsatisfactory specimen - hemolyzedLAB TO REORDER AND REDRAW. PLEASE SEE RESULTS UNDER ACCN [id number]. 5.7* 5.5* 6.1* 5.5* 6.8* 6.8*   CHLORIDE 116* 117* Canceled, Test credited Unsatisfactory specimen - hemolyzedLAB TO REORDER AND REDRAW. PLEASE SEE RESULTS UNDER ACCN [id number]. 119* 122*  --   --   --  117*   CO2 22 21 Canceled, Test credited Unsatisfactory specimen - hemolyzedLAB TO REORDER AND REDRAW. PLEASE SEE RESULTS UNDER ACCN [id number]. 23 21  --   --   --  " 15*   BUN 43* 47* Canceled, Test credited Unsatisfactory specimen - hemolyzedLAB TO REORDER AND REDRAW. PLEASE SEE RESULTS UNDER ACCN [id number]. 49* 51*  --   --   --  55*   CR 2.77* 2.85* Canceled, Test credited Unsatisfactory specimen - hemolyzedLAB TO REORDER AND REDRAW. PLEASE SEE RESULTS UNDER ACCN [id number]. 2.93* 3.10*  --   --   --  3.41*   * 414* Canceled, Test credited Unsatisfactory specimen - hemolyzedLAB TO REORDER AND REDRAW. PLEASE SEE RESULTS UNDER ACCN [id number]. 305* 140*  --  250*  --  198*   BENTLEY 11.5* 12.1* Canceled, Test credited Unsatisfactory specimen - hemolyzedLAB TO REORDER AND REDRAW. PLEASE SEE RESULTS UNDER ACCN [id number]. 12.5* 12.0*  --   --   --  12.3*       INRNo lab results found in last 7 days.   Attestation:   I have reviewed today's relevant vital signs, notes, medications, labs and imaging.    Mario Abrams MD  [facility] Nephrology  Office phone :[phone]  Pager: [phone]

## 2017-05-18 NOTE — PROGRESS NOTES
Care Transition Initial Assessment - SW     Met with: Patient    Active Problems:    Acute renal failure (ARF) (H)         DATA  Lives With: facility resident  Living Arrangements: extended care facility ([facility])  Description of Support System: Supportive, Involved  Who is your support system?: Parent(s)  Support Assessment: Adequate family and caregiver support.   Identified issues/concerns regarding health management      Quality Of Family Relationships: supportive, involved     Per social service protocol for discharge planning.  Patient was admitted on 5-16-17 with acute renal failure.  The tentative date of discharge is 5-20-17 or 5-21-17.  Reviewed chart.  Patient was admitted from [facility].  Call placed to check the bed hold status.  Patient has a 18 day bed hold and can return there on discharge.  Patient will likely need transport arranged when discharge is known.  Patient does not need a PAS as she was admitted from there.      ASSESSMENT  Cognitive Status:  awake  Concerns to be addressed: discharge planning.     PLAN  Financial costs for the patient includes N/A.  Patient given options and choices for discharge N/A.  Patient/family is agreeable to the plan?  Yes  Patient Goals and Preferences: Return to [facility] on discharge.  Patient anticipates discharging to:  [facility].    Will continue to follow and assist with a safe discharge plan.      GILMA Lopez, [facility]  [phone]

## 2017-05-18 NOTE — PROGRESS NOTES
[facility]  Hospitalist Progress Note    Assessment & Plan   Nel Farmer is a 61-year-old woman with history of schizoaffective disorder, cognitive impairment usually oriented to just self and place per TCU report, who has had 2 days of lethargy, decreased activity, decreased intake. Lab work up for evaluation at TCU showed potassium of 6.1 with a creatinine of 4 and so was brought to ER 5/16/17. Work up in ER showed possible UTI, LYLA, hyperkalemia.    Acute metabolic encephalopathy likely secondary to severe dehydration  Patient has diabetes insipidus, and also on amiloride, likely contributing to dehydration and metabolic encephalopathy, though mostly with decreased response frequently at baseline. Also on ativan, risperidone, zyprexa and depakote which could contribute to encephalopathy. UA also suggestive of UTI possibly contributing. Depakote level normal.  - continue aggressive IV hydration with D5 given hypernatremia  - Holding PTA ativan until mental status better,   -PTA depakote, risperidal and zyprexa continued.   - Treat UTI as below.     Severe dehydration with acute kidney injury/hypernatremia and hyperkalemia  Her baseline creatinine is around 2.4 with creatinine on admission of 3.41, K 6.8 at admission. In the ED, she was given 2 liters of fluid, bicarbonate, insulin.  - Poor oral intake, DI and being on amiloride likely causing dehydration and acute kidney injury on top of CKD.   - Nephrology consulted, IVF D5 @150cc/hr  - Monitor intake output, Cr improved to 2.77.  - Sodium now 147  -potassium normalized on 5/18 at 4.2     Probable urinary tract infection  Has  white blood cells and 2-5 WBC casts on a catheter specimen of a UA. She has a history of Klebsiella urinary tract infection in the past with sensitivity to most antibiotics tested including ceftriaxone and fluoroquinolones. She was given Levaquin 750 mg in the emergency department. >100k yeast. Blood cultures  "NGTD.  - d/c levofloxacin  - Urine culture and blood cultures were sent, follow.     Hypertension  BP elevated, also tachycardic which could be due to dehydration.   - More alert awake, and so PTA PO anti HTN meds including amlodipine, clonidine, isosorbide, mononitrate, metoprolol continued.  - PRN hydralazine/metoprolol IV as well ordered.  - Holding amiloride and Lasix      Diabetes mellitus type 2 on insulin  On glipizide and 70/30 insulin (20 units in am and 34 units in the pm). Diet was resumed, but PO intake is low so continue to hold glipizide.  - Resumed 70/30 insulin at lower dose, 20 units in the evening and 15 units in the morning.  - Moderate insulin resistance ISS   - Moderate CHO diet.     Elevated Lipase  Unclear significance. Denies abd pain. No nausea/vomiting noted. CT abd-pelvis (though non contrast) shows atrophic pancreas. Unlikely this is pancreatitis.   - Diet resumed, monitor for any worsening GI symptoms  - Lipase slighty elevated. Follow symptomatically.      Diabetes insipidus  Induced by lithium.   - Lithium was discontinued before this hospitalization.  - Large urine output, continue to monitor output and sodiums  - IVF as above     Schizoaffective disorder  PTA depakote, risperidal and zyprexa continued  - hold PTA ativan, likely will resume tomorrow if mentation continues to improve.     Gastroesophageal reflux disease  -Continue omeprazole      Active Diet Order      Combination Diet 2 gm K Diet; Dysphagia Diet Level 1: Pureed; Nectar Thickened Liquids (pre-thickened or use instant food thickener) (via teaspoon)    DVT prophylaxis: Heparin SQ  Code Status: Full Code    Disposition: Expected discharge in 2-3 days if mental status improves to baseline. Bed hold at U. Entered transfer orders out of [facility] to med/surg floor.     Isabel Ojeda MD  Text Page  ~~~~~~~~~~~~~~~~~~~~~~~~~~~~~~~~~~~~~~~~~~~~~~~  Interval History   Responding to voice. \"I feel so bad.\" Endorses pain of back " and buttocks. No chest pain or SOB.     -Data reviewed today: I reviewed all new labs and imaging results over the last 24 hours. I personally reviewed the EKG tracing showing NSR. To sinus tachycardia.     Physical Exam   Temp: 97.7  F (36.5  C) Temp src: Oral BP: (!) 177/96   Heart Rate: 106 Resp: 12 SpO2: 100 % O2 Device: None (Room air)    Vitals:    05/16/17 2300 05/17/17 0527 05/18/17 0600   Weight: 81.9 kg (180 lb 8 oz) 80.2 kg (176 lb 12.9 oz) 81.8 kg (180 lb 5.4 oz)     Vital Signs with Ranges  Temp:  [97.7  F (36.5  C)-98.7  F (37.1  C)] 97.7  F (36.5  C)  Heart Rate:  [106-116] 106  Resp:  [8-18] 12  BP: (104-177)/() 177/96  SpO2:  [95 %-100 %] 100 %  I/O last 3 completed shifts:  In: 120 [P.O.:120]  Out: 2800 [Urine:2800]    Constitutional: Reclined in bed, lethargic, NAD  HEENT: facial hair present, mmm  Respiratory: Lungs CTAB, no wheezes or crackles  Cardiovascular: RRR, no murmurs  no LE edema  GI: obese, soft, non-tender, nondistended  Skin/Integument: warm, dry, no acute rashes  Musc: RIDER, diffusely weak  Neuro: oriented to self, no hallucinations, no tremor  Psych: calm/blunted affect, no obvious thought disorder, not anxious    Medications     IV fluid REPLACEMENT ONLY 150 mL/hr at 05/17/17 2238       insulin aspart  1-7 Units Subcutaneous TID AC     insulin aspart  1-5 Units Subcutaneous At Bedtime     insulin isophane & regular  15 Units Subcutaneous QAM     insulin isophane & regular  20 Units Subcutaneous Daily with supper     amLODIPine  5 mg Oral Daily     cloNIDine  0.2 mg Oral BID     divalproex  500 mg Oral QAM     divalproex  1,500 mg Oral At Bedtime     isosorbide mononitrate  30 mg Oral Daily     metoprolol  25 mg Oral Daily     OLANZapine (zyPREXA) tablet 5 mg  5 mg Oral At Bedtime     omeprazole (priLOSEC) CR capsule 20 mg  20 mg Oral BID AC     risperiDONE (risperDAL) tablet 1 mg  1 mg Oral At Bedtime     levofloxacin  500 mg Intravenous Q48H     heparin  5,000 Units  Subcutaneous Q12H       Data     Recent Labs  Lab 05/18/17  0550 05/17/17 2005 05/17/17  1914 05/17/17  1320 05/17/17  0519  05/16/17  1909 05/16/17  1907   WBC 5.8  --   --   --  5.5  --   --  6.8   HGB 12.2  --   --   --  12.0  --  12.9 12.6   MCV 97  --   --   --  96  --   --  95   *  --   --   --  141*  --   --  143*   * 146* Canceled, Test credited Unsatisfactory specimen - hemolyzedLAB TO REORDER AND REDRAW. PLEASE SEE RESULTS UNDER ACCN [id number]. 148* 151*  --  141 143   POTASSIUM 4.2 4.6 Canceled, Test credited Unsatisfactory specimen - hemolyzedLAB TO REORDER AND REDRAW. PLEASE SEE RESULTS UNDER ACCN [id number]. 5.7* 5.5*  < > 6.8* 6.8*   CHLORIDE 116* 117* Canceled, Test credited Unsatisfactory specimen - hemolyzedLAB TO REORDER AND REDRAW. PLEASE SEE RESULTS UNDER ACCN [id number]. 119* 122*  --   --  117*   CO2 22 21 Canceled, Test credited Unsatisfactory specimen - hemolyzedLAB TO REORDER AND REDRAW. PLEASE SEE RESULTS UNDER ACCN [id number]. 23 21  --   --  15*   BUN 43* 47* Canceled, Test credited Unsatisfactory specimen - hemolyzedLAB TO REORDER AND REDRAW. PLEASE SEE RESULTS UNDER ACCN [id number]. 49* 51*  --   --  55*   CR 2.77* 2.85* Canceled, Test credited Unsatisfactory specimen - hemolyzedLAB TO REORDER AND REDRAW. PLEASE SEE RESULTS UNDER ACCN [id number]. 2.93* 3.10*  --   --  3.41*   ANIONGAP 9 8 Canceled, Test credited Unsatisfactory specimen - hemolyzedLAB TO REORDER AND REDRAW. PLEASE SEE RESULTS UNDER ACCN [id number]. 6 8  --   --  11   BENTLEY 11.5* 12.1* Canceled, Test credited Unsatisfactory specimen - hemolyzedLAB TO REORDER AND REDRAW. PLEASE SEE RESULTS UNDER ACCN [id number]. 12.5* 12.0*  --   --  12.3*   * 414* Canceled, Test credited Unsatisfactory specimen - hemolyzedLAB TO REORDER AND REDRAW. PLEASE SEE RESULTS UNDER ACCN [id number]. 305* 140*  < >  --  198*   ALBUMIN  --  2.7* Canceled, Test credited Unsatisfactory specimen - hemolyzedLAB TO REORDER AND REDRAW. PLEASE SEE RESULTS UNDER ACCN  [id number]. 2.8*  --   --   --  3.1*   PROTTOTAL  --   --   --   --   --   --   --  7.7   BILITOTAL  --   --   --   --   --   --   --  0.3   ALKPHOS  --   --   --   --   --   --   --  68   ALT  --   --   --   --   --   --   --  14   AST  --   --   --   --   --   --   --  19   LIPASE 1367*  --   --  1497*  --   --   --  756*   < > = values in this interval not displayed.    Imaging:  No results found for this or any previous visit (from the past 24 hour(s)).

## 2017-05-19 NOTE — PROGRESS NOTES
hospitalist cross cover note:    Difficulty swallowing depakote pills as they are big.  Reviewed with pharmacist, changed to depakote sprinkles.    Carson Schuster MD  Hospitalist

## 2017-05-19 NOTE — PROGRESS NOTES
Renal Medicine Progress Note            Assessment/Plan:     61 y.o woman with schizoaffective disorder and CKD IV from lithium toxicity, admitted for recurrent altered mental status, consulted for A/CKD and hyperkalemia.       1. CKD IV, baseline Scr ~ 2.5 mg/dl. Previous episodes of LYLA. Scr was 2.38 mg/dl when she was discharged last month. She has NDI from lithium.       2. Acute kidney injury from dehydration. Scr has improved with IVF.       3. Hypernatremia. She is prone to hypernatremia from NDI and lack of access to free water   -IVF to d5W       4. Altered mental status. Unable to be awakened.      5. Hyperkalemia. Resolved.      6. IDDM   -monitor glucose level closely while on D5W      7. Schizoaffective disorder.    8. Elevated lipase?     Plan  1. Check renal panel.         Interval History:     Pt is sleeping right now. She is unable to be awakened. Per RN, she did not open her eyes, but she responded to verbal commands, and she was fed breakfast.           Medications and Allergies:       insulin isophane & regular  20 Units Subcutaneous QAM     insulin isophane & regular  34 Units Subcutaneous Daily with supper     sodium chloride (PF)  3 mL Intracatheter Q8H     divalproex  1,500 mg Oral At Bedtime     divalproex  500 mg Oral QAM     insulin aspart  1-7 Units Subcutaneous TID AC     insulin aspart  1-5 Units Subcutaneous At Bedtime     amLODIPine  5 mg Oral Daily     cloNIDine  0.2 mg Oral BID     isosorbide mononitrate  30 mg Oral Daily     metoprolol  25 mg Oral Daily     OLANZapine (zyPREXA) tablet 5 mg  5 mg Oral At Bedtime     omeprazole (priLOSEC) CR capsule 20 mg  20 mg Oral BID AC     risperiDONE (risperDAL) tablet 1 mg  1 mg Oral At Bedtime     heparin  5,000 Units Subcutaneous Q12H        Allergies   Allergen Reactions     Amoxicillin      Amoxicillin      Haldol [Benzyl Alcohol]      Haldol [Haloperidol]      Pcn [Penicillin G]      Penicillins      Seroquel [Quetiapine] GI Disturbance  "           Physical Exam:   Vitals were reviewed  Heart Rate: 72, Blood pressure 134/59, pulse 110, temperature 97.9  F (36.6  C), temperature source Oral, resp. rate 16, height 1.626 m (5' 4\"), weight 81.8 kg (180 lb 5.4 oz), last menstrual period 04/27/2012, SpO2 96 %, not currently breastfeeding.    Wt Readings from Last 3 Encounters:   05/18/17 81.8 kg (180 lb 5.4 oz)   04/02/17 92.4 kg (203 lb 11.3 oz)   03/25/17 102.2 kg (225 lb 5 oz)       Intake/Output Summary (Last 24 hours) at 05/19/17 1201  Last data filed at 05/19/17 0550   Gross per 24 hour   Intake                0 ml   Output             1700 ml   Net            -1700 ml     Gen: Unable to be awakened  CV: RRR  Pulm: Ctab  Abd: obese, soft, NT  Neuro: unable to be awakened, grimaced   Ext: no edema  +cherry           Data:     CBC RESULTS:     Recent Labs  Lab 05/19/17  0700 05/18/17  0550 05/17/17  0519 05/16/17 1909 05/16/17 1907   WBC  --  5.8 5.5  --  6.8   RBC  --  4.13 4.02  --  4.22   HGB  --  12.2 12.0 12.9 12.6   HCT  --  40.2 38.6  --  40.1   PLT 93* 126* 141*  --  143*       Basic Metabolic Panel:    Recent Labs  Lab 05/18/17  0550 05/17/17 2005 05/17/17  1914 05/17/17  1320 05/17/17  0519 05/17/17  0015 05/16/17 2034 05/16/17 1909 05/16/17 1907   * 146* Canceled, Test credited Unsatisfactory specimen - hemolyzedLAB TO REORDER AND REDRAW. PLEASE SEE RESULTS UNDER ACCN [id number]. 148* 151*  --   --  141 143   POTASSIUM 4.2 4.6 Canceled, Test credited Unsatisfactory specimen - hemolyzedLAB TO REORDER AND REDRAW. PLEASE SEE RESULTS UNDER ACCN [id number]. 5.7* 5.5* 6.1* 5.5* 6.8* 6.8*   CHLORIDE 116* 117* Canceled, Test credited Unsatisfactory specimen - hemolyzedLAB TO REORDER AND REDRAW. PLEASE SEE RESULTS UNDER ACCN [id number]. 119* 122*  --   --   --  117*   CO2 22 21 Canceled, Test credited Unsatisfactory specimen - hemolyzedLAB TO REORDER AND REDRAW. PLEASE SEE RESULTS UNDER ACCN [id number]. 23 21  --   --   --  15*   BUN 43* 47* Canceled, " Test credited Unsatisfactory specimen - hemolyzedLAB TO REORDER AND REDRAW. PLEASE SEE RESULTS UNDER ACCN [id number]. 49* 51*  --   --   --  55*   CR 2.77* 2.85* Canceled, Test credited Unsatisfactory specimen - hemolyzedLAB TO REORDER AND REDRAW. PLEASE SEE RESULTS UNDER ACCN [id number]. 2.93* 3.10*  --   --   --  3.41*   * 414* Canceled, Test credited Unsatisfactory specimen - hemolyzedLAB TO REORDER AND REDRAW. PLEASE SEE RESULTS UNDER ACCN [id number]. 305* 140*  --  250*  --  198*   BENTLEY 11.5* 12.1* Canceled, Test credited Unsatisfactory specimen - hemolyzedLAB TO REORDER AND REDRAW. PLEASE SEE RESULTS UNDER ACCN [id number]. 12.5* 12.0*  --   --   --  12.3*       INRNo lab results found in last 7 days.   Attestation:   I have reviewed today's relevant vital signs, notes, medications, labs and imaging.    Mario Abrams MD  [facility] - Nephrology  Office phone :[phone]  Pager: [phone]

## 2017-05-19 NOTE — PLAN OF CARE
Problem: Goal Outcome Summary  Goal: Goal Outcome Summary  Outcome: No Change  Pt Alert to self only, disoriented to place, situation, time. Lethargic, able to sternal rub awake for brief moment. Ax2 with lift. BP drop to 88/40 (MD aware), other VSS. Wound on coccyx, dressing changed. Bruises on R knee and L arm. 2g NA/ DD1 pureed w/ nectar thick liquids, total feed, poor appetite. Dunlap patent w/ straw colored urine. Plan: continue to monitor.

## 2017-05-19 NOTE — PROGRESS NOTES
MD Notification    Notified Person:  MD    Notified Persons Name: Dr. Schuster     Notification Date/Time: 5/18/17 2230    Notification Interaction:  Talked with Physician    Purpose of Notification: Pt unable to swallow Depakote pills whole. Pills cannot be split or crushed.    Orders Received: MD will change order to Depakote sprinkle capsules.

## 2017-05-19 NOTE — PLAN OF CARE
Problem: Goal Outcome Summary  Goal: Goal Outcome Summary  Outcome: No Change   Pt transferred from heart center at 1800. VSS. Orientated to self and place, speech illogical at times. Ax2 w/ lift, total cares. 2G Na/DD1 w/ nectar thick liq diet,  at bedtime, meds crushed in applesauce. IVF infusing. Dc back to LTC in a day or two. Will continue to monitor.

## 2017-05-19 NOTE — PROGRESS NOTES
[facility] Nurse Inpatient Adult Pressure Injury (PI) Assessment      Follow up Assessment of PI(s) on pt's:  Coccyx/R Buttock  Data:   Patient History:    per MD note(s): Nel Farmer is a 61-year-old woman with history of intellectual disability, schizoaffective disorder, chronic kidney disease, recent admissions for acute encephalopathy associated with infection who most recently was admitted on 2017-2017 for acute metabolic encephalopathy associated with acute kidney injury, metabolic acidosis and suspected healthcare-associated pneumonia. Prior to that, she was admitted in March for acute encephalopathy associated with a UTI as well as acute kidney injury. She is usually just oriented to 1 and to possibly place, per my discussion with TCU.   Patient is unable to give a history at this time, so all of my history is obtained from the ER physician and discussion with the TCU staff. Apparently, Nel was more lethargic, not interactive, not getting up out of bed, eating less for the last 2 days. Due to this, they got labs today and her creatinine was elevated at 4 with a potassium of 6.1. They therefore sent her to the ED. They did not notice any fevers or chills or shortness of breath or cough. No abdominal pain was noted. No rashes or any other symptoms, just lethargy and decreased interactiveness and decreased appetite.    Current mattress: Nurse Manuela Higgins to order YESSICA pressure relief mattress  Current pressure relieving devices: Foam dressing and Pillows    Moisture Management: Incontinence Protocol, Diaper and Urinary Catheter    Current Diet / Nutrition:          Active Diet Order  NPO Time Specified  Willis Assessment and sub scores:  Willis Score  Av.8  Min: 11  Max: 14     Recent Labs   Lab Test  17   0550  17   1907   17   1515   ALBUMIN   --   2.7*   < >  3.1*   --   3.0*   HGB  12.2   --    < >  12.6   < >  10.9*   INR   --     --    --    --    --   1.19*   WBC  5.8   --    < >  6.8   < >  9.5   A1C   --    --    --   7.0*   < >   --     < > = values in this interval not displayed.         Pressure Injury Assessment (location): Coccyx and Right Gluteal Cleft   Wound History: Pt has recent increased weakness and lethargy spending a few days bed bound    Wound Base:     1. Wound is oval open at distal end. Epidermis intact on upper half of wound, deep purple, non blanchable, dry. Distal end of wound is about 1cm exposed dermis that is beefy red and draining. Right upper corner 0.5cm pink serous blister    2. Right gluteal cleft deep purple, epidermis intact, non blanchable, dry    Specific Dimensions (length x width x depth, in cm) : 1. 4.0 x 3.5 x 0.4cm    2. 5 x 1.5 x 0cm    Tunneling: N/A    Undermining: N/A    Palpation of the wound bed: boggy    Slough appearance: none    Eschar appearance: none    Periwound Skin: intact, blanchable    Temperature normal     Drainage: serosanguinous , scant    Odor: none    Pain: absent      Intervention:     Patient's chart evaluated.     Willis Interventions: Current Willis Interventions and Care Plan reviewed and updated, appropriate at this time.    Wound care to coccyx/R glut cleft supplies gathered, cleaned with microklenz, applied sacral mepilex    Orders Reviewed     Supplies Reviewed left at bedside    Discussed plan of care with Nurse      Assessment:     Pressure Injury (PI) located on coccyx/ R gluteal cleft deep tissue injury     Deep tissue injury present on admission new blister at top right of coccyx deep tissue injury.     Pt now has low air loss mattress in place    Wound has no acute signs or symptoms of infection.      Plan:     Nursing to notify the Provider(s) and re-consult the [facility] Nurse if wound(s) deteriorate(s)or if the wound care plan needs reevaluation.    If pt is refusing to turn or reposition they must be educated on the potential injury from not off loading pressure.  "Then this \"educated refusal\" needs to be documented as an \"educated refusal to turn/ reposition\" and document if alert, etc.    PIP ACTIVITY MEASURES:    CHAIR: Pt should sit on a chair cushion ([id number]) when up to the chair and not sit for more than one hour at a time before fully offloading backside (either stand for a couple of minutes and/or return to bed, positioning on a side) to relieve pressure and re-perfuse tissue. Additionally, encourage pt to shift side to side every 15 minutes, too.     NOTE: the Z-Flow Pad is NOT a cushion, pt should NOT sit on the Z-Flow pads         BED: Reposition side to side every 1-2 hours when awake.     Consider Z-Norman Pillows to help keep pt on side (#[id number]-medium or #[id number]- large). *Z-Flows are for positioning, DO NOT SIT ON!    Keep heels elevated    As able keep HOB below 30 degrees    Low air loss specialty mattress ordered    NOTE: If pt is refusing to turn or reposition they must be educated on the potential injury from not offloading pressure. Then this \"educated refusal\" needs to be documented as an \"educated refusal to turn/ reposition\" and document if alert, etc. Additionally please notify MD and charge nurse of refusal(s).    Plan for wound care to wound located on coccyx/Right Gluteal Cleft:  1. Clean wound with MicroKlenz Spray, pat dry  2. Paint with No Sting Skin Prep (#[id number]) and allow to dry thoroughly  3. Press a Mepilex  Sacral Dressing (#[id number])  to the area, making sure to conform nicely to skin curvatures  4. Time and date dressing change and clarisse with a \"T\" for treatment of a wound  5. Reposition pt every 1 to 2 hours when in bed and hourly when up to the chair to relieve pressure and promote perfusion to tissue  NOTE** make sure to continue to assess under the Mepilex Dressing BID and document findings.     WO Nurse will return: Weekly and PRN  Face to face time: 20 Minutes     Reviewed by Donta Masterson CWOCN       "

## 2017-05-19 NOTE — PLAN OF CARE
Problem: Goal Outcome Summary  Goal: Goal Outcome Summary  Outcome: Therapy, progress toward functional goals as expected  SLP:  Pt seen to f/u for swallowing. Pt continued to have her eyes closed for session w/ minimal interaction.  Pt tolerated current diet textures w/o outward s/sx of aspiration.  Refused attempts at adv diet trials today.  Recommend continue with Dysphagia Diet level 1 with nectar thick liquids. Pt must be upright and alert for all PO, liquids by spoon only, small bites, alternate liquids solids and allow time for pt to swallow.  Discharge back to TCU when stable.

## 2017-05-19 NOTE — PLAN OF CARE
Problem: Goal Outcome Summary  Goal: Goal Outcome Summary  Outcome: No Change  Patient alert to self. Speech illogical at times. Ax2 w/ lift, total cares. 2G Na/DD1 w/ nectar thick liq diet, meds crushed in applesauce. d5  Infusing @ 150/hr. Dunlap patent with adequate output. Turned and repositioned q2hrs. Mepelix on coccyx, c/d/i.  Will continue to monitor.

## 2017-05-19 NOTE — PROVIDER NOTIFICATION
MD Notification    Notified Person:  MD    Notified Persons Name: White    Notification Date/Time: 5/19/17 13:15    Notification Interaction:  Talked with Physician    Purpose of Notification: BP 86/37     Orders Received: Blood cultures

## 2017-05-19 NOTE — PROGRESS NOTES
[facility]  Hospitalist Progress Note    Assessment & Plan   Nel Farmer is a 61-year-old woman with history of schizoaffective disorder, cognitive impairment usually oriented to just self and place per TCU report, who has had 2 days of lethargy, decreased activity, decreased intake. Lab work up for evaluation at TCU showed potassium of 6.1 with a creatinine of 4 and so was brought to ER 5/16/17. Work up in ER showed possible UTI, LYLA, hyperkalemia.    Acute metabolic encephalopathy likely secondary to severe dehydration  Patient has diabetes insipidus, and also on amiloride, likely contributing to dehydration and metabolic encephalopathy, though mostly with decreased response frequently at baseline. Also on ativan, risperidone, zyprexa and depakote which could contribute to encephalopathy. UA also suggestive of UTI possibly contributing. Depakote level normal.  - continue aggressive IV hydration with D5 given hypernatremia. Na 137 today.   - Holding PTA ativan until mental status better   - PTA depakote, risperidal and zyprexa continued.   - check blood cultures today given lethargy & hypotension to eval for infection   - supportive care     Severe dehydration with acute kidney injury/hypernatremia and hyperkalemia  Her baseline creatinine is around 2.4 with creatinine on admission of 3.41, K 6.8 at admission. In the ED, she was given 2 liters of fluid, bicarbonate, insulin.  - Poor oral intake, DI and being on amiloride PTA likely causing dehydration and acute kidney injury on top of CKD.   - Nephrology consulted, IVF D5 @150cc/hr  - Monitor intake output, Cr improved to 2.59.  - Sodium now 137  -potassium normalized (5/19 at 4.2)     Urinary tract infection, yeast  Had  white blood cells and 2-5 WBC casts on a catheter specimen of a UA. She has a history of Klebsiella urinary tract infection in the past with sensitivity to most antibiotics tested including ceftriaxone and  fluoroquinolones. She was given Levaquin 750 mg in the emergency department. >100k yeast. Blood cultures NGTD.  - d/c'd levofloxacin on   - Urine culture and blood cultures were sent, follow.  - repeat blood cultures today      Hypertension  BP elevated, also tachycardic which could be due to dehydration.   - More alert awake, and so PTA PO anti HTN meds including amlodipine, clonidine, isosorbide, mononitrate, metoprolol continued.  - PRN hydralazine/metoprolol IV as well ordered.  - Holding amiloride and Lasix       Diabetes mellitus type 2 on insulin  On glipizide and 70/30 insulin (20 units in am and 34 units in the pm). Diet was resumed, but PO intake is low so continue to hold glipizide. Consistently >300 range despite minimal po intake.   - Increase 70/30 insulin to PTA dosin units in the evening and 20 units in the morning.  - Moderate insulin resistance ISS   - Moderate CHO diet.      Elevated Lipase  Unclear significance. Denies abd pain. No nausea/vomiting noted. CT abd-pelvis (though non contrast) shows atrophic pancreas. Unlikely this is pancreatitis.   - Diet resumed, monitor for any worsening GI symptoms  - Lipase slighty elevated. Follow symptomatically.       Diabetes insipidus  Induced by lithium.   - Lithium was discontinued before this hospitalization.  - Large urine output, continue to monitor output and sodiums  - IVF as above      Schizoaffective disorder  PTA depakote, risperidal and zyprexa continued  - hold PTA ativan, likely will resume tomorrow if mentation continues to improve. Not improved as of 2017.       Gastroesophageal reflux disease  -Continue omeprazole      Active Diet Order      Combination Diet 2 gm K Diet; Dysphagia Diet Level 1: Pureed; Nectar Thickened Liquids (pre-thickened or use instant food thickener) (via teaspoon)    DVT prophylaxis: Heparin SQ  Code Status: Full Code    Disposition: Expected discharge on  or Monday when mental status improves,  electrolytes stable and no longer needs IVF.     Isabel Ojeda MD  Text Page  ~~~~~~~~~~~~~~~~~~~~~~~~~~~~~~~~~~~~~~~~~~~~~~~  Interval History   Lethargic today. Waxing/saning alertness throughout the day. No family present in room.     -Data reviewed today: I reviewed all new labs and imaging results over the last 24 hours. I personally reviewed no images or EKG's today.    Physical Exam   Temp: 97.9  F (36.6  C) Temp src: Oral BP: 134/59   Heart Rate: 72 Resp: 16 SpO2: 96 % O2 Device: None (Room air)    Vitals:    05/16/17 2300 05/17/17 0527 05/18/17 0600   Weight: 81.9 kg (180 lb 8 oz) 80.2 kg (176 lb 12.9 oz) 81.8 kg (180 lb 5.4 oz)     Vital Signs with Ranges  Temp:  [96.8  F (36  C)-98  F (36.7  C)] 97.9  F (36.6  C)  Heart Rate:  [72-86] 72  Resp:  [12-16] 16  BP: (116-138)/(59-99) 134/59  SpO2:  [95 %-100 %] 96 %  I/O last 3 completed shifts:  In: 440 [P.O.:440]  Out: 1700 [Urine:1700]    Constitutional: Lethargic, NAD  Respiratory: Lungs CTAB, no wheezes or crackles  Cardiovascular: RRR, no murmurs  no LE edema  GI: soft, non-tender, nondistended  Skin/Integument: clammy, warm, dry, no acute rashes  Neuro: no clonus or tremors, unable to fully cooperate with exam  Psych:unable to assess     Medications     IV fluid REPLACEMENT ONLY 150 mL/hr at 05/19/17 0652       sodium chloride (PF)  3 mL Intracatheter Q8H     divalproex  1,500 mg Oral At Bedtime     divalproex  500 mg Oral QAM     insulin aspart  1-7 Units Subcutaneous TID AC     insulin aspart  1-5 Units Subcutaneous At Bedtime     insulin isophane & regular  15 Units Subcutaneous QAM     insulin isophane & regular  20 Units Subcutaneous Daily with supper     amLODIPine  5 mg Oral Daily     cloNIDine  0.2 mg Oral BID     isosorbide mononitrate  30 mg Oral Daily     metoprolol  25 mg Oral Daily     OLANZapine (zyPREXA) tablet 5 mg  5 mg Oral At Bedtime     omeprazole (priLOSEC) CR capsule 20 mg  20 mg Oral BID AC     risperiDONE (risperDAL) tablet 1  mg  1 mg Oral At Bedtime     heparin  5,000 Units Subcutaneous Q12H       Data     Recent Labs  Lab 05/19/17  0700 05/18/17  0550 05/17/17 2005 05/17/17  1914 05/17/17  1320 05/17/17  0519  05/16/17  1909 05/16/17  1907   WBC  --  5.8  --   --   --  5.5  --   --  6.8   HGB  --  12.2  --   --   --  12.0  --  12.9 12.6   MCV  --  97  --   --   --  96  --   --  95   PLT 93* 126*  --   --   --  141*  --   --  143*   NA  --  147* 146* Canceled, Test credited Unsatisfactory specimen - hemolyzedLAB TO REORDER AND REDRAW. PLEASE SEE RESULTS UNDER ACCN [id number]. 148* 151*  --  141 143   POTASSIUM  --  4.2 4.6 Canceled, Test credited Unsatisfactory specimen - hemolyzedLAB TO REORDER AND REDRAW. PLEASE SEE RESULTS UNDER ACCN [id number]. 5.7* 5.5*  < > 6.8* 6.8*   CHLORIDE  --  116* 117* Canceled, Test credited Unsatisfactory specimen - hemolyzedLAB TO REORDER AND REDRAW. PLEASE SEE RESULTS UNDER ACCN [id number]. 119* 122*  --   --  117*   CO2  --  22 21 Canceled, Test credited Unsatisfactory specimen - hemolyzedLAB TO REORDER AND REDRAW. PLEASE SEE RESULTS UNDER ACCN [id number]. 23 21  --   --  15*   BUN  --  43* 47* Canceled, Test credited Unsatisfactory specimen - hemolyzedLAB TO REORDER AND REDRAW. PLEASE SEE RESULTS UNDER ACCN [id number]. 49* 51*  --   --  55*   CR  --  2.77* 2.85* Canceled, Test credited Unsatisfactory specimen - hemolyzedLAB TO REORDER AND REDRAW. PLEASE SEE RESULTS UNDER ACCN [id number]. 2.93* 3.10*  --   --  3.41*   ANIONGAP  --  9 8 Canceled, Test credited Unsatisfactory specimen - hemolyzedLAB TO REORDER AND REDRAW. PLEASE SEE RESULTS UNDER ACCN [id number]. 6 8  --   --  11   BENTLEY  --  11.5* 12.1* Canceled, Test credited Unsatisfactory specimen - hemolyzedLAB TO REORDER AND REDRAW. PLEASE SEE RESULTS UNDER ACCN [id number]. 12.5* 12.0*  --   --  12.3*   GLC  --  296* 414* Canceled, Test credited Unsatisfactory specimen - hemolyzedLAB TO REORDER AND REDRAW. PLEASE SEE RESULTS UNDER ACCN [id number]. 305* 140*  < >  --  198*   ALBUMIN   --   --  2.7* Canceled, Test credited Unsatisfactory specimen - hemolyzedLAB TO REORDER AND REDRAW. PLEASE SEE RESULTS UNDER ACCN [id number]. 2.8*  --   --   --  3.1*   PROTTOTAL  --   --   --   --   --   --   --   --  7.7   BILITOTAL  --   --   --   --   --   --   --   --  0.3   ALKPHOS  --   --   --   --   --   --   --   --  68   ALT  --   --   --   --   --   --   --   --  14   AST  --   --   --   --   --   --   --   --  19   LIPASE  --  1367*  --   --  1497*  --   --   --  756*   < > = values in this interval not displayed.    Imaging:  No results found for this or any previous visit (from the past 24 hour(s)).

## 2017-05-20 NOTE — PLAN OF CARE
Problem: Goal Outcome Summary  Goal: Goal Outcome Summary  Outcome: No Change  Pt lethargic , VSS on RA. Repo'd q2. Dressing to coccyx PU changed & site cleaned. Dunlap patent. Total feed, poor jair. Will continue to monitor.     IV went bad approx 1645, IV team tried x6, awaiting anesthesia for placement.

## 2017-05-20 NOTE — PLAN OF CARE
Problem: Goal Outcome Summary  Goal: Goal Outcome Summary  SLP: Pt seen for f/u swallowing treatment. Pt appeared more alert and participated in simple conversation with occasionally opening her eyes. Oral cavity clear. Baseline wet, gurgled cough noted clearing with cough. Pt tolerating nectar thick liquids. Thin liquids via teaspoon targeted with wet cough. Unable to determine if this is baseline cough or related to upgraded trials. Trial ended due to overt s/sx of aspiration and pt fatigue. Recommended continue Dysphagia Diet level 1 with nectar thick liquids. Pt must be upright and alert for all PO, liquids by spoon only, small bites, alternate liquids solids and allow time for pt to swallow. Discharge back to TCU when stable with further ST.

## 2017-05-20 NOTE — PROGRESS NOTES
[facility]  Hospitalist Progress Note    Assessment & Plan   Nel Farmer is a 61-year-old woman with history of schizoaffective disorder, cognitive impairment usually oriented to just self and place per TCU report, who has had 2 days of lethargy, decreased activity, decreased intake. Lab work up for evaluation at TCU showed potassium of 6.1 with a creatinine of 4 and so was brought to ER 5/16/17. Work up in ER showed possible UTI, LYLA, hyperkalemia.    Acute metabolic encephalopathy likely secondary to severe dehydration  Patient has diabetes insipidus, and also on amiloride, likely contributing to dehydration and metabolic encephalopathy, though mostly with decreased response frequently at baseline. Also on ativan, risperidone, zyprexa and depakote which could contribute to encephalopathy. UA also suggestive of UTI possibly contributing but no bacterial growth seen. Depakote level normal.  - D5NS rate reduced to 75 ml/hour  - Holding PTA ativan until mental status better  - Check with TCU tomorrow to determine patient's baseline affect, mentation    - PTA depakote, risperidal and zyprexa continued.   - check blood cultures from 5/19. NGTD.   - supportive care     Severe dehydration with acute kidney injury/hypernatremia and hyperkalemia  Her baseline creatinine is around 2.4 with creatinine on admission of 3.41, K 6.8 at admission. In the ED, she was given 2 liters of fluid, bicarbonate, insulin.  - Poor oral intake, DI and being on amiloride PTA likely causing dehydration and acute kidney injury on top of CKD.   - Nephrology following. IVF D5 reduced to 75 ml/hr  - Monitor intake output, Cr improved to 2.54.  - Sodium now 129 (drop from 136 on 5/19)  -potassium normalized       Urinary tract infection, yeast  Had  white blood cells and 2-5 WBC casts on a catheter specimen of a UA. She has a history of Klebsiella urinary tract infection in the past with sensitivity to most antibiotics  tested including ceftriaxone and fluoroquinolones. She was given Levaquin 750 mg in the emergency department. >100k yeast. Blood cultures NGTD.  - d/c'd levofloxacin on   - Urine culture and blood cultures were sent, follow.  - repeat blood cultures today      Hypertension  BP elevated, also tachycardic which could be due to dehydration.   - More alert awake, and so PTA PO anti HTN meds including amlodipine, clonidine, isosorbide, mononitrate, metoprolol continued.  - PRN hydralazine/metoprolol IV as well ordered.  - Holding amiloride and Lasix       Diabetes mellitus type 2 on insulin  On glipizide and 70/30 insulin (20 units in am and 34 units in the pm). Diet was resumed, but PO intake is low so continue to hold glipizide. Consistently >300 range despite minimal po intake.   - Increased 70/30 insulin to PTA dosin units in the evening and 20 units in the morning.  - Moderate insulin resistance ISS   - Moderate CHO diet.      Elevated Lipase  Unclear significance. Denies abd pain. No nausea/vomiting noted. CT abd-pelvis (though non contrast) shows atrophic pancreas. Unlikely this is pancreatitis.   - Diet resumed, monitor for any worsening GI symptoms  - Lipase slighty elevated. Follow symptomatically.       Diabetes insipidus  Induced by lithium.   - Lithium was discontinued before this hospitalization.  - Large urine output, continue to monitor output and sodiums  - IVF as above      Schizoaffective disorder  PTA depakote, risperidal and zyprexa continued  - hold PTA ativan, likely will resume tomorrow if mentation continues to improve. Not improved as of 2017.       Gastroesophageal reflux disease  -Continue omeprazole    Active Diet Order      Combination Diet 2 gm K Diet; Dysphagia Diet Level 1: Pureed; Nectar Thickened Liquids (pre-thickened or use instant food thickener) (via teaspoon)    DVT prophylaxis: Heparin SQ  Code Status: Full Code    Disposition: Expected discharge return to TCU early  next week. Need to determine what her baseline mental status and functioning are at the care center. May be slower to recover from encephalopathy given schizoaffective disorder.     Isabel Ojeda MD  Text Page  ~~~~~~~~~~~~~~~~~~~~~~~~~~~~~~~~~~~~~~~~~~~~~~~  Interval History   More alert and responsive today. Reacts to pain and discomfort. Keeps eyes closed while eating. ?volitional behavior of ignoring caregivers. Denies pain or nausea.     -Data reviewed today: I reviewed all new labs and imaging results over the last 24 hours. I personally reviewed no images or EKG's today.    Physical Exam   Temp: 97.6  F (36.4  C) Temp src: Axillary BP: 122/53   Heart Rate: 81 Resp: 18 SpO2: 94 % O2 Device: None (Room air)    Vitals:    05/17/17 0527 05/18/17 0600 05/20/17 0552   Weight: 80.2 kg (176 lb 12.9 oz) 81.8 kg (180 lb 5.4 oz) 89.5 kg (197 lb 4.8 oz)     Vital Signs with Ranges  Temp:  [96.7  F (35.9  C)-98.2  F (36.8  C)] 97.6  F (36.4  C)  Heart Rate:  [72-88] 81  Resp:  [16-18] 18  BP: ()/(37-67) 122/53  SpO2:  [94 %-96 %] 94 %  I/O last 3 completed shifts:  In: 9139 [P.O.:580; I.V.:8559]  Out: 2100 [Urine:2100]    Constitutional: Sleepy, NAD, opens eyes to voice  Respiratory: Lungs CTAB, no wheezes or crackles  Cardiovascular: RRR, no murmurs  Trace LE edema  GI: obese, soft, non-tender, nondistended  Skin/Integument: damp, warm, no acute rashes  Musc: RIDER, normal muscle bulk  Neuro: no tremor or myoclonus, doesn't follow commands other than opening eyes.  Psych: difficult to assess, blunted affect      Medications     IV fluid REPLACEMENT ONLY 150 mL/hr at 05/20/17 0222       insulin isophane & regular  20 Units Subcutaneous QAM     insulin isophane & regular  34 Units Subcutaneous Daily with supper     sodium chloride (PF)  3 mL Intracatheter Q8H     divalproex  1,500 mg Oral At Bedtime     divalproex  500 mg Oral QAM     insulin aspart  1-7 Units Subcutaneous TID AC     insulin aspart  1-5 Units  Subcutaneous At Bedtime     amLODIPine  5 mg Oral Daily     cloNIDine  0.2 mg Oral BID     isosorbide mononitrate  30 mg Oral Daily     metoprolol  25 mg Oral Daily     OLANZapine (zyPREXA) tablet 5 mg  5 mg Oral At Bedtime     omeprazole (priLOSEC) CR capsule 20 mg  20 mg Oral BID AC     risperiDONE (risperDAL) tablet 1 mg  1 mg Oral At Bedtime     heparin  5,000 Units Subcutaneous Q12H       Data     Recent Labs  Lab 05/19/17  0700 05/18/17  0550 05/17/17 2005 05/17/17  1320 05/17/17  0519  05/16/17  1909 05/16/17 1907   WBC  --  5.8  --   --   --  5.5  --   --  6.8   HGB  --  12.2  --   --   --  12.0  --  12.9 12.6   MCV  --  97  --   --   --  96  --   --  95   PLT 93* 126*  --   --   --  141*  --   --  143*    147* 146*  < > 148* 151*  --  141 143   POTASSIUM 4.2 4.2 4.6  < > 5.7* 5.5*  < > 6.8* 6.8*   CHLORIDE 106 116* 117*  < > 119* 122*  --   --  117*   CO2 17* 22 21  < > 23 21  --   --  15*   BUN 40* 43* 47*  < > 49* 51*  --   --  55*   CR 2.59* 2.77* 2.85*  < > 2.93* 3.10*  --   --  3.41*   ANIONGAP 13 9 8  < > 6 8  --   --  11   BENTLEY 10.5* 11.5* 12.1*  < > 12.5* 12.0*  --   --  12.3*   * 296* 414*  < > 305* 140*  < >  --  198*   ALBUMIN 2.3*  --  2.7*  < > 2.8*  --   --   --  3.1*   PROTTOTAL  --   --   --   --   --   --   --   --  7.7   BILITOTAL  --   --   --   --   --   --   --   --  0.3   ALKPHOS  --   --   --   --   --   --   --   --  68   ALT  --   --   --   --   --   --   --   --  14   AST  --   --   --   --   --   --   --   --  19   LIPASE  --  1367*  --   --  1497*  --   --   --  756*   < > = values in this interval not displayed.    Imaging:  No results found for this or any previous visit (from the past 24 hour(s)).

## 2017-05-20 NOTE — PROGRESS NOTES
Renal Medicine Progress Note            Assessment/Plan:     61 y.o woman with schizoaffective disorder and CKD IV from lithium toxicity, admitted for recurrent altered mental status, consulted for A/CKD and hyperkalemia.       1. CKD IV, baseline Scr ~ 2.5 mg/dl. Previous episodes of LYLA. Scr was 2.38 mg/dl when she was discharged last month. She has NDI from lithium.       2. Acute kidney injury from dehydration. Resolved.      3. Hypernatremia and now with hyponatremia.      4. Altered mental status. Pt continues to keep eyes close but respond to pain.      5. Hyperkalemia. Resolved.      6. IDDM      7. Schizoaffective disorder.     8. Elevated lipase?      Plan  1. Stop D5 1/2 NS. Per RN, pt will not be able to keep up with oral intake. So will keep 1/2 NS at 75 cc/hr to hopefully avoid recurrent hypernatremia.  2. Sodium bicarbonate bid         Interval History:     Patient is sleeping. She does not respond to verbal command. She easily arouse and yell out to pain. RN fed her breakfast, and pt ate while eyes closed.   Per RN she respond to pain.          Medications and Allergies:       insulin isophane & regular  20 Units Subcutaneous QAM     insulin isophane & regular  34 Units Subcutaneous Daily with supper     sodium chloride (PF)  3 mL Intracatheter Q8H     divalproex  1,500 mg Oral At Bedtime     divalproex  500 mg Oral QAM     insulin aspart  1-7 Units Subcutaneous TID AC     insulin aspart  1-5 Units Subcutaneous At Bedtime     amLODIPine  5 mg Oral Daily     cloNIDine  0.2 mg Oral BID     isosorbide mononitrate  30 mg Oral Daily     metoprolol  25 mg Oral Daily     OLANZapine (zyPREXA) tablet 5 mg  5 mg Oral At Bedtime     omeprazole (priLOSEC) CR capsule 20 mg  20 mg Oral BID AC     risperiDONE (risperDAL) tablet 1 mg  1 mg Oral At Bedtime     heparin  5,000 Units Subcutaneous Q12H        Allergies   Allergen Reactions     Amoxicillin      Amoxicillin      Haldol [Benzyl Alcohol]      Haldol  "[Haloperidol]      Pcn [Penicillin G]      Penicillins      Seroquel [Quetiapine] GI Disturbance            Physical Exam:   Vitals were reviewed  Heart Rate: 89, Blood pressure 122/56, pulse 110, temperature 96.1  F (35.6  C), temperature source Axillary, resp. rate 18, height 1.626 m (5' 4\"), weight 89.5 kg (197 lb 4.8 oz), last menstrual period 04/27/2012, SpO2 96 %, not currently breastfeeding.    Wt Readings from Last 3 Encounters:   05/20/17 89.5 kg (197 lb 4.8 oz)   04/02/17 92.4 kg (203 lb 11.3 oz)   03/25/17 102.2 kg (225 lb 5 oz)       Intake/Output Summary (Last 24 hours) at 05/20/17 1047  Last data filed at 05/20/17 0600   Gross per 24 hour   Intake             9139 ml   Output             2100 ml   Net             7039 ml     Gen: eyes close.   CV: RRR  Pulm: Ctab  Abd: obese, soft, NT  Neuro: responded and yelled out loudly to pain  Ext: no edema  +cherry            Data:     CBC RESULTS:     Recent Labs  Lab 05/19/17  0700 05/18/17  0550 05/17/17  0519 05/16/17  1909 05/16/17  1907   WBC  --  5.8 5.5  --  6.8   RBC  --  4.13 4.02  --  4.22   HGB  --  12.2 12.0 12.9 12.6   HCT  --  40.2 38.6  --  40.1   PLT 93* 126* 141*  --  143*       Basic Metabolic Panel:    Recent Labs  Lab 05/20/17  0739 05/19/17  0700 05/18/17  0550 05/17/17  2005 05/17/17  1914 05/17/17  1320   * 136 147* 146* Canceled, Test credited Unsatisfactory specimen - hemolyzedLAB TO REORDER AND REDRAW. PLEASE SEE RESULTS UNDER ACCN [id number]. 148*   POTASSIUM 4.4 4.2 4.2 4.6 Canceled, Test credited Unsatisfactory specimen - hemolyzedLAB TO REORDER AND REDRAW. PLEASE SEE RESULTS UNDER ACCN [id number]. 5.7*   CHLORIDE 101 106 116* 117* Canceled, Test credited Unsatisfactory specimen - hemolyzedLAB TO REORDER AND REDRAW. PLEASE SEE RESULTS UNDER ACCN [id number]. 119*   CO2 14* 17* 22 21 Canceled, Test credited Unsatisfactory specimen - hemolyzedLAB TO REORDER AND REDRAW. PLEASE SEE RESULTS UNDER ACCN [id number]. 23   BUN 40* 40* 43* 47* Canceled, " Test credited Unsatisfactory specimen - hemolyzedLAB TO REORDER AND REDRAW. PLEASE SEE RESULTS UNDER ACCN [id number]. 49*   CR 2.54* 2.59* 2.77* 2.85* Canceled, Test credited Unsatisfactory specimen - hemolyzedLAB TO REORDER AND REDRAW. PLEASE SEE RESULTS UNDER ACCN [id number]. 2.93*   * 296* 296* 414* Canceled, Test credited Unsatisfactory specimen - hemolyzedLAB TO REORDER AND REDRAW. PLEASE SEE RESULTS UNDER ACCN [id number]. 305*   BENTLEY 9.7 10.5* 11.5* 12.1* Canceled, Test credited Unsatisfactory specimen - hemolyzedLAB TO REORDER AND REDRAW. PLEASE SEE RESULTS UNDER ACCN [id number]. 12.5*       INRNo lab results found in last 7 days.   Attestation:   I have reviewed today's relevant vital signs, notes, medications, labs and imaging.    Mario Abrams MD  [facility] Nephrology  Office phone :[phone]  Pager: [phone]

## 2017-05-21 NOTE — PROGRESS NOTES
Renal Medicine Progress Note            Assessment/Plan:     61 y.o woman with schizoaffective disorder and CKD IV from lithium toxicity, admitted for recurrent altered mental status, consulted for A/CKD and hyperkalemia.       1. CKD IV, baseline Scr ~ 2.5 mg/dl. Previous episodes of LYLA. Scr was 2.38 mg/dl when she was discharged last month. She has NDI from lithium.       2. Acute kidney injury from dehydration. Resolved.      3. Hypernatremia and now with hyponatremia. Resolved.      4. Altered mental status. Significant improved     5. Hyperkalemia. Resolved.      6. IDDM      7. Schizoaffective disorder.      8. Elevated lipase?      Plan  1. Encourage free water intake to avoid recurrent hypernatremia due to DNI. Adjust and stop hypotonic fluid as needed. Please call with any questions or concerns. I am signing off. Thank you.        Interval History:     Pt is awake, alert and answering questions. No other issues report by RN.          Medications and Allergies:       heparin lock flush  5-10 mL Intracatheter Q24H     sodium bicarbonate  650 mg Oral BID     insulin isophane & regular  20 Units Subcutaneous QAM     insulin isophane & regular  34 Units Subcutaneous Daily with supper     sodium chloride (PF)  3 mL Intracatheter Q8H     divalproex  1,500 mg Oral At Bedtime     divalproex  500 mg Oral QAM     insulin aspart  1-7 Units Subcutaneous TID AC     insulin aspart  1-5 Units Subcutaneous At Bedtime     amLODIPine  5 mg Oral Daily     cloNIDine  0.2 mg Oral BID     isosorbide mononitrate  30 mg Oral Daily     metoprolol  25 mg Oral Daily     OLANZapine (zyPREXA) tablet 5 mg  5 mg Oral At Bedtime     omeprazole (priLOSEC) CR capsule 20 mg  20 mg Oral BID AC     risperiDONE (risperDAL) tablet 1 mg  1 mg Oral At Bedtime     heparin  5,000 Units Subcutaneous Q12H        Allergies   Allergen Reactions     Amoxicillin      Amoxicillin      Haldol [Benzyl Alcohol]      Haldol [Haloperidol]      Pcn [Penicillin  "G]      Penicillins      Seroquel [Quetiapine] GI Disturbance            Physical Exam:   Vitals were reviewed  Heart Rate: 92, Blood pressure 137/59, pulse 110, temperature 97.1  F (36.2  C), temperature source Oral, resp. rate 16, height 1.626 m (5' 4\"), weight 89.5 kg (197 lb 4.8 oz), last menstrual period 04/27/2012, SpO2 93 %, not currently breastfeeding.    Wt Readings from Last 3 Encounters:   05/20/17 89.5 kg (197 lb 4.8 oz)   04/02/17 92.4 kg (203 lb 11.3 oz)   03/25/17 102.2 kg (225 lb 5 oz)       Intake/Output Summary (Last 24 hours) at 05/21/17 1042  Last data filed at 05/21/17 0918   Gross per 24 hour   Intake             2150 ml   Output             3225 ml   Net            -1075 ml     Gen: NAD  CV: RRR  Pulm: Ctab  Abd: obese, soft, NT  Neuro: awake, alert and answering questions. Completely different compare to yesterday  Ext: no edema  +cherry         Data:     CBC RESULTS:     Recent Labs  Lab 05/19/17  0700 05/18/17  0550 05/17/17  0519 05/16/17  1909 05/16/17  1907   WBC  --  5.8 5.5  --  6.8   RBC  --  4.13 4.02  --  4.22   HGB  --  12.2 12.0 12.9 12.6   HCT  --  40.2 38.6  --  40.1   PLT 93* 126* 141*  --  143*       Basic Metabolic Panel:    Recent Labs  Lab 05/21/17  0936 05/20/17  0739 05/19/17  0700 05/18/17  0550 05/17/17 2005 05/17/17  1914    129* 136 147* 146* Canceled, Test credited Unsatisfactory specimen - hemolyzedLAB TO REORDER AND REDRAW. PLEASE SEE RESULTS UNDER ACCN [id number].   POTASSIUM 3.9 4.4 4.2 4.2 4.6 Canceled, Test credited Unsatisfactory specimen - hemolyzedLAB TO REORDER AND REDRAW. PLEASE SEE RESULTS UNDER ACCN [id number].   CHLORIDE 107 101 106 116* 117* Canceled, Test credited Unsatisfactory specimen - hemolyzedLAB TO REORDER AND REDRAW. PLEASE SEE RESULTS UNDER ACCN [id number].   CO2 19* 14* 17* 22 21 Canceled, Test credited Unsatisfactory specimen - hemolyzedLAB TO REORDER AND REDRAW. PLEASE SEE RESULTS UNDER ACCN [id number].   BUN 39* 40* 40* 43* 47* Canceled, Test " credited Unsatisfactory specimen - hemolyzedLAB TO REORDER AND REDRAW. PLEASE SEE RESULTS UNDER ACCN [id number].   CR 2.33* 2.54* 2.59* 2.77* 2.85* Canceled, Test credited Unsatisfactory specimen - hemolyzedLAB TO REORDER AND REDRAW. PLEASE SEE RESULTS UNDER ACCN [id number].   * 346* 296* 296* 414* Canceled, Test credited Unsatisfactory specimen - hemolyzedLAB TO REORDER AND REDRAW. PLEASE SEE RESULTS UNDER ACCN [id number].   BENTLEY 10.0 9.7 10.5* 11.5* 12.1* Canceled, Test credited Unsatisfactory specimen - hemolyzedLAB TO REORDER AND REDRAW. PLEASE SEE RESULTS UNDER ACCN [id number].       INRNo lab results found in last 7 days.   Attestation:   I have reviewed today's relevant vital signs, notes, medications, labs and imaging.    Mario Abrams MD  [facility] - Nephrology  Office phone :[phone]  Pager: [phone]

## 2017-05-21 NOTE — PLAN OF CARE
Problem: Goal Outcome Summary  Goal: Goal Outcome Summary  Outcome: No Change  A&O to self only. Lethargic, but arouses to touch/pain. VSS. Denies pain, does not appear to be in pain. Tele NSR. Infrequent productive cough. Coccyx dressing CDI, repo q 2 hr. +2 BLE edema, extremities elevated. Triple IJ placed last brice, dressing changed by flying squad d/t bleeding. Bedrest. Pt appeared to rest comfortably throughout the night. Will continue to monitor.

## 2017-05-21 NOTE — PROGRESS NOTES
X cover    1/2 bcx was + for GPC in clusters.  Noted kidney injury.  Will not give vancomycin as patient afebrile and noted very slow renal recovery.  Will await final ID.  Will defer antibiotics if they should be given by rounding physician.    Hong Lee MD

## 2017-05-21 NOTE — PROGRESS NOTES
Pt needed a central line placed, pt unable to consent, 2 RN's(Nely Brody, Irene Ibarra) contacted pt's mother Lisa Farmer who consented to the procedure,

## 2017-05-21 NOTE — ADDENDUM NOTE
Addendum  created 05/20/17 1443 by Charanjit Magaña MD    Anesthesia Intra LDAs edited, LDA properties accepted

## 2017-05-21 NOTE — PROVIDER NOTIFICATION
MD notified of patient being too lethargic (awakens to stimulation/pain) for po medications. No new orders and okay to hold the medications for now. VSS. Last  and will recheck BG upon return from CVC placement.

## 2017-05-21 NOTE — ANESTHESIA POSTPROCEDURE EVALUATION
Patient: Nel Farmer    * No procedures listed *    Diagnosis:* No pre-op diagnosis entered *  Diagnosis Additional Information: No value filed.    Anesthesia Type:  No value filed.    Note:  Anesthesia Post Evaluation    Patient location during evaluation: PACU  Patient participation: Unable to participate in evaluation secondary to underlying medical condition  Level of consciousness: responsive to verbal stimuli  Pain management: adequate  Airway patency: patent  Cardiovascular status: acceptable  Respiratory status: acceptable  Hydration status: acceptable  PONV: none             Last vitals:  Vitals:    05/20/17 1257 05/20/17 1600 05/20/17 2032   BP: 145/65 126/66 112/57   Pulse:      Resp: 16 16 15   Temp: 36  C (96.8  F) 36.5  C (97.7  F) 36.6  C (97.9  F)   SpO2: 96% 96% 100%         Electronically Signed By: Charanjit Magaña MD  May 20, 2017  10:00 PM

## 2017-05-21 NOTE — PROGRESS NOTES
X cover 1938    Called by nursing regarding multiple unsuccessful attempt at establishing an IV access, even with anesthesia help. Patient with severe dehydration and acute renal failure; will require IV fluids and anticipate few more days stay; ordered PICC line    1939  Per nursing, unable to get PICC line until tomorrow; will try to get IJ line

## 2017-05-21 NOTE — ANESTHESIA PREPROCEDURE EVALUATION
Procedure: * No procedures listed *  Preop diagnosis: * No pre-op diagnosis entered *  Allergies   Allergen Reactions     Amoxicillin      Amoxicillin      Haldol [Benzyl Alcohol]      Haldol [Haloperidol]      Pcn [Penicillin G]      Penicillins      Seroquel [Quetiapine] GI Disturbance     Patient Active Problem List   Diagnosis     HTN (hypertension)     Hyperlipidemia with target LDL less than 70     TYPE 2 DIABETES, HBA1C GOAL < 7%     acute Mastitis     CKD (chronic kidney disease) stage 3, GFR 30-59 ml/min     Advanced directives, counseling/discussion     Health Care Home     Suicidal ideation     Schizoaffective disorder, bipolar type (H)     Depression (emotion)     Paranoia (H)     Schizoaffective disorder, manic type (H)     Psychosis     Depression with suicidal ideation     Depression     Nausea     Falls frequently     Sepsis due to urinary tract infection (H)     Altered mental status     Acute renal failure (ARF) (H)     Past Medical History:   Diagnosis Date     Anxiety      CKD (chronic kidney disease) stage 3, GFR 30-59 ml/min     baseline Cr 1.8-2     Cognitive deficits      Depression      Depressive disorder      Diabetes (H)      DM type 2 (diabetes mellitus, type 2) (H)     insulin dependent      HTN      Hyperlipidemia LDL goal < 70      Hypertension      Osteoarthritis      Schizoaffective disorder (H)      Past Surgical History:   Procedure Laterality Date     C TOTAL KNEE ARTHROPLASTY Left 3/2010     COLONOSCOPY  10/19/2011    Procedure:COMBINED COLONOSCOPY, REMOVE TUMOR/POLYP/LESION BY SNARE; Colonoscopy; Surgeon:MARY ALICE RUSH; Location: GI     DILATION AND CURETTAGE  age 31     TONSILLECTOMY & ADENOIDECTOMY         No current facility-administered medications on file prior to encounter.   Current Outpatient Prescriptions on File Prior to Encounter:  LORazepam (ATIVAN) 0.5 MG tablet Take 1 tablet (0.5 mg) by mouth 2 times daily as needed for anxiety   cloNIDine (CATAPRES)  "0.2 MG tablet Take 1 tablet (0.2 mg) by mouth 2 times daily   amLODIPine (NORVASC) 5 MG tablet Take 1 tablet (5 mg) by mouth daily   aMILoride (MIDAMOR) 5 MG tablet Take 2 tablets (10 mg) by mouth daily   metoprolol (TOPROL-XL) 25 MG 24 hr tablet Take 1 tablet (25 mg) by mouth daily   glipiZIDE (GLUCOTROL XL) 2.5 MG 24 hr tablet Take 1 tablet (2.5 mg) by mouth 2 times daily (before meals)   insulin isophane & regular (HUMULIN MIX 70/30 PEN) susp 20 units before breakfast  30 units before dinner (Patient taking differently: 20 units before breakfast  34 units before dinner)   risperiDONE (RISPERDAL M-TABS) 0.5 MG disintegrating tablet Place 1 tablet (0.5 mg) under the tongue 3 times daily as needed   senna-docusate (SENOKOT-S;PERICOLACE) 8.6-50 MG per tablet Take 1 tablet by mouth 2 times daily as needed for constipation   polyethylene glycol (MIRALAX/GLYCOLAX) powder Take 17 g by mouth daily as needed for constipation   divalproex (DEPAKOTE) 500 MG EC tablet Take 500 mg by mouth every morning   divalproex (DEPAKOTE) 500 MG EC tablet Take 1,500 mg by mouth At Bedtime   Furosemide (LASIX PO) Take 10 mg by mouth daily    Furosemide (LASIX PO) Take 20 mg by mouth daily as needed (Take at noon for leg swelling if needed)   OLANZapine (ZYPREXA PO) Take 5 mg by mouth At Bedtime   RisperiDONE (RISPERDAL PO) Take 1 mg by mouth At Bedtime   Omeprazole (PRILOSEC PO) Take 20 mg by mouth 2 times daily (before meals)   isosorbide mononitrate (IMDUR) 30 MG 24 hr tablet Take 1 tablet by mouth daily.   insulin syringe-needle U-100 (BD INSULIN SYRINGE ULTRAFINE) 31G X 5/16\" 1 ML Use one syringe bid daily or as directed.     /57 (BP Location: Right arm)  Pulse 110  Temp 36.6  C (97.9  F) (Axillary)  Resp 15  Ht 1.626 m (5' 4\")  Wt 89.5 kg (197 lb 4.8 oz)  LMP 04/27/2012  SpO2 100%  BMI 33.87 kg/m2    Lab Results   Component Value Date    WBC 5.8 05/18/2017     Lab Results   Component Value Date    RBC 4.13 05/18/2017 "     Lab Results   Component Value Date    HGB 12.2 05/18/2017     Lab Results   Component Value Date    HCT 40.2 05/18/2017     Lab Results   Component Value Date    MCV 97 05/18/2017     Lab Results   Component Value Date    MCH 29.5 05/18/2017     Lab Results   Component Value Date    MCHC 30.3 05/18/2017     Lab Results   Component Value Date    RDW 15.8 05/18/2017     Lab Results   Component Value Date    PLT 93 05/19/2017     Lab Results   Component Value Date    INR 1.19 03/30/2017       Last Basic Metabolic Panel:  Lab Results   Component Value Date     05/20/2017      Lab Results   Component Value Date    POTASSIUM 4.4 05/20/2017     Lab Results   Component Value Date    CHLORIDE 101 05/20/2017     Lab Results   Component Value Date    BENTLEY 9.7 05/20/2017     Lab Results   Component Value Date    CO2 14 05/20/2017     Lab Results   Component Value Date    BUN 40 05/20/2017     Lab Results   Component Value Date    CR 2.54 05/20/2017     Lab Results   Component Value Date     05/20/2017     Anesthesia Evaluation     . Pt has had prior anesthetic.            ROS/MED HX    ENT/Pulmonary:       Neurologic: Comment: COGNITIVE IMPAIRMENT    Metabolic encephalopathy      Cardiovascular: Comment: POOR PERIPHERAL VENOUS ACCESS, 8TH FLOOR CALLED TO HAVE CENTRAL LINE PLACED    Normal EF by echo 3/2017    (+) Dyslipidemia, hypertension----. : . . . :. .       METS/Exercise Tolerance:     Hematologic:         Musculoskeletal:   (+) arthritis, , , -       GI/Hepatic:     (+) GERD       Renal/Genitourinary: Comment: Creatine was 4, now 2.54    Urinary tract infection    Hyperkalemia resolved  Hypernatremia resolved    Diabetes insipidus    (+) chronic renal disease, type: ARF,       Endo:     (+) type II DM Using insulin .      Psychiatric:     (+) psychiatric history other (comment), depression and anxiety (schizoaffective disorder)      Infectious Disease:         Malignancy:         Other:                      Physical Exam  Normal systems: dental    Airway   Mallampati: III  TM distance: >3 FB  Neck ROM: limited    Dental     Cardiovascular   Rhythm and rate: regular and normal      Pulmonary (+) rhonchi                       Anesthesia Plan      History & Physical Review  History and physical reviewed and following examination; no interval change.    ASA Status:  3 .        Plan for     Additional equipment: Central Line Central line consent obtained on floor from sister.  Patient unable to give consent      Postoperative Care      Consents                          .

## 2017-05-21 NOTE — ANESTHESIA PROCEDURE NOTES
CENTRAL LINE INSERTION PROCEDURE NOTE:   Pre-Procedure  Performed by POWER BROWN  Location: OR, pre-op      Pre-Anesthestic Checklist: patient identified, risks and benefits discussed and informed consent    Timeout  Correct Patient: Yes   Correct Procedure: Yes   Correct Site: Yes   Correct Laterality: N/A   Correct Position: Yes   Site Marked: N/A   .   Procedure Documentation   Procedure: central line and new line  Position: Trendelenburg  Patient Prep;all elements of maximal sterile barrier technique followed, mask, hat, sterile gown, sterile gloves, draped, chlorhexidine gluconate and isopropyl alcohol, patient draped      Insertion Site:internal jugular, right    Skin infiltrated with 3 mL of 1% lidocaine.  Catheter: 7 Fr, 20 cm, T.L.  Assessment/Narrative    Catheter: 7 Fr, 20 cm, T.L.     Secured by suture  Tegaderm and Biopatch dressing used.  blood aspirated from all lumens  All lumens flushed: YesTip termination:right atrium  Verification method: X-ray  Comments:  Central Line    No complications.  Patient tolerated procedure well  Line placed in preop area  No dysrhythmias with wire insertion  Thin wall to right IJ x 1.     NO LEFT IJ VISIBLE WITH ULTRASOUND PRIOR TO BEGINNING PROCEDURE        ASA 3: HTN, GERD, METABOLIC ENCEPHALOPATHY, COGNITIVE IMPAIRMENT, SCHIZOAFFECTIVE DISORDER, URINARY TRACT INFECTION, ACUTE RENAL FAILURE , HYPERKALEMIA, DIABETES INSIPIDUS    No sedative medications were give for this procedure    CXR line in good position, no pneumothorax

## 2017-05-21 NOTE — PROGRESS NOTES
[facility]  Hospitalist Progress Note    Assessment & Plan   Nel Farmer is a 61-year-old woman with history of schizoaffective disorder, cognitive impairment usually oriented to just self and place per TCU report, who has had 2 days of lethargy, decreased activity, decreased intake. Lab work up for evaluation at TCU showed potassium of 6.1 with a creatinine of 4 and so was brought to ER 5/16/17. Work up in ER showed possible UTI, LYLA, hyperkalemia.    Acute metabolic encephalopathy likely secondary to severe dehydration  Patient has diabetes insipidus, and also on amiloride, likely contributing to dehydration and metabolic encephalopathy, though mostly with decreased response frequently at baseline. Also on ativan, risperidone, zyprexa and depakote which could contribute to encephalopathy. UA also suggestive of UTI possibly contributing but no bacterial growth seen. Depakote level normal.  - D5NS rate 75 ml/hour per nephrology  - Holding PTA ativan until mental status better  - Check with TCU tomorrow to determine patient's baseline affect, mentation    - PTA depakote, risperidal and zyprexa continued.   - supportive care      Severe dehydration with acute kidney injury/hypernatremia and hyperkalemia  Her baseline creatinine is around 2.4 with creatinine on admission of 3.41, K 6.8 at admission. In the ED, she was given 2 liters of fluid, bicarbonate, insulin.  - Poor oral intake, DI and being on amiloride PTA likely causing dehydration and acute kidney injury on top of CKD.   - Nephrology following. IVF D5 reduced to 75 ml/hr  - Monitor intake output, Cr improved to 2.54.  - Sodium dropped to 129 (drop from 136 on 5/19) and now 137  - potassium normalized       Urinary tract infection, yeast  Had  white blood cells and 2-5 WBC casts on a catheter specimen of a UA. She has a history of Klebsiella urinary tract infection in the past with sensitivity to most antibiotics tested including  ceftriaxone and fluoroquinolones. She was given Levaquin 750 mg in the emergency department. >100k yeast. Blood cultures NGTD.  - d/c'd levofloxacin on   - Urine culture and blood cultures were sent, follow. Gram pos cocci (Staph capitis). Sensitivities pending.   - repeat blood cultures today      Hypertension  BP elevated, also tachycardic which could be due to dehydration.   - More alert awake, and so PTA PO anti HTN meds including amlodipine, clonidine, isosorbide, mononitrate, metoprolol continued.  - PRN hydralazine/metoprolol IV as well ordered.  - Holding amiloride and Lasix       Diabetes mellitus type 2 on insulin  On glipizide and 70/30 insulin (20 units in am and 34 units in the pm). Diet was resumed, but PO intake is low so continue to hold glipizide. Consistently >300 range despite minimal po intake.   - Increased 70/30 insulin to PTA dosin units in the evening and 20 units in the morning.  - Moderate insulin resistance ISS   - Moderate CHO diet.      Elevated Lipase  Unclear significance. Denies abd pain. No nausea/vomiting noted. CT abd-pelvis (though non contrast) shows atrophic pancreas. Unlikely this is pancreatitis.   - Diet resumed, monitor for any worsening GI symptoms  - Lipase was slighty elevated. Follow symptomatically.       Diabetes insipidus  Induced by lithium.   - Lithium was discontinued before this hospitalization.  - Large urine output, continue to monitor output and sodiums  - IVF as above      Schizoaffective disorder  PTA depakote, risperidal and zyprexa continued. Mentation not improved as of 2017.  - hold PTA ativan. Unclear if this would have paradoxical reaction (stimulant) effect on her. Need to discuss with her care center to determine baseline affect.       Gastroesophageal reflux disease  -Continue omeprazole      Active Diet Order      Combination Diet 2 gm K Diet; Dysphagia Diet Level 1: Pureed; Nectar Thickened Liquids (pre-thickened or use instant food  thickener) (via teaspoon)    DVT prophylaxis: Heparin SQ  Code Status: Full Code    Disposition: Expected discharge return to TCU early this week. Need to determine what her baseline mental status and functioning are at the care center. May be slower to recover from encephalopathy given schizoaffective disorder.    Isabel Ojeda MD  Text Page  ~~~~~~~~~~~~~~~~~~~~~~~~~~~~~~~~~~~~~~~~~~~~~~~  Interval History   Patient with some periods of alertness this morning when she was more interactive. This afternoon is back to sleeping and ignoring my questions.     PICC placed last evening. Afebrile now.     -Data reviewed today: I reviewed all new labs and imaging results over the last 24 hours. I personally reviewed no images or EKG's today.    Physical Exam   Temp: 98.6  F (37  C) Temp src: Axillary BP: 127/65   Heart Rate: 88 Resp: 16 SpO2: 95 % O2 Device: None (Room air)    Vitals:    05/17/17 0527 05/18/17 0600 05/20/17 0552   Weight: 80.2 kg (176 lb 12.9 oz) 81.8 kg (180 lb 5.4 oz) 89.5 kg (197 lb 4.8 oz)     Vital Signs with Ranges  Temp:  [96.1  F (35.6  C)-98.6  F (37  C)] 98.6  F (37  C)  Heart Rate:  [81-89] 88  Resp:  [15-18] 16  BP: (111-145)/(25-66) 127/65  SpO2:  [95 %-100 %] 95 %  I/O last 3 completed shifts:  In: 2030 [P.O.:210; I.V.:1820]  Out: 3225 [Urine:3225]    Constitutional: Sleepy, NAD, minimally conversive  Respiratory: Coarse bilaterally, no wheezing  Cardiovascular: RRR, no murmurs  Trace LE edema  GI: soft, non-tender, nondistended  Skin/Integument: sweaty, warm, no acute rashes  Musc: RIDER  Neuro: oriented to self, doesn't follow commands but will reposition herself in bed    Medications     NaCl 75 mL/hr at 05/21/17 0621       heparin lock flush  5-10 mL Intracatheter Q24H     sodium bicarbonate  650 mg Oral BID     insulin isophane & regular  20 Units Subcutaneous QAM     insulin isophane & regular  34 Units Subcutaneous Daily with supper     sodium chloride (PF)  3 mL Intracatheter Q8H      divalproex  1,500 mg Oral At Bedtime     divalproex  500 mg Oral QAM     insulin aspart  1-7 Units Subcutaneous TID AC     insulin aspart  1-5 Units Subcutaneous At Bedtime     amLODIPine  5 mg Oral Daily     cloNIDine  0.2 mg Oral BID     isosorbide mononitrate  30 mg Oral Daily     metoprolol  25 mg Oral Daily     OLANZapine (zyPREXA) tablet 5 mg  5 mg Oral At Bedtime     omeprazole (priLOSEC) CR capsule 20 mg  20 mg Oral BID AC     risperiDONE (risperDAL) tablet 1 mg  1 mg Oral At Bedtime     heparin  5,000 Units Subcutaneous Q12H       Data     Recent Labs  Lab 05/20/17  0739 05/19/17  0700 05/18/17  0550 05/17/17  2005  05/17/17  1320 05/17/17  0519  05/16/17  1909 05/16/17 1907   WBC  --   --  5.8  --   --   --  5.5  --   --  6.8   HGB  --   --  12.2  --   --   --  12.0  --  12.9 12.6   MCV  --   --  97  --   --   --  96  --   --  95   PLT  --  93* 126*  --   --   --  141*  --   --  143*   * 136 147* 146*  < > 148* 151*  --  141 143   POTASSIUM 4.4 4.2 4.2 4.6  < > 5.7* 5.5*  < > 6.8* 6.8*   CHLORIDE 101 106 116* 117*  < > 119* 122*  --   --  117*   CO2 14* 17* 22 21  < > 23 21  --   --  15*   BUN 40* 40* 43* 47*  < > 49* 51*  --   --  55*   CR 2.54* 2.59* 2.77* 2.85*  < > 2.93* 3.10*  --   --  3.41*   ANIONGAP 14 13 9 8  < > 6 8  --   --  11   BENTLEY 9.7 10.5* 11.5* 12.1*  < > 12.5* 12.0*  --   --  12.3*   * 296* 296* 414*  < > 305* 140*  < >  --  198*   ALBUMIN  --  2.3*  --  2.7*  < > 2.8*  --   --   --  3.1*   PROTTOTAL  --   --   --   --   --   --   --   --   --  7.7   BILITOTAL  --   --   --   --   --   --   --   --   --  0.3   ALKPHOS  --   --   --   --   --   --   --   --   --  68   ALT  --   --   --   --   --   --   --   --   --  14   AST  --   --   --   --   --   --   --   --   --  19   LIPASE  --   --  1367*  --   --  1497*  --   --   --  756*   < > = values in this interval not displayed.    Imaging:  Recent Results (from the past 24 hour(s))   XR Chest Port 1 View    Narrative     CHEST PORTABLE ONE VIEW 5/20/2017 9:49 PM     COMPARISON: 2 view chest x-ray 5/16/2017.    HISTORY: Post central line placement.    FINDINGS: A right internal jugular central venous catheter has been  placed with its tip in the low superior vena cava. The cardiac  silhouette, pulmonary vasculature, lungs and pleural spaces are within  normal limits.      Impression    IMPRESSION: Clear lungs.    NARCISA VALDEZ MD

## 2017-05-21 NOTE — PROVIDER NOTIFICATION
MD Notification    Notified Person:  MD    Notified Persons Name: Dr. Lee    Notification Date/Time: May 21, 2017 0215    Notification Interaction:  Talked with Physician    Purpose of Notification: R hand BC (+) [facility]    Orders Received: None    Comments:

## 2017-05-21 NOTE — PLAN OF CARE
Problem: Goal Outcome Summary  Goal: Goal Outcome Summary  SLP: Attempted to see pt for dysphagia tx; however pt lethargic and did not wake despite maximum encouragement from ST. Will re-attempt later as able, otherwise will follow up tomorrow.

## 2017-05-21 NOTE — PLAN OF CARE
Problem: Goal Outcome Summary  Goal: Goal Outcome Summary  [facility] care [id number]: VSS, lethargic but awakens to pain/stimulation. See note regarding provider notification/medications. Held HS medications. Dunlap patent, good urine output. Productive cough, LS diminished. Down for CVC placement at this time.

## 2017-05-21 NOTE — PROVIDER NOTIFICATION
MD Notification    Notified Person:  MD    Notified Persons Name: Dr. Lee     Notification Date/Time: May 20, 2017 3177    Notification Interaction: Talked with Physician    Purpose of Notification: BC (+) cocci in clusters from R hand    Orders Received: None    Comments:

## 2017-05-21 NOTE — PLAN OF CARE
Problem: Goal Outcome Summary  Goal: Goal Outcome Summary  Outcome: No Change  A&O to self only. Lethargic, but arouses to touch/pain. VSS. Denies pain, does not appear to be in pain. Bedrest, A2 with lift. Tele NSR. Infrequent non-productive cough. More awake today, appetite poor, emesis x1. Coccyx dressing CDI, repo q 2 hr. +2 BLE edema. Triple IJ placed 5/20 ,one lumen infusing .45 NS @75, other lumens hep locked. Plan: continue to monitor.

## 2017-05-22 NOTE — PLAN OF CARE
Problem: Goal Outcome Summary  Goal: Goal Outcome Summary  Outcome: No Change  A&O to self only. Lethargic, but arouses to touch/pain. VSS, tele NSR. Denies pain/nausea. Infrequent weak cough. Coccyx dressing CDI, repo q 2 hr. +2 BLE edema. Triple IJ infusing, other lumens flushed & hep locked. DD1, nectar thick, 2g K, tolerating fair. Ate breakfast great, too lethargic for lunch. Head CT scheduled for today. Plan: Continue to monitor.

## 2017-05-22 NOTE — PLAN OF CARE
Problem: Goal Outcome Summary  Goal: Goal Outcome Summary  Outcome: No Change  Current Diagnosis: UTI, LYLA, hyperkalemia. Acute encephalopathy   Patient Story (what led to hospitalization and pertinent medical history): From TCU with increased lethargy & decreased activity/appetite.      Plans for DC: Pending. Back to TCU ([facility]) next week.     DO NOT REMOVE ANY FIELDS BELOW - TYPE N/A OR WNL IF NO INFO TO ADD  ALL FIELDS MUST BE UPDATED EVERY SHIFT WITH CURRENT INFORMATION     Procedure/POD: R IJ placed 5/20  Neuro/Psych/Sleep: A&O to self and occasionally place. Lethargic, but arouses to touch/pain.  Pain/Abnormal VS/Tele: VSS. C/O headache x1. Tele NSR.  Resp/CV: frequent fair productive cough. LS dim.  Skin/Wound: New coccyx dressing applied, Wound black, eschar +, small amount serosanguinous drainage on mepilex.  repo q 2. +2 BLE edema; extremities elevated.   /GI/Diet: DD1, nectar thick, 2g K. Dunlap patent, UOP adequate. Held evening meds due to lethargy.   IV/Chemo/Radiation: Triple IJ, white lumen infusing; others hep locked  Abnormal Labs/Glucose: . Crt 2.54. R hand BC (+) staph, no abx added, BS:179. STAT blood cultures pending.   Activity/Safety: Bedrest  Consults: Nephrology signed off, WOC nurse following      COPY BELOW FOR END OF SHIFT NOTE  Shift Update: A&O to self only. Lethargic, but arouses to touch/pain. VSS. Denies pain, does not appear to be in pain. Bedrest, A2 with lift. Tele NSR. frequent fair productive cough. Appetite poor. New Coccyx dressing applied, repo q 2 hr. +2 BLE edema. Triple IJ placed 5/20 ,one lumen infusing .45 NS @75, other lumens hep locked. Blood cultures pending. Plan: continue to monitor.      *Family updated

## 2017-05-22 NOTE — PROGRESS NOTES
[facility]  Progress Note  [facility]    Length of stay visit by  but the patient was sleeping. Will attempt another visit later in the week.       Serene Rios  Chaplain Resident  Pager [phone]

## 2017-05-22 NOTE — PLAN OF CARE
Problem: Goal Outcome Summary  Goal: Goal Outcome Summary  Outcome: No Change  A&O to self only. Lethargic, but arouses to touch/pain. VSS, tele NSR. Denies pain/nausea. Infrequent weak cough. Coccyx dressing CDI, repo q 2 hr + specialty bed. +2 BLE edema. Triple IJ infusing, other lumens flushed & hep locked. Pt appeared to rest comfortably throughout the night. Will continue to monitor.

## 2017-05-22 NOTE — PROGRESS NOTES
[facility]  Hospitalist Progress Note    Assessment & Plan   Nel Farmer is a 61-year-old woman with history of schizoaffective disorder, cognitive impairment usually oriented to just self and place per TCU report, who has had 2 days of lethargy, decreased activity, decreased intake. Lab work up for evaluation at TCU showed potassium of 6.1 with a creatinine of 4 and so was brought to ER 5/16/17. Work up in ER showed possible UTI, LYLA, hyperkalemia. UTI consistent with yeast, no bacteria found.     Acute metabolic encephalopathy likely secondary to severe dehydration  Acte on chronic stage 4 renal failure secondary to lithium toxicity. LYLA resolved.   Patient has diabetes insipidus, and was also on amiloride, likely contributing to dehydration and metabolic encephalopathy, though mostly with decreased response frequently at baseline. Also on ativan, risperidone, zyprexa and depakote which could contribute to encephalopathy. UA also suggestive of UTI possibly contributing but no bacterial growth seen. Depakote level normal.   -d/c D5 1/2 NS today and recheck BMP tomorrow morning  - Head CT today given challenging neuro exam due to lethargy and possible volitional behavioral issues. Left arm weakness.   - PTA depakote, risperidal and zyprexa continued.  - Holding PTA ativan until mental status better  - Checked with TCU to determine patient's baseline affect, mentation and she is often combative and needs significant redirection => Not currently at baseline.      Severe dehydration with acute kidney injury/hypernatremia and hyperkalemia  Her baseline creatinine is around 2.4 with creatinine on admission of 3.41, K 6.8 at admission. In the ED, she was given 2 liters of fluid, bicarbonate, insulin. Poor oral intake, DI and being on amiloride PTA likely causing dehydration and acute kidney injury on top of CKD. Her fluid intake had been limited at the skilled nursing facility since patient was prone  to throwing cups at staff. Potassium normalized.  - Nephrology was following & now signed-off.   - Monitor intake output, Cr  At baseline as of       Urinary tract infection, yeast  Had  white blood cells and 2-5 WBC casts on a catheter specimen of a UA. She has a history of Klebsiella urinary tract infection in the past with sensitivity to most antibiotics tested including ceftriaxone and fluoroquinolones. She was given Levaquin 750 mg in the emergency department. >100k yeast. Blood cultures NGTD. D/c'd levofloxacin on   - Urine culture and blood cultures were sent, follow. Gram pos cocci (Staph capitis). Sensitivities pending.   - repeat blood cultures NGTD  -central line placed (RIJ) on  since unable to get PICC at that time and needed access for IVF and medications       Hypertension  BP elevated, also tachycardic which could be due to dehydration.   - More alert awake, and so PTA PO anti HTN meds including amlodipine, clonidine, isosorbide, mononitrate, metoprolol continued.  - PRN hydralazine/metoprolol IV as well ordered.  - Holding amiloride and Lasix       Diabetes mellitus type 2 on insulin  On glipizide and 70/30 insulin (20 units in am and 34 units in the pm). Diet was resumed, but PO intake is low so continue to hold glipizide. Consistently >300 range despite minimal po intake.   - Increased 70/30 insulin to PTA dosin units in the evening and 20 units in the morning.  - Moderate insulin resistance ISS   - Moderate CHO diet.      Elevated Lipase  Unclear significance. Denies abd pain. No nausea/vomiting noted. CT abd-pelvis (though non contrast) shows atrophic pancreas. Unlikely this is pancreatitis.   - Diet resumed, monitor for any worsening GI symptoms  - Lipase was slighty elevated. Follow symptomatically.       Diabetes insipidus  Induced by lithium.   - Lithium was discontinued before this hospitalization.  - Large urine output, continue to monitor output and sodiums  - IVF  "as above      Schizoaffective disorder  Paranoid Schizophrenia  Cognitive Deficits  PTA depakote, risperidal and zyprexa continued. Mentation not improved as of 5/22/2017. Has had waxing-waning brief periods of alertness.   - Hold PTA ativan. Unclear if this would have paradoxical reaction (stimulant) effect on her. Need to discuss with her care center to determine baseline affect.       Gastroesophageal reflux disease  -Continue omeprazole      Active Diet Order      Combination Diet 2 gm K Diet; Dysphagia Diet Level 1: Pureed; Nectar Thickened Liquids (pre-thickened or use instant food thickener) (via teaspoon)    DVT prophylaxis: D/c Heparin SQ 5/22 due to dropping platelet count  Code Status: Full Code    Disposition: Expected discharge once baseline mental status is achieved. Bed hold at skilled nursing facility.     Isabel Ojeda MD  Text Page  ~~~~~~~~~~~~~~~~~~~~~~~~~~~~~~~~~~~~~~~~~~~~~~~  Interval History   Patient states \"Ow!\" when any limbs are moved. Briefly opens eyes but will not respond to questions.     -Data reviewed today: I reviewed all new labs and imaging results over the last 24 hours. I personally reviewed no images or EKG's today.    Physical Exam   Temp: 99  F (37.2  C) Temp src: Axillary BP: 144/50 Pulse: 90 Heart Rate: 84 Resp: 16 SpO2: 99 % O2 Device: None (Room air)    Vitals:    05/17/17 0527 05/18/17 0600 05/20/17 0552   Weight: 80.2 kg (176 lb 12.9 oz) 81.8 kg (180 lb 5.4 oz) 89.5 kg (197 lb 4.8 oz)     Vital Signs with Ranges  Temp:  [99  F (37.2  C)-99.4  F (37.4  C)] 99  F (37.2  C)  Pulse:  [90] 90  Heart Rate:  [84-90] 84  Resp:  [16-17] 16  BP: (108-144)/(50-61) 144/50  SpO2:  [92 %-99 %] 99 %  I/O last 3 completed shifts:  In: 1875 [P.O.:180; I.V.:5155]  Out: 1951 [Urine:1950; Emesis/NG output:1]    Constitutional: Sleepy, NAD, minimally conversive  Respiratory: Coarse bilaterally, no wheezing  Cardiovascular: RRR, no murmurs  Trace LE edema  GI: soft, non-tender, " nondistended  Skin/Integument: sweaty, warm, no acute rashes  Musc: RIDER  Neuro: not answering questions, doesn't follow commands to test strength of arms, but will briefly open eyes, left arm seems more flaccid     Medications     NaCl 75 mL/hr at 05/22/17 0511       heparin lock flush  5-10 mL Intracatheter Q24H     sodium bicarbonate  650 mg Oral BID     insulin isophane & regular  20 Units Subcutaneous QAM     insulin isophane & regular  34 Units Subcutaneous Daily with supper     divalproex  1,500 mg Oral At Bedtime     divalproex  500 mg Oral QAM     insulin aspart  1-7 Units Subcutaneous TID AC     insulin aspart  1-5 Units Subcutaneous At Bedtime     amLODIPine  5 mg Oral Daily     cloNIDine  0.2 mg Oral BID     isosorbide mononitrate  30 mg Oral Daily     metoprolol  25 mg Oral Daily     OLANZapine (zyPREXA) tablet 5 mg  5 mg Oral At Bedtime     omeprazole (priLOSEC) CR capsule 20 mg  20 mg Oral BID AC     risperiDONE (risperDAL) tablet 1 mg  1 mg Oral At Bedtime     heparin  5,000 Units Subcutaneous Q12H       Data     Recent Labs  Lab 05/22/17  0600 05/21/17  0936 05/20/17  0739 05/19/17  0700 05/18/17  0550  05/17/17  1320 05/17/17  0519  05/16/17  1909 05/16/17  1907   WBC  --   --   --   --  5.8  --   --  5.5  --   --  6.8   HGB  --   --   --   --  12.2  --   --  12.0  --  12.9 12.6   MCV  --   --   --   --  97  --   --  96  --   --  95   PLT 83*  --   --  93* 126*  --   --  141*  --   --  143*   NA  --  137 129* 136 147*  < > 148* 151*  --  141 143   POTASSIUM  --  3.9 4.4 4.2 4.2  < > 5.7* 5.5*  < > 6.8* 6.8*   CHLORIDE  --  107 101 106 116*  < > 119* 122*  --   --  117*   CO2  --  19* 14* 17* 22  < > 23 21  --   --  15*   BUN  --  39* 40* 40* 43*  < > 49* 51*  --   --  55*   CR  --  2.33* 2.54* 2.59* 2.77*  < > 2.93* 3.10*  --   --  3.41*   ANIONGAP  --  11 14 13 9  < > 6 8  --   --  11   BENTLEY  --  10.0 9.7 10.5* 11.5*  < > 12.5* 12.0*  --   --  12.3*   GLC  --  159* 346* 296* 296*  < > 305* 140*  < >   --  198*   ALBUMIN  --  2.3*  --  2.3*  --   < > 2.8*  --   --   --  3.1*   PROTTOTAL  --   --   --   --   --   --   --   --   --   --  7.7   BILITOTAL  --   --   --   --   --   --   --   --   --   --  0.3   ALKPHOS  --   --   --   --   --   --   --   --   --   --  68   ALT  --   --   --   --   --   --   --   --   --   --  14   AST  --   --   --   --   --   --   --   --   --   --  19   LIPASE  --   --   --   --  1367*  --  1497*  --   --   --  756*   < > = values in this interval not displayed.    Imaging:  No results found for this or any previous visit (from the past 24 hour(s)).

## 2017-05-22 NOTE — PLAN OF CARE
Problem: Goal Outcome Summary  Goal: Goal Outcome Summary  SLP: Attempted to see patient for swallow treatment but was unable to arouse. Roommate stated she recently finished breakfast. Would not arouse to tactile stimulation or voice.

## 2017-05-23 NOTE — PLAN OF CARE
Problem: Goal Outcome Summary  Goal: Goal Outcome Summary  Pt VSS. Tele NSR. Appetite good in AM, encouraged fluids. R IJ heparin locked. Dunlap patent, yellow urine. Coccyx wound care done, new dressing applied, c/d/i. Turned/repo q 2h. Pt appeared to be comfortable, offered no verbal groans of pain.

## 2017-05-23 NOTE — DISCHARGE INSTRUCTIONS
"  PIP ACTIVITY MEASURES:    CHAIR: Pt should sit on a chair cushion (247100) when up to the chair and not sit for more than one hour at a time before fully offloading backside (either stand for a couple of minutes and/or return to bed, positioning on a side) to relieve pressure and re-perfuse tissue. Additionally, encourage pt to shift side to side every 15 minutes, too.     NOTE: the Z-Flow Pad is NOT a cushion, pt should NOT sit on the Z-Flow pads          BED: Reposition side to side every 1-2 hours when awake.     Consider Z-Norman Pillows to help keep pt on side (#[id number] or #[id number]- large). *Z-Flows are for positioning, DO NOT SIT ON!    Keep heels elevated    As able keep HOB below 30 degrees    Low air loss specialty mattress ordered    NOTE: If pt is refusing to turn or reposition they must be educated on the potential injury from not offloading pressure. Then this \"educated refusal\" needs to be documented as an \"educated refusal to turn/ reposition\" and document if alert, etc. Additionally please notify MD and charge nurse of refusal(s).    Plan for wound care to wound located on coccyx/Right Gluteal Cleft:  1. Clean wound with MicroKlenz Spray, pat dry  2. Paint with No Sting Skin Prep (#360674) and allow to dry thoroughly  3. Press a Mepilex Sacral Dressing (#[id number]) to the area, making sure to conform nicely to skin curvatures  4. Time and date dressing change and clarisse with a \"T\" for treatment of a wound  5. Reposition pt every 1 to 2 hours when in bed and hourly when up to the chair to relieve pressure and promote perfusion to tissue  NOTE** make sure to continue to assess under the Mepilex Dressing BID and document findings.      "

## 2017-05-23 NOTE — PLAN OF CARE
Problem: Goal Outcome Summary  Goal: Goal Outcome Summary  Outcome: No Change  A&O to self, place at times. Lethargic, improved throughout the night. Hypotensive, jessee; other VSS. Tele NSR. Denies pain/nausea. Infrequent weak cough. Coccyx dressing CDI, repo q 2 hr. +2 BLE edema. Triple IJ hep locked. Given full bed bath with linen change. Pt appeared to rest comfortably throughout the night. Will continue to monitor.

## 2017-05-23 NOTE — PROGRESS NOTES
[facility]  Hospitalist Progress Note    Assessment & Plan   Nel Farmer is a 61-year-old woman with history of schizoaffective disorder, cognitive impairment usually oriented to just self and place per TCU report, who has had 2 days of lethargy, decreased activity, decreased intake. Lab work up for evaluation at TCU showed potassium of 6.1 with a creatinine of 4 and so was brought to ER 5/16/17. Work up in ER showed possible UTI, LYLA, hyperkalemia. UTI consistent with yeast, no bacteria found.     Acute metabolic encephalopathy likely secondary to severe dehydration  Acte on chronic stage 4 renal failure secondary to lithium toxicity. LYLA resolved.   Patient has diabetes insipidus, and was also on amiloride, likely contributing to dehydration and metabolic encephalopathy, though mostly with decreased response frequently at baseline. Also on ativan, risperidone, zyprexa and depakote which could contribute to encephalopathy. UA also suggestive of UTI possibly contributing but no bacterial growth seen. Depakote level normal.   - Head CT 5/22 negative for acute process. Psychiatrist consulted, discussed with Dr Vasquez- risperidal discontinued and HS depakote dose decreased and zyprexa continued.  - Holding PTA ativan   - If no improvement, ? MRI brain.     Severe dehydration with acute kidney injury/hypernatremia and hyperkalemia  Her baseline creatinine is around 2.4 with creatinine on admission of 3.41, K 6.8 at admission. In the ED, she was given 2 liters of fluid, bicarbonate, insulin. Poor oral intake, DI and being on amiloride PTA likely causing dehydration and acute kidney injury on top of CKD. Her fluid intake had been limited at the skilled nursing facility since patient was prone to throwing cups at staff. Potassium normalized.  - Nephrology was following & now signed-off.   - Monitor intake output, Cr  At baseline as of 5/21      Urinary tract infection, yeast  Had  white blood  cells and 2-5 WBC casts on a catheter specimen of a UA. She has a history of Klebsiella urinary tract infection in the past with sensitivity to most antibiotics tested including ceftriaxone and fluoroquinolones. She was given Levaquin 750 mg in the emergency department. >100k yeast. Blood cultures NGTD. D/c'd levofloxacin on   - Urine culture and blood cultures were sent, follow. Blood  shows Staph capitis likely a contaminant and repeat blood cultures NGTD  - central line placed (RIJ) on  since unable to get PICC at that time and needed access for IVF and medications       Hypertension  BP elevated, also tachycardic which could be due to dehydration.  - PTA PO anti HTN meds including amlodipine, clonidine, isosorbide, mononitrate, metoprolol continued.  - PRN hydralazine/metoprolol IV as well ordered.  - Holding amiloride and Lasix given Godwin decreased intake and DI.      Diabetes mellitus type 2 on insulin  On glipizide and 70/30 insulin (20 units in am and 34 units in the pm). Diet was resumed, but PO intake is low so continue to hold glipizide. Consistently >300 range despite minimal po intake.   - Increased 70/30 insulin to PTA dosin units in the evening and 20 units in the morning.  - Moderate insulin resistance ISS   - Moderate CHO diet.  - if D5 drip is needed, then will increase insulin further.      Elevated Lipase  Unclear significance. Denies abd pain. No nausea/vomiting noted. CT abd-pelvis (though non contrast) shows atrophic pancreas. Unlikely this is pancreatitis.   - Diet resumed, monitor for any worsening GI symptoms  - Lipase was slighty elevated. Follow symptomatically.       Diabetes insipidus  Induced by lithium.   - Lithium was discontinued before this hospitalization.  - Continue to monitor output and sodiums  - IVF was discontinued  and sodium has gone up, recheck now, if >145, will restart D5 drip  - encourage PO free water.      Schizoaffective disorder  Paranoid  Schizophrenia  Cognitive Deficits  PTA is on depakote, risperidal and zyprexa and ativan.  - Has had waxing-waning brief periods of alertness.   - appreciate psych consult, risperidal stopped and HS depakote dose decreased 5/23       Gastroesophageal reflux disease  -Continue omeprazole    Active Diet Order      Combination Diet 2 gm K Diet; Dysphagia Diet Level 1: Pureed; Nectar Thickened Liquids (pre-thickened or use instant food thickener) (via teaspoon)    DVT prophylaxis: D/c Heparin SQ 5/22 due to dropping platelet count    Code Status: Full Code    Disposition: Expected discharge once baseline mental status is achieved. Unclear at this time. I have called her mom but unable to reach her.     Carson Schuster MD  Hospitalist  ~~~~~~~~~~~~~~~~~~~~~~~~~~~~~~~~~~~~~~~~~~~~~~~  Interval History      Intermittently lethargic overnight and this morning as well. Responded to commands but goes back to sleep quickly. Unable to obtain ROS due to her mental status.    - did not get Olanzepine for 2 nights but received last night.    -Data reviewed today: I reviewed all new labs and imaging results over the last 24 hours. I personally reviewed no images or EKG's today.    Physical Exam   Temp: 97.5  F (36.4  C) Temp src: Oral BP: 144/63   Heart Rate: 81 Resp: 16 SpO2: 98 % O2 Device: None (Room air)    Vitals:    05/18/17 0600 05/20/17 0552 05/23/17 0513   Weight: 81.8 kg (180 lb 5.4 oz) 89.5 kg (197 lb 4.8 oz) 86 kg (189 lb 11.2 oz)     Vital Signs with Ranges  Temp:  [97.5  F (36.4  C)-98  F (36.7  C)] 97.5  F (36.4  C)  Heart Rate:  [57-81] 81  Resp:  [16-20] 16  BP: ()/(44-63) 144/63  SpO2:  [94 %-98 %] 98 %  I/O last 3 completed shifts:  In: 1141 [P.O.:480; I.V.:661]  Out: 2325 [Urine:2325]    Constitutional: Sleepy, NAD, minimally conversive  HEENT: Pupils are equal, sluggish.  Respiratory: shallow breaths, no wheezing tachypnea or resp distress.  Cardiovascular: RRR, no murmurs, no tachycardia.  Trace LE  edema  GI: soft, non-tender, nondistended  Skin/Integument: warm, no acute rashes  Musc: RIDER  Neuro: followed verbal command with hand  on Lt, Rt appears weaker, facial symmetry preserved.    Medications        heparin lock flush  5-10 mL Intracatheter Q24H     sodium bicarbonate  650 mg Oral BID     insulin isophane & regular  20 Units Subcutaneous QAM     insulin isophane & regular  34 Units Subcutaneous Daily with supper     divalproex  1,500 mg Oral At Bedtime     divalproex  500 mg Oral QAM     insulin aspart  1-7 Units Subcutaneous TID AC     insulin aspart  1-5 Units Subcutaneous At Bedtime     amLODIPine  5 mg Oral Daily     cloNIDine  0.2 mg Oral BID     isosorbide mononitrate  30 mg Oral Daily     metoprolol  25 mg Oral Daily     OLANZapine (zyPREXA) tablet 5 mg  5 mg Oral At Bedtime     omeprazole (priLOSEC) CR capsule 20 mg  20 mg Oral BID AC     risperiDONE (risperDAL) tablet 1 mg  1 mg Oral At Bedtime       Data     Recent Labs  Lab 05/23/17  0530 05/22/17  0600 05/21/17  0936 05/20/17  0739 05/19/17  0700 05/18/17  0550  05/17/17  1320 05/17/17  0519  05/16/17  1907   WBC 9.0  --   --   --   --  5.8  --   --  5.5  --  6.8   HGB 9.1*  --   --   --   --  12.2  --   --  12.0  < > 12.6   MCV 94  --   --   --   --  97  --   --  96  --  95   PLT 83* 83*  --   --  93* 126*  --   --  141*  --  143*   *  --  137 129* 136 147*  < > 148* 151*  < > 143   POTASSIUM 3.7  --  3.9 4.4 4.2 4.2  < > 5.7* 5.5*  < > 6.8*   CHLORIDE 114*  --  107 101 106 116*  < > 119* 122*  --  117*   CO2 20  --  19* 14* 17* 22  < > 23 21  --  15*   BUN 38*  --  39* 40* 40* 43*  < > 49* 51*  --  55*   CR 2.40*  --  2.33* 2.54* 2.59* 2.77*  < > 2.93* 3.10*  --  3.41*   ANIONGAP 11  --  11 14 13 9  < > 6 8  --  11   BENTLEY 10.5*  --  10.0 9.7 10.5* 11.5*  < > 12.5* 12.0*  --  12.3*   *  --  159* 346* 296* 296*  < > 305* 140*  < > 198*   ALBUMIN  --   --  2.3*  --  2.3*  --   < > 2.8*  --   --  3.1*   PROTTOTAL  --   --   --    --   --   --   --   --   --   --  7.7   BILITOTAL  --   --   --   --   --   --   --   --   --   --  0.3   ALKPHOS  --   --   --   --   --   --   --   --   --   --  68   ALT  --   --   --   --   --   --   --   --   --   --  14   AST  --   --   --   --   --   --   --   --   --   --  19   LIPASE  --   --   --   --   --  1367*  --  1497*  --   --  756*   < > = values in this interval not displayed.    Imaging:  Recent Results (from the past 24 hour(s))   CT Head w/o Contrast    Narrative    CT HEAD W/O CONTRAST  5/22/2017 4:26 PM    HISTORY: Lethargy and possible arm weakness.    TECHNIQUE: Scans were obtained through the head without IV contrast.   Radiation dose for this scan was reduced using automated exposure  control, adjustment of the mA and/or kV according to patient size, or  iterative reconstruction technique.    COMPARISON: 3/30/2017.    FINDINGS: Moderate atrophy. Benign calcification of the anterior falx.   No hemorrhage, mass lesion, or focal area of acute infarction  identified. Paranasal sinuses are normal. No bony abnormality. No  appreciable interval change.      Impression    IMPRESSION:   1. Atrophy.  2. Nothing acute.  3. No interval change.    HEATHER POSADAS MD

## 2017-05-23 NOTE — PROGRESS NOTES
"BRIEF NUTRITION ASSESSMENT      REASON FOR ASSESSMENT:  LOS    NUTRITION HISTORY:  Pt is a NH resident  Per transfer sheets, she is on a \"renal, moderate CHO\" diet  A nutrition supplement is offered if pt refuses meals    Unable to obtain diet history (altered mental status)    Per NH staff - \"eating less for the last 2 days\" (PTA)    CURRENT DIET AND INTAKE:  Diet:  DD1, NTL, 2gm K                Not Appropriate for Room Service (receives standard meal trays)         Chart reviewed  Overall intake averages ~50% meals    5/17: SLP eval ---> rec DD1, NTL diet    ANTHROPOMETRICS:  Height: 5'4\"  Weight: (5/23) 86 kg  BMI: 32.5 kg/m2  IBW:  54.5 kg  Weight Status: Obesity Grade I BMI 30-34.9  %IBW: 158%  Weight History: Records reflect 36# wt loss past 2 months and 12# wt loss past week (??)  Wt Readings from Last 10 Encounters:   05/23/17 86 kg (189 lb 11.2 oz)   04/02/17 92.4 kg (203 lb 11.3 oz)   03/25/17 102.2 kg (225 lb 5 oz)   06/20/16 108.9 kg (240 lb)   05/27/16 108.9 kg (240 lb)   05/21/16 108.9 kg (240 lb)   05/07/16 109.8 kg (242 lb)   05/05/16 110.7 kg (244 lb)   04/30/16 110.2 kg (243 lb)   03/09/16 109.1 kg (240 lb 9.6 oz)     Vitals:    05/16/17 1833 05/16/17 2300 05/17/17 0527 05/18/17 0600   Weight: 91.2 kg (201 lb) 81.9 kg (180 lb 8 oz) 80.2 kg (176 lb 12.9 oz) 81.8 kg (180 lb 5.4 oz)    05/20/17 0552 05/23/17 0513   Weight: 89.5 kg (197 lb 4.8 oz) 86 kg (189 lb 11.2 oz)       5/19: WOCN ---> \"Deep tissue injury present on admission, new blister at top right of coccyx deep tissue injury\"      LABS:  Labs noted    NUTRITION INTERVENTION:  Nutrition Diagnosis:  No nutrition diagnosis at this time.    Implementation:  Nutrition Education --> not appropriate at this time due to patient condition  Will send a Magic Cup thickened supplement with meals for added ayden/pro    FOLLOW UP/MONITORING:   Will re-evaluate in 7 - 10 days, or sooner, if re-consulted.          "

## 2017-05-23 NOTE — PLAN OF CARE
Problem: Goal Outcome Summary  Goal: Goal Outcome Summary  SLP: Patient was seen during lunch for diet tolerance. She was initially alert stating she already had ate. Started to feed patient and she became lethagric and only took several bites/sips. Delayed swallow noted with verbal cues to swallow. No overt Sx of aspiration but due to decreased ROSITA no further trials given. Given inability to participate and slow progress will decreased frequency to 3x/wk. Recommend: 1. Continue on the Dysphagia Diet level 1 with nectar thick liquids by spoon. 2. SLP will continue to follow for diet advancement. 3. Discharge to LTC when stable with continued speech therapy. .

## 2017-05-23 NOTE — PLAN OF CARE
Problem: Goal Outcome Summary  Goal: Goal Outcome Summary  Outcome: No Change  A/O to self. Lethargic at times. VSS, tele NSR. No reports of pain. Coccyx dressing CDI, repo q 2 hr. +2 BLE edema. D/C heparin per MD dt/ downward trending platelets. Triple lumen IJ hep locked. DD1 2g K w/NTL,  tolerating fair. Likes pills crushed in applesauce. Head CT negative. Will continue to monitor pt.

## 2017-05-24 NOTE — PLAN OF CARE
Problem: Goal Outcome Summary  Goal: Goal Outcome Summary  Outcome: No Change  A&O X1(self) not orientated to time place or situation. Pt is difficult to assess due to lack of cooperation. Pt will communicate with encouragement but illogical response. Pt is repositioned 2q. Pt needs 2 and lift. Pt will take medication in applesauce. DD1 diet with necator thick liquids. IJ dressing changed. continue to monitor.

## 2017-05-24 NOTE — PROVIDER NOTIFICATION
"MD Notification    Notified Person:  MD    Notified Persons Name: Dr. Dow    Notification Date/Time: May 23, 2017 2330    Notification Interaction:  Talked with Physician    Purpose of Notification: Per Dr. Gross note from 5/23 1600 \"restart D5 IVF if Na >145. Na 146. Want to restart IVF?    Orders Received: None    Comments: Per Dr. Dow, defer to AM rounder to address; BMP ordered for AM to assess Na level.  "

## 2017-05-24 NOTE — PROVIDER NOTIFICATION
Dr Schuster text paged for blood sugar 234, and order change for insulin every 4 hours, waiting for orders

## 2017-05-24 NOTE — PLAN OF CARE
Problem: Goal Outcome Summary  Goal: Goal Outcome Summary  Outcome: Declining  Pt very obtunded, needed firm sternal rub to arouse, however, this is fluctuating as later on she responded to voice.  Doesn't withdraw from pain especially on rt side.  Shallow breathing, but VSS.  Abnormal MRI, MD notified.  Neuro consulted, EEG done. Dunlap output minimal at this time.  Transferred to ICU.  Mother updated on plan of care.

## 2017-05-24 NOTE — PROGRESS NOTES
[facility]  Hospitalist Progress Note    Assessment & Plan   Nel Farmer is a 61-year-old woman with history of schizoaffective disorder, cognitive impairment usually oriented to just self and place per TCU report, who has had 2 days of lethargy, decreased activity, decreased intake. Lab work up for evaluation at TCU showed potassium of 6.1 with a creatinine of 4 and so was brought to ER 5/16/17. Work up in ER showed possible UTI, LYLA, hyperkalemia. UTI consistent with yeast, no bacteria found.     Acute metabolic encephalopathy likely secondary to severe dehydration  Acute subdural hematoma, Rt  Acte on chronic stage 4 renal failure secondary to lithium toxicity. LYLA resolved  Patient has diabetes insipidus, and was also on amiloride, likely contributing to dehydration and metabolic encephalopathy, though mostly with decreased response frequently at baseline. Also on ativan, risperidone, zyprexa and depakote which could contribute to encephalopathy. UA also suggestive of UTI possibly contributing but no bacterial growth seen. Depakote level normal.   - Head CT 5/22 negative for acute process. Psychiatrist consulted, discussed with Dr Vasquez- risperidal discontinued and HS depakote dose decreased and zyprexa continued.  - Holding PTA ativan   - MRI brain done today 5/24, shows SDH along the posterior aspect of the right cerebral convexity measuring up to 0.2 cm in thickness, not on anticoagulation. Unlikely this MRI finding is contributing to her mentation. BP has been well controlled. Discussed with Dr Artis, neuro consult requested, EEG is being done currently. Repeat imaging per neuro.  - ABG without hypercarbia  - No anticoagulants. Seizure precautions.   - Transfer to ICU discussed with intensivist  - Serial neuro checks, telemetry and continuous pulse ox.     Severe dehydration with acute kidney injury/hypernatremia and hyperkalemia  Her baseline creatinine is around 2.4 with creatinine  on admission of 3.41, K 6.8 at admission. In the ED, she was given 2 liters of fluid, bicarbonate, insulin. Poor oral intake, DI and being on amiloride PTA likely causing dehydration and acute kidney injury on top of CKD. Her fluid intake had been limited at the skilled nursing facility since patient was prone to throwing cups at staff. Potassium normalized.  - Nephrology was following, given worsening sodium, decreased urine output, and low bicarb, discussed with Dr Ramos, started on D5 with bicarb drip now.  - Monitor intake output, Cr trended to baseline as of , now up again with decreased output. IVF as above.      Urinary tract infection, yeast  Had  white blood cells and 2-5 WBC casts on a catheter specimen of a UA. She has a history of Klebsiella urinary tract infection in the past with sensitivity to most antibiotics tested including ceftriaxone and fluoroquinolones. She was given Levaquin 750 mg in the emergency department. >100k yeast. D/c'd levofloxacin on   - Urine culture and blood cultures were sent, follow. Blood / from  shows Staph capitis likely a contaminant and repeat blood cultures NGTD  - central line placed (RIJ) on  since unable to get PICC at that time and needed access for IVF and medications       Hypertension  BP elevated, also tachycardic which could be due to dehydration.  - PTA PO anti HTN meds including amlodipine, clonidine, isosorbide, mononitrate, metoprolol was continued- given NPO now, dc PO meds. On scheduled metoprolol  - PRN hydralazine/metoprolol IV as well ordered.  - Holding amiloride and Lasix given Godwin decreased intake and DI and hyperkalemia.      Diabetes mellitus type 2 on insulin  On glipizide and 70/30 insulin (20 units in am and 34 units in the pm).    - Given NPO, will change insulin to 25 units at HS and  15 units in AM- Increased 70/30 insulin to PTA dosin units in the evening and 15 units in the morning.  - Moderate insulin  "resistance ISS   - After transfer to ICU, if BS>180, will use IV insulin per ICU protocol      Elevated Lipase  Unclear significance. Denies abd pain. No nausea/vomiting noted. CT abd-pelvis (though non contrast) shows atrophic pancreas. Unlikely this is pancreatitis.   - Diet resumed, monitor for any worsening GI symptoms  - Lipase was slighty elevated. Follow symptomatically.       Diabetes insipidus  Induced by lithium.   - Lithium was discontinued before this hospitalization.  - Continue to monitor output and sodiums  - IVF was discontinued 5/22 and sodium has gone up.  - back on D5 and bicarb drip. Discussed with Dr Ramos.      Schizoaffective disorder  Paranoid Schizophrenia  Cognitive Deficits  PTA is on depakote, risperidal and zyprexa and ativan.  - Given obtundation, dc PO meds, depakote changed to IV.  - Has had waxing-waning brief periods of alertness.   - appreciate psych consult      Thrombocytopenia: Unclear etiology  - 140 on admission down to 80 but stable.  - HIT panel ordered.  - monitor count.   - if <50, or head bleed worse, will transfuse.      Gastroesophageal reflux disease  -Continue PPI IV    DVT prophylaxis: D/c Heparin SQ 5/22 due to dropping platelet count    Code Status: Full Code    Disposition: Expected discharge once baseline mental status is achieved. Unclear at this time. I discussed with her mom late yesterday and also updated her today over the phone regarding above.     Carson Schuster MD  Hospitalist  ~~~~~~~~~~~~~~~~~~~~~~~~~~~~~~~~~~~~~~~~~~~~~~~  Interval History      Patient was evaluated at noon, as somnolent, but arousable but was not following command. Said \"don't bother with my feet\" during exam. Noticed to have decreased movement of Rt hand during nursing care.  - Continues to have waxing and waning mental status throughout the day, she is obtunded needing sternal rub, but next minute wakes, verbalizes few words and becomes lethargic.   -Afebrile.     -Data " "reviewed today: I reviewed all new labs and imaging results over the last 24 hours. I personally reviewed no images or EKG's today.    Physical Exam   Temp: 98.9  F (37.2  C) Temp src: Oral BP: 102/59   Heart Rate: 95 Resp: 18 SpO2: 96 % O2 Device: None (Room air)    Vitals:    05/20/17 0552 05/23/17 0513 05/24/17 0608   Weight: 89.5 kg (197 lb 4.8 oz) 86 kg (189 lb 11.2 oz) 85.5 kg (188 lb 7.9 oz)     Vital Signs with Ranges  Temp:  [97.8  F (36.6  C)-99.7  F (37.6  C)] 98.9  F (37.2  C)  Heart Rate:  [] 95  Resp:  [16-18] 18  BP: (102-131)/(58-66) 102/59  SpO2:  [94 %-96 %] 96 %  I/O last 3 completed shifts:  In: 660 [P.O.:660]  Out: 1425 [Urine:1425]    Constitutional: Obtunded, NAD, minimally conversive at times  HEENT: Pupils are equal, sluggish.  Respiratory: shallow breaths, no wheezing tachypnea or resp distress.  Cardiovascular: RRR, no murmurs, no tachycardia.  Trace LE edema  GI: soft, non-tender, nondistended  Skin/Integument: warm, no acute rashes  Neuro: Fluctuating mental status. Was somnolent at noon, but during exam sais  \"don't bother with my feet\". Subsequently was obtunded and arousable with sternal rubs, now lethargic, arouses with minimal painful stimuli. facial symmetry preserved.COgwheel type of rigidity of extremities noted.     Medications     IV infusion builder WITH additives         divalproex  1,000 mg Oral At Bedtime     sodium bicarbonate  650 mg Oral BID     insulin isophane & regular  20 Units Subcutaneous QAM     insulin isophane & regular  34 Units Subcutaneous Daily with supper     divalproex  500 mg Oral QAM     insulin aspart  1-7 Units Subcutaneous TID AC     insulin aspart  1-5 Units Subcutaneous At Bedtime     amLODIPine  5 mg Oral Daily     cloNIDine  0.2 mg Oral BID     isosorbide mononitrate  30 mg Oral Daily     metoprolol  25 mg Oral Daily     OLANZapine (zyPREXA) tablet 5 mg  5 mg Oral At Bedtime     omeprazole (priLOSEC) CR capsule 20 mg  20 mg Oral BID AC "       Data     Recent Labs  Lab 05/24/17  0600 05/23/17  1645 05/23/17  0530 05/22/17  0600 05/21/17  0936  05/19/17  0700 05/18/17  0550   WBC  --   --  9.0  --   --   --   --  5.8   HGB  --   --  9.1*  --   --   --   --  12.2   MCV  --   --  94  --   --   --   --  97   PLT  --   --  83* 83*  --   --  93* 126*   * 146* 145*  --  137  < > 136 147*   POTASSIUM 4.0  --  3.7  --  3.9  < > 4.2 4.2   CHLORIDE 116*  --  114*  --  107  < > 106 116*   CO2 17*  --  20  --  19*  < > 17* 22   BUN 36*  --  38*  --  39*  < > 40* 43*   CR 2.54*  --  2.40*  --  2.33*  < > 2.59* 2.77*   ANIONGAP 12  --  11  --  11  < > 13 9   BENTLEY 10.3*  --  10.5*  --  10.0  < > 10.5* 11.5*   *  --  153*  --  159*  < > 296* 296*   ALBUMIN  --   --   --   --  2.3*  --  2.3*  --    LIPASE  --   --   --   --   --   --   --  1367*   < > = values in this interval not displayed.    Imaging:  Recent Results (from the past 24 hour(s))   MR Brain w/o Contrast    Narrative    MRI OF THE BRAIN WITHOUT CONTRAST May 24, 2017 2:44 PM     HISTORY: Lethargy. Right weakness. Evaluate for CVA.     TECHNIQUE:   Brain: Axial diffusion-weighted with ADC map, T2-weighted with fat  saturation, T1-weighted and turboFLAIR and coronal T1-weighted images  of the brain were obtained without intravenous contrast.     COMPARISON: Head CT 5/22/2017.    FINDINGS: There is thin abnormal signal along the posterior aspect of  the right cerebral convexity measuring up to 0.2 cm in thickness that  could represent a thin subdural hematoma. This was not definitely  visualized on the comparison head CT possibly due to its thin nature.  There is moderate diffuse cerebral volume loss. There are minimal  patchy periventricular areas of abnormal T2 signal hyperintensity in  the cerebral white matter bilaterally that are consistent with sequela  of chronic small vessel ischemic disease.     The ventricles and basal cisterns are within normal limits in  configuration given the  degree of cerebral volume loss. There is no  midline shift. There are no extra-axial fluid collections. There is no  evidence for recent infarct.     There is no sinusitis or mastoiditis.       Impression    IMPRESSION:    1. Possible thin subdural hematoma along the posterior aspect of the  right cerebral convexity measuring up to 0.2 cm in thickness.  2. Diffuse cerebral volume loss and cerebral white matter changes  consistent with chronic small vessel ischemic disease.      NARCISA VALDEZ MD

## 2017-05-24 NOTE — CONSULTS
DATE OF ADMISSION:  05/16/2017     DATE OF SERVICE:  05/23/2017      PSYCHIATRIC CONSULTATION       REQUESTING PHYSICIAN:  Carson Schuster MD      PRIMARY CARE PHYSICIAN:  None given      REASON FOR CONSULTATION:  Intermittent lethargy in a patient with schizoaffective disorder, schizophrenia and cognitive deficits.      IDENTIFYING DATA:  Nel Farmer is a 61-year-old woman who was recently discharged from [facility] on 04/04/2017 on account of acute metabolic encephalopathy associated with acute kidney injury, metabolic acidosis and suspected healthcare-associated pneumonia.  She has an established history of schizoaffective disorder and intellectual disability.  She had recently been placed in a TCU where she was found to be more lethargic and not interactive, not getting out of bed and eating less for the last 2 days on account of which her labs were obtained and she was found to have an elevated creatinine level of 4, potassium of 6.1 on account of which was sent to the emergency department here at [facility].  Psychiatry was consulted to help render an opinion on her intermittent lethargy.  Information was gathered through direct patient contact as well as chart review as the patient is a very poor historian.      HISTORY OF PRESENT ILLNESS:  Nel Farmer has had previous psychiatric hospitalizations and followed on an outpatient basis by Dr. José Kraft for psychiatric care.  She had typically been on the combination of Zyprexa, Risperdal and Depakote during this episode of care.  Depakote level has been therapeutic at 56 mg per liter and she has remained on prior to admission doses of Zyprexa at 1 mg each night at bedtime and Zyprexa 5 mg each night at bedtime.  Depakote had been maintained at a total daily dose of 2000 mg.  Prior to admission lorazepam has been discontinued on account of her lethargy.  It appears that she did not have any signs suggestive of fevers or  shortness of breath but did have laboratory evidence of a urinary tract infection with urinalysis positive with  white blood cells, 2-5 white blood cell casts, trace of ketones, moderate blood, 10-25 RBCs, many bacteria and moderate yeast.  It is also noted that she had become quite dehydrated, likely due to a combination of diabetes insipidus and her prior to admission use of amiloride.  Her fluid intake had been limited at the skilled nursing facility on account of her reported maladaptive behavior at the [facility] nursing facility where she was throwing cups of water at staff.  She received Levaquin 750 mg in the emergency department, but this has been discontinued.  Urine culture did not isolate any bacteria, but showed over 100,000 colonies of Candida albicans and Candida dubliniensis.  Blood culture resulted on 05/19/2017 was positive for Staphylococcus capitis and head CT without contrast done on 05/22/2017 suggested no interval change from last study on 03/30/2017 with findings only notable for moderate atrophy, benign calcification of the arterial falx with no hemorrhage, mass lesion or focal area of acute infarction.      On direct interview, the patient is a poor historian.  She appears lethargic.  She responds to her name but is not able to express herself.  She is not able to identify her current location and is not even able to name a pan that was shown to her.  She appears very drowsy and easily drifts to sleep.  She does not appear to be responding to internal stimuli and does not display any self-injurious behaviors.      PAST PSYCHIATRIC HISTORY:  As described above, the patient has been managed long-term by Dr. José Kraft with a combination of Depakote, Risperdal and Zyprexa.  Just had a couple of hospitalizations this year for medical reasons, but no recent psychiatric hospitalization.      CHEMICAL USE HISTORY:  None reported.      PAST MEDICAL HISTORY:     1.  Diabetes insipidus.   2.   Hyperlipidemia.     3.  Hypertension.     4.  Knee arthroplasty.     5.  Foot fracture.   6.  Tonsillectomy and adenoidectomy.   7.  D&C.   8.  Frequent falls.      MEDICATIONS PRIOR TO ADMISSION:   1.  Ativan 0.5 mg p.o. b.i.d. p.r.n.    2.  Catapres 0.2 mg p.o. b.i.d.   3.  Norvasc 5 mg p.o. daily.   4.  Amiloride 5 mg 2 p.o. daily.   5.  Toprol-XL 25 mg p.o. daily.   6.  Glucotrol-XL 2.5 mg p.o. b.i.d.    7.  Humulin mix 70/30 pen 20 units before breakfast, 30 units before dinner.   8.  Risperdal M-Tab 0.5 mg t.i.d. p.r.n.   9.  Senokot 8.6/50 mg 1 p.o. b.i.d. p.r.n. constipation.   10.  MiraLax 17 g p.o. daily as needed for constipation.   11.  Depakote  mg p.o. q.a.m.   12.  Depakote EC 1500 mg p.o. each night at bedtime.   13.  Lasix 20 mg p.o. daily p.r.n.   14.  Zyprexa 5 mg p.o. each night at bedtime.   15.  Risperdal 1 mg p.o. each night at bedtime.   16.  Omeprazole 20 mg p.o. b.i.d.    17.  Imdur 30 mg p.o. daily.      ALLERGIES:  Amoxicillin, haloperidol, penicillin and Seroquel.      FAMILY PSYCHIATRIC HISTORY:  Unknown.      SOCIAL HISTORY:  Unable to obtain from patient.  Records suggest that she currently resides at a skilled nursing facility, but prior to that had resided with her mother.  She had been in some sort of work program, but this has not been the case in recent time.      REVIEW OF SYSTEMS:  A 10-point review of systems could not be completed on account of the patient's lethargy.      VITAL SIGNS:  Blood pressure 144/63, respirations 16, temperature 97.5, pulse 81.     Weight 189 pounds.      MENTAL STATUS EXAMINATION:  This is a middle-aged woman who appears her stated age of 61.  She is seen at her bedside where she is lying supine and receiving IV hydration.  She is minimally responsive verbally.  She does respond to her name but does not respond to queries.  She looks around the room and intermittently screams out loudly.  Her thought process is difficult to assess as she has  been minimally verbal and she appears very internally preoccupied but does not respond to queries regarding the presence or absence of auditory or visual hallucinations.  Her speech is sparse.  Her language is limited.  Her gait and station are not assessed as she is currently bed bound.  She does not display any abnormal involuntary movements.  Her associations are concrete.  Her recall of recent and remote events cannot be assessed as patient was not responsive to queries.  Her attention span and concentration are poor.  Her insight and judgment are impaired.  Risk assessment at this time is considered moderate.      DIAGNOSTIC IMPRESSION:  Nel Farmer is a 61-year-old woman who was referred to the hospital on account of worsening lethargy and abnormal labs.  She was found to have evidence of urinary tract infection in the emergency room and was also noted to be dehydrated.  She has a history of chronic kidney disease and diabetes insipidus that appear to have worsened in the course of the preceding days.  She does have a history of prior use of lithium that essentially led to her diabetes insipidus.  Her current valproic acid level was 56 mg/L and she is on low doses of antipsychotic medications.  It is unclear if her lethargy is a result of her psychiatric medications, but they certainly can contribute to her lethargy.  Her baseline is already impaired.  Her case was discussed with her attending physician, Dr. Schuster, and it was agreed that her Depakote dose will be reduced to 1000 mg while discontinuing Risperdal at this time.      DIAGNOSES:   1.  Delirium due to multiple factors including dehydration, medications and urinary tract infection.   2.  Schizoaffective disorder, bipolar type by history.   3.  Intellectual disability, unspecified severity.   4.  Hypokalemia.   5.  Hypertension.   6.  Urinary tract infection.   7.  Diabetes mellitus type 2.   8.  Diabetes insipidus.      RECOMMENDATIONS:   1.   Medical management as you are.   2.  It is possible that her psychiatric medications may be contributing to her current presentation, but they are unlikely to be the primary reason she is lethargic plus these medications are not new for her and they are actually low dose of both medications.  She will therefore be placed at a lower dose of Depakote EC at 1000 mg at bedtime while Risperdal will be held and she will be maintained on olanzapine at 5 mg daily at bedtime.  Psychiatry will follow with you tomorrow to reevaluate her.         KAREY ALLEN MD             D: 2017 13:14   T: 2017 21:15   MT:       Name:     MARIO ALBERTO PATRICK   MRN:      [id number]        Account:       [id number]   :      1955           Consult Date:  2017      Document: [id number]

## 2017-05-24 NOTE — PROGRESS NOTES
HOSPITALIST SERVICE CROSS-COVER NOTE:    Ordered BMP in am for rising Na level.    Chelo Dow MD  Hospitalist  Pager # [phone]

## 2017-05-24 NOTE — CONSULTS
Please see dictation for full details    60 yo woman with fluctuating change sin mental status. MRI brain showed small SDH. Concern for seizures Will check EEG.    Neuro will follow.    Dahlia Artis MD  [facility]  5/24/2017 4:03 PM

## 2017-05-24 NOTE — PLAN OF CARE
Problem: Goal Outcome Summary  Goal: Goal Outcome Summary  Outcome: No Change  A&O to self, place at times. Lethargic. Tachy at times, other VSS. Tele ST. Denies pain/nausea. Infrequent weak cough. Coccyx dressing changed, repo q 2 hr. +2 BLE edema, elevated. Triple IJ hep locked.  MD notified regarding Dr. Gross note from 5/23 about restarting IVF (see MD notification). Large loose/watery stool x 2, first BM since 18th per chart; next will need possible C-Diff sample sent. Appeared to rest comfortably throughout the night. Will continue to monitor.

## 2017-05-24 NOTE — PLAN OF CARE
Problem: Individualization  Goal: Patient Preferences  Neuro: obtunded but opens eyes to pain. Per MD and RN pt have period of fluctuating mentation since admission  CV: SR, goal SBP < 140.   Pulm: on RA, LS dim, will go on BIPAP if desats overnight  GI/: NPO due to decreased alertness, cherry with adequate UOP  Skin: coccyx wound, mepilex changed, forehead with multiple pokes from EEG, left arm with bruises, skin dry and flaky  Lines: triple lumen RIJ  Restraints: none for now, bed alarm on for safety   Plan: continue to monitor, VBG tomorrow morning.

## 2017-05-24 NOTE — PLAN OF CARE
Problem: Goal Outcome Summary  Goal: Goal Outcome Summary  SLP: Pt tolerating diet of dysphagia level 1 and nectar thick liquids. Pt requires 4-5 sips of nectar liquids to clear oral residue with each bite of solids. No overt s/sx of aspiration with puree solids and nectar thick liquids via teaspoon. Advanced diet not trialed due to pt masticating solids with delayed swallow and moderate oral residue with pureed textures. Recommended: continue dysphagia diet level 1 with nectar thick liquids by spoon. Provide 4-5 liquid sips for each bite of solid. Oral cares after meals as needed. ST to continue to follow for diet advancement and DC to LTC with continued ST.

## 2017-05-24 NOTE — PLAN OF CARE
Problem: Goal Outcome Summary  Goal: Goal Outcome Summary  Outcome: No Change  Shift Update: A/O to self and place at times. Lethargic. Tachy at times, other VSS on RA. Tele ST. Denies pain. LS dim. Dressing changed on coccyx. Turn/repo q2hrs. Triple IJ hep locked. Dunlap patent, with adequate urine. No BM on day shift. Had large loose stools x2 on night shift (24). Will need c.diff sample sent if one more loose stool. Speech evaluated pt, recommends sips of nectar thick liquids using spoon inbetween bites of pureed food. Continue to monitor.

## 2017-05-24 NOTE — CONSULTS
NEUROLOGIC CONSULTATION       REQUESTING PHYSICIAN:  Dr. Schuster.       REASON FOR CONSULTATION:  Altered mental status.      HISTORY OF PRESENT ILLNESS:  Nel Farmer is a 61-year-old woman with a past medical history significant for intellectual disabilities, schizoaffective disorder, chronic kidney disease who has had intermittent hospitalizations recently for encephalopathy related to renal failure and metabolic acidosis.  The patient has been admitted to the hospital since 05/17, she has been noted by nursing and by physicians who are taking care of her that she has some fluctuating changes in mental status that were felt to be most psych related.  Dr. Schuster has been following her and became a little bit concerned about some episodes of left-sided weakness or not wanting to move her left side, he sent her for a CT scan of the head on 05/22 and then an MRI scan of the brain today.  The MRI scan of the brain came back as showing a right subdural hematoma measuring 2 mm in thickness that was not seen on the CT scan of the head, this prompted neurological consultation.  The patient will be transferred to the seventh floor.      Talking to nursing, it sounds as though the patient in the past hospital stays has not been particularly cooperative with nurses and often does not respond immediately, earlier today she was not even having her eyes open and responding, now she is, it is definitely fluctuating.      PAST MEDICAL HISTORY:  Significant for schizoaffective disorder, intellectual disability, osteoarthritis, hypertension, hyperlipidemia, type 2 diabetes, depression, chronic kidney disease stage 3 and anxiety.      PAST SURGICAL HISTORY:  Tonsil and adenoidectomy, D&C, colonoscopy and total knee arthroplasty on the left.      SOCIAL HISTORY:  The patient is not a tobacco user, not an alcohol user, she does not have any children, I believe that she lives with her mother.      FAMILY HISTORY:  Significant for  mother with hypertension, father with colorectal cancer.      REVIEW OF SYSTEMS:  Unable to obtain due to patient's lack of cooperation with examination.      CURRENT HOSPITAL MEDICATIONS:   1.  Tylenol to be used as needed.   2.  Amlodipine 5 mg daily.   3.  Bisacodyl 10 mg daily p.r.n. constipation.   4.  Clonidine 0.2 mg twice daily.   5.  Divalproex sprinkles 1000 mg at bedtime, divalproex sprinkles 500 mg q.a.m.   6.  Hydralazine 10 mg every 4 hours for elevated blood pressure.     7.  Humulin mix 70/30, 20 units each morning and 34 units daily with supper.     8.  Metoprolol 2.5 mg every 4 hours as needed for elevated blood pressure via IV.     9.  Metoprolol XL 25 mg daily.   10.  Omeprazole 20 mg twice daily.    11.  Zofran used as needed.   12.  Imdur 30 mg daily.   13.  Melatonin 1 mg each day at bedtime.   14.  Olanzapine 5 mg daily at bedtime.      PHYSICAL EXAMINATION:   VITAL SIGNS:  The patient's blood pressure is 102/59, pulse oximetry 96% on room air, respiratory rate 18, heart rate 95, temperature 98.9.   GENERAL APPEARANCE:  The patient is in the hospital bed not cooperative with examination but in no acute distress.   NEUROLOGICAL EXAMINATION:  The patient is awake lying in the bed with her eyes open, she responds to her name and says 'yes' but then really does not respond to any further, she does not follow any commands.  Her pupils are equal and reactive to light, her face does appear to be symmetric, blink rate appears normal.  She has no significant rigidity in the upper or lower extremities, she does appear to withdraw to painful stimulation but will not participate in strength testing to command.  The patient's muscle bulk appears normal in all 4 extremities.  Reflexes are normal and bilaterally symmetric in the upper and lower extremities.  Plantar responses are flexor bilaterally.  Sensation appears to be intact in all 4 extremities to painful stimuli and the patient withdraws.    CARDIOVASCULAR EXAMINATION:  Regular rate and rhythm with no significant murmurs.   LUNGS:  Clear to auscultation bilaterally.   EXTREMITIES:  The patient has multiple bruises on her knees and on her arms.  No peripheral edema.      IMPRESSION:  Mario Alberto has been admitted to the Hospitalist Service for the past week, she had a fluctuating mental status of unclear significance and an MRI scan today showed a possible subdural hemorrhage, I do not think the subdural hemorrhages anything that would need surgical intervention, we would just monitor and she is not on any antiplatelets; we certainly would not want to start any of antiplatelets or anticoagulation at this time.  The patient can have mechanical clot prevention and for her legs.  The biggest risk or concern is that potentially she has subclinical seizures related to subdural hemorrhage; we will obtain an EEG to rule that out and follow from there.      I should note that Risperdal was started and then stopped, Risperdal could create some Parkinson's- like symptoms and maybe that was what was seen the other day when she was using one side.  It looks to me as though it might have been stopped now and I would recommend continuing to avoid that; the patient certainly has many other medicines that would perhaps cause encephalopathy including lithium and Depakote and dosing is being directed by the psychiatry team.      Dr. Beasley will see the patient tomorrow.      Thank you for the consultation.         AAKASH PATRICIA MD             D: 2017 16:22   T: 2017 17:57   MT: [id number]      Name:     MARIO ALBERTO PATRICK   MRN:      [id number]        Account:       [id number]   :      1955           Consult Date:  2017      Document: [id number]

## 2017-05-25 NOTE — PROGRESS NOTES
Assessment and Plan:   LYLA/CKD: Cr drifting up last 4-5 days: 2.4 > > 2.7. I/O 1175/525 yesterday. Hx of lithium treatment and perhaps lithium nephropathy. Hx of diabetes insipidus due to Li. Na now nl. K 3.6 and HCO3 22. Wt 81.9 on 5/16 > > 84.5 5/25. Getting IVF: D5W with 50 of HCO3 at 150 per h. She remains NPO.   Monitor labs. Check Laura and FENa. No indication for dialysils.             Interval History:   Encephalopathy: neuro following.     Hypertension: on catapres patch, 0.1 mg, IV metoprolol 5 mg q 6 h.   Appears adequately controlled.             Review of Systems:   Unable.           Medications:       valproate (DEPACON) intermittent infusion  500 mg Intravenous Q8H     insulin isophane & regular  25 Units Subcutaneous Daily with supper     insulin isophane & regular  15 Units Subcutaneous QAM     pantoprazole  40 mg Intravenous QAM AC     metoprolol  5 mg Intravenous Q6H     cloNIDine   Transdermal Q8H     [START ON 5/31/2017] cloNIDine   Transdermal Weekly     cloNIDine  1 patch Transdermal Weekly     insulin aspart  1-6 Units Subcutaneous Q4H     OLANZapine (zyPREXA) tablet 5 mg  5 mg Oral At Bedtime       IV infusion builder WITH additives 150 mL/hr at 05/25/17 0730     Current active medications and PTA medications reviewed, see medication list for details.            Physical Exam:   Vitals were reviewed  Patient Vitals for the past 24 hrs:   BP Temp Temp src Heart Rate Resp SpO2 Weight   05/25/17 1100 155/85 - - 87 24 99 % -   05/25/17 1000 139/76 - - 83 24 98 % -   05/25/17 0900 135/77 - - 76 25 99 % -   05/25/17 0830 154/81 - - 75 20 99 % -   05/25/17 0800 151/78 98.1  F (36.7  C) Axillary 90 18 99 % -   05/25/17 0700 112/65 - - 81 17 99 % -   05/25/17 0600 122/78 97.5  F (36.4  C) Axillary 96 15 98 % -   05/25/17 0500 111/76 - - 95 13 98 % -   05/25/17 0400 102/58 98.2  F (36.8  C) Axillary 78 19 99 % 84.5 kg (186 lb 4.6 oz)   05/25/17 0300 119/65 - - 79 15 99 % -   05/25/17 0200  114/57 - - 78 13 100 % -   17 0100 114/56 - - 92 12 98 % -   17 0015 - - - - - 90 % -   17 0000 134/70 97.9  F (36.6  C) Axillary 85 9 98 % -   17 2300 105/61 - - 93 13 95 % -   17 2200 131/70 98.9  F (37.2  C) Axillary 100 15 96 % -   17 2100 120/72 - - 101 11 94 % -   17 2000 124/76 98.9  F (37.2  C) Axillary 93 14 96 % -   17 1900 99/48 - - 90 17 96 % -   17 1845 107/62 - - 92 15 95 % -   17 1830 97/50 - - 92 12 95 % -   17 1815 115/60 - - 99 12 96 % -   17 1800 - 98.6  F (37  C) Axillary - - - -   17 1532 102/59 98.9  F (37.2  C) Oral 95 18 96 % -       Temp:  [97.5  F (36.4  C)-98.9  F (37.2  C)] 98.1  F (36.7  C)  Heart Rate:  [] 87  Resp:  [9-25] 24  BP: ()/(48-85) 155/85  SpO2:  [90 %-100 %] 99 %    Temperatures:  Current - Temp: 98.1  F (36.7  C); Max - Temp  Av.4  F (36.9  C)  Min: 97.5  F (36.4  C)  Max: 98.9  F (37.2  C)  Respiration range: Resp  Av  Min: 9  Max: 25  Pulse range: No Data Recorded  Blood pressure range: Systolic (24hrs), Av , Min:97 , Max:155   ; Diastolic (24hrs), Av, Min:48, Max:85    Pulse oximetry range: SpO2  Av %  Min: 90 %  Max: 100 %    I/O last 3 completed shifts:  In:  [P.O.:300; I.V.:192]  Out: 1575 [Urine:1450; Emesis/NG output:125]      Intake/Output Summary (Last 24 hours) at 17 1211  Last data filed at 17 1000   Gross per 24 hour   Intake             2765 ml   Output             2050 ml   Net              715 ml       Obtunded, NG in place  No LE edema  Lungs with clear ant BS  Cor RRR nl S1 S2 no M       Wt Readings from Last 4 Encounters:   17 84.5 kg (186 lb 4.6 oz)   17 92.4 kg (203 lb 11.3 oz)   17 102.2 kg (225 lb 5 oz)   16 108.9 kg (240 lb)          Data:          Lab Results   Component Value Date     2017     2017     2017    Lab Results   Component Value Date    CHLORIDE 111  05/25/2017    CHLORIDE 117 05/24/2017    CHLORIDE 116 05/24/2017    Lab Results   Component Value Date    BUN 44 05/25/2017    BUN 41 05/24/2017    BUN 36 05/24/2017      Lab Results   Component Value Date    POTASSIUM 3.6 05/25/2017    POTASSIUM 4.3 05/24/2017    POTASSIUM 4.0 05/24/2017    Lab Results   Component Value Date    CO2 22 05/25/2017    CO2 17 05/24/2017    CO2 17 05/24/2017    Lab Results   Component Value Date    CR 2.74 05/25/2017    CR 2.82 05/24/2017    CR 2.54 05/24/2017        Recent Labs   Lab Test  05/25/17   0415  05/24/17   1600  05/23/17   0530   05/18/17   0550   WBC  6.9   --   9.0   --   5.8   HGB  9.8*   --   9.1*   --   12.2   HCT  31.8*   --   28.8*   --   40.2   MCV  96   --   94   --   97   PLT  145*  141*  83*   < >  126*    < > = values in this interval not displayed.     Recent Labs   Lab Test  05/25/17   1055  05/25/17   0415  05/16/17 1907  03/30/17   1515   05/14/15   0737   03/27/10   1615   03/22/10   1435   AST   --   5  19  12   < >   --    < >   --    < >  37   ALT   --   8  14  20   < >   --    < >   --    < >  8   ALKPHOS   --   50  68  75   < >   --    < >   --    < >  106   BILITOTAL   --   0.2  0.3  0.2   < >   --    < >   --    < >  0.3   BILICONJ   --    --    --    --    --    --    --    --    --   0.0   ZACARIAS  20   --    --    --    --   18   --   18   --    --     < > = values in this interval not displayed.       Recent Labs   Lab Test  05/16/17 1907 02/03/12   0740  02/02/12   0750   MAG  3.0*  2.6*  2.4*     Recent Labs   Lab Test  05/21/17   0936  05/19/17   0700  05/17/17   2005   PHOS  2.5  2.8  2.1*     Recent Labs   Lab Test  05/25/17   0415  05/24/17   1600  05/24/17   0600   BENTLEY  9.8  10.2*  10.3*       Lab Results   Component Value Date    BENTLEY 9.8 05/25/2017     Lab Results   Component Value Date    WBC 6.9 05/25/2017    HGB 9.8 (L) 05/25/2017    HCT 31.8 (L) 05/25/2017    MCV 96 05/25/2017     (L) 05/25/2017     Lab Results   Component  Value Date     05/25/2017    POTASSIUM 3.6 05/25/2017    CHLORIDE 111 (H) 05/25/2017    CO2 22 05/25/2017     (H) 05/25/2017     Lab Results   Component Value Date    BUN 44 (H) 05/25/2017    CR 2.74 (H) 05/25/2017     Lab Results   Component Value Date    MAG 3.0 (H) 05/16/2017     Lab Results   Component Value Date    PHOS 2.5 05/21/2017       Creatinine   Date Value Ref Range Status   05/25/2017 2.74 (H) 0.52 - 1.04 mg/dL Final   05/24/2017 2.82 (H) 0.52 - 1.04 mg/dL Final   05/24/2017 2.54 (H) 0.52 - 1.04 mg/dL Final   05/23/2017 2.40 (H) 0.52 - 1.04 mg/dL Final   05/21/2017 2.33 (H) 0.52 - 1.04 mg/dL Final   05/20/2017 2.54 (H) 0.52 - 1.04 mg/dL Final       Attestation:  I have reviewed today's vital signs, notes, medications, labs and imaging.     Champ aCin MD

## 2017-05-25 NOTE — PROGRESS NOTES
ICU Multi-Disciplinary Note  Patient condition reviewed and discussed while on multidisciplinary rounds today. No interventions performed. Anticipate transfer back to floor today  The Critical Care service will continue to follow peripherally while patient is within the ICU. We are readily available should issues arise.  Please feel free to contact us for anything with which we may be of assistance.    Diane Sanderson

## 2017-05-25 NOTE — CONSULTS
ICU consult note      61-year-old female with a recent admission to [facility] for LYLA was readmitted for lethargy, decreased activity and low intake. Her past medical history is significant for chronic kidney disease with schizoaffective disorder, cognitive impairment usually oriented to just self and place.  She was found to have hyperkalemia (6.1) with a creatinine of 4. Work up in ER on 5/16 showed possible UTI, LYLA, hyperkalemia. UTI consistent with yeast, no bacteria found.     She was treated on the floor up until today but was transferred to the ICU in view of her tenuous mental status. Apparently her mental status waxes and wanes to the point of obtundation but being arousable on noxious stimuli.     Currently her working diagnosis includes   acute metabolic encephalopathy worsened by dehydration (now corrected).  She also has chronic stage 4 renal failure (secondary to lithium toxicity)  DI   UTI    Neuro:   Continues to wax and wane, the obtundation at times is worrisome and hence the ICU admission  Head CT on 5/22 was negative for any acute process  EEG was done today  Considering MRI tomorrow  Neurology following  Schizoaffective disorder  Paranoid Schizophrenia  Cognitive Deficits  on depakote, risperidal and zyprexa and ativan.  risperidal stopped and HS depakote dose decreased 5/23 per psychiatry    Respiratory Status    Currently breathing spontaneously on room air. She is not in any respiratory distress, not snoring and seems stable with oxygen sats above 96%    Cardiovascular Status  Hemodynamically stable, not on any pressors  Hypertension at baseline on amlodipine, clonidine, isosorbide, mononitrate, metoprolol  Plan: To continue monitoring   PRN hydralazine    GI:  Enteral feeds, patient not eating well still  GERD  Plan: Continue current therapy  Nutritional consult as necessary    Renal:   Chronic kidney disease baseline creat 2.4   DI  Continue monitoring input/ouput    ID:   Currently not  on any antibiotics    Endocrine:       DM type 2 on insulin  On glipizide and 70/30 insulin (20 units in am and 34 units in the pm).   - Increased 70/30 insulin to PTA dosin units in the evening and 20 units in the morning.  - Moderate insulin resistance ISS   - Moderate CHO diet.    DI      Induced by lithium. (no longer on lithium)    DVT prophylaxis: D/c Heparin SQ  due to dropping platelet count     Code Status: Full Code     Disposition: ICU for tenuous neurological status.

## 2017-05-25 NOTE — PROGRESS NOTES
[facility]  Neuroscience and Sp[facility] [place]  Neurology Daily Note     10/11/11 8:35 AM        Assessment and Plan: 1.   Recurrent encephalopathy  2. Small SDH which is unlikely of any clinical significance.  3. No convincing evidence seizure activity  4. Will check ammonia level as on Depakote. Depakote level was ok earlier    Attestation:  This patient was seen and evaluated by me, David Beasley, separately from the house staff team or resident(s).  I have reviewed the note below and agree.          Interval History:   In ICU           Review of Systems:   Review of systems not obtained due to patient factors - confusion          Medications:     Current Facility-Administered Medications   Medication     D5W 1,000 mL with sodium bicarbonate 50 mEq/L infusion     valproate (DEPACON) 500 mg in D5W 50 mL intermittent infusion     insulin isophane & regular (HumuLIN MIX 70/30 PEN , NovoLIN MIX 70/30 PEN) injection 25 Units     insulin isophane & regular (HumuLIN MIX 70/30 PEN , NovoLIN MIX 70/30 PEN) injection 15 Units     pantoprazole (PROTONIX) 40 mg IV push injection     metoprolol (LOPRESSOR) injection 5 mg     cloNIDine (CATAPRES-TTS) Patch in Place     [START ON 5/31/2017] cloNIDine (CATAPRES-TTS) patch REMOVAL     cloNIDine (CATAPRES-TTS1) 0.1 MG/24HR WK patch 1 patch     hydrALAZINE (APRESOLINE) injection 10-20 mg     insulin aspart (NovoLOG) inj (RAPID ACTING)     miconazole (MICATIN; MICRO GUARD) 2 % powder     sodium chloride (PF) 0.9% PF flush 10-20 mL     heparin lock flush 10 UNIT/ML injection 5-10 mL     sodium chloride (PF) 0.9% PF flush 3 mL     metoprolol (LOPRESSOR) injection 2.5 mg     OLANZapine (zyPREXA) tablet 5 mg     risperiDONE (risperDAL M-TABS) ODT tab 0.5 mg     glucose 40 % gel 15-30 g    Or     dextrose 50 % injection 25-50 mL    Or     glucagon injection 1 mg     acetaminophen (TYLENOL) tablet 650 mg     naloxone (NARCAN) injection 0.1-0.4 mg     melatonin tablet 1 mg  "    senna-docusate (SENOKOT-S;PERICOLACE) 8.6-50 MG per tablet 1-2 tablet     polyethylene glycol (MIRALAX/GLYCOLAX) Packet 17 g     bisacodyl (DULCOLAX) Suppository 10 mg     ondansetron (ZOFRAN-ODT) ODT tab 4 mg    Or     ondansetron (ZOFRAN) injection 4 mg             Physical Exam:   Vitals: Blood pressure 112/65, pulse 90, temperature 97.5  F (36.4  C), temperature source Axillary, resp. rate 17, height 1.626 m (5' 4\"), weight 84.5 kg (186 lb 4.6 oz), last menstrual period 04/27/2012, SpO2 99 %, not currently breastfeeding.  Obtunded but arousable. Tells me her name. Eyes conjugate. Moves extremities in limited fashion but symmetric          Data:   ROUTINE IP LABS (Last 3results)  CBC RESULTS:     Recent Labs  Lab 05/25/17  0415 05/24/17  1600 05/23/17  0530   WBC 6.9  --  9.0   RBC 3.32*  --  3.07*   HGB 9.8*  --  9.1*   HCT 31.8*  --  28.8*   * 141* 83*     Basic Metabolic Panel:    Recent Labs  Lab 05/25/17  0415 05/24/17  1600 05/24/17  0600    146* 145*   POTASSIUM 3.6 4.3 4.0   CHLORIDE 111* 117* 116*   CO2 22 17* 17*   BUN 44* 41* 36*   CR 2.74* 2.82* 2.54*   * 239* 176*   BENTLEY 9.8 10.2* 10.3*     INR:No lab results found in last 7 days.   Lipid Profile:  Recent Labs   Lab Test  10/25/11   1504  10/21/10   0823  07/02/10   0848   CHOL  318*  219*  271*   HDL  33*  45*  35*   LDL  153.6*  133*  173*   TRIG  657*  207*  315*   CHOLHDLRATIO   --   4.9  8.0*     TSH:  TSH   Date Value Ref Range Status   05/16/2017 2.93 0.40 - 4.00 mU/L Final   ,   Vitamin B12:   Lab Results   Component Value Date    B12 1135 03/18/2011      A1C:   Lab Results   Component Value Date    A1C 7.0 05/16/2017     .               "

## 2017-05-25 NOTE — PROGRESS NOTES
"Essentially unchanged neurologically. Sommulent to obtunded, does not follow commands but calls out \"OW!\" with any noxious stimulation \"STOP IT\" Pt. began to sneeze & cough then began to vomit green drainage. NGT placed without difficulty, green returns.   "

## 2017-05-25 NOTE — PLAN OF CARE
Problem: Brain Injury, Moderate Traumatic (GCS 9-12) (Adult)  Goal: Signs and Symptoms of Listed Potential Problems Will be Absent or Manageable (Brain Injury, Moderate Traumatic)  Signs and symptoms of listed potential problems will be absent or manageable by discharge/transition of care (reference Brain Injury, Moderate Traumatic (GCS 9-12) (Adult) CPG).   Outcome: No Change  Essentially unchanged, sommulent, no verbalization, right arm slightly withdraws to pain.

## 2017-05-25 NOTE — PROCEDURES
ELECTROENCEPHALOGRAM      ORDERING PHYSICIAN:  Dahlia Artis MD      EEG #       EEG performed at [facility] 2017       Report and electroencephalogram is obtained in Mario Alberto Patrick during wakefulness.  EKG activity is monitored.  Background activity primarily consists of moderate amplitude delta and theta frequencies with occasional superimposed fast activity.  There is considerable muscle artifact throughout the recording period, in fact, it is fairly persistent over the left temporal region. There are times when this activity does lessen though it never goes away fully.  However, there is no definite epileptiform activity appreciated.  Heart rate is around 100.        IMPRESSION:  This is an abnormal recording by virtue of diffuse slowing of brain frequencies.  There is considerable muscle artifact throughout the recording, particularly in the left temporal region.  This does make interpretation difficult; however, no definite epileptiform activity was identified.  This pattern could be consistent with a metabolic encephalopathy.  Clinical correlation is advised.          HEIDI TIJERINA MD             D: 2017 20:21   T: 2017 12:19   MT: [email or web address]      Name:     MARIO ALBERTO PATRICK   MRN:      [id number]        Account:        [id number]   :      1955           Procedure Date: 2017      Document: [id number]

## 2017-05-25 NOTE — PROGRESS NOTES
[facility]  Hospitalist Progress Note    Assessment & Plan   Nel Farmer is a 61-year-old woman with history of schizoaffective disorder, cognitive impairment usually oriented to just self and place per TCU report, who has had 2 days of lethargy, decreased activity, decreased intake. Lab work up for evaluation at TCU showed potassium of 6.1 with a creatinine of 4 and so was brought to ER 5/16/17. Work up in ER showed possible UTI, LYLA, hyperkalemia. UTI consistent with yeast, no bacteria found.     Acute metabolic encephalopathy likely secondary to severe dehydration  Acute subdural hematoma, Rt  Acte on chronic stage 4 renal failure secondary to lithium toxicity. LYLA resolved  Patient has diabetes insipidus, and was also on amiloride, likely contributing to dehydration and metabolic encephalopathy, though mostly with decreased response frequently at baseline. Also on ativan, risperidone, zyprexa and depakote which could contribute to encephalopathy. UA also suggestive of UTI possibly contributing but no bacterial growth seen. Depakote level normal.   - Head CT 5/22 negative for acute process. Psychiatrist consulted, discussed with Dr Vasquez- risperidal discontinued and HS depakote dose decreased and zyprexa continued. Holding PTA ativan, re consulted psychiatrist again.  - MRI brain done 5/24, showed SDH along the posterior aspect of the right cerebral convexity measuring up to 0.2 cm in thickness, patient not on anticoagulation. Unlikely this MRI finding is contributing to her mentation. BP has been well controlled. Neuro consulted, EEG did not show seizure like activity, ABG without hypercarbia  - Transferred to ICU 5/24 and monitored overnight, evaluated by Dr Sun as well, etiology remains unclear. Slightly more awake at times but mostly remains lethargic.  - No blood thinners.  - Seizure precautions.   - Continue serial neuro checks, telemetry and continuous pulse ox.     Severe  dehydration with acute kidney injury/hypernatremia and hyperkalemia  Her baseline creatinine is around 2.4 with creatinine on admission of 3.41, K 6.8 at admission. In the ED, she was given 2 liters of fluid, bicarbonate, insulin. Poor oral intake, DI and being on amiloride PTA likely causing dehydration and acute kidney injury on top of CKD. Her fluid intake had been limited at the skilled nursing facility since patient was prone to throwing cups at staff. Potassium normalized.  - Nephrology was following, given worsening sodium, decreased urine output, and low bicarb, discussed with Dr Ramos and started on D5 with bicarb drip 5/24.  - Monitor intake output, Cr trended to baseline as of 5/21 then Cr and Na trended up again so now back on bicarb drip.      Urinary tract infection, yeast  Had  white blood cells and 2-5 WBC casts on a catheter specimen of a UA. She has a history of Klebsiella urinary tract infection in the past with sensitivity to most antibiotics tested including ceftriaxone and fluoroquinolones. She was given Levaquin 750 mg in the emergency department. >100k yeast. D/c'd levofloxacin on 5/18  - Urine culture and blood cultures were sent, follow. Blood 1/2 from 5/19 shows Staph capitis likely a contaminant and repeat blood cultures NGTD  - central line placed (RIJ) on 5/20 since unable to get PICC at that time and needed access for IVF and medications       Diarrhea and emesis: Unclear etiology, ? Due to head bleed.  - c diff sent  - has NG in place  - get XR abd to see if ileus/obstruction. If none, then clamping trial.     Hypertension  BP elevated, also tachycardic which could be due to dehydration.  - PTA PO anti HTN meds including amlodipine, clonidine, isosorbide, mononitrate, metoprolol was continued- given NPO now, dc PO meds. On scheduled metoprolol  - PRN hydralazine/metoprolol IV as well ordered.  - Holding amiloride and Lasix given Godwin decreased intake and DI and  hyperkalemia.      Diabetes mellitus type 2 on insulin  On glipizide and 70/30 insulin (20 units in am and 34 units in the pm).    - Given NPO, will start on lantus 15 units daily and continue moderate insulin resistance ISS       Elevated Lipase  Unclear significance. Denies abd pain. No nausea/vomiting noted. CT abd-pelvis (though non contrast) shows atrophic pancreas. Unlikely this is pancreatitis.   - Did not report worsening GI symptoms when placed on diet while she was awake.   - Lipase trended down. Follow symptomatically.       Diabetes insipidus  Induced by lithium. Lithium was discontinued before this hospitalization.  - Continue to monitor output and sodiums  - back on D5 and bicarb drip as Cr and sodium trended up.       Schizoaffective disorder  Paranoid Schizophrenia  Cognitive Deficits  PTA is on depakote, risperidal and zyprexa and ativan.  - Given obtundation, PO meds discontinued, depakote changed to IV.  - Has had waxing-waning brief periods of alertness.   - appreciate psych consult      Thrombocytopenia: Unclear etiology  - 140 on admission down to 80 but then improved and normal now.    Gastroesophageal reflux disease  -Continue PPI IV    DVT prophylaxis: D/c Heparin SQ 5/22 due to dropping platelet count    Code Status: Full Code    Disposition: Expected discharge once baseline mental status is achieved. Unclear at this time. I discussed with her mom and sister present at the bedside. Also followed up and discussed regarding code status and they both expressed DNI/DNR. Patient is unmarried, does not have kids and does not have POA. Will have further conversation when she is able to participate.  - transfer out of ICU to neuro floor, discussed with Dr Beasley and Dr Dino Schuster MD  Hospitalist  ~~~~~~~~~~~~~~~~~~~~~~~~~~~~~~~~~~~~~~~~~~~~~~~  Interval History      Patient had uneventful course in ICU, sleepy mostly but awake in between per RN. Her mom and sister at bedside. They  report she was awake intermittently and recognizes them.   - Patient is sleepy, follows verbal commands after repeated instructions.   - had emesis overnight and so NG was placed. Also had loose stools.   - unable to obtain ROS due to her mentation.  - Afebrile.     -Data reviewed today: I reviewed all new labs and imaging results over the last 24 hours. I personally reviewed no images or EKG's today.    Physical Exam   Temp: 98.2  F (36.8  C) Temp src: Axillary BP: 140/74   Heart Rate: 84 Resp: 18 SpO2: 99 % O2 Device: Nasal cannula Oxygen Delivery: 2 LPM  Vitals:    05/23/17 0513 05/24/17 0608 05/25/17 0400   Weight: 86 kg (189 lb 11.2 oz) 85.5 kg (188 lb 7.9 oz) 84.5 kg (186 lb 4.6 oz)     Vital Signs with Ranges  Temp:  [97.5  F (36.4  C)-98.9  F (37.2  C)] 98.2  F (36.8  C)  Heart Rate:  [] 84  Resp:  [7-25] 18  BP: ()/(48-85) 140/74  SpO2:  [90 %-100 %] 99 %  I/O last 3 completed shifts:  In: 2225 [P.O.:300; I.V.:1925]  Out: 1575 [Urine:1450; Emesis/NG output:125]    Constitutional: somnolent, NAD   HEENT: Pupils are equal, sluggish.  Respiratory: dim but clear, no wheezing tachypnea or resp distress.  Cardiovascular: RRR, no murmurs, no tachycardia. Trace LE edema  GI: soft, non-tender, nondistended  Skin/Integument: warm, no acute rashes.  Neuro: Fluctuating mental status. More awake earlier per RN/family, now sleepy, wakes up with verbal stimuli, follows 1-2 simple verbal commands, Rt hand  is weakes than Left. Cogwheel type of rigidity of extremities noted.     Medications     IV infusion builder WITH additives 150 mL/hr at 05/25/17 0730       valproate (DEPACON) intermittent infusion  500 mg Intravenous Q8H     pantoprazole  40 mg Intravenous QAM AC     metoprolol  5 mg Intravenous Q6H     cloNIDine   Transdermal Q8H     [START ON 5/31/2017] cloNIDine   Transdermal Weekly     cloNIDine  1 patch Transdermal Weekly     insulin aspart  1-6 Units Subcutaneous Q4H     OLANZapine (zyPREXA)  tablet 5 mg  5 mg Oral At Bedtime       Data     Recent Labs  Lab 05/25/17  0415 05/24/17  1600 05/24/17  0600  05/23/17  0530  05/21/17  0936   WBC 6.9  --   --   --  9.0  --   --    HGB 9.8*  --   --   --  9.1*  --   --    MCV 96  --   --   --  94  --   --    * 141*  --   --  83*  < >  --     146* 145*  < > 145*  --  137   POTASSIUM 3.6 4.3 4.0  --  3.7  --  3.9   CHLORIDE 111* 117* 116*  --  114*  --  107   CO2 22 17* 17*  --  20  --  19*   BUN 44* 41* 36*  --  38*  --  39*   CR 2.74* 2.82* 2.54*  --  2.40*  --  2.33*   ANIONGAP 10 12 12  --  11  --  11   BENTLEY 9.8 10.2* 10.3*  --  10.5*  --  10.0   * 239* 176*  --  153*  --  159*   ALBUMIN 2.0*  --   --   --   --   --  2.3*   PROTTOTAL 5.7*  --   --   --   --   --   --    BILITOTAL 0.2  --   --   --   --   --   --    ALKPHOS 50  --   --   --   --   --   --    ALT 8  --   --   --   --   --   --    AST 5  --   --   --   --   --   --    LIPASE 105  --   --   --   --   --   --    < > = values in this interval not displayed.    Imaging:  Recent Results (from the past 24 hour(s))   MR Brain w/o Contrast    Narrative    MRI OF THE BRAIN WITHOUT CONTRAST May 24, 2017 2:44 PM     HISTORY: Lethargy. Right weakness. Evaluate for CVA.     TECHNIQUE:   Brain: Axial diffusion-weighted with ADC map, T2-weighted with fat  saturation, T1-weighted and turboFLAIR and coronal T1-weighted images  of the brain were obtained without intravenous contrast.     COMPARISON: Head CT 5/22/2017.    FINDINGS: There is thin abnormal signal along the posterior aspect of  the right cerebral convexity measuring up to 0.2 cm in thickness that  could represent a thin subdural hematoma. This was not definitely  visualized on the comparison head CT possibly due to its thin nature.  There is moderate diffuse cerebral volume loss. There are minimal  patchy periventricular areas of abnormal T2 signal hyperintensity in  the cerebral white matter bilaterally that are consistent with  sequela  of chronic small vessel ischemic disease.     The ventricles and basal cisterns are within normal limits in  configuration given the degree of cerebral volume loss. There is no  midline shift. There are no extra-axial fluid collections. There is no  evidence for recent infarct.     There is no sinusitis or mastoiditis.       Impression    IMPRESSION:    1. Possible thin subdural hematoma along the posterior aspect of the  right cerebral convexity measuring up to 0.2 cm in thickness.  2. Diffuse cerebral volume loss and cerebral white matter changes  consistent with chronic small vessel ischemic disease.      NARCISA VALDEZ MD

## 2017-05-25 NOTE — PLAN OF CARE
Problem: Goal Outcome Summary  Goal: Goal Outcome Summary  Outcome: Improving  VSS.  Patient received scheduled metoprolol and prn hydralizine to keep SBP > 140.  Neuros wax and wanes, but appears more awake, see neuro assessment for further details.  Denies pain.  Voiding adequately.  Minimal stool from flexiseal, CDiff negative.  After discussion with family, patient made DNR/DNI. Updated patient and family on POC, questions answered.  Patient OK to transfer to neuro floor.

## 2017-05-25 NOTE — PROGRESS NOTES
"Pt. is sommulent, however with noxious stimuli shouts out \"JUST LEAVE ME ALONE!\" does not follow commands, moves all extremities to noxious stimulation except R arm, sensation intact x4 as pt. yells out \"OW!\" answered \"[place]\" when asked where she was, states her name, yells out \"[place]!\" when asked where she lives, then continued to repeat this several times, loudly, then becomes catatonic in appearance.  "

## 2017-05-26 NOTE — CONSULTS
[facility] Psychiatric Consult Progress Note      Interval History:   Pt seen, care reviewed with treatment team. Patient was seen for follow-up. She was described by her nurse as being a little more alert.  The MRI scan of the brain done on 5/24/17 came back as showing a right subdural hematoma measuring 2 mm in thickness that was not seen on the CT scan of the head, this prompted neurological consultation but was not thought to be contributory. She was seen at bedside today and she was not responsive at all to verbal or tactile stimulation. She is now on IV Depacon. She has not been agitated nor displayed overt psychosis.    Review of systems:   The Review of Systems is negative other than noted in the HPI     Medications:       [START ON 5/27/2017] insulin glargine  25 Units Subcutaneous QAM AC     valproate (DEPACON) intermittent infusion  500 mg Intravenous Q8H     pantoprazole  40 mg Intravenous QAM AC     metoprolol  5 mg Intravenous Q6H     cloNIDine   Transdermal Q8H     [START ON 5/31/2017] cloNIDine   Transdermal Weekly     cloNIDine  1 patch Transdermal Weekly     insulin aspart  1-6 Units Subcutaneous Q4H     OLANZapine (zyPREXA) tablet 5 mg  5 mg Oral At Bedtime     hydrALAZINE, miconazole, sodium chloride (PF), heparin lock flush, sodium chloride (PF), metoprolol, risperiDONE, glucose **OR** dextrose **OR** glucagon, acetaminophen, naloxone, melatonin, senna-docusate, polyethylene glycol, bisacodyl, ondansetron **OR** ondansetron      Mental Status Examination:     Appearance:  somnolent  Eye Contact:  poor   Speech:  mute  Psychomotor Behavior:  no evidence of tardive dyskinesia, dystonia, or tics  Mood:  NA  Affect:  intensity is blunted  Thought Process:  NA NA  Thought Content:  Internally preoccupied  Oriented to:  NA  Attention Span and Concentration:  poor  Recent and Remote Memory:  poor  Fund of Knowledge: NA  Muscle Strength and Tone: flaccid  Gait and Station: NA  Insight:   NA  Judgment:  NA        Labs/vitals:     Recent Results (from the past 24 hour(s))   Fractional excretion of sodium    Collection Time: 05/25/17  1:30 PM   Result Value Ref Range    Creatinine Urine 19 mg/dL    Sodium Urine mmol/L 25 mmol/L    %FENA 2.3 %   Creatinine    Collection Time: 05/25/17  1:30 PM   Result Value Ref Range    Creatinine 2.50 (H) 0.52 - 1.04 mg/dL    GFR Estimate 20 (L) >60 mL/min/1.7m2    GFR Estimate If Black 24 (L) >60 mL/min/1.7m2   Sodium    Collection Time: 05/25/17  1:30 PM   Result Value Ref Range    Sodium 142 133 - 144 mmol/L   Glucose by meter    Collection Time: 05/25/17  4:03 PM   Result Value Ref Range    Glucose 247 (H) 70 - 99 mg/dL   Glucose by meter    Collection Time: 05/25/17  7:42 PM   Result Value Ref Range    Glucose 267 (H) 70 - 99 mg/dL   Glucose by meter    Collection Time: 05/25/17 11:50 PM   Result Value Ref Range    Glucose 247 (H) 70 - 99 mg/dL   Glucose by meter    Collection Time: 05/26/17  3:38 AM   Result Value Ref Range    Glucose 237 (H) 70 - 99 mg/dL   Basic metabolic panel    Collection Time: 05/26/17  6:20 AM   Result Value Ref Range    Sodium 142 133 - 144 mmol/L    Potassium 2.9 (L) 3.4 - 5.3 mmol/L    Chloride 107 94 - 109 mmol/L    Carbon Dioxide 28 20 - 32 mmol/L    Anion Gap 7 3 - 14 mmol/L    Glucose 229 (H) 70 - 99 mg/dL    Urea Nitrogen 39 (H) 7 - 30 mg/dL    Creatinine 2.41 (H) 0.52 - 1.04 mg/dL    GFR Estimate 20 (L) >60 mL/min/1.7m2    GFR Estimate If Black 25 (L) >60 mL/min/1.7m2    Calcium 9.2 8.5 - 10.1 mg/dL   CBC with platelets differential    Collection Time: 05/26/17  6:20 AM   Result Value Ref Range    WBC 9.5 4.0 - 11.0 10e9/L    RBC Count 3.14 (L) 3.8 - 5.2 10e12/L    Hemoglobin 9.3 (L) 11.7 - 15.7 g/dL    Hematocrit 29.5 (L) 35.0 - 47.0 %    MCV 94 78 - 100 fl    MCH 29.6 26.5 - 33.0 pg    MCHC 31.5 31.5 - 36.5 g/dL    RDW 16.1 (H) 10.0 - 15.0 %    Platelet Count 150 150 - 450 10e9/L    Diff Method Manual Differential     %  Neutrophils 65.0 %    % Lymphocytes 20.0 %    % Monocytes 11.0 %    % Eosinophils 1.0 %    % Basophils 0.0 %    % Metamyelocytes 2.0 %    % Myelocytes 1.0 %    Absolute Neutrophil 6.2 1.6 - 8.3 10e9/L    Absolute Lymphocytes 1.9 0.8 - 5.3 10e9/L    Absolute Monocytes 1.0 0.0 - 1.3 10e9/L    Absolute Eosinophils 0.1 0.0 - 0.7 10e9/L    Absolute Basophils 0.0 0.0 - 0.2 10e9/L    Absolute Metamyelocytes 0.2 (H) 0 10e9/L    Absolute Myelocytes 0.1 (H) 0 10e9/L   Glucose by meter    Collection Time: 05/26/17  7:53 AM   Result Value Ref Range    Glucose 227 (H) 70 - 99 mg/dL   Glucose by meter    Collection Time: 05/26/17 11:09 AM   Result Value Ref Range    Glucose 203 (H) 70 - 99 mg/dL     B/P: 116/68, T: 97.6, P: 90, R: 16    Impression:   Nel Farmer is a 61-year-old woman who was referred to the hospital on account of worsening lethargy and abnormal labs.  She was found to have evidence of urinary tract infection in the emergency room and was also noted to be dehydrated.  She has a history of chronic kidney disease and diabetes insipidus that appear to have worsened in the course of the preceding days.  She does have a history of prior use of lithium that essentially led to her diabetes insipidus.  Her current valproic acid level was 56 mg/L and she is on low doses of antipsychotic medications.  It is unclear if her lethargy is a result of her psychiatric medications, but they certainly can contribute to her lethargy.        DIagnoses:     1.  Delirium due to multiple factors including dehydration, medications and urinary tract infection.   2.  Schizoaffective disorder, bipolar type by history.   3.  Intellectual disability, unspecified severity.   4.  Hypokalemia.   5.  Hypertension.   6.  Urinary tract infection.   7.  Diabetes mellitus type 2.   8.  Diabetes insipidus.          Plan:   1. Written information given on medications. Side effects, risks, benefits reviewed.  2. Discontinue scheduled antipsychotics  and offer Zyprexa Zydis 2.5-5 mg bid prn agitation or psychosis.  3. Medical management as you are.    TIME SPENT = 15 MINUTES    Attestation:  Patient has been seen and evaluated by me,  Liam Vasquez MD

## 2017-05-26 NOTE — PROGRESS NOTES
[facility] Nurse Inpatient Adult Pressure Injury (PI) Assessment      Follow up Assessment of PI(s) on pt's:  Coccyx/R Buttock  Data:   Patient History:    per MD note(s): Nel Farmer is a 61-year-old woman with history of intellectual disability, schizoaffective disorder, chronic kidney disease, recent admissions for acute encephalopathy associated with infection who most recently was admitted on 2017-2017 for acute metabolic encephalopathy associated with acute kidney injury, metabolic acidosis and suspected healthcare-associated pneumonia. Prior to that, she was admitted in March for acute encephalopathy associated with a UTI as well as acute kidney injury. She is usually just oriented to 1 and to possibly place, per my discussion with TCU.   Patient is unable to give a history at this time, so all of my history is obtained from the ER physician and discussion with the TCU staff. Apparently, Nel was more lethargic, not interactive, not getting up out of bed, eating less for the last 2 days. Due to this, they got labs today and her creatinine was elevated at 4 with a potassium of 6.1. They therefore sent her to the ED. They did not notice any fevers or chills or shortness of breath or cough. No abdominal pain was noted. No rashes or any other symptoms, just lethargy and decreased interactiveness and decreased appetite.      Current mattress:  Pulsate air mattress  Current pressure relieving devices: Foam dressing and Pillows    Moisture Management: Incontinence Protocol, Diaper and Urinary Catheter - had rectal tube but removed today -    Current Diet / Nutrition:   Active Diet Order      Combination Diet Dysphagia Diet Level 1: Pureed; Nectar Thickened Liquids (pre-thickened or use instant food thickener) (with teaspoon)    Willis Assessment and sub scores:  Willis Score  Av  Min: 10  Max: 15     Labs:  Recent Labs   Lab Test  17   0620  17   3794    05/16/17   1907   03/30/17   1515   ALBUMIN   --   2.0*   < >  3.1*   --   3.0*   HGB  9.3*  9.8*   < >  12.6   < >  10.9*   INR   --    --    --    --    --   1.19*   WBC  9.5  6.9   < >  6.8   < >  9.5   A1C   --    --    --   7.0*   < >   --     < > = values in this interval not displayed.       Pressure Injury Assessment (location): Coccyx and Right Gluteal Cleft   Wound History: Pt had recent increased weakness and lethargy spending a few days bed bound    Wound Base:  Yellow/tan/white leathery slough to coccyx area.  Edges pink and slightly moist.  Periwound and right gluteal cleft with scattered maceration and pink denudement, and peely flaky tissue to inner fleshy buttocks.     Specific Dimensions (length x width x depth, in cm) : 4 x 2.5 x 0.1+cm, unable to tell depth due to slough    Tunneling: N/A    Undermining: N/A    Palpation of the wound bed: normal, non-fluctuant    Slough appearance: adherent, smooth, yellowish, leathery    Eschar appearance: none    Periwound Skin: mild denudement, pink, blanchable    Temperature normal     Drainage: serosanguinous to slightly creamy, small     Odor: mild    Pain: minimal     Intervention:     Patient's chart evaluated.     Willis Interventions: Current Willis Interventions and Care Plan reviewed and updated, appropriate at this time.    Wound care to coccyx/R glut cleft supplies gathered, cleaned periarea with Evens and then wounds with microklenz, applied Triad paste, clean brief.  Pt repositioned on left side.     Orders updated     Supplies Reviewed and left at bedside    Discussed plan of care with Nurse, pt      Assessment:     Pressure Injury (PI) located on coccyx/ R gluteal cleft:  DTI (Deep tissue injury) present on admission, which has evolved into an Unstageable PI over the coccyx.  There is now a defined area of adherent yellowish slough, non-fluctuant, but depth unknown.    Will switch plan of care to Triad paste to wound to help debride the sloughy  "tissue.  This will also will be more practical than dressings, since wound very close to perianal area and rectal tube being removed today, and would be hard to keep dressings adhered or clean.      Pt now has low air loss mattress in place    Wound has no acute signs or symptoms of infection.      Plan:     Nursing to notify the Provider(s) and re-consult the [facility] Nurse if wound(s) deteriorate(s)or if the wound care plan needs reevaluation.    If pt is refusing to turn or reposition they must be educated on the potential injury from not off loading pressure. Then this \"educated refusal\" needs to be documented as an \"educated refusal to turn/ reposition\" and document if alert, etc.      PIP ACTIVITY MEASURES:    CHAIR: Pt should sit on a chair cushion ([id number]) when up to the chair and not sit for more than one hour at a time before fully offloading backside (either stand for a couple of minutes and/or return to bed, positioning on a side) to relieve pressure and re-perfuse tissue. Additionally, encourage pt to shift side to side every 15 minutes, too.     NOTE: the Z-Flow Pad is NOT a cushion, pt should NOT sit on the Z-Flow pads    BED: Reposition side to side every 1-2 hours when awake.     Consider Z-Norman Pillows to help keep pt on side (#[id number] or #[id number]- large). *Z-Flows are for positioning, DO NOT SIT ON!    Keep heels elevated    As able keep HOB below 30 degrees    Low air loss specialty mattress ordered    NOTE: If pt is refusing to turn or reposition they must be educated on the potential injury from not offloading pressure. Then this \"educated refusal\" needs to be documented as an \"educated refusal to turn/ reposition\" and document if alert, etc. Additionally please notify MD and charge nurse of refusal(s).      Plan for wound care to coccyx:  BID and prn:  1.  Clean entire perineal area with Evens lotion and soft dry wipes.    2.  Then cleanse open wounds with MicroKlenz spray, pat dry.   3.  Apply " moderate layer of Triad paste (in yellow tube) over wound and any raw/denuded areas.  No Mepilex, but can stick an ABD to area if needed if a lot of drainage.  Otherwise leave open to air.  4.  Continue to apply regular Critic-aid barrier paste to perianal area prn.   5.  Reposition pt every 1 to 2 hours side to side when in bed and hourly when up to the chair to relieve pressure and promote perfusion to tissue.  6.  Pulsate air mattress:  No cloth chux, no fitted sheets - only green lift sheet and a Covidien pad.  Use 'Autofirm' button on pump to make turning/boosting easier on pt and staff.    7.  Document wound findings in chart and notify WOC if worsens or any concerns.      WOC Nurse will return: Weekly and PRN  Face to face time: 30 Minutes

## 2017-05-26 NOTE — PLAN OF CARE
Problem: Goal Outcome Summary  Goal: Goal Outcome Summary  SLP: Dysphagia diet level 1 and nectar thick liquids via teaspoon targeted. No difficulty noted with multiple spoon sips. Minimal oral residue noted with applesauce textures requiring liquid wash to clear. Delayed cough x1 noted which may not be related to PO trials as it was also present at baseline. Vocal quality remained clear with pt yelling out,  leave me alone.  Recommended: dysphagia diet level 1, nectar thick liquids with teaspoon only, alternate 1-2 liquid sips after every solid bite, verify swallow before offering additional sips/bites, standard oral cares. ST to follow for diet tolerance and upgrade as appropriate. Continued ST recommended with discharge to LTC. Given improved participation and ROSITA, frequency changed to daily.

## 2017-05-26 NOTE — PROGRESS NOTES
[facility]  Hospitalist Progress Note    Assessment & Plan   Nel Farmer is a 61-year-old woman with history of schizoaffective disorder, cognitive impairment usually oriented to just self and place per TCU report, who has had 2 days of lethargy, decreased activity, decreased intake. Lab work up for evaluation at TCU showed potassium of 6.1 with a creatinine of 4 and so was brought to ER 5/16/17. Work up in ER showed possible UTI, LYLA, hyperkalemia. UTI consistent with yeast, no bacteria found.     Acute metabolic encephalopathy likely secondary to severe dehydration  Acute subdural hematoma, Rt  Acte on chronic stage 4 renal failure secondary to lithium toxicity. LYLA resolved  Patient has diabetes insipidus, and was also on amiloride, likely contributing to dehydration and metabolic encephalopathy, though mostly with decreased response frequently at baseline. Also on ativan, risperidone, zyprexa and depakote (500 mg at am and 1500 mg at HS)which could contribute to encephalopathy. UA also suggestive of UTI possibly contributing but no bacterial growth seen. Depakote level normal.   - Holding PTA ativan since admission. Head CT 5/22 negative for acute process. Psychiatrist consulted, had discussed with Dr Vasquez and risperidal discontinued and HS depakote dose decreased to 100 mg and decided to continue zyprexa- given NPO depakote changed to 500 mg IV TID and did not receive zyprexa yesterday night (5/25)  - MRI brain done 5/24, showed SDH along the posterior aspect of the right cerebral convexity measuring up to 0.2 cm in thickness, patient not on anticoagulation. Unlikely this MRI finding is contributing to her mentation. BP has been well controlled. Neuro consulted, EEG did not show seizure like activity, ABG without hypercarbia  - Transferred to ICU 5/24 and monitored overnight, evaluated by Dr Sun as well, etiology remains unclear, was mostly lethargic though slightly more awake so  transferred out of ICU 5/25.  - No blood thinners.  - Seizure precautions.   - Continue serial neuro checks, telemetry and continuous pulse ox.  - patient is much more awake today, best she has been. Psych has been re consulted, neuro has signed off.     Diarrhea and emesis: started having diarrhea and vomited while in ICU. NG and rectal tube as well were placed. c diff is negative. Had mildly elevated lipase which trended down.  - Only 100 cc output through NG, now more awake and denies pain/nausea. abd XR without bowel loop distension or air fluid level.  - dc NG  - SLP eval, if clears restart diet.    Severe dehydration with acute kidney injury/hypernatremia and hyperkalemia, now hypokalemic  Her baseline creatinine is around 2.4 with creatinine on admission of 3.41, K 6.8 at admission. In the ED, she was given 2 liters of fluid, bicarbonate, insulin. Poor oral intake, DI and being on amiloride PTA likely causing dehydration and acute kidney injury on top of CKD. Her fluid intake had been limited at the skilled nursing facility since patient was prone to throwing cups at staff.   - Hyperkalemia corrected and now low at 2.9 - given CKD not on replacement protocol, will give total 40 mEq this am and recheck and supplement per level.  - Nephrology was following, given worsening sodium, decreased urine output, and low bicarb, discussed with Dr Ramos and started on D5 with bicarb drip 5/24. She has difficulty maintaining PO intake and to maintain electrolyte, so will continue IVF, will have nephro see again regarding their input on fluid at dc.  - Monitor intake output       Urinary tract infection, yeast  Had  white blood cells and 2-5 WBC casts on a catheter specimen of a UA. She has a history of Klebsiella urinary tract infection in the past with sensitivity to most antibiotics tested including ceftriaxone and fluoroquinolones. She was given Levaquin 750 mg in the emergency department. >100k yeast. D/c'd  levofloxacin on 5/18  - Urine culture and blood cultures were sent, follow. Blood 1/2 from 5/19 shows Staph capitis likely a contaminant and repeat blood cultures NGTD  - central line placed (RIJ) on 5/20 since unable to get PICC at that time and needed access for IVF and medications     Hypertension  BP elevated, also tachycardic which could be due to dehydration.  - PTA PO anti HTN meds including amlodipine, clonidine, isosorbide, mononitrate, metoprolol was continued- given NPO, PO meds changed to IV. On scheduled metoprolol, and clonidine patch. if diet resumed, resume PO meds.  - PRN hydralazine/metoprolol IV as well ordered.  - Holding amiloride given hyperkalemia and Lasix given Godwin, decreased intake and DI       Diabetes mellitus type 2 on insulin  On glipizide and 70/30 insulin (20 units in am and 34 units in the pm).    - Given NPO, started on lantus 15 units daily, BS in 200, increase lantus to 25 units daily.  - Continue moderate insulin resistance ISS   - if Diet resumed, will transition to PTA indulin regimen.      Elevated Lipase  Unclear significance. Denies abd pain. Vomited once in ICU but none since then. CT abd-pelvis (though non contrast) shows atrophic pancreas. Unlikely this is pancreatitis.   - Lipase trended down.   - resume diet after SLP eval and monitor.      Diabetes insipidus  Induced by lithium. Lithium was discontinued before this hospitalization.  - Continue to monitor output and sodiums  - back on D5 and bicarb drip as above      Schizoaffective disorder  Paranoid Schizophrenia  Cognitive Deficits  PTA is on depakote, risperidal and zyprexa and ativan.  - medications have been adjusted as above.  - Has had waxing-waning brief periods of alertness.   - appreciate psych consult, reconsulted.      Thrombocytopenia: Unclear etiology  - 140 on admission down to 80 but then improved and normal now.    Gastroesophageal reflux disease  -Continue PPI     DVT prophylaxis: D/c Heparin SQ 5/22  due to dropping platelet count, use pcds    Code Status: DNR/DNI    Disposition: Expected discharge: Once diet resumed, able to tolerate diet and mentation stable. Also would like to see patient being off IVF for 24 hours prior to dc with stable Cr/sodium. Likely another 2 days at least.    I discussed with her mom and sister present at the bedside 5/25. Also followed up and discussed regarding code status and they both expressed DNI/DNR. Patient is unmarried, does not have kids and does not have POA. Will need to have further conversation when she is able to participate in conversation. She is more clear now but I don't think she is has decisional capacity to participate in the conversation or ever will be.    Carson Schuster MD  Hospitalist  ~~~~~~~~~~~~~~~~~~~~~~~~~~~~~~~~~~~~~~~~~~~~~~~  Interval History      No vomiting since yesterday and NG output was only 100 cc, also patient is more awake today (the best I have ever seen her in this hospitalization).   - denies abdominal pain, nausea.   - remains afebrile  - diarrhea has subsided, c diff negative.     -Data reviewed today: I reviewed all new labs and imaging results over the last 24 hours. I personally reviewed no images or EKG's today.    Physical Exam   Temp: 98  F (36.7  C) Temp src: Axillary BP: 120/60   Heart Rate: 74 Resp: 16 SpO2: 95 % O2 Device: None (Room air) Oxygen Delivery: 1 LPM  Vitals:    05/24/17 0608 05/25/17 0400 05/26/17 0631   Weight: 85.5 kg (188 lb 7.9 oz) 84.5 kg (186 lb 4.6 oz) 80.3 kg (177 lb 0.5 oz)     Vital Signs with Ranges  Temp:  [97.9  F (36.6  C)-98.7  F (37.1  C)] 98  F (36.7  C)  Heart Rate:  [74-98] 74  Resp:  [7-24] 16  BP: (115-165)/(57-87) 120/60  SpO2:  [94 %-99 %] 95 %  I/O last 3 completed shifts:  In: 3444 [I.V.:3444]  Out: 2980 [Urine:2880; Emesis/NG output:100]    Constitutional: alert awake, comfortable  HEENT: Pupils are equal, sluggish.  Respiratory: dim but clear, no wheezing tachypnea or resp  distress.  Cardiovascular: RRR, no murmurs, no tachycardia. No edema  GI: soft, non-tender, obese, BS (+)  Skin/Integument: warm, no acute rashes.  Neuro: alert awake. Knows her , told me she is at [place]. Calm and co operative. Face symmetrical, Rt arm is pronated and seems weaker 3/5 strength Lt hand strength normal. Weak dorsi and plantar flexion but did not make effort.      Medications     IV infusion builder WITH additives 150 mL/hr at 17 0341       insulin glargine  10 Units Subcutaneous Once     [START ON 2017] insulin glargine  25 Units Subcutaneous QAM AC     potassium chloride  20 mEq Intravenous Once     valproate (DEPACON) intermittent infusion  500 mg Intravenous Q8H     pantoprazole  40 mg Intravenous QAM AC     metoprolol  5 mg Intravenous Q6H     cloNIDine   Transdermal Q8H     [START ON 2017] cloNIDine   Transdermal Weekly     cloNIDine  1 patch Transdermal Weekly     insulin aspart  1-6 Units Subcutaneous Q4H     OLANZapine (zyPREXA) tablet 5 mg  5 mg Oral At Bedtime       Data     Recent Labs  Lab 17  0620 17  1330 17  0415 17  1600  17  0530  17  0936   WBC 9.5  --  6.9  --   --  9.0  --   --    HGB 9.3*  --  9.8*  --   --  9.1*  --   --    MCV 94  --  96  --   --  94  --   --      --  145* 141*  --  83*  < >  --     142 143 146*  < > 145*  --  137   POTASSIUM 2.9*  --  3.6 4.3  < > 3.7  --  3.9   CHLORIDE 107  --  111* 117*  < > 114*  --  107   CO2 28  --  22 17*  < > 20  --  19*   BUN 39*  --  44* 41*  < > 38*  --  39*   CR 2.41* 2.50* 2.74* 2.82*  < > 2.40*  --  2.33*   ANIONGAP 7  --  10 12  < > 11  --  11   BENTLEY 9.2  --  9.8 10.2*  < > 10.5*  --  10.0   *  --  282* 239*  < > 153*  --  159*   ALBUMIN  --   --  2.0*  --   --   --   --  2.3*   PROTTOTAL  --   --  5.7*  --   --   --   --   --    BILITOTAL  --   --  0.2  --   --   --   --   --    ALKPHOS  --   --  50  --   --   --   --   --    ALT  --   --  8  --   --    --   --   --    AST  --   --  5  --   --   --   --   --    LIPASE  --   --  105  --   --   --   --   --    < > = values in this interval not displayed.    Imaging:  No results found for this or any previous visit (from the past 24 hour(s)).

## 2017-05-26 NOTE — PROGRESS NOTES
[facility]  Progress Note  [facility]    Brief  visit. Introduced SH services.  Patient lying on her back with eyes open but did not respond to my yes, no questions.   Plan: SH will not follow at this time until patient can benefit from visits.       Serene Rios  Chaplain Resident  Pager [phone]

## 2017-05-26 NOTE — PROVIDER NOTIFICATION
"Text page to Dr. Schuster, \"K+ 3.2, would you like to order more replacement? Thanks\"    Per Dr. Schuster, give another 20mEq potassium chloride IV, recheck in AM.   "

## 2017-05-26 NOTE — PROGRESS NOTES
SW:  I: OCTAVIA spoke with pt's sister in law regarding POA/guardianship forms. Tana originally asking about notary availability in hospital. SW explained how to obtain notary services in hospital. Tana went on to explain that she have been involved with pt for a long time and at one point she worked for a year to have her placed in a group home. Shortly after, Tana was out of town and pt's mother moved her back home. Pt's mother is no long able to care for pt. SW reviewed with Tana that physician does not feel that she is able to make decisions at this point and she may not regain that ability. SW discussed guardianship process with [id number] and she indicated that she will need to pursue on behalf of pt.  P: SW will follow as needed.

## 2017-05-26 NOTE — CONSULTS
Patient seen for follow-up psychiatric consultation. Please see my note for details. Thanks.    Time spent = 15 minutes.

## 2017-05-26 NOTE — PROGRESS NOTES
Nephrology Progress Note          Assessment and Plan:       Acute on chronic renal failure better.  Hydration state looks ok.  Switch IV fluid to just D5W, now that NAGMA has resolved.  Agree with non-protocol KCL replacement.  Consider resumption of Amiloride if chemistries remain stable next 1-2 days.        * No resolved hospital problems. *               Interval History:   Opens eyes to verbal cues; otherwise no interaction during my visit.  VS ok.  Good rm air SaO2.  No oral intake recorded last 48 hrs.  Better UO with generous IV fluid volume, ~3L/day.  Recorded Wt much lower, down >4kg; suspect error since intake exceeds UO.  Meds and labs reviewed.  K+ down to 2.9; other lytes nl.  BUN/Cr down to about 40/2.4; close to baseline.  Other chemistries ok; glucose ~200.  CBC stable; Hgb >9.                  Medications:       [START ON 5/27/2017] insulin glargine  25 Units Subcutaneous QAM AC     valproate (DEPACON) intermittent infusion  500 mg Intravenous Q8H     pantoprazole  40 mg Intravenous QAM AC     metoprolol  5 mg Intravenous Q6H     cloNIDine   Transdermal Q8H     [START ON 5/31/2017] cloNIDine   Transdermal Weekly     cloNIDine  1 patch Transdermal Weekly     insulin aspart  1-6 Units Subcutaneous Q4H       IV infusion builder WITH additives                      Physical Exam:       Vital Sign Ranges  Temp:  [97.6  F (36.4  C)-98.7  F (37.1  C)] 97.6  F (36.4  C)  Heart Rate:  [74-98] 85  Resp:  [7-20] 16  BP: (115-165)/(57-87) 116/68  SpO2:  [94 %-99 %] 97 %    Weight, current:  80.3 kg (actual weight)  Weight change: -4.2 kg (-9 lb 4.2 oz)    I/O last 3 completed shifts:  In: 3444 [I.V.:3444]  Out: 2980 [Urine:2880; Emesis/NG output:100]    Physical Exam:   General:  Patient comfortable, in no apparent distress.  Awakens to verbal cue.  Neck:  Supple, no JVD.  Lungs:  Clear to auscultation bilaterally.  Cardiac:  Regular rate and rhythm, no murmurs, rub, or gallops.  Abdomen:  Soft, nontender,  physiologic sounds.  Extremities:  Trace edema.  2+ pulses.  Skin:  Warm, dry.  Neurologic:  No focal deficits.             Data:        Lab Results   Component Value Date     05/26/2017    Lab Results   Component Value Date    CHLORIDE 107 05/26/2017    Lab Results   Component Value Date    BUN 39 (H) 05/26/2017      Lab Results   Component Value Date    POTASSIUM 2.9 (L) 05/26/2017    Lab Results   Component Value Date    CO2 28 05/26/2017    Lab Results   Component Value Date    CR 2.41 (H) 05/26/2017        Lab Results   Component Value Date     05/26/2017     05/25/2017     05/25/2017     Lab Results   Component Value Date    POTASSIUM 2.9 (L) 05/26/2017    POTASSIUM 3.6 05/25/2017    POTASSIUM 4.3 05/24/2017     Lab Results   Component Value Date    CHLORIDE 107 05/26/2017    CHLORIDE 111 (H) 05/25/2017    CHLORIDE 117 (H) 05/24/2017     Lab Results   Component Value Date    CO2 28 05/26/2017    CO2 22 05/25/2017    CO2 17 (L) 05/24/2017     Lab Results   Component Value Date    CR 2.41 (H) 05/26/2017    CR 2.50 (H) 05/25/2017    CR 2.74 (H) 05/25/2017     Lab Results   Component Value Date    BUN 39 (H) 05/26/2017    BUN 44 (H) 05/25/2017    BUN 41 (H) 05/24/2017     Lab Results   Component Value Date    HGB 9.3 (L) 05/26/2017    HGB 9.8 (L) 05/25/2017    HGB 9.1 (L) 05/23/2017     Lab Results   Component Value Date    PH 7.24 (L) 05/24/2017    PO2 82 05/24/2017    PCO2 36 05/24/2017    HCO3 15 (L) 05/24/2017    ROOSE[place]ELT  05/24/2017     Canceled, Test credited   Unsatisfactory specimen - clotted               Grant Ramos MD  Nephrology; [employer]  [phone]

## 2017-05-26 NOTE — PROVIDER NOTIFICATION
"Text page to Dr. Schuster, \"FYI pt once again somnolent. Also, would you like to order sliding scale QID insulin instead of q4h? ok to d/c dilip?\"    Pt seen by MD, continue to monitor  "

## 2017-05-26 NOTE — PROGRESS NOTES
"[facility]  Neurology Progress Note          Assessment and Plan:   1. Metabolic Encephalopathy-recurrent  EEG ruled out seizure  Felt to be due to meds, renal failure etc. Lithium toxicity a concern. Psych may need to assess long term viability of medicine combination    Ammonia level check and fine, no problems with dose of Depakote. I defer to psych service as to whether this should be decreased but it can be fairly sedating.    2. Incidental SDH found. Not sure why it is there and don't believe it is contributing. Avoid antiplatelets and anticoagulation at this time.    Neuro service to sign off please call with questions.       Attestation:  I have reviewed today's vital signs, medications, labs and imaging.          Interval History:   Pt shows no changes, no worsening per nursing. No family available at time of visit             Review of Systems:   Unable to perform due to pt being bathed and not cooperative          Medications:       insulin glargine  15 Units Subcutaneous QAM AC     valproate (DEPACON) intermittent infusion  500 mg Intravenous Q8H     pantoprazole  40 mg Intravenous QAM AC     metoprolol  5 mg Intravenous Q6H     cloNIDine   Transdermal Q8H     [START ON 5/31/2017] cloNIDine   Transdermal Weekly     cloNIDine  1 patch Transdermal Weekly     insulin aspart  1-6 Units Subcutaneous Q4H     OLANZapine (zyPREXA) tablet 5 mg  5 mg Oral At Bedtime     hydrALAZINE, miconazole, sodium chloride (PF), heparin lock flush, sodium chloride (PF), metoprolol, risperiDONE, glucose **OR** dextrose **OR** glucagon, acetaminophen, naloxone, melatonin, senna-docusate, polyethylene glycol, bisacodyl, ondansetron **OR** ondansetron      PE  /60 (BP Location: Right arm)  Pulse 90  Temp 98  F (36.7  C) (Axillary)  Resp 16  Ht 1.626 m (5' 4\")  Wt 80.3 kg (177 lb 0.5 oz)  LMP 04/27/2012  SpO2 97%  BMI 30.39 kg/m2  Gen: Being bathed, limits exam Pt laying with eyes closed. Forced eye " closure when I attempt to open.   Neuro: PERRL, face symm            Data:   -  -Ammonia 20  -    Dahlia Artis MD  [facility]  5/26/2017 8:27 AM

## 2017-05-26 NOTE — PLAN OF CARE
Problem: Goal Outcome Summary  Goal: Goal Outcome Summary  Outcome: No Change  Pt somnolent, occasionally wakes up to voice. Mostly non-verbal, was able to say her name once. [facility] orientation. Has right sided hemiplegia. Spontaneously moves LUE, otherwise minimal movement. Does not follow commands for neuro assessment. PRN hydralazine given x1 for SBP >140. Satting in mid-upper 90's on  1L NC. NG tube to low intermittent suction. Tele NSR. NPO, frequent oral cares. T/R. Coccyx wound covered in Mepiborder, CDI. Dunlap and rectal tube patent. No non verbal signs of pain this shift. Will continue to monitor.

## 2017-05-27 NOTE — PROGRESS NOTES
[facility]  Hospitalist Progress Note        Naga Eugenie Peña, DO  05/27/2017        Interval History:      Patient with limited interaction upon interview. Discussed with nursing.          Assessment and Plan:        Nel Farmer is a 61-year-old woman with history of schizoaffective disorder, cognitive impairment usually oriented to just self and place per TCU report, who has had 2 days of lethargy, decreased activity, decreased intake. Lab work up for evaluation at TCU showed potassium of 6.1 with a creatinine of 4 and so was brought to ER 5/16/17. Work up in ER showed possible UTI, LYLA, hyperkalemia. UTI consistent with yeast, no bacteria found.      Acute metabolic encephalopathy likely secondary to severe dehydration and Acute subdural hematoma, Acute on chronic stage 4 renal failure secondary to lithium toxicity. LYLA resolved  Patient has diabetes insipidus, and was also on amiloride, likely contributing to dehydration and metabolic encephalopathy, though mostly with decreased response frequently at baseline. Also on ativan, risperidone, zyprexa and depakote (500 mg at am and 1500 mg at HS)which could contribute to encephalopathy. UA also suggestive of UTI possibly contributing but no bacterial growth seen. Depakote level normal. Head CT 5/22 negative for acute process. Psychiatrist consulted, had discussed with Dr Vasquez. Neuro consulted, EEG did not show seizure like activity, ABG without hypercarbia. MRI brain done 5/24, showed SDH along the posterior aspect of the right cerebral convexity measuring up to 0.2 cm in thickness, patient not on anticoagulation. Unlikely this MRI finding is contributing to her mentation. Transferred to ICU 5/24 and monitored overnight, transferred out of ICU 5/25.   - Holding PTA ativan since admission.    - No blood thinners.   - Seizure precautions.    - Neuro checks, telemetry   - Nephrology following.      Diarrhea and emesis: started having  diarrhea and vomited while in ICU. NG and rectal tube as well were placed. c diff is negative. Had mildly elevated lipase which trended down.   - SLP following.      Severe dehydration with acute kidney injury/hypernatremia and hyperkalemia, now hypokalemic Her baseline creatinine is around 2.4 with creatinine on admission of 3.41, K 6.8 at admission. In the ED, she was given 2 liters of fluid, bicarbonate, insulin. Poor oral intake, DI and being on amiloride PTA likely causing dehydration and acute kidney injury on top of CKD. Her fluid intake had been limited at the skilled nursing facility since patient was prone to throwing cups at staff.    - Nephrology following.    - Monitor intake output       Urinary tract infection, yeast Had  white blood cells and 2-5 WBC casts on a catheter specimen of a UA. She has a history of Klebsiella urinary tract infection in the past with sensitivity to most antibiotics tested including ceftriaxone and fluoroquinolones. She was given Levaquin 750 mg in the emergency department. >100k yeast. D/c'd levofloxacin on 5/18   - Urine culture and blood cultures were sent, follow.    - Blood 1/2 from 5/19 shows Staph capitis likely a contaminant and repeat blood cultures NGTD   - Central line placed (RIJ) on 5/20 since unable to get PICC at that time and needed access for IVF and medications      Hypertension BP elevated, also tachycardic which could be due to dehydration.   - PTA amlodipine, clonidine, isosorbide, mononitrate, metoprolol continued- given NPO, PO meds changed to IV. On scheduled metoprolol, and clonidine patch. As diet resumed, resume PO meds.   - PRN hydralazine/metoprolol   - Holding amiloride       Diabetes mellitus type 2 on insulin On glipizide and 70/30 insulin (20 units in am and 34 units in the pm).     - Lantus to 25 units daily.   - Moderate insulin resistance ISS       Elevated Lipase Unclear significance. Denies abd pain. Vomited once in ICU but none  "since then. CT abd-pelvis (though non contrast) shows atrophic pancreas. Unlikely this is pancreatitis.    - Lipase trended down.       Diabetes insipidus Induced by lithium. Lithium was discontinued before this hospitalization.   - Continue to monitor output and sodiums      Schizoaffective disorder and Paranoid Schizophrenia and Cognitive Deficits PTA is on depakote, risperidal and zyprexa and ativan.   - Psych following.       Thrombocytopenia: Unclear etiology   - Monitor.      Gastroesophageal reflux disease   - Continue PPI      DVT prophylaxis: D/c Heparin SQ  due to dropping platelet count, use pcds     Code: DNR/DNI     Disposition: Expected discharge: Once diet resumed, able to tolerate diet and mentation stable. Also would like to see patient being off IVF for 24 hours prior to dc with stable Cr/sodium.                    Physical Exam:      Heart Rate: 80    Blood pressure 127/70, pulse 90, temperature 98.1  F (36.7  C), temperature source Oral, resp. rate 16, height 1.626 m (5' 4\"), weight 79.8 kg (175 lb 14.8 oz), last menstrual period 2012, SpO2 97 %, not currently breastfeeding.    Vitals:    17 0400 17 0631 17 0500   Weight: 84.5 kg (186 lb 4.6 oz) 80.3 kg (177 lb 0.5 oz) 79.8 kg (175 lb 14.8 oz)       Vital Sign Ranges  Temperature Temp  Av.8  F (36.6  C)  Min: 97.5  F (36.4  C)  Max: 98.1  F (36.7  C)   Blood pressure Systolic (24hrs), Av , Min:116 , Max:140        Diastolic (24hrs), Av, Min:52, Max:74      Pulse No Data Recorded   Respirations Resp  Av  Min: 16  Max: 16   Pulse oximetry SpO2  Av.5 %  Min: 93 %  Max: 97 %     Vital Signs with Ranges  Temp:  [97.5  F (36.4  C)-98.1  F (36.7  C)] 98.1  F (36.7  C)  Heart Rate:  [72-91] 80  Resp:  [16] 16  BP: (116-140)/(52-74) 127/70  SpO2:  [93 %-97 %] 97 %    I/O Last 3 Shifts:   I/O last 3 completed shifts:  In: 2640 [P.O.:50; I.V.:2590]  Out: 2400 [Urine:2400]    I/O past 24 hours: "     Intake/Output Summary (Last 24 hours) at 05/27/17 0717  Last data filed at 05/27/17 0637   Gross per 24 hour   Intake             2640 ml   Output             2400 ml   Net              240 ml     GENERAL: Alert and disoriented. NAD.   HEENT: Normocephalic. No icterus or injection. Nares normal.   LUNGS: Decrement to bases. No dyspnea at rest.   HEART: Regular rate. Extremities perfused.   ABDOMEN: Soft, nontender, and nondistended. Positive bowel sounds.   EXTREMITIES: No LE edema noted.   NEUROLOGIC: Moves extremities x4. Oriented to place and person.          Prior to Admission Medications:        Prescriptions Prior to Admission   Medication Sig Dispense Refill Last Dose     LORazepam (ATIVAN) 0.5 MG tablet Take 1 tablet (0.5 mg) by mouth 2 times daily as needed for anxiety 14 tablet 0 prn med     cloNIDine (CATAPRES) 0.2 MG tablet Take 1 tablet (0.2 mg) by mouth 2 times daily   5/15/2017 at Unknown time     amLODIPine (NORVASC) 5 MG tablet Take 1 tablet (5 mg) by mouth daily 30 tablet  5/15/2017 at Unknown time     aMILoride (MIDAMOR) 5 MG tablet Take 2 tablets (10 mg) by mouth daily   5/15/2017 at Unknown time     metoprolol (TOPROL-XL) 25 MG 24 hr tablet Take 1 tablet (25 mg) by mouth daily 30 tablet  5/15/2017 at Unknown time     glipiZIDE (GLUCOTROL XL) 2.5 MG 24 hr tablet Take 1 tablet (2.5 mg) by mouth 2 times daily (before meals) 30 tablet  5/15/2017 at pm     insulin isophane & regular (HUMULIN MIX 70/30 PEN) susp 20 units before breakfast  30 units before dinner (Patient taking differently: 20 units before breakfast  34 units before dinner) 30 mL 1 5/15/2017 at Unknown time     risperiDONE (RISPERDAL M-TABS) 0.5 MG disintegrating tablet Place 1 tablet (0.5 mg) under the tongue 3 times daily as needed 60 tablet 0 prn med     senna-docusate (SENOKOT-S;PERICOLACE) 8.6-50 MG per tablet Take 1 tablet by mouth 2 times daily as needed for constipation 60 tablet 0 prn med     polyethylene glycol  "(MIRALAX/GLYCOLAX) powder Take 17 g by mouth daily as needed for constipation   prn med     divalproex (DEPAKOTE) 500 MG EC tablet Take 500 mg by mouth every morning   5/15/2017 at Unknown time     divalproex (DEPAKOTE) 500 MG EC tablet Take 1,500 mg by mouth At Bedtime   5/15/2017 at Unknown time     Furosemide (LASIX PO) Take 10 mg by mouth daily    5/16/2017 at Unknown time     Furosemide (LASIX PO) Take 20 mg by mouth daily as needed (Take at noon for leg swelling if needed)   prn med     OLANZapine (ZYPREXA PO) Take 5 mg by mouth At Bedtime   5/15/2017 at Unknown time     RisperiDONE (RISPERDAL PO) Take 1 mg by mouth At Bedtime   5/15/2017 at Unknown time     Omeprazole (PRILOSEC PO) Take 20 mg by mouth 2 times daily (before meals)   5/15/2017 at pm     isosorbide mononitrate (IMDUR) 30 MG 24 hr tablet Take 1 tablet by mouth daily. 30 tablet prn 5/15/2017 at Unknown time     insulin syringe-needle U-100 (BD INSULIN SYRINGE ULTRAFINE) 31G X 5/16\" 1 ML Use one syringe bid daily or as directed. 60 each prn Taking            Medications:        Current Facility-Administered Medications   Medication Last Rate     D5W 100 mL/hr at 05/27/17 0136     Current Facility-Administered Medications   Medication Dose Route Frequency     insulin glargine  25 Units Subcutaneous QAM AC     insulin aspart  1-7 Units Subcutaneous TID AC     insulin aspart  1-5 Units Subcutaneous At Bedtime     valproate (DEPACON) intermittent infusion  500 mg Intravenous Q8H     pantoprazole  40 mg Intravenous QAM AC     metoprolol  5 mg Intravenous Q6H     cloNIDine   Transdermal Q8H     [START ON 5/31/2017] cloNIDine   Transdermal Weekly     cloNIDine  1 patch Transdermal Weekly     Current Facility-Administered Medications   Medication Dose Route Frequency     OLANZapine zydis  2.5-5 mg Oral BID PRN     hydrALAZINE  10-20 mg Intravenous Q2H PRN     miconazole   Topical Q1H PRN     sodium chloride (PF)  10-20 mL Intracatheter Q1H PRN     heparin " lock flush  5-10 mL Intracatheter Q1H PRN     sodium chloride (PF)  3 mL Intracatheter Q1H PRN     metoprolol  2.5 mg Intravenous Q4H PRN     risperiDONE  0.5 mg Sublingual TID PRN     glucose  15-30 g Oral Q15 Min PRN    Or     dextrose  25-50 mL Intravenous Q15 Min PRN    Or     glucagon  1 mg Subcutaneous Q15 Min PRN     acetaminophen  650 mg Oral Q4H PRN     naloxone  0.1-0.4 mg Intravenous Q2 Min PRN     melatonin  1 mg Oral At Bedtime PRN     senna-docusate  1-2 tablet Oral BID PRN     polyethylene glycol  17 g Oral Daily PRN     bisacodyl  10 mg Rectal Daily PRN     ondansetron  4 mg Oral Q6H PRN    Or     ondansetron  4 mg Intravenous Q6H PRN            Data:      Lab data reviewed.     Recent Labs  Lab 05/27/17  0630 05/26/17  1630 05/26/17  0620 05/25/17  1330 05/25/17  0415   HGB 9.4*  --  9.3*  --  9.8*   MCV 94  --  94  --  96     --  150  --  145*     --  142 142 143   POTASSIUM 3.4 3.2* 2.9*  --  3.6   CHLORIDE 109  --  107  --  111*   CO2 27  --  28  --  22   BUN 35*  --  39*  --  44*   CR 2.22*  --  2.41* 2.50* 2.74*   ANIONGAP 7  --  7  --  10   BENTLEY 9.0  --  9.2  --  9.8   *  --  229*  --  282*           Imaging:      Imaging data reviewed.     Dr. Naga Pompa D.O.  [facility]  Pager [phone]

## 2017-05-27 NOTE — PLAN OF CARE
Problem: Goal Outcome Summary  Goal: Goal Outcome Summary  Outcome: Improving  Pt lethargic intermittently today. Alert to self when awake. Does not follow all commands. Generalized weakness. BUE 3/5 strength-needs many cues to move RUE, weak grasp. RUE 2+ edema. BLE weak, responds to painful stimuli. Denies pain. Tele NSR. DD1, nectar diet, total feed. Poor appetite. Emesis after just a few bites of lunch. Coccyx wound per WOC orders. Scattered bruising. Dunlap patent. Sz precautions maintained, no sz activity noted. VSS. Triple lumen IJ infusing. T/R q2h. On specialty mattress. Continue to monitor. Spoke with [place]  Nursing staff today, per staff pt is w/c bound at baseline, alert to self/place, requires assistance with ADLs.    [id number]  Neuros continue to wax and wane. Pt withdrawn and does not follow most commands this evening.

## 2017-05-27 NOTE — PLAN OF CARE
Problem: Goal Outcome Summary  Goal: Goal Outcome Summary  Outcome: No Change  Pt somnolent/lethargic intermittently today. When awake pt alert to self at times, doesn't speak much, speech is loud and forced when she does talk. Denies pain. Does not follow most commands. RUE hemiparesis, RLE hemiparesis, generalized weakness throughout. Withdraws to painful stimuli in BLE. LONA sensation. Tele NSR. DD1, nectar diet, total feed. K replaced, recheck in AM. T/R q2h. Coccyx wound per WOC orders. Germán patent. Sz precautions maintained, no sz activity noted. VSS. Triple lumen IJ on R neck infusing. Continue to monitor.

## 2017-05-27 NOTE — PLAN OF CARE
Problem: Goal Outcome Summary  Goal: Goal Outcome Summary  Outcome: No Change  A/O to self and place at times. Lethargic. VSS on RA. Tele NSR. Denies pain. LS dim. Turn/repo q2hrs. Triple IJ on right side of neck. Dunlap patent, with adequate urine. DD1 diet with nectar thick liquids using spoon inbetween bites of pureed food. Hemiparesis on LUE and LLE. Generalized weakness in all extremities. Not following many commands. Forced speech when answering questions. Continue to monitor.

## 2017-05-27 NOTE — PROGRESS NOTES
Nephrology Progress Note          Assessment and Plan:       Acute on chronic renal failure continues better.  Creatinine now better than usual baseline, and volume status looks ok.  Oral intake still very poor.  Continue current D5W infusion and daily chemistry monitoring.        * No resolved hospital problems. *               Interval History:   Same minimal response.  Opens eyes to verbal cues but no interaction otherwise.  Small oral intake.  Good UO ~2.5L/day.  I&O in good balance and wt ~same.  VS ok.  Good rm air SaO2.  Meds and labs reviewed.  Lytes ok; K+ better, 3.4.  Cr down to 2.22. Glucoses ~200.  CBC stable.  Hgb 9-10.                    Medications:       insulin glargine  25 Units Subcutaneous QAM AC     insulin aspart  1-7 Units Subcutaneous TID AC     insulin aspart  1-5 Units Subcutaneous At Bedtime     valproate (DEPACON) intermittent infusion  500 mg Intravenous Q8H     pantoprazole  40 mg Intravenous QAM AC     metoprolol  5 mg Intravenous Q6H     cloNIDine   Transdermal Q8H     [START ON 5/31/2017] cloNIDine   Transdermal Weekly     cloNIDine  1 patch Transdermal Weekly       D5W 100 mL/hr at 05/27/17 1114                    Physical Exam:       Vital Sign Ranges  Temp:  [97.5  F (36.4  C)-98.5  F (36.9  C)] 98.5  F (36.9  C)  Heart Rate:  [72-91] 90  Resp:  [16] 16  BP: (115-142)/(52-74) 142/70  SpO2:  [93 %-97 %] 94 %    Weight, current:  79.8 kg (actual weight)  Weight change: -0.5 kg (-1 lb 1.6 oz)    I/O last 3 completed shifts:  In: 2640 [P.O.:50; I.V.:2590]  Out: 2400 [Urine:2400]    Physical Exam:   General:  Patient comfortable, in no apparent distress.  Awakens but otherwise unresponsive.  Neck:  Supple, no JVD.  Lungs:  Clear to auscultation bilaterally.  Cardiac:  Regular rate and rhythm, no murmurs, rub, or gallops.  Abdomen:  Soft, nontender, physiologic sounds.  Extremities:  Without edema.  2+ pulses.  Skin:  Warm, dry.  Neurologic:  No focal deficits.             Data:         Lab Results   Component Value Date     05/27/2017    Lab Results   Component Value Date    CHLORIDE 109 05/27/2017    Lab Results   Component Value Date    BUN 35 (H) 05/27/2017      Lab Results   Component Value Date    POTASSIUM 3.4 05/27/2017    Lab Results   Component Value Date    CO2 27 05/27/2017    Lab Results   Component Value Date    CR 2.22 (H) 05/27/2017        Lab Results   Component Value Date     05/27/2017     05/26/2017     05/25/2017     Lab Results   Component Value Date    POTASSIUM 3.4 05/27/2017    POTASSIUM 3.2 (L) 05/26/2017    POTASSIUM 2.9 (L) 05/26/2017     Lab Results   Component Value Date    CHLORIDE 109 05/27/2017    CHLORIDE 107 05/26/2017    CHLORIDE 111 (H) 05/25/2017     Lab Results   Component Value Date    CO2 27 05/27/2017    CO2 28 05/26/2017    CO2 22 05/25/2017     Lab Results   Component Value Date    CR 2.22 (H) 05/27/2017    CR 2.41 (H) 05/26/2017    CR 2.50 (H) 05/25/2017     Lab Results   Component Value Date    BUN 35 (H) 05/27/2017    BUN 39 (H) 05/26/2017    BUN 44 (H) 05/25/2017     Lab Results   Component Value Date    HGB 9.4 (L) 05/27/2017    HGB 9.3 (L) 05/26/2017    HGB 9.8 (L) 05/25/2017     Lab Results   Component Value Date    PH 7.24 (L) 05/24/2017    PO2 82 05/24/2017    PCO2 36 05/24/2017    HCO3 15 (L) 05/24/2017    ROOSEVELT  05/24/2017     Canceled, Test credited   Unsatisfactory specimen - clotted               Grant Ramos MD  Nephrology; [email or web address]  [phone]

## 2017-05-27 NOTE — PLAN OF CARE
Problem: Goal Outcome Summary  Goal: Goal Outcome Summary  Outcome: No Change  Pt somnolent/lethargic intermittently. When awake pt alert to self at times, doesn't speak much, speech is loud and forced when she does talk. Denies pain. Not follow most commands just kept repeating her name and date of birth. Generalized weakness throughout. Withdraws to painful stimuli in BLE. LONA sensation. Tele NSR. DD1, nectar diet, total feed. K replaced today, recheck in AM.  Turn every 2 hours and is on a specialty bed and being followed by [facility]. Coccyx wound per WOC orders. Dunlap patent. Sz precautions maintained, no sz activity noted. VSS. Triple lumen IJ on R neck infusing. Continue to monitor. Use lift to turn.

## 2017-05-27 NOTE — PLAN OF CARE
Problem: Goal Outcome Summary  Goal: Goal Outcome Summary  SLP: Baseline cough noted prior to PO. Oral cavity clear. Given improved ROSITA, thin water via   teaspoon trialed. Pt initially tolerated well with timely swallow. As trial progressed, delayed oral phase and suspect premature spillage noted. Coughing present with vocal quality remaining clear. Trial ended due to overt s/sx of aspiration. Pt refused textures at this time. Recommended: continue dysphagia diet level 1 and nectar thick liquids via teaspoon with frequent liquid washes. ST to follow for advanced diet/liquid trials. Pt may be appropriate for free water protocol if caregivers are able to provide aggressive oral cares after meals. Continued ST following discharge recommended.

## 2017-05-28 NOTE — PLAN OF CARE
Problem: Goal Outcome Summary  Goal: Goal Outcome Summary  Outcome: No Change  VSS. Alert. Mostly non verbal and unable to assess orientation. Will occasionally answer yes/no questions. On air mattress. Repositioned q2h. Tele NSR.

## 2017-05-28 NOTE — PROGRESS NOTES
"During assessment, patient would not speak or follow any commands.  Offered patient magic cup as she had not eaten yet today.  Patient accepted a spoon full.  When asked if she wanted more she yelled, \"Feed me more!\"  Patient ate entire magic cup and one bite of her supper.  After one bite of supper, she gagged and threw up a medium sized emesis.      Patient more talkative at HS.  Finished cup of nectar-thickened water.  Watching TV.  "

## 2017-05-28 NOTE — CONSULTS
MD CONSULT FOR TF  CLINICAL NUTRITION SERVICES - REASSESSMENT NOTE      Recommendations Ordered by Registered Dietitian (RD):     Once FT placed, rec Isosource 1.5 @ 45 mL/hr = 1620 cals (26 ayden/kg), 73 gm pro (1.2 gm/kg), 16 gm fiber, 820 mL free water, 100% RDI, 5543 IU Vit A, 345 mg Vit C, 22 mg Zn    Plan to start TF at 15 mL/hr.  Increase by 10 mL every 12 hrs to goal rate of 45 mL/hr.    Standard water flushes of 30 mL every 4 hrs (tube patency)    Daily multivitamin           EVALUATION OF PROGRESS TOWARD GOALS   Diet:  DD1. NTL + Magic Cup with meals           Not Appropriate for Room Service          Chart reviewed  Pt is a total feed  Intake has varied --> 0-50% meals  Plan to place a FT tomorrow      Dosing Weight: 61.4 kg (adjusted wt for overwt)    ASSESSED NUTRITION NEEDS:  7681-9860 cals (25-30 cals/kg - maintenance)  75-90 gm protein (1.2-1.5 gm/kg - preservation LBM)      NEW FINDINGS:   5/26: WOCN ---> Pressure Injury (PI) located on coccyx/ R gluteal cleft:  DTI (Deep tissue injury) present on admission, which has evolved into an Unstageable PI over the coccyx    IVF (D5) at 100 mL/hr  (408 cals)    Last noted BM (5/25)    5/27: SLP - continues to rec DD1, NTL diet      Vitals:    05/16/17 1833 05/16/17 2300 05/17/17 0527 05/18/17 0600   Weight: 91.2 kg (201 lb) 81.9 kg (180 lb 8 oz) 80.2 kg (176 lb 12.9 oz) 81.8 kg (180 lb 5.4 oz)    05/20/17 0552 05/23/17 0513 05/24/17 0608 05/25/17 0400   Weight: 89.5 kg (197 lb 4.8 oz) 86 kg (189 lb 11.2 oz) 85.5 kg (188 lb 7.9 oz) 84.5 kg (186 lb 4.6 oz)    05/26/17 0631 05/27/17 0500 05/28/17 0500   Weight: 80.3 kg (177 lb 0.5 oz) 79.8 kg (175 lb 14.8 oz) 82 kg (180 lb 12.4 oz)         Previous Goals (5/23):   No previous goals identified    Previous Nutrition Diagnosis (5/23):   No nutrition diagnosis  Evaluation: Declining        CURRENT NUTRITION DIAGNOSIS  Predicted suboptimal nutrient intake related to mental status and dysphagia as evidenced by intake  fluctuating from 0%-50% meals    INTERVENTIONS  Recommendations / Nutrition Prescription  DD1, NTL diet (per SLP)    Once FT placed, rec Isosource 1.5 @ 45 mL/hr = 1620 cals (26 ayden/kg), 73 gm pro (1.2 gm/kg), 16 gm fiber, 820 mL free water, 100% RDI, 5543 IU Vit A, 345 mg Vit C, 22 mg Zn    Plan to start TF at 15 mL/hr.  Increase by 10 mL every 12 hrs to goal rate of 45 mL/hr.    Standard water flushes of 30 mL every 4 hrs (tube patency)    Daily multivitamin    Implementation  EN Composition and EN Schedule --> ordered TF in EPIC    Goals  EN of Isosource 1.5 @ 45 mL/hr to meet % est needs      MONITORING AND EVALUATION:  Progress towards goals will be monitored and evaluated per protocol and Practice Guidelines

## 2017-05-28 NOTE — PLAN OF CARE
Problem: Goal Outcome Summary  Goal: Goal Outcome Summary  Outcome: No Change  Per night nurse, pt awake and talkative through the night. Today, intermittently lethargic. Alert to self when awake, mostly nonverbal, forced speech when talking. Does not follow most commands. Moves LUE spontaneously, very weak grasp RUE, BLE wiggles toes to command at times otherwise little movement in BLE. LONA sensation. Non productive cough-CXR negative. Repeat head CT d/t pt not following commands & weakness-negative. Tele NSR. VSS. DD1, nectar diet, poor appetite. Didn't eat lunch. Plan for XR TF placement tomorrow. T/R q2h. Up with lift, sat in chair today. Coccyx ulcer per WOC orders. Dunlap patent. R IJ infusing. No s/sx of pain. Plan for psych to see. D/c pending progress.     [id number]  Up to chair this evening. Ate 75% of dinner. Loud and forced speech. Alert to self. Doesn't follow most commands. RUE still rigid without much movement. RUE edema. BLE weakness. BLE withdraw to painful stimuli.

## 2017-05-28 NOTE — PROVIDER NOTIFICATION
"Text page to Dr. Pompa, \"Pt still lethargic intermittently and not moving RUE or BLE to command. Would you like to re image brain?\"    New order for head CT  "

## 2017-05-28 NOTE — PLAN OF CARE
Problem: Goal Outcome Summary  Goal: Goal Outcome Summary     SLP - Swallow f/u. Pt sleeping soundly, did not disturb given new neuro changes and order for head CT. Also noted TF placement ordered. Due to pt's fluctuating mental status, slow progress and inconsistent ability to participate, will decrease SLP POC to 3x/wk. F/u tomorrow as able.

## 2017-05-28 NOTE — PROGRESS NOTES
[facility]  Hospitalist Progress Note        Naga Eugenie Peña, DO  05/28/2017        Interval History:      Patient remains confused, discussed with nursing.          Assessment and Plan:        Nel Farmer is a 61-year-old woman with history of schizoaffective disorder, cognitive impairment usually oriented to just self and place per TCU report, who has had 2 days of lethargy, decreased activity, decreased intake. Lab work up for evaluation at TCU showed potassium of 6.1 with a creatinine of 4 and so was brought to ER 5/16/17. Work up in ER showed possible UTI, LYLA, hyperkalemia. UTI consistent with yeast, no bacteria found.       Acute metabolic encephalopathy likely secondary to severe dehydration and Acute subdural hematoma, Acute on chronic stage 4 renal failure secondary to lithium toxicity. LYLA resolved Patient has diabetes insipidus, and was also on amiloride, likely contributing to dehydration and metabolic encephalopathy, though mostly with decreased response frequently at baseline. Also on ativan, risperidone, zyprexa and depakote (500 mg at am and 1500 mg at HS)which could contribute to encephalopathy. UA also suggestive of UTI possibly contributing but no bacterial growth seen. Depakote level normal. Head CT 5/22 negative for acute process. Psychiatrist consulted, had discussed with Dr Vasquez. Neuro consulted, EEG did not show seizure like activity, ABG without hypercarbia. MRI brain done 5/24, showed SDH along the posterior aspect of the right cerebral convexity measuring up to 0.2 cm in thickness, patient not on anticoagulation. Unlikely this MRI finding is contributing to her mentation. Transferred to ICU 5/24 and monitored overnight, transferred out of ICU 5/25.   - Holding PTA ativan since admission.    - No blood thinners.   - Seizure precautions.    - Neuro checks, telemetry   - Nephrology following.    - Psychiatry following.      Diarrhea and emesis: started having  diarrhea and vomited while in ICU. NG and rectal tube as well were placed. c diff is negative. Had mildly elevated lipase which trended down.   - SLP following.       Severe dehydration with acute kidney injury/hypernatremia and hyperkalemia, now hypokalemic Her baseline creatinine is around 2.4 with creatinine on admission of 3.41, K 6.8 at admission. In the ED, she was given 2 liters of fluid, bicarbonate, insulin. Poor oral intake, DI and being on amiloride PTA likely causing dehydration and acute kidney injury on top of CKD. Her fluid intake had been limited at the skilled nursing facility since patient was prone to throwing cups at staff.    - Nephrology following.    - Monitor intake output       Urinary tract infection, yeast Had  white blood cells and 2-5 WBC casts on a catheter specimen of a UA. She has a history of Klebsiella urinary tract infection in the past with sensitivity to most antibiotics tested including ceftriaxone and fluoroquinolones. She was given Levaquin 750 mg in the emergency department. >100k yeast. D/c'd levofloxacin.    - Blood 1/2 from 5/19 shows Staph capitis likely a contaminant and repeat blood cultures NGTD   - Central line placed (RIJ) on 5/20 since unable to get PICC at that time and needed access for IVF and medications       Hypertension BP elevated, also tachycardic which could be due to dehydration.   - PTA amlodipine, clonidine, isosorbide, mononitrate, metoprolol continued- given NPO, PO meds changed to IV. On scheduled metoprolol, and clonidine patch. As diet resumed, resume PO meds.   - PRN hydralazine/metoprolol   - Holding amiloride       Diabetes mellitus type 2 on insulin On glipizide and 70/30 insulin (20 units in am and 34 units in the pm).     - Lantus to 25 units daily.   - Moderate insulin resistance ISS       Elevated Lipase Unclear significance. Denies abd pain. Vomited once in ICU but none since then. CT abd-pelvis (though non contrast) shows atrophic  "pancreas. Unlikely this is pancreatitis.    - Lipase trended down.       Diabetes insipidus Induced by lithium. Lithium was discontinued before this hospitalization.   - Continue to monitor output and sodiums      Schizoaffective disorder and Paranoid Schizophrenia and Cognitive Deficits PTA is on depakote, risperidal and zyprexa and ativan.   - Psych following.       Thrombocytopenia: Unclear etiology   - Monitor.       Gastroesophageal reflux disease   - Continue PPI       DVT prophylaxis: D/c Heparin SQ  due to dropping platelet count, use pcds      Code: DNR/DNI      Disposition: Expected discharge: Once diet resumed, able to tolerate diet and mentation stable. Also would like to see patient being off IVF for 24 hours prior to dc with stable Cr/sodium.                    Physical Exam:      Heart Rate: 80    Blood pressure 128/65, pulse 80, temperature 98  F (36.7  C), temperature source Oral, resp. rate 16, height 1.626 m (5' 4\"), weight 82 kg (180 lb 12.4 oz), last menstrual period 2012, SpO2 100 %, not currently breastfeeding.    Vitals:    17 0631 17 0500 17 0500   Weight: 80.3 kg (177 lb 0.5 oz) 79.8 kg (175 lb 14.8 oz) 82 kg (180 lb 12.4 oz)       Vital Sign Ranges  Temperature Temp  Av  F (36.7  C)  Min: 97.2  F (36.2  C)  Max: 98.5  F (36.9  C)   Blood pressure Systolic (24hrs), Av , Min:115 , Max:145        Diastolic (24hrs), Av, Min:56, Max:72      Pulse Pulse  Av  Min: 80  Max: 80   Respirations Resp  Av  Min: 16  Max: 16   Pulse oximetry SpO2  Av.5 %  Min: 94 %  Max: 100 %     Vital Signs with Ranges  Temp:  [97.2  F (36.2  C)-98.5  F (36.9  C)] 98  F (36.7  C)  Pulse:  [80] 80  Heart Rate:  [66-90] 80  Resp:  [16] 16  BP: (115-145)/(56-72) 128/65  SpO2:  [94 %-100 %] 100 %    I/O Last 3 Shifts:   I/O last 3 completed shifts:  In: 2317 [P.O.:338; I.V.:1979]  Out: 2400 [Urine:2400]    I/O past 24 hours:     Intake/Output Summary (Last 24 hours) " at 05/28/17 0731  Last data filed at 05/28/17 0607   Gross per 24 hour   Intake             2317 ml   Output             2400 ml   Net              -83 ml     GENERAL: Alert and disoriented. NAD. Intermittently alert to voice.   HEENT: Normocephalic. No icterus or injection. Nares normal.   LUNGS: Decrement to bases. No dyspnea at rest.   HEART: Regular rate. Extremities perfused.   ABDOMEN: Soft, nontender, and nondistended. Positive bowel sounds.   EXTREMITIES: No LE edema noted.   NEUROLOGIC: Moves extremities x4. Oriented to place and person.          Prior to Admission Medications:        Prescriptions Prior to Admission   Medication Sig Dispense Refill Last Dose     LORazepam (ATIVAN) 0.5 MG tablet Take 1 tablet (0.5 mg) by mouth 2 times daily as needed for anxiety 14 tablet 0 prn med     cloNIDine (CATAPRES) 0.2 MG tablet Take 1 tablet (0.2 mg) by mouth 2 times daily   5/15/2017 at Unknown time     amLODIPine (NORVASC) 5 MG tablet Take 1 tablet (5 mg) by mouth daily 30 tablet  5/15/2017 at Unknown time     aMILoride (MIDAMOR) 5 MG tablet Take 2 tablets (10 mg) by mouth daily   5/15/2017 at Unknown time     metoprolol (TOPROL-XL) 25 MG 24 hr tablet Take 1 tablet (25 mg) by mouth daily 30 tablet  5/15/2017 at Unknown time     glipiZIDE (GLUCOTROL XL) 2.5 MG 24 hr tablet Take 1 tablet (2.5 mg) by mouth 2 times daily (before meals) 30 tablet  5/15/2017 at pm     insulin isophane & regular (HUMULIN MIX 70/30 PEN) susp 20 units before breakfast  30 units before dinner (Patient taking differently: 20 units before breakfast  34 units before dinner) 30 mL 1 5/15/2017 at Unknown time     risperiDONE (RISPERDAL M-TABS) 0.5 MG disintegrating tablet Place 1 tablet (0.5 mg) under the tongue 3 times daily as needed 60 tablet 0 prn med     senna-docusate (SENOKOT-S;PERICOLACE) 8.6-50 MG per tablet Take 1 tablet by mouth 2 times daily as needed for constipation 60 tablet 0 prn med     polyethylene glycol (MIRALAX/GLYCOLAX)  "powder Take 17 g by mouth daily as needed for constipation   prn med     divalproex (DEPAKOTE) 500 MG EC tablet Take 500 mg by mouth every morning   5/15/2017 at Unknown time     divalproex (DEPAKOTE) 500 MG EC tablet Take 1,500 mg by mouth At Bedtime   5/15/2017 at Unknown time     Furosemide (LASIX PO) Take 10 mg by mouth daily    5/16/2017 at Unknown time     Furosemide (LASIX PO) Take 20 mg by mouth daily as needed (Take at noon for leg swelling if needed)   prn med     OLANZapine (ZYPREXA PO) Take 5 mg by mouth At Bedtime   5/15/2017 at Unknown time     RisperiDONE (RISPERDAL PO) Take 1 mg by mouth At Bedtime   5/15/2017 at Unknown time     Omeprazole (PRILOSEC PO) Take 20 mg by mouth 2 times daily (before meals)   5/15/2017 at pm     isosorbide mononitrate (IMDUR) 30 MG 24 hr tablet Take 1 tablet by mouth daily. 30 tablet prn 5/15/2017 at Unknown time     insulin syringe-needle U-100 (BD INSULIN SYRINGE ULTRAFINE) 31G X 5/16\" 1 ML Use one syringe bid daily or as directed. 60 each prn Taking            Medications:        Current Facility-Administered Medications   Medication Last Rate     D5W 100 mL/hr at 05/27/17 2124     Current Facility-Administered Medications   Medication Dose Route Frequency     insulin glargine  25 Units Subcutaneous QAM AC     insulin aspart  1-7 Units Subcutaneous TID AC     insulin aspart  1-5 Units Subcutaneous At Bedtime     valproate (DEPACON) intermittent infusion  500 mg Intravenous Q8H     pantoprazole  40 mg Intravenous QAM AC     metoprolol  5 mg Intravenous Q6H     cloNIDine   Transdermal Q8H     [START ON 5/31/2017] cloNIDine   Transdermal Weekly     cloNIDine  1 patch Transdermal Weekly     Current Facility-Administered Medications   Medication Dose Route Frequency     OLANZapine zydis  2.5-5 mg Oral BID PRN     hydrALAZINE  10-20 mg Intravenous Q2H PRN     miconazole   Topical Q1H PRN     sodium chloride (PF)  10-20 mL Intracatheter Q1H PRN     heparin lock flush  5-10 mL " Intracatheter Q1H PRN     sodium chloride (PF)  3 mL Intracatheter Q1H PRN     metoprolol  2.5 mg Intravenous Q4H PRN     risperiDONE  0.5 mg Sublingual TID PRN     glucose  15-30 g Oral Q15 Min PRN    Or     dextrose  25-50 mL Intravenous Q15 Min PRN    Or     glucagon  1 mg Subcutaneous Q15 Min PRN     acetaminophen  650 mg Oral Q4H PRN     naloxone  0.1-0.4 mg Intravenous Q2 Min PRN     melatonin  1 mg Oral At Bedtime PRN     senna-docusate  1-2 tablet Oral BID PRN     polyethylene glycol  17 g Oral Daily PRN     bisacodyl  10 mg Rectal Daily PRN     ondansetron  4 mg Oral Q6H PRN    Or     ondansetron  4 mg Intravenous Q6H PRN            Data:      Lab data reviewed.     Recent Labs  Lab 05/28/17  0635 05/27/17  0630 05/26/17  1630 05/26/17  0620   HGB 9.5* 9.4*  --  9.3*   MCV 94 94  --  94    180  --  150    143  --  142   POTASSIUM 3.4 3.4 3.2* 2.9*   CHLORIDE 106 109  --  107   CO2 26 27  --  28   BUN 34* 35*  --  39*   CR 2.04* 2.22*  --  2.41*   ANIONGAP 8 7  --  7   BENTLEY 9.4 9.0  --  9.2   * 183*  --  229*           Imaging:      Imaging data reviewed.     Dr. Naga Pompa D.O.  [facility] Hospitalist  Pager [phone]

## 2017-05-28 NOTE — PROGRESS NOTES
Nephrology Progress Note          Assessment and Plan:       Acute on chronic renal failure continues to improve with IV hydration.  Mental status better, but oral intake still looks insufficient.  Continue same D5W IV support for now.  Is enteral nutritional support a consideration?  Watch chemistries and intake.      * No resolved hospital problems. *               Interval History:   Up in chair.  Much more alert and somewhat interactive.  No distress.  VS ok.  No resp issues; good rm air SaO2.  Not much oral intake documented.  Wt up ~2kg.  UO ~same, 2.5L/day.  I&O seem to be in balance.  Meds and labs reviewed.  Lytes nl.  Cr down to ~2, BUN 30's.  Glucoses fair, 150-250.  CBC stable, except sl high WBC.                Medications:       insulin glargine  25 Units Subcutaneous QAM AC     insulin aspart  1-7 Units Subcutaneous TID AC     insulin aspart  1-5 Units Subcutaneous At Bedtime     valproate (DEPACON) intermittent infusion  500 mg Intravenous Q8H     pantoprazole  40 mg Intravenous QAM AC     metoprolol  5 mg Intravenous Q6H     cloNIDine   Transdermal Q8H     [START ON 5/31/2017] cloNIDine   Transdermal Weekly     cloNIDine  1 patch Transdermal Weekly       D5W 100 mL/hr at 05/27/17 2124                    Physical Exam:       Vital Sign Ranges  Temp:  [97.2  F (36.2  C)-98.5  F (36.9  C)] 98.1  F (36.7  C)  Pulse:  [68-80] 68  Heart Rate:  [66-90] 80  Resp:  [16] 16  BP: (118-145)/(56-72) 118/58  SpO2:  [94 %-100 %] 98 %    Weight, current:  82 kg (actual weight)  Weight change: 2.2 kg (4 lb 13.6 oz)    I/O last 3 completed shifts:  In: 2317 [P.O.:338; I.V.:1979]  Out: 2400 [Urine:2400]    Physical Exam:   General:  Patient comfortable, in no apparent distress.  Awake, alert, not oriented.  Neck:  Supple, no JVD.  Lungs:  Clear to auscultation bilaterally.  Cardiac:  Regular rate and rhythm, no murmurs, rub, or gallops.  Abdomen:  Soft, nontender, physiologic sounds.  Extremities:  Without edema.  2+  pulses.  Skin:  Warm, dry.  Neurologic:  No focal deficits.             Data:        Lab Results   Component Value Date     05/28/2017    Lab Results   Component Value Date    CHLORIDE 106 05/28/2017    Lab Results   Component Value Date    BUN 34 (H) 05/28/2017      Lab Results   Component Value Date    POTASSIUM 3.4 05/28/2017    Lab Results   Component Value Date    CO2 26 05/28/2017    Lab Results   Component Value Date    CR 2.04 (H) 05/28/2017        Lab Results   Component Value Date     05/28/2017     05/27/2017     05/26/2017     Lab Results   Component Value Date    POTASSIUM 3.4 05/28/2017    POTASSIUM 3.4 05/27/2017    POTASSIUM 3.2 (L) 05/26/2017     Lab Results   Component Value Date    CHLORIDE 106 05/28/2017    CHLORIDE 109 05/27/2017    CHLORIDE 107 05/26/2017     Lab Results   Component Value Date    CO2 26 05/28/2017    CO2 27 05/27/2017    CO2 28 05/26/2017     Lab Results   Component Value Date    CR 2.04 (H) 05/28/2017    CR 2.22 (H) 05/27/2017    CR 2.41 (H) 05/26/2017     Lab Results   Component Value Date    BUN 34 (H) 05/28/2017    BUN 35 (H) 05/27/2017    BUN 39 (H) 05/26/2017     Lab Results   Component Value Date    HGB 9.5 (L) 05/28/2017    HGB 9.4 (L) 05/27/2017    HGB 9.3 (L) 05/26/2017     Lab Results   Component Value Date    PH 7.24 (L) 05/24/2017    PO2 82 05/24/2017    PCO2 36 05/24/2017    HCO3 15 (L) 05/24/2017    ROOSE[place]ELT  05/24/2017     Canceled, Test credited   Unsatisfactory specimen - clotted               Grant Ramos MD  Nephrology; [facility] [employer]  [phone]

## 2017-05-29 NOTE — PLAN OF CARE
Problem: Goal Outcome Summary  Goal: Goal Outcome Summary  SLP: Patient down having a feeding tube placed. Will reschedule for 5/30/17.

## 2017-05-29 NOTE — PLAN OF CARE
Problem: Goal Outcome Summary  Goal: Goal Outcome Summary  Outcome: No Change  Lethargic, answers yes and no questions at times.  Patient's [birth year] is [birth year], family in room.  Unable to assess most neuros due to patient not understanding, or not responding to commands.  Lift, total feed.  NG feeding tube to advance by 10 mL at 2000 (Q12 hours) goal of 45mL hourly.  DD1 with nectar thick.  Patient has a cherry catheter draining clear, yellow urine.  Tele shows SR with PAC.  3 lumen IJ on right neck, IV fluids infusing.  Coccyx pressure ulcer, see WOCN note for care, on 1st step mattress.  Diabetic.  +1-2 generalized edema.  Palliative care consult in place.

## 2017-05-29 NOTE — PROGRESS NOTES
[facility]    Sepsis Evaluation Progress Note    Date of Service: 05/29/2017    I was called to see Nel Farmer due to abnormal vital signs triggering the Sepsis SIRS screening alert. She is not known to have an infection.     Physical Exam    Vital Signs:  Temp: 98.1  F (36.7  C) Temp src: Axillary BP: 124/65 Pulse: 68 Heart Rate: 93 Resp: 16 SpO2: 95 % O2 Device: Nasal cannula Oxygen Delivery: 2 LPM    Lab:  Lactic Acid   Date Value Ref Range Status   05/28/2017 2.7 (H) 0.7 - 2.1 mmol/L Final           Assessment and Plan    The SIRS and exam findings are likely due to renal failure, there is no sign of sepsis at this time.    Disposition: The patient will remain on the current unit. We will continue to monitor this patient closely.    Fawad Bunch MD

## 2017-05-29 NOTE — PROGRESS NOTES
SPIRITUAL HEALTH SERVICES  SPIRITUAL ASSESSMENT Progress Note    [address]    PRIMARY FOCUS:     Goals of care    Support for coping    ILLNESS CIRCUMSTANCES:   Reviewed documentation. Reflective conversation shared with pt's mother Lisa and pt's sister-in-law Tana which integrated elements of illness and family narratives.     Context of Serious Illness/Symptom(s) - Tana shared with me some of the details of pt's long and challenging medical situation.  She has been in hospitals and rehab facilities since February.  I believe before that she was living at home.  Pt has a number of developmental disabilities and mental health issues, in addition to her physical challenges.    Resources for Support - Pt's mother has been her primary decision-maker (medical and financial), but pt's sister-in-law is attempting to become more involved.  Pt's sister-in-law is  to pt's brother, but he prefers that she be involved in pt's care needs.  Pt also has a sister who lives in [place].    DISTRESS:     Emotional/Existential/Relational Distress - Pt herself was able to name her mother and sister-in-law, but was not able to otherwise engage in conversation.  Today is her birthday.    Spiritual/Episcopalian Distress - Not to my knowledge.    Social/Cultural/Economic Distress - Unknown.    SPIRITUAL/[employer] COPING:     [place]osque/Damaris - [employer]ist.  Pt and her mother attend [facility] [employer] [employer], although they aren't easily able to get to [employer] any more.    Spiritual Practice(s) - Not discussed.    Emotional/Existential/Relational Connections - Unknown.    GOALS OF CARE:    Goals of Care - Pt's family members are considering next steps in pt's care plan.  Pt's sister-in-law requested a palliative care consult, and is anxious to consider options for pt.  Pt's niece is getting  in mid-June, and thus family members are anxious to make get plans in place as soon as possible.    Meaning/Sense-Making - Not discussed.    PLAN:    team will continue to provide support.      Janna Medina M.A.  Staff   Pager [phone]  Phone [phone]

## 2017-05-29 NOTE — PLAN OF CARE
"Problem: Goal Outcome Summary  Goal: Goal Outcome Summary  Outcome: No Change  Lethargic, [facility] orientation but will yell out \"[place]\" and \"YES\". Neuros: does not follow commands, RUE rigid, LUE hemiparesis,  BLE weakness. VSS. Tele NSR. DD1 diet NT liquids. Triple lumen R IJ WNL.  IRA pressure injury to coccyx, cleaned per WOC instructions.  [place] WNL.  Up with 2, lift, t/r q2 hrs. Denies pain, no nonverbal indicators present. Plan for feeding tube placement today, psych to see, nrsng cont to monitor .          "

## 2017-05-29 NOTE — PROGRESS NOTES
I reviewed her chart. No further recommendation from us. I am signing off. Please call us back with any questions or concerns. Patient is prone to LYLA and hypernatremia due to NDI and psych issue. Push oral intake and prn IVF as needed. Thank you.

## 2017-05-29 NOTE — PLAN OF CARE
Problem: Goal Outcome Summary  Goal: Goal Outcome Summary  PT - Order received, pt in cart ready to go down for feeding tube placement. Will check back later today or tomorrow.

## 2017-05-29 NOTE — PROGRESS NOTES
[facility]  Hospitalist Progress Note        Nagaabdifatah Traore Peña, DO  05/29/2017        Interval History:      Tube feeding initiated. Patient more verbal today.          Assessment and Plan:        Nel Farmer is a 61-year-old woman with history of schizoaffective disorder, cognitive impairment usually oriented to just self and place per TCU report, who has had 2 days of lethargy, decreased activity, decreased intake. Lab work up for evaluation at TCU showed potassium of 6.1 with a creatinine of 4 and so was brought to ER 5/16/17. Work up in ER showed possible UTI, LYLA, hyperkalemia. UTI consistent with yeast, no bacteria found.       Acute metabolic encephalopathy likely secondary to severe dehydration and Acute subdural hematoma, Acute on chronic stage 4 renal failure secondary to lithium toxicity. LYLA resolved Patient has diabetes insipidus, and was also on amiloride, likely contributing to dehydration and metabolic encephalopathy, though mostly with decreased response frequently at baseline. Also on ativan, risperidone, zyprexa and depakote (500 mg at am and 1500 mg at HS)which could contribute to encephalopathy. UA also suggestive of UTI possibly contributing but no bacterial growth seen. Depakote level normal. Head CT 5/22 negative for acute process. Psychiatrist consulted, had discussed with Dr Vasquez. Neuro consulted, EEG did not show seizure like activity, ABG without hypercarbia. MRI brain done 5/24, showed SDH along the posterior aspect of the right cerebral convexity measuring up to 0.2 cm in thickness, patient not on anticoagulation. Unlikely this MRI finding is contributing to her mentation. Transferred to ICU 5/24 and monitored overnight, transferred out of ICU 5/25.  - Holding PTA ativan   - Seizure precautions.   - Nephrology following.   - Psychiatry following.       Diarrhea and emesis: started having diarrhea and vomited while in ICU. NG and rectal tube as well were  placed. c diff is negative. Had mildly elevated lipase which trended down.  - SLP following.   - Tube feeding initiated.   - Nutrition following.       Severe dehydration with acute kidney injury/hypernatremia and hyperkalemia, now hypokalemic Her baseline creatinine is around 2.4 with creatinine on admission of 3.41, K 6.8 at admission. In the ED, she was given 2 liters of fluid, bicarbonate, insulin. Poor oral intake, DI and being on amiloride PTA likely causing dehydration and acute kidney injury on top of CKD. Her fluid intake had been limited at the skilled nursing facility since patient was prone to throwing cups at staff.   - Nephrology following.   - Monitor intake output       Urinary tract infection, yeast Had  white blood cells and 2-5 WBC casts on a catheter specimen of a UA. She has a history of Klebsiella urinary tract infection in the past with sensitivity to most antibiotics tested including ceftriaxone and fluoroquinolones. She was given Levaquin 750 mg in the emergency department. >100k yeast. D/c'd levofloxacin. Blood 1/2 from 5/19 shows Staph capitis   - Central line placed (RIJ) on 5/20 since unable to get PICC at that time and needed access for IVF and medications       Hypertension BP elevated, also tachycardic which could be due to dehydration.  - metoprolol, and clonidine patch.  - PRN hydralazine/metoprolol  - Holding amiloride       Diabetes mellitus type 2 on insulin On glipizide and 70/30 insulin (20 units in am and 34 units in the pm).    - Lantus to 25 units daily.  - Moderate insulin resistance ISS       Elevated Lipase Unclear significance. Denies abd pain. Vomited once in ICU but none since then. CT abd-pelvis (though non contrast) shows atrophic pancreas. Unlikely this is pancreatitis.   - Lipase trended down.       Diabetes insipidus Induced by lithium. Lithium was discontinued before this hospitalization.  - Continue to monitor output and sodiums      Schizoaffective  "disorder and Paranoid Schizophrenia and Cognitive Deficits PTA is on depakote, risperidal and zyprexa and ativan.  - Psych following.       Thrombocytopenia: Unclear etiology  - Monitor.       Gastroesophageal reflux disease  - Continue PPI       DVT prophylaxis: D/c Heparin SQ  due to dropping platelet count, use pcds      Code: DNR/DNI      Disposition: Once mental status stable and nutrition plan consolidated. Palliative consulted to discuss goals of care and tube feeding.                    Physical Exam:      Heart Rate: 81    Blood pressure 141/60, pulse 68, temperature 98.1  F (36.7  C), temperature source Axillary, resp. rate 16, height 1.626 m (5' 4\"), weight 81.5 kg (179 lb 10.8 oz), last menstrual period 2012, SpO2 98 %, not currently breastfeeding.    Vitals:    17 0500 17 0500 17 0315   Weight: 79.8 kg (175 lb 14.8 oz) 82 kg (180 lb 12.4 oz) 81.5 kg (179 lb 10.8 oz)       Vital Sign Ranges  Temperature Temp  Av.1  F (36.7  C)  Min: 98  F (36.7  C)  Max: 98.1  F (36.7  C)   Blood pressure Systolic (24hrs), Av , Min:107 , Max:159        Diastolic (24hrs), Av, Min:57, Max:83      Pulse No Data Recorded   Respirations Resp  Av  Min: 16  Max: 16   Pulse oximetry SpO2  Av.5 %  Min: 89 %  Max: 98 %     Vital Signs with Ranges  Temp:  [98  F (36.7  C)-98.1  F (36.7  C)] 98.1  F (36.7  C)  Heart Rate:  [] 81  Resp:  [16] 16  BP: (107-159)/(57-83) 141/60  SpO2:  [89 %-98 %] 98 %    I/O Last 3 Shifts:   I/O last 3 completed shifts:  In: 957 [P.O.:140; I.V.:817]  Out: 2600 [Urine:2600]    I/O past 24 hours:     Intake/Output Summary (Last 24 hours) at 17 0942  Last data filed at 17 0700   Gross per 24 hour   Intake             2411 ml   Output             2600 ml   Net             -189 ml     GENERAL: Alert and disoriented. NAD. More alert today.   HEENT: Normocephalic. No icterus or injection. Nares normal. NG in place.   LUNGS: Decrement to " bases. No dyspnea at rest.   HEART: Regular rate. Extremities perfused.   ABDOMEN: Soft, nontender, and nondistended. Positive bowel sounds.   EXTREMITIES: No LE edema noted.   NEUROLOGIC: Moves extremities x4. Oriented to place and person.          Prior to Admission Medications:        Prescriptions Prior to Admission   Medication Sig Dispense Refill Last Dose     LORazepam (ATIVAN) 0.5 MG tablet Take 1 tablet (0.5 mg) by mouth 2 times daily as needed for anxiety 14 tablet 0 prn med     cloNIDine (CATAPRES) 0.2 MG tablet Take 1 tablet (0.2 mg) by mouth 2 times daily   5/15/2017 at Unknown time     amLODIPine (NORVASC) 5 MG tablet Take 1 tablet (5 mg) by mouth daily 30 tablet  5/15/2017 at Unknown time     aMILoride (MIDAMOR) 5 MG tablet Take 2 tablets (10 mg) by mouth daily   5/15/2017 at Unknown time     metoprolol (TOPROL-XL) 25 MG 24 hr tablet Take 1 tablet (25 mg) by mouth daily 30 tablet  5/15/2017 at Unknown time     glipiZIDE (GLUCOTROL XL) 2.5 MG 24 hr tablet Take 1 tablet (2.5 mg) by mouth 2 times daily (before meals) 30 tablet  5/15/2017 at pm     insulin isophane & regular (HUMULIN MIX 70/30 PEN) susp 20 units before breakfast  30 units before dinner (Patient taking differently: 20 units before breakfast  34 units before dinner) 30 mL 1 5/15/2017 at Unknown time     risperiDONE (RISPERDAL M-TABS) 0.5 MG disintegrating tablet Place 1 tablet (0.5 mg) under the tongue 3 times daily as needed 60 tablet 0 prn med     senna-docusate (SENOKOT-S;PERICOLACE) 8.6-50 MG per tablet Take 1 tablet by mouth 2 times daily as needed for constipation 60 tablet 0 prn med     polyethylene glycol (MIRALAX/GLYCOLAX) powder Take 17 g by mouth daily as needed for constipation   prn med     divalproex (DEPAKOTE) 500 MG EC tablet Take 500 mg by mouth every morning   5/15/2017 at Unknown time     divalproex (DEPAKOTE) 500 MG EC tablet Take 1,500 mg by mouth At Bedtime   5/15/2017 at Unknown time     Furosemide (LASIX PO) Take 10  "mg by mouth daily    5/16/2017 at Unknown time     Furosemide (LASIX PO) Take 20 mg by mouth daily as needed (Take at noon for leg swelling if needed)   prn med     OLANZapine (ZYPREXA PO) Take 5 mg by mouth At Bedtime   5/15/2017 at Unknown time     RisperiDONE (RISPERDAL PO) Take 1 mg by mouth At Bedtime   5/15/2017 at Unknown time     Omeprazole (PRILOSEC PO) Take 20 mg by mouth 2 times daily (before meals)   5/15/2017 at pm     isosorbide mononitrate (IMDUR) 30 MG 24 hr tablet Take 1 tablet by mouth daily. 30 tablet prn 5/15/2017 at Unknown time     insulin syringe-needle U-100 (BD INSULIN SYRINGE ULTRAFINE) 31G X 5/16\" 1 ML Use one syringe bid daily or as directed. 60 each prn Taking            Medications:        Current Facility-Administered Medications   Medication Last Rate     IV fluid REPLACEMENT ONLY       D5W 100 mL/hr at 05/29/17 0314     Current Facility-Administered Medications   Medication Dose Route Frequency     multivitamins with minerals  15 mL Per Feeding Tube Daily     insulin glargine  25 Units Subcutaneous QAM AC     insulin aspart  1-7 Units Subcutaneous TID AC     insulin aspart  1-5 Units Subcutaneous At Bedtime     valproate (DEPACON) intermittent infusion  500 mg Intravenous Q8H     pantoprazole  40 mg Intravenous QAM AC     metoprolol  5 mg Intravenous Q6H     cloNIDine   Transdermal Q8H     [START ON 5/31/2017] cloNIDine   Transdermal Weekly     cloNIDine  1 patch Transdermal Weekly     Current Facility-Administered Medications   Medication Dose Route Frequency     IV fluid REPLACEMENT ONLY   Intravenous Continuous PRN     acetaminophen  650 mg Oral Q4H PRN     OLANZapine zydis  2.5-5 mg Oral BID PRN     hydrALAZINE  10-20 mg Intravenous Q2H PRN     miconazole   Topical Q1H PRN     sodium chloride (PF)  10-20 mL Intracatheter Q1H PRN     heparin lock flush  5-10 mL Intracatheter Q1H PRN     sodium chloride (PF)  3 mL Intracatheter Q1H PRN     metoprolol  2.5 mg Intravenous Q4H PRN     " risperiDONE  0.5 mg Sublingual TID PRN     glucose  15-30 g Oral Q15 Min PRN    Or     dextrose  25-50 mL Intravenous Q15 Min PRN    Or     glucagon  1 mg Subcutaneous Q15 Min PRN     naloxone  0.1-0.4 mg Intravenous Q2 Min PRN     melatonin  1 mg Oral At Bedtime PRN     senna-docusate  1-2 tablet Oral BID PRN     polyethylene glycol  17 g Oral Daily PRN     bisacodyl  10 mg Rectal Daily PRN     ondansetron  4 mg Oral Q6H PRN    Or     ondansetron  4 mg Intravenous Q6H PRN            Data:      Lab data reviewed.     Recent Labs  Lab 05/29/17  0605 05/28/17  0635 05/27/17  0630  05/26/17  0620   HGB  --  9.5* 9.4*  --  9.3*   MCV  --  94 94  --  94   PLT  --  199 180  --  150    140 143  --  142   POTASSIUM 3.3* 3.4 3.4  < > 2.9*   CHLORIDE 106 106 109  --  107   CO2 25 26 27  --  28   BUN 30 34* 35*  --  39*   CR 2.15* 2.04* 2.22*  --  2.41*   ANIONGAP 8 8 7  --  7   BENTLEY 9.2 9.4 9.0  --  9.2   * 185* 183*  --  229*   < > = values in this interval not displayed.        Imaging:      Imaging data reviewed.     GURINDER EspinoO.  [facility] Center[facility]  Pager [phone]

## 2017-05-29 NOTE — CONSULTS
[facility] Psychiatric Consult Progress Note      Interval History:   Pt seen, care reviewed with treatment team. Patient was seen for follow-up. She has not been agitated nor displayed overt psychosis. Pt not saying much in exa, staff report the same. No outbursts and has not had to take Antipsychotics to calm down. Pt still getting IV Edpakon and level is in the therepeutic range for seizures but is low for Psychiatry.  Pt is entirely off Lithium. Given her Kidney failure from Lithium toxicity would highly recommend not restarting Lithium.  Pt has multiple reasons for delirium and has not yet cleared. Will continue to make Zyprexa and Risperdal prn. Along with IV Depakon.     Review of systems:   The Review of Systems is negative other than noted in the HPI     Medications:       potassium chloride  20 mEq Oral or NG Tube Once     multivitamins with minerals  15 mL Per Feeding Tube Daily     insulin glargine  25 Units Subcutaneous QAM AC     insulin aspart  1-7 Units Subcutaneous TID AC     insulin aspart  1-5 Units Subcutaneous At Bedtime     valproate (DEPACON) intermittent infusion  500 mg Intravenous Q8H     pantoprazole  40 mg Intravenous QAM AC     metoprolol  5 mg Intravenous Q6H     cloNIDine   Transdermal Q8H     [START ON 5/31/2017] cloNIDine   Transdermal Weekly     cloNIDine  1 patch Transdermal Weekly     IV fluid REPLACEMENT ONLY, acetaminophen, OLANZapine zydis, hydrALAZINE, miconazole, sodium chloride (PF), heparin lock flush, sodium chloride (PF), metoprolol, risperiDONE, glucose **OR** dextrose **OR** glucagon, naloxone, melatonin, senna-docusate, polyethylene glycol, bisacodyl, ondansetron **OR** ondansetron      Mental Status Examination:     Appearance:  somnolent  Eye Contact:  poor   Speech:  mute  Psychomotor Behavior:  no evidence of tardive dyskinesia, dystonia, or tics  Mood:  NA  Affect:  intensity is blunted  Thought Process:  NA NA  Thought Content:  Internally  preoccupied  Oriented to:  NA  Attention Span and Concentration:  poor  Recent and Remote Memory:  poor  Fund of Knowledge: NA  Muscle Strength and Tone: flaccid  Gait and Station: NA  Insight:  NA  Judgment:  NA        Labs/vitals:     Recent Results (from the past 24 hour(s))   Glucose by meter    Collection Time: 05/28/17 12:03 PM   Result Value Ref Range    Glucose 254 (H) 70 - 99 mg/dL   Glucose by meter    Collection Time: 05/28/17  5:27 PM   Result Value Ref Range    Glucose 193 (H) 70 - 99 mg/dL   Glucose by meter    Collection Time: 05/28/17  9:28 PM   Result Value Ref Range    Glucose 303 (H) 70 - 99 mg/dL   Lactic acid level STAT    Collection Time: 05/28/17 11:30 PM   Result Value Ref Range    Lactic Acid 2.7 (H) 0.7 - 2.1 mmol/L   Glucose by meter    Collection Time: 05/29/17  2:06 AM   Result Value Ref Range    Glucose 265 (H) 70 - 99 mg/dL   Basic metabolic panel    Collection Time: 05/29/17  6:05 AM   Result Value Ref Range    Sodium 139 133 - 144 mmol/L    Potassium 3.3 (L) 3.4 - 5.3 mmol/L    Chloride 106 94 - 109 mmol/L    Carbon Dioxide 25 20 - 32 mmol/L    Anion Gap 8 3 - 14 mmol/L    Glucose 250 (H) 70 - 99 mg/dL    Urea Nitrogen 30 7 - 30 mg/dL    Creatinine 2.15 (H) 0.52 - 1.04 mg/dL    GFR Estimate 23 (L) >60 mL/min/1.7m2    GFR Estimate If Black 28 (L) >60 mL/min/1.7m2    Calcium 9.2 8.5 - 10.1 mg/dL   Magnesium    Collection Time: 05/29/17  6:05 AM   Result Value Ref Range    Magnesium 2.1 1.6 - 2.3 mg/dL   Phosphorus    Collection Time: 05/29/17  6:05 AM   Result Value Ref Range    Phosphorus 3.0 2.5 - 4.5 mg/dL   Lactic acid whole blood    Collection Time: 05/29/17  6:05 AM   Result Value Ref Range    Lactic Acid 1.5 0.7 - 2.1 mmol/L   Glucose by meter    Collection Time: 05/29/17  8:16 AM   Result Value Ref Range    Glucose 218 (H) 70 - 99 mg/dL     B/P: 116/68, T: 97.6, P: 90, R: 16    Impression:   Nel BRADFORDBella Farmer is a 61-year-old woman who was referred to the hospital on account of  worsening lethargy and abnormal labs.  She was found to have evidence of urinary tract infection in the emergency room and was also noted to be dehydrated.  She has a history of chronic kidney disease and diabetes insipidus that appear to have worsened in the course of the preceding days.  She does have a history of prior use of lithium that essentially led to her diabetes insipidus.  Her current valproic acid level was 56 mg/L and she is on low doses of antipsychotic medications.  It is unclear if her lethargy is a result of her psychiatric medications, but they certainly can contribute to her lethargy.        DIagnoses:     1.  Delirium due to multiple factors including dehydration, medications and urinary tract infection.   2.  Schizoaffective disorder, bipolar type by history.   3.  Intellectual disability, unspecified severity.   4.  Hypokalemia.   5.  Hypertension.   6.  Urinary tract infection.   7.  Diabetes mellitus type 2.   8.  Diabetes insipidus.          Plan:   1. Written information given on medications. Side effects, risks, benefits reviewed.  2. Discontinue scheduled antipsychotics and offer Zyprexa Zydis 2.5-5 mg bid prn agitation or psychosis.Continue IV Depkon.  3. Medical management as you are.    TIME SPENT = 15 MINUTES    Attestation:  Patient has been seen and evaluated by me,  Dino Smalls MD

## 2017-05-30 NOTE — PLAN OF CARE
Problem: Goal Outcome Summary  Goal: Goal Outcome Summary  Outcome: No Change  Pt intermittently lethargic opens eyes to name and answers simple yes/no questions. Follows basic commands. RUE hemiplegic, BLE 2/5 with 3+ BLE edema in feet/ankles. PERRLA. Will not follow majority of neuro assessment.  On tele in SR. Sz precautions with no sz activity noted. Dunlap in place and patent. First step mattress and t/R q2H with skin care protocol and wound care BID/PRN to coccyx per POC, macerated/sloughed skin IRA. TF running at 25/hr, tolerating with no residuals/ABD pain. LS dim, infx loose cough. BG covered, refused PO intake when offered. Plan for palliative care consult 5/30. D/c plan pending.

## 2017-05-30 NOTE — PROVIDER NOTIFICATION
Pharmacy consult to convert valprate 500mg IV q8h to an NG form.  Pt's home dose was Depakote 500mg qam and 1500mg qpm.  Spoke with Dr. Pompa of the hospitalist service.  Will convert to valproic acid solution 500mg q8h and will titrate up if needed.      Thank you, Shahrzad DUNLAP, PharmD

## 2017-05-30 NOTE — PLAN OF CARE
Problem: Goal Outcome Summary  Goal: Goal Outcome Summary  Outcome: No Change  No change in neuros overnight. Tele SR, no seizure activities noted. LE edema-elevated. Total cares turned & reposition every two hours. TF @ 25cc /hr to advance to 35 cc( goals is 45 cc/hr as ordered)  at 1100. No non verbal pain signs/symptoms noted. Plans for palliative care consult today.

## 2017-05-30 NOTE — PLAN OF CARE
"Problem: Goal Outcome Summary  Goal: Goal Outcome Summary  PT-Received order, reviewed chart and spoke with nurse at [facility] where patient is a long term resident. Nurse reported patient is \"total care\" including needing a paolo lift for all mobility. Based on this information, do not feel PT eval would be beneficial to this patient or change her discharge recommendation. Will cancel PT order.       "

## 2017-05-30 NOTE — PLAN OF CARE
Problem: Goal Outcome Summary  Goal: Goal Outcome Summary  Outcome: No Change  Pt alert, oriented to self. Will answer yes/no questions. R side slight contracture noted. CMS +, VSS.  Has Isosource running at 35,(increased from 25, goal of 45). Stopped D5 per order d/t blood glucose consisently high. Germán is patent. Cleansed wound per order. Pt transferred to 8th floor.

## 2017-05-30 NOTE — PLAN OF CARE
Problem: Goal Outcome Summary  Goal: Goal Outcome Summary  Outcome: Declining  SLP: Attempted to see patient for swallow treatment after transferring to station 88. Lunch tray at bedside but was unable to arouse her for any PO trials.

## 2017-05-30 NOTE — PLAN OF CARE
Problem: Goal Outcome Summary  Goal: Goal Outcome Summary  Outcome: No Change  Lethargic, answers yes and no questions at times. Mechanical lift, total feed.  NG feeding tube to running at 35 mL/hr with a goal of 45 mL/hr. Increase to 45 mL/hr at midnight 5/31.  DD1 with nectar thick.  Patient has a cherry catheter draining clear, yellow urine. 3 lumen IJ on right neck, IV fluids infusing.  Coccyx pressure ulcer, on 1st step mattress.  Diabetic. +1-2 generalized edema.  Palliative care consult in place.

## 2017-05-30 NOTE — PROGRESS NOTES
[facility]  Hospitalist Progress Note        Nagaabdifatah Traore Peña, DO  05/30/2017        Interval History:      Patient more alert today but remains confused.          Assessment and Plan:        Nel Farmer is a 61-year-old woman with history of schizoaffective disorder, cognitive impairment usually oriented to just self and place per TCU report, who has had 2 days of lethargy, decreased activity, decreased intake. Lab work up for evaluation at TCU showed potassium of 6.1 with a creatinine of 4 and so was brought to ER 5/16/17. Work up in ER showed possible UTI, LYLA, hyperkalemia. UTI consistent with yeast, no bacteria found.       Acute metabolic encephalopathy likely secondary to severe dehydration and Acute subdural hematoma, Acute on chronic stage 4 renal failure secondary to lithium toxicity. LYLA resolved Patient has diabetes insipidus, and was also on amiloride, likely contributing to dehydration and metabolic encephalopathy, though mostly with decreased response frequently at baseline. Also on ativan, risperidone, zyprexa and depakote (500 mg at am and 1500 mg at HS)which could contribute to encephalopathy. UA also suggestive of UTI possibly contributing but no bacterial growth seen. Depakote level normal. Head CT 5/22 negative for acute process. Psychiatrist consulted, had discussed with Dr Vasquez. Neuro consulted, EEG did not show seizure like activity, ABG without hypercarbia. MRI brain done 5/24, showed SDH along the posterior aspect of the right cerebral convexity measuring up to 0.2 cm in thickness, patient not on anticoagulation. Unlikely this MRI finding is contributing to her mentation. Transferred to ICU 5/24 and monitored overnight, transferred out of ICU 5/25.  - Holding PTA ativan   - Seizure precautions.   - Nephrology signed off.   - Psychiatry re-consultation as clinically indicated.       Diarrhea and emesis: started having diarrhea and vomited while in ICU. NG and  rectal tube as well were placed. c diff is negative. Had mildly elevated lipase which trended down.  - SLP following.   - Tube feeding initiated.   - Nutrition following.   - Palliative consulted for goals of care and potential for longer term tube feeding.       Severe dehydration with acute kidney injury/hypernatremia and hyperkalemia, now hypokalemic Her baseline creatinine is around 2.4 with creatinine on admission of 3.41, K 6.8 at admission. In the ED, she was given 2 liters of fluid, bicarbonate, insulin. Poor oral intake, DI and being on amiloride PTA likely causing dehydration and acute kidney injury on top of CKD. Her fluid intake had been limited at the skilled nursing facility since patient was prone to throwing cups at staff.   - Nephrology signed off.   - Monitor intake output       Urinary tract infection, yeast Had  white blood cells and 2-5 WBC casts on a catheter specimen of a UA. She has a history of Klebsiella urinary tract infection in the past with sensitivity to most antibiotics tested including ceftriaxone and fluoroquinolones. She was given Levaquin 750 mg in the emergency department. >100k yeast. D/c'd levofloxacin. Blood 1/2 from 5/19 shows Staph capitis   - Central line placed (RIJ) on 5/20.   - Treated for yeast, completed.       Hypertension BP elevated, also tachycardic which could be due to dehydration.  - metoprolol and clonidine patch.  - PRN hydralazine/metoprolol  - Holding amiloride       Diabetes mellitus type 2 on insulin On glipizide and 70/30 insulin (20 units in am and 34 units in the pm).    - Lantus to 25 units daily.  - Moderate insulin resistance ISS       Elevated Lipase Unclear significance. Denies abd pain. Vomited once in ICU but none since then. CT abd-pelvis (though non contrast) shows atrophic pancreas. Unlikely this is pancreatitis.   - Lipase trended down.       Diabetes insipidus Induced by lithium. Lithium was discontinued before this hospitalization.  -  "Continue to monitor output and sodium      Schizoaffective disorder and Paranoid Schizophrenia and Cognitive Deficits PTA is on depakote, risperidal and zyprexa and ativan.  - Psych intermittently following.   - Continue Valproic acid, switched from IV to NG route.       Thrombocytopenia: Unclear etiology  - Monitor.       Gastroesophageal reflux disease  - Continue PPI       DVT prophylaxis: D/c Heparin SQ  due to dropping platelet count, use pcds      Code: DNR/DNI      Disposition: Once mental status stable and nutrition plan consolidated. Palliative consulted to discuss goals of care and tube feeding, ultimately palliative continuing discussion to reach consensus among patient's children to establish disposition.                    Physical Exam:      Heart Rate: 82    Blood pressure 135/72, pulse 68, temperature 98.4  F (36.9  C), temperature source Oral, resp. rate 18, height 1.626 m (5' 4\"), weight 82 kg (180 lb 12.4 oz), last menstrual period 2012, SpO2 96 %, not currently breastfeeding.    Vitals:    17 0500 17 0315 17 0500   Weight: 82 kg (180 lb 12.4 oz) 81.5 kg (179 lb 10.8 oz) 82 kg (180 lb 12.4 oz)       Vital Sign Ranges  Temperature Temp  Av.9  F (36.6  C)  Min: 97.5  F (36.4  C)  Max: 98.4  F (36.9  C)   Blood pressure Systolic (24hrs), Av , Min:112 , Max:151        Diastolic (24hrs), Av, Min:54, Max:73      Pulse No Data Recorded   Respirations Resp  Av.2  Min: 16  Max: 18   Pulse oximetry SpO2  Av.2 %  Min: 93 %  Max: 98 %     Vital Signs with Ranges  Temp:  [97.5  F (36.4  C)-98.4  F (36.9  C)] 98.4  F (36.9  C)  Heart Rate:  [71-84] 82  Resp:  [16-18] 18  BP: (112-151)/(54-73) 135/72  SpO2:  [93 %-98 %] 96 %    I/O Last 3 Shifts:   I/O last 3 completed shifts:  In:  [I.V.:; NG/GT:105]  Out: 2675 [Urine:2675]    I/O past 24 hours:     Intake/Output Summary (Last 24 hours) at 17  Last data filed at 17 0600   Gross per 24 " hour   Intake              815 ml   Output             2675 ml   Net            -1860 ml     GENERAL: Alert and disoriented. NAD. Alert but limited insight.   HEENT: Normocephalic. No icterus or injection. Nares normal. NG in place.   LUNGS: Decrement to bases. No dyspnea at rest.   HEART: Regular rate. Extremities perfused.   ABDOMEN: Soft, nontender, and nondistended. Positive bowel sounds.   EXTREMITIES: No LE edema noted.   NEUROLOGIC: Moves extremities x4. Oriented to place and person.          Prior to Admission Medications:        Prescriptions Prior to Admission   Medication Sig Dispense Refill Last Dose     LORazepam (ATIVAN) 0.5 MG tablet Take 1 tablet (0.5 mg) by mouth 2 times daily as needed for anxiety 14 tablet 0 prn med     cloNIDine (CATAPRES) 0.2 MG tablet Take 1 tablet (0.2 mg) by mouth 2 times daily   5/15/2017 at Unknown time     amLODIPine (NORVASC) 5 MG tablet Take 1 tablet (5 mg) by mouth daily 30 tablet  5/15/2017 at Unknown time     aMILoride (MIDAMOR) 5 MG tablet Take 2 tablets (10 mg) by mouth daily   5/15/2017 at Unknown time     metoprolol (TOPROL-XL) 25 MG 24 hr tablet Take 1 tablet (25 mg) by mouth daily 30 tablet  5/15/2017 at Unknown time     glipiZIDE (GLUCOTROL XL) 2.5 MG 24 hr tablet Take 1 tablet (2.5 mg) by mouth 2 times daily (before meals) 30 tablet  5/15/2017 at pm     insulin isophane & regular (HUMULIN MIX 70/30 PEN) susp 20 units before breakfast  30 units before dinner (Patient taking differently: 20 units before breakfast  34 units before dinner) 30 mL 1 5/15/2017 at Unknown time     risperiDONE (RISPERDAL M-TABS) 0.5 MG disintegrating tablet Place 1 tablet (0.5 mg) under the tongue 3 times daily as needed 60 tablet 0 prn med     senna-docusate (SENOKOT-S;PERICOLACE) 8.6-50 MG per tablet Take 1 tablet by mouth 2 times daily as needed for constipation 60 tablet 0 prn med     polyethylene glycol (MIRALAX/GLYCOLAX) powder Take 17 g by mouth daily as needed for constipation   " prn med     divalproex (DEPAKOTE) 500 MG EC tablet Take 500 mg by mouth every morning   5/15/2017 at Unknown time     divalproex (DEPAKOTE) 500 MG EC tablet Take 1,500 mg by mouth At Bedtime   5/15/2017 at Unknown time     Furosemide (LASIX PO) Take 10 mg by mouth daily    5/16/2017 at Unknown time     Furosemide (LASIX PO) Take 20 mg by mouth daily as needed (Take at noon for leg swelling if needed)   prn med     OLANZapine (ZYPREXA PO) Take 5 mg by mouth At Bedtime   5/15/2017 at Unknown time     RisperiDONE (RISPERDAL PO) Take 1 mg by mouth At Bedtime   5/15/2017 at Unknown time     Omeprazole (PRILOSEC PO) Take 20 mg by mouth 2 times daily (before meals)   5/15/2017 at pm     isosorbide mononitrate (IMDUR) 30 MG 24 hr tablet Take 1 tablet by mouth daily. 30 tablet prn 5/15/2017 at Unknown time     insulin syringe-needle U-100 (BD INSULIN SYRINGE ULTRAFINE) 31G X 5/16\" 1 ML Use one syringe bid daily or as directed. 60 each prn Taking            Medications:        Current Facility-Administered Medications   Medication Last Rate     IV fluid REPLACEMENT ONLY       D5W 100 mL/hr at 05/30/17 0150     Current Facility-Administered Medications   Medication Dose Route Frequency     metoprolol  25 mg Oral BID     pantoprazole  40 mg Per NG tube Daily     heparin lock flush  5-10 mL Intracatheter Q24H     multivitamins with minerals  15 mL Per Feeding Tube Daily     insulin glargine  25 Units Subcutaneous QAM AC     insulin aspart  1-7 Units Subcutaneous TID AC     insulin aspart  1-5 Units Subcutaneous At Bedtime     valproate (DEPACON) intermittent infusion  500 mg Intravenous Q8H     cloNIDine   Transdermal Q8H     [START ON 5/31/2017] cloNIDine   Transdermal Weekly     cloNIDine  1 patch Transdermal Weekly     Current Facility-Administered Medications   Medication Dose Route Frequency     sodium chloride (PF)  10-20 mL Intracatheter Q1H PRN     heparin lock flush  5-10 mL Intracatheter Q1H PRN     IV fluid " REPLACEMENT ONLY   Intravenous Continuous PRN     acetaminophen  650 mg Oral Q4H PRN     OLANZapine zydis  2.5-5 mg Oral BID PRN     hydrALAZINE  10-20 mg Intravenous Q2H PRN     miconazole   Topical Q1H PRN     sodium chloride (PF)  10-20 mL Intracatheter Q1H PRN     heparin lock flush  5-10 mL Intracatheter Q1H PRN     sodium chloride (PF)  3 mL Intracatheter Q1H PRN     metoprolol  2.5 mg Intravenous Q4H PRN     risperiDONE  0.5 mg Sublingual TID PRN     glucose  15-30 g Oral Q15 Min PRN    Or     dextrose  25-50 mL Intravenous Q15 Min PRN    Or     glucagon  1 mg Subcutaneous Q15 Min PRN     naloxone  0.1-0.4 mg Intravenous Q2 Min PRN     melatonin  1 mg Oral At Bedtime PRN     senna-docusate  1-2 tablet Oral BID PRN     polyethylene glycol  17 g Oral Daily PRN     bisacodyl  10 mg Rectal Daily PRN     ondansetron  4 mg Oral Q6H PRN    Or     ondansetron  4 mg Intravenous Q6H PRN            Data:      Lab data reviewed.     Recent Labs  Lab 05/29/17  0605 05/28/17  0635 05/27/17  0630  05/26/17  0620   HGB  --  9.5* 9.4*  --  9.3*   MCV  --  94 94  --  94   PLT  --  199 180  --  150    140 143  --  142   POTASSIUM 3.3* 3.4 3.4  < > 2.9*   CHLORIDE 106 106 109  --  107   CO2 25 26 27  --  28   BUN 30 34* 35*  --  39*   CR 2.15* 2.04* 2.22*  --  2.41*   ANIONGAP 8 8 7  --  7   BENTLEY 9.2 9.4 9.0  --  9.2   * 185* 183*  --  229*   < > = values in this interval not displayed.        Imaging:      Imaging data reviewed.     Dr. Naga Pompa D.O.  [facility]  Pager [phone]

## 2017-05-30 NOTE — PROGRESS NOTES
Pt had an NJ feeding tube placed yesterday and started Isosource 1.5 @ 15 mL/hr, now increased to 30 mL/hr.   Goal is 45 mL/hr, should reach goal later today.  IVF d/c'd, will increase H2O flushes to 135 mL q 4 hours for total fluids of 1600 mL/day.    Note BS 200s, on med SSI and Lantus, 25 units.  K low, 3.3.    Pt also on a DD1, NT diet but intake has been 0-25%. SLP is following.    Last documented BM was 5/25.    Marcie Levy, IMANI  Pager [phone] (M-F)            [phone] (W/E & Hol)

## 2017-05-31 NOTE — PROGRESS NOTES
TORI  D:  Per palliative update, pt is now comfort care.  Mother Lisa would like pt to discharge to alternate SNF which is closer to mother's home in [place].  I:  SW met with mother Lisa and sister with Lety from  Hospice present.  Alternate facility options discussed.  Mother gave consent for SW to seek LTC bed for pt at:  1[facility]  2[facility]   3[facility].  Referral sent by DOD process.  Lety from  Hospice meeting with family now to discuss hospice services available to pt.  P:  SW following pt for alternate SNF placement.

## 2017-05-31 NOTE — PROGRESS NOTES
[facility]    Hospitalist Progress Note    Date of Service (when I saw the patient): 05/31/2017    Assessment & Plan     Nel Farmer is a 61-year-old woman with history of schizoaffective disorder, cognitive impairment usually oriented to just self and place per TCU report, who has had 2 days of lethargy, decreased activity, decreased intake. Lab work up for evaluation at TCU showed potassium of 6.1 with a creatinine of 4 and so was brought to ER 5/16/17. Work up in ER showed possible UTI, LYLA, hyperkalemia. UTI consistent with yeast, no bacteria found.       Acute metabolic encephalopathy likely secondary to severe dehydration and Acute subdural hematoma, Acute on chronic stage 4 renal failure secondary to lithium toxicity. LYLA resolved Patient has diabetes insipidus, and was also on amiloride, likely contributing to dehydration and metabolic encephalopathy, though mostly with decreased response frequently at baseline. Also on ativan, risperidone, zyprexa and depakote (500 mg at am and 1500 mg at HS)which could contribute to encephalopathy. UA also suggestive of UTI possibly contributing but no bacterial growth seen. Depakote level normal. Head CT 5/22 negative for acute process. Psychiatrist consulted, had discussed with Dr Vasquez. Neuro consulted, EEG did not show seizure like activity, ABG without hypercarbia. MRI brain done 5/24, showed SDH along the posterior aspect of the right cerebral convexity measuring up to 0.2 cm in thickness, patient not on anticoagulation. Unlikely this MRI finding is contributing to her mentation. Transferred to ICU 5/24 and monitored overnight, transferred out of ICU 5/25.  - Holding PTA ativan   - Seizure precautions.   - Appreciate Nephrology /neurology/psych assistance.      Diarrhea and emesis: started having diarrhea and vomited while in ICU. NG and rectal tube as well were placed. c diff is negative. Had mildly elevated lipase which trended down.  - Tube  feeding initiated but now comfort care and feeding stopped 5/31.      Severe dehydration with acute kidney injury/hypernatremia and hyperkalemia, now hypokalemic Her baseline creatinine is around 2.4 with creatinine on admission of 3.41, K 6.8 at admission. In the ED, she was given 2 liters of fluid, bicarbonate, insulin. Poor oral intake, DI and being on amiloride PTA likely causing dehydration and acute kidney injury on top of CKD. Her fluid intake had been limited at the skilled nursing facility since patient was prone to throwing cups at staff.   - Nephrology signed off.       Urinary tract infection, yeast Had  white blood cells and 2-5 WBC casts on a catheter specimen of a UA. She has a history of Klebsiella urinary tract infection in the past with sensitivity to most antibiotics tested including ceftriaxone and fluoroquinolones. She was given Levaquin 750 mg in the emergency department. >100k yeast. D/c'd levofloxacin. Blood 1/2 from 5/19 shows Staph capitis   - Central line placed (RIJ) on 5/20.   - Treated for yeast, completed.       Hypertension BP elevated, also tachycardic which could be due to dehydration. Amiloride discontinued due to hyperkalemia  - On clonidine patch will leave it for now.  - PRN hydralazine/metoprolol      Diabetes mellitus type 2 on insulin: On glipizide and 70/30 insulin (20 units in am and 34 units in the pm).    - Lantus to 20 units as TF stopped, will review with family regarding continuing this at discharge.  - Moderate insulin resistance ISS       Elevated Lipase Unclear significance. Denies abd pain. Vomited once in ICU but none since then. CT abd-pelvis (though non contrast) shows atrophic pancreas. Unlikely this is pancreatitis.   - Lipase trended down.       Diabetes insipidus Induced by lithium. Lithium was discontinued before this hospitalization.       Schizoaffective disorder and Paranoid Schizophrenia and Cognitive Deficits PTA is on depakote, risperidal and  zyprexa and ativan.  - Continue Valproic acid, switched from IV to NG route, level high, hold for now, readdress with family at discharge.       Thrombocytopenia: Unclear etiology      Gastroesophageal reflux disease  - Continue PPI     DVT Prophylaxis: Pneumatic Compression Devices  Code Status: DNR/DNI    Disposition: Expected discharge:  for disposition, back to TCU with comfort care likely tomorrow    Carson Schuster MD  Hospitalist    Interval History   Patient was seen and examined, intermittently somnolent per RN.   Unable to obtain ROS given mentation.    -Data reviewed today: I reviewed all new labs and imaging results over the last 24 hours. I personally reviewed no images or EKG's today.    Physical Exam   Temp: 99.1  F (37.3  C) Temp src: Axillary BP: 138/70   Heart Rate: 100 Resp: 16 SpO2: (!) 88 % O2 Device: None (Room air)    Vitals:    05/29/17 0315 05/30/17 0500 05/31/17 0500   Weight: 81.5 kg (179 lb 10.8 oz) 82 kg (180 lb 12.4 oz) 84.4 kg (186 lb 1.1 oz)     Vital Signs with Ranges  Temp:  [97.9  F (36.6  C)-99.6  F (37.6  C)] 99.1  F (37.3  C)  Heart Rate:  [] 100  Resp:  [15-16] 16  BP: (111-162)/(51-77) 138/70  SpO2:  [88 %-95 %] 88 %  I/O last 3 completed shifts:  In: 330 [NG/GT:330]  Out: 3250 [Urine:3250]    GENERAL: sleepy but responds to verbal stimuli.  HEENT: Normocephalic. No icterus or injection.    LUNGS: Decrement to bases. Normal work of breathing.  HEART: Regular rate. Extremities perfused.   ABDOMEN: Soft, nontender, and nondistended. Positive bowel sounds.   EXTREMITIES: No LE edema noted.   NEUROLOGIC: Rt UE seems spastic with decrease movement. Sleepy but wakes up with verbal stimuli, only follows 1-2 commands then goes back to sleep.    Medications     IV fluid REPLACEMENT ONLY         [START ON 6/1/2017] insulin glargine  20 Units Subcutaneous QAM AC     heparin lock flush  5-10 mL Intracatheter Q24H     insulin aspart  1-7 Units Subcutaneous TID AC     insulin  aspart  1-5 Units Subcutaneous At Bedtime     cloNIDine   Transdermal Q8H     cloNIDine   Transdermal Weekly     cloNIDine  1 patch Transdermal Weekly       Data     Recent Labs  Lab 05/31/17  0600 05/30/17  0700 05/29/17  0605 05/28/17  0635  05/25/17  0415   WBC 14.4* 13.7*  --  13.3*  < > 6.9   HGB 10.0* 9.6*  --  9.5*  < > 9.8*   MCV 98 95  --  94  < > 96    195  --  199  < > 145*   * 140 139 140  < > 143   POTASSIUM 4.4 4.1 3.3* 3.4  < > 3.6   CHLORIDE 115* 107 106 106  < > 111*   CO2 27 25 25 26  < > 22   BUN 34* 27 30 34*  < > 44*   CR 2.04* 1.96* 2.15* 2.04*  < > 2.74*   ANIONGAP 6 8 8 8  < > 10   BENTLEY 10.1 9.4 9.2 9.4  < > 9.8   * 316* 250* 185*  < > 282*   ALBUMIN  --   --   --   --   --  2.0*   PROTTOTAL  --   --   --   --   --  5.7*   BILITOTAL  --   --   --   --   --  0.2   ALKPHOS  --   --   --   --   --  50   ALT  --   --   --   --   --  8   AST  --   --   --   --   --  5   LIPASE  --   --   --   --   --  105   < > = values in this interval not displayed.    No results found for this or any previous visit (from the past 24 hour(s)).

## 2017-05-31 NOTE — PLAN OF CARE
Problem: Goal Outcome Summary  Goal: Goal Outcome Summary  Outcome: No Change  Pt lethargic, answers yes and no questions at times. Mechanical lift. VSS. NJ feeding tube to running at 35 mL/hr with a goal of 45 mL/hr. Increase to 45 mL/hr at midnight 5/31.  DD1 with nectar thick.  Total feed; Poor appetite. Patient has a cherry catheter draining clear, yellow urine. 3 lumen IJ on right neck; heparin locked.  Coccyx pressure ulcer, on 1st step mattress.  +1 generalized edema.  Palliative care consult in place.

## 2017-05-31 NOTE — PROGRESS NOTES
Chart reviewed  Note pt is now comfort cares  TF has been dc'd  Regular diet as tolerates    Will be available as needed

## 2017-05-31 NOTE — PLAN OF CARE
Problem: Goal Outcome Summary  Goal: Goal Outcome Summary  Outcome: No Change  VSS and WNL for this pt. Pt transfers with assist of 2 with lift, LS diminished and is oriented to self only, lethargic.  Has NJ for feedings and is at goal rate of 45/hr. Pt answers only yes and no questions. Total feed, DDI with necter thick, has little appetite per report. Patient has a cherry catheter draining clear, yellow urine. 3 lumen IJ on right neck; heparin locked.  Coccyx pressure ulcer, on 1st step mattress.  +1 generalized edema.  Palliative care consult in place.

## 2017-05-31 NOTE — PLAN OF CARE
Speech Language Therapy Discharge Summary    Reason for therapy discharge:    Patient/family request discontinuation of services.    Progress towards therapy goal(s). See goals on Care Plan in [facility] electronic health record for goal details.  Goals not met.  Barriers to achieving goals:   Comfort cares with regular diet and thin liquids..    Therapy recommendation(s):    No further therapy is recommended.     ST to sign off at this time due to comfort care with regular diet and thin liquids. Please re-consult if status changes.     Problem: Goal Outcome Summary  Goal: Goal Outcome Summary  SLP: Pt able to be aroused briefly for follow up dysphagia treatment. Max assist feeding of nectar thick liquids via teaspoon and pureed textures targeted. Prolonged oral phase noted secondary to fatigue. Pt tolerating diet with no overt s/sx of aspiration. Advanced textures not trialed due to pt fatigue. Recommended: continue nectar thick liquids via teaspoon and dysphagia diet level 1 with small bites and sips. ST to follow for possible free water protocol. Plan of care pending palliative consult.

## 2017-05-31 NOTE — CONSULTS
[facility]    Palliative Care Consultation     Nel Farmer  MRN# [id number]  Date of Admission:  5/16/2017  Date of Service (when I saw the patient): 05/31/17  Reason for consult: Consulted by Dr. Pompa for Goals of care    Assessment & Plan   Nel Farmer is a 61-year-old woman with history of schizoaffective disorder, cognitive impairment usually oriented to just self and place per TCU report, who has had 2 days of lethargy, decreased activity, decreased intake. Lab work up for evaluation at TCU showed potassium of 6.1 with a creatinine of 4 and so was brought to ER 5/16/17. Work up in ER showed possible UTI, LYLA, hyperkalemia. UTI consistent with yeast, no bacteria found.     Symptoms/Recommendations   1. Goals of Care. Met with Nel's mother Lisa and ANNIKA Tana this afternoon (see details of discussion below). At this time, they agree that transitioning to comfort measures and focusing on management of Nel's symptoms is appropriate at this time. They are interested in meeting with the hospice liaison (Lety emerson). They also have questions re: placement as they do not wish for Nel to return to her previous TCU in [place]. Relayed this message to unit SW.  -comfort measures  -d/c tube feeds, vitals, labs, telemetry     Support/Coping  -Pt's mother (Lisa) and ANNIKA (Tana) at bedside and present for care conference   -Will involve Palliative LICSW, Maite Erickson, and/or Palliative , Lillie Medina    Decisional Support, Goals of Care, Counseling & Coordination  Decisional Capacity Intact?  -No  Health Care Directive on File?  -No  Code Status/Resuscitation Preferences?  -DNR/DNI  Plan of Care?  -d/c with comfort cares/hospice when placement arranged     Discussion  Introduced the scope of our practice to Nel's mother and ANNIKA. Discussed our potential roles for symptom management, support/coping, and decisional support (aka goals of care).     Met with Nancys  mother Lisa and ANNIKA Tana this afternoon. We discussed Nel's overall health and Lisa and Tana feel Nel's health began to decline in February. Since then she has spent a great deal of time in the hospital and they have seen a gradual downward spiral. At this point, Nel spends most of her time in bed sleeping. Lisa and Tana both state that this is not how Nel would want to live. We discussed 2 paths that Nel could continue on. A restorative pathway would include continuing to try and manage Nel's medical complications, this would likely result in a feeding tube being placed to ensure adequate nutrition which she would need to have the best chance at recovery. Lisa and Tana both understand that she may never get back to her prior level of functioning and that another complication will mean she will return to the hospital and be further set back. The other path Nel could follow would be a comfort focused pathway in which we stop trying to correct problems and instead shift our focus to managing Nel's symptoms and keeping her as comfortable as possible for whatever time she has left. Lisa and Tana feel this path would be best for Nel at this point. We discussed how hospice could provide an additional layer of support to ensure Nel's comfort. Lisa and Tana are agreeable with a hospice meeting and also would like to discuss placement with the unit SW. They expressed a strong desire not to have Nel return to [facility] as they did not feel she received good care and also because it is too far for Lisa to drive to. I explained that we could begin the comfort care plan while Nel is in the hospital by discontinuing labs, vitals, tele monitoring, and the NJ tube as these interventions are not providing Nel comfort. I explained that she could be allowed to eat/drink for comfort/pleasure knowing the risk for aspiration will be present. Lisa and Tana are  agreeable with this plan of care. I relayed this information to the unit Lety DUDLEY (hospice liaison), Nel's bedside nurse, and Dr Schuster.     Thank you for involving us in the care of this patient and family. We will continue to follow. Please do not hesitate to contact me with questions or concerns or the on-call provider for our team if evening or weekend.    Hue GRIFFIN, [facility]  Palliative Medicine   Pager [phone]    Attestation:  Total time on the floor involved in the patient's care: 75 minutes  Total time spent in counseling/care coordination: >50%    Chief Complaint   Goals of Care.    History is obtained from the pt's family, staff, and extensive chart review.     Adopted from H&P.  Nel aFrmer is a 61-year-old woman with history of intellectual disability, schizoaffective disorder, chronic kidney disease, recent admissions for acute encephalopathy associated with infection who most recently was admitted on 03/30/2017-04/04/2017 for acute metabolic encephalopathy associated with acute kidney injury, metabolic acidosis and suspected healthcare-associated pneumonia.  Prior to that, she was admitted in March for acute encephalopathy associated with a UTI as well as acute kidney injury.  She is usually just oriented x 1 to self and sometimes to place, per my discussion with TCU.      Patient is unable to give a history at this time, so all of my history is obtained from the ER physician and discussion with the TCU staff.  Apparently, Nel was more lethargic, not interactive, not getting up out of bed, eating less for the last 2 days.  Due to this, they got labs today and her creatinine was elevated at 4 with a potassium of 6.1.  They therefore sent her to the ED.  They did not notice any fevers or chills or shortness of breath or cough.  No abdominal pain was noted.  No rashes or any other symptoms, just lethargy and decreased interactiveness and decreased appetite.       At arrival to the ED, her  temperature is 99.3.  Her heart rate was 110 with a blood pressure of 152/82, respirations 16, oxygenation 96% on room air.  She is alert but unable to follow commands but not able to answer most questions.  Her labs were significant for a potassium of 6.8, verified with repeat lab.  A creatinine of 3.4 up from her baseline of 2.4.  Her bicarb is only 15 with a normal anion gap.  Liver panel was normal.  A1c 7.  Her lactic acid was normal.  Lipase was obtained and was mildly elevated at 756 associated with no abdominal pain.  TSH was normal.         A CT of the abdomen and pelvis was obtained for the elevated lipase and showed just an atrophied pancreas but no evidence of acute pancreatitis.  They noted a heterogeneous appearance of the liver, possibly related to streak artifact and a few tiny nonobstructive renal stones but nothing to suggest acute infection or acute process.  Her glucose was when 198.  VBG was obtained and revealed a mild acidosis with a pH of 7.22, bicarb calculated at 16 with a CO2 of 39.  Her CBC revealed a normal white blood cell count, hemoglobin 12.9 up from previous hemoglobins around 10 to suggest hemoconcentration.  Her platelets are 143.  Her UA is positive with  white blood cells and with 2-5 white blood cell casts.  Also noted is some trace ketones, moderate blood with 10-25 RBCs, many bacteria and moderate yeast.  Blood cultures were obtained.  Chest x-ray was done and was clear, reviewed by myself.  EKG was performed and revealed no acute changes of hyperkalemia.  She is in normal sinus rythym, reveals sinus tachycardia with no evidence of acute ischemia.  There is very minimal peaking of the T waves, no QRS prolongation.       She received bicarbonate, insulin, calcium gluconate in the ED with repeat potassium of 5.5.     Past Medical History    I have reviewed this patient's medical history and updated it with pertinent information if needed.   Past Medical History:    Diagnosis Date     Anxiety      CKD (chronic kidney disease) stage 3, GFR 30-59 ml/min     baseline Cr 1.8-2     Cognitive deficits      Depression      Depressive disorder      Diabetes (H)      DM type 2 (diabetes mellitus, type 2) (H)     insulin dependent      HTN      Hyperlipidemia LDL goal < 70      Hypertension      Osteoarthritis      Schizoaffective disorder (H)        Past Surgical History   I have reviewed this patient's surgical history and updated it with pertinent information if needed.  Past Surgical History:   Procedure Laterality Date     C TOTAL KNEE ARTHROPLASTY Left 3/2010     COLONOSCOPY  10/19/2011    Procedure:COMBINED COLONOSCOPY, REMOVE TUMOR/POLYP/LESION BY SNARE; Colonoscopy; Surgeon:MARY ALICE RUSH; Location: GI     DILATION AND CURETTAGE  age 31     TONSILLECTOMY & ADENOIDECTOMY         Social History   Living situation: most recently has been residing at [facility]    Family system: mother (Lisa), Brother (Charanjit), sister in law (Tana), and another sister in [place]    Self-identified support system: unable to assess due to cognitive impairment    Employment/education: did not assess    Activities/interests: did not assess    Use of community resources: did not assess    Church affiliation: did not assess    Involvement in [place] community: did not assess    Impact of illness on patient: unable to assess due to cognitive impairment    Family History   I have reviewed this patient's family history and updated it with pertinent information if needed.   Family History   Problem Relation Age of Onset     Hypertension Mother      Cancer - colorectal Father      Family History Negative Sister      Family History Negative Brother        Allergies   Allergies   Allergen Reactions     Amoxicillin      Amoxicillin      Haldol [Benzyl Alcohol]      Haldol [Haloperidol]      Pcn [Penicillin G]      Penicillins      Seroquel [Quetiapine] GI Disturbance       Medications    Current Facility-Administered Medications Ordered in Epic   Medication Dose Route Frequency Last Rate Last Dose     atropine 1 % ophthalmic solution 1-2 drop  1-2 drop Sublingual Q1H PRN         hypromellose-dextran (ARTIFICAL TEARS) ophthalmic solution 1-2 drop  1-2 drop Both Eyes Q8H PRN         valproic acid (DEPAKENE) solution 500 mg  500 mg Oral or Feeding Tube Q8H   500 mg at 05/31/17 0937     acetaminophen (TYLENOL) solution 650 mg  650 mg Oral or Feeding Tube Q4H PRN         melatonin tablet 1 mg  1 mg Per G Tube At Bedtime PRN         metoprolol (LOPRESSOR) suspension 25 mg  25 mg Oral or Feeding Tube BID   25 mg at 05/31/17 0936     polyethylene glycol (MIRALAX/GLYCOLAX) Packet 17 g  17 g Oral or Feeding Tube Daily PRN         pantoprazole (PROTONIX) suspension 40 mg  40 mg Per NG tube Daily   40 mg at 05/31/17 0937     sodium chloride (PF) 0.9% PF flush 10-20 mL  10-20 mL Intracatheter Q1H PRN         heparin lock flush 10 UNIT/ML injection 5-10 mL  5-10 mL Intracatheter Q24H   10 mL at 05/30/17 1904     heparin lock flush 10 UNIT/ML injection 5-10 mL  5-10 mL Intracatheter Q1H PRN   5 mL at 05/30/17 2106     dextrose 10 % 1,000 mL infusion   Intravenous Continuous PRN         insulin glargine (LANTUS) injection 25 Units  25 Units Subcutaneous QAM AC   25 Units at 05/31/17 0936     OLANZapine zydis (zyPREXA) ODT half-tab 2.5-5 mg  2.5-5 mg Oral BID PRN         insulin aspart (NovoLOG) inj (RAPID ACTING)  1-7 Units Subcutaneous TID AC   5 Units at 05/31/17 1217     insulin aspart (NovoLOG) inj (RAPID ACTING)  1-5 Units Subcutaneous At Bedtime   3 Units at 05/30/17 2151     cloNIDine (CATAPRES-TTS) Patch in Place   Transdermal Q8H         cloNIDine (CATAPRES-TTS) patch REMOVAL   Transdermal Weekly         cloNIDine (CATAPRES-TTS1) 0.1 MG/24HR WK patch 1 patch  1 patch Transdermal Weekly         hydrALAZINE (APRESOLINE) injection 10-20 mg  10-20 mg Intravenous Q2H PRN   10 mg at 05/26/17 0011      miconazole (MICATIN; MICRO GUARD) 2 % powder   Topical Q1H PRN         sodium chloride (PF) 0.9% PF flush 10-20 mL  10-20 mL Intracatheter Q1H PRN   10 mL at 05/25/17 0755     heparin lock flush 10 UNIT/ML injection 5-10 mL  5-10 mL Intracatheter Q1H PRN   5 mL at 05/29/17 0607     sodium chloride (PF) 0.9% PF flush 3 mL  3 mL Intracatheter Q1H PRN         metoprolol (LOPRESSOR) injection 2.5 mg  2.5 mg Intravenous Q4H PRN         risperiDONE (risperDAL M-TABS) ODT tab 0.5 mg  0.5 mg Sublingual TID PRN         glucose 40 % gel 15-30 g  15-30 g Oral Q15 Min PRN        Or     dextrose 50 % injection 25-50 mL  25-50 mL Intravenous Q15 Min PRN        Or     glucagon injection 1 mg  1 mg Subcutaneous Q15 Min PRN         naloxone (NARCAN) injection 0.1-0.4 mg  0.1-0.4 mg Intravenous Q2 Min PRN         senna-docusate (SENOKOT-S;PERICOLACE) 8.6-50 MG per tablet 1-2 tablet  1-2 tablet Oral BID PRN         bisacodyl (DULCOLAX) Suppository 10 mg  10 mg Rectal Daily PRN         ondansetron (ZOFRAN-ODT) ODT tab 4 mg  4 mg Oral Q6H PRN        Or     ondansetron (ZOFRAN) injection 4 mg  4 mg Intravenous Q6H PRN         No current [facility]-ordered outpatient prescriptions on file.       Review of Systems   The comprehensive review of systems is negative other than noted in the assessment/plan.    Physical Exam   Temp: 99.1  F (37.3  C) Temp src: Axillary BP: 138/70   Heart Rate: 100 Resp: 16 SpO2: (!) 88 % O2 Device: None (Room air)    Vitals:    05/29/17 0315 05/30/17 0500 05/31/17 0500   Weight: 81.5 kg (179 lb 10.8 oz) 82 kg (180 lb 12.4 oz) 84.4 kg (186 lb 1.1 oz)     CONSTITUTIONAL: NAD, A&O to self. Calm and cooperative, mostly sleepy but did arouse to my voice.  HEENT: NCAT, MMM  NECK: Supple  CARDIOVASCULAR: exam deferred  RESPIRATORY: NL respiratory effort on RA  GASTROINTESTINAL: exam deferred  MUSCULOSKELETAL: Moving freely in bed   SKIN: Warm and intact. No concerning lesions or rashes on exposed skin surfaces   NEUROLOGIC:  Aroused to my voice and answered some yes/no questions but not reliable.  PSYCH: Affect flat    Data   Results for orders placed or performed during the hospital encounter of 05/16/17 (from the past 24 hour(s))   Glucose by meter   Result Value Ref Range    Glucose 278 (H) 70 - 99 mg/dL   Glucose by meter   Result Value Ref Range    Glucose 282 (H) 70 - 99 mg/dL   Lactic acid level STAT   Result Value Ref Range    Lactic Acid 1.8 0.7 - 2.1 mmol/L   Glucose by meter   Result Value Ref Range    Glucose 320 (H) 70 - 99 mg/dL   Glucose by meter   Result Value Ref Range    Glucose 299 (H) 70 - 99 mg/dL   Basic metabolic panel   Result Value Ref Range    Sodium 148 (H) 133 - 144 mmol/L    Potassium 4.4 3.4 - 5.3 mmol/L    Chloride 115 (H) 94 - 109 mmol/L    Carbon Dioxide 27 20 - 32 mmol/L    Anion Gap 6 3 - 14 mmol/L    Glucose 389 (H) 70 - 99 mg/dL    Urea Nitrogen 34 (H) 7 - 30 mg/dL    Creatinine 2.04 (H) 0.52 - 1.04 mg/dL    GFR Estimate 25 (L) >60 mL/min/1.7m2    GFR Estimate If Black 30 (L) >60 mL/min/1.7m2    Calcium 10.1 8.5 - 10.1 mg/dL   CBC with platelets   Result Value Ref Range    WBC 14.4 (H) 4.0 - 11.0 10e9/L    RBC Count 3.44 (L) 3.8 - 5.2 10e12/L    Hemoglobin 10.0 (L) 11.7 - 15.7 g/dL    Hematocrit 33.8 (L) 35.0 - 47.0 %    MCV 98 78 - 100 fl    MCH 29.1 26.5 - 33.0 pg    MCHC 29.6 (L) 31.5 - 36.5 g/dL    RDW 16.5 (H) 10.0 - 15.0 %    Platelet Count 203 150 - 450 10e9/L   Glucose by meter   Result Value Ref Range    Glucose 439 (H) 70 - 99 mg/dL

## 2017-05-31 NOTE — PROGRESS NOTES
[facility]: Met with patients mother Lisa and sister in law Tana to explain the Medicare hospice program. Nel has a history of intellectual disability, schizoaffective disorder, chronic kidney disease, recent admissions for acute encephalopathy associated with infection who most recently was admitted on 03/30/2017-04/04/2017 for acute metabolic encephalopathy associated with acute kidney injury, metabolic acidosis and suspected healthcare-associated pneumonia.  Prior to that, she was admitted in March for acute encephalopathy associated with a UTI as well as acute kidney injury. Pt seen today by Eugenie DUNLAP Np from Palliative and the family has decided on hospice for Nel. Both Lisa and Tana have had prior hospice experience. I reviewed the hospice medicare benefit, services available for support, medication and equipment coverage. Lisa signed the hospice consent forms for hospice services to begin at discharge. A POLST was completed and put on the front of the hospital chart for signature. Brittney CAR  has informed me that there is a bed available at [facility] for tomorrow. The [place] hospice team will complete the admission at the facility tomorrow at 3:30pm. Please order the following hospice comfort meds for discharge:  Hydromorphone 10mg/ml, 1 mg or 0.1 ML PO/SL every 2 hours PRN pain/dyspnea #30 ml  Lorazepam 0.25mg PO/SL every 4 hours PRN anxiety/restlessness # 30  Haloperidol 0.5mg PO/SL every 6 hours PRN agitation/nausea # 15  Atropine 1% 2 drops PO/SL every 2 hours PRN terminal congestion # 5ml  Bisacodyl supp 10mg MS daily PRN constipation # 2  Acetaminophen supp 650mg MS every 4 hours PRN fever/mild pain # 2  No ranges and bubblepack please.   Lisa was given her signed copies of the hospice consent forms along with the hospice handbook with the 24 hr number. I have updated admission person Cathy at [facility]. I have ordered an ROSAMARIA overlay mattress for the bed to be  delivered by noon tomorrow by [facility] Thank you for the referral. Lety Griffith RN, BSN  [facility] Admission nurse  [phone]

## 2017-05-31 NOTE — PLAN OF CARE
Problem: Goal Outcome Summary  Goal: Goal Outcome Summary  Outcome: No Change  VSS. A&O to self. LS diminished throughout. Pt transfers with assist of 2 and mechanical lift. NJ for feedings running at 45 mL/hr (goal). 135 mL free water flushes Q4h. Pt answers yes/no questions most of the time. DDI with nectar thick, didn't eat what was brought up for her. 3 lumen IJ R neck hep locked. Coccyx pressure ulcer care done, wound is IRA, BID. 1st step mattress. Palliative care consult in place. Valproic acid free level high at 33.7, MD notified. Will continue to monitor.     Update: comfort care orders entered. Removed tele and NJ.

## 2017-06-01 NOTE — PLAN OF CARE
Problem: Goal Outcome Summary  Goal: Goal Outcome Summary  Outcome: Adequate for Discharge Date Met:  06/01/17  Adequate for discharge. Pt belongings and medications sent with transport. Transported to hospice facility on comfort cares.

## 2017-06-01 NOTE — PROGRESS NOTES
SWS  D:  SW following pt for coordination of hospice at facility discharge plan.  [facility] can accept pt for admission today.   Hospice is aware of this and plans to visit pt at [facility] around 1530  today.  I:  Due to pt having encephalopathy, renal failure, and requiring O2 with minimal responsiveness, SW arranged stretcher transport thru [facility] for 1400 today.  PCS form completed and faxed.  Pt has MA, so transport should be covered.  SW also informed pt's mother Lisa of [facility] acceptance and 1400 transport.  She was pleased to hear of [facility] acceptance as [place] is too far for her to drive.  Lisa will inform [facility] that pt will not be returning there and arrange a time to  pt's belongings.  Meds to be filled here and sent with pt.  Cathy at [facility] informed of transport time.  No PAS required.  P:  Pt to discharge to [facility] at 1400 today with  Hospice services.

## 2017-06-01 NOTE — DISCHARGE SUMMARY
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 Lab work up for evaluation at TCU showed potassium of 6.1 with a creatinine of 4 and so was brought to ER 5/16/17. Work up in ER showed possible UTI, LYLA, hyperkalemia. UTI consistent with yeast, no bacteria found.     Hospital Course   Nel Farmer was admitted on 5/16/2017.  The following problems were addressed during her hospitalization:      Acute metabolic encephalopathy secondary to severe dehydration and Acute subdural hematoma, Acute on chronic stage 4 renal failure and Acute Kidney Injury: Patient has diabetes insipidus, and was also on amiloride, likely contributing to dehydration and metabolic encephalopathy, though mostly with decreased response frequently at baseline. Also on ativan, risperidone, zyprexa and depakote (500 mg at am and 1500 mg at HS)which could contribute to encephalopathy. UA also suggestive of UTI possibly contributing but no bacterial growth seen on culture. Depakote level was normal at presentation. Head CT 5/22 negative for acute process. Psychiatrist consulted, and  PTA medications adjusted as below. Subsequently she became obtunded needing ICU transfer but was able to protect airway. MRI brain done 5/24, showed SDH along the posterior aspect of the right cerebral convexity measuring up to 0.2 cm in thickness, patient not on anticoagulation, thought not related to her obtundation. Neuro consulted, EEG did not show seizure like activity and she was not noticed to have clinical seizure like spells. ABG without hypercarbia. PTA ativan and Risperdal held, depakote dose decreased. Was on Seizure precautions.     She continued to remain somnolent to obtunded with brief periods of more alertness. She was started on tube feeding as well. Palliative care was consulted. Met with patient's Mom and Sister in Law and they decided comfort care based on patient's best interest. She was transferred to TCU to continue comfort measures.       Diarrhea and emesis: started having diarrhea and vomited while in ICU. NG and rectal tube as well were placed. c diff is negative. Had mildly elevated lipase which trended down. Tube feeding initiated but subsequently family decided for comfort care and feeding stopped 5/31.      Severe dehydration with acute kidney injury/hypernatremia and hyperkalemia, and subsequent hypokalemia: Her baseline creatinine is around 2.4 with creatinine on admission of 3.41 and K 6.8. In the ED, she was given 2 liters of fluid, bicarbonate, insulin. Poor oral intake, DI and being on amiloride PTA likely causing dehydration and acute kidney injury on top of CKD. Her fluid intake had been limited at the skilled nursing facility since patient was prone to throwing cups at staff. Managed by Nephrology with bicarb drip and above abnormalities improved/corrected. But worsened again while off IVF and so managed with tube feeding/free water flushes and IVF. Eventually was on comfort care.      Urinary tract infection, yeast Had  white blood cells and 2-5 WBC casts on a catheter  specimen of a UA. She has a history of Klebsiella urinary tract infection in the past with sensitivity to most antibiotics tested including ceftriaxone and fluoroquinolones. She was given Levaquin 750 mg in the emergency department. >100k yeast. D/c'd levofloxacin. Blood 1/2 from 5/19 shows Staph capitis. Central line placed (RIJ) on 5/20 for IV access. Treated for yeast, completed.       Hypertension BP elevated, also tachycardic which could be due to dehydration. Amiloride discontinued due to hyperkalemia. Managed with clonidine patch and PRN hydralazine/metoprolol. Discontinued as discharged with comfort care.      Diabetes mellitus type 2 on insulin: On glipizide and 70/30 insulin (20 units in am and 34 units in the pm). Managed with Lantus and Moderate insulin resistance ISS while she was being treated actively, discontinued as comfort care.      Elevated Lipase Unclear significance. Denies abd pain. Vomited once in ICU but none since then. CT abd-pelvis (though non contrast) shows atrophic pancreas. Unlikely this is pancreatitis. Lipase trended down.       Diabetes insipidus Induced by lithium. Lithium was discontinued before this hospitalization.       Schizoaffective disorder and Paranoid Schizophrenia and Cognitive Deficits PTA is on depakote, risperidal and zyprexa and ativan. Continue Valproic acid and dose adjusted per psychiatry. Also Risperdal was held.       Thrombocytopenia: Unclear etiology, resolved.      Gastroesophageal reflux disease  Was on PPI     Carson Schuster MD  Hospitalist    Significant Results and Procedures   Multiple brain imagings, CXRs CT abd pelvis reports attached.  Vonore feeding tube was placed, eventually removed prior to discharge    Pending Results   These results will be followed up by none    Unresulted Labs Ordered in the Past 30 Days of this Admission     No orders found from 3/17/2017 to 5/17/2017.          Code Status   Comfort Care       Primary Care Physician   No  primary care provider on file.    GENERAL: somnolent, not in distress  HEENT: Normocephalic. No icterus or injection.    LUNGS: dim over the bases. Normal work of breathing.  HEART: Regular rate. Extremities perfused    ABDOMEN: Soft, nontender, and nondistended. Positive bowel sounds.   EXTREMITIES: No LE edema noted.   NEUROLOGIC: somnolent. Does not follow verbal commands.        Discharge Disposition   Discharged to nursing home  Condition at discharge: Critical    Consultations This Hospital Stay   NEPHROLOGY IP CONSULT  WOUND OSTOMY CONTINENCE NURSE  IP CONSULT  SPEECH LANGUAGE PATH ADULT IP CONSULT  SOCIAL WORK IP CONSULT  VASCULAR ACCESS ADULT IP CONSULT  PSYCHIATRY IP CONSULT  NEUROLOGY IP CONSULT  INTENSIVIST IP CONSULT  PSYCHIATRY IP CONSULT  PHYSICAL THERAPY ADULT IP CONSULT  OCCUPATIONAL THERAPY ADULT IP CONSULT  PSYCHIATRY IP CONSULT  NUTRITION SERVICES ADULT IP CONSULT  NUTRITION SERVICES ADULT IP CONSULT  PHARMACY IP CONSULT  PALLIATIVE CARE ADULT IP CONSULT  PHARMACY IP CONSULT    Time Spent on this Encounter   Carson MARS, personally saw the patient today and spent greater than 30 minutes discharging this patient.    Discharge Orders     General info for SNF   Length of Stay Estimate: Short Term Care: Estimated # of Days <30  Condition at Discharge: Declining  Level of care:board and care  Rehabilitation Potential: Poor  Admission H&P remains valid and up-to-date: Yes  Recent Chemotherapy: N/A  Use Nursing Home Standing Orders: Yes     Mantoux instructions   Give two-step Mantoux (PPD) Per Facility Policy Yes     Reason for your hospital stay   Acute encephalopathy, dehydration, renal failure     Dunlap catheter   To straight gravity drainage. Change catheter every 2 weeks and PRN for leaking or decreased uring output with signs of bladder distention. DO NOT change catheter without a specific MD order IF diagnosis of benign prostatic hypertrophy (BPH), neurogenic bladder, or other urological  "conditions     Additional Discharge Instructions   Follow up with hospice     DNR/DNI     Oxygen - Nasal cannula   2 Lpm by nasal cannula for comfort.     Advance Diet as Tolerated   Follow this diet upon discharge: Orders Placed This Encounter     Room Service     Regular Diet Adult       Discharge Medications   Discharge Medication List as of 6/1/2017 12:42 PM      START taking these medications    Details   HYDROmorphone (DILAUDID HIGH CONCENTRATION) 10 mg/mL LIQD (HIGH CONC) solution Place 0.1 mLs (1 mg) under the tongue every 2 hours as needed for moderate to severe pain (anxiety or restlessness), Disp-30 mL, R-0, Local Print      atropine 1 % SOLN Place 2 drops under the tongue every 2 hours as needed for secretions, Disp-5 mL, Transitional      bisacodyl (DULCOLAX) 10 MG Suppository Place 1 suppository (10 mg) rectally daily as needed for constipation, Disp-25 suppository, R-1, Transitional      acetaminophen (TYLENOL) 650 MG Suppository Place 1 suppository (650 mg) rectally every 4 hours as needed for fever or mild pain, Disp-60 suppository, Transitional      OLANZapine zydis (ZYPREXA) 5 MG ODT tab Place 1 tablet (5 mg) under the tongue every 6 hours as needed, Disp-40 tablet, R-0, Local Print         CONTINUE these medications which have CHANGED    Details   LORazepam (ATIVAN) 2 MG/ML (HIGH CONC) solution Place 0.25 mLs (0.5 mg) under the tongue every 4 hours, Disp-30 mL, R-0, Local Print         CONTINUE these medications which have NOT CHANGED    Details   insulin syringe-needle U-100 (BD INSULIN SYRINGE ULTRAFINE) 31G X 5/16\" 1 ML Use one syringe bid daily or as directed.Disp-60 each, [id number]         STOP taking these medications       LORazepam (ATIVAN) 0.5 MG tablet Comments:   Reason for Stopping:         cloNIDine (CATAPRES) 0.2 MG tablet Comments:   Reason for Stopping:         amLODIPine (NORVASC) 5 MG tablet Comments:   Reason for Stopping:         aMILoride (MIDAMOR) 5 MG tablet " Comments:   Reason for Stopping:         metoprolol (TOPROL-XL) 25 MG 24 hr tablet Comments:   Reason for Stopping:         glipiZIDE (GLUCOTROL XL) 2.5 MG 24 hr tablet Comments:   Reason for Stopping:         insulin isophane & regular (HUMULIN MIX 70/30 PEN) susp Comments:   Reason for Stopping:         risperiDONE (RISPERDAL M-TABS) 0.5 MG disintegrating tablet Comments:   Reason for Stopping:         senna-docusate (SENOKOT-S;PERICOLACE) 8.6-50 MG per tablet Comments:   Reason for Stopping:         polyethylene glycol (MIRALAX/GLYCOLAX) powder Comments:   Reason for Stopping:         divalproex (DEPAKOTE) 500 MG EC tablet Comments:   Reason for Stopping:         divalproex (DEPAKOTE) 500 MG EC tablet Comments:   Reason for Stopping:         Furosemide (LASIX PO) Comments:   Reason for Stopping:         Furosemide (LASIX PO) Comments:   Reason for Stopping:         OLANZapine (ZYPREXA PO) Comments:   Reason for Stopping:         RisperiDONE (RISPERDAL PO) Comments:   Reason for Stopping:         Omeprazole (PRILOSEC PO) Comments:   Reason for Stopping:         isosorbide mononitrate (IMDUR) 30 MG 24 hr tablet Comments:   Reason for Stopping:             Allergies   Allergies   Allergen Reactions     Amoxicillin      Amoxicillin      Haldol [Benzyl Alcohol]      Haldol [Haloperidol]      Pcn [Penicillin G]      Penicillins      Seroquel [Quetiapine] GI Disturbance     Data   Most Recent 3 CBC's:  Recent Labs   Lab Test  05/31/17   0600  05/30/17   0700  05/28/17   0635   WBC  14.4*  13.7*  13.3*   HGB  10.0*  9.6*  9.5*   MCV  98  95  94   PLT  203  195  199      Most Recent 3 BMP's:  Recent Labs   Lab Test  05/31/17   0600  05/30/17   0700  05/29/17   0605   NA  148*  140  139   POTASSIUM  4.4  4.1  3.3*   CHLORIDE  115*  107  106   CO2  27  25  25   BUN  34*  27  30   CR  2.04*  1.96*  2.15*   ANIONGAP  6  8  8   BENTLEY  10.1  9.4  9.2   GLC  389*  316*  250*     Most Recent 2 LFT's:  Recent Labs   Lab Test   05/25/17   0415  05/16/17   1907   AST  5  19   ALT  8  14   ALKPHOS  50  68   BILITOTAL  0.2  0.3     Most Recent INR's and Anticoagulation Dosing History:  Anticoagulation Dose History     Recent Dosing and Labs Latest Ref Rng & Units 7/2/2010 7/3/2010 7/4/2010 7/5/2010 7/6/2010 2/12/2011 3/30/2017    INR 0.86 - 1.14 2.49(H) 2.37(H) 2.31(H) 2.21(H) 2.47(H) 0.99 1.19(H)        Most Recent 3 Troponin's:  Recent Labs   Lab Test  12/05/15   1700  04/30/14   0912  03/12/14   1748   TROPI  <0.015  The 99th percentile for upper reference range is 0.045 ug/L.  Troponin values in   the range of 0.045 - 0.120 ug/L may be associated with risks of adverse   clinical events.    <0.012  <0.012     Most Recent Cholesterol Panel:  Recent Labs   Lab Test  10/25/11   1504   CHOL  318*   LDL  153.6*   HDL  33*   TRIG  657*     Most Recent 6 Bacteria Isolates From Any Culture (See EPIC Reports for Culture Details):  Recent Labs   Lab Test  05/21/17   1852  05/21/17   1810  05/19/17   1416  05/19/17   1405  05/16/17   1954  05/16/17   1938   CULT  No growth  No growth  Cultured on the 1st day of incubation: Staphylococcus capitis  Critical Value/Significant Value, preliminary result only, called to and read   back by Faby Ward RN ([id number]) @23:20 on 5/20/17,LISSY  (Note)  POSITIVE for Staphylococci other than S.aureus, S.epidermidis and  S.lugdunensis, by [employer] multiplex nucleic acid test.  Coagulase-negative staphylococci are the most common venipuncture or  collection associated skin CONTAMINANTS grown in blood cultures.  Final identification and antimicrobial susceptibility testing will be  verified by standard methods.    Specimen tested with [employer] multiplex, gram-positive blood culture  nucleic acid test for the following targets: Staph aureus, Staph  epidermidis, Staph lugdunensis, other Staph species, Enterococcus  faecalis, Enterococcus faecium, Streptococcus species, S. agalactiae,  S. anginosus grp., S. pneumoniae, S.  pyogenes, Listeria sp., mecA  (methicillin resistance) and Nicole/B (vancomycin resistance).    Critical Value/Significa nt Value called to and read back by Faby Ward RN, @ 0152 05.21.2017 BL  *  No growth  >100,000 colonies/mL Candida albicans / dubliniensis Candida albicans and Candida   dubliniensis are not routinely speciated  Susceptibility testing not routinely done  *  No growth     Most Recent TSH, T4 and A1c Labs:  Recent Labs   Lab Test  05/16/17   1907   03/31/10   0640   TSH  2.93   < >   --    T4   --    --   1.46   A1C  7.0*   < >   --     < > = values in this interval not displayed.     Results for orders placed or performed during the hospital encounter of 05/16/17   XR Chest 2 Views    Narrative    CHEST TWO VIEWS  5/16/2017 8:31 PM     COMPARISON: Frontal chest x-ray 3/30/2017    HISTORY: Fever, tachycardia    FINDINGS: The cardiac silhouette, pulmonary vasculature, lungs and  pleural spaces are within normal limits.      Impression    IMPRESSION: Clear lungs.    NARCISA VALDEZ MD   CT Abdomen Pelvis w/o Contrast    Narrative    CT ABDOMEN AND PELVIS WITHOUT CONTRAST 5/16/2017 8:10 PM     HISTORY: Elevated lipase; diffuse abdominal pain.    COMPARISON: 5/21/2016    TECHNIQUE: Volumetric helical acquisition of CT images from the lung  bases through the symphysis pubis without intravenous contrast.  Radiation dose for this scan was reduced using automated exposure  control, adjustment of the mA and/or kV according to patient size, or  iterative reconstruction technique.    FINDINGS: Pancreas is atrophied, but otherwise unremarkable without  definite surrounding inflammatory change. Heterogeneous appearance of  the liver which is nonspecific. Adrenal glands and spleen are  unremarkable. A few tiny bilateral nonobstructing renal stones are  noted. No ureteral stones or hydronephrosis. Normal appendix. There  are minimal atherosclerotic changes of the visualized aorta and its  branches. There is no  evidence of aortic aneurysm. There are no  dilated loops of small intestine or large bowel to suggest ileus or  obstruction. No free air or free fluid.       Impression    IMPRESSION:  1. The pancreas is atrophied, but otherwise unremarkable in appearance  without evidence of acute pancreatitis. Mild acute pancreatitis can be  occult on CT.  2. Heterogeneous appearance of the liver, possibly related to streak  artifact.  3. A few tiny nonobstructing renal stones bilaterally.    JONA KAM MD   XR Chest Port 1 View    Narrative    CHEST PORTABLE ONE VIEW 5/20/2017 9:49 PM     COMPARISON: 2 view chest x-ray 5/16/2017.    HISTORY: Post central line placement.    FINDINGS: A right internal jugular central venous catheter has been  placed with its tip in the low superior vena cava. The cardiac  silhouette, pulmonary vasculature, lungs and pleural spaces are within  normal limits.      Impression    IMPRESSION: Clear lungs.    NARCISA VALDEZ MD   CT Head w/o Contrast    Narrative    CT HEAD W/O CONTRAST  5/22/2017 4:26 PM    HISTORY: Lethargy and possible arm weakness.    TECHNIQUE: Scans were obtained through the head without IV contrast.   Radiation dose for this scan was reduced using automated exposure  control, adjustment of the mA and/or kV according to patient size, or  iterative reconstruction technique.    COMPARISON: 3/30/2017.    FINDINGS: Moderate atrophy. Benign calcification of the anterior falx.   No hemorrhage, mass lesion, or focal area of acute infarction  identified. Paranasal sinuses are normal. No bony abnormality. No  appreciable interval change.      Impression    IMPRESSION:   1. Atrophy.  2. Nothing acute.  3. No interval change.    HEATHER POSADAS MD   MR Brain w/o Contrast    Narrative    MRI OF THE BRAIN WITHOUT CONTRAST May 24, 2017 2:44 PM     HISTORY: Lethargy. Right weakness. Evaluate for CVA.     TECHNIQUE:   Brain: Axial diffusion-weighted with ADC map, T2-weighted with  fat  saturation, T1-weighted and turboFLAIR and coronal T1-weighted images  of the brain were obtained without intravenous contrast.     COMPARISON: Head CT 5/22/2017.    FINDINGS: There is thin abnormal signal along the posterior aspect of  the right cerebral convexity measuring up to 0.2 cm in thickness that  could represent a thin subdural hematoma. This was not definitely  visualized on the comparison head CT possibly due to its thin nature.  There is moderate diffuse cerebral volume loss. There are minimal  patchy periventricular areas of abnormal T2 signal hyperintensity in  the cerebral white matter bilaterally that are consistent with sequela  of chronic small vessel ischemic disease.     The ventricles and basal cisterns are within normal limits in  configuration given the degree of cerebral volume loss. There is no  midline shift. There are no extra-axial fluid collections. There is no  evidence for recent infarct.     There is no sinusitis or mastoiditis.       Impression    IMPRESSION:    1. Possible thin subdural hematoma along the posterior aspect of the  right cerebral convexity measuring up to 0.2 cm in thickness.  2. Diffuse cerebral volume loss and cerebral white matter changes  consistent with chronic small vessel ischemic disease.      NARCISA VALDEZ MD   XR Abdomen 2 Views    Narrative    ABDOMEN TWO VIEW 5/25/2017 7:28 PM     HISTORY: Ileus versus obstruction. Patient with emesis.    COMPARISON: Abdominal x-ray 2/14/2017, CT abdomen and pelvis  5/16/2017.      Impression    IMPRESSION: Supine and left lateral decubitus view is performed. Bowel  gas pattern is unremarkable. No dilated, air-filled loops of bowel are  present. No air-fluid levels are noted on decubitus image. No free  intraperitoneal air. No abnormal calcifications are evident.  Nasogastric tube overlies the region of the stomach below the left  hemidiaphragm. Follow-up small bowel follow-through could be performed  for further  assessment if clinically warranted.    MICHAEL LIRIANO MD   XR Chest Port 1 View    Narrative    XR CHEST PORT 1 VW 5/28/2017 9:10 AM    COMPARISON: 5/20/2017    HISTORY: Cough       Impression    IMPRESSION: Right internal jugular central venous catheter tip in the  mid SVC. Cardiac silhouette within normal limits. No focal airspace  disease, pleural effusion or pneumothorax.    JOHNSON ARRIETA   CT Head w/o Contrast    Narrative    CT HEAD WITHOUT CONTRAST  5/28/2017 1:57 PM    HISTORY: Neurological changes.    COMPARISON: A CT on 5/22/2017 and an MRI on 5/24/2017.    TECHNIQUE: Routine transverse CT images of the head without  intravenous contrast. Radiation dose for this scan was reduced using  automated exposure control, adjustment of the mA and/or kV according  to patient size, or iterative reconstruction technique.    FINDINGS: The sulci and ventricles are normal for the patient's age.  Areas of low attenuation are present in the white matter of the  cerebral hemispheres that are consistent with small vessel ischemic  disease.     There is no evidence of intracranial hemorrhage, mass, acute infarct  or anomaly. The visualized portions of the sinuses and mastoids appear  normal. No fracture is seen.      Impression    IMPRESSION: Age related changes and white matter changes consistent  with small vessel ischemic disease. No acute abnormality is seen. I  see no definite change since a previous CT. The recent MRI  demonstrated a possible small right posterior subdural hematoma. This  is not demonstrated on the CT.    OLLIE RODRIGUEZ MD   XR Feeding Tube Placement    Narrative    FEEDING TUBE PLACEMENT 5/29/2017 10:15 AM    HISTORY: Nutritional needs.    COMPARISON: None.    FLUOROSCOPY TIME: 1.16 minutes.    IMAGES OBTAINED: 1     FINDINGS: A feeding tube was placed. At the final position, the  feeding tube tip was at the level of the Ligament of Treitz at the  junction of the duodenum and jejunum. 15 mL of  Omnipaque 240  contrast  was injected to confirm placement. The tube was marked at the level of  the nose at the 102 cm length. There were no complications of the  procedure.      Impression    IMPRESSION: Successful feeding tube placement.    OLLIE RODRIGUEZ MD

## 2017-06-01 NOTE — PLAN OF CARE
Problem: Goal Outcome Summary  Goal: Goal Outcome Summary  Outcome: No Change  Comfort cares. Does not respond to questions. LS diminished throughout. FLACC shows no pain. Pt transfers with assist of 2 and mechanical lift. Regular diet, didn't eat this shift. 3 lumen IJ R neck hep locked. Will continue to monitor.

## 2017-06-01 NOTE — PLAN OF CARE
Problem: Goal Outcome Summary  Goal: Goal Outcome Summary  Outcome: Declining  Comfort cares. VSS. A&O to self. LS diminished throughout. FLACC shows no pain. Pt transfers with assist of 2 and mechanical lift. Pt answers yes/no questions most of the time. Regular diet, didn't eat this shift. 3 lumen IJ R neck hep locked. Coccyx pressure ulcer care done, wound is IRA, BID. Will continue to monitor.

## 2017-06-01 NOTE — PROGRESS NOTES
[facility] Nurse Inpatient Adult Pressure Injury (PI) Assessment      Follow up Assessment of PI(s) on pt's:  Coccyx/R Buttock  Data:   Patient History:    per MD note(s): Nel Farmer is a 61-year-old woman with history of intellectual disability, schizoaffective disorder, chronic kidney disease, recent admissions for acute encephalopathy associated with infection who most recently was admitted on 2017-2017 for acute metabolic encephalopathy associated with acute kidney injury, metabolic acidosis and suspected healthcare-associated pneumonia. Prior to that, she was admitted in March for acute encephalopathy associated with a UTI as well as acute kidney injury. She is usually just oriented to 1 and to possibly place, per my discussion with TCU.   Patient is unable to give a history at this time, so all of my history is obtained from the ER physician and discussion with the TCU staff. Apparently, Nel was more lethargic, not interactive, not getting up out of bed, eating less for the last 2 days. Due to this, they got labs today and her creatinine was elevated at 4 with a potassium of 6.1. They therefore sent her to the ED. They did not notice any fevers or chills or shortness of breath or cough. No abdominal pain was noted. No rashes or any other symptoms, just lethargy and decreased interactiveness and decreased appetite.       Current mattress:  Pulsate air mattress  Current pressure relieving devices: Foam dressing and Pillows    Moisture Management: Incontinence Protocol, Diaper and Urinary Catheter - had rectal tube but removed today -    Current Diet / Nutrition:     Active Diet Order      Combination Diet Dysphagia Diet Level 1: Pureed; Nectar Thickened Liquids (pre-thickened or use instant food thickener) (with teaspoon)       Willis Assessment and sub scores:  Willis Score  Av  Min: 10  Max: 15      Labs:           Recent Labs   Lab Test  17   0620   05/25/17   0415    05/16/17   1907    03/30/17   1515   ALBUMIN   --   2.0*   < >  3.1*   --   3.0*   HGB  9.3*  9.8*   < >  12.6   < >  10.9*   INR   --    --    --    --    --   1.19*   WBC  9.5  6.9   < >  6.8   < >  9.5   A1C   --    --    --   7.0*   < >   --     < > = values in this interval not displayed.         Pressure Injury Assessment (location): Coccyx and Right Gluteal Cleft   Wound History: Pt had recent increased weakness and lethargy spending a few days bed bound. Pt unresponsive with plans to transition to Hospice care today.    Wound Base:  Yellow/tan/white adherent slough to coccyx area.  Edges pink and slightly moist.  Periwound and right gluteal cleft with scattered maceration and pink denudement, and peely flaky tissue to inner fleshy buttocks.     Specific Dimensions (length x width x depth, in cm) : 4 x 2.5  unable to tell depth due to slough    Tunneling: N/A    Undermining: N/A    Palpation of the wound bed: normal, non-fluctuant    Slough appearance: adherent, smooth, yellowish, leathery    Eschar appearance: none    Periwound Skin: mild denudement, pink, blanchable    Temperature normal     Drainage: serosanguinous to slightly creamy, small     Odor: mild    Pain: minimal      Intervention:     Patient's chart evaluated.     Willis Interventions: Current Willis Interventions and Care Plan reviewed and updated, appropriate at this time.    Wound care to coccyx/R glut cleft supplies gathered, cleaned periarea with Evens and then wounds with microklenz, applied Triad paste, clean brief.  Pt repositioned on left side.     Orders updated     Supplies Reviewed and left at bedside    Discussed plan of care with Nurse      Assessment:     Pressure Injury (PI) located on coccyx/ R gluteal cleft:  DTI (Deep tissue injury) present on admission, which has evolved into an Unstageable PI over the coccyx.  There is now a defined area of adherent yellowish slough, non-fluctuant, but depth  "unknown.    Will switch plan of care to Triad paste to wound to help debride the sloughy tissue.  This will also will be more practical than dressings, since wound very close to perianal area and rectal tube being removed today, and would be hard to keep dressings adhered or clean.      Pt now has low air loss mattress in place    Wound has no acute signs or symptoms of infection.       Plan:     Nursing to notify the Provider(s) and re-consult the [facility] Nurse if wound(s) deteriorate(s)or if the wound care plan needs reevaluation.    If pt is refusing to turn or reposition they must be educated on the potential injury from not off loading pressure. Then this \"educated refusal\" needs to be documented as an \"educated refusal to turn/ reposition\" and document if alert, etc.       PIP ACTIVITY MEASURES:    CHAIR: Pt should sit on a chair cushion ([id number]) when up to the chair and not sit for more than one hour at a time before fully offloading backside (either stand for a couple of minutes and/or return to bed, positioning on a side) to relieve pressure and re-perfuse tissue. Additionally, encourage pt to shift side to side every 15 minutes, too.     NOTE: the Z-Flow Pad is NOT a cushion, pt should NOT sit on the Z-Flow pads    BED: Reposition side to side every 1-2 hours when awake.     Consider Z-Norman Pillows to help keep pt on side (#[id number]-medium or #[id number]- large). *Z-Flows are for positioning, DO NOT SIT ON!    Keep heels elevated    As able keep HOB below 30 degrees    Low air loss specialty mattress ordered    NOTE: If pt is refusing to turn or reposition they must be educated on the potential injury from not offloading pressure. Then this \"educated refusal\" needs to be documented as an \"educated refusal to turn/ reposition\" and document if alert, etc. Additionally please notify MD and charge nurse of refusal(s).       Plan for wound care to coccyx:  BID and prn:  1.  Clean entire perineal area with Evens lotion and " soft dry wipes.    2.  Then cleanse open wounds with MicroKlenz spray, pat dry.   3.  Apply moderate layer of Triad paste (in yellow tube) over wound and any raw/denuded areas.  No Mepilex, but can stick an ABD to area if needed if a lot of drainage.  Otherwise leave open to air.  4.  Continue to apply regular Critic-aid barrier paste to perianal area prn.   5.  Reposition pt every 1 to 2 hours side to side when in bed and hourly when up to the chair to relieve pressure and promote perfusion to tissue.  6.  Pulsate air mattress:  No cloth chux, no fitted sheets - only green lift sheet and a Covidien pad.  Use 'Autofirm' button on pump to make turning/boosting easier on pt and staff.    7.  Document wound findings in chart and notify WOC if worsens or any concerns.       WOC Nurse will return: Weekly and PRN if patient does not DC to hospice today.  Face to face time: 30 Minutes

## 2017-06-02 NOTE — PROGRESS NOTES
[place] [facility]  PRIMARY CARE PROVIDER AND CLINIC:  No primary care provider on file. No primary physician on file.  Chief Complaint   Patient presents with     Hospital F/U       HPI:      WEIGHT: 6/2/17 179 LBS  BP: 124-131/85-94  P: 122-134  O2: 86-88    Nel Farmer is a 62 year old  (1955),admitted to the [facility]  from [facility].  Hospital stay 5/16/17 through 6/1/17.  Admitted to this facility for  hospice.  Current issues are:        Acute metabolic encephalopathy  Severe dehydration  CKD (chronic kidney disease) stage 4, GFR 15-29 ml/min (H)  Secondary nephrogenic diabetes insipidus (H)  Lithium toxicity  Vomiting and diarrhea  Hypernatremia  Hyperkalemia  UTI (urinary tract infection)  Essential hypertension  Schizoaffective disorder (H)  Paranoid schizophrenia (H)  Thrombocytopenia (H)  Patient has diabetes insipidus, and was also on amiloride, likely contributing to dehydration and metabolic encephalopathy, though mostly with decreased response frequently at baseline. Also on ativan, risperidone, zyprexa and depakote (500 mg at am and 1500 mg at HS)which could contribute to encephalopathy. UA also suggestive of UTI possibly contributing but no bacterial growth seen on culture. Depakote level was normal at presentation. Head CT 5/22 negative for acute process. Psychiatrist consulted, and PTA medications adjusted as below. Subsequently she became obtunded needing ICU transfer but was able to protect airway. MRI brain done 5/24, showed SDH along the posterior aspect of the right cerebral convexity measuring up to 0.2 cm in thickness, patient not on anticoagulation, thought not related to her obtundation. Neuro consulted, EEG did not show seizure like activity and she was not noticed to have clinical seizure like spells. ABG without hypercarbia. PTA ativan and Risperdal held, depakote dose decreased. Was on Seizure precautions.  She continued to remain  somnolent to obtunded with brief periods of more alertness. She was started on tube feeding as well. Palliative care was consulted, patient's Mom and Sister in Law decided comfort care based on patient's best interest.     Hospice care patient  Patient was seen in her room today. She had a fever of 104 overnight and is 103.8 this morning.  Eyes were slightly open but not to command. Having respiratory distress with respirations in the 30's,  and oxygen saturation of 82%. Lung sounds with rhonci throughout all lobes. Dunlap in place with moderate amount of urine. Dilaudid, ativan and atropine gtt's ordered and given PRN.     CODE STATUS/ADVANCE DIRECTIVES DISCUSSION:   DNR / DNI  Patient's living condition: lives in a skilled nursing facility    ALLERGIES:Amoxicillin; Amoxicillin; Haldol [benzyl alcohol]; Haldol [haloperidol]; Pcn [penicillin g]; Penicillins; and Seroquel [quetiapine]  PAST MEDICAL HISTORY:  has a past medical history of Anxiety; CKD (chronic kidney disease) stage 3, GFR 30-59 ml/min; Cognitive deficits; Depression; Depressive disorder; Diabetes (H); DM type 2 (diabetes mellitus, type 2) (H); HTN; Hyperlipidemia LDL goal < 70; Hypertension; Osteoarthritis; and Schizoaffective disorder (H).  PAST SURGICAL HISTORY:  has a past surgical history that includes tonsillectomy & adenoidectomy; Dilation and curettage (age 31); TOTAL KNEE ARTHROPLASTY (Left, 3/2010); and Colonoscopy (10/19/2011).  FAMILY HISTORY: family history includes Cancer - colorectal in her father; Family History Negative in her brother and sister; Hypertension in her mother.  SOCIAL HISTORY:  reports that she has never smoked. She does not have any smokeless tobacco history on file. She reports that she does not drink alcohol or use illicit drugs.    Post Discharge Medication Reconciliation Status: unable to reconcile discharge medications due to mental status.  Current Outpatient Prescriptions   Medication Sig Dispense Refill      "ACETAMINOPHEN PO Take 650 mg by mouth every 4 hours as needed for pain       HYDROmorphone (DILAUDID HIGH CONCENTRATION) 10 mg/mL LIQD (HIGH CONC) solution Place 0.1 mLs (1 mg) under the tongue every 2 hours as needed for moderate to severe pain (anxiety or restlessness) 30 mL 0     atropine 1 % SOLN Place 2 drops under the tongue every 2 hours as needed for secretions 5 mL      bisacodyl (DULCOLAX) 10 MG Suppository Place 1 suppository (10 mg) rectally daily as needed for constipation 25 suppository 1     LORazepam (ATIVAN) 2 MG/ML (HIGH CONC) solution Place 0.25 mLs (0.5 mg) under the tongue every 4 hours 30 mL 0     OLANZapine zydis (ZYPREXA) 5 MG ODT tab Place 1 tablet (5 mg) under the tongue every 6 hours as needed 40 tablet 0     insulin syringe-needle U-100 (BD INSULIN SYRINGE ULTRAFINE) 31G X 5/16\" 1 ML Use one syringe bid daily or as directed. 60 each prn       ROS:  Unobtainable secondary to cognitive impairment or aphasia.    Exam:  BP (!) 131/94  Pulse 134  Temp 101.7  F (38.7  C)  Resp 24  Ht 5' 5\" (1.651 m)  Wt 179 lb (81.2 kg)  LMP 04/27/2012  SpO2 (!) 86%  BMI 29.79 kg/m2 limited exam due to hospice status and distress  GENERAL APPEARANCE:  appears ill, distress  EYES:  EOM, conjunctivae, lids, pupils and irises normal, Conjunctiva and lids normal  RESP:  rhonchi throughout all lobes, respiratory distress, using accessory muscles  CV:  Palpation and auscultation of heart done , irregular rate and rhythm, no murmur, rub, or gallop  SKIN:  Palpation of skin and subcutaneous tissue: diaphoretic    Lab/Diagnostic data:    CBC RESULTS:   Recent Labs   Lab Test  05/31/17   0600  05/30/17   0700   WBC  14.4*  13.7*   RBC  3.44*  3.25*   HGB  10.0*  9.6*   HCT  33.8*  31.0*   MCV  98  95   MCH  29.1  29.5   MCHC  29.6*  31.0*   RDW  16.5*  16.0*   PLT  203  195       Last Basic Metabolic Panel:  Recent Labs   Lab Test  05/31/17   0600  05/30/17   0700   NA  148*  140   POTASSIUM  4.4  4.1   CHLORIDE  " 115*  107   BENTLEY  10.1  9.4   CO2  27  25   BUN  34*  27   CR  2.04*  1.96*   GLC  389*  316*       Liver Function Studies -   Recent Labs   Lab Test  05/25/17   0415  05/21/17   0936   05/16/17   1907   PROTTOTAL  5.7*   --    --   7.7   ALBUMIN  2.0*  2.3*   < >  3.1*   BILITOTAL  0.2   --    --   0.3   ALKPHOS  50   --    --   68   AST  5   --    --   19   ALT  8   --    --   14    < > = values in this interval not displayed.       TSH   Date Value Ref Range Status   05/16/2017 2.93 0.40 - 4.00 mU/L Final   12/30/2015 1.70 0.40 - 4.00 mU/L Final       Lab Results   Component Value Date    A1C 7.0 05/16/2017    A1C 8.7 03/30/2017       ASSESSMENT/PLAN:  (G93.41) Acute metabolic encephalopathy  (primary encounter diagnosis)  (E86.0) Severe dehydration   (N18.4) CKD (chronic kidney disease) stage 4, GFR 15-29 ml/min (H)  (N25.1) Secondary nephrogenic diabetes insipidus (H)  (T56.891A) Lithium toxicity  (R11.10,  R19.7) Vomiting and diarrhea  (E87.0) Hypernatremia  (E87.5) Hyperkalemia  (N39.0) UTI (urinary tract infection)  (I10) Essential hypertension  (F25.9) Schizoaffective disorder (H)  (F20.0) Paranoid schizophrenia (H)  (D69.6) Thrombocytopenia (H)  (Z51.5) Hospice care patient  Comment: chronic issues. Have now enrolled with [facility]. Given current clinical picture, would not expect life expectancy >24 hours. Family was notified.   Plan: continue with comfort measures.     Information reviewed:  Medications, vital signs, orders, nursing notes, problem list, hospital information. Total time spent with patient visit was 25 min including patient visit, review of past records and phone call to patient contact. Greater than 50% of total time spent with counseling and coordinating care.    Electronically signed by:  GABY Callejas CNP

## 2017-08-30 NOTE — PROGRESS NOTES
[facility]      OUTPATIENT PHYSICAL THERAPY EVALUATION  PLAN OF TREATMENT FOR OUTPATIENT REHABILITATION  (COMPLETE FOR INITIAL CLAIMS ONLY)  Patient's Last Name, First Name, M.I.  YOB: 1955  Nel Farmer                        Provider's Name  [facility] Medical Record No.  [id number]                               Onset Date:  02/12/17   Start of Care Date:         Type:     _X_PT   ___OT   ___SLP Medical Diagnosis:                           PT Diagnosis:  Impaired transfers   Visits from SOC:  1   _________________________________________________________________________________  Plan of Treatment/Functional Goals    Planned Interventions:  ,    balance training, bed mobility training, strengthening, transfer training, home program guidelines, progressive activity/exercise,       Goals: See Physical Therapy Goals on Care Plan in [facility] electronic health record.    Therapy Frequency: 5 times/week  Predicted Duration of Therapy Intervention: 1 week  _________________________________________________________________________________    I CERTIFY THE NEED FOR THESE SERVICES FURNISHED UNDER        THIS PLAN OF TREATMENT AND WHILE UNDER MY CARE     (Physician co-signature of this document indicates review and certification of the therapy plan).                   ,      Referring Physician: Angie Park MD            Initial Assessment        See Physical Therapy evaluation dated   in Epic electronic health record.

## 2017-11-09 NOTE — PROVIDER NOTIFICATION
Called on-call hospitalist Dr. Iglesias regarding . MD to increase fluids, order to put in catheter and recheck NA level as previously ordered.    Attending

## 2019-05-30 NOTE — PROGRESS NOTES
Social Work Progress Note     D:   SW following for d/c planning. Per chart review, weekend coverage SW followed up on multiple LTC placement referrals.     A/I: Writer following up on referrals. SW left message for Aida at [facility], confirming pt does have Medicaid according to our records and we would like staff to complete the in-person assessment that was offered for private room. SW requested Aida return writer's phone call to confirm the in-person assessment on Wednesday.    [facility] admissions staff contacted writer this AM and confirmed their liaison, MORENA, should be able to come by for assessment today. OCTAVIA has not yet heard a definite time of visit.     P: Referrals still pending for [facility], [facility], and [facility] (contact is Deonna). Unit SW will need to follow up with each facility.     GILMA Em [facility]  [phone]            Social Work Progress Note- ADDENDUM at 1610    D: OCTAVIA spoke with admissions at [facility]. MORENA, [facility] liaison, states that pt was not appropriate for their facility due to pt's inability to track the conversation, appearing disoriented, screaming and disrupting staff and patients. MORENA suggested we reach out to the [facility] as they may have an appropriate bed for pt.     SW then received voicemail from Nel at [facility]; per Nel, patient's MA only covers Medicare co-pays and deductions and does not cover nursing home care. Nel is wondering if pt has filed yet for full MA.  Nel confirmed they can be out here on Wednesday to perform on-site evaluation.     SW received voicemail from Svetlana at [facility]; she anticipates clinically they may be able to accept pt but has some questions for unit SW to answer.     P: SW will need to follow up with [facility] and possibly the [facility] on Tuesday. SW will need to contact financial office for assistance with MA application.     GILMA Em [facility]  [phone]      left rib

## 2020-11-12 NOTE — PROVIDER NOTIFICATION
MD notified of increased lethargy this shift- difficult to arrouse- cannot swallow safely. OK given to hold PM meds.    MD notified of BG decrease from 171 to 121 after missing dinner. Asked to hold HS humulin dose tonight.   Coronary artery disease involving native coronary artery of native heart without angina pectoris

## 2022-11-18 NOTE — PLAN OF CARE
Our lab is in [address]. I will comment on your results when they are all back.     Problem: Goal Outcome Summary  Goal: Goal Outcome Summary  SLP: Swallow tx completed this AM to assess appropriateness for diet upgrade. Pt drank 8 oz of thin liquids via cup and straw. She consistently takes large consecutive sips. Prompt swallow response; reduced hyolaryngeal elevated noted upon manual palpation. Pt ate x3 large bites of pudding and x2 ricardo cracker squares. Mastication slow but adequate. No overt s/sx of aspiration during all PO trials; however, pt remains at increased risk of aspiration/choking given impulsivity when eating and drinking. Recommend cautiously advance diet to dysphagia diet level 3, continue thin liquids. Recommend small bites and sips, slow pace and chew foods well, alternate solids/liquids, up in chair for meals, regular oral cares. Continue meds in puree. Pt will be safest with supervision during meals given impulsivity. SLP will continue to follow to assess tolerance of diet/appropriateness for upgrade and provide additional training on aspiration precautions. Recommend discharge to LTC with ongoing SLP services at this time.

## 2025-07-08 NOTE — PLAN OF CARE
Problem: Goal Outcome Summary  Goal: Goal Outcome Summary  Outcome: No Change  Scheduled tylenol for right foot pain. CMS intact. LONA last BM, Miralax given x1, no results yet. Blood glucoses elevated, at 2225 paged Dr. Serrano and obtained sliding scale coverage for patient. Incontinent of urine in large amounts. Alert and oriented but developmentally delayed. Turned and repositioned Q2H. Good appetite. SWS following for LTC placement.        Quality 508: Adult Covid-19 Vaccination Status: Patients who are up to date on their COVID-19 vaccinations as defined by CDC recommendations on current vaccination Quality 226: Preventive Care And Screening: Tobacco Use: Screening And Cessation Intervention: Patient screened for tobacco use and is an ex/non-smoker Quality 47: Advance Care Plan: Advance Care Planning discussed and documented; advance care plan or surrogate decision maker documented in the medical record. Detail Level: Generalized